# Patient Record
Sex: FEMALE | Race: WHITE | NOT HISPANIC OR LATINO | Employment: OTHER | URBAN - METROPOLITAN AREA
[De-identification: names, ages, dates, MRNs, and addresses within clinical notes are randomized per-mention and may not be internally consistent; named-entity substitution may affect disease eponyms.]

---

## 2017-01-04 ENCOUNTER — OFFICE VISIT (OUTPATIENT)
Dept: GASTROENTEROLOGY | Facility: CLINIC | Age: 67
End: 2017-01-04
Payer: MEDICARE

## 2017-01-04 ENCOUNTER — LAB VISIT (OUTPATIENT)
Dept: LAB | Facility: HOSPITAL | Age: 67
End: 2017-01-04
Attending: INTERNAL MEDICINE
Payer: MEDICARE

## 2017-01-04 VITALS
BODY MASS INDEX: 16.01 KG/M2 | HEART RATE: 80 BPM | SYSTOLIC BLOOD PRESSURE: 132 MMHG | WEIGHT: 105.63 LBS | DIASTOLIC BLOOD PRESSURE: 82 MMHG | HEIGHT: 68 IN

## 2017-01-04 DIAGNOSIS — K50.018 CROHN'S DISEASE OF SMALL INTESTINE WITH OTHER COMPLICATION: ICD-10-CM

## 2017-01-04 DIAGNOSIS — R63.4 WEIGHT LOSS, NON-INTENTIONAL: ICD-10-CM

## 2017-01-04 DIAGNOSIS — K59.09 CONSTIPATION, CHRONIC: ICD-10-CM

## 2017-01-04 DIAGNOSIS — K50.018 CROHN'S DISEASE OF SMALL INTESTINE WITH OTHER COMPLICATION: Primary | ICD-10-CM

## 2017-01-04 LAB
ANION GAP SERPL CALC-SCNC: 9 MMOL/L
BASOPHILS # BLD AUTO: 0.01 K/UL
BASOPHILS NFR BLD: 0.2 %
BUN SERPL-MCNC: 9 MG/DL
CALCIUM SERPL-MCNC: 9.4 MG/DL
CHLORIDE SERPL-SCNC: 102 MMOL/L
CO2 SERPL-SCNC: 26 MMOL/L
CREAT SERPL-MCNC: 0.9 MG/DL
CRP SERPL-MCNC: 1.2 MG/L
DIFFERENTIAL METHOD: ABNORMAL
EOSINOPHIL # BLD AUTO: 0 K/UL
EOSINOPHIL NFR BLD: 0.3 %
ERYTHROCYTE [DISTWIDTH] IN BLOOD BY AUTOMATED COUNT: 12.5 %
ERYTHROCYTE [SEDIMENTATION RATE] IN BLOOD BY WESTERGREN METHOD: 8 MM/HR
EST. GFR  (AFRICAN AMERICAN): >60 ML/MIN/1.73 M^2
EST. GFR  (NON AFRICAN AMERICAN): >60 ML/MIN/1.73 M^2
GLUCOSE SERPL-MCNC: 101 MG/DL
HCT VFR BLD AUTO: 39.9 %
HGB BLD-MCNC: 13.9 G/DL
LYMPHOCYTES # BLD AUTO: 1.6 K/UL
LYMPHOCYTES NFR BLD: 23.6 %
MCH RBC QN AUTO: 33.4 PG
MCHC RBC AUTO-ENTMCNC: 34.8 %
MCV RBC AUTO: 96 FL
MONOCYTES # BLD AUTO: 0.7 K/UL
MONOCYTES NFR BLD: 10.3 %
NEUTROPHILS # BLD AUTO: 4.3 K/UL
NEUTROPHILS NFR BLD: 65.4 %
PLATELET # BLD AUTO: 257 K/UL
PMV BLD AUTO: 9.9 FL
POTASSIUM SERPL-SCNC: 4.3 MMOL/L
RBC # BLD AUTO: 4.16 M/UL
SODIUM SERPL-SCNC: 137 MMOL/L
TSH SERPL DL<=0.005 MIU/L-ACNC: 1.33 UIU/ML
WBC # BLD AUTO: 6.62 K/UL

## 2017-01-04 PROCEDURE — 85651 RBC SED RATE NONAUTOMATED: CPT | Mod: PO

## 2017-01-04 PROCEDURE — 1126F AMNT PAIN NOTED NONE PRSNT: CPT | Mod: S$GLB,,, | Performed by: INTERNAL MEDICINE

## 2017-01-04 PROCEDURE — 3075F SYST BP GE 130 - 139MM HG: CPT | Mod: S$GLB,,, | Performed by: INTERNAL MEDICINE

## 2017-01-04 PROCEDURE — 1159F MED LIST DOCD IN RCRD: CPT | Mod: S$GLB,,, | Performed by: INTERNAL MEDICINE

## 2017-01-04 PROCEDURE — 3079F DIAST BP 80-89 MM HG: CPT | Mod: S$GLB,,, | Performed by: INTERNAL MEDICINE

## 2017-01-04 PROCEDURE — 84443 ASSAY THYROID STIM HORMONE: CPT

## 2017-01-04 PROCEDURE — 99214 OFFICE O/P EST MOD 30 MIN: CPT | Mod: S$GLB,,, | Performed by: INTERNAL MEDICINE

## 2017-01-04 PROCEDURE — 36415 COLL VENOUS BLD VENIPUNCTURE: CPT | Mod: PO

## 2017-01-04 PROCEDURE — 86140 C-REACTIVE PROTEIN: CPT

## 2017-01-04 PROCEDURE — 99499 UNLISTED E&M SERVICE: CPT | Mod: S$GLB,,, | Performed by: INTERNAL MEDICINE

## 2017-01-04 PROCEDURE — 1160F RVW MEDS BY RX/DR IN RCRD: CPT | Mod: S$GLB,,, | Performed by: INTERNAL MEDICINE

## 2017-01-04 PROCEDURE — 85025 COMPLETE CBC W/AUTO DIFF WBC: CPT

## 2017-01-04 PROCEDURE — 80048 BASIC METABOLIC PNL TOTAL CA: CPT

## 2017-01-04 PROCEDURE — 99999 PR PBB SHADOW E&M-EST. PATIENT-LVL III: CPT | Mod: PBBFAC,,, | Performed by: INTERNAL MEDICINE

## 2017-01-04 PROCEDURE — 1157F ADVNC CARE PLAN IN RCRD: CPT | Mod: S$GLB,,, | Performed by: INTERNAL MEDICINE

## 2017-01-04 RX ORDER — MESALAMINE 0.38 G/1
1.5 CAPSULE, EXTENDED RELEASE ORAL DAILY
Qty: 360 CAPSULE | Refills: 3 | Status: SHIPPED | OUTPATIENT
Start: 2017-01-04 | End: 2017-07-10 | Stop reason: ALTCHOICE

## 2017-01-04 RX ORDER — MESALAMINE 0.38 G/1
1.5 CAPSULE, EXTENDED RELEASE ORAL DAILY
Qty: 120 CAPSULE | Refills: 0 | Status: SHIPPED | OUTPATIENT
Start: 2017-01-04 | End: 2017-02-10

## 2017-01-04 RX ORDER — AMLODIPINE BESYLATE 10 MG/1
TABLET ORAL
Qty: 90 TABLET | Refills: 1 | Status: SHIPPED | OUTPATIENT
Start: 2017-01-04 | End: 2017-05-18 | Stop reason: SDUPTHER

## 2017-01-04 NOTE — MR AVS SNAPSHOT
[facility]  [address] [place] [address]  Phone: [phone]  Fax: [phone]                  Manuela Reyes   2017 10:40 AM   Office Visit    Description:  Female : 1950   Provider:  Jose Luis Leonard MD   Department:  [facility] - Gastroenterology           Reason for Visit     Crohn's Disease     Irritable Bowel Syndrome     Follow-up     Constipation     Bloated     Weight Loss           Diagnoses this Visit        Comments    Crohn's disease of small intestine with other complication    -  Primary     Constipation, chronic         Weight loss, non-intentional                To Do List           Future Appointments        Provider Department Dept Phone    2017 4:45 PM LAB, SAME DAY [facility] [phone]    2017 8:00 AM LABORATORY, [address] [facility] [phone]    2017 8:30 AM ONLC [facility] [phone]    2017 11:00 AM [facility] Luis Leonard [facility] [phone]      Goals (5 Years of Data)     None      Follow-Up and Disposition     Return in about 3 months (around 2017).       These Medications        Disp Refills Start End    mesalamine (APRISO) 0.375 gram Cp24 360 capsule 3 2017    Take 4 capsules (1.5 g total) by mouth once daily. - Oral    Pharmacy: [facility] [address] Ph #: [phone]       mesalamine (APRISO) 0.375 gram Cp24 120 capsule 0 2017    Take 4 capsules (1.5 g total) by mouth once daily. - Oral    Pharmacy: [facility] [place] [address] - [place]  [address] Ph #: [phone]         [facility] Call     [facility] On Call Nurse Care Line -  Assistance  Registered nurses in the [facility] On Call Center provide clinical advisement, health education, appointment booking, and other advisory services.  Call for this free service at  [phone].             Medications           Message regarding Medications     Verify the changes and/or additions to your medication regime listed below are the same as discussed with your clinician today.  If any of these changes or additions are incorrect, please notify your healthcare provider.        START taking these NEW medications        Refills    mesalamine (APRISO) 0.375 gram Cp24 3    Sig: Take 4 capsules (1.5 g total) by mouth once daily.    Class: Normal    Route: Oral    mesalamine (APRISO) 0.375 gram Cp24 0    Sig: Take 4 capsules (1.5 g total) by mouth once daily.    Class: Normal    Route: Oral      STOP taking these medications     PENTASA 500 mg CR capsule TAKE 2 CAPSULES TWICE DAILY           Verify that the below list of medications is an accurate representation of the medications you are currently taking.  If none reported, the list may be blank. If incorrect, please contact your healthcare provider. Carry this list with you in case of emergency.           Current Medications     amlodipine (NORVASC) 10 MG tablet TAKE 1 TABLET EVERY DAY    aspirin (ECOTRIN) 325 MG EC tablet Take 81 mg by mouth once daily.     calcium carbonate (OS-COOPER) 600 mg (1,500 mg) Tab Take 600 mg by mouth 2 (two) times daily with meals.    cyanocobalamin, vitamin B-12, 2,500 mcg Lozg Place 1 tablet under the tongue 2 (two) times daily.    fluticasone (FLONASE) 50 mcg/actuation nasal spray USE 2 SPRAYS IN EACH NOSTRIL EVERY DAY    loratadine (CLARITIN) 10 mg tablet     lorazepam (ATIVAN) 1 MG tablet Take 1 tablet (1 mg total) by mouth every evening.    vitamin D 1000 units Tab Take 185 mg by mouth once daily.    desonide 0.05% (DESOWEN) 0.05 % Oint Apply topically 3 (three) times daily. Apply to upper and lower eyelid rims OU TID for 7 days.    mesalamine (APRISO) 0.375 gram Cp24 Take 4 capsules (1.5 g total) by mouth once daily.    mesalamine (APRISO) 0.375 gram Cp24 Take 4 capsules (1.5 g total) by mouth once  "daily.    valacyclovir (VALTREX) 500 MG tablet Take 1 tablet (500 mg total) by mouth 2 (two) times daily.           Clinical Reference Information           Vital Signs - Last Recorded  Most recent update: 1/4/2017 11:16 AM by Aurelia Mercado LPN    BP Pulse Ht Wt BMI    132/82 80 5' 8.4" (1.737 m) 47.9 kg (105 lb 9.6 oz) 15.87 kg/m2      Blood Pressure          Most Recent Value    BP  132/82      Allergies as of 1/4/2017     Codeine Sulfate    Hydrochlorothiazide    Lisinopril      Immunizations Administered on Date of Encounter - 1/4/2017     None      Orders Placed During Today's Visit     Future Labs/Procedures Expected by Expires    Basic metabolic panel  1/4/2017 3/5/2018    C-reactive protein  1/4/2017 3/5/2018    CBC auto differential  1/4/2017 3/5/2018    ESR (SEDIMENTATION RATE, MANUAL)  1/4/2017 3/5/2018    TSH  1/4/2017 3/5/2018      "
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
## 2017-01-05 ENCOUNTER — PATIENT MESSAGE (OUTPATIENT)
Dept: GASTROENTEROLOGY | Facility: HOSPITAL | Age: 67
End: 2017-01-05

## 2017-01-05 ENCOUNTER — PATIENT MESSAGE (OUTPATIENT)
Dept: GASTROENTEROLOGY | Facility: CLINIC | Age: 67
End: 2017-01-05

## 2017-01-05 DIAGNOSIS — K50.018 CROHN'S DISEASE OF SMALL INTESTINE WITH OTHER COMPLICATION: ICD-10-CM

## 2017-01-05 DIAGNOSIS — R63.4 WEIGHT LOSS, NON-INTENTIONAL: Primary | ICD-10-CM

## 2017-01-05 NOTE — TELEPHONE ENCOUNTER
Labs normal. Will get CT A/P to evaluate weight loss.   Results sent to patient through [facility].

## 2017-01-09 ENCOUNTER — TELEPHONE (OUTPATIENT)
Dept: GASTROENTEROLOGY | Facility: CLINIC | Age: 67
End: 2017-01-09

## 2017-01-10 ENCOUNTER — PATIENT MESSAGE (OUTPATIENT)
Dept: GASTROENTEROLOGY | Facility: CLINIC | Age: 67
End: 2017-01-10

## 2017-01-11 ENCOUNTER — HOSPITAL ENCOUNTER (OUTPATIENT)
Dept: RADIOLOGY | Facility: HOSPITAL | Age: 67
Discharge: HOME | End: 2017-01-11
Attending: INTERNAL MEDICINE
Payer: MEDICARE

## 2017-01-11 ENCOUNTER — PATIENT MESSAGE (OUTPATIENT)
Dept: GASTROENTEROLOGY | Facility: CLINIC | Age: 67
End: 2017-01-11

## 2017-01-11 DIAGNOSIS — R63.4 WEIGHT LOSS, NON-INTENTIONAL: ICD-10-CM

## 2017-01-11 DIAGNOSIS — K50.018 CROHN'S DISEASE OF SMALL INTESTINE WITH OTHER COMPLICATION: ICD-10-CM

## 2017-01-11 PROCEDURE — 74177 CT ABD & PELVIS W/CONTRAST: CPT | Mod: TC

## 2017-01-11 PROCEDURE — 25500020 PHARM REV CODE 255: Performed by: INTERNAL MEDICINE

## 2017-01-11 RX ADMIN — IOHEXOL 75 ML: 300 INJECTION, SOLUTION INTRAVENOUS at 01:01

## 2017-01-11 RX ADMIN — IOHEXOL 30 ML: 350 INJECTION, SOLUTION INTRAVENOUS at 11:01

## 2017-01-12 ENCOUNTER — PATIENT MESSAGE (OUTPATIENT)
Dept: GASTROENTEROLOGY | Facility: CLINIC | Age: 67
End: 2017-01-12

## 2017-01-12 ENCOUNTER — PATIENT MESSAGE (OUTPATIENT)
Dept: GASTROENTEROLOGY | Facility: HOSPITAL | Age: 67
End: 2017-01-12

## 2017-01-17 ENCOUNTER — PATIENT MESSAGE (OUTPATIENT)
Dept: INTERNAL MEDICINE | Facility: CLINIC | Age: 67
End: 2017-01-17

## 2017-01-18 RX ORDER — LORAZEPAM 1 MG/1
1 TABLET ORAL NIGHTLY
Qty: 30 TABLET | Refills: 5 | Status: SHIPPED | OUTPATIENT
Start: 2017-01-18 | End: 2017-07-17 | Stop reason: SDUPTHER

## 2017-01-20 ENCOUNTER — TELEPHONE (OUTPATIENT)
Dept: GASTROENTEROLOGY | Facility: CLINIC | Age: 67
End: 2017-01-20

## 2017-01-20 ENCOUNTER — PATIENT MESSAGE (OUTPATIENT)
Dept: GASTROENTEROLOGY | Facility: HOSPITAL | Age: 67
End: 2017-01-20

## 2017-01-20 RX ORDER — BUDESONIDE 3 MG/1
9 CAPSULE, COATED PELLETS ORAL DAILY
Qty: 90 CAPSULE | Refills: 1 | Status: SHIPPED | OUTPATIENT
Start: 2017-01-20 | End: 2017-02-19

## 2017-01-20 RX ORDER — BUDESONIDE 3 MG/1
9 CAPSULE, COATED PELLETS ORAL DAILY
Qty: 90 CAPSULE | Refills: 1 | Status: SHIPPED | OUTPATIENT
Start: 2017-01-20 | End: 2017-01-20 | Stop reason: SDUPTHER

## 2017-01-24 ENCOUNTER — PATIENT MESSAGE (OUTPATIENT)
Dept: INTERNAL MEDICINE | Facility: CLINIC | Age: 67
End: 2017-01-24

## 2017-01-27 ENCOUNTER — PATIENT MESSAGE (OUTPATIENT)
Dept: GASTROENTEROLOGY | Facility: HOSPITAL | Age: 67
End: 2017-01-27

## 2017-02-08 ENCOUNTER — LAB VISIT (OUTPATIENT)
Dept: LAB | Facility: HOSPITAL | Age: 67
End: 2017-02-08
Attending: INTERNAL MEDICINE
Payer: MEDICARE

## 2017-02-08 ENCOUNTER — PATIENT MESSAGE (OUTPATIENT)
Dept: GASTROENTEROLOGY | Facility: CLINIC | Age: 67
End: 2017-02-08

## 2017-02-08 DIAGNOSIS — I10 ESSENTIAL HYPERTENSION: ICD-10-CM

## 2017-02-08 LAB
ANION GAP SERPL CALC-SCNC: 12 MMOL/L
BUN SERPL-MCNC: 14 MG/DL
CALCIUM SERPL-MCNC: 9.4 MG/DL
CHLORIDE SERPL-SCNC: 101 MMOL/L
CHOLEST/HDLC SERPL: 2.5 {RATIO}
CO2 SERPL-SCNC: 24 MMOL/L
CREAT SERPL-MCNC: 0.8 MG/DL
EST. GFR  (AFRICAN AMERICAN): >60 ML/MIN/1.73 M^2
EST. GFR  (NON AFRICAN AMERICAN): >60 ML/MIN/1.73 M^2
GLUCOSE SERPL-MCNC: 89 MG/DL
HDL/CHOLESTEROL RATIO: 40.7 %
HDLC SERPL-MCNC: 182 MG/DL
HDLC SERPL-MCNC: 74 MG/DL
LDLC SERPL CALC-MCNC: 90.8 MG/DL
NONHDLC SERPL-MCNC: 108 MG/DL
POTASSIUM SERPL-SCNC: 4.3 MMOL/L
SODIUM SERPL-SCNC: 137 MMOL/L
TRIGL SERPL-MCNC: 86 MG/DL

## 2017-02-08 PROCEDURE — 80061 LIPID PANEL: CPT

## 2017-02-08 PROCEDURE — 36415 COLL VENOUS BLD VENIPUNCTURE: CPT

## 2017-02-08 PROCEDURE — 80048 BASIC METABOLIC PNL TOTAL CA: CPT

## 2017-02-10 ENCOUNTER — OFFICE VISIT (OUTPATIENT)
Dept: INTERNAL MEDICINE | Facility: CLINIC | Age: 67
End: 2017-02-10
Payer: MEDICARE

## 2017-02-10 VITALS
DIASTOLIC BLOOD PRESSURE: 72 MMHG | WEIGHT: 104.75 LBS | HEART RATE: 86 BPM | OXYGEN SATURATION: 98 % | BODY MASS INDEX: 15.87 KG/M2 | SYSTOLIC BLOOD PRESSURE: 142 MMHG | HEIGHT: 68 IN | TEMPERATURE: 98 F

## 2017-02-10 DIAGNOSIS — K50.00 CROHN'S DISEASE OF SMALL INTESTINE WITHOUT COMPLICATION: ICD-10-CM

## 2017-02-10 DIAGNOSIS — M81.0 OSTEOPOROSIS: ICD-10-CM

## 2017-02-10 DIAGNOSIS — I10 ESSENTIAL HYPERTENSION: Primary | ICD-10-CM

## 2017-02-10 DIAGNOSIS — K55.1 MESENTERIC ARTERY STENOSIS: ICD-10-CM

## 2017-02-10 DIAGNOSIS — R63.4 WEIGHT LOSS: ICD-10-CM

## 2017-02-10 PROCEDURE — 3077F SYST BP >= 140 MM HG: CPT | Mod: S$GLB,,, | Performed by: INTERNAL MEDICINE

## 2017-02-10 PROCEDURE — 99999 PR PBB SHADOW E&M-EST. PATIENT-LVL III: CPT | Mod: PBBFAC,,, | Performed by: INTERNAL MEDICINE

## 2017-02-10 PROCEDURE — 99214 OFFICE O/P EST MOD 30 MIN: CPT | Mod: S$GLB,,, | Performed by: INTERNAL MEDICINE

## 2017-02-10 PROCEDURE — 1160F RVW MEDS BY RX/DR IN RCRD: CPT | Mod: S$GLB,,, | Performed by: INTERNAL MEDICINE

## 2017-02-10 PROCEDURE — 1126F AMNT PAIN NOTED NONE PRSNT: CPT | Mod: S$GLB,,, | Performed by: INTERNAL MEDICINE

## 2017-02-10 PROCEDURE — 99499 UNLISTED E&M SERVICE: CPT | Mod: S$GLB,,, | Performed by: INTERNAL MEDICINE

## 2017-02-10 PROCEDURE — 1159F MED LIST DOCD IN RCRD: CPT | Mod: S$GLB,,, | Performed by: INTERNAL MEDICINE

## 2017-02-10 PROCEDURE — 1157F ADVNC CARE PLAN IN RCRD: CPT | Mod: S$GLB,,, | Performed by: INTERNAL MEDICINE

## 2017-02-10 PROCEDURE — 3078F DIAST BP <80 MM HG: CPT | Mod: S$GLB,,, | Performed by: INTERNAL MEDICINE

## 2017-02-10 NOTE — MR AVS SNAPSHOT
[facility]  [address]  [place] [address]  Phone: [phone]  Fax: [phone]                  Manuela Reyes   2/10/2017 11:00 AM   Office Visit    Description:  Female : 1950   Provider:  Christelle Lawler DO   Department:  [facility]           Reason for Visit     Follow-up           Diagnoses this Visit        Comments    Essential hypertension    -  Primary     Osteoporosis         Weight loss         Mesenteric artery stenosis         Crohn's disease of small intestine without complication                To Do List           Future Appointments        Provider Department Dept Phone    3/8/2017 9:30 AM [facility] Reed [facility] [phone]    2017 11:00 AM Jose Luis Leonard MD [facility] [phone]      Goals (5 Years of Data)     None      [facility] Nurse Care Line -  Assistance  Registered nurses in the [facility] On Call Center provide clinical advisement, health education, appointment booking, and other advisory services.  Call for this free service at [phone].             Medications           Message regarding Medications     Verify the changes and/or additions to your medication regime listed below are the same as discussed with your clinician today.  If any of these changes or additions are incorrect, please notify your healthcare provider.        STOP taking these medications     desonide 0.05% (DESOWEN) 0.05 % Oint Apply topically 3 (three) times daily. Apply to upper and lower eyelid rims OU TID for 7 days.    loratadine (CLARITIN) 10 mg tablet     valacyclovir (VALTREX) 500 MG tablet Take 1 tablet (500 mg total) by mouth 2 (two) times daily.           Verify that the below list of medications is an accurate representation of the medications you are currently taking.  If none reported, the list may be blank. If incorrect, please contact your  "healthcare provider. Carry this list with you in case of emergency.           Current Medications     amlodipine (NORVASC) 10 MG tablet TAKE 1 TABLET EVERY DAY    aspirin (ECOTRIN) 325 MG EC tablet Take 81 mg by mouth once daily.     budesonide (ENTOCORT EC) 3 mg capsule Take 3 capsules (9 mg total) by mouth once daily.    calcium carbonate (OS-COOPER) 600 mg (1,500 mg) Tab Take 600 mg by mouth 2 (two) times daily with meals.    cyanocobalamin, vitamin B-12, 2,500 mcg Lozg Place 1 tablet under the tongue 2 (two) times daily.    fluticasone (FLONASE) 50 mcg/actuation nasal spray USE 2 SPRAYS IN EACH NOSTRIL EVERY DAY    lorazepam (ATIVAN) 1 MG tablet Take 1 tablet (1 mg total) by mouth every evening.    mesalamine (APRISO) 0.375 gram Cp24 Take 4 capsules (1.5 g total) by mouth once daily.    vitamin D 1000 units Tab Take 185 mg by mouth once daily.           Clinical Reference Information           Your Vitals Were     BP Pulse Temp Height    142/72 (BP Location: Right arm, Patient Position: Sitting, BP Method: Manual) 86 97.5 °F (36.4 °C) (Tympanic) 5' 8.4" (1.737 m)    Weight SpO2 BMI    47.5 kg (104 lb 11.5 oz) 98% 15.74 kg/m2      Blood Pressure          Most Recent Value    BP  (!)  142/72      Allergies as of 2/10/2017     Codeine Sulfate    Hydrochlorothiazide    Lisinopril      Immunizations Administered on Date of Encounter - 2/10/2017     None      Orders Placed During Today's Visit      Normal Orders This Visit    Ambulatory consult to Vascular Surgery       Language Assistance Services     ATTENTION: Language assistance services are available, free of charge. Please call [phone].      ATENCIÓN: Si habla español, tiene a marte disposición servicios gratuitos de asistencia lingüística. Llame al [phone].     CHÚ Ý: N?u b?n nói Ti?ng Vi?t, có các d?ch v? h? tr? ngôn ng? mi?n phí dành cho b?n. G?i s? [phone].         [facility] complies with applicable Federal civil rights laws " and does not discriminate on the basis of race, color, national origin, age, disability, or sex.

## 2017-02-10 NOTE — PROGRESS NOTES
Subjective:       Patient ID: Manuela Reyes is a 67 y.o. female.    Chief Complaint: Follow-up    HPI Comments: Manuela Reyes  67 y.o. White female    Patient presents with:  Follow-up    HPI: Here today to follow up on chronic conditions.  HTN--stable on amlodipine.                     LDLCALC                  90.8                02/08/2017            She recently had a DEXA scan that shows osteoporosis. She takes vitamin D. She is active.   She has been losing weight. She was recently evaluated by her gastroenterologist for Crohn's disease. She had a CT scan that showed mesenteric stenosis. She denies abdominal pain with meals or anorexia. She is concerned about this.     Past Medical History:    Anxiety                                                       Crohn's disease                                               Depression                                                    Genital herpes                                                Hypertension                                                  Insomnia                                                      Osteoporosis                                                  TIA (transient ischemic attack)                               Current Outpatient Prescriptions on File Prior to Visit:  amlodipine (NORVASC) 10 MG tablet, TAKE 1 TABLET EVERY DAY, Disp: 90 tablet, Rfl: 1  aspirin (ECOTRIN) 325 MG EC tablet, Take 81 mg by mouth once daily. , Disp: , Rfl:   budesonide (ENTOCORT EC) 3 mg capsule, Take 3 capsules (9 mg total) by mouth once daily., Disp: 90 capsule, Rfl: 1  calcium carbonate (OS-COOPER) 600 mg (1,500 mg) Tab, Take 600 mg by mouth 2 (two) times daily with meals., Disp: , Rfl:   cyanocobalamin, vitamin B-12, 2,500 mcg Lozg, Place 1 tablet under the tongue 2 (two) times daily., Disp: , Rfl:   fluticasone (FLONASE) 50 mcg/actuation nasal spray, USE 2 SPRAYS IN EACH NOSTRIL EVERY DAY, Disp: 48 g, Rfl: 3  lorazepam (ATIVAN) 1 MG tablet, Take  1 tablet (1 mg total) by mouth every evening., Disp: 30 tablet, Rfl: 5  mesalamine (APRISO) 0.375 gram Cp24, Take 4 capsules (1.5 g total) by mouth once daily., Disp: 360 capsule, Rfl: 3  vitamin D 1000 units Tab, Take 185 mg by mouth once daily., Disp: , Rfl:     Allergies:  Review of patient's allergies indicates:   -- Codeine sulfate -- Itching   -- Hydrochlorothiazide -- Other (See Comments)    --  Adverse Reaction   -- Lisinopril -- Swelling and Edema    --  Facial Swelling        Review of Systems   Constitutional: Positive for unexpected weight change. Negative for fever.   Respiratory: Negative for cough and shortness of breath.    Cardiovascular: Negative for chest pain and leg swelling.   Gastrointestinal: Negative for abdominal pain and blood in stool.   Genitourinary: Negative for dysuria.   Neurological: Negative for dizziness and headaches.       Objective:      Physical Exam   Constitutional: She is oriented to person, place, and time. She appears well-developed and well-nourished. No distress.   Eyes: No scleral icterus.   Cardiovascular: Normal rate, regular rhythm and normal heart sounds.    Pulmonary/Chest: Effort normal and breath sounds normal. No respiratory distress. She has no wheezes. She has no rales.   Abdominal: Soft. Bowel sounds are normal. She exhibits no distension. There is no tenderness.   Surgical scars on abdomen     Musculoskeletal: She exhibits no edema.   Neurological: She is alert and oriented to person, place, and time.   Skin: Skin is warm and dry.   Psychiatric: She has a normal mood and affect.   Vitals reviewed.      Assessment:       1. Essential hypertension    2. Osteoporosis    3. Weight loss    4. Mesenteric artery stenosis    5. Crohn's disease of small intestine without complication        Plan:       Manuela was seen today for follow-up.    Diagnoses and all orders for this visit:    Essential hypertension  -     Continue current management    Osteoporosis  -      Continue vitamin D  -     Await finalized report    Weight loss  -     Monitor    Crohn's disease of small intestine without complication    Mesenteric artery stenosis  -     Ambulatory consult to Vascular Surgery    RTC in 6 months and as needed.

## 2017-02-19 ENCOUNTER — PATIENT MESSAGE (OUTPATIENT)
Dept: GASTROENTEROLOGY | Facility: CLINIC | Age: 67
End: 2017-02-19

## 2017-02-20 ENCOUNTER — TELEPHONE (OUTPATIENT)
Dept: INTERNAL MEDICINE | Facility: CLINIC | Age: 67
End: 2017-02-20

## 2017-02-20 DIAGNOSIS — M81.0 OSTEOPOROSIS: Primary | ICD-10-CM

## 2017-02-21 ENCOUNTER — TELEPHONE (OUTPATIENT)
Dept: RHEUMATOLOGY | Facility: CLINIC | Age: 67
End: 2017-02-21

## 2017-02-21 NOTE — TELEPHONE ENCOUNTER
Called patient regarding referral from Dr. Lawler, made an apt with Dr. DUVALL for 5.16.17 at 2.30 pm. Pt verbalized understanding. Will mail apt slip as well.

## 2017-02-21 NOTE — TELEPHONE ENCOUNTER
----- Message from Pita Luciano LPN sent at 2/21/2017  1:29 PM CST -----      ----- Message -----     From: Ines Ramirez     Sent: 2/21/2017   1:27 PM       To: Dione GARRETT Staff    Patient states that she was returning your call.   Call her at [phone].                                      key

## 2017-04-12 ENCOUNTER — OFFICE VISIT (OUTPATIENT)
Dept: GASTROENTEROLOGY | Facility: CLINIC | Age: 67
End: 2017-04-12
Payer: MEDICARE

## 2017-04-12 VITALS
HEIGHT: 68 IN | HEART RATE: 84 BPM | WEIGHT: 104.25 LBS | BODY MASS INDEX: 15.8 KG/M2 | DIASTOLIC BLOOD PRESSURE: 76 MMHG | SYSTOLIC BLOOD PRESSURE: 140 MMHG

## 2017-04-12 DIAGNOSIS — K55.1 SMA STENOSIS: ICD-10-CM

## 2017-04-12 DIAGNOSIS — K50.012 CROHN'S DISEASE OF SMALL INTESTINE WITH INTESTINAL OBSTRUCTION: Primary | ICD-10-CM

## 2017-04-12 DIAGNOSIS — K59.09 CONSTIPATION, CHRONIC: ICD-10-CM

## 2017-04-12 PROCEDURE — 1157F ADVNC CARE PLAN IN RCRD: CPT | Mod: S$GLB,,, | Performed by: INTERNAL MEDICINE

## 2017-04-12 PROCEDURE — 99214 OFFICE O/P EST MOD 30 MIN: CPT | Mod: S$GLB,,, | Performed by: INTERNAL MEDICINE

## 2017-04-12 PROCEDURE — 1160F RVW MEDS BY RX/DR IN RCRD: CPT | Mod: S$GLB,,, | Performed by: INTERNAL MEDICINE

## 2017-04-12 PROCEDURE — 1159F MED LIST DOCD IN RCRD: CPT | Mod: S$GLB,,, | Performed by: INTERNAL MEDICINE

## 2017-04-12 PROCEDURE — 1126F AMNT PAIN NOTED NONE PRSNT: CPT | Mod: S$GLB,,, | Performed by: INTERNAL MEDICINE

## 2017-04-12 PROCEDURE — 99999 PR PBB SHADOW E&M-EST. PATIENT-LVL III: CPT | Mod: PBBFAC,,, | Performed by: INTERNAL MEDICINE

## 2017-04-12 PROCEDURE — 3078F DIAST BP <80 MM HG: CPT | Mod: S$GLB,,, | Performed by: INTERNAL MEDICINE

## 2017-04-12 PROCEDURE — 3077F SYST BP >= 140 MM HG: CPT | Mod: S$GLB,,, | Performed by: INTERNAL MEDICINE

## 2017-04-12 PROCEDURE — 99499 UNLISTED E&M SERVICE: CPT | Mod: S$GLB,,, | Performed by: INTERNAL MEDICINE

## 2017-04-12 RX ORDER — SODIUM, POTASSIUM,MAG SULFATES 17.5-3.13G
1 SOLUTION, RECONSTITUTED, ORAL ORAL ONCE
Qty: 1 BOTTLE | Refills: 0 | Status: SHIPPED | OUTPATIENT
Start: 2017-04-12 | End: 2017-04-12

## 2017-04-12 NOTE — PROGRESS NOTES
Clinic Follow Up:  [facility] Follow Up Note    Reason for Follow Up:  The primary encounter diagnosis was Crohn's disease of small intestine with intestinal obstruction. Diagnoses of Constipation, chronic and SMA stenosis were also pertinent to this visit.    PCP: Christelle Lawler       HPI:  This is a 67 y.o. female here for follow up of the above issues.  She has been doing better since our last visit.  She has been taking Apriso 1.5 grams daily and is about to finish a 3 month course of Entocort.  She reports with this combination her symptoms have improved dramatically.  Her bloating is much improved but still occurs occasionally.  She has implemented a low fiber diet and reports that she has only had 2 significant episodes of abdominal bloating.  She is also using MiraLAX once a day and reports having a bowel movement on most days.  Yesterday she had a little bit of diarrhea and urgency but this is the only time this has happened in the last few months.  Her abdominal discomfort is improved as well.  Her weight has been stable.  After her last visit she had a CT scan done and it revealed some narrowing of the proximal SMA.  She has seen vascular surgery for this and no intervention was recommended.    Review of Systems:  CONSTITUTIONAL: Denies weight change,  fatigue, fevers, chills, night sweats.  CARDIOVASCULAR: Denies chest pain, shortness of breath, orthopnea and edema.  RESPIRATORY: Denies cough, hemoptysis, dyspnea, and wheezing.  GI: See HPI.  : Denies dysuria and hematuria    Medical History:  Past Medical History:   Diagnosis Date    Anxiety     Crohn's disease     Depression     Genital herpes     Hypertension     Insomnia     Osteoporosis     TIA (transient ischemic attack)        Surgical History:   Past Surgical History:   Procedure Laterality Date    APPENDECTOMY      SMALL INTESTINE SURGERY         Family History:   Family History   Problem Relation Age of Onset     "Stroke Mother     Cancer Father      Lung    Glaucoma Neg Hx     Macular degeneration Neg Hx     Thyroid disease Neg Hx        Social History:   Social History   Substance Use Topics    Smoking status: Former Smoker     Years: 20.00     Types: Cigarettes     Quit date: 1/1/2005    Smokeless tobacco: Never Used      Comment: Former Light Smoker less than 10 a day    Alcohol use 2.4 oz/week     4 Glasses of wine per week       Allergies: Reviewed    Home Medications:  Medication List with Changes/Refills   New Medications    SODIUM,POTASSIUM,MAG SULFATES (SUPREP BOWEL PREP KIT) 17.5-3.13-1.6 GRAM SOLR    Take 1 kit by mouth once.   Current Medications    AMLODIPINE (NORVASC) 10 MG TABLET    TAKE 1 TABLET EVERY DAY    ASPIRIN (ECOTRIN) 325 MG EC TABLET    Take 81 mg by mouth once daily.     CALCIUM CARBONATE (OS-COOPER) 600 MG (1,500 MG) TAB    Take 600 mg by mouth 2 (two) times daily with meals.    CYANOCOBALAMIN, VITAMIN B-12, 2,500 MCG LOZG    Place 1 tablet under the tongue 2 (two) times daily.    FLUTICASONE (FLONASE) 50 MCG/ACTUATION NASAL SPRAY    USE 2 SPRAYS IN EACH NOSTRIL EVERY DAY    LORATADINE ORAL    Take 10 mg by mouth.    LORAZEPAM (ATIVAN) 1 MG TABLET    Take 1 tablet (1 mg total) by mouth every evening.    MESALAMINE (APRISO) 0.375 GRAM CP24    Take 4 capsules (1.5 g total) by mouth once daily.    VITAMIN D 1000 UNITS TAB    Take 185 mg by mouth once daily.       Physical Exam:  Vital Signs:  BP (!) 140/76  Pulse 84  Ht 5' 8.4" (1.737 m)  Wt 47.3 kg (104 lb 4.4 oz)  BMI 15.67 kg/m2  Body mass index is 15.67 kg/(m^2).      GENERAL: No acute distress, Alert and oriented x 4  EYES: Anicteric, no pallor noted.  ENT: Oropharynx is clear  NECK: Supple, no masses, no thyromegally.  CHEST: Equal breath sounds bilaterally, no wheezing.  CARDIOVASCULAR: Regular rate and rhythm. Murmurs, rubs and gallops absent.  ABDOMEN: soft, non-tender, non-distended, normal bowel sounds.  EXTREMITIES: No clubbing, " cyanosis or edema.    Labs: Pertinent labs reviewed.  Recent BMP noted.    Imaging: CT scan personally reviewed again.    Endoscopy:  Not applicable.    CRC Screening: Due for colonoscopy.    Assessment:  1. Crohn's disease of small intestine with intestinal obstruction    2. Constipation, chronic    3. SMA stenosis        Recommendations:  1.  Crohn's disease: Overall her symptoms are much better.  I recommend that she continue Apriso for the time being.  We will evaluate her colon in the near future with colonoscopy.  This will help to determine if the stenotic area seen on CT scan is more related to an inflammatory stricture or fibrotic stricture.  Continue low fiber diet at the present time.    2.  Chronic constipation: Continue MiraLAX.  Symptoms under better control.    3.  SMA stenosis.  Seen by vascular surgery.  No intervention recommended at the present time.    Return to Clinic:    Follow up based on the above evaluation.

## 2017-04-12 NOTE — MR AVS SNAPSHOT
[facility]  [address] [address]  Phone: [phone]  Fax: [phone]                  Manuela Reyes   2017 11:00 AM   Office Visit    Description:  Female : 1950   Provider:  Jose Luis Leonard MD   Department:  [facility]           Reason for Visit     Crohn's Disease     Irritable Bowel Syndrome           Diagnoses this Visit        Comments    Crohn's disease of small intestine with intestinal obstruction    -  Primary     Constipation, chronic         SMA stenosis                To Do List           Future Appointments        Provider Department Dept Phone    2017 2:30 PM Jayjay Larkin MD [facility] [phone]      Your Future Surgeries/Procedures     May 04, 2017   Surgery with Jose Luis Leonard MD   [facility])    [address] [address] [phone]              Goals (5 Years of Data)     None       These Medications        Disp Refills Start End    sodium,potassium,mag sulfates (SUPREP BOWEL PREP KIT) 17.5-3.13-1.6 gram SolR 1 Bottle 0 2017    Take 1 kit by mouth once. - Oral    Pharmacy: [facility] - [place] [id number] [facility] Ph #: [phone]         [facility]     [facility] Care Line - 24/ Assistance  Unless otherwise directed by your provider, please contact [facility], our nurse care line that is available for 24/7 assistance.     Registered nurses in the [facility] provide: appointment scheduling, clinical advisement, health education, and other advisory services.  Call: [phone] (toll free)               Medications           Message regarding Medications     Verify the changes and/or additions to your medication regime listed below are the same as discussed with your clinician today.  If any of these changes or additions are  "incorrect, please notify your healthcare provider.        START taking these NEW medications        Refills    sodium,potassium,mag sulfates (SUPREP BOWEL PREP KIT) 17.5-3.13-1.6 gram SolR 0    Sig: Take 1 kit by mouth once.    Class: Normal    Route: Oral           Verify that the below list of medications is an accurate representation of the medications you are currently taking.  If none reported, the list may be blank. If incorrect, please contact your healthcare provider. Carry this list with you in case of emergency.           Current Medications     amlodipine (NORVASC) 10 MG tablet TAKE 1 TABLET EVERY DAY    aspirin (ECOTRIN) 325 MG EC tablet Take 81 mg by mouth once daily.     calcium carbonate (OS-COOPER) 600 mg (1,500 mg) Tab Take 600 mg by mouth 2 (two) times daily with meals.    cyanocobalamin, vitamin B-12, 2,500 mcg Lozg Place 1 tablet under the tongue 2 (two) times daily.    fluticasone (FLONASE) 50 mcg/actuation nasal spray USE 2 SPRAYS IN EACH NOSTRIL EVERY DAY    LORATADINE ORAL Take 10 mg by mouth.    lorazepam (ATIVAN) 1 MG tablet Take 1 tablet (1 mg total) by mouth every evening.    mesalamine (APRISO) 0.375 gram Cp24 Take 4 capsules (1.5 g total) by mouth once daily.    vitamin D 1000 units Tab Take 185 mg by mouth once daily.    sodium,potassium,mag sulfates (SUPREP BOWEL PREP KIT) 17.5-3.13-1.6 gram SolR Take 1 kit by mouth once.           Clinical Reference Information           Your Vitals Were     BP Pulse Height Weight BMI    140/76 84 5' 8.4" (1.737 m) 47.3 kg (104 lb 4.4 oz) 15.67 kg/m2      Blood Pressure          Most Recent Value    BP  (!)  140/76      Allergies as of 4/12/2017     Codeine Sulfate    Hydrochlorothiazide    Lisinopril      Immunizations Administered on Date of Encounter - 4/12/2017     None      Orders Placed During Today's Visit      Normal Orders This Visit    Case request GI: COLONOSCOPY       Language Assistance Services     ATTENTION: Language assistance services " are available, free of charge. Please call [phone].      ATENCIÓN: Si habla español, tiene a marte disposición servicios gratuitos de asistencia lingüística. Llame al [phone].     CHÚ Ý: N?u b?n nói Ti?ng Vi?t, có các d?ch v? h? tr? ngôn ng? mi?n phí dành cho b?n. G?i s? [phone].         [facility] complies with applicable Federal civil rights laws and does not discriminate on the basis of race, color, national origin, age, disability, or sex.

## 2017-04-13 ENCOUNTER — PATIENT MESSAGE (OUTPATIENT)
Dept: GASTROENTEROLOGY | Facility: CLINIC | Age: 67
End: 2017-04-13

## 2017-04-23 ENCOUNTER — PATIENT MESSAGE (OUTPATIENT)
Dept: GASTROENTEROLOGY | Facility: CLINIC | Age: 67
End: 2017-04-23

## 2017-04-26 ENCOUNTER — PATIENT MESSAGE (OUTPATIENT)
Dept: GASTROENTEROLOGY | Facility: CLINIC | Age: 67
End: 2017-04-26

## 2017-04-27 ENCOUNTER — TELEPHONE (OUTPATIENT)
Dept: GASTROENTEROLOGY | Facility: CLINIC | Age: 67
End: 2017-04-27

## 2017-04-27 NOTE — TELEPHONE ENCOUNTER
----- Message from Sergio Orosco sent at 4/27/2017 10:44 AM CDT -----  Contact: pt   States she is calling to discuss her colonoscopy appt and can be reached at [phone]//[email or web address]

## 2017-04-27 NOTE — TELEPHONE ENCOUNTER
Spoke with patient. She was originally scheduled for colonoscopy on May 4, 2017.  Was rescheduled to 6/1/2017 by schedulers. She would like to know if you want the colonoscopy sooner and if she will have to take the Apriso no matter what the colonoscopy shows.  She states the Apriso is very expensive and she just want to know if it is a long term medication. Please advise.

## 2017-05-01 ENCOUNTER — TELEPHONE (OUTPATIENT)
Dept: GASTROENTEROLOGY | Facility: CLINIC | Age: 67
End: 2017-05-01

## 2017-05-01 ENCOUNTER — PATIENT MESSAGE (OUTPATIENT)
Dept: GASTROENTEROLOGY | Facility: CLINIC | Age: 67
End: 2017-05-01

## 2017-05-01 NOTE — TELEPHONE ENCOUNTER
Spoke with patient. Informed her the if she is feeling well ok to wait until June to do the colonoscopy. Based on findings at the time of her colonoscopy she may need to continue the Apriso, buy may decide to switch medication .  Will need to be on some medication for the remainder of her life per Dr. Leonard.  Patient voiced understanding.

## 2017-05-01 NOTE — TELEPHONE ENCOUNTER
If she is feeling OK then we can wait until June to do the colonoscopy. Based on what we find at the time of the colonoscopy we may need to continue Apriso, but we may decide to switch to another medication. She will need to be on some medication for the remainder of her life.

## 2017-05-18 RX ORDER — AMLODIPINE BESYLATE 10 MG/1
TABLET ORAL
Qty: 90 TABLET | Refills: 2 | Status: SHIPPED | OUTPATIENT
Start: 2017-05-18 | End: 2018-03-28 | Stop reason: SDUPTHER

## 2017-05-19 ENCOUNTER — ANESTHESIA (OUTPATIENT)
Dept: ENDOSCOPY | Facility: HOSPITAL | Age: 67
End: 2017-05-19
Payer: MEDICARE

## 2017-05-19 ENCOUNTER — SURGERY (OUTPATIENT)
Age: 67
End: 2017-05-19

## 2017-05-19 ENCOUNTER — ANESTHESIA EVENT (OUTPATIENT)
Dept: ENDOSCOPY | Facility: HOSPITAL | Age: 67
End: 2017-05-19
Payer: MEDICARE

## 2017-05-19 ENCOUNTER — HOSPITAL ENCOUNTER (OUTPATIENT)
Facility: HOSPITAL | Age: 67
Discharge: HOME | End: 2017-05-19
Attending: INTERNAL MEDICINE | Admitting: INTERNAL MEDICINE
Payer: MEDICARE

## 2017-05-19 VITALS
WEIGHT: 102 LBS | SYSTOLIC BLOOD PRESSURE: 120 MMHG | TEMPERATURE: 98 F | OXYGEN SATURATION: 98 % | DIASTOLIC BLOOD PRESSURE: 72 MMHG | HEART RATE: 77 BPM | HEIGHT: 67 IN | BODY MASS INDEX: 16.01 KG/M2 | RESPIRATION RATE: 16 BRPM

## 2017-05-19 VITALS — RESPIRATION RATE: 14 BRPM

## 2017-05-19 DIAGNOSIS — K50.118 CROHN'S COLITIS, OTHER COMPLICATION: ICD-10-CM

## 2017-05-19 PROBLEM — K50.10 CROHN'S COLITIS: Status: ACTIVE | Noted: 2017-05-19

## 2017-05-19 PROCEDURE — 27201012 HC FORCEPS, HOT/COLD, DISP: Performed by: INTERNAL MEDICINE

## 2017-05-19 PROCEDURE — 37000009 HC ANESTHESIA EA ADD 15 MINS: Performed by: INTERNAL MEDICINE

## 2017-05-19 PROCEDURE — 88305 TISSUE EXAM BY PATHOLOGIST: CPT | Mod: 26,,, | Performed by: PATHOLOGY

## 2017-05-19 PROCEDURE — 25000003 PHARM REV CODE 250: Performed by: NURSE ANESTHETIST, CERTIFIED REGISTERED

## 2017-05-19 PROCEDURE — 45380 COLONOSCOPY AND BIOPSY: CPT | Performed by: INTERNAL MEDICINE

## 2017-05-19 PROCEDURE — 63600175 PHARM REV CODE 636 W HCPCS: Performed by: NURSE ANESTHETIST, CERTIFIED REGISTERED

## 2017-05-19 PROCEDURE — 45380 COLONOSCOPY AND BIOPSY: CPT | Mod: ,,, | Performed by: INTERNAL MEDICINE

## 2017-05-19 PROCEDURE — 88305 TISSUE EXAM BY PATHOLOGIST: CPT | Performed by: PATHOLOGY

## 2017-05-19 PROCEDURE — 37000008 HC ANESTHESIA 1ST 15 MINUTES: Performed by: INTERNAL MEDICINE

## 2017-05-19 PROCEDURE — 25000003 PHARM REV CODE 250: Performed by: INTERNAL MEDICINE

## 2017-05-19 RX ORDER — SODIUM CHLORIDE, SODIUM LACTATE, POTASSIUM CHLORIDE, CALCIUM CHLORIDE 600; 310; 30; 20 MG/100ML; MG/100ML; MG/100ML; MG/100ML
INJECTION, SOLUTION INTRAVENOUS CONTINUOUS
Status: DISCONTINUED | OUTPATIENT
Start: 2017-05-19 | End: 2017-05-19 | Stop reason: HOSPADM

## 2017-05-19 RX ORDER — LIDOCAINE HYDROCHLORIDE 10 MG/ML
INJECTION INFILTRATION; PERINEURAL
Status: DISCONTINUED | OUTPATIENT
Start: 2017-05-19 | End: 2017-05-19

## 2017-05-19 RX ORDER — ASPIRIN 81 MG/1
81 TABLET ORAL DAILY
COMMUNITY
Start: 2017-05-19

## 2017-05-19 RX ORDER — PROPOFOL 10 MG/ML
VIAL (ML) INTRAVENOUS
Status: DISCONTINUED | OUTPATIENT
Start: 2017-05-19 | End: 2017-05-19

## 2017-05-19 RX ADMIN — PROPOFOL 50 MG: 10 INJECTION, EMULSION INTRAVENOUS at 09:05

## 2017-05-19 RX ADMIN — LIDOCAINE HYDROCHLORIDE 50 MG: 10 INJECTION, SOLUTION INFILTRATION; PERINEURAL at 09:05

## 2017-05-19 RX ADMIN — PROPOFOL 20 MG: 10 INJECTION, EMULSION INTRAVENOUS at 09:05

## 2017-05-19 RX ADMIN — SODIUM CHLORIDE, SODIUM LACTATE, POTASSIUM CHLORIDE, AND CALCIUM CHLORIDE: .6; .31; .03; .02 INJECTION, SOLUTION INTRAVENOUS at 08:05

## 2017-05-19 NOTE — H&P
Short Stay Endoscopy History and Physical    PCP - Christelle Lawler, DO    Procedure - Colonoscopy  ASA - 2  Mallampati - per anesthesia  History of Anesthesia problems - no  Family history Anesthesia problems -  no     HPI:  This is a 67 y.o. female here for evaluation of :     Average Risk Screening: No  High risk screening: yes  History of polyps: No  Anemia: No  Blood in stools: No  Diarrhea: No  Abdominal Pain: No    Review of Systems:  CONSTITUTIONAL: Denies weight change,  fatigue, fevers, chills, night sweats.  CARDIOVASCULAR: Denies chest pain, shortness of breath, orthopnea and edema.  RESPIRATORY: Denies cough, hemoptysis, dyspnea, and wheezing.  GI: See HPI.    Medical History:  Past Medical History:   Diagnosis Date    Anxiety     Crohn's disease     Depression     Genital herpes     Hypertension     Insomnia     Osteoporosis     TIA (transient ischemic attack)        Surgical History:   Past Surgical History:   Procedure Laterality Date    APPENDECTOMY      SMALL INTESTINE SURGERY         Family History:   Family History   Problem Relation Age of Onset    Stroke Mother     Cancer Father      Lung    Glaucoma Neg Hx     Macular degeneration Neg Hx     Thyroid disease Neg Hx        Social History:   Social History   Substance Use Topics    Smoking status: Former Smoker     Years: 20.00     Types: Cigarettes     Quit date: 1/1/2005    Smokeless tobacco: Never Used      Comment: Former Light Smoker less than 10 a day    Alcohol use 2.4 oz/week     4 Glasses of wine per week       Allergies: Reviewed.    Medications:  No current facility-administered medications on file prior to encounter.      Current Outpatient Prescriptions on File Prior to Encounter   Medication Sig Dispense Refill    calcium carbonate (OS-COOPER) 600 mg (1,500 mg) Tab Take 600 mg by mouth 2 (two) times daily with meals.      cyanocobalamin, vitamin B-12, 2,500 mcg [facility] 1 tablet under the tongue 2 (two) times  daily.      fluticasone (FLONASE) 50 mcg/actuation nasal spray USE 2 SPRAYS IN EACH NOSTRIL EVERY DAY 48 g 3    LORATADINE ORAL Take 10 mg by mouth.      lorazepam (ATIVAN) 1 MG tablet Take 1 tablet (1 mg total) by mouth every evening. 30 tablet 5    mesalamine (APRISO) 0.375 gram Cp24 Take 4 capsules (1.5 g total) by mouth once daily. 360 capsule 3    vitamin D 1000 units Tab Take 185 mg by mouth once daily.      aspirin (ECOTRIN) 325 MG EC tablet Take 81 mg by mouth once daily.          Physical Exam:  Vital Signs:   Vitals:    05/19/17 0830   BP: (!) 142/84   Pulse: 78   Resp: 16   Temp: 98.2 °F (36.8 °C)     General Appearance: Well appearing in no acute distress  ENT: OP clear  Chest: CTA B  CV: RRR, no m/r/g  Abd: s/nt/nd/nabs  Ext: no edema    Labs:  Reviewed    Impression: Crohn's disease    Plan:  I have explained the risks and benefits of colonoscopy to the patient including but not limited to bleeding, perforation, infection, and death. The patient wishes to proceed with colonoscopy.

## 2017-05-19 NOTE — ANESTHESIA PREPROCEDURE EVALUATION
05/19/2017  Manuela Reyes is a 67 y.o., female.    Anesthesia Evaluation    I have reviewed the Patient Summary Reports.    I have reviewed the Nursing Notes.   I have reviewed the Medications.     Review of Systems  Anesthesia Hx:  No problems with previous Anesthesia  Denies Family Hx of Anesthesia complications.   Denies Personal Hx of Anesthesia complications.   Social:  Former Smoker, Social Alcohol Use    Hematology/Oncology:  Hematology Normal   Oncology Normal     Cardiovascular:   Hypertension Denies MI.   Denies CABG/stent.         Pulmonary:   Denies COPD.  Denies Asthma.  Denies Sleep Apnea.    Renal/:  Renal/ Normal     Hepatic/GI:   Bowel Prep. Denies GERD. Denies Liver Disease.  Denies Hepatitis.    Musculoskeletal:   osteoporosis   Neurological:   TIA, Denies Seizures.    Endocrine:  Endocrine Normal    Psych:   anxiety depression          Physical Exam  General:  Well nourished    Airway/Jaw/Neck:  Airway Findings: Mouth Opening: Normal Tongue: Normal  General Airway Assessment: Adult  Mallampati: II      Dental:  Dental Findings: In tact   Chest/Lungs:  Chest/Lungs Findings: Clear to auscultation, Normal Respiratory Rate     Heart/Vascular:  Heart Findings: Rate: Normal  Rhythm: Regular Rhythm  Sounds: Normal             Anesthesia Plan  Type of Anesthesia, risks & benefits discussed:  Anesthesia Type:  MAC  Patient's Preference:   Intra-op Monitoring Plan: standard ASA monitors  Intra-op Monitoring Plan Comments:   Post Op Pain Control Plan:   Post Op Pain Control Plan Comments:   Induction:   IV  Beta Blocker:  Patient is not currently on a Beta-Blocker (No further documentation required).       Informed Consent: Patient understands risks and agrees with Anesthesia plan.  Questions answered. Anesthesia consent signed with patient.  ASA Score: 2     Day of Surgery Review of  History & Physical: I have interviewed and examined the patient. I have reviewed the patient's H&P dated:  There are no significant changes.  H&P update referred to the surgeon.         Ready For Surgery From Anesthesia Perspective.

## 2017-05-19 NOTE — IP AVS SNAPSHOT
[address] [place] [address]           Patient Discharge Instructions   Our goal is to set you up for success. This packet includes information on your condition, medications, and your home care.  It will help you care for yourself to prevent having to return to the hospital.     Please ask your nurse if you have any questions.      There are many details to remember when preparing to leave the hospital. Here is what you will need to do:    1. Take your medicine. If you are prescribed medications, review your Medication List on the following pages. You may have new medications to  at the pharmacy and others that you'll need to stop taking. Review the instructions for how and when to take your medications. Talk with your doctor or nurses if you are unsure of what to do.     2. Go to your follow-up appointments. Specific follow-up information is listed in the following pages. Your may be contacted by a nurse or clinical provider about future appointments. Be sure we have all of the phone numbers to reach you. Please contact your provider's office if you are unable to make an appointment.     3. Watch for warning signs. Your doctor or nurse will give you detailed warning signs to watch for and when to call for assistance. These instructions may also include educational information about your condition. If you experience any of warning signs to your health, call your doctor.               ** Verify the list of medication(s) below is accurate and up to date. Carry this with you in case of emergency. If your medications have changed, please notify your healthcare provider.             Medication List      CHANGE how you take these medications        Additional Info                      aspirin 81 MG EC tablet   Commonly known as:  ECOTRIN   Refills:  0   Dose:  81 mg   What changed:  medication strength    Instructions:  Take 1 tablet (81 mg total) by mouth once  daily.     Begin Date    AM    Noon    PM    Bedtime         CONTINUE taking these medications        Additional Info                      amlodipine 10 MG tablet   Commonly known as:  NORVASC   Quantity:  90 tablet   Refills:  2    Instructions:  TAKE 1 TABLET EVERY DAY     Begin Date    AM    Noon    PM    Bedtime       calcium carbonate 600 mg (1,500 mg) Tab   Commonly known as:  OS-COOPER   Refills:  0   Dose:  600 mg    Instructions:  Take 600 mg by mouth 2 (two) times daily with meals.     Begin Date    AM    Noon    PM    Bedtime       cyanocobalamin (vitamin B-12) 2,500 mcg Lozg   Refills:  0   Dose:  1 tablet    Instructions:  Place 1 tablet under the tongue 2 (two) times daily.     Begin Date    AM    Noon    PM    Bedtime       fluticasone 50 mcg/actuation nasal spray   Commonly known as:  FLONASE   Quantity:  48 g   Refills:  3    Instructions:  USE 2 SPRAYS IN EACH NOSTRIL EVERY DAY     Begin Date    AM    Noon    PM    Bedtime       LORATADINE ORAL   Refills:  0   Dose:  10 mg    Instructions:  Take 10 mg by mouth.     Begin Date    AM    Noon    PM    Bedtime       lorazepam 1 MG tablet   Commonly known as:  ATIVAN   Quantity:  30 tablet   Refills:  5   Dose:  1 mg    Instructions:  Take 1 tablet (1 mg total) by mouth every evening.     Begin Date    AM    Noon    PM    Bedtime       mesalamine 0.375 gram Cp24   Commonly known as:  APRISO   Quantity:  360 capsule   Refills:  3   Dose:  1.5 g    Instructions:  Take 4 capsules (1.5 g total) by mouth once daily.     Begin Date    AM    Noon    PM    Bedtime       vitamin D 1000 units Tab   Refills:  0   Dose:  185 mg    Instructions:  Take 185 mg by mouth once daily.     Begin Date    AM    Noon    PM    Bedtime            Where to Get Your Medications      You can get these medications from any pharmacy     You don't need a prescription for these medications     aspirin 81 MG EC tablet                  Please bring to all follow up appointments:    1. A  copy of your discharge instructions.  2. All medicines you are currently taking in their original bottles.  3. Identification and insurance card.    Please arrive 15 minutes ahead of scheduled appointment time.    Please call 24 hours in advance if you must reschedule your appointment and/or time.        Your Scheduled Appointments     Sep 01, 2017 11:00 AM CDT   New Patient with Jayjay Larkin MD   [facility] - Rheumatology ([facility])    [address] [facility] [place] [id number] [phone]              Follow-up Information     Follow up with Christelle Lawler DO.    Specialty:  Internal Medicine    Contact information:    [address] [place] [address] [address] [place] [address]  [phone]          Discharge Instructions     Future Orders    Activity as tolerated     Diet general     Questions:    Total calories:      Fat restriction, if any:      Protein restriction, if any:      Na restriction, if any:      Fluid restriction:      Additional restrictions:          Discharge Instructions         Crohns Disease    Crohns disease is inflammation of the intestinal tract that comes and goes in flare-ups. This is a chronic (long-term) illness. During a flare-up, intense abdominal pain and fever may be felt. Mucus, blood, or pus may appear in the stool. Between flare ups, inflammation lessens and there usually are no symptoms. Crohns disease is a form of inflammatory bowel disease (IBD).   Symptoms of Crohn's disease may include:  · Abdominal cramps and pain  · Diarrhea, usually bloody, alternating with constipation  · Mucus in stools  · Rectal bleeding  · Rectal pain  · Fever  · Low energy  · Decreased appetite and weight loss  No one knows what exactly causes Crohn's disease, and there is no cure. The goal of treatment is to control and relieve symptoms and prevent complications, so you can lead a full and active life. No single treatment works for everyone, but many things can be done to help.  Diet  Foods did  "not cause your Crohn's, but they can affect it. Unfortunately, there is no one diet that works for everyone. Below are some things to try. Keep a food log to figure out what you are sensitive to.  · Eat more slowly and smaller amounts at a time, but more often. Remember, you can always eat more, but cannot eat less once you've eaten too much.  · High fiber foods are complicated. While they may help constipation they can make the bloating, cramping, gas, and diarrhea worse.  · Try avoiding dairy products, sometimes this helps  · Try cutting out foods that are high in fat and fatty meats  · Eat less sugar  · Bloating or passing excess gas may be controlled. Be careful with "gassy" vegetables and fruits like beans, cabbage, broccoli, and cauliflower.  · Be careful of carbonated beverages and fruit juices. They can make the bloating and diarrhea worse.  · Caffeine, alcohol, and stimulants may worsen symptoms. These include coffee, tea, sodas, energy drinks, and chocolate.  Lifestyle  Although stress does not cause Crohn's, it is often a factor in flare-ups. It can also affect how you feel about and cope with your condition.  · Look for factors that seem to worsen your symptoms such as stress and emotions.  · Counseling can often help relieve stress. So can self-help measures such as exercise, yoga, and meditation.  · Depression can be a part of this illness and antidepressants may be prescribed. This may actually help with diarrhea, constipation, and cramping, as well as depression.  · Smoking doesn't cause Crohn's, but can make the symptoms worse.  Medicines  Your healthcare provider may prescribe medicines. If so, take them as directed. For acute flare-ups, prescription medicines can be prescribed. Contact your provider if you need this.  · Ask your healthcare provider before taking any antidiarrheal medicines.  · Avoid anti-inflammatory medicines like ibuprofen or naproxen.  · Consider nutritional supplements. This " is especially true if the diarrhea is prolonged, or you aren't eating or are losing weight.  Follow-up care  Follow up with your healthcare provider, or as advised. If a stool sample was taken or cultures were done, you will be told if your treatment needs to change. Call as directed for the results.  Support  Support groups for persons Crohns disease can be a source of useful information on how others are coping with this illness. They are available in person, on the phone, or via the Internet. Contact the following resources for more information.  · Crohns and Colitis [employer] 479.978.9865 www.ccfa.org  · National Digestive Diseases Information Clearinghouse (NDDIC) 657.548.1715 www.digestive.niddk.nih.gov  When to seek medical advice  Call your healthcare provider right away if any of these occur:  · Fever of 100.4ºF (38ºC) or higher, or as directed by your healthcare provider  · Abdominal pain that doesn't get better when you take the usual measures  · Mucus, pus, or blood in the stool (dark or bright red)  · Repeated vomiting  · Abdominal swelling and pain that doesn't go away after a few hours  Call 911  Call 911 if any of these occur:  · Trouble breathing  · Confusion  · Extreme sleepiness or trouble waking up  · Fainting or loss of consciousness  · Rapid heart rate  Date Last Reviewed: 8/31/2015 © 2000-2016 [employer]. [address] [place] [address]. All rights reserved. This information is not intended as a substitute for professional medical care. Always follow your healthcare professional's instructions.        Hemorrhoids    Hemorrhoids are swollen and inflamed veins inside the rectum and near the anus. The rectum is the last several inches of the colon. The anus is the passage between the rectum and the outside of the body.  Causes  The veins can become swollen due to increased pressure in them. This is most often caused by:  · Chronic constipation or  diarrhea  · Straining when having a bowel movement  · Sitting too long on the toilet  · A low-fiber diet  · Pregnancy  Symptoms  · Bleeding from the rectum (this may be noticeable after bowel movements)  · Lump near the anus  · Itching around the anus  · Pain around the anus  There are different types of hemorrhoids. Depending on the type you have and the severity, you may be able to treat yourself at home. In some cases, a procedure may be the best treatment option. Your healthcare provider can tell you more about this, if needed.  Home care  General care  · To get relief from pain or itching, try:  ¨ Topical products. Your healthcare provider may prescribe or recommend creams, ointments, or pads that can be applied to the hemorrhoid. Use these exactly as directed.  ¨ Medicines. Your healthcare provider may recommend stool softeners, suppositories, or laxatives to help manage constipation. Use these exactly as directed.  ¨ Sitz baths. A sitz bath involves sitting in a few inches of warm bath water. Be careful not to make the water so hot that you burn yourself--test it before sitting in it. Soak for about 10 to 15 minutes a few times a day. This may help relieve pain.  Tips to help prevent hemorrhoids  · Eat more fiber. Fiber adds bulk to stool and absorbs water as it moves through your colon. This makes stool softer and easier to pass.  ¨ Increase the fiber in your diet with more fiber-rich foods. These include fresh fruit, vegetables, and whole grains.  ¨ Take a fiber supplement or bulking agent, if advised to by your provider. These include products such as psyllium or methylcellulose.  · Drink plenty of water, if directed to by your provider. This can help keep stool soft.  · Be more active. Frequent exercise aids digestion and helps prevent constipation. It may also help make bowel movements more regular.  · Dont strain during bowel movements. This can make hemorrhoids more likely. Also, dont sit on the  "toilet for long periods of time.  Follow-up care  Follow up with your healthcare provider, or as advised. If a culture or imaging tests were done, you will be notified of the results when they are ready. This may take a few days or longer.  When to seek medical advice  Call your healthcare provider right away if any of these occur:  · Increased bleeding from the rectum  · Increased pain around the rectum or anus  · Weakness or dizziness  Call 911  Call 911 or return to the emergency department right away if any of these occur:  · Trouble breathing or swallowing  · Fainting or loss of consciousness  · Unusually fast heart rate  · Vomiting blood  · Large amounts of blood in stool  Date Last Reviewed: 6/22/2015  © 5628-9183 [employer]. [address] [place] [address], [place] [address]. All rights reserved. This information is not intended as a substitute for professional medical care. Always follow your healthcare professional's instructions.            Admission Information     Date & Time Provider Department CSN    5/19/2017  7:50 AM Jose Luis Leonard MD [facility] [id number]      Care Providers     Provider Role Specialty Primary office phone    Jose Luis Leonard MD Attending Provider Gastroenterology [phone]    Jose Luis Leonard MD Surgeon  Gastroenterology [phone]      Your Vitals Were     BP Pulse Temp Resp Height Weight    102/60 85 98.2 °F (36.8 °C) (Oral) 16 5' 7" (1.702 m) 46.3 kg (102 lb)    SpO2 BMI             98% 15.98 kg/m2         Recent Lab Values     No lab values to display.      Pending Labs     Order Current Status    Specimen to Pathology - Surgery Collected (05/19/17 0730)      Allergies as of 5/19/2017        Reactions    Codeine Sulfate Itching    Hydrochlorothiazide Other (See Comments)    Adverse Reaction    Lisinopril Swelling, Edema    Facial Swelling      [facility] Nurse Care Line - 24/7 Assistance  Unless otherwise directed " by your provider, please contact [facility], our nurse care line that is available for 24/7 assistance.     Registered nurses in the [facility] provide clinical advisement, health education, appointment booking, and other advisory services.  Call for this free service at [phone].        Advance Directives     An advance directive is a document which, in the event you are no longer able to make decisions for yourself, tells your healthcare team what kind of treatment you do or do not want to receive, or who you would like to make those decisions for you.  If you do not currently have an advance directive, [facility] encourages you to create one.  For more information call:  [phone]), [phone] ([phone]),  or log on to [email or web address].        Smoking Cessation     If you would like to quit smoking:   You may be eligible for free services if you are a [place] resident and started smoking cigarettes before September 1, 1988.  Call the Smoking Cessation Trust (SCT) toll free at (860) 708-4585 or (354) 580-4080.   Call 4-294-QUIT-NOW if you do not meet the above criteria.   Contact us via email: [email or web address]   View our website for more information: [email or web address]        Language Assistance Services     ATTENTION: Language assistance services are available, free of charge. Please call [phone].      ATENCIÓN: Si habla español, tiene a marte disposición servicios gratuitos de asistencia lingüística. Llame al [phone].     CHÚ Ý: N?u b?n nói Ti?ng Vi?t, có các d?ch v? h? tr? ngôn ng? mi?n phí dành cho b?n. G?i s? [phone].         [facility] complies with applicable Federal civil rights laws and does not discriminate on the basis of race, color, national origin, age, disability, or sex.

## 2017-05-19 NOTE — DISCHARGE SUMMARY
[facility] -   Brief Operative Note     SUMMARY     Surgery Date: 5/19/2017     Surgeon(s) and Role:     * Jose Luis Leonard MD - Primary    Assisting Surgeon: None    Pre-op Diagnosis:  Crohn's disease of small intestine with intestinal obstruction [K50.012]    Post-op Diagnosis:  Post-Op Diagnosis Codes:     * Crohn's disease of small intestine with intestinal obstruction [K50.012]    Procedure(s) (LRB):  COLONOSCOPY (N/A)    Anesthesia: Monitor Anesthesia Care    Description of the findings of the procedure: Procedure completed. See Procedure note for details.     Findings/Key Components: Procedure completed. See Procedure note for details.     Prosthesis/Implants: None    Estimated Blood Loss: less than 10         Specimens:   Specimen (12h ago through future)    Start     Ordered    05/19/17 0928  Specimen to Pathology - Surgery  Once     Comments:  1. illium bx r/o illitis    05/19/17 0930          Discharge Note    SUMMARY     Admit Date: 5/19/2017    Discharge Date and Time:  05/19/2017 9:38 AM    Hospital Course (synopsis of major diagnoses, care, treatment, and services provided during the course of the hospital stay): Procedure completed. See Procedure note for details.      Final Diagnosis: Post-Op Diagnosis Codes:     * Crohn's disease of small intestine with intestinal obstruction [K50.012]    Disposition: Home or Self Care    Follow Up/Patient Instructions:     Medications:  Reconciled Home Medications:   Current Discharge Medication List      CONTINUE these medications which have CHANGED    Details   aspirin (ECOTRIN) 81 MG EC tablet Take 1 tablet (81 mg total) by mouth once daily.         CONTINUE these medications which have NOT CHANGED    Details   amlodipine (NORVASC) 10 MG tablet TAKE 1 TABLET EVERY DAY  Qty: 90 tablet, Refills: 2      calcium carbonate (OS-COOPER) 600 mg (1,500 mg) Tab Take 600 mg by mouth 2 (two) times daily with meals.      cyanocobalamin, vitamin B-12, 2,500 mcg  Lozg Place 1 tablet under the tongue 2 (two) times daily.      fluticasone (FLONASE) 50 mcg/actuation nasal spray USE 2 SPRAYS IN EACH NOSTRIL EVERY DAY  Qty: 48 g, Refills: 3      LORATADINE ORAL Take 10 mg by mouth.      lorazepam (ATIVAN) 1 MG tablet Take 1 tablet (1 mg total) by mouth every evening.  Qty: 30 tablet, Refills: 5      mesalamine (APRISO) 0.375 gram Cp24 Take 4 capsules (1.5 g total) by mouth once daily.  Qty: 360 capsule, Refills: 3      vitamin D 1000 units Tab Take 185 mg by mouth once daily.             Discharge Procedure Orders  Diet general     Activity as tolerated       Follow-up Information     Follow up with Christelle Lawler DO.    Specialty:  Internal Medicine    Contact information:    [address] [place] [address] [address] [place] [address] [phone]

## 2017-05-19 NOTE — ANESTHESIA RELEASE NOTE
"Anesthesia Release from PACU Note    Patient: Manuela Reyes    Procedure(s) Performed: Procedure(s) (LRB):  COLONOSCOPY (N/A)    Anesthesia type: MAC    Post pain: Adequate analgesia    Post assessment: no apparent anesthetic complications, tolerated procedure well and no evidence of recall    Last Vitals:   Visit Vitals    BP (!) 142/84 (Patient Position: Lying, BP Method: Automatic)    Pulse 78    Temp 36.8 °C (98.2 °F) (Oral)    Resp 16    Ht 5' 7" (1.702 m)    Wt 46.3 kg (102 lb)    SpO2 99%    Breastfeeding No    BMI 15.98 kg/m2       Post vital signs: stable    Level of consciousness: awake    Nausea/Vomiting: no nausea/no vomiting    Complications: none    Airway Patency: patent    Respiratory: unassisted, spontaneous ventilation, room air    Cardiovascular: stable and blood pressure at baseline    Hydration: euvolemic  "

## 2017-05-19 NOTE — DISCHARGE INSTRUCTIONS
"  Crohns Disease    Crohns disease is inflammation of the intestinal tract that comes and goes in flare-ups. This is a chronic (long-term) illness. During a flare-up, intense abdominal pain and fever may be felt. Mucus, blood, or pus may appear in the stool. Between flare ups, inflammation lessens and there usually are no symptoms. Crohns disease is a form of inflammatory bowel disease (IBD).   Symptoms of Crohn's disease may include:  · Abdominal cramps and pain  · Diarrhea, usually bloody, alternating with constipation  · Mucus in stools  · Rectal bleeding  · Rectal pain  · Fever  · Low energy  · Decreased appetite and weight loss  No one knows what exactly causes Crohn's disease, and there is no cure. The goal of treatment is to control and relieve symptoms and prevent complications, so you can lead a full and active life. No single treatment works for everyone, but many things can be done to help.  Diet  Foods did not cause your Crohn's, but they can affect it. Unfortunately, there is no one diet that works for everyone. Below are some things to try. Keep a food log to figure out what you are sensitive to.  · Eat more slowly and smaller amounts at a time, but more often. Remember, you can always eat more, but cannot eat less once you've eaten too much.  · High fiber foods are complicated. While they may help constipation they can make the bloating, cramping, gas, and diarrhea worse.  · Try avoiding dairy products, sometimes this helps  · Try cutting out foods that are high in fat and fatty meats  · Eat less sugar  · Bloating or passing excess gas may be controlled. Be careful with "gassy" vegetables and fruits like beans, cabbage, broccoli, and cauliflower.  · Be careful of carbonated beverages and fruit juices. They can make the bloating and diarrhea worse.  · Caffeine, alcohol, and stimulants may worsen symptoms. These include coffee, tea, sodas, energy drinks, and chocolate.  Lifestyle  Although stress " does not cause Crohn's, it is often a factor in flare-ups. It can also affect how you feel about and cope with your condition.  · Look for factors that seem to worsen your symptoms such as stress and emotions.  · Counseling can often help relieve stress. So can self-help measures such as exercise, yoga, and meditation.  · Depression can be a part of this illness and antidepressants may be prescribed. This may actually help with diarrhea, constipation, and cramping, as well as depression.  · Smoking doesn't cause Crohn's, but can make the symptoms worse.  Medicines  Your healthcare provider may prescribe medicines. If so, take them as directed. For acute flare-ups, prescription medicines can be prescribed. Contact your provider if you need this.  · Ask your healthcare provider before taking any antidiarrheal medicines.  · Avoid anti-inflammatory medicines like ibuprofen or naproxen.  · Consider nutritional supplements. This is especially true if the diarrhea is prolonged, or you aren't eating or are losing weight.  Follow-up care  Follow up with your healthcare provider, or as advised. If a stool sample was taken or cultures were done, you will be told if your treatment needs to change. Call as directed for the results.  Support  Support groups for persons Crohns disease can be a source of useful information on how others are coping with this illness. They are available in person, on the phone, or via the Internet. Contact the following resources for more information.  · Crohns and [employer] 498.896.8920 www.ccfa.org  · National Digestive Diseases Information Clearinghouse (NDDIC) 527.247.7850 www.digestive.niddk.nih.gov  When to seek medical advice  Call your healthcare provider right away if any of these occur:  · Fever of 100.4ºF (38ºC) or higher, or as directed by your healthcare provider  · Abdominal pain that doesn't get better when you take the usual measures  · Mucus, pus, or blood  in the stool (dark or bright red)  · Repeated vomiting  · Abdominal swelling and pain that doesn't go away after a few hours  Call 911  Call 911 if any of these occur:  · Trouble breathing  · Confusion  · Extreme sleepiness or trouble waking up  · Fainting or loss of consciousness  · Rapid heart rate  Date Last Reviewed: 8/31/2015 © 2000-2016 [employer]. [address] [place] [address], [place] [address]. All rights reserved. This information is not intended as a substitute for professional medical care. Always follow your healthcare professional's instructions.        Hemorrhoids    Hemorrhoids are swollen and inflamed veins inside the rectum and near the anus. The rectum is the last several inches of the colon. The anus is the passage between the rectum and the outside of the body.  Causes  The veins can become swollen due to increased pressure in them. This is most often caused by:  · Chronic constipation or diarrhea  · Straining when having a bowel movement  · Sitting too long on the toilet  · A low-fiber diet  · Pregnancy  Symptoms  · Bleeding from the rectum (this may be noticeable after bowel movements)  · Lump near the anus  · Itching around the anus  · Pain around the anus  There are different types of hemorrhoids. Depending on the type you have and the severity, you may be able to treat yourself at home. In some cases, a procedure may be the best treatment option. Your healthcare provider can tell you more about this, if needed.  Home care  General care  · To get relief from pain or itching, try:  ¨ Topical products. Your healthcare provider may prescribe or recommend creams, ointments, or pads that can be applied to the hemorrhoid. Use these exactly as directed.  ¨ Medicines. Your healthcare provider may recommend stool softeners, suppositories, or laxatives to help manage constipation. Use these exactly as directed.  ¨ Sitz baths. A sitz bath involves sitting in a few inches of warm bath water. Be  careful not to make the water so hot that you burn yourself--test it before sitting in it. Soak for about 10 to 15 minutes a few times a day. This may help relieve pain.  Tips to help prevent hemorrhoids  · Eat more fiber. Fiber adds bulk to stool and absorbs water as it moves through your colon. This makes stool softer and easier to pass.  ¨ Increase the fiber in your diet with more fiber-rich foods. These include fresh fruit, vegetables, and whole grains.  ¨ Take a fiber supplement or bulking agent, if advised to by your provider. These include products such as psyllium or methylcellulose.  · Drink plenty of water, if directed to by your provider. This can help keep stool soft.  · Be more active. Frequent exercise aids digestion and helps prevent constipation. It may also help make bowel movements more regular.  · Dont strain during bowel movements. This can make hemorrhoids more likely. Also, dont sit on the toilet for long periods of time.  Follow-up care  Follow up with your healthcare provider, or as advised. If a culture or imaging tests were done, you will be notified of the results when they are ready. This may take a few days or longer.  When to seek medical advice  Call your healthcare provider right away if any of these occur:  · Increased bleeding from the rectum  · Increased pain around the rectum or anus  · Weakness or dizziness  Call 911  Call 911 or return to the emergency department right away if any of these occur:  · Trouble breathing or swallowing  · Fainting or loss of consciousness  · Unusually fast heart rate  · Vomiting blood  · Large amounts of blood in stool  Date Last Reviewed: 6/22/2015 © 2000-2016 [employer]. [address] [place] [address], [place] [address]. All rights reserved. This information is not intended as a substitute for professional medical care. Always follow your healthcare professional's instructions.

## 2017-05-19 NOTE — TRANSFER OF CARE
"Anesthesia Transfer of Care Note    Patient: Manuela Reyes    Procedure(s) Performed: Procedure(s) (LRB):  COLONOSCOPY (N/A)    Patient location: PACU    Anesthesia Type: MAC    Transport from OR: Transported from OR on room air with adequate spontaneous ventilation    Post pain: adequate analgesia    Post assessment: no apparent anesthetic complications and tolerated procedure well    Post vital signs: stable    Level of consciousness: awake    Nausea/Vomiting: no nausea/vomiting    Complications: none    Transfer of care protocol was followed      Last vitals:   Visit Vitals    BP (!) 142/84 (Patient Position: Lying, BP Method: Automatic)    Pulse 78    Temp 36.8 °C (98.2 °F) (Oral)    Resp 16    Ht 5' 7" (1.702 m)    Wt 46.3 kg (102 lb)    SpO2 99%    Breastfeeding No    BMI 15.98 kg/m2     "

## 2017-05-19 NOTE — ANESTHESIA POSTPROCEDURE EVALUATION
"Anesthesia Post Evaluation    Patient: Manuela Reyes    Procedure(s) Performed: Procedure(s) (LRB):  COLONOSCOPY (N/A)    Final Anesthesia Type: MAC  Patient location during evaluation: PACU  Patient participation: Yes- Able to Participate  Level of consciousness: awake  Post-procedure vital signs: reviewed and stable  Pain management: adequate  Airway patency: patent  PONV status at discharge: No PONV  Anesthetic complications: no      Cardiovascular status: blood pressure returned to baseline and hemodynamically stable  Respiratory status: unassisted, spontaneous ventilation and room air  Hydration status: euvolemic  Follow-up not needed.        Visit Vitals    BP (!) 142/84 (Patient Position: Lying, BP Method: Automatic)    Pulse 78    Temp 36.8 °C (98.2 °F) (Oral)    Resp 16    Ht 5' 7" (1.702 m)    Wt 46.3 kg (102 lb)    SpO2 99%    Breastfeeding No    BMI 15.98 kg/m2       Pain/Aileen Score: Pain Assessment Performed: Yes (5/19/2017  8:19 AM)      "

## 2017-05-22 ENCOUNTER — OFFICE VISIT (OUTPATIENT)
Dept: OPHTHALMOLOGY | Facility: CLINIC | Age: 67
End: 2017-05-22
Payer: MEDICARE

## 2017-05-22 ENCOUNTER — PATIENT MESSAGE (OUTPATIENT)
Dept: GASTROENTEROLOGY | Facility: CLINIC | Age: 67
End: 2017-05-22

## 2017-05-22 DIAGNOSIS — H00.15 CHALAZION LEFT LOWER EYELID: Primary | ICD-10-CM

## 2017-05-22 PROCEDURE — 92002 INTRM OPH EXAM NEW PATIENT: CPT | Mod: S$GLB,,, | Performed by: OPTOMETRIST

## 2017-05-22 PROCEDURE — 99999 PR PBB SHADOW E&M-EST. PATIENT-LVL I: CPT | Mod: PBBFAC,,, | Performed by: OPTOMETRIST

## 2017-05-22 RX ORDER — BUDESONIDE 3 MG/1
9 CAPSULE, COATED PELLETS ORAL DAILY
Qty: 30 CAPSULE | Refills: 1 | Status: SHIPPED | OUTPATIENT
Start: 2017-05-22 | End: 2017-06-02 | Stop reason: SDUPTHER

## 2017-05-22 NOTE — TELEPHONE ENCOUNTER
Please try to schedule her an appt with me as soon as possible. Any time in the next few weeks would be fine. Thanks.

## 2017-05-22 NOTE — PROGRESS NOTES
HPI     Bump on left lower lid since 04/26/2017. Patient using warm compresses   and erythromycin bita and keflex that helped then bump came back. Patient   having puss pocket on left lower lid. Patient had a eye exam 05/03/2017   with outside provider.    Last edited by Dioni Woodard, OD on 5/22/2017  1:24 PM. (History)            Assessment /Plan     For exam results, see Encounter Report.    Chalazion left lower eyelid      Warm compresses and lid scrubs qid OU x 3 weeks, worksheet given.  Omega 3    RTC 3 weeks with Jamia if no improvement, sooner if worsens, consider 2nd round of Keflex x 10 days instead of 5

## 2017-05-25 ENCOUNTER — TELEPHONE (OUTPATIENT)
Dept: GASTROENTEROLOGY | Facility: CLINIC | Age: 67
End: 2017-05-25

## 2017-06-02 ENCOUNTER — TELEPHONE (OUTPATIENT)
Dept: GASTROENTEROLOGY | Facility: CLINIC | Age: 67
End: 2017-06-02

## 2017-06-02 RX ORDER — BUDESONIDE 3 MG/1
9 CAPSULE, COATED PELLETS ORAL DAILY
Qty: 90 CAPSULE | Refills: 1 | Status: SHIPPED | OUTPATIENT
Start: 2017-06-02 | End: 2017-07-02

## 2017-06-20 ENCOUNTER — PATIENT MESSAGE (OUTPATIENT)
Dept: GASTROENTEROLOGY | Facility: CLINIC | Age: 67
End: 2017-06-20

## 2017-07-10 ENCOUNTER — OFFICE VISIT (OUTPATIENT)
Dept: GASTROENTEROLOGY | Facility: CLINIC | Age: 67
End: 2017-07-10
Payer: MEDICARE

## 2017-07-10 ENCOUNTER — LAB VISIT (OUTPATIENT)
Dept: LAB | Facility: HOSPITAL | Age: 67
End: 2017-07-10
Attending: INTERNAL MEDICINE
Payer: MEDICARE

## 2017-07-10 VITALS
DIASTOLIC BLOOD PRESSURE: 70 MMHG | SYSTOLIC BLOOD PRESSURE: 122 MMHG | WEIGHT: 104.06 LBS | HEIGHT: 67 IN | HEART RATE: 66 BPM | BODY MASS INDEX: 16.33 KG/M2

## 2017-07-10 DIAGNOSIS — K50.012 CROHN'S DISEASE OF SMALL INTESTINE WITH INTESTINAL OBSTRUCTION: ICD-10-CM

## 2017-07-10 DIAGNOSIS — K50.012 CROHN'S DISEASE OF SMALL INTESTINE WITH INTESTINAL OBSTRUCTION: Primary | ICD-10-CM

## 2017-07-10 LAB
ALBUMIN SERPL BCP-MCNC: 4 G/DL
ALP SERPL-CCNC: 75 U/L
ALT SERPL W/O P-5'-P-CCNC: 16 U/L
ANION GAP SERPL CALC-SCNC: 13 MMOL/L
AST SERPL-CCNC: 22 U/L
BASOPHILS # BLD AUTO: 0.01 K/UL
BASOPHILS NFR BLD: 0.2 %
BILIRUB SERPL-MCNC: 0.6 MG/DL
BUN SERPL-MCNC: 10 MG/DL
CALCIUM SERPL-MCNC: 9.7 MG/DL
CHLORIDE SERPL-SCNC: 99 MMOL/L
CO2 SERPL-SCNC: 24 MMOL/L
CREAT SERPL-MCNC: 0.8 MG/DL
CRP SERPL-MCNC: 1.9 MG/L
DIFFERENTIAL METHOD: ABNORMAL
EOSINOPHIL # BLD AUTO: 0 K/UL
EOSINOPHIL NFR BLD: 0.5 %
ERYTHROCYTE [DISTWIDTH] IN BLOOD BY AUTOMATED COUNT: 12.7 %
ERYTHROCYTE [SEDIMENTATION RATE] IN BLOOD BY WESTERGREN METHOD: 13 MM/HR
EST. GFR  (AFRICAN AMERICAN): >60 ML/MIN/1.73 M^2
EST. GFR  (NON AFRICAN AMERICAN): >60 ML/MIN/1.73 M^2
GLUCOSE SERPL-MCNC: 83 MG/DL
HCT VFR BLD AUTO: 42.8 %
HGB BLD-MCNC: 14.9 G/DL
LYMPHOCYTES # BLD AUTO: 1.6 K/UL
LYMPHOCYTES NFR BLD: 26.7 %
MCH RBC QN AUTO: 33.6 PG
MCHC RBC AUTO-ENTMCNC: 34.8 %
MCV RBC AUTO: 97 FL
MONOCYTES # BLD AUTO: 0.7 K/UL
MONOCYTES NFR BLD: 12.2 %
NEUTROPHILS # BLD AUTO: 3.5 K/UL
NEUTROPHILS NFR BLD: 60.2 %
PLATELET # BLD AUTO: 268 K/UL
PMV BLD AUTO: 9.7 FL
POTASSIUM SERPL-SCNC: 3.6 MMOL/L
PROT SERPL-MCNC: 8.2 G/DL
RBC # BLD AUTO: 4.43 M/UL
SODIUM SERPL-SCNC: 136 MMOL/L
VIT B12 SERPL-MCNC: 1809 PG/ML
WBC # BLD AUTO: 5.84 K/UL

## 2017-07-10 PROCEDURE — 86706 HEP B SURFACE ANTIBODY: CPT

## 2017-07-10 PROCEDURE — 85651 RBC SED RATE NONAUTOMATED: CPT | Mod: PO

## 2017-07-10 PROCEDURE — 86140 C-REACTIVE PROTEIN: CPT

## 2017-07-10 PROCEDURE — 86803 HEPATITIS C AB TEST: CPT

## 2017-07-10 PROCEDURE — 99214 OFFICE O/P EST MOD 30 MIN: CPT | Mod: S$GLB,,, | Performed by: INTERNAL MEDICINE

## 2017-07-10 PROCEDURE — 82607 VITAMIN B-12: CPT

## 2017-07-10 PROCEDURE — 87340 HEPATITIS B SURFACE AG IA: CPT

## 2017-07-10 PROCEDURE — 36415 COLL VENOUS BLD VENIPUNCTURE: CPT | Mod: PO

## 2017-07-10 PROCEDURE — 86704 HEP B CORE ANTIBODY TOTAL: CPT

## 2017-07-10 PROCEDURE — 99499 UNLISTED E&M SERVICE: CPT | Mod: S$GLB,,, | Performed by: INTERNAL MEDICINE

## 2017-07-10 PROCEDURE — 80053 COMPREHEN METABOLIC PANEL: CPT

## 2017-07-10 PROCEDURE — 85025 COMPLETE CBC W/AUTO DIFF WBC: CPT

## 2017-07-10 PROCEDURE — 99999 PR PBB SHADOW E&M-EST. PATIENT-LVL III: CPT | Mod: PBBFAC,,, | Performed by: INTERNAL MEDICINE

## 2017-07-10 PROCEDURE — 1126F AMNT PAIN NOTED NONE PRSNT: CPT | Mod: S$GLB,,, | Performed by: INTERNAL MEDICINE

## 2017-07-10 PROCEDURE — 86480 TB TEST CELL IMMUN MEASURE: CPT

## 2017-07-10 PROCEDURE — 1159F MED LIST DOCD IN RCRD: CPT | Mod: S$GLB,,, | Performed by: INTERNAL MEDICINE

## 2017-07-10 RX ORDER — POLYETHYLENE GLYCOL 3350 17 G/17G
POWDER, FOR SOLUTION ORAL
COMMUNITY
Start: 2017-04-17 | End: 2018-06-08

## 2017-07-10 RX ORDER — BUDESONIDE 3 MG/1
6 CAPSULE, COATED PELLETS ORAL DAILY
COMMUNITY
End: 2017-08-21 | Stop reason: ALTCHOICE

## 2017-07-10 NOTE — PROGRESS NOTES
Clinic Follow Up:  [facility] Follow Up Note    Reason for Follow Up:  The encounter diagnosis was Crohn's disease of small intestine with intestinal obstruction.    PCP: Christelle Lawler       HPI:  This is a 67 y.o. female here for follow up of the above issues.  She has been doing fairly well.  Her bowels are moving with using MiraLAX twice daily.  She's also on a low fiber diet which helps a lot of her bloating issues.  Her weight has been stable.  She denies any major postprandial bloating that she was having in the past.  She does continue to have some bloating and gas issues but overall she feels fairly well and better than she did several months ago.  She did notice improvement in the gas and bloating when she was on the higher dose of Entocort.  She is currently taking 6 mg a day.  She is here today to discuss further management of her Crohn's disease.  She had a colonoscopy a few weeks ago and was noted to have active ileal disease as well as a significant ileal stricture.    Review of Systems:  CONSTITUTIONAL: Denies weight change,  fatigue, fevers, chills, night sweats.  CARDIOVASCULAR: Denies chest pain, shortness of breath, orthopnea and edema.  RESPIRATORY: Denies cough, hemoptysis, dyspnea, and wheezing.  GI: See HPI.  : Denies dysuria and hematuria    Medical History:  Past Medical History:   Diagnosis Date    Anxiety     Crohn's disease     Depression     Genital herpes     Hypertension     Insomnia     Osteoporosis     TIA (transient ischemic attack)        Surgical History:   Past Surgical History:   Procedure Laterality Date    APPENDECTOMY      COLONOSCOPY N/A 5/19/2017    Procedure: COLONOSCOPY;  Surgeon: Jose Luis Leonard MD;  Location: [facility];  Service: Endoscopy;  Laterality: N/A;    SMALL INTESTINE SURGERY         Family History:   Family History   Problem Relation Age of Onset    Stroke Mother     Cancer Father      Lung    Glaucoma Neg Hx      "Macular degeneration Neg Hx     Thyroid disease Neg Hx        Social History:   Social History   Substance Use Topics    Smoking status: Former Smoker     Years: 20.00     Types: Cigarettes     Quit date: 1/1/2005    Smokeless tobacco: Never Used      Comment: Former Light Smoker less than 10 a day    Alcohol use No       Allergies: Reviewed    Home Medications:  Medication List with Changes/Refills   Current Medications    AMLODIPINE (NORVASC) 10 MG TABLET    TAKE 1 TABLET EVERY DAY    ASPIRIN (ECOTRIN) 81 MG EC TABLET    Take 1 tablet (81 mg total) by mouth once daily.    BUDESONIDE (ENTOCORT EC) 3 MG CAPSULE    Take 6 mg by mouth once daily.    CALCIUM CARBONATE (OS-COOPER) 600 MG (1,500 MG) TAB    Take 600 mg by mouth 2 (two) times daily with meals.    CYANOCOBALAMIN, VITAMIN B-12, 2,500 MCG LOZG    Place 1 tablet under the tongue 2 (two) times daily.    FLUTICASONE (FLONASE) 50 MCG/ACTUATION NASAL SPRAY    USE 2 SPRAYS IN EACH NOSTRIL EVERY DAY    LORATADINE ORAL    Take 10 mg by mouth.    LORAZEPAM (ATIVAN) 1 MG TABLET    Take 1 tablet (1 mg total) by mouth every evening.    MULTIVITAMIN WITH MINERALS TABLET    Take 1 tablet by mouth once daily at 6am.    POLYETHYLENE GLYCOL (GLYCOLAX) 17 GRAM/DOSE POWDER        VITAMIN D 1000 UNITS TAB    Take 185 mg by mouth once daily.   Discontinued Medications    MESALAMINE (APRISO) 0.375 GRAM CP24    Take 4 capsules (1.5 g total) by mouth once daily.       Physical Exam:  Vital Signs:  /70   Pulse 66   Ht 5' 7" (1.702 m)   Wt 47.2 kg (104 lb 0.9 oz)   BMI 16.30 kg/m²   Body mass index is 16.3 kg/m².      GENERAL: No acute distress, Alert and oriented x 4  EYES: Anicteric, no pallor noted.  ENT: Oropharynx is clear  NECK: Supple, no masses, no thyromegally.  CHEST: Equal breath sounds bilaterally, no wheezing.  CARDIOVASCULAR: Regular rate and rhythm. Murmurs, rubs and gallops absent.  ABDOMEN: soft, non-tender, non-distended, normal bowel sounds.  EXTREMITIES: " No clubbing, cyanosis or edema.  SKIN: Without lesion.  LYMPH: No cervical, axillary lymphadenopathy palpable.   NEUROLOGICAL: Grossly normal.    Labs: Pertinent labs reviewed.     Endoscopy:  Not applicable    CRC Screening: Up-to-date.  Please see health maintenance tab.    Imaging: CT scan from earlier this year was reviewed.    Assessment:  1. Crohn's disease of small intestine with intestinal obstruction        Recommendations:  1.  Crohn's disease: She has significant ileal disease in spite of taking Pentasa.  She's had 2 prior surgeries because of stricturing disease in the past.  At this time she's minimally symptomatic and I think it would not be unreasonable to try medical management prior to considering surgery.  I did explain to her that eventually she may require surgery for the recurrent stricture but she would like to try medical therapy first as well.  The option of proceeding with surgery initially and then starting medications after was presented as well.  We discussed various treatment options including different biologic agents as well as the use of immunomodulators.  Given her age and the increased risk of lymphoma I would not recommend using combination therapy at this time.  Today I recommend that we start a TNF inhibitor. The risks of biologic agents (Increased risk of infection, reactivation of tuberculosis or fungal infections, suppressed immune system, increased risk of malignancy, injection/infusion reactions) were discussed with the patient.  We will check labs including hepatitis B and C serologies, quantiferon gold, B12, CBC, CMP, ESR, CRP.  If the labs look okay we will start Humira in the near future. Continue Entocort as planned.    Return to Clinic:    Return in about 8 weeks (around 9/4/2017).

## 2017-07-11 ENCOUNTER — TELEPHONE (OUTPATIENT)
Dept: GASTROENTEROLOGY | Facility: HOSPITAL | Age: 67
End: 2017-07-11

## 2017-07-11 DIAGNOSIS — R76.8 HCV ANTIBODY POSITIVE: Primary | ICD-10-CM

## 2017-07-11 LAB
HBV CORE AB SERPL QL IA: POSITIVE
HBV SURFACE AB SER-ACNC: POSITIVE M[IU]/ML
HBV SURFACE AG SERPL QL IA: NEGATIVE
HCV AB SERPL QL IA: POSITIVE

## 2017-07-12 ENCOUNTER — PATIENT MESSAGE (OUTPATIENT)
Dept: GASTROENTEROLOGY | Facility: CLINIC | Age: 67
End: 2017-07-12

## 2017-07-13 LAB
MITOGEN NIL: >10 IU/ML
NIL: 0.03 IU/ML
TB ANTIGEN NIL: 0 IU/ML
TB ANTIGEN: 0.03 IU/ML
TB GOLD: NEGATIVE

## 2017-07-14 ENCOUNTER — PATIENT MESSAGE (OUTPATIENT)
Dept: GASTROENTEROLOGY | Facility: CLINIC | Age: 67
End: 2017-07-14

## 2017-07-16 ENCOUNTER — PATIENT MESSAGE (OUTPATIENT)
Dept: INTERNAL MEDICINE | Facility: CLINIC | Age: 67
End: 2017-07-16

## 2017-07-17 ENCOUNTER — TELEPHONE (OUTPATIENT)
Dept: GASTROENTEROLOGY | Facility: CLINIC | Age: 67
End: 2017-07-17

## 2017-07-17 RX ORDER — LORAZEPAM 1 MG/1
1 TABLET ORAL NIGHTLY
Qty: 30 TABLET | Refills: 0 | Status: SHIPPED | OUTPATIENT
Start: 2017-07-17 | End: 2017-07-20 | Stop reason: SDUPTHER

## 2017-07-18 ENCOUNTER — LAB VISIT (OUTPATIENT)
Dept: LAB | Facility: HOSPITAL | Age: 67
End: 2017-07-18
Attending: INTERNAL MEDICINE
Payer: MEDICARE

## 2017-07-18 DIAGNOSIS — R76.8 HCV ANTIBODY POSITIVE: ICD-10-CM

## 2017-07-18 PROCEDURE — 87517 HEPATITIS B DNA QUANT: CPT

## 2017-07-18 PROCEDURE — 87522 HEPATITIS C REVRS TRNSCRPJ: CPT

## 2017-07-20 ENCOUNTER — PATIENT MESSAGE (OUTPATIENT)
Dept: INTERNAL MEDICINE | Facility: CLINIC | Age: 67
End: 2017-07-20

## 2017-07-20 NOTE — TELEPHONE ENCOUNTER
Nurse spoke with pt. appt for 6 month f/u med refill scheduled for 7/25/17 @ 3. Pt request 5 days of med refill be sent to pharmacy until appt. Please review

## 2017-07-20 NOTE — TELEPHONE ENCOUNTER
----- Message from Amaris Hall sent at 7/20/2017  9:00 AM CDT -----  Contact: Pt  ..1. What is the name of the medication you are requesting? LORAZEPAM  2. What is the dose? 1 MG  3. How do you take the medication? Orally, topically, etc? Orally  4. How often do you take this medication? Daily  5. Do you need a 30 day or 90 day supply? 30  6. How many refills are you requesting? 1  7. What is your preferred pharmacy and location of the pharmacy? ..  [facility] [address] [address]  Phone: [phone] Fax: [phone]      8. Who can we contact with further questions?Pt

## 2017-07-21 ENCOUNTER — PATIENT MESSAGE (OUTPATIENT)
Dept: GASTROENTEROLOGY | Facility: CLINIC | Age: 67
End: 2017-07-21

## 2017-07-21 ENCOUNTER — PATIENT MESSAGE (OUTPATIENT)
Dept: GASTROENTEROLOGY | Facility: HOSPITAL | Age: 67
End: 2017-07-21

## 2017-07-21 ENCOUNTER — TELEPHONE (OUTPATIENT)
Dept: GASTROENTEROLOGY | Facility: CLINIC | Age: 67
End: 2017-07-21

## 2017-07-21 LAB
HCV LOG: 6.25 LOG (10) IU/ML
HCV RNA QUANT PCR: ABNORMAL IU/ML
HCV, QUALITATIVE: DETECTED IU/ML

## 2017-07-21 RX ORDER — LORAZEPAM 1 MG/1
1 TABLET ORAL NIGHTLY
Qty: 30 TABLET | Refills: 0 | Status: SHIPPED | OUTPATIENT
Start: 2017-07-21 | End: 2017-08-17 | Stop reason: SDUPTHER

## 2017-07-24 ENCOUNTER — TELEPHONE (OUTPATIENT)
Dept: PHARMACY | Facility: CLINIC | Age: 67
End: 2017-07-24

## 2017-07-24 ENCOUNTER — PATIENT MESSAGE (OUTPATIENT)
Dept: GASTROENTEROLOGY | Facility: CLINIC | Age: 67
End: 2017-07-24

## 2017-07-24 DIAGNOSIS — B18.2 CHRONIC HEPATITIS C WITHOUT HEPATIC COMA: Primary | ICD-10-CM

## 2017-07-24 LAB
HBV DNA SERPL NAA+PROBE-ACNC: NORMAL LOGIU/ML
HBV DNA SERPL NAA+PROBE-LOG IU: NORMAL IU/ML

## 2017-07-24 NOTE — TELEPHONE ENCOUNTER
Spoke with the pt and questions answered. Will send in Rx for Humira to [facility] on [address]. Will also arrange for labs to check HCV genotype and fibrosure score.

## 2017-07-25 ENCOUNTER — OFFICE VISIT (OUTPATIENT)
Dept: INTERNAL MEDICINE | Facility: CLINIC | Age: 67
End: 2017-07-25
Payer: MEDICARE

## 2017-07-25 ENCOUNTER — LAB VISIT (OUTPATIENT)
Dept: LAB | Facility: HOSPITAL | Age: 67
End: 2017-07-25
Attending: INTERNAL MEDICINE
Payer: MEDICARE

## 2017-07-25 ENCOUNTER — PATIENT MESSAGE (OUTPATIENT)
Dept: GASTROENTEROLOGY | Facility: CLINIC | Age: 67
End: 2017-07-25

## 2017-07-25 ENCOUNTER — TELEPHONE (OUTPATIENT)
Dept: PHARMACY | Facility: CLINIC | Age: 67
End: 2017-07-25

## 2017-07-25 VITALS
DIASTOLIC BLOOD PRESSURE: 74 MMHG | HEART RATE: 86 BPM | HEIGHT: 67 IN | OXYGEN SATURATION: 98 % | WEIGHT: 103.19 LBS | SYSTOLIC BLOOD PRESSURE: 132 MMHG | BODY MASS INDEX: 16.2 KG/M2 | TEMPERATURE: 98 F

## 2017-07-25 DIAGNOSIS — K55.1 MESENTERIC ARTERY STENOSIS: ICD-10-CM

## 2017-07-25 DIAGNOSIS — K50.012 CROHN'S DISEASE OF SMALL INTESTINE WITH INTESTINAL OBSTRUCTION: ICD-10-CM

## 2017-07-25 DIAGNOSIS — F41.9 ANXIETY: ICD-10-CM

## 2017-07-25 DIAGNOSIS — E78.5 HYPERLIPIDEMIA LDL GOAL <70: ICD-10-CM

## 2017-07-25 DIAGNOSIS — B18.2 CHRONIC HEPATITIS C WITHOUT HEPATIC COMA: ICD-10-CM

## 2017-07-25 DIAGNOSIS — I10 HYPERTENSION GOAL BP (BLOOD PRESSURE) < 140/90: Primary | ICD-10-CM

## 2017-07-25 DIAGNOSIS — F51.04 CHRONIC INSOMNIA: ICD-10-CM

## 2017-07-25 PROCEDURE — 99499 UNLISTED E&M SERVICE: CPT | Mod: S$GLB,,, | Performed by: INTERNAL MEDICINE

## 2017-07-25 PROCEDURE — 87522 HEPATITIS C REVRS TRNSCRPJ: CPT

## 2017-07-25 PROCEDURE — 87902 NFCT AGT GNTYP ALYS HEP C: CPT

## 2017-07-25 PROCEDURE — 1159F MED LIST DOCD IN RCRD: CPT | Mod: S$GLB,,, | Performed by: INTERNAL MEDICINE

## 2017-07-25 PROCEDURE — 99214 OFFICE O/P EST MOD 30 MIN: CPT | Mod: S$GLB,,, | Performed by: INTERNAL MEDICINE

## 2017-07-25 PROCEDURE — 82247 BILIRUBIN TOTAL: CPT

## 2017-07-25 PROCEDURE — 99999 PR PBB SHADOW E&M-EST. PATIENT-LVL III: CPT | Mod: PBBFAC,,, | Performed by: INTERNAL MEDICINE

## 2017-07-25 PROCEDURE — 36415 COLL VENOUS BLD VENIPUNCTURE: CPT

## 2017-07-25 RX ORDER — POLYETHYLENE GLYCOL 3350 17 G/17G
POWDER, FOR SOLUTION ORAL
COMMUNITY
Start: 2017-07-10 | End: 2017-07-25

## 2017-07-25 RX ORDER — TRIAMCINOLONE ACETONIDE 1 MG/G
CREAM TOPICAL 2 TIMES DAILY
Refills: 0 | COMMUNITY
Start: 2017-07-14 | End: 2018-06-08

## 2017-07-25 NOTE — TELEPHONE ENCOUNTER
Informed patient we have received an order from your provider for Humira and will contact you once a benefits investigation is complete with copay information to set up pickup or shipment and [facility] consultation.  feel free to give us a call with  any questions prior to hearing back from us at [phone]._ 7/25/17

## 2017-07-25 NOTE — PROGRESS NOTES
Subjective:       Patient ID: Manuela Reyes is a 67 y.o. female.    Chief Complaint: Follow-up    Manuela Reyes  67 y.o. White female    Patient presents with:  Follow-up    HPI: Here today to follow up on chronic conditions.  HTN--stable on amlodipine.                    CHOL                     182                 02/08/2017                 HDL                      74                  02/08/2017                 LDLCALC                  90.8                02/08/2017                TRIG                     86                  02/08/2017            She was found to have mesenteric artery stenosis. She was evaluated by vascular surgery. Surgical intervention was not indicated. She denies symptoms.   Crohn's disease--management per GI. She will be startin Humira.   Insomnia/anxiety--stable on lorazepam.         Past Medical History:  Anxiety  Crohn's disease  Depression  Genital herpes  Hypertension  Insomnia  Osteoporosis  TIA (transient ischemic attack)    Current Outpatient Prescriptions on File Prior to Visit:  adalimumab (HUMIRA PEN [facility] START) PnKt injection, Take 80mg (2 shots) on day 1 and day 2, Then take 80mg (2 shots) on day 14., Disp: 6 each, Rfl: 0  adalimumab (HUMIRA) 40 mg/0.8 mL SyKt injection, Inject 0.8 mLs (40 mg total) into the skin every 14 (fourteen) days., Disp: 2 vial, Rfl: 11  amlodipine (NORVASC) 10 MG tablet, TAKE 1 TABLET EVERY DAY, Disp: 90 tablet, Rfl: 2  aspirin (ECOTRIN) 81 MG EC tablet, Take 1 tablet (81 mg total) by mouth once daily., Disp: , Rfl:   budesonide (ENTOCORT EC) 3 mg capsule, Take 6 mg by mouth once daily., Disp: , Rfl:   calcium carbonate (OS-COOPER) 600 mg (1,500 mg) Tab, Take 600 mg by mouth 2 (two) times daily with meals., Disp: , Rfl:   cyanocobalamin, vitamin B-12, 2,500 mcg Lozg, Place 1 tablet under the tongue 2 (two) times daily., Disp: , Rfl:   fluticasone (FLONASE) 50 mcg/actuation nasal spray, USE 2 SPRAYS IN EACH NOSTRIL EVERY DAY,  Disp: 48 g, Rfl: 3  LORATADINE ORAL, Take 10 mg by mouth., Disp: , Rfl:   lorazepam (ATIVAN) 1 MG tablet, Take 1 tablet (1 mg total) by mouth every evening., Disp: 30 tablet, Rfl: 0  multivitamin with minerals tablet, Take 1 tablet by mouth once daily at 6am., Disp: , Rfl:   polyethylene glycol (GLYCOLAX) 17 gram/dose powder, , Disp: , Rfl:   vitamin D 1000 units Tab, Take 185 mg by mouth once daily., Disp: , Rfl:     Allergies:  Review of patient's allergies indicates:   -- Codeine sulfate -- Itching   -- Hydrochlorothiazide -- Other (See Comments)    --  Adverse Reaction   -- Lisinopril -- Swelling and Edema    --  Facial Swelling            Review of Systems   Constitutional: Negative for fever and unexpected weight change.   Respiratory: Negative for shortness of breath.    Cardiovascular: Negative for chest pain and leg swelling.   Gastrointestinal: Negative for abdominal pain and blood in stool.   Genitourinary: Negative for dysuria.   Musculoskeletal: Negative for gait problem.   Neurological: Negative for dizziness and headaches.   Psychiatric/Behavioral: Positive for sleep disturbance. The patient is nervous/anxious.        Objective:      Physical Exam   Constitutional: She is oriented to person, place, and time. She appears well-developed and well-nourished. No distress.   Eyes: No scleral icterus.   Neck: No tracheal deviation present.   Cardiovascular: Normal rate, regular rhythm and normal heart sounds.    Pulmonary/Chest: Effort normal and breath sounds normal. No respiratory distress.   Abdominal: Soft. Bowel sounds are normal.   Musculoskeletal: She exhibits no edema.   Neurological: She is alert and oriented to person, place, and time.   Skin: Skin is warm and dry.   Psychiatric: She has a normal mood and affect.   Vitals reviewed.      Assessment:       1. Hypertension goal BP (blood pressure) < 140/90    2. Hyperlipidemia LDL goal <70    3. Mesenteric artery stenosis    4. Crohn's disease of  small intestine with intestinal obstruction    5. Chronic insomnia    6. Anxiety        Plan:       Manuela was seen today for follow-up.    Diagnoses and all orders for this visit:    Hypertension goal BP (blood pressure) < 140/90  -     Continue current management    Hyperlipidemia LDL goal <70  -     Check lipid panel    Mesenteric artery stenosis  -     Monitor  -     LDL goal less than 70--explained to patient     Crohn's disease of small intestine with intestinal obstruction  -     Management per GI    Chronic insomnia  -     Continue lorazepam    Anxiety  -     Continue lorazepam    Schedule labs.     F/U in 6 months and as needed.

## 2017-07-28 LAB
HCV GENTYP SERPL NAA+PROBE: ABNORMAL
HCV QUALITATIVE RESULT: DETECTED
HCV QUANTITATIVE LOG: 6.21 LOG (10) IU/ML
HCV RNA SPEC NAA+PROBE-ACNC: ABNORMAL IU/ML

## 2017-07-28 NOTE — TELEPHONE ENCOUNTER
Good afternoon,     I see that Ms. Reyes's results for hepatitis came back positive. Before moving forward and scheduling her shipment and consult of the Humira I wanted to first check with you and see how you would like us to proceed. Would you like us to hold the Rx at this time and wait for clearance or proceed?     Thanks,   Amy Jerome, PharmD  [facility]

## 2017-07-29 LAB
A2 MACROGLOB SERPL-MCNC: 421 MG/DL (ref 106–279)
ALT SERPL W P-5'-P-CCNC: 13 U/L (ref 6–29)
APO A-I SERPL-MCNC: 197 MG/DL (ref 101–198)
BILIRUB SERPL-MCNC: 0.4 MG/DL (ref 0.2–1.2)
FIBROSIS STAGE SERPL QL: ABNORMAL
FIBROTEST INTERPRETATION: ABNORMAL
FOOTNOTE: ABNORMAL
GGT SERPL-CCNC: 10 U/L (ref 3–65)
HAPTOGLOB SERPL-MCNC: 195 MG/DL (ref 43–212)
LIVER FIBR SCORE SERPL CALC.FIBROSURE: 0.25
NECROINFLAMMAT INTERP: ABNORMAL
NECROINFLAMMATORY ACT GRADE SERPL QL: ABNORMAL
NECROINFLAMMATORY ACT SCORE SERPL: 0.04
REFERENCE ID: ABNORMAL

## 2017-07-31 ENCOUNTER — PATIENT MESSAGE (OUTPATIENT)
Dept: GASTROENTEROLOGY | Facility: CLINIC | Age: 67
End: 2017-07-31

## 2017-07-31 ENCOUNTER — TELEPHONE (OUTPATIENT)
Dept: GASTROENTEROLOGY | Facility: HOSPITAL | Age: 67
End: 2017-07-31

## 2017-08-01 ENCOUNTER — PATIENT MESSAGE (OUTPATIENT)
Dept: GASTROENTEROLOGY | Facility: CLINIC | Age: 67
End: 2017-08-01

## 2017-08-01 NOTE — TELEPHONE ENCOUNTER
Patient reached out to [facility] regarding specialty medication for Humira. Inform patient medication was approved but with a high co-pay. Patient would like to  patient assistance application at [facility] in [facility] and will complete and bring it back to them to fax it over to [facility]. Patient voiced understanding.

## 2017-08-02 ENCOUNTER — OFFICE VISIT (OUTPATIENT)
Dept: GASTROENTEROLOGY | Facility: CLINIC | Age: 67
End: 2017-08-02
Payer: MEDICARE

## 2017-08-02 VITALS
SYSTOLIC BLOOD PRESSURE: 122 MMHG | DIASTOLIC BLOOD PRESSURE: 82 MMHG | WEIGHT: 109.38 LBS | HEART RATE: 80 BPM | HEIGHT: 68 IN | BODY MASS INDEX: 16.58 KG/M2

## 2017-08-02 DIAGNOSIS — B18.2 CHRONIC HEPATITIS C WITHOUT HEPATIC COMA: Primary | ICD-10-CM

## 2017-08-02 DIAGNOSIS — K50.012 CROHN'S DISEASE OF SMALL INTESTINE WITH INTESTINAL OBSTRUCTION: ICD-10-CM

## 2017-08-02 DIAGNOSIS — K50.119 CROHN'S COLITIS, UNSPECIFIED COMPLICATION: ICD-10-CM

## 2017-08-02 PROCEDURE — 1125F AMNT PAIN NOTED PAIN PRSNT: CPT | Mod: S$GLB,,, | Performed by: INTERNAL MEDICINE

## 2017-08-02 PROCEDURE — 99999 PR PBB SHADOW E&M-EST. PATIENT-LVL III: CPT | Mod: PBBFAC,,, | Performed by: INTERNAL MEDICINE

## 2017-08-02 PROCEDURE — 99499 UNLISTED E&M SERVICE: CPT | Mod: S$GLB,,, | Performed by: INTERNAL MEDICINE

## 2017-08-02 PROCEDURE — 99214 OFFICE O/P EST MOD 30 MIN: CPT | Mod: S$GLB,,, | Performed by: INTERNAL MEDICINE

## 2017-08-02 PROCEDURE — 1159F MED LIST DOCD IN RCRD: CPT | Mod: S$GLB,,, | Performed by: INTERNAL MEDICINE

## 2017-08-02 RX ORDER — LEDIPASVIR AND SOFOSBUVIR 90; 400 MG/1; MG/1
1 TABLET, FILM COATED ORAL DAILY
Qty: 30 TABLET | Refills: 1 | Status: SHIPPED | OUTPATIENT
Start: 2017-08-02 | End: 2018-01-12

## 2017-08-02 NOTE — PROGRESS NOTES
Clinic Follow Up:  [facility] Follow Up Note    Reason for Follow Up:  The primary encounter diagnosis was Chronic hepatitis C without hepatic coma. Diagnoses of Crohn's disease of small intestine with intestinal obstruction and Crohn's colitis, unspecified complication were also pertinent to this visit.    PCP: Christelle Lawler   [address] [place] [address]  / ALECIA MARQUEZ [id number]    HPI:  This is a 67 y.o. female here for follow up of the above issues.  She is here today for a follow up primarily to discuss her hepatitis C infection.  At her last visit we did serologies for hepatitis B and C.  Hepatitis B serologies were negative for the hepatitis C antibody was positive.  A subsequent viral load was confirmed to be positive and she was found to have genotype 1A disease.  She had a negative TB test so prescriptions for Humira were submitted to the pharmacy.  These have currently been approved but she is waiting to hear back on whether co-pay assistance will be possible because there is still a rather sizable co-pay.  There has been no significant change in her symptoms.  She is here today to discuss potentially treating hepatitis C.     Review of Systems:  CONSTITUTIONAL: Denies weight change,  fatigue, fevers, chills, night sweats.  CARDIOVASCULAR: Denies chest pain, shortness of breath, orthopnea and edema.  RESPIRATORY: Denies cough, hemoptysis, dyspnea, and wheezing.  GI: See HPI.  : Denies dysuria and hematuria    Medical History:  Past Medical History:   Diagnosis Date    Anxiety     Crohn's disease     Depression     Genital herpes     Hepatitis C infection     Hypertension     Insomnia     Osteoporosis     TIA (transient ischemic attack)        Surgical History:   Past Surgical History:   Procedure Laterality Date    APPENDECTOMY      COLONOSCOPY N/A 5/19/2017    Procedure: COLONOSCOPY;  Surgeon: Jose Luis Leonard MD;  Location: [facility];  Service: Endoscopy;  Laterality: N/A;     SMALL INTESTINE SURGERY         Family History:   Family History   Problem Relation Age of Onset    Stroke Mother     Cancer Father      Lung    Glaucoma Neg Hx     Macular degeneration Neg Hx     Thyroid disease Neg Hx        Social History:   Social History   Substance Use Topics    Smoking status: Former Smoker     Years: 20.00     Types: Cigarettes     Quit date: 1/1/2005    Smokeless tobacco: Never Used      Comment: Former Light Smoker less than 10 a day    Alcohol use No       Allergies: Reviewed    Home Medications:  Medication List with Changes/Refills   New Medications    LEDIPASVIR-SOFOSBUVIR  MG TAB    Take 1 tablet by mouth once daily.   Current Medications    ADALIMUMAB (HUMIRA PEN CROHN'S-UC-HS START) PNKT INJECTION    Take 80mg (2 shots) on day 1 and day 2, Then take 80mg (2 shots) on day 14.    ADALIMUMAB (HUMIRA) 40 MG/0.8 ML SYKT INJECTION    Inject 0.8 mLs (40 mg total) into the skin every 14 (fourteen) days.    AMLODIPINE (NORVASC) 10 MG TABLET    TAKE 1 TABLET EVERY DAY    ASPIRIN (ECOTRIN) 81 MG EC TABLET    Take 1 tablet (81 mg total) by mouth once daily.    BUDESONIDE (ENTOCORT EC) 3 MG CAPSULE    Take 6 mg by mouth once daily.    CALCIUM CARBONATE (OS-COOPER) 600 MG (1,500 MG) TAB    Take 600 mg by mouth 2 (two) times daily with meals.    CYANOCOBALAMIN, VITAMIN B-12, 2,500 MCG LOZG    Place 1 tablet under the tongue 2 (two) times daily.    FLUTICASONE (FLONASE) 50 MCG/ACTUATION NASAL SPRAY    USE 2 SPRAYS IN EACH NOSTRIL EVERY DAY    LORATADINE ORAL    Take 10 mg by mouth.    LORAZEPAM (ATIVAN) 1 MG TABLET    Take 1 tablet (1 mg total) by mouth every evening.    MULTIVITAMIN WITH MINERALS TABLET    Take 1 tablet by mouth once daily at 6am.    POLYETHYLENE GLYCOL (GLYCOLAX) 17 GRAM/DOSE POWDER        TRIAMCINOLONE ACETONIDE 0.1% (KENALOG) 0.1 % CREAM    2 (two) times daily. Apply to affected area    VITAMIN D 1000 UNITS TAB    Take 185 mg by mouth once daily.       Physical  "Exam:  Vital Signs:  /82   Pulse 80   Ht 5' 8.4" (1.737 m)   Wt 49.6 kg (109 lb 5.6 oz)   BMI 16.43 kg/m²   Body mass index is 16.43 kg/m².      GENERAL: No acute distress, Alert and oriented x 4  EYES: Anicteric, no pallor noted.  ABDOMEN: soft, non-tender, non-distended, normal bowel sounds.    Labs: Pertinent labs reviewed.  Hepatitis C confirmed.  Fibro-sure score suggests minimal fibrosis.    Endoscopy:  Not applicable    CRC Screening: Up-to-date.  Please see health maintenance tab.    Assessment:  1. Chronic hepatitis C without hepatic coma    2. Crohn's disease of small intestine with intestinal obstruction    3. Crohn's colitis, unspecified complication        Recommendations:  1.  Hepatitis C infection: We discussed treatment options today.  We will proceed with treatment with Harvoni.  A prescription was sent to the patient's pharmacy.  She will need 8 weeks of therapy.  We discussed the potential side effects of Harvoni.  I also discussed the importance of not missing doses, taking the doses at the same time every day, and avoiding antacids and other medications that may reduce gastric acid production.    2.  Crohn's disease: Awaiting further word on insurance approval for Humira.  If the co-pay will be unreasonable then we will likely use Remicade instead.    Return to Clinic:    Return in about 2 months (around 10/2/2017).        "

## 2017-08-03 ENCOUNTER — TELEPHONE (OUTPATIENT)
Dept: PHARMACY | Facility: CLINIC | Age: 67
End: 2017-08-03

## 2017-08-03 NOTE — TELEPHONE ENCOUNTER
Informed patient this is [facility] specialty pharmacy, we have received an order for Briana from your provider and will contact you once a benefits investigation is complete with copay information, to set up pickup or shipment, and [facility] consultation.  feel free to give us a call with  any questions prior to hearing back from us at [phone]. thank you!_OSEAS 8/3/17

## 2017-08-04 NOTE — TELEPHONE ENCOUNTER
[employer]: Theresa- Reach out to insurance company to submit prior authorization for Harvoni 90/400mg x 8 weeks over the phone. Case ID# [id number] SHAHDIA @5:30pm

## 2017-08-07 NOTE — TELEPHONE ENCOUNTER
DOCUMENTATION ONLY   Briana prior authorization approved on 08/06/2017 x 8 weeks.   Approval date: 08/06/2017 to 10/01/2017   PA# [id number]   Co-pay: $2945.93     Forward to patient assistance.

## 2017-08-08 ENCOUNTER — PATIENT MESSAGE (OUTPATIENT)
Dept: INTERNAL MEDICINE | Facility: CLINIC | Age: 67
End: 2017-08-08

## 2017-08-08 ENCOUNTER — PATIENT MESSAGE (OUTPATIENT)
Dept: GASTROENTEROLOGY | Facility: CLINIC | Age: 67
End: 2017-08-08

## 2017-08-08 NOTE — TELEPHONE ENCOUNTER
DOCUMENTATION ONLY:  [facility] Approved  8/8/17-8/8/18  $24,000 Award  BIN: [id number]  PCN: [id number]  ID: [id number]  Group: [id number]  $0.00 copay

## 2017-08-09 NOTE — TELEPHONE ENCOUNTER
Initial Harvoni consult complete. Name/ confirmed. Medication list reviewed and updated. Consultation included: indication; goals of treatment; administration; storage and handling; side effects; how to handle side effects; the importance of compliance; how to handle missed doses; the importance of laboratory monitoring; the importance of keeping all follow up appointments. Patient understands to report any medication changes to [facility] and provider. All questions answered and addressed to patients satisfaction. Patient acknowledged understanding of goals of treatment and medication regimen; she is aware to notify pharmacy of any medication changes during treatment; she endorses strict compliance with regard to her regular medication regimen and does not anticipate problems with the addition of harvoni; she questioned the mechanism of the medication with was relayed in layman's terms for her; she is briefed on side effects of medication and made aware to notify pharmacy of any side effects; all questions were answered and addressed to her satisfaction; will f/u with patient in 7-10 days to assess tolerance and in 21 days to coordinate refill    Initial Harvoni 90/400mg consult completed on 17. Harvoni 90/400mg will be shipped on 17 to arrive at patient's home on 8/10/17 via [employer]. $0 copay. Patient will start Harvoni 90/400mg on 17. Address confirmed, CC on file. Confirmed 2 patient identifiers - name and . Therapy Appropriate.    Harvoni- Take one tablet by mouth daily x 8 weeks  Counseling was reviewed:   1. Patient MUST take Harvoni at the SAME time every day.   2. Patient MUST avoid acid reducers without consulting with myself or provider first. Antacids are to be spaced out at least 4 hours apart from Harvoni.    3. Side effects include headaches and fatigue.   Headache: Patient may treat with OTC remedies. If Tylenol is used, dose should  not exceed 2000mg per day.    DDI: Medication list  reviewed and potential DDIs addressed. No DDIs or allergies noted. Patient MUST contact myself or provider prior to starting any new OTC, herbal, or prescription drugs to avoid potential DDIs.    Discussed the importance of staying well hydrated while on therapy. Compliance stressed - patient to take missed doses as soon as remembered, but NOT to take 2 doses in one day. Patient will report questions or concerns to myself or practitioner. Patient verbalizes understanding. Patient plans to start Harvoni on 8/11/17. Consultation included: indication; goals of treatment; administration; storage and handling; side effects; how to handle side effects; the importance of compliance; how to handle missed doses; the importance of laboratory monitoring; the importance of keeping all follow up appointments.  Patient understands to report any medication changes to [facility] and provider. All questions answered and addressed to patients satisfaction.  I will f/u with patient in 1 week from start, and [facility] will contact patient in 3 weeks to coordinate next refill.      Harjit Ortiz, PharmD, BCPS  [facility]  [phone]

## 2017-08-09 NOTE — TELEPHONE ENCOUNTER
DOCUMENTATION ONLY:  Humira Assistance Approved  8/9/17-12/31/17  [facility] Patient [employer]

## 2017-08-10 ENCOUNTER — PATIENT MESSAGE (OUTPATIENT)
Dept: GASTROENTEROLOGY | Facility: CLINIC | Age: 67
End: 2017-08-10

## 2017-08-10 ENCOUNTER — PATIENT MESSAGE (OUTPATIENT)
Dept: RHEUMATOLOGY | Facility: CLINIC | Age: 67
End: 2017-08-10

## 2017-08-10 ENCOUNTER — TELEPHONE (OUTPATIENT)
Dept: GASTROENTEROLOGY | Facility: HOSPITAL | Age: 67
End: 2017-08-10

## 2017-08-10 DIAGNOSIS — B18.2 CHRONIC HEPATITIS C WITHOUT HEPATIC COMA: Primary | ICD-10-CM

## 2017-08-10 RX ORDER — TALC
1 POWDER (GRAM) TOPICAL NIGHTLY PRN
COMMUNITY
End: 2019-08-05

## 2017-08-10 NOTE — TELEPHONE ENCOUNTER
She is going to start Harvoni on 8/11/17. She will need to have a repeat HCV blood test a month after she finishes treatment (arround Nov 11). She also will be getting her Humira soon and needs to come in for teaching. Thanks.

## 2017-08-14 ENCOUNTER — PATIENT MESSAGE (OUTPATIENT)
Dept: INTERNAL MEDICINE | Facility: CLINIC | Age: 67
End: 2017-08-14

## 2017-08-16 ENCOUNTER — TELEPHONE (OUTPATIENT)
Dept: GASTROENTEROLOGY | Facility: CLINIC | Age: 67
End: 2017-08-16

## 2017-08-16 ENCOUNTER — PATIENT MESSAGE (OUTPATIENT)
Dept: GASTROENTEROLOGY | Facility: CLINIC | Age: 67
End: 2017-08-16

## 2017-08-16 ENCOUNTER — TELEPHONE (OUTPATIENT)
Dept: PHARMACY | Facility: CLINIC | Age: 67
End: 2017-08-16

## 2017-08-16 NOTE — TELEPHONE ENCOUNTER
Incoming call from patient reporting signs of acute sinusitis including chills and stuffy nose; she inquires if this is related to harvoni; she does state that she typically gets sinus infections at the end of the summer months which may be what this is; usually requires antibiotics; explained that I do not expect this is related to harvoni and may just be a seasonal reaction; she has NOT initiated humira at this time and she is advised to hold start of humira until this sinus issue has resolved; she acknowledges understanding; will f/u w\ patient 8/21 to determine status and humira start

## 2017-08-16 NOTE — TELEPHONE ENCOUNTER
Informed her Harvoni not cause of sinus reaction. She verbalized understanding. Schedule Nurse visit for Humira teaching.

## 2017-08-17 ENCOUNTER — PATIENT MESSAGE (OUTPATIENT)
Dept: GASTROENTEROLOGY | Facility: CLINIC | Age: 67
End: 2017-08-17

## 2017-08-18 RX ORDER — LORAZEPAM 1 MG/1
1 TABLET ORAL NIGHTLY
Qty: 30 TABLET | Refills: 5 | Status: SHIPPED | OUTPATIENT
Start: 2017-08-18 | End: 2018-03-02 | Stop reason: SDUPTHER

## 2017-08-21 ENCOUNTER — TELEPHONE (OUTPATIENT)
Dept: PHARMACY | Facility: CLINIC | Age: 67
End: 2017-08-21

## 2017-08-21 ENCOUNTER — CLINICAL SUPPORT (OUTPATIENT)
Dept: GASTROENTEROLOGY | Facility: CLINIC | Age: 67
End: 2017-08-21
Payer: MEDICARE

## 2017-08-21 VITALS
HEIGHT: 67 IN | SYSTOLIC BLOOD PRESSURE: 156 MMHG | DIASTOLIC BLOOD PRESSURE: 88 MMHG | BODY MASS INDEX: 15.92 KG/M2 | HEART RATE: 88 BPM | WEIGHT: 101.44 LBS

## 2017-08-21 DIAGNOSIS — Z79.620 ADALIMUMAB (HUMIRA) LONG-TERM USE: Primary | ICD-10-CM

## 2017-08-21 PROCEDURE — 99499 UNLISTED E&M SERVICE: CPT | Mod: S$GLB,,, | Performed by: INTERNAL MEDICINE

## 2017-08-21 PROCEDURE — 99999 PR PBB SHADOW E&M-EST. PATIENT-LVL III: CPT | Mod: PBBFAC,,,

## 2017-08-21 NOTE — PROGRESS NOTES
Pt did teaching for Humira injections. Watched video at [email or web address] for instruction. Used practice pen before self administering. Administered 40 mg in right upper leg at 3:14 pm and 40 mg in left upper leg at 3:20 pm. Reports pain 0/10 at both injection sites. No redness, swelling, or bruising noted. Denies headache, dizziness, or nausea. Band aid applied at both injection sites. Monitored for 15 min. No reaction noted.

## 2017-08-21 NOTE — TELEPHONE ENCOUNTER
Harvoni f/u complete. Name/ confirmed. Medication list reviewed and updated. Consultation included: indication; goals of treatment; administration; storage and handling; side effects; how to handle side effects; the importance of compliance; how to handle missed doses; the importance of laboratory monitoring; the importance of keeping all follow up appointments. Patient understands to report any medication changes to [facility] and provider. All questions answered and addressed to patients satisfaction. Noah acknowledges understanding of medciation regimen and goals of treatment; she endorses strict compliance; she understands the importance of lab monitoring; she was feeling a little under the weather last week and reports that she missed no doses of her Harvoni during that period; she is afforded the opportunity to ask questions and had none

## 2017-08-21 NOTE — TELEPHONE ENCOUNTER
patient returned call stating she feels well and will receive injection training today at MD office; today is day 1 humira treatment

## 2017-08-22 ENCOUNTER — PATIENT MESSAGE (OUTPATIENT)
Dept: GASTROENTEROLOGY | Facility: CLINIC | Age: 67
End: 2017-08-22

## 2017-08-22 ENCOUNTER — TELEPHONE (OUTPATIENT)
Dept: GASTROENTEROLOGY | Facility: CLINIC | Age: 67
End: 2017-08-22

## 2017-08-22 NOTE — TELEPHONE ENCOUNTER
2 of the 4 starter Humira shots leaked less that 1/4 of each shot. Ms. Reyes wants to know if she needs another shot?

## 2017-08-22 NOTE — TELEPHONE ENCOUNTER
She received instruction 8/21/17. States she finally understands how much pressure needs to be put on pen while injecting.

## 2017-08-22 NOTE — TELEPHONE ENCOUNTER
----- Message from nAita Palacios sent at 8/22/2017  1:47 PM CDT -----  Contact: Patient  Patient states she was given training in giving Humira herself, but patient is still having difficulties, please call her back [phone]. Thank you

## 2017-08-24 ENCOUNTER — PATIENT OUTREACH (OUTPATIENT)
Dept: ADMINISTRATIVE | Facility: HOSPITAL | Age: 67
End: 2017-08-24

## 2017-08-24 NOTE — PROGRESS NOTES
Last documented 2017 Blood Pressure routed to [facility] as attestation documentation for Blood Pressure Controlled measure. Measure has been met.

## 2017-08-29 ENCOUNTER — PATIENT MESSAGE (OUTPATIENT)
Dept: GASTROENTEROLOGY | Facility: CLINIC | Age: 67
End: 2017-08-29

## 2017-08-31 ENCOUNTER — LAB VISIT (OUTPATIENT)
Dept: LAB | Facility: HOSPITAL | Age: 67
End: 2017-08-31
Payer: MEDICARE

## 2017-08-31 DIAGNOSIS — I10 ESSENTIAL HYPERTENSION: ICD-10-CM

## 2017-08-31 LAB
ANION GAP SERPL CALC-SCNC: 8 MMOL/L
BUN SERPL-MCNC: 14 MG/DL
CALCIUM SERPL-MCNC: 9.8 MG/DL
CHLORIDE SERPL-SCNC: 98 MMOL/L
CHOLEST SERPL-MCNC: 206 MG/DL
CHOLEST/HDLC SERPL: 2.5 {RATIO}
CO2 SERPL-SCNC: 30 MMOL/L
CREAT SERPL-MCNC: 0.9 MG/DL
EST. GFR  (AFRICAN AMERICAN): >60 ML/MIN/1.73 M^2
EST. GFR  (NON AFRICAN AMERICAN): >60 ML/MIN/1.73 M^2
GLUCOSE SERPL-MCNC: 104 MG/DL
HDLC SERPL-MCNC: 83 MG/DL
HDLC SERPL: 40.3 %
LDLC SERPL CALC-MCNC: 105 MG/DL
NONHDLC SERPL-MCNC: 123 MG/DL
POTASSIUM SERPL-SCNC: 4.2 MMOL/L
SODIUM SERPL-SCNC: 136 MMOL/L
TRIGL SERPL-MCNC: 90 MG/DL

## 2017-08-31 PROCEDURE — 80048 BASIC METABOLIC PNL TOTAL CA: CPT

## 2017-08-31 PROCEDURE — 80061 LIPID PANEL: CPT

## 2017-08-31 PROCEDURE — 36415 COLL VENOUS BLD VENIPUNCTURE: CPT

## 2017-09-05 ENCOUNTER — OFFICE VISIT (OUTPATIENT)
Dept: INTERNAL MEDICINE | Facility: CLINIC | Age: 67
End: 2017-09-05
Payer: MEDICARE

## 2017-09-05 VITALS
HEART RATE: 83 BPM | SYSTOLIC BLOOD PRESSURE: 144 MMHG | OXYGEN SATURATION: 98 % | TEMPERATURE: 97 F | HEIGHT: 67 IN | DIASTOLIC BLOOD PRESSURE: 78 MMHG | BODY MASS INDEX: 16.13 KG/M2 | WEIGHT: 102.75 LBS

## 2017-09-05 DIAGNOSIS — I10 HYPERTENSION GOAL BP (BLOOD PRESSURE) < 140/90: Primary | ICD-10-CM

## 2017-09-05 DIAGNOSIS — L30.9 DERMATITIS OF FACE: ICD-10-CM

## 2017-09-05 PROCEDURE — 3077F SYST BP >= 140 MM HG: CPT | Mod: S$GLB,,, | Performed by: INTERNAL MEDICINE

## 2017-09-05 PROCEDURE — 1159F MED LIST DOCD IN RCRD: CPT | Mod: S$GLB,,, | Performed by: INTERNAL MEDICINE

## 2017-09-05 PROCEDURE — 3078F DIAST BP <80 MM HG: CPT | Mod: S$GLB,,, | Performed by: INTERNAL MEDICINE

## 2017-09-05 PROCEDURE — 99999 PR PBB SHADOW E&M-EST. PATIENT-LVL III: CPT | Mod: PBBFAC,,, | Performed by: INTERNAL MEDICINE

## 2017-09-05 PROCEDURE — 1126F AMNT PAIN NOTED NONE PRSNT: CPT | Mod: S$GLB,,, | Performed by: INTERNAL MEDICINE

## 2017-09-05 PROCEDURE — 3008F BODY MASS INDEX DOCD: CPT | Mod: S$GLB,,, | Performed by: INTERNAL MEDICINE

## 2017-09-05 PROCEDURE — 99213 OFFICE O/P EST LOW 20 MIN: CPT | Mod: S$GLB,,, | Performed by: INTERNAL MEDICINE

## 2017-09-05 PROCEDURE — 99499 UNLISTED E&M SERVICE: CPT | Mod: S$GLB,,, | Performed by: INTERNAL MEDICINE

## 2017-09-06 NOTE — PROGRESS NOTES
Manuela Reyes  67 y.o.  White female    Chief Complaint   Patient presents with    Follow-up       HPI:  Presents to the clinic to follow up on HTN. Her b/p has been running high at home. She has a wrist cuff. She states it is an old cuff. She has it here today and it is reading 10 points higher in both systolic and diastolic readings. She has been compliant with amlodipine.   She is also concerned about having a rash on her chin. She noticed it a couple days ago. She states it does not itch, hurt or drain. She has a prescription for triamcinolone at home. It was prescribed by her dermatologist for a separate issue.     PMH: Reviewed    MEDS: Reviewed med card    ALLERGIES: Reviewed allergy card    PE: Reviewed vitals  GENERAL: Alert and oriented, no acute distress  FACE: Erythematous macules and papules on chin, non tender, no drainage   HEART: Regular rate  LUNGS: Unlabored respirations     ASSESSMENT/PLAN:    Manuela was seen today for follow-up.    Diagnoses and all orders for this visit:    Hypertension goal BP (blood pressure) < 140/90  -     Recommend investing in a new b/p cuff  -     Recommend daily home b/p readings    Dermatitis of face  -     Recommend triamcinolone BID x 7 days    Advised to notify me of b/p readings via [facility] in 1-2 weeks.

## 2017-09-11 ENCOUNTER — PATIENT MESSAGE (OUTPATIENT)
Dept: INTERNAL MEDICINE | Facility: CLINIC | Age: 67
End: 2017-09-11

## 2017-09-26 ENCOUNTER — OFFICE VISIT (OUTPATIENT)
Dept: INTERNAL MEDICINE | Facility: CLINIC | Age: 67
End: 2017-09-26
Payer: MEDICARE

## 2017-09-26 ENCOUNTER — TELEPHONE (OUTPATIENT)
Dept: RADIOLOGY | Facility: HOSPITAL | Age: 67
End: 2017-09-26

## 2017-09-26 VITALS
HEART RATE: 91 BPM | OXYGEN SATURATION: 97 % | SYSTOLIC BLOOD PRESSURE: 160 MMHG | DIASTOLIC BLOOD PRESSURE: 82 MMHG | WEIGHT: 103.63 LBS | HEIGHT: 67 IN | TEMPERATURE: 96 F | BODY MASS INDEX: 16.27 KG/M2

## 2017-09-26 DIAGNOSIS — I10 HYPERTENSION GOAL BP (BLOOD PRESSURE) < 140/90: Primary | ICD-10-CM

## 2017-09-26 DIAGNOSIS — R10.9 LEFT FLANK PAIN: ICD-10-CM

## 2017-09-26 PROCEDURE — 3008F BODY MASS INDEX DOCD: CPT | Mod: S$GLB,,, | Performed by: INTERNAL MEDICINE

## 2017-09-26 PROCEDURE — 3077F SYST BP >= 140 MM HG: CPT | Mod: S$GLB,,, | Performed by: INTERNAL MEDICINE

## 2017-09-26 PROCEDURE — 3079F DIAST BP 80-89 MM HG: CPT | Mod: S$GLB,,, | Performed by: INTERNAL MEDICINE

## 2017-09-26 PROCEDURE — 99213 OFFICE O/P EST LOW 20 MIN: CPT | Mod: S$GLB,,, | Performed by: INTERNAL MEDICINE

## 2017-09-26 PROCEDURE — 1159F MED LIST DOCD IN RCRD: CPT | Mod: S$GLB,,, | Performed by: INTERNAL MEDICINE

## 2017-09-26 PROCEDURE — 99499 UNLISTED E&M SERVICE: CPT | Mod: S$GLB,,, | Performed by: INTERNAL MEDICINE

## 2017-09-26 PROCEDURE — 99999 PR PBB SHADOW E&M-EST. PATIENT-LVL IV: CPT | Mod: PBBFAC,,, | Performed by: INTERNAL MEDICINE

## 2017-09-27 ENCOUNTER — HOSPITAL ENCOUNTER (OUTPATIENT)
Dept: RADIOLOGY | Facility: HOSPITAL | Age: 67
Discharge: HOME | End: 2017-09-27
Attending: INTERNAL MEDICINE
Payer: MEDICARE

## 2017-09-27 ENCOUNTER — PATIENT MESSAGE (OUTPATIENT)
Dept: INTERNAL MEDICINE | Facility: CLINIC | Age: 67
End: 2017-09-27

## 2017-09-27 DIAGNOSIS — R10.9 LEFT FLANK PAIN: ICD-10-CM

## 2017-09-27 PROCEDURE — 76770 US EXAM ABDO BACK WALL COMP: CPT | Mod: 26,,, | Performed by: RADIOLOGY

## 2017-09-27 PROCEDURE — 76770 US EXAM ABDO BACK WALL COMP: CPT | Mod: TC

## 2017-09-28 ENCOUNTER — PATIENT MESSAGE (OUTPATIENT)
Dept: INTERNAL MEDICINE | Facility: CLINIC | Age: 67
End: 2017-09-28

## 2017-09-28 NOTE — PROGRESS NOTES
Manuela Reyes  67 y.o.  White female    Chief Complaint   Patient presents with    Follow-up       HPI:  Presents to the clinic to follow up on HTN. Since her last visit a couple weeks ago she has purchased a new b/p cuff and her readings have been better. Her b/p is high today but she has a lot on her mind. She denies symptoms.   She continues to have pain in her left mid back on occasion. It has been going on for a while. The pain seems to worsen with certain movements. She denies trauma. She denies radiation of pain. She denies urinary symptoms. .    PMH: Reviewed    MEDS: Reviewed med card    ALLERGIES: Reviewed allergy card    PE: Reviewed vitals  GENERAL: Alert and oriented, no acute distress  HEART: Regular rate  LUNGS: Unlabored respirations  BACK: Left flank without swelling, erythema or lesions, no significant tenderness noted, no palpable deformities or masses      ASSESSMENT/PLAN:    Manuela was seen today for follow-up.    Diagnoses and all orders for this visit:    Hypertension goal BP (blood pressure) < 140/90  -     Continue amlodipine   -     Continue home b/p monitoring    Left flank pain  -     US Retroperitoneal Complete; Future    I have recommended she send me her home b/p readings over a one week period via [facility].

## 2017-09-29 ENCOUNTER — TELEPHONE (OUTPATIENT)
Dept: INTERNAL MEDICINE | Facility: CLINIC | Age: 67
End: 2017-09-29

## 2017-09-29 DIAGNOSIS — N20.0 NEPHROLITHIASIS: ICD-10-CM

## 2017-09-29 DIAGNOSIS — R10.9 LEFT FLANK PAIN: Primary | ICD-10-CM

## 2017-09-29 DIAGNOSIS — N28.1 COMPLEX RENAL CYST: ICD-10-CM

## 2017-10-01 ENCOUNTER — PATIENT MESSAGE (OUTPATIENT)
Dept: INTERNAL MEDICINE | Facility: CLINIC | Age: 67
End: 2017-10-01

## 2017-10-02 ENCOUNTER — PATIENT MESSAGE (OUTPATIENT)
Dept: GASTROENTEROLOGY | Facility: CLINIC | Age: 67
End: 2017-10-02

## 2017-10-02 ENCOUNTER — OFFICE VISIT (OUTPATIENT)
Dept: GASTROENTEROLOGY | Facility: CLINIC | Age: 67
End: 2017-10-02
Payer: MEDICARE

## 2017-10-02 VITALS
HEIGHT: 68 IN | BODY MASS INDEX: 15.77 KG/M2 | WEIGHT: 104.06 LBS | DIASTOLIC BLOOD PRESSURE: 84 MMHG | HEART RATE: 88 BPM | SYSTOLIC BLOOD PRESSURE: 154 MMHG

## 2017-10-02 DIAGNOSIS — K50.012 CROHN'S DISEASE OF SMALL INTESTINE WITH INTESTINAL OBSTRUCTION: Primary | ICD-10-CM

## 2017-10-02 DIAGNOSIS — Z13.9 ENCOUNTER FOR MEDICAL SCREENING EXAMINATION: ICD-10-CM

## 2017-10-02 DIAGNOSIS — Z12.5 PROSTATE CANCER SCREENING: ICD-10-CM

## 2017-10-02 DIAGNOSIS — B18.2 CHRONIC HEPATITIS C WITHOUT HEPATIC COMA: ICD-10-CM

## 2017-10-02 PROCEDURE — 3008F BODY MASS INDEX DOCD: CPT | Mod: S$GLB,,, | Performed by: INTERNAL MEDICINE

## 2017-10-02 PROCEDURE — 1159F MED LIST DOCD IN RCRD: CPT | Mod: S$GLB,,, | Performed by: INTERNAL MEDICINE

## 2017-10-02 PROCEDURE — 99999 PR PBB SHADOW E&M-EST. PATIENT-LVL III: CPT | Mod: PBBFAC,,, | Performed by: INTERNAL MEDICINE

## 2017-10-02 PROCEDURE — 1125F AMNT PAIN NOTED PAIN PRSNT: CPT | Mod: S$GLB,,, | Performed by: INTERNAL MEDICINE

## 2017-10-02 PROCEDURE — 3077F SYST BP >= 140 MM HG: CPT | Mod: S$GLB,,, | Performed by: INTERNAL MEDICINE

## 2017-10-02 PROCEDURE — 99213 OFFICE O/P EST LOW 20 MIN: CPT | Mod: S$GLB,,, | Performed by: INTERNAL MEDICINE

## 2017-10-02 PROCEDURE — 3079F DIAST BP 80-89 MM HG: CPT | Mod: S$GLB,,, | Performed by: INTERNAL MEDICINE

## 2017-10-02 PROCEDURE — 99499 UNLISTED E&M SERVICE: CPT | Mod: S$GLB,,, | Performed by: INTERNAL MEDICINE

## 2017-10-02 NOTE — PROGRESS NOTES
Clinic Follow Up:  [facility] Follow Up Note    Reason for Follow Up:  The primary encounter diagnosis was Crohn's disease of small intestine with intestinal obstruction. Diagnoses of Chronic hepatitis C without hepatic coma, Encounter for medical screening examination, and Prostate cancer screening were also pertinent to this visit.    PCP: Christelle Lawler       HPI:  This is a 67 y.o. female here for follow up of the above issues.  She has been doing pretty well from a Crohn's disease standpoint.  She has been on Humira for the last 6 weeks.  She has had no issues with the injections.  She denies any abdominal pain, diarrhea, bloating after eating.  She has one bowel movement daily.  She continues on a low fiber diet.  She also has almost completed her Harvoni therapy.  She has 3 days left.  She does report some pain in the left flank for several months.  She saw her primary care physician and had an ultrasound done which showed some renal cysts as well as renal stones.  There were no stones identified on her CT scan in early January.    Review of Systems:  CONSTITUTIONAL: Denies weight change,  fatigue, fevers, chills, night sweats.  CARDIOVASCULAR: Denies chest pain, shortness of breath, orthopnea and edema.  RESPIRATORY: Denies cough, hemoptysis, dyspnea, and wheezing.  GI: See HPI.  : Denies dysuria and hematuria    Medical History:  Past Medical History:   Diagnosis Date    Anxiety     Crohn's disease     Depression     Genital herpes     Hepatitis C infection     Hypertension     Insomnia     Osteoporosis     TIA (transient ischemic attack)        Surgical History:   Past Surgical History:   Procedure Laterality Date    APPENDECTOMY      COLONOSCOPY N/A 5/19/2017    Procedure: COLONOSCOPY;  Surgeon: Jose Luis Leonard MD;  Location: [facility];  Service: Endoscopy;  Laterality: N/A;    SMALL INTESTINE SURGERY         Family History:   Family History   Problem Relation Age of  "Onset    Stroke Mother     Cancer Father      Lung    Glaucoma Neg Hx     Macular degeneration Neg Hx     Thyroid disease Neg Hx        Social History:   Social History   Substance Use Topics    Smoking status: Former Smoker     Years: 20.00     Types: Cigarettes     Quit date: 1/1/2005    Smokeless tobacco: Former User      Comment: Former Light Smoker less than 10 a day    Alcohol use No       Allergies: Reviewed    Home Medications:  Medication List with Changes/Refills   Current Medications    ADALIMUMAB (HUMIRA PEN CROHN'S-UC-HS START) PNKT INJECTION    Take 80mg (2 shots) on day 1 and day 2, Then take 80mg (2 shots) on day 14.    ADALIMUMAB (HUMIRA) 40 MG/0.8 ML SYKT INJECTION    Inject 0.8 mLs (40 mg total) into the skin every 14 (fourteen) days.    AMLODIPINE (NORVASC) 10 MG TABLET    TAKE 1 TABLET EVERY DAY    ASPIRIN (ECOTRIN) 81 MG EC TABLET    Take 1 tablet (81 mg total) by mouth once daily.    CALCIUM CARBONATE (OS-COOPER) 600 MG (1,500 MG) TAB    Take 600 mg by mouth 2 (two) times daily with meals.    CYANOCOBALAMIN, VITAMIN B-12, 2,500 MCG LOZG    Place 1 tablet under the tongue.     FLUTICASONE (FLONASE) 50 MCG/ACTUATION NASAL SPRAY    USE 2 SPRAYS IN EACH NOSTRIL EVERY DAY    LEDIPASVIR-SOFOSBUVIR  MG TAB    Take 1 tablet by mouth once daily.    LORATADINE ORAL    Take 10 mg by mouth.    LORAZEPAM (ATIVAN) 1 MG TABLET    Take 1 tablet (1 mg total) by mouth every evening.    MELATONIN 3 MG TAB    Take 1 tablet by mouth nightly as needed.    MULTIVITAMIN WITH MINERALS TABLET    Take 1 tablet by mouth once daily at 6am.    POLYETHYLENE GLYCOL (GLYCOLAX) 17 GRAM/DOSE POWDER        TRIAMCINOLONE ACETONIDE 0.1% (KENALOG) 0.1 % CREAM    2 (two) times daily. Apply to affected area    VITAMIN D 1000 UNITS TAB    Take 185 mg by mouth once daily.       Physical Exam:  Vital Signs:  BP (!) 154/84   Pulse 88   Ht 5' 8.4" (1.737 m)   Wt 47.2 kg (104 lb 0.9 oz)   BMI 15.64 kg/m²   Body mass index is " 15.64 kg/m².      GENERAL: No acute distress, Alert and oriented x 4  EYES: Anicteric, no pallor noted.  ENT: Oropharynx is clear  NECK: Supple, no masses, no thyromegally.  CHEST: Equal breath sounds bilaterally, no wheezing.  CARDIOVASCULAR: Regular rate and rhythm. Murmurs, rubs and gallops absent.  ABDOMEN: soft, non-tender, non-distended, normal bowel sounds.  EXTREMITIES: No clubbing, cyanosis or edema.  SKIN: Without lesion.  LYMPH: No cervical, axillary lymphadenopathy palpable.   NEUROLOGICAL: Grossly normal    Labs: Pertinent labs reviewed.  No new labs available    Endoscopy:  Not applicable    CRC Screening: Up-to-date    Assessment:  1. Crohn's disease of small intestine with intestinal obstruction    2. Chronic hepatitis C without hepatic coma    3. Encounter for medical screening examination    4. Prostate cancer screening        Recommendations:  1.  Crohn's disease: Continue Humira.  Symptoms well controlled.  I recommend that she start to increase her fiber intake as tolerated.  She will have labs done in about a month.    2.  Hepatitis C infection: Check viral load and liver enzymes a month after completing therapy.    Return to Clinic:    Return in about 3 months (around 1/2/2018).

## 2017-10-03 ENCOUNTER — PATIENT MESSAGE (OUTPATIENT)
Dept: GASTROENTEROLOGY | Facility: CLINIC | Age: 67
End: 2017-10-03

## 2017-10-03 ENCOUNTER — PATIENT MESSAGE (OUTPATIENT)
Dept: INTERNAL MEDICINE | Facility: CLINIC | Age: 67
End: 2017-10-03

## 2017-10-03 PROBLEM — B19.20 HEPATITIS C: Status: ACTIVE | Noted: 2017-10-03

## 2017-10-08 ENCOUNTER — PATIENT MESSAGE (OUTPATIENT)
Dept: INTERNAL MEDICINE | Facility: CLINIC | Age: 67
End: 2017-10-08

## 2017-10-09 ENCOUNTER — PATIENT MESSAGE (OUTPATIENT)
Dept: GASTROENTEROLOGY | Facility: CLINIC | Age: 67
End: 2017-10-09

## 2017-10-09 ENCOUNTER — TELEPHONE (OUTPATIENT)
Dept: UROLOGY | Facility: CLINIC | Age: 67
End: 2017-10-09

## 2017-10-09 NOTE — TELEPHONE ENCOUNTER
----- Message from Ines Ramirez sent at 10/9/2017  9:32 AM CDT -----  Patient got a message about getting an earlier appointment.   Call her at [phone].                            key

## 2017-10-12 ENCOUNTER — PATIENT MESSAGE (OUTPATIENT)
Dept: INTERNAL MEDICINE | Facility: CLINIC | Age: 67
End: 2017-10-12

## 2017-10-18 ENCOUNTER — PATIENT MESSAGE (OUTPATIENT)
Dept: GASTROENTEROLOGY | Facility: CLINIC | Age: 67
End: 2017-10-18

## 2017-10-30 ENCOUNTER — OFFICE VISIT (OUTPATIENT)
Dept: UROLOGY | Facility: CLINIC | Age: 67
End: 2017-10-30
Payer: MEDICARE

## 2017-10-30 VITALS
BODY MASS INDEX: 15.69 KG/M2 | SYSTOLIC BLOOD PRESSURE: 142 MMHG | DIASTOLIC BLOOD PRESSURE: 84 MMHG | WEIGHT: 104.38 LBS

## 2017-10-30 DIAGNOSIS — N20.0 RENAL STONES: ICD-10-CM

## 2017-10-30 DIAGNOSIS — N28.1 RENAL CYST: Primary | ICD-10-CM

## 2017-10-30 LAB
BILIRUB SERPL-MCNC: NORMAL MG/DL
BLOOD URINE, POC: NORMAL
COLOR, POC UA: NORMAL
GLUCOSE UR QL STRIP: NORMAL
KETONES UR QL STRIP: NORMAL
LEUKOCYTE ESTERASE URINE, POC: NORMAL
NITRITE, POC UA: NORMAL
PH, POC UA: 6
PROTEIN, POC: NORMAL
SPECIFIC GRAVITY, POC UA: 1.01
UROBILINOGEN, POC UA: NORMAL

## 2017-10-30 PROCEDURE — 99204 OFFICE O/P NEW MOD 45 MIN: CPT | Mod: 25,S$GLB,, | Performed by: UROLOGY

## 2017-10-30 PROCEDURE — 81002 URINALYSIS NONAUTO W/O SCOPE: CPT | Mod: S$GLB,,, | Performed by: UROLOGY

## 2017-10-30 PROCEDURE — 99999 PR PBB SHADOW E&M-EST. PATIENT-LVL II: CPT | Mod: PBBFAC,,, | Performed by: UROLOGY

## 2017-10-30 NOTE — PROGRESS NOTES
Chief Complaint: Left flank pain    HPI:   10/30/17: 66 yo woman since 12/16 has had some left flank pain.  CT 1/17 shows some simple renal cysts no sig stones.  US more recently redemonstrates similar findings no obstruction.  No abd/pelvic pain and no exac/rel factors.  No hematuria.  No prior urolithiasis.  No urinary bother.  No  history.      Allergies:  Codeine sulfate; Hydrochlorothiazide; and Lisinopril    Medications: has a current medication list which includes the following prescription(s): adalimumab, adalimumab, amlodipine, aspirin, calcium carbonate, cyanocobalamin (vitamin b-12), fluticasone, ledipasvir-sofosbuvir, loratadine, lorazepam, melatonin, multivitamin with minerals, polyethylene glycol, triamcinolone acetonide 0.1%, and vitamin d.    Review of Systems:  General: No fever, chills, fatigability, or weight loss.  Skin: No rashes, itching, or changes in color or texture of skin.  Chest: Denies HICKS, cyanosis, wheezing, cough, and sputum production.  Abdomen: Appetite fine. No weight loss. Denies diarrhea, abdominal pain, hematemesis, or blood in stool.  Musculoskeletal: No joint stiffness or swelling. Denies back pain.  : As above.  All other review of systems negative.    PMH:   has a past medical history of Anxiety; Crohn's disease; Depression; Genital herpes; Hepatitis C infection; Hypertension; Insomnia; Osteoporosis; and TIA (transient ischemic attack).    PSH:   has a past surgical history that includes Appendectomy; Small intestine surgery; and Colonoscopy (N/A, 5/19/2017).    FamHx: family history includes Cancer in her father; Stroke in her mother.    SocHx:  reports that she quit smoking about 12 years ago. Her smoking use included Cigarettes. She quit after 20.00 years of use. She has quit using smokeless tobacco. She reports that she does not drink alcohol or use drugs.     Physical Exam:  Vitals:   Vitals:    10/30/17 1355   BP: (!) 142/84     General: A&Ox3. No apparent  distress. No deformities.  Neck: No masses. Normal thyroid.  Lungs: normal inspiration. No use of accessory muscles.  Heart: normal pulse. No arrhythmias.  Abdomen: Soft. NT. ND. No masses. No hernias. No hepatosplenomegaly.  Lymphatic: Neck and groin nodes negative.  Skin: The skin is warm and dry. No jaundice.  Ext: No c/c/e.  : deferred    Labs/Studies:   Urinalysis performed in clinic, summary: UA normal    Impression/Plan:   1. Nothing on her CT or US reveals a source of pain that could be related to the left kidney.  Relieving constipation may relieve her pain in the left flank.  Could out of an abundance of caution get a CT renal mass protocol because of the minimally complex cyst, but very low risk of this very small lesion being RCC, so not ordering this now.  2. US/RTC 1 year to survey any changes.

## 2017-10-30 NOTE — LETTER
October 30, 2017      Christelle Lawler DO  [address] [place] [address] Dr Tristen MARQUEZ [id number]           [facility]  [address] [place] [address] [place]  [place] [address]  Phone: [phone]  Fax: [phone]          Patient: Manuela Reyes   MR Number: [id number]   YOB: 1950   Date of Visit: 10/30/2017       Dear Dr. Christelle Lawler:    Thank you for referring Manuela Reyes to me for evaluation. Attached you will find relevant portions of my assessment and plan of care.    If you have questions, please do not hesitate to call me. I look forward to following Manuela Reyes along with you.    Sincerely,    Santo Burt IV, MD    Enclosure  CC:  No Recipients    If you would like to receive this communication electronically, please contact [email or web address] or [phone] to request more information on [facility] access.    For providers and/or their staff who would like to refer a patient to [facility], please contact us through our one-stop-shop provider referral line, [facility], at [phone].    If you feel you have received this communication in error or would no longer like to receive these types of communications, please e-mail [email or web address]

## 2017-10-31 ENCOUNTER — PATIENT MESSAGE (OUTPATIENT)
Dept: GASTROENTEROLOGY | Facility: CLINIC | Age: 67
End: 2017-10-31

## 2017-11-01 ENCOUNTER — PATIENT MESSAGE (OUTPATIENT)
Dept: INTERNAL MEDICINE | Facility: CLINIC | Age: 67
End: 2017-11-01

## 2017-11-02 RX ORDER — FLUTICASONE PROPIONATE 50 MCG
SPRAY, SUSPENSION (ML) NASAL
Qty: 48 G | Refills: 3 | Status: SHIPPED | OUTPATIENT
Start: 2017-11-02 | End: 2018-01-10 | Stop reason: SDUPTHER

## 2017-11-08 ENCOUNTER — PATIENT MESSAGE (OUTPATIENT)
Dept: GASTROENTEROLOGY | Facility: CLINIC | Age: 67
End: 2017-11-08

## 2017-11-14 ENCOUNTER — TELEPHONE (OUTPATIENT)
Dept: PHARMACY | Facility: CLINIC | Age: 67
End: 2017-11-14

## 2017-11-14 NOTE — TELEPHONE ENCOUNTER
Called patient for final QOL assessment at [facility].  Name and  confirmed.  Patient stated that she is feeling well and noted marked improvement from before therapy.  Patient reminded to followup with all future appts for labs and provider consults.    KILO Méndez.Ph.  Clinical Pharmacist  [facility]  Phone: [phone]

## 2017-12-18 ENCOUNTER — PATIENT MESSAGE (OUTPATIENT)
Dept: GASTROENTEROLOGY | Facility: CLINIC | Age: 67
End: 2017-12-18

## 2018-01-01 ENCOUNTER — PATIENT MESSAGE (OUTPATIENT)
Dept: GASTROENTEROLOGY | Facility: CLINIC | Age: 68
End: 2018-01-01

## 2018-01-10 ENCOUNTER — PATIENT MESSAGE (OUTPATIENT)
Dept: INTERNAL MEDICINE | Facility: CLINIC | Age: 68
End: 2018-01-10

## 2018-01-10 RX ORDER — FLUTICASONE PROPIONATE 50 MCG
2 SPRAY, SUSPENSION (ML) NASAL DAILY
Qty: 48 G | Refills: 3 | Status: CANCELLED | OUTPATIENT
Start: 2018-01-10

## 2018-01-10 RX ORDER — FLUTICASONE PROPIONATE 50 MCG
2 SPRAY, SUSPENSION (ML) NASAL DAILY
Qty: 48 G | Refills: 1 | Status: SHIPPED | OUTPATIENT
Start: 2018-01-10 | End: 2018-06-08

## 2018-01-12 ENCOUNTER — LAB VISIT (OUTPATIENT)
Dept: LAB | Facility: HOSPITAL | Age: 68
End: 2018-01-12
Attending: NURSE PRACTITIONER
Payer: MEDICARE

## 2018-01-12 ENCOUNTER — INITIAL CONSULT (OUTPATIENT)
Dept: GASTROENTEROLOGY | Facility: CLINIC | Age: 68
End: 2018-01-12
Payer: MEDICARE

## 2018-01-12 ENCOUNTER — TELEPHONE (OUTPATIENT)
Dept: PHARMACY | Facility: CLINIC | Age: 68
End: 2018-01-12

## 2018-01-12 VITALS
HEART RATE: 80 BPM | HEIGHT: 69 IN | DIASTOLIC BLOOD PRESSURE: 78 MMHG | SYSTOLIC BLOOD PRESSURE: 152 MMHG | BODY MASS INDEX: 15.71 KG/M2 | WEIGHT: 106.06 LBS

## 2018-01-12 DIAGNOSIS — K50.019 CROHN'S DISEASE OF SMALL INTESTINE WITH COMPLICATION: ICD-10-CM

## 2018-01-12 DIAGNOSIS — B19.20 HEPATITIS C VIRUS INFECTION WITHOUT HEPATIC COMA, UNSPECIFIED CHRONICITY: ICD-10-CM

## 2018-01-12 DIAGNOSIS — B19.20 HEPATITIS C VIRUS INFECTION WITHOUT HEPATIC COMA, UNSPECIFIED CHRONICITY: Primary | ICD-10-CM

## 2018-01-12 LAB
ALBUMIN SERPL BCP-MCNC: 3.8 G/DL
ALP SERPL-CCNC: 76 U/L
ALT SERPL W/O P-5'-P-CCNC: 11 U/L
ANION GAP SERPL CALC-SCNC: 8 MMOL/L
AST SERPL-CCNC: 22 U/L
BASOPHILS # BLD AUTO: 0.01 K/UL
BASOPHILS NFR BLD: 0.2 %
BILIRUB SERPL-MCNC: 0.5 MG/DL
BUN SERPL-MCNC: 23 MG/DL
CALCIUM SERPL-MCNC: 9.4 MG/DL
CHLORIDE SERPL-SCNC: 98 MMOL/L
CO2 SERPL-SCNC: 28 MMOL/L
CREAT SERPL-MCNC: 0.8 MG/DL
DIFFERENTIAL METHOD: ABNORMAL
EOSINOPHIL # BLD AUTO: 0 K/UL
EOSINOPHIL NFR BLD: 0.3 %
ERYTHROCYTE [DISTWIDTH] IN BLOOD BY AUTOMATED COUNT: 12 %
EST. GFR  (AFRICAN AMERICAN): >60 ML/MIN/1.73 M^2
EST. GFR  (NON AFRICAN AMERICAN): >60 ML/MIN/1.73 M^2
GLUCOSE SERPL-MCNC: 89 MG/DL
HCT VFR BLD AUTO: 36.7 %
HGB BLD-MCNC: 12.8 G/DL
IMM GRANULOCYTES # BLD AUTO: 0.01 K/UL
IMM GRANULOCYTES NFR BLD AUTO: 0.2 %
LYMPHOCYTES # BLD AUTO: 1.8 K/UL
LYMPHOCYTES NFR BLD: 27.5 %
MCH RBC QN AUTO: 33.8 PG
MCHC RBC AUTO-ENTMCNC: 34.9 G/DL
MCV RBC AUTO: 97 FL
MONOCYTES # BLD AUTO: 0.5 K/UL
MONOCYTES NFR BLD: 8.3 %
NEUTROPHILS # BLD AUTO: 4.1 K/UL
NEUTROPHILS NFR BLD: 63.5 %
NRBC BLD-RTO: 0 /100 WBC
PLATELET # BLD AUTO: 225 K/UL
PMV BLD AUTO: 9.8 FL
POTASSIUM SERPL-SCNC: 3.9 MMOL/L
PROT SERPL-MCNC: 7.6 G/DL
RBC # BLD AUTO: 3.79 M/UL
SODIUM SERPL-SCNC: 134 MMOL/L
WBC # BLD AUTO: 6.37 K/UL

## 2018-01-12 PROCEDURE — 99214 OFFICE O/P EST MOD 30 MIN: CPT | Mod: S$GLB,,, | Performed by: NURSE PRACTITIONER

## 2018-01-12 PROCEDURE — 80053 COMPREHEN METABOLIC PANEL: CPT

## 2018-01-12 PROCEDURE — 99499 UNLISTED E&M SERVICE: CPT | Mod: S$GLB,,, | Performed by: NURSE PRACTITIONER

## 2018-01-12 PROCEDURE — 99999 PR PBB SHADOW E&M-EST. PATIENT-LVL III: CPT | Mod: PBBFAC,,, | Performed by: NURSE PRACTITIONER

## 2018-01-12 PROCEDURE — 85025 COMPLETE CBC W/AUTO DIFF WBC: CPT

## 2018-01-12 PROCEDURE — 87522 HEPATITIS C REVRS TRNSCRPJ: CPT

## 2018-01-12 PROCEDURE — 36415 COLL VENOUS BLD VENIPUNCTURE: CPT | Mod: PO

## 2018-01-12 RX ORDER — SODIUM, POTASSIUM,MAG SULFATES 17.5-3.13G
1 SOLUTION, RECONSTITUTED, ORAL ORAL ONCE
Qty: 1 BOTTLE | Refills: 0 | Status: SHIPPED | OUTPATIENT
Start: 2018-01-12 | End: 2018-01-12

## 2018-01-12 RX ORDER — AMOXICILLIN 500 MG
2 CAPSULE ORAL DAILY
COMMUNITY
End: 2018-02-13

## 2018-01-12 RX ORDER — DOXYCYCLINE 40 MG/1
40 CAPSULE ORAL DAILY
Status: ON HOLD | COMMUNITY
End: 2018-03-26 | Stop reason: CLARIF

## 2018-01-12 NOTE — PROGRESS NOTES
Clinic Follow Up:  [facility] Follow Up Note    Reason for Follow Up:  The primary encounter diagnosis was Hepatitis C virus infection without hepatic coma, unspecified chronicity. A diagnosis of Crohn's disease of small intestine with complication was also pertinent to this visit.    PCP: Christelle Lawler       HPI:  This is a 68 y.o. female here for follow up of the above  Pt is a previous pt of Dr. Leonard  SHe states that she has been overall feeling well without any complaints.   SHe has been taking the Humira every 2 weeks.  Rerpots soft, formed non-bloody stools.  Denies any abdominal pain.  Weight is improving.  Last colonoscopy 5/17 with continued inflammation and stricture.  Repeat needed in February/March for ensured healing.   Pt previously treated for HCV with Harvoni.  Labs in November showed HCV undetected.   Denies any abdominal pain.  No nausea or vomiting.  No change in bowel pattern.  No melena or hematochezia. No weight loss.      Review of Systems   Constitutional: Negative for chills, fever, malaise/fatigue and weight loss.   Respiratory: Negative for cough.    Cardiovascular: Negative for chest pain.   Gastrointestinal:        Per HPI   Musculoskeletal: Negative for myalgias.   Skin: Negative for itching and rash.   Neurological: Negative for headaches.   Psychiatric/Behavioral: The patient is not nervous/anxious.        Medical History:  Past Medical History:   Diagnosis Date    Anxiety     Crohn's disease     Depression     Genital herpes     Hepatitis C infection     Hypertension     Insomnia     Osteoporosis     TIA (transient ischemic attack)        Surgical History:   Past Surgical History:   Procedure Laterality Date    APPENDECTOMY      COLONOSCOPY N/A 5/19/2017    Procedure: COLONOSCOPY;  Surgeon: Jose Luis Leonard MD;  Location: [facility];  Service: Endoscopy;  Laterality: N/A;    SMALL INTESTINE SURGERY         Family History:   Family History    Problem Relation Age of Onset    Stroke Mother     Cancer Father      Lung    Glaucoma Neg Hx     Macular degeneration Neg Hx     Thyroid disease Neg Hx        Social History:   Social History   Substance Use Topics    Smoking status: Former Smoker     Years: 20.00     Types: Cigarettes     Quit date: 1/1/2005    Smokeless tobacco: Former User      Comment: Former Light Smoker less than 10 a day    Alcohol use No       Allergies: Reviewed    Home Medications:  Current Outpatient Prescriptions on File Prior to Visit   Medication Sig Dispense Refill    amlodipine (NORVASC) 10 MG tablet TAKE 1 TABLET EVERY DAY 90 tablet 2    aspirin (ECOTRIN) 81 MG EC tablet Take 1 tablet (81 mg total) by mouth once daily.      calcium carbonate (OS-COOPER) 600 mg (1,500 mg) Tab Take 600 mg by mouth 2 (two) times daily with meals.      cyanocobalamin, vitamin B-12, 2,500 mcg Lozg Place 1 tablet under the tongue.       fluticasone (FLONASE) 50 mcg/actuation nasal spray 2 sprays (100 mcg total) by Each Nare route once daily. 48 g 1    LORATADINE ORAL Take 10 mg by mouth.      lorazepam (ATIVAN) 1 MG tablet Take 1 tablet (1 mg total) by mouth every evening. 30 tablet 5    multivitamin with minerals tablet Take 1 tablet by mouth once daily at 6am.      polyethylene glycol (GLYCOLAX) 17 gram/dose powder       vitamin D 1000 units Tab Take 185 mg by mouth once daily.      [DISCONTINUED] adalimumab (HUMIRA PEN CROHN'S-UC-HS START) PnKt injection Take 80mg (2 shots) on day 1 and day 2, Then take 80mg (2 shots) on day 14. 6 each 0    [DISCONTINUED] adalimumab (HUMIRA) 40 mg/0.8 mL SyKt injection Inject 0.8 mLs (40 mg total) into the skin every 14 (fourteen) days. 2 vial 11    melatonin 3 mg Tab Take 1 tablet by mouth nightly as needed.      triamcinolone acetonide 0.1% (KENALOG) 0.1 % cream 2 (two) times daily. Apply to affected area  0    [DISCONTINUED] ledipasvir-sofosbuvir  mg Tab Take 1 tablet by mouth once  "daily. 30 tablet 1     No current facility-administered medications on file prior to visit.        Physical Exam:  Vital Signs:  BP (!) 152/78   Pulse 80   Ht 5' 8.5" (1.74 m)   Wt 48.1 kg (106 lb 0.7 oz)   BMI 15.89 kg/m²   Body mass index is 15.89 kg/m².  Physical Exam   Constitutional: She is oriented to person, place, and time. She appears well-developed and well-nourished.   HENT:   Head: Normocephalic.   Eyes: No scleral icterus.   Neck: Normal range of motion.   Cardiovascular: Normal rate and regular rhythm.    Pulmonary/Chest: Effort normal and breath sounds normal.   Abdominal: Soft. Bowel sounds are normal. She exhibits no distension. There is no tenderness.   Musculoskeletal: Normal range of motion.   Neurological: She is alert and oriented to person, place, and time.   Skin: Skin is warm and dry.   Psychiatric: She has a normal mood and affect.   Vitals reviewed.      Labs: Pertinent labs reviewed.    Assessment:  1. Hepatitis C virus infection without hepatic coma, unspecified chronicity    2. Crohn's disease of small intestine with complication        Recommendations:  Hepatitis C virus infection without hepatic coma, unspecified chronicity  - Labs today to ensure SVR12  -     HEPATITIS C RNA, QUANTITATIVE, PCR; Future; Expected date: 01/12/2018    Crohn's disease of small intestine with complication  - Continue current POC  - Will get labs today for trending.  - Colonoscopy with Suprep for ensured healing.   -     CBC auto differential; Future; Expected date: 01/12/2018  -     Comprehensive metabolic panel; Future; Expected date: 01/12/2018  -     adalimumab (HUMIRA) 40 mg/0.8 mL SyKt injection; Inject 0.8 mLs (40 mg total) into the skin every 14 (fourteen) days.  Dispense: 2 vial; Refill: 11  -     Case request GI: COLONOSCOPY  -     sodium,potassium,mag sulfates (SUPREP BOWEL PREP KIT) 17.5-3.13-1.6 gram SolR; Take 1 Units by mouth once.  Dispense: 1 Bottle; Refill: 0        Return to " Clinic:    Follow-up in about 6 months (around 7/12/2018).

## 2018-01-15 ENCOUNTER — TELEPHONE (OUTPATIENT)
Dept: GASTROENTEROLOGY | Facility: CLINIC | Age: 68
End: 2018-01-15

## 2018-01-15 ENCOUNTER — PATIENT MESSAGE (OUTPATIENT)
Dept: GASTROENTEROLOGY | Facility: CLINIC | Age: 68
End: 2018-01-15

## 2018-01-15 LAB
HCV LOG: <1.08 LOG (10) IU/ML
HCV RNA QUANT PCR: <12 IU/ML
HCV, QUALITATIVE: NOT DETECTED IU/ML

## 2018-01-15 NOTE — TELEPHONE ENCOUNTER
Spoke to pt after speaking to Claudia about her Humira.  Answered all of her questions re the dosage and route of administration.  She then stated that she does not run the Humira through her own insurance company as she has a large co-pay - it goes through [employer].  Will call the specialty pharmacy to see if we need to send Rx directly to [facility] or if they will send it through.

## 2018-01-15 NOTE — TELEPHONE ENCOUNTER
----- Message from Swati Rojas sent at 1/15/2018 10:26 AM CST -----  Contact: pt  1. What is the name of the medication you are requesting? Humira  2. What is the dose? 40mg  3. How do you take the medication? Orally, topically, etc? oral  4. How often do you take this medication? Once injection every 2 weeks  5. Do you need a 30 day or 90 day supply? .  6. How many refills are you requesting? .  7. What is your preferred pharmacy and location of the pharmacy? [facility]  8. Who can we contact with further questions? [phone]

## 2018-01-16 ENCOUNTER — PATIENT MESSAGE (OUTPATIENT)
Dept: GASTROENTEROLOGY | Facility: CLINIC | Age: 68
End: 2018-01-16

## 2018-01-17 NOTE — TELEPHONE ENCOUNTER
DOCUMENTATION ONLY:  Prior authorization for Humira approved from 1/16/18 to 12/31/18    Case Id: N/A    Co-pay: $1,594.47    Patient Assistance is current with [employer] Patient [employer].

## 2018-01-23 ENCOUNTER — PATIENT MESSAGE (OUTPATIENT)
Dept: GASTROENTEROLOGY | Facility: CLINIC | Age: 68
End: 2018-01-23

## 2018-02-13 ENCOUNTER — OFFICE VISIT (OUTPATIENT)
Dept: OTOLARYNGOLOGY | Facility: CLINIC | Age: 68
End: 2018-02-13
Payer: MEDICARE

## 2018-02-13 VITALS
BODY MASS INDEX: 15.33 KG/M2 | WEIGHT: 102.31 LBS | DIASTOLIC BLOOD PRESSURE: 97 MMHG | HEART RATE: 91 BPM | SYSTOLIC BLOOD PRESSURE: 152 MMHG

## 2018-02-13 DIAGNOSIS — K13.70 LESION OF ORAL MUCOSA: Primary | ICD-10-CM

## 2018-02-13 PROCEDURE — 99999 PR PBB SHADOW E&M-EST. PATIENT-LVL IV: CPT | Mod: PBBFAC,,, | Performed by: PHYSICIAN ASSISTANT

## 2018-02-13 PROCEDURE — 3008F BODY MASS INDEX DOCD: CPT | Mod: S$GLB,,, | Performed by: PHYSICIAN ASSISTANT

## 2018-02-13 PROCEDURE — 99213 OFFICE O/P EST LOW 20 MIN: CPT | Mod: S$GLB,,, | Performed by: PHYSICIAN ASSISTANT

## 2018-02-13 PROCEDURE — 1126F AMNT PAIN NOTED NONE PRSNT: CPT | Mod: S$GLB,,, | Performed by: PHYSICIAN ASSISTANT

## 2018-02-13 PROCEDURE — 1159F MED LIST DOCD IN RCRD: CPT | Mod: S$GLB,,, | Performed by: PHYSICIAN ASSISTANT

## 2018-02-13 NOTE — PROGRESS NOTES
Subjective:   Patient: Manuela Reyes [id number], :1950   Visit date:2018 3:02 PM    Chief Complaint:  Consult (sore under tongue)    HPI:  Manuela is a 68 y.o. female who is here to see me today for the first time for evaluation of a sore under her tongue. She was last here in 2016 with Dr. Urbano with xerostomia.  She says her dry mouth and tongue sensitivity has continued to wax and wane since that time.  About 5 days ago she says that under her tongue felt irritated and when she looked, she saw a small blood blister on the underside of her tongue.  Denies drainage or severe pain.  Denies any change in size of the spot since she first noticed it.  She was eating chips the day before she noticed it and she does take B12 sublingual.  Denies any worsening pain with eating/drinking.  Denies any swelling under her tongue, neck or face.  Denies fever.  She has routine dental exams, last one about 8 months ago.  She does not smoke.  No recent fever or chills.        Review of Systems:  -     Allergic/Immunologic: is allergic to codeine sulfate; hydrochlorothiazide; and lisinopril..  -     Constitutional: Current temp:      Her meds, allergies, medical, surgical, social & family histories were reviewed & updated:  -     She has a current medication list which includes the following prescription(s): adalimumab, amlodipine, aspirin, calcium carbonate, cyanocobalamin (vitamin b-12), doxycycline, fluticasone, loratadine, lorazepam, melatonin, multivitamin with minerals, polyethylene glycol, triamcinolone acetonide 0.1%, and vitamin d.  -     She  has a past medical history of Anxiety; Crohn's disease; Depression; Genital herpes; Hepatitis C infection; Hypertension; Insomnia; Osteoporosis; and TIA (transient ischemic attack).   -     She  does not have any pertinent problems on file.   -     She  has a past surgical history that includes Appendectomy; Small intestine surgery; and Colonoscopy (N/A,  5/19/2017).  -     She  reports that she quit smoking about 13 years ago. Her smoking use included Cigarettes. She quit after 20.00 years of use. She has quit using smokeless tobacco. She reports that she does not drink alcohol or use drugs.  -     Her family history includes Cancer in her father; Stroke in her mother.  -     She is allergic to codeine sulfate; hydrochlorothiazide; and lisinopril.    Objective:     Physical Exam:  Vitals:  BP (!) 152/97   Pulse 91   Wt 46.4 kg (102 lb 4.7 oz)   BMI 15.33 kg/m²   Appearance:  Well-developed, well-nourished.  Communication:  Able to communicate, no hoarseness.  Head & Face:  Normocephalic, atraumatic, no sinus tenderness, normal facial strength.  Eyes:  Extraocular motions intact.  Ears:  Otoscopy of external auditory canals and tympanic membranes was normal, clinical speech reception thresholds grossly intact, no mass/lesion of auricle.  Nose:  No masses/lesions of external nose, nasal mucosa, septum, and turbinates were within normal limits.  Mouth:  No mass/lesion of lips, teeth, gums, hard/soft palate, tongue, tonsils, or oropharynx.  Small blood blister noted just lateral to frenulum at left [place]'s duct; no palpable stone and no ulceration or tenderness.  No tenderness or masses palpated in floor of mouth.  Mucous membranes appear somewhat dry.           Neck & Lymphatics:  No cervical lymphadenopathy, no neck mass/crepitus/ asymmetry, trachea is midline, no thyroid enlargement/tenderness/mass.  Neuro/Psych: Alert with normal mood and affect.   Abdominal: Normal appearance.   Respiration/Chest:  Symmetric expansion during respiration, normal respiratory effort.  Skin:  Warm and intact  Cardiovascular:  No peripheral vascular edema or varicosities.    Assessment & Plan:   Manuela was seen today for consult.    Diagnoses and all orders for this visit:    Lesion of oral mucosa      Reassured patient that this is likely related to trauma (?ranula).  I would  recommend avoiding any sharp foods, and using a small-headed soft toothbrush to avoid any trauma to the oral mucosa.  There is some research to suggest sodium lauryl sulfate to be implicated in this disease process, so may wish to switch to a natural toothpaste that does not contain this chemical.  Recommend RTC in 2 weeks with Dr. Urbano for recheck.  Instructed her to call sooner with any worsening symptoms.  Discussed that sialolithiasis can cause obstruction of the salivary gland and sialoadenitis can occur as well.  I don't suspect this at this time.  We also discussed conservative measures to help treat sialoadenitis, including massage, warm compresses, aggressive hydration, and oral sialogogues (lemon drops or peppermints).      As for her xerostomia, she says it's really unchanged since she was last here and says it waxes and wanes.  Her autoimmune labs/ antibodies were all normal at that time.  She has had appt with rheumatology scheduled but has not seen them.    We discussed that a decrease in salivary production may occur as a normal process of aging which may occur more rapidly and some individuals than others.  In these patients it is important to provide symptomatic relief with significant hydration as well as sialagogues or salivary replacement.  Additionally this is important in order to protect the teeth.

## 2018-02-15 ENCOUNTER — PATIENT OUTREACH (OUTPATIENT)
Dept: ADMINISTRATIVE | Facility: HOSPITAL | Age: 68
End: 2018-02-15

## 2018-02-24 RX ORDER — LORAZEPAM 1 MG/1
TABLET ORAL
Qty: 30 TABLET | Refills: 5 | Status: CANCELLED | OUTPATIENT
Start: 2018-02-24

## 2018-02-28 ENCOUNTER — OFFICE VISIT (OUTPATIENT)
Dept: OTOLARYNGOLOGY | Facility: CLINIC | Age: 68
End: 2018-02-28
Payer: MEDICARE

## 2018-02-28 ENCOUNTER — PATIENT MESSAGE (OUTPATIENT)
Dept: OTOLARYNGOLOGY | Facility: CLINIC | Age: 68
End: 2018-02-28

## 2018-02-28 VITALS
DIASTOLIC BLOOD PRESSURE: 90 MMHG | TEMPERATURE: 96 F | BODY MASS INDEX: 15.69 KG/M2 | WEIGHT: 104.75 LBS | SYSTOLIC BLOOD PRESSURE: 166 MMHG | HEART RATE: 85 BPM

## 2018-02-28 DIAGNOSIS — K11.7 XEROSTOMIA: Primary | ICD-10-CM

## 2018-02-28 PROCEDURE — 99999 PR PBB SHADOW E&M-EST. PATIENT-LVL III: CPT | Mod: PBBFAC,,, | Performed by: OTOLARYNGOLOGY

## 2018-02-28 PROCEDURE — 99213 OFFICE O/P EST LOW 20 MIN: CPT | Mod: S$GLB,,, | Performed by: OTOLARYNGOLOGY

## 2018-02-28 PROCEDURE — 3080F DIAST BP >= 90 MM HG: CPT | Mod: S$GLB,,, | Performed by: OTOLARYNGOLOGY

## 2018-02-28 PROCEDURE — 3077F SYST BP >= 140 MM HG: CPT | Mod: S$GLB,,, | Performed by: OTOLARYNGOLOGY

## 2018-03-02 ENCOUNTER — PATIENT MESSAGE (OUTPATIENT)
Dept: OTOLARYNGOLOGY | Facility: CLINIC | Age: 68
End: 2018-03-02

## 2018-03-02 ENCOUNTER — DOCUMENTATION ONLY (OUTPATIENT)
Dept: ENDOSCOPY | Facility: HOSPITAL | Age: 68
End: 2018-03-02

## 2018-03-02 ENCOUNTER — PATIENT MESSAGE (OUTPATIENT)
Dept: ENDOSCOPY | Facility: HOSPITAL | Age: 68
End: 2018-03-02

## 2018-03-02 ENCOUNTER — PATIENT MESSAGE (OUTPATIENT)
Dept: INTERNAL MEDICINE | Facility: CLINIC | Age: 68
End: 2018-03-02

## 2018-03-02 NOTE — TELEPHONE ENCOUNTER
Please reschedule her to see Dr. Urbano for possible biopsy in 6 weeks.  Book as a procedure.  Thanks!

## 2018-03-03 RX ORDER — LORAZEPAM 1 MG/1
1 TABLET ORAL NIGHTLY
Qty: 30 TABLET | Refills: 0 | Status: SHIPPED | OUTPATIENT
Start: 2018-03-03 | End: 2018-03-28 | Stop reason: SDUPTHER

## 2018-03-06 RX ORDER — LORAZEPAM 1 MG/1
1 TABLET ORAL NIGHTLY
Qty: 30 TABLET | Refills: 0 | OUTPATIENT
Start: 2018-03-06

## 2018-03-06 NOTE — PROGRESS NOTES
"Subjective:   Patient: Manuela Reyes [id number], :1950   Visit date:2018 9:38 AM    Chief Complaint:  Follow-up    HPI:  Manuela is a 68 y.o. female who is here for follow-up. She was last here with Rylee Lovell PA-C about 2 weeks ago.  She had seen me about a month ago for xerostomia.  When she saw Rylee, she had noted a small "blister" below the tongue the week prior.  She reports that it is unchanged.  She denies any pain in the area.  She does not smoke.  She denies unintentional weight loss. She denies bleeding from the area.         Review of Systems:  -     Allergic/Immunologic: is allergic to codeine sulfate; hydrochlorothiazide; and lisinopril..  -     Constitutional: Current temp: 96.1 °F (35.6 °C) (Tympanic)    Her meds, allergies, medical, surgical, social & family histories were reviewed & updated:  -     She has a current medication list which includes the following prescription(s): adalimumab, amlodipine, aspirin, calcium carbonate, cyanocobalamin (vitamin b-12), doxycycline, fluticasone, loratadine, melatonin, multivitamin with minerals, polyethylene glycol, triamcinolone acetonide 0.1%, vitamin d, and lorazepam.  -     She  has a past medical history of Anxiety; Crohn's disease; Depression; Genital herpes; Hepatitis C infection; Hypertension; Insomnia; Osteoporosis; and TIA (transient ischemic attack).   -     She  does not have any pertinent problems on file.   -     She  has a past surgical history that includes Appendectomy; Small intestine surgery; and Colonoscopy (N/A, 2017).  -     She  reports that she quit smoking about 13 years ago. Her smoking use included Cigarettes. She quit after 20.00 years of use. She has quit using smokeless tobacco. She reports that she does not drink alcohol or use drugs.  -     Her family history includes Cancer in her father; Stroke in her mother.  -     She is allergic to codeine sulfate; hydrochlorothiazide; and " lisinopril.    Objective:     Physical Exam:  Vitals:  BP (!) 166/90   Pulse 85   Temp 96.1 °F (35.6 °C) (Tympanic)   Wt 47.5 kg (104 lb 11.5 oz)   LMP  (Exact Date)   BMI 15.69 kg/m²   Appearance:  Well-developed, well-nourished.  Communication:  Able to communicate, no hoarseness.  Head & Face:  Normocephalic, atraumatic, no sinus tenderness, normal facial strength.  Eyes:  Extraocular motions intact.  Ears:  Otoscopy of external auditory canals and tympanic membranes was normal, clinical speech reception thresholds grossly intact, no mass/lesion of auricle.  Nose:  No masses/lesions of external nose, nasal mucosa, septum, and turbinates were within normal limits.  Mouth:  No mass/lesion of lips, teeth, gums, hard/soft palate, tongue, tonsils, or oropharynx.  Mild dilation of [place]'s duct bilaterally  Neck & Lymphatics:  No cervical lymphadenopathy, no neck mass/crepitus/ asymmetry, trachea is midline, no thyroid enlargement/tenderness/mass.  Neuro/Psych: Alert with normal mood and affect.   Abdominal: Normal appearance.   Respiration/Chest:  Symmetric expansion during respiration, normal respiratory effort.  Skin:  Warm and intact  Cardiovascular:  No peripheral vascular edema or varicosities.    Assessment & Plan:   Manuela was seen today for follow-up.    Diagnoses and all orders for this visit:    Xerostomia      We discussed her medical conditions, treatments and plan.   Furthermore, I counseled Manuela that while at this point I do not see any evidence of head and neck cancer, this does not eliminate the possibility of a very small malignancy that is currently undetectable on examination or developing head and neck cancer in the future.  I advised them of the signs of head and neck cancer and that they should alert my office should they occur for more than 10-14 days (unintentional weight loss, pain or difficulty with swallowing, bleeding in the oral cavity or throat, coughing or spitting up blood, pain  with mouth opening, persistent ulceration of the skin or mucosa, persistent or enlarging neck masses/swelling).  Manuela seemed to have a clear understanding of the concerning nature of these conditions.  Manuela should return to clinic if any issues arise (symptoms worsen or persist), otherwise we will see her back in the clinic only as needed.

## 2018-03-19 ENCOUNTER — PATIENT MESSAGE (OUTPATIENT)
Dept: ENDOSCOPY | Facility: HOSPITAL | Age: 68
End: 2018-03-19

## 2018-03-23 ENCOUNTER — TELEPHONE (OUTPATIENT)
Dept: GASTROENTEROLOGY | Facility: CLINIC | Age: 68
End: 2018-03-23

## 2018-03-23 NOTE — TELEPHONE ENCOUNTER
Returned pt's call - she just wanted to go over her Nu-Lytely prep to make sure she was going to do it correctly.  Went over it with her and answered her questions.

## 2018-03-23 NOTE — TELEPHONE ENCOUNTER
----- Message from Arabella Duncan sent at 3/23/2018  3:49 PM CDT -----  Contact: pt   Pt is requesting a call back from nurse in regards to her colonoscopy.           [phone] (home)

## 2018-03-26 ENCOUNTER — ANESTHESIA EVENT (OUTPATIENT)
Dept: ENDOSCOPY | Facility: HOSPITAL | Age: 68
End: 2018-03-26
Payer: MEDICARE

## 2018-03-26 ENCOUNTER — HOSPITAL ENCOUNTER (OUTPATIENT)
Facility: HOSPITAL | Age: 68
Discharge: HOME | End: 2018-03-26
Attending: INTERNAL MEDICINE | Admitting: INTERNAL MEDICINE
Payer: MEDICARE

## 2018-03-26 ENCOUNTER — ANESTHESIA (OUTPATIENT)
Dept: ENDOSCOPY | Facility: HOSPITAL | Age: 68
End: 2018-03-26
Payer: MEDICARE

## 2018-03-26 ENCOUNTER — SURGERY (OUTPATIENT)
Age: 68
End: 2018-03-26

## 2018-03-26 VITALS
DIASTOLIC BLOOD PRESSURE: 80 MMHG | OXYGEN SATURATION: 100 % | SYSTOLIC BLOOD PRESSURE: 146 MMHG | WEIGHT: 101 LBS | HEIGHT: 67 IN | HEART RATE: 72 BPM | BODY MASS INDEX: 15.85 KG/M2 | TEMPERATURE: 98 F | RESPIRATION RATE: 18 BRPM

## 2018-03-26 DIAGNOSIS — K50.90 CROHN'S DISEASE: ICD-10-CM

## 2018-03-26 PROCEDURE — 25000003 PHARM REV CODE 250: Performed by: NURSE ANESTHETIST, CERTIFIED REGISTERED

## 2018-03-26 PROCEDURE — 37000008 HC ANESTHESIA 1ST 15 MINUTES: Performed by: INTERNAL MEDICINE

## 2018-03-26 PROCEDURE — 37000009 HC ANESTHESIA EA ADD 15 MINS: Performed by: INTERNAL MEDICINE

## 2018-03-26 PROCEDURE — 45378 DIAGNOSTIC COLONOSCOPY: CPT | Mod: ,,, | Performed by: INTERNAL MEDICINE

## 2018-03-26 PROCEDURE — 45378 DIAGNOSTIC COLONOSCOPY: CPT | Performed by: INTERNAL MEDICINE

## 2018-03-26 PROCEDURE — 63600175 PHARM REV CODE 636 W HCPCS: Performed by: NURSE ANESTHETIST, CERTIFIED REGISTERED

## 2018-03-26 PROCEDURE — 25000003 PHARM REV CODE 250: Performed by: INTERNAL MEDICINE

## 2018-03-26 RX ORDER — LIDOCAINE HYDROCHLORIDE 10 MG/ML
INJECTION INFILTRATION; PERINEURAL
Status: DISCONTINUED | OUTPATIENT
Start: 2018-03-26 | End: 2018-03-26

## 2018-03-26 RX ORDER — SODIUM CHLORIDE, SODIUM LACTATE, POTASSIUM CHLORIDE, CALCIUM CHLORIDE 600; 310; 30; 20 MG/100ML; MG/100ML; MG/100ML; MG/100ML
INJECTION, SOLUTION INTRAVENOUS CONTINUOUS
Status: DISCONTINUED | OUTPATIENT
Start: 2018-03-26 | End: 2018-03-26 | Stop reason: HOSPADM

## 2018-03-26 RX ORDER — PROPOFOL 10 MG/ML
VIAL (ML) INTRAVENOUS
Status: DISCONTINUED | OUTPATIENT
Start: 2018-03-26 | End: 2018-03-26

## 2018-03-26 RX ADMIN — PROPOFOL 20 MG: 10 INJECTION, EMULSION INTRAVENOUS at 12:03

## 2018-03-26 RX ADMIN — PROPOFOL 70 MG: 10 INJECTION, EMULSION INTRAVENOUS at 12:03

## 2018-03-26 RX ADMIN — LIDOCAINE HYDROCHLORIDE 50 MG: 10 INJECTION, SOLUTION INFILTRATION; PERINEURAL at 12:03

## 2018-03-26 RX ADMIN — SODIUM CHLORIDE, SODIUM LACTATE, POTASSIUM CHLORIDE, AND CALCIUM CHLORIDE: .6; .31; .03; .02 INJECTION, SOLUTION INTRAVENOUS at 11:03

## 2018-03-26 NOTE — ANESTHESIA RELEASE NOTE
"Anesthesia Release from PACU Note    Patient: Manuela Reyes    Procedure(s) Performed: Procedure(s) (LRB):  COLONOSCOPY (N/A)    Anesthesia type: MAC    Post pain: Adequate analgesia    Post assessment: no apparent anesthetic complications, tolerated procedure well and no evidence of recall    Last Vitals:   Visit Vitals  BP (!) 147/87 (BP Location: Left arm, Patient Position: Lying)   Pulse 76   Temp 36.7 °C (98 °F) (Oral)   Resp 20   Ht 5' 7" (1.702 m)   Wt 45.8 kg (101 lb)   SpO2 98%   Breastfeeding? No   BMI 15.82 kg/m²       Post vital signs: stable    Level of consciousness: sedated    Nausea/Vomiting: no nausea/no vomiting    Complications: none    Airway Patency: patent    Respiratory: unassisted, spontaneous ventilation, room air    Cardiovascular: stable and blood pressure at baseline    Hydration: euvolemic  "

## 2018-03-26 NOTE — PROVATION PATIENT INSTRUCTIONS
Discharge Summary/Instructions after an Endoscopic Procedure  Patient Name: Manuela Reyes  Patient MRN: [id number]  Patient YOB: 1950 Monday, March 26, 2018 Guillaume Whitman MD  RESTRICTIONS:  During your procedure today, you received medications for sedation.  These   medications may affect your judgment, balance and coordination.  Therefore,   for 24 hours, you have the following restrictions:   - DO NOT drive a car, operate machinery, make legal/financial decisions,   sign important papers or drink alcohol.    ACTIVITY:  The following day: return to full activity including work, except no heavy   lifting, straining or running for 3 days if polyps were removed.  DIET:  Eat and drink normally unless instructed otherwise.     TREATMENT FOR COMMON SIDE EFFECTS:  - Mild abdominal pain, nausea, belching, bloating or excessive gas:  rest,   eat lightly and use a heating pad.  - Sore Throat: treat with throat lozenges and/or gargle with warm salt   water.  - Because air was used during the procedure, expelling large amounts of air   from your rectum or belching is normal.  - If a bowel prep was taken, you may not have a bowel movement for 1-3 days.    This is normal.  SYMPTOMS TO WATCH FOR AND REPORT TO YOUR PHYSICIAN:  1. Abdominal pain or bloating, other than gas cramps.  2. Chest pain.  3. Back pain.  4. Signs of infection such as: chills or fever occurring within 24 hours   after the procedure.  5. Rectal bleeding, which would show as bright red, maroon, or black stools.   (A tablespoon of blood from the rectum is not serious, especially if   hemorrhoids are present.)  6. Vomiting.  7. Weakness or dizziness.  GO DIRECTLY TO THE NEAREST EMERGENCY ROOM IF YOU HAVE ANY OF THE FOLLOWING:      Difficulty breathing              Chills and/or fever over 101 F   Persistent vomiting and/or vomiting blood   Severe abdominal pain   Severe chest pain   Black, tarry stools   Bleeding- more than one  tablespoon   Any other symptom or condition that you feel may need urgent attention  Your doctor recommends these additional instructions:  If any biopsies were taken, your doctors clinic will contact you in 1 to 2   weeks with any results.  You have a contact number available for emergencies.  The signs and symptoms   of potential delayed complications were discussed with you.  You may return   to normal activities tomorrow.  Written discharge instructions were   provided to you.   Resume your previous diet.   Continue your present medications.   Your physician has recommended a repeat colonoscopy in five years for   screening purposes.   Return to your primary care physician as previously scheduled.  For questions, problems or results please call your physician Guillaume Whitman MD at Work:  [phone]  If you have any questions about the above instructions, call the GI   department at [phone] or call the endoscopy unit at [phone]   from 7am until 3 pm.  [facility], EMERGENCY ROOM PHONE NUMBER:   [phone]  IF A COMPLICATION OR EMERGENCY SITUATION ARISES AND YOU ARE UNABLE TO REACH   YOUR PHYSICIAN - GO DIRECTLY TO THE EMERGENCY ROOM.  I have read or have had read to me these discharge instructions for my   procedure and have received a written copy.  I understand these   instructions and will follow-up with my physician if I have any questions.     __________________________________       _____________________________________  Nurse Signature                                          Patient/Designated   Responsible Party Signature  Guillaume Whitman MD  3/26/2018 12:33:12 PM  This report has been verified and signed electronically.

## 2018-03-26 NOTE — H&P
Short Stay Endoscopy History and Physical    PCP - Christelle Lawler, DO    Procedure - Colonoscopy  ASA - II  Mallampati - per anesthesia  History of Anesthesia problems - no  Family history Anesthesia problems -  no     HPI:  This is a 68 y.o. female here for evaluation of : Crohn's disease assessment    Average Risk Screening:no  Family history of colon cancer: No  History of polyps: No  Anemia: No  Blood in stools: No  Diarrhea: No  Abdominal Pain: No    Review of Systems:  CONSTITUTIONAL: Denies weight change,  fatigue, fevers, chills, night sweats.  CARDIOVASCULAR: Denies chest pain, shortness of breath, orthopnea and edema.  RESPIRATORY: Denies cough, hemoptysis, dyspnea, and wheezing.  GI: See HPI.    Medical History:  Past Medical History:   Diagnosis Date    Anxiety     Crohn's disease     Depression     Genital herpes     Hepatitis C infection     Hypertension     Insomnia     Osteoporosis     TIA (transient ischemic attack)        Surgical History:   Past Surgical History:   Procedure Laterality Date    APPENDECTOMY      COLONOSCOPY N/A 5/19/2017    Procedure: COLONOSCOPY;  Surgeon: Jose Luis Leonard MD;  Location: [facility];  Service: Endoscopy;  Laterality: N/A;    SMALL INTESTINE SURGERY         Family History:   Family History   Problem Relation Age of Onset    Stroke Mother     Cancer Father      Lung    Glaucoma Neg Hx     Macular degeneration Neg Hx     Thyroid disease Neg Hx        Social History:   Social History   Substance Use Topics    Smoking status: Former Smoker     Years: 20.00     Types: Cigarettes     Quit date: 1/1/2005    Smokeless tobacco: Former User      Comment: Former Light Smoker less than 10 a day    Alcohol use No       Allergies: Reviewed.    Medications:  No current facility-administered medications on file prior to encounter.      Current Outpatient Prescriptions on File Prior to Encounter   Medication Sig Dispense Refill    adalimumab (HUMIRA) 40  mg/0.8 mL SyKt injection Inject 0.8 mLs (40 mg total) into the skin every 14 (fourteen) days. 2 vial 11    amlodipine (NORVASC) 10 MG tablet TAKE 1 TABLET EVERY DAY 90 tablet 2    aspirin (ECOTRIN) 81 MG EC tablet Take 1 tablet (81 mg total) by mouth once daily.      calcium carbonate (OS-COOPER) 600 mg (1,500 mg) Tab Take 600 mg by mouth 2 (two) times daily with meals.      cyanocobalamin, vitamin B-12, 2,500 mcg Lozg Place 1 tablet under the tongue.       doxycycline (ORACEA) 40 mg capsule Take 40 mg by mouth once daily.      fluticasone (FLONASE) 50 mcg/actuation nasal spray 2 sprays (100 mcg total) by Each Nare route once daily. 48 g 1    LORATADINE ORAL Take 10 mg by mouth.      melatonin 3 mg Tab Take 1 tablet by mouth nightly as needed.      multivitamin with minerals tablet Take 1 tablet by mouth once daily at 6am.      polyethylene glycol (GLYCOLAX) 17 gram/dose powder       triamcinolone acetonide 0.1% (KENALOG) 0.1 % cream 2 (two) times daily. Apply to affected area  0    vitamin D 1000 units Tab Take 185 mg by mouth once daily.         Physical Exam:  Vital Signs: There were no vitals filed for this visit.  General Appearance: Well appearing in no acute distress  ENT: OP clear  Chest: CTA B  CV: RRR, no m/r/g  Abd: s/nt/nd/nabs  Ext: no edema    Labs:  Lab Results   Component Value Date    WBC 6.37 01/12/2018    HGB 12.8 01/12/2018    HCT 36.7 (L) 01/12/2018    MCV 97 01/12/2018     01/12/2018     No results found for: INR, PROTIME  No results found for: IRON, TIBC, FERRITIN, SATURATEDIRO      IMPRESSION:  Patient Active Problem List   Diagnosis    Crohn's disease of small intestine    Hypertension goal BP (blood pressure) < 140/90    Crohn's colitis    Mesenteric artery stenosis    Hyperlipidemia LDL goal <70    Chronic insomnia    Anxiety    Hepatitis C       Crohn's disease assessment      Plan:  I have explained the risks and benefits of colonoscopy to the patient including  but not limited to bleeding, perforation, infection, and death. The patient wishes to proceed with colonoscopy.

## 2018-03-26 NOTE — ANESTHESIA PREPROCEDURE EVALUATION
03/26/2018  Manuela Reyes is a 68 y.o., female.    Anesthesia Evaluation    I have reviewed the Patient Summary Reports.    I have reviewed the Nursing Notes.   I have reviewed the Medications.     Review of Systems  Anesthesia Hx:  No problems with previous Anesthesia  Denies Family Hx of Anesthesia complications.   Denies Personal Hx of Anesthesia complications.   Social:  Former Smoker, No Alcohol Use    Hematology/Oncology:  Hematology Normal   Oncology Normal     Cardiovascular:   Hypertension Denies MI.   Denies CABG/stent.      hyperlipidemia    Pulmonary:   Denies COPD.  Denies Asthma.  Denies Sleep Apnea.    Renal/:  Renal/ Normal     Hepatic/GI:   Bowel Prep. Denies GERD. Liver Disease, Hepatitis, C Crohn's disease  Hep c was treated   Musculoskeletal:   Osteoporosis   Neurological:   TIA, Denies CVA. Denies Seizures.    Endocrine:  Endocrine Normal    Dermatological:   Genital herpes   Psych:   anxiety depression Insomnia         Physical Exam  General:  Well nourished    Airway/Jaw/Neck:  Airway Findings: Mouth Opening: Normal Tongue: Normal  General Airway Assessment: Adult  Mallampati: II      Dental:  Dental Findings: In tact   Chest/Lungs:  Chest/Lungs Findings: Clear to auscultation, Normal Respiratory Rate     Heart/Vascular:  Heart Findings: Rate: Normal  Rhythm: Regular Rhythm  Sounds: Normal             Anesthesia Plan  Type of Anesthesia, risks & benefits discussed:  Anesthesia Type:  MAC  Patient's Preference:   Intra-op Monitoring Plan: standard ASA monitors  Intra-op Monitoring Plan Comments:   Post Op Pain Control Plan:   Post Op Pain Control Plan Comments:   Induction:   IV  Beta Blocker:  Patient is not currently on a Beta-Blocker (No further documentation required).       Informed Consent: Patient understands risks and agrees with Anesthesia plan.  Questions answered.  Anesthesia consent signed with patient.  ASA Score: 2     Day of Surgery Review of History & Physical: I have interviewed and examined the patient. I have reviewed the patient's H&P dated:  There are no significant changes.  H&P update referred to the surgeon.         Ready For Surgery From Anesthesia Perspective.

## 2018-03-26 NOTE — TRANSFER OF CARE
"Anesthesia Transfer of Care Note    Patient: Manuela Reyes    Procedure(s) Performed: Procedure(s) (LRB):  COLONOSCOPY (N/A)    Patient location: PACU    Anesthesia Type: MAC    Transport from OR: Transported from OR on room air with adequate spontaneous ventilation    Post pain: adequate analgesia    Post assessment: no apparent anesthetic complications and tolerated procedure well    Post vital signs: stable    Level of consciousness: sedated    Nausea/Vomiting: no nausea/vomiting    Complications: none    Transfer of care protocol was followed      Last vitals:   Visit Vitals  BP (!) 147/87 (BP Location: Left arm, Patient Position: Lying)   Pulse 76   Temp 36.7 °C (98 °F) (Oral)   Resp 20   Ht 5' 7" (1.702 m)   Wt 45.8 kg (101 lb)   SpO2 98%   Breastfeeding? No   BMI 15.82 kg/m²     "

## 2018-03-26 NOTE — ANESTHESIA POSTPROCEDURE EVALUATION
"Anesthesia Post Evaluation    Patient: Manuela Reyes    Procedure(s) Performed: Procedure(s) (LRB):  COLONOSCOPY (N/A)    Final Anesthesia Type: MAC  Patient location during evaluation: PACU  Patient participation: No - Unable to Participate, Sedation  Level of consciousness: sedated  Post-procedure vital signs: reviewed and stable  Pain management: adequate  Airway patency: patent  PONV status at discharge: No PONV  Anesthetic complications: no      Cardiovascular status: blood pressure returned to baseline and hemodynamically stable  Respiratory status: room air, spontaneous ventilation and unassisted  Hydration status: euvolemic  Follow-up not needed.        Visit Vitals  BP (!) 147/87 (BP Location: Left arm, Patient Position: Lying)   Pulse 76   Temp 36.7 °C (98 °F) (Oral)   Resp 20   Ht 5' 7" (1.702 m)   Wt 45.8 kg (101 lb)   SpO2 98%   Breastfeeding? No   BMI 15.82 kg/m²       Pain/Aileen Score: Pain Assessment Performed: Yes (3/26/2018 11:45 AM)  Presence of Pain: denies (3/26/2018 11:45 AM)      "

## 2018-03-28 ENCOUNTER — OFFICE VISIT (OUTPATIENT)
Dept: INTERNAL MEDICINE | Facility: CLINIC | Age: 68
End: 2018-03-28
Payer: MEDICARE

## 2018-03-28 VITALS
OXYGEN SATURATION: 98 % | SYSTOLIC BLOOD PRESSURE: 128 MMHG | DIASTOLIC BLOOD PRESSURE: 62 MMHG | HEART RATE: 81 BPM | BODY MASS INDEX: 15.95 KG/M2 | WEIGHT: 101.88 LBS | TEMPERATURE: 96 F

## 2018-03-28 DIAGNOSIS — E78.5 HYPERLIPIDEMIA LDL GOAL <70: ICD-10-CM

## 2018-03-28 DIAGNOSIS — K55.1 MESENTERIC ARTERY STENOSIS: ICD-10-CM

## 2018-03-28 DIAGNOSIS — K50.00 CROHN'S DISEASE OF SMALL INTESTINE WITHOUT COMPLICATION: ICD-10-CM

## 2018-03-28 DIAGNOSIS — F41.9 ANXIETY: ICD-10-CM

## 2018-03-28 DIAGNOSIS — Z91.89 AT RISK FOR CARDIOVASCULAR EVENT: ICD-10-CM

## 2018-03-28 DIAGNOSIS — I10 HYPERTENSION GOAL BP (BLOOD PRESSURE) < 140/90: Primary | Chronic | ICD-10-CM

## 2018-03-28 PROCEDURE — 99999 PR PBB SHADOW E&M-EST. PATIENT-LVL III: CPT | Mod: PBBFAC,,, | Performed by: INTERNAL MEDICINE

## 2018-03-28 PROCEDURE — 99214 OFFICE O/P EST MOD 30 MIN: CPT | Mod: S$GLB,,, | Performed by: INTERNAL MEDICINE

## 2018-03-28 PROCEDURE — 3074F SYST BP LT 130 MM HG: CPT | Mod: CPTII,S$GLB,, | Performed by: INTERNAL MEDICINE

## 2018-03-28 PROCEDURE — 99499 UNLISTED E&M SERVICE: CPT | Mod: S$GLB,,, | Performed by: INTERNAL MEDICINE

## 2018-03-28 PROCEDURE — 3078F DIAST BP <80 MM HG: CPT | Mod: CPTII,S$GLB,, | Performed by: INTERNAL MEDICINE

## 2018-03-28 RX ORDER — LORAZEPAM 1 MG/1
1 TABLET ORAL NIGHTLY
Qty: 30 TABLET | Refills: 5 | Status: SHIPPED | OUTPATIENT
Start: 2018-03-28 | End: 2018-07-20 | Stop reason: SDUPTHER

## 2018-03-28 RX ORDER — METRONIDAZOLE 7.5 MG/G
CREAM TOPICAL
Refills: 2 | COMMUNITY
Start: 2017-12-21 | End: 2019-08-05

## 2018-03-28 RX ORDER — VALACYCLOVIR HYDROCHLORIDE 500 MG/1
TABLET, FILM COATED ORAL
Refills: 0 | COMMUNITY
Start: 2017-12-29 | End: 2021-10-19 | Stop reason: SDUPTHER

## 2018-03-28 RX ORDER — AMLODIPINE BESYLATE 10 MG/1
10 TABLET ORAL DAILY
Qty: 90 TABLET | Refills: 3 | Status: SHIPPED | OUTPATIENT
Start: 2018-03-28 | End: 2019-02-22 | Stop reason: SDUPTHER

## 2018-03-28 NOTE — PROGRESS NOTES
Subjective:       Patient ID: Manuela Reyes is a 68 y.o. female.    Chief Complaint: Annual Exam    Manuela Reyes  68 y.o. White female     Patient presents with:  Annual Exam    HPI: Here today for an annual physical.   HTN--stable on amlodipine. She denies symptoms.   Anxiety--stable on lorazepam. She takes it at night. She needs refills.   Crohn's disease--she recently had a normal colonoscopy. She is managed by GI.   Mesenteric artery stenosis/aortic atherosclerosis--she has seen vascular surgery but is wondering if she should see cardiology. She is not on a statin but she is taking an aspirin daily. She has a strong family history of CVD. She denies symptoms.                      LDLCALC                  105.0               08/31/2017              Pneumococcal (65+)(1 of 2 - PCV13) due on 01/04/2015  Mammogram due on 12/27/2018  DEXA SCAN due on 02/08/2020  Lipid Panel due on 08/31/2022  Colonoscopy due on 03/26/2023  TETANUS VACCINE due on 07/25/2026  Hepatitis C Screening Completed  Zoster Vaccine Completed  Influenza Vaccine Completed    Past Medical History:  Anxiety  Crohn's disease  Depression  Genital herpes  Hepatitis C infection  Hypertension  Insomnia  Osteoporosis  TIA (transient ischemic attack)    Past Surgical History:  APPENDECTOMY  5/19/2017: COLONOSCOPY N/A      Comment: Procedure: COLONOSCOPY;  Surgeon: Jose Luis Leonard MD;  Location: [facility];  Service:                Endoscopy;  Laterality: N/A;  3/26/2018: COLONOSCOPY N/A      Comment: Procedure: COLONOSCOPY;  Surgeon: Guillaume Whitman MD;  Location: [facility];  Service:                Endoscopy;  Laterality: N/A;  SMALL INTESTINE SURGERY    Review of patient's family history indicates:    Stroke                         Mother                    Heart disease                  Mother                    Cancer                         Father                      Comment: Lung    Heart  disease                  Sister                    Glaucoma                       Neg Hx                    Macular degeneration           Neg Hx                    Thyroid disease                Neg Hx                    Current Outpatient Prescriptions on File Prior to Visit:  adalimumab (HUMIRA) 40 mg/0.8 mL SyKt injection, Inject 0.8 mLs (40 mg total) into the skin every 14 (fourteen) days., Disp: 2 vial, Rfl: 11  aspirin (ECOTRIN) 81 MG EC tablet, Take 1 tablet (81 mg total) by mouth once daily., Disp: , Rfl:   calcium carbonate (OS-COOPER) 600 mg (1,500 mg) Tab, Take 600 mg by mouth 2 (two) times daily with meals., Disp: , Rfl:   cyanocobalamin, vitamin B-12, 2,500 mcg Lozg, Place 1 tablet under the tongue. , Disp: , Rfl:   fluticasone (FLONASE) 50 mcg/actuation nasal spray, 2 sprays (100 mcg total) by Each Nare route once daily., Disp: 48 g, Rfl: 1  melatonin 3 mg Tab, Take 1 tablet by mouth nightly as needed., Disp: , Rfl:   multivitamin with minerals tablet, Take 1 tablet by mouth once daily at 6am., Disp: , Rfl:   polyethylene glycol (GLYCOLAX) 17 gram/dose powder, , Disp: , Rfl:   vitamin D 1000 units Tab, Take 185 mg by mouth once daily., Disp: , Rfl:   amlodipine (NORVASC) 10 MG tablet, TAKE 1 TABLET EVERY DAY, Disp: 90 tablet, Rfl: 2  LORazepam (ATIVAN) 1 MG tablet, Take 1 tablet (1 mg total) by mouth every evening., Disp: 30 tablet, Rfl: 0  LORATADINE ORAL, Take 10 mg by mouth., Disp: , Rfl:   triamcinolone acetonide 0.1% (KENALOG) 0.1 % cream, 2 (two) times daily. Apply to affected area, Disp: , Rfl: 0    Allergies:  Review of patient's allergies indicates:   -- Codeine sulfate -- Itching   -- Hydrochlorothiazide -- Other (See Comments)    --  Adverse Reaction   -- Lisinopril -- Swelling and Edema    --  Facial Swelling                Review of Systems   Constitutional: Negative for appetite change, fever and unexpected weight change.   Respiratory: Negative for cough and shortness of breath.     Cardiovascular: Negative for chest pain and leg swelling.   Gastrointestinal: Negative for abdominal pain.   Genitourinary: Negative for difficulty urinating.   Musculoskeletal: Negative for gait problem.   Neurological: Negative for dizziness and headaches.   Psychiatric/Behavioral: The patient is nervous/anxious.        Objective:      Physical Exam   Constitutional: She is oriented to person, place, and time. She appears well-developed and well-nourished. No distress.   Eyes: No scleral icterus.   Neck: No tracheal deviation present.   Cardiovascular: Normal rate, regular rhythm and normal heart sounds.    Pulmonary/Chest: Effort normal and breath sounds normal. No respiratory distress.   Abdominal: Soft. Bowel sounds are normal.   Neurological: She is alert and oriented to person, place, and time.   Skin: Skin is warm and dry.   Psychiatric: She has a normal mood and affect.   Vitals reviewed.      Assessment:       1. Hypertension goal BP (blood pressure) < 140/90    2. Anxiety    3. Crohn's disease of small intestine without complication    4. Mesenteric artery stenosis    5. Hyperlipidemia LDL goal <70    6. At risk for cardiovascular event        Plan:       Manuela was seen today for annual exam.    Diagnoses and all orders for this visit:    Hypertension goal BP (blood pressure) < 140/90  -     Refill amLODIPine (NORVASC) 10 MG tablet; Take 1 tablet (10 mg total) by mouth once daily.    Anxiety  -     Refill LORazepam (ATIVAN) 1 MG tablet; Take 1 tablet (1 mg total) by mouth every evening.    Crohn's disease of small intestine without complication    Mesenteric artery stenosis  -     Lipid panel; Standing    Hyperlipidemia LDL goal <70  -     Discussed goal due to aortic atherosclerosis  -     Lipid panel; Standing    At risk for cardiovascular event  -     Ambulatory consult to Cardiology    She states she received her pneumonia vaccine at her pharmacy. I will obtain the record.     Labs and F/U in 6  months and as needed.

## 2018-04-05 ENCOUNTER — LAB VISIT (OUTPATIENT)
Dept: LAB | Facility: HOSPITAL | Age: 68
End: 2018-04-05
Payer: MEDICARE

## 2018-04-05 DIAGNOSIS — E78.5 HYPERLIPIDEMIA LDL GOAL <70: ICD-10-CM

## 2018-04-05 DIAGNOSIS — K55.1 MESENTERIC ARTERY STENOSIS: ICD-10-CM

## 2018-04-05 LAB
CHOLEST SERPL-MCNC: 203 MG/DL
CHOLEST/HDLC SERPL: 2.4 {RATIO}
HDLC SERPL-MCNC: 86 MG/DL
HDLC SERPL: 42.4 %
LDLC SERPL CALC-MCNC: 102.4 MG/DL
NONHDLC SERPL-MCNC: 117 MG/DL
TRIGL SERPL-MCNC: 73 MG/DL

## 2018-04-05 PROCEDURE — 36415 COLL VENOUS BLD VENIPUNCTURE: CPT

## 2018-04-05 PROCEDURE — 80061 LIPID PANEL: CPT

## 2018-04-06 ENCOUNTER — PATIENT MESSAGE (OUTPATIENT)
Dept: INTERNAL MEDICINE | Facility: CLINIC | Age: 68
End: 2018-04-06

## 2018-04-11 ENCOUNTER — PATIENT MESSAGE (OUTPATIENT)
Dept: INTERNAL MEDICINE | Facility: CLINIC | Age: 68
End: 2018-04-11

## 2018-04-18 ENCOUNTER — OFFICE VISIT (OUTPATIENT)
Dept: OTOLARYNGOLOGY | Facility: CLINIC | Age: 68
End: 2018-04-18
Payer: MEDICARE

## 2018-04-18 VITALS
BODY MASS INDEX: 16.4 KG/M2 | HEART RATE: 98 BPM | SYSTOLIC BLOOD PRESSURE: 153 MMHG | DIASTOLIC BLOOD PRESSURE: 93 MMHG | WEIGHT: 104.75 LBS | TEMPERATURE: 98 F

## 2018-04-18 DIAGNOSIS — J06.9 ACUTE UPPER RESPIRATORY INFECTION: Primary | ICD-10-CM

## 2018-04-18 PROCEDURE — 3080F DIAST BP >= 90 MM HG: CPT | Mod: CPTII,S$GLB,, | Performed by: OTOLARYNGOLOGY

## 2018-04-18 PROCEDURE — 99999 PR PBB SHADOW E&M-EST. PATIENT-LVL III: CPT | Mod: PBBFAC,,, | Performed by: OTOLARYNGOLOGY

## 2018-04-18 PROCEDURE — 99214 OFFICE O/P EST MOD 30 MIN: CPT | Mod: S$GLB,,, | Performed by: OTOLARYNGOLOGY

## 2018-04-18 PROCEDURE — 3077F SYST BP >= 140 MM HG: CPT | Mod: CPTII,S$GLB,, | Performed by: OTOLARYNGOLOGY

## 2018-04-18 RX ORDER — AZITHROMYCIN 250 MG/1
TABLET, FILM COATED ORAL
Qty: 6 TABLET | Refills: 0 | Status: SHIPPED | OUTPATIENT
Start: 2018-04-18 | End: 2018-06-08

## 2018-04-18 RX ORDER — METHYLPREDNISOLONE 4 MG/1
TABLET ORAL
Qty: 1 PACKAGE | Refills: 0 | Status: SHIPPED | OUTPATIENT
Start: 2018-04-18 | End: 2018-06-08

## 2018-04-18 RX ORDER — CETIRIZINE HYDROCHLORIDE 10 MG/1
10 TABLET ORAL DAILY
COMMUNITY
End: 2018-06-08

## 2018-04-19 NOTE — PROGRESS NOTES
Subjective:   Patient: Manuela Reyes [id number], :1950   Visit date:2018 9:09 PM    Chief Complaint:  Lesion of oral mucosa (per margaret viveros for possible biopsy ) and Sinus Problem (begining  a sinus drip, headache)    HPI:  Manuela is a 68 y.o. female who is here for follow-up. She was last here about 6 weeks ago for a small area of abnormality below the tongue.  She is a chronic smoker.  Since this first appeared, she has had no pain or bleeding from the area.  She feels that the area is the same or perhaps a bit smaller.  Additionally, she has had about 4 days of not feeling well.  Bilateral congestion and nasal discomfort.  Pressure like sensation.  Moderate severity.  No exacerbating or relieving factors. Associated rhinorrhea.      Review of Systems:  -     Allergic/Immunologic: is allergic to codeine sulfate; hydrochlorothiazide; and lisinopril..  -     Constitutional: Current temp: 97.9 °F (36.6 °C) (Tympanic)    Her meds, allergies, medical, surgical, social & family histories were reviewed & updated:  -     She has a current medication list which includes the following prescription(s): adalimumab, amlodipine, aspirin, calcium carbonate, cetirizine, cyanocobalamin (vitamin b-12), fluticasone, lorazepam, melatonin, metronidazole 0.75%, multivitamin with minerals, triamcinolone acetonide 0.1%, valacyclovir, vitamin d, azithromycin, methylprednisolone, and polyethylene glycol.  -     She  has a past medical history of Anxiety; Crohn's disease; Depression; Genital herpes; Hepatitis C infection; Hypertension; Insomnia; Osteoporosis; and TIA (transient ischemic attack).   -     She  does not have any pertinent problems on file.   -     She  has a past surgical history that includes Appendectomy; Small intestine surgery; Colonoscopy (N/A, 2017); and Colonoscopy (N/A, 3/26/2018).  -     She  reports that she quit smoking about 13 years ago. Her smoking use included Cigarettes. She quit  after 20.00 years of use. She has quit using smokeless tobacco. She reports that she does not drink alcohol or use drugs.  -     Her family history includes Cancer in her father; Heart disease in her mother and sister; Stroke in her mother.  -     She is allergic to codeine sulfate; hydrochlorothiazide; and lisinopril.    Objective:     Physical Exam:  Vitals:  BP (!) 153/93 (BP Location: Right arm, Patient Position: Sitting, BP Method: Small (Automatic))   Pulse 98   Temp 97.9 °F (36.6 °C) (Tympanic)   Wt 47.5 kg (104 lb 11.5 oz)   BMI 16.40 kg/m²   Communication:  Able to communicate, no hoarseness.  Head & Face:  Normocephalic, atraumatic, no sinus tenderness.  Eyes:  Extraocular motions intact.  Ears:  Otoscopy of external auditory canals and tympanic membranes was normal, clinical speech reception thresholds grossly intact, no mass/lesion of auricle.  Nose:  No masses/lesions of external nose, septum, and turbinates were within normal limits.  Right nasal mucosa erythmatous.   Mouth:  No mass/lesion of lips, teeth, gums, hard/soft palate, tongue, tonsils, or oropharynx. Small area near left punctum of wharthin's duct with mild purple discoloration, appears similar to small ectatic vessel.    Neck & Lymphatics:  No cervical lymphadenopathy, no neck mass/crepitus/ asymmetry, trachea is midline, no thyroid enlargement/tenderness/mass.  Neuro/Psych: Alert with normal mood and affect.   Respiration/Chest:  Symmetric expansion during respiration, normal respiratory effort.  Skin:  Warm and intact.    Assessment & Plan:     URI- zpack and medrol dosepack    Mouth appears benign.  We discussed her medical conditions, treatments and plan.   Furthermore, I counseled Manuela that while at this point I do not see any evidence of head and neck cancer, this does not eliminate the possibility of a very small malignancy that is currently undetectable on examination or developing head and neck cancer in the future.  I advised  them of the signs of head and neck cancer and that they should alert my office should they occur for more than 10-14 days (unintentional weight loss, pain or difficulty with swallowing, bleeding in the oral cavity or throat, coughing or spitting up blood, pain with mouth opening, persistent ulceration of the skin or mucosa, persistent or enlarging neck masses/swelling).  Manuela seemed to have a clear understanding of the concerning nature of these conditions.  Manuela should return to clinic if any issues arise (symptoms worsen or persist), otherwise we will see her back in the clinic only as needed.

## 2018-04-25 ENCOUNTER — PATIENT MESSAGE (OUTPATIENT)
Dept: OTOLARYNGOLOGY | Facility: CLINIC | Age: 68
End: 2018-04-25

## 2018-04-25 DIAGNOSIS — J01.90 ACUTE BACTERIAL SINUSITIS: Primary | ICD-10-CM

## 2018-04-25 DIAGNOSIS — B96.89 ACUTE BACTERIAL SINUSITIS: Primary | ICD-10-CM

## 2018-04-26 ENCOUNTER — TELEPHONE (OUTPATIENT)
Dept: OTOLARYNGOLOGY | Facility: CLINIC | Age: 68
End: 2018-04-26

## 2018-04-26 ENCOUNTER — PATIENT MESSAGE (OUTPATIENT)
Dept: OTOLARYNGOLOGY | Facility: CLINIC | Age: 68
End: 2018-04-26

## 2018-04-26 ENCOUNTER — HOSPITAL ENCOUNTER (OUTPATIENT)
Dept: RADIOLOGY | Facility: HOSPITAL | Age: 68
Discharge: HOME | End: 2018-04-26
Attending: OTOLARYNGOLOGY
Payer: MEDICARE

## 2018-04-26 DIAGNOSIS — B96.89 ACUTE BACTERIAL SINUSITIS: ICD-10-CM

## 2018-04-26 DIAGNOSIS — J01.90 ACUTE BACTERIAL SINUSITIS: ICD-10-CM

## 2018-04-26 PROCEDURE — 70220 X-RAY EXAM OF SINUSES: CPT | Mod: TC

## 2018-04-26 PROCEDURE — 70220 X-RAY EXAM OF SINUSES: CPT | Mod: 26,,, | Performed by: RADIOLOGY

## 2018-04-26 NOTE — TELEPHONE ENCOUNTER
----- Message from Yvonne Garnett sent at 4/26/2018  1:56 PM CDT -----  Contact: pt  She's calling stating that she had lab work completed today but didn't know that she was supposed to fast, wants to know if she should re-do the labs, please advise [phone] (home)

## 2018-04-30 ENCOUNTER — PATIENT MESSAGE (OUTPATIENT)
Dept: OTOLARYNGOLOGY | Facility: CLINIC | Age: 68
End: 2018-04-30

## 2018-04-30 RX ORDER — LEVOCETIRIZINE DIHYDROCHLORIDE 5 MG/1
5 TABLET, FILM COATED ORAL NIGHTLY
Qty: 30 TABLET | Refills: 11 | Status: SHIPPED | OUTPATIENT
Start: 2018-04-30 | End: 2018-05-01 | Stop reason: SDUPTHER

## 2018-05-01 RX ORDER — LEVOCETIRIZINE DIHYDROCHLORIDE 5 MG/1
5 TABLET, FILM COATED ORAL NIGHTLY
Qty: 30 TABLET | Refills: 11 | Status: SHIPPED | OUTPATIENT
Start: 2018-05-01 | End: 2019-04-15

## 2018-05-04 ENCOUNTER — OFFICE VISIT (OUTPATIENT)
Dept: CARDIOLOGY | Facility: CLINIC | Age: 68
End: 2018-05-04
Payer: MEDICARE

## 2018-05-04 VITALS
SYSTOLIC BLOOD PRESSURE: 130 MMHG | BODY MASS INDEX: 16.17 KG/M2 | WEIGHT: 103 LBS | HEIGHT: 67 IN | HEART RATE: 78 BPM | DIASTOLIC BLOOD PRESSURE: 70 MMHG

## 2018-05-04 DIAGNOSIS — Z82.49 FAMILY HISTORY OF CARDIOVASCULAR DISEASE: ICD-10-CM

## 2018-05-04 DIAGNOSIS — K55.1 MESENTERIC ARTERY STENOSIS: ICD-10-CM

## 2018-05-04 DIAGNOSIS — E78.5 HYPERLIPIDEMIA LDL GOAL <70: ICD-10-CM

## 2018-05-04 DIAGNOSIS — I10 HYPERTENSION GOAL BP (BLOOD PRESSURE) < 140/90: Primary | Chronic | ICD-10-CM

## 2018-05-04 DIAGNOSIS — I70.0 ATHEROSCLEROSIS OF ABDOMINAL AORTA: ICD-10-CM

## 2018-05-04 PROCEDURE — 99999 PR PBB SHADOW E&M-EST. PATIENT-LVL IV: CPT | Mod: PBBFAC,,, | Performed by: INTERNAL MEDICINE

## 2018-05-04 PROCEDURE — 3075F SYST BP GE 130 - 139MM HG: CPT | Mod: CPTII,S$GLB,, | Performed by: INTERNAL MEDICINE

## 2018-05-04 PROCEDURE — 93000 ELECTROCARDIOGRAM COMPLETE: CPT | Mod: S$GLB,,, | Performed by: INTERNAL MEDICINE

## 2018-05-04 PROCEDURE — 99204 OFFICE O/P NEW MOD 45 MIN: CPT | Mod: S$GLB,,, | Performed by: INTERNAL MEDICINE

## 2018-05-04 PROCEDURE — 3078F DIAST BP <80 MM HG: CPT | Mod: CPTII,S$GLB,, | Performed by: INTERNAL MEDICINE

## 2018-05-04 NOTE — PROGRESS NOTES
Subjective:    Patient ID:  Manuela Reyes is a 68 y.o. female who presents for evaluation of Risk Factor Management For Atherosclerosis; Hypertension; and Hyperlipidemia      Pt referred by Dr. Christelle Lawler      HPI pt presents for cardiac eval.  Has HTN, hyperlipidemia, crohns disease, mesenteric artery stenosis (CTA 2017 showed mesenteric stenosis, abdominal aorta atherosclerosis).  Nonsmoker.  Sister has CHF.  Mother had MI age 60's.  Brother has CAD/PCI.   She denies chest pains, no dyspnea.  Walks for exercise.  Mindful of diet.   ecg today is normal.       Patient Active Problem List   Diagnosis    Crohn's disease of small intestine    Hypertension goal BP (blood pressure) < 140/90    Crohn's colitis    Mesenteric artery stenosis    Hyperlipidemia LDL goal <70    Chronic insomnia    Anxiety    Hepatitis C    Crohn's disease    Family history of cardiovascular disease    Atherosclerosis of abdominal aorta         Past Medical History:   Diagnosis Date    Anxiety     Crohn's disease     Depression     Genital herpes     Hepatitis C infection     Hypertension     Insomnia     Osteoporosis     TIA (transient ischemic attack)        Current Outpatient Prescriptions:     adalimumab (HUMIRA) 40 mg/0.8 mL SyKt injection, Inject 0.8 mLs (40 mg total) into the skin every 14 (fourteen) days., Disp: 2 vial, Rfl: 11    amLODIPine (NORVASC) 10 MG tablet, Take 1 tablet (10 mg total) by mouth once daily., Disp: 90 tablet, Rfl: 3    aspirin (ECOTRIN) 81 MG EC tablet, Take 1 tablet (81 mg total) by mouth once daily., Disp: , Rfl:     azithromycin (Z-KEVIN) 250 MG tablet, Take per package instructions, Disp: 6 tablet, Rfl: 0    calcium carbonate (OS-COOPER) 600 mg (1,500 mg) Tab, Take 600 mg by mouth 2 (two) times daily with meals., Disp: , Rfl:     cetirizine (ZYRTEC) 10 MG tablet, Take 10 mg by mouth once daily., Disp: , Rfl:     cyanocobalamin, vitamin B-12, 2,500 mcg Lo, Place 1 tablet under  "the tongue. , Disp: , Rfl:     fluticasone (FLONASE) 50 mcg/actuation nasal spray, 2 sprays (100 mcg total) by Each Nare route once daily., Disp: 48 g, Rfl: 1    levocetirizine (XYZAL) 5 MG tablet, Take 1 tablet (5 mg total) by mouth every evening., Disp: 30 tablet, Rfl: 11    LORazepam (ATIVAN) 1 MG tablet, Take 1 tablet (1 mg total) by mouth every evening., Disp: 30 tablet, Rfl: 5    melatonin 3 mg Tab, Take 1 tablet by mouth nightly as needed., Disp: , Rfl:     methylPREDNISolone (MEDROL DOSEPACK) 4 mg tablet, use as directed, Disp: 1 Package, Rfl: 0    metronidazole 0.75% (METROCREAM) 0.75 % Crea, APPLY ONE APPLICATION TO THE FACE TWICE DAILY, Disp: , Rfl: 2    multivitamin with minerals tablet, Take 1 tablet by mouth once daily at 6am., Disp: , Rfl:     polyethylene glycol (GLYCOLAX) 17 gram/dose powder, , Disp: , Rfl:     triamcinolone acetonide 0.1% (KENALOG) 0.1 % cream, 2 (two) times daily. Apply to affected area, Disp: , Rfl: 0    valACYclovir (VALTREX) 500 MG tablet, take 1 tablet by mouth as directed and take 1 tablet by mouth twice a day for 3 days, Disp: , Rfl: 0    vitamin D 1000 units Tab, Take 185 mg by mouth once daily., Disp: , Rfl:       Review of Systems   Constitution: Negative.   HENT: Negative.    Eyes: Negative.    Cardiovascular: Negative.    Respiratory: Negative.    Endocrine: Negative.    Hematologic/Lymphatic: Negative.    Skin: Negative.    Musculoskeletal: Negative.    Gastrointestinal: Negative.    Genitourinary: Negative.    Neurological: Negative.    Psychiatric/Behavioral: Negative.    Allergic/Immunologic: Negative.        /70 (BP Location: Left arm, Patient Position: Sitting, BP Method: Large (Manual))   Pulse 78   Ht 5' 7" (1.702 m)   Wt 46.7 kg (103 lb)   BMI 16.13 kg/m²     Wt Readings from Last 3 Encounters:   05/04/18 46.7 kg (103 lb)   04/18/18 47.5 kg (104 lb 11.5 oz)   03/28/18 46.2 kg (101 lb 13.6 oz)     Temp Readings from Last 3 Encounters: "   04/18/18 97.9 °F (36.6 °C) (Tympanic)   03/28/18 96.4 °F (35.8 °C) (Tympanic)   03/26/18 98 °F (36.7 °C) (Oral)     BP Readings from Last 3 Encounters:   05/04/18 130/70   04/18/18 (!) 153/93   03/28/18 128/62     Pulse Readings from Last 3 Encounters:   05/04/18 78   04/18/18 98   03/28/18 81          Objective:    Physical Exam   Constitutional: She is oriented to person, place, and time. Vital signs are normal. She appears well-developed and well-nourished. She is active and cooperative. She does not have a sickly appearance. She does not appear ill. No distress.   HENT:   Head: Normocephalic.   Neck: Neck supple. Normal carotid pulses, no hepatojugular reflux and no JVD present. Carotid bruit is not present. No thyromegaly present.   Cardiovascular: Normal rate, regular rhythm, S1 normal, S2 normal, normal heart sounds and normal pulses.  PMI is not displaced.  Exam reveals no gallop and no friction rub.    No murmur heard.  Pulses:       Radial pulses are 2+ on the right side, and 2+ on the left side.        Femoral pulses are 2+ on the right side, and 2+ on the left side.       Dorsalis pedis pulses are 2+ on the right side, and 2+ on the left side.        Posterior tibial pulses are 2+ on the right side, and 2+ on the left side.   Pulmonary/Chest: Effort normal and breath sounds normal. She has no wheezes. She has no rales.   Abdominal: Soft. Normal appearance, normal aorta and bowel sounds are normal. She exhibits abdominal bruit. She exhibits no pulsatile liver, no ascites and no mass. There is no splenomegaly or hepatomegaly. There is no tenderness.   Musculoskeletal: She exhibits no edema.   Lymphadenopathy:     She has no cervical adenopathy.   Neurological: She is alert and oriented to person, place, and time.   Skin: Skin is warm. She is not diaphoretic.   Psychiatric: She has a normal mood and affect. Her behavior is normal.   Nursing note and vitals reviewed.      I have reviewed all pertinent labs  and cardiac studies.    No results found for: LABA1C, HGBA1C  Lab Results   Component Value Date    CHOL 203 (H) 04/05/2018    CHOL 206 (H) 08/31/2017    CHOL 182 02/08/2017     Lab Results   Component Value Date    HDL 86 (H) 04/05/2018    HDL 83 (H) 08/31/2017    HDL 74 02/08/2017     Lab Results   Component Value Date    LDLCALC 102.4 04/05/2018    LDLCALC 105.0 08/31/2017    LDLCALC 90.8 02/08/2017     Lab Results   Component Value Date    TRIG 73 04/05/2018    TRIG 90 08/31/2017    TRIG 86 02/08/2017     Lab Results   Component Value Date    CHOLHDL 42.4 04/05/2018    CHOLHDL 40.3 08/31/2017    CHOLHDL 40.7 02/08/2017       Chemistry        Component Value Date/Time     (L) 01/12/2018 1437    K 3.9 01/12/2018 1437    CL 98 01/12/2018 1437    CO2 28 01/12/2018 1437    BUN 23 01/12/2018 1437    CREATININE 0.8 01/12/2018 1437    GLU 89 01/12/2018 1437        Component Value Date/Time    CALCIUM 9.4 01/12/2018 1437    ALKPHOS 76 01/12/2018 1437    AST 22 01/12/2018 1437    ALT 11 01/12/2018 1437    BILITOT 0.5 01/12/2018 1437    ESTGFRAFRICA >60.0 01/12/2018 1437    EGFRNONAA >60.0 01/12/2018 1437        Lab Results   Component Value Date    WBC 7.30 04/26/2018    HGB 14.1 04/26/2018    HCT 40.8 04/26/2018     (H) 04/26/2018     04/26/2018           Assessment:       1. Hypertension goal BP (blood pressure) < 140/90    2. Hyperlipidemia LDL goal <70    3. Mesenteric artery stenosis    4. Family history of cardiovascular disease    5. Atherosclerosis of abdominal aorta         Plan:             Stress test  Echocardiogram  Discussed statin indications.  She has indication for statin for atherosclerotic disease.  Indications for treatment, side effects discussed.  Could be possible interaction with Humira although I think it would be safe for low dose statin.  She will check with her treating physician for Crohns disease who prescribes Humira.  Cardiac diet  Exercise  No changes to meds  today.    Phone review for test results.

## 2018-05-04 NOTE — LETTER
May 4, 2018      Christelle Lawler DO  [address] [place] [address] [address] [place] [address]           [facility]  [address] [place] [address] [place] [address]  Phone: [phone]  Fax: [phone]          Patient: Manuela Reyes   MR Number: [id number]   YOB: 1950   Date of Visit: 5/4/2018       Dear Dr. Christelle Lawler:    Thank you for referring Manuela Reyes to me for evaluation. Attached you will find relevant portions of my assessment and plan of care.    If you have questions, please do not hesitate to call me. I look forward to following Manuela Reyes along with you.    Sincerely,    Allen White MD    Enclosure  CC:  No Recipients    If you would like to receive this communication electronically, please contact [email or web address] or [phone] to request more information on [employer] Link access.    For providers and/or their staff who would like to refer a patient to [facility], please contact us through our one-stop-shop provider referral line, [facility], at [phone].    If you feel you have received this communication in error or would no longer like to receive these types of communications, please e-mail [email or web address]

## 2018-05-07 ENCOUNTER — PATIENT MESSAGE (OUTPATIENT)
Dept: CARDIOLOGY | Facility: CLINIC | Age: 68
End: 2018-05-07

## 2018-05-07 ENCOUNTER — PATIENT MESSAGE (OUTPATIENT)
Dept: GASTROENTEROLOGY | Facility: CLINIC | Age: 68
End: 2018-05-07

## 2018-05-08 ENCOUNTER — PATIENT MESSAGE (OUTPATIENT)
Dept: OTOLARYNGOLOGY | Facility: CLINIC | Age: 68
End: 2018-05-08

## 2018-05-08 ENCOUNTER — PATIENT MESSAGE (OUTPATIENT)
Dept: CARDIOLOGY | Facility: CLINIC | Age: 68
End: 2018-05-08

## 2018-05-08 DIAGNOSIS — E78.2 MIXED HYPERLIPIDEMIA: Primary | ICD-10-CM

## 2018-05-09 ENCOUNTER — PATIENT MESSAGE (OUTPATIENT)
Dept: CARDIOLOGY | Facility: CLINIC | Age: 68
End: 2018-05-09

## 2018-05-09 RX ORDER — ROSUVASTATIN CALCIUM 10 MG/1
10 TABLET, COATED ORAL DAILY
Qty: 90 TABLET | Refills: 3 | Status: SHIPPED | OUTPATIENT
Start: 2018-05-09 | End: 2019-06-06

## 2018-05-09 NOTE — TELEPHONE ENCOUNTER
Discussed at her appt on statin side effects.  Very safe, most pts have no side effects.  Small percentage of pts can have muscle aches, pains.    Dr White

## 2018-05-10 ENCOUNTER — PATIENT MESSAGE (OUTPATIENT)
Dept: CARDIOLOGY | Facility: CLINIC | Age: 68
End: 2018-05-10

## 2018-05-10 DIAGNOSIS — E78.2 MIXED HYPERLIPIDEMIA: ICD-10-CM

## 2018-05-10 RX ORDER — ROSUVASTATIN CALCIUM 10 MG/1
10 TABLET, COATED ORAL DAILY
Qty: 90 TABLET | Refills: 3 | OUTPATIENT
Start: 2018-05-10 | End: 2019-05-10

## 2018-05-22 ENCOUNTER — CLINICAL SUPPORT (OUTPATIENT)
Dept: CARDIOLOGY | Facility: CLINIC | Age: 68
End: 2018-05-22
Attending: INTERNAL MEDICINE
Payer: MEDICARE

## 2018-05-22 DIAGNOSIS — I10 HYPERTENSION GOAL BP (BLOOD PRESSURE) < 140/90: Chronic | ICD-10-CM

## 2018-05-22 DIAGNOSIS — I70.0 ATHEROSCLEROSIS OF ABDOMINAL AORTA: ICD-10-CM

## 2018-05-22 DIAGNOSIS — Z82.49 FAMILY HISTORY OF CARDIOVASCULAR DISEASE: ICD-10-CM

## 2018-05-22 DIAGNOSIS — K55.1 MESENTERIC ARTERY STENOSIS: ICD-10-CM

## 2018-05-22 LAB
DIASTOLIC DYSFUNCTION: NO
ESTIMATED PA SYSTOLIC PRESSURE: 28
RETIRED EF AND QEF - SEE NOTES: 60 (ref 55–65)

## 2018-05-22 PROCEDURE — 93306 TTE W/DOPPLER COMPLETE: CPT | Mod: S$GLB,,, | Performed by: INTERNAL MEDICINE

## 2018-05-24 ENCOUNTER — PATIENT MESSAGE (OUTPATIENT)
Dept: CARDIOLOGY | Facility: CLINIC | Age: 68
End: 2018-05-24

## 2018-05-31 ENCOUNTER — PATIENT MESSAGE (OUTPATIENT)
Dept: CARDIOLOGY | Facility: CLINIC | Age: 68
End: 2018-05-31

## 2018-06-07 ENCOUNTER — PATIENT MESSAGE (OUTPATIENT)
Dept: GASTROENTEROLOGY | Facility: CLINIC | Age: 68
End: 2018-06-07

## 2018-06-07 ENCOUNTER — TELEPHONE (OUTPATIENT)
Dept: RHEUMATOLOGY | Facility: CLINIC | Age: 68
End: 2018-06-07

## 2018-06-07 NOTE — TELEPHONE ENCOUNTER
Spoke with pt and scheduled next day appointment with Dr. Colon for 6.8.18 at 12.30 pm. Pt verbalized understanding.

## 2018-06-08 ENCOUNTER — OFFICE VISIT (OUTPATIENT)
Dept: RHEUMATOLOGY | Facility: CLINIC | Age: 68
End: 2018-06-08
Payer: MEDICARE

## 2018-06-08 ENCOUNTER — PATIENT MESSAGE (OUTPATIENT)
Dept: GASTROENTEROLOGY | Facility: CLINIC | Age: 68
End: 2018-06-08

## 2018-06-08 VITALS
BODY MASS INDEX: 16.72 KG/M2 | HEART RATE: 86 BPM | HEIGHT: 67 IN | DIASTOLIC BLOOD PRESSURE: 83 MMHG | WEIGHT: 106.5 LBS | SYSTOLIC BLOOD PRESSURE: 149 MMHG

## 2018-06-08 DIAGNOSIS — M81.0 AGE-RELATED OSTEOPOROSIS WITHOUT CURRENT PATHOLOGICAL FRACTURE: Primary | ICD-10-CM

## 2018-06-08 PROCEDURE — 3077F SYST BP >= 140 MM HG: CPT | Mod: CPTII,S$GLB,, | Performed by: INTERNAL MEDICINE

## 2018-06-08 PROCEDURE — 99204 OFFICE O/P NEW MOD 45 MIN: CPT | Mod: S$GLB,,, | Performed by: INTERNAL MEDICINE

## 2018-06-08 PROCEDURE — 99999 PR PBB SHADOW E&M-EST. PATIENT-LVL III: CPT | Mod: PBBFAC,,, | Performed by: INTERNAL MEDICINE

## 2018-06-08 PROCEDURE — 3079F DIAST BP 80-89 MM HG: CPT | Mod: CPTII,S$GLB,, | Performed by: INTERNAL MEDICINE

## 2018-06-08 PROCEDURE — 99499 UNLISTED E&M SERVICE: CPT | Mod: S$GLB,,, | Performed by: INTERNAL MEDICINE

## 2018-06-08 NOTE — PATIENT INSTRUCTIONS
Denosumab injection  What is this medicine?  DENOSUMAB (den oh aixa mab) slows bone breakdown. Prolia is used to treat osteoporosis in women after menopause and in men. Xgeva is used to prevent bone fractures and other bone problems caused by cancer bone metastases. Xgeva is also used to treat giant cell tumor of the bone.  How should I use this medicine?  This medicine is for injection under the skin. It is given by a health care professional in a hospital or clinic setting.  If you are getting Prolia, a special MedGuide will be given to you by the pharmacist with each prescription and refill. Be sure to read this information carefully each time.  For Prolia, talk to your pediatrician regarding the use of this medicine in children. Special care may be needed. For Xgeva, talk to your pediatrician regarding the use of this medicine in children. While this drug may be prescribed for children as young as 13 years for selected conditions, precautions do apply.  What side effects may I notice from receiving this medicine?  Side effects that you should report to your doctor or health care professional as soon as possible:  · allergic reactions like skin rash, itching or hives, swelling of the face, lips, or tongue  · breathing problems  · chest pain  · fast, irregular heartbeat  · feeling faint or lightheaded, falls  · fever, chills, or any other sign of infection  · muscle spasms, tightening, or twitches  · numbness or tingling  · skin blisters or bumps, or is dry, peels, or red  · slow healing or unexplained pain in the mouth or jaw  · unusual bleeding or bruising  Side effects that usually do not require medical attention (Report these to your doctor or health care professional if they continue or are bothersome.):  · muscle pain  · stomach upset, gas  What may interact with this medicine?  Do not take this medicine with any of the following medications:  · other medicines containing denosumab  This medicine may also  interact with the following medications:  · medicines that suppress the immune system  · medicines that treat cancer  · steroid medicines like prednisone or cortisone  What if I miss a dose?  It is important not to miss your dose. Call your doctor or health care professional if you are unable to keep an appointment.  Where should I keep my medicine?  This medicine is only given in a clinic, doctor's office, or other health care setting and will not be stored at home.  What should I tell my health care provider before I take this medicine?  They need to know if you have any of these conditions:  · dental disease  · eczema  · infection or history of infections  · kidney disease or on dialysis  · low blood calcium or vitamin D  · malabsorption syndrome  · scheduled to have surgery or tooth extraction  · taking medicine that contains denosumab  · thyroid or parathyroid disease  · an unusual reaction to denosumab, other medicines, foods, dyes, or preservatives  · pregnant or trying to get pregnant  · breast-feeding  What should I watch for while using this medicine?  Visit your doctor or health care professional for regular checks on your progress. Your doctor or health care professional may order blood tests and other tests to see how you are doing.  Call your doctor or health care professional if you get a cold or other infection while receiving this medicine. Do not treat yourself. This medicine may decrease your body's ability to fight infection.  You should make sure you get enough calcium and vitamin D while you are taking this medicine, unless your doctor tells you not to. Discuss the foods you eat and the vitamins you take with your health care professional.  See your dentist regularly. Brush and floss your teeth as directed. Before you have any dental work done, tell your dentist you are receiving this medicine.  Do not become pregnant while taking this medicine or for 5 months after stopping it. Women should  inform their doctor if they wish to become pregnant or think they might be pregnant. There is a potential for serious side effects to an unborn child. Talk to your health care professional or pharmacist for more information.  NOTE:This sheet is a summary. It may not cover all possible information. If you have questions about this medicine, talk to your doctor, pharmacist, or health care provider. Copyright© 2017 [employer]

## 2018-06-08 NOTE — LETTER
June 8, 2018      Christelle Lawler DO  [address] [place] [address]           [facility] - Rheumatology  [address] [place] [address]  Phone: [phone]  Fax: [phone]          Patient: Manuela Reyes   MR Number: [id number]   YOB: 1950   Date of Visit: 6/8/2018       Dear Dr. Christelle Lawler:    Thank you for referring Manuela Reyes to me for evaluation. Attached you will find relevant portions of my assessment and plan of care.    If you have questions, please do not hesitate to call me. I look forward to following Manuela Reyes along with you.    Sincerely,    Shilpa Colon MD    Enclosure  CC:  No Recipients    If you would like to receive this communication electronically, please contact [email or web address] or [phone] to request more information on [employer] Link access.    For providers and/or their staff who would like to refer a patient to [facility], please contact us through our one-stop-shop provider referral line, [facility] , at [phone].    If you feel you have received this communication in error or would no longer like to receive these types of communications, please e-mail [email or web address]

## 2018-06-08 NOTE — PROGRESS NOTES
CC:  Chief Complaint   Patient presents with    Osteoporosis       History of Present Illness:  Manuela Farley a 68 y.o.yo female   Patient Active Problem List   Diagnosis    Crohn's disease of small intestine    Hypertension goal BP (blood pressure) < 140/90    Crohn's colitis    Mesenteric artery stenosis    Hyperlipidemia LDL goal <70    Chronic insomnia    Anxiety    Hepatitis C    Crohn's disease    Family history of cardiovascular disease    Atherosclerosis of abdominal aorta    Age-related osteoporosis without current pathological fracture     DEXA : 2/2017  Osteoporosis   Current meds for osteopenia/ osteoporosis  :ca/vit d   Prior meds for osteopenia/ osteoporosis  : boniva between 3543-8474   Hystrectomy: no , menopause at 45   Smoker : former smoker , quit now   Kidney problems : no   Prior fracture : no   Other :     Bleeding gums : no  Dental caries :no  Anticipated dental work :yes  Jaw pains :no   GERD :no        Past Medical History:   Diagnosis Date    Anxiety     Crohn's disease     Depression     Genital herpes     Hepatitis C infection     Hypertension     Insomnia     Mesenteric artery stenosis     Dr. Checo Rede--vascular surgery    Osteoporosis     TIA (transient ischemic attack)        Past Surgical History:   Procedure Laterality Date    APPENDECTOMY      COLONOSCOPY N/A 5/19/2017    Procedure: COLONOSCOPY;  Surgeon: Jose Luis Leonard MD;  Location: [facility] ENDO;  Service: Endoscopy;  Laterality: N/A;    COLONOSCOPY N/A 3/26/2018    Procedure: COLONOSCOPY;  Surgeon: Guillaume Whitman MD;  Location: [facility] ENDO;  Service: Endoscopy;  Laterality: N/A;    SMALL INTESTINE SURGERY           Social History   Substance Use Topics    Smoking status: Former Smoker     Years: 20.00     Types: Cigarettes     Quit date: 1/1/2005    Smokeless tobacco: Former User      Comment: Former Light Smoker less than 10 a day    Alcohol use No       Family History    Problem Relation Age of Onset    Stroke Mother     Heart disease Mother     Cancer Father         Lung    Heart disease Sister     Glaucoma Neg Hx     Macular degeneration Neg Hx     Thyroid disease Neg Hx        Review of patient's allergies indicates:   Allergen Reactions    Codeine sulfate Itching    Hydrochlorothiazide Other (See Comments)     Adverse Reaction    Lisinopril Swelling and Edema     Facial Swelling             Review of Systems:  Constitutional: Denies fever, chills. No recent weight changes.   Fatigue: no  Muscle weakness: no  Headaches: no new headaches  Eyes: No redness or dryness.  No recent visual changes.  ENT: Denies dry mouth. No oral or nasal ulcers.  Card: No chest pain.  Resp: No cough or sob.   Gastro: No nausea or vomiting.  No heartburn.  Constipation: no  Diarrhea: no  Genito:  No dysuria.  No genital ulcers.  Skin: No rash.  Raynauds:no  Neuro: No numbness / tingling.   Psych: No depression, anxiety  Endo:  no excess thirst.  Heme: no abnormal bleeding or bruising  Clots:none       OBJECTIVE:     Vital Signs   Vitals:    06/08/18 1252   BP: (!) 149/83   Pulse: 86     Physical Exam:  General Appearance:  NAD.   Gait: not favoring.  HEENT: PERRL.  Eyes not dry or injected.  No nasal ulcers.  Mouth not dry, no oral lesions.  Lymph: cervical, supraclavicular or axillary nodes: none abnormal   Cardio: no irregularity of S1 or S2.  No gallops or rubs.   Resp: Normal respiratory motion. Clear to auscultation bilaterally.   No abnormal chest conformation.  Abd: Soft, non-tender, nondistended.  No masses.   Skin: Head and neck,  and extremities examined. No rashes.   Neuro: Ox3.   Cranial nerves II-XII grossly intact.   Sensation intact  in both distal LE and upper extremities to light touch.  Musculoskeletal Exam:    Right Side Rheumatological Exam     Examination finds the shoulder, elbow, wrist, knee, 1st PIP, 1st MCP, 2nd PIP, 2nd MCP, 3rd PIP, 3rd MCP, 4th PIP, 4th MCP, 5th  PIP and 5th MCP no synovitis     Others :  Hip: No synovitis   Ankle :No synovitis   Foot:No synovitis     Left Side Rheumatological Exam     Examination finds the shoulder, elbow, wrist, knee, 1st PIP, 1st MCP, 2nd PIP, 2nd MCP, 3rd PIP, 3rd MCP, 4th PIP, 4th MCP, 5th PIP and 5th MCP no synovitis     Others :  Hip:No synovitis   Ankle :No synovitis   Foot:No synovitis       Muscle strength:Equal and full in all mm groups of the upper and lower ext.    Laboratory:   Results for orders placed or performed in visit on 05/22/18   2D echo with color flow doppler   Result Value Ref Range    EF 60 55 - 65    Diastolic Dysfunction No     Est. PA Systolic Pressure 28      Imaging :DEXA 2/2017,  Osteoporosis, T score -3.2 at spine     Notes reviewed  Other procedures:    ASSESSMENT/PLAN:     Age-related osteoporosis without current pathological fracture  -     Comprehensive metabolic panel; Standing  -     Vitamin D; Standing  -     PTH, intact; Future; Expected date: 06/08/2018  -     Prior Authorization Order      Discussed Prolia with her.  Reviewed her DEXA results.  She has some dental work pending.  She would complete the dental work and call us back to schedule labs and Prolia later.    History of Crohn's disease , on Humira:  Her questions/concerns regarding Prolia and Humira answered.  She would want to talk to her gastroenterologist prior to proceeding as well.  Continue vit d supp otc     Chronic hep c : s/p treatment     Risks vs Benefits and potential side effects of medication prescribed today were discussed with patient. Medication literature given to patient up discharge  Went over uptodate information /literature on the meds prescribed today    Start Prolia. Risks vs Benefits and potential side effects of medication prescribed today were discussed with patient. Medication literature given to patient up discharge. We also discussed about jaw necrosis and atypical fractures which are rare side effects from the  medication.  Always mention to dentist prior to any dental work of  being on Prolia.    6 month return     Cc Claudia Tang/VENKATA

## 2018-06-11 ENCOUNTER — PATIENT MESSAGE (OUTPATIENT)
Dept: GASTROENTEROLOGY | Facility: CLINIC | Age: 68
End: 2018-06-11

## 2018-06-14 ENCOUNTER — OFFICE VISIT (OUTPATIENT)
Dept: PODIATRY | Facility: CLINIC | Age: 68
End: 2018-06-14
Payer: MEDICARE

## 2018-06-14 VITALS
BODY MASS INDEX: 16.65 KG/M2 | DIASTOLIC BLOOD PRESSURE: 83 MMHG | SYSTOLIC BLOOD PRESSURE: 161 MMHG | HEART RATE: 85 BPM | WEIGHT: 106.06 LBS | HEIGHT: 67 IN

## 2018-06-14 DIAGNOSIS — L60.9 DISEASE OF NAIL: Primary | ICD-10-CM

## 2018-06-14 PROCEDURE — 3077F SYST BP >= 140 MM HG: CPT | Mod: CPTII,S$GLB,, | Performed by: PODIATRIST

## 2018-06-14 PROCEDURE — 99999 PR PBB SHADOW E&M-EST. PATIENT-LVL IV: CPT | Mod: PBBFAC,,, | Performed by: PODIATRIST

## 2018-06-14 PROCEDURE — 99203 OFFICE O/P NEW LOW 30 MIN: CPT | Mod: S$GLB,,, | Performed by: PODIATRIST

## 2018-06-14 PROCEDURE — 88302 TISSUE EXAM BY PATHOLOGIST: CPT | Mod: 26,,, | Performed by: PATHOLOGY

## 2018-06-14 PROCEDURE — 88312 SPECIAL STAINS GROUP 1: CPT | Performed by: PATHOLOGY

## 2018-06-14 PROCEDURE — 88312 SPECIAL STAINS GROUP 1: CPT | Mod: 26,,, | Performed by: PATHOLOGY

## 2018-06-14 PROCEDURE — 3079F DIAST BP 80-89 MM HG: CPT | Mod: CPTII,S$GLB,, | Performed by: PODIATRIST

## 2018-06-14 NOTE — PATIENT INSTRUCTIONS
Patient instructed to spray all shoes with Lysol disinfectant spray and let dry before wearing.   Patient instructed to wash all socks in hot water and bleach.       Tea Tree Oil

## 2018-06-14 NOTE — PROGRESS NOTES
Subjective:     Patient ID: Manuela Reyes is a 68 y.o. female.    Chief Complaint: Nail Problem (toenail fungus )    Manuela is a 68 y.o. female who presents to the clinic complaining of thick and discolored toenails on both feet. Patient states she has tried several OTC medications with no real improvement. Manuela is inquiring about treatment options.          Patient Active Problem List   Diagnosis    Crohn's disease of small intestine    Hypertension goal BP (blood pressure) < 140/90    Crohn's colitis    Mesenteric artery stenosis    Hyperlipidemia LDL goal <70    Chronic insomnia    Anxiety    Hepatitis C    Crohn's disease    Family history of cardiovascular disease    Atherosclerosis of abdominal aorta    Age-related osteoporosis without current pathological fracture       Medication List with Changes/Refills   Current Medications    ADALIMUMAB (HUMIRA) 40 MG/0.8 ML SYKT INJECTION    Inject 0.8 mLs (40 mg total) into the skin every 14 (fourteen) days.    AMLODIPINE (NORVASC) 10 MG TABLET    Take 1 tablet (10 mg total) by mouth once daily.    ASPIRIN (ECOTRIN) 81 MG EC TABLET    Take 1 tablet (81 mg total) by mouth once daily.    CALCIUM CARBONATE (OS-COOPER) 600 MG (1,500 MG) TAB    Take 600 mg by mouth 2 (two) times daily with meals.    CYANOCOBALAMIN, VITAMIN B-12, 2,500 MCG LOZG    Place 1 tablet under the tongue.     LEVOCETIRIZINE (XYZAL) 5 MG TABLET    Take 1 tablet (5 mg total) by mouth every evening.    LORAZEPAM (ATIVAN) 1 MG TABLET    Take 1 tablet (1 mg total) by mouth every evening.    MELATONIN 3 MG TAB    Take 1 tablet by mouth nightly as needed.    METRONIDAZOLE 0.75% (METROCREAM) 0.75 % CREA    APPLY ONE APPLICATION TO THE FACE TWICE DAILY    MULTIVITAMIN WITH MINERALS TABLET    Take 1 tablet by mouth once daily at 6am.    ROSUVASTATIN (CRESTOR) 10 MG TABLET    Take 1 tablet (10 mg total) by mouth once daily.    VALACYCLOVIR (VALTREX) 500 MG TABLET    take 1 tablet by mouth as  "directed and take 1 tablet by mouth twice a day for 3 days    VITAMIN D 1000 UNITS TAB    Take 185 mg by mouth once daily.       Review of patient's allergies indicates:   Allergen Reactions    Codeine sulfate Itching    Hydrochlorothiazide Other (See Comments)     Adverse Reaction    Lisinopril Swelling and Edema     Facial Swelling       Past Surgical History:   Procedure Laterality Date    APPENDECTOMY      COLONOSCOPY N/A 5/19/2017    Procedure: COLONOSCOPY;  Surgeon: Jose Luis Leonard MD;  Location: [facility] ENDO;  Service: Endoscopy;  Laterality: N/A;    COLONOSCOPY N/A 3/26/2018    Procedure: COLONOSCOPY;  Surgeon: Guillaume Whitman MD;  Location: [facility] ENDO;  Service: Endoscopy;  Laterality: N/A;    SMALL INTESTINE SURGERY         Family History   Problem Relation Age of Onset    Stroke Mother     Heart disease Mother     Cancer Father         Lung    Heart disease Sister     Glaucoma Neg Hx     Macular degeneration Neg Hx     Thyroid disease Neg Hx        Social History     Social History    Marital status: Single     Spouse name: N/A    Number of children: N/A    Years of education: N/A     Occupational History    Not on file.     Social History Main Topics    Smoking status: Former Smoker     Years: 20.00     Types: Cigarettes     Quit date: 1/1/2005    Smokeless tobacco: Former User      Comment: Former Light Smoker less than 10 a day    Alcohol use No    Drug use: No    Sexual activity: No     Other Topics Concern    Not on file     Social History Narrative    No narrative on file       Vitals:    06/14/18 1313 06/14/18 1319   BP: (!) 161/91 (!) 161/83   Pulse: 87 85   Weight: 48.1 kg (106 lb 0.7 oz)    Height: 5' 7" (1.702 m)        Review of Systems   Constitutional: Negative for chills and fever.   Respiratory: Negative for shortness of breath.    Cardiovascular: Negative for chest pain, palpitations, orthopnea, claudication and leg swelling.   Gastrointestinal: Negative for " diarrhea, nausea and vomiting.   Musculoskeletal: Negative for joint pain.   Skin: Negative for rash.   Neurological: Negative for dizziness, tingling, sensory change, focal weakness and weakness.   Psychiatric/Behavioral: Negative.            Objective:      PHYSICAL EXAM: Apperance: Alert and orient in no distress,well developed, and with good attention to grooming and body habits  LOWER EXTREMITY EXAM:  VASCULAR: Dorsalis pedis pulses 2/4 bilateral and Posterior Tibial pulses 2/4 bilateral. Capillary fill time <4 seconds bilateral. No edema observed bilateral. Varicosities absent bilateral. Skin temperature of the lower extremities is warm to warm, proximal to distal. Hair growth WNL bilateral.  DERMATOLOGICAL: No skin rashes, subcutaneous nodules, lesions, or ulcers observed bilateral. Nails 1,2,3,4,5 bilateral thickened, and discolored with subungual debris. Webspaces 1,2,3,4 bilateral clean, dry and without evidence of break in skin integrity.  NEUROLOGICAL: Light touch, sharp-dull, proprioception all present and equal bilaterally.     MUSCULOSKELETAL: Muscle strength is 5/5 for foot inverters, everters, plantarflexors, and dorsiflexors. Muscle tone is normal. Negative pain on palpation of nails bilateral.            Assessment:       Encounter Diagnosis   Name Primary?    Disease of nail Yes         Plan:   Disease of nail  -     Tissue Specimen To Pathology, Podiatry      I counseled the patient on her conditions, regarding findings of my examination, my impressions, and usual treatment plan.   Patient instructed to spray all shoes with Lysol disinfectant spray and let dry before wearing. Patient instructed to wash all socks in hot water and bleach.  Discuss treatment options for nail fungus.  I explained that fungus lives in a warm dark moist environment and therefore patient should make every attempt to keep feet clean and dry.  We discussed drying feet thoroughly after shower particularly between the  toes and then applying powder between the toes and in the shoes.    For fungal toenails I prescribed topical medication to be used daily for up to a year.  We also discussed oral Lamisil but I did not recommend it as a first line of treatment since it is an internal medicine that may potentially have side effects, including liver problems. Patient elects for topical treatment.   With patient's permission, nail clippings obtained for fungal nail culture.   Patient to return pending nail culture results.                                 Halima Salas DPM  [facility]

## 2018-06-20 ENCOUNTER — PATIENT MESSAGE (OUTPATIENT)
Dept: PODIATRY | Facility: CLINIC | Age: 68
End: 2018-06-20

## 2018-06-28 ENCOUNTER — TELEPHONE (OUTPATIENT)
Dept: GASTROENTEROLOGY | Facility: CLINIC | Age: 68
End: 2018-06-28

## 2018-06-28 NOTE — TELEPHONE ENCOUNTER
----- Message from Enriqueta Fernandes sent at 6/28/2018  1:25 PM CDT -----  Contact: self/[phone]  Would like to consult with nurse regarding root cannel, patient would like to know if she could take Humira, please call back at  [phone]. Thanks/ar

## 2018-06-28 NOTE — TELEPHONE ENCOUNTER
Returned call to pt who stated that her dentist has ordered a root canal. Pt would like to make sure it is safe for her to have this done while she is on Humira. Pt is requesting a call back from Claudia Tang or a message through her [employer].

## 2018-06-29 NOTE — TELEPHONE ENCOUNTER
Informed pt, per Claudia Tang, there are no issues with dental work and Humira. Advised pt to made her dentist aware of all medications she is taking. Pt verbalized understanding.

## 2018-07-03 ENCOUNTER — PATIENT MESSAGE (OUTPATIENT)
Dept: PODIATRY | Facility: CLINIC | Age: 68
End: 2018-07-03

## 2018-07-05 ENCOUNTER — PATIENT MESSAGE (OUTPATIENT)
Dept: GASTROENTEROLOGY | Facility: CLINIC | Age: 68
End: 2018-07-05

## 2018-07-08 ENCOUNTER — PATIENT MESSAGE (OUTPATIENT)
Dept: CARDIOLOGY | Facility: CLINIC | Age: 68
End: 2018-07-08

## 2018-07-12 ENCOUNTER — OFFICE VISIT (OUTPATIENT)
Dept: OPHTHALMOLOGY | Facility: CLINIC | Age: 68
End: 2018-07-12
Payer: MEDICARE

## 2018-07-12 ENCOUNTER — PATIENT MESSAGE (OUTPATIENT)
Dept: GASTROENTEROLOGY | Facility: CLINIC | Age: 68
End: 2018-07-12

## 2018-07-12 ENCOUNTER — OFFICE VISIT (OUTPATIENT)
Dept: GASTROENTEROLOGY | Facility: CLINIC | Age: 68
End: 2018-07-12
Payer: MEDICARE

## 2018-07-12 ENCOUNTER — PATIENT MESSAGE (OUTPATIENT)
Dept: CARDIOLOGY | Facility: CLINIC | Age: 68
End: 2018-07-12

## 2018-07-12 VITALS
WEIGHT: 105.81 LBS | HEIGHT: 67 IN | HEART RATE: 80 BPM | BODY MASS INDEX: 16.61 KG/M2 | DIASTOLIC BLOOD PRESSURE: 70 MMHG | SYSTOLIC BLOOD PRESSURE: 126 MMHG

## 2018-07-12 DIAGNOSIS — H25.013 CATARACT CORTICAL, SENILE, BILATERAL: ICD-10-CM

## 2018-07-12 DIAGNOSIS — Z13.5 GLAUCOMA SCREENING: ICD-10-CM

## 2018-07-12 DIAGNOSIS — H25.13 CATARACT, NUCLEAR SCLEROTIC SENILE, BILATERAL: Primary | ICD-10-CM

## 2018-07-12 DIAGNOSIS — H52.7 REFRACTIVE ERROR: ICD-10-CM

## 2018-07-12 DIAGNOSIS — K50.00 CROHN'S DISEASE OF SMALL INTESTINE WITHOUT COMPLICATION: Primary | ICD-10-CM

## 2018-07-12 PROBLEM — B19.20 HEPATITIS C: Status: RESOLVED | Noted: 2017-10-03 | Resolved: 2018-07-12

## 2018-07-12 PROCEDURE — 99214 OFFICE O/P EST MOD 30 MIN: CPT | Mod: S$GLB,,, | Performed by: NURSE PRACTITIONER

## 2018-07-12 PROCEDURE — 99999 PR PBB SHADOW E&M-EST. PATIENT-LVL I: CPT | Mod: PBBFAC,,, | Performed by: OPTOMETRIST

## 2018-07-12 PROCEDURE — 92015 DETERMINE REFRACTIVE STATE: CPT | Mod: S$GLB,,, | Performed by: OPTOMETRIST

## 2018-07-12 PROCEDURE — 99999 PR PBB SHADOW E&M-EST. PATIENT-LVL III: CPT | Mod: PBBFAC,,, | Performed by: NURSE PRACTITIONER

## 2018-07-12 PROCEDURE — 3078F DIAST BP <80 MM HG: CPT | Mod: CPTII,S$GLB,, | Performed by: NURSE PRACTITIONER

## 2018-07-12 PROCEDURE — 3074F SYST BP LT 130 MM HG: CPT | Mod: CPTII,S$GLB,, | Performed by: NURSE PRACTITIONER

## 2018-07-12 PROCEDURE — 92014 COMPRE OPH EXAM EST PT 1/>: CPT | Mod: S$GLB,,, | Performed by: OPTOMETRIST

## 2018-07-12 NOTE — PROGRESS NOTES
HPI     Last TRF visit 05/22/2017 for chalazion only, not a full exam.  Screening for glaucoma  RE  Decreased distance and near vision    Last edited by STEVE Tolbert, OD on 7/12/2018  2:28 PM. (History)            Assessment /Plan     For exam results, see Encounter Report.    Cataract, nuclear sclerotic senile, bilateral    Cataract cortical, senile, bilateral    Glaucoma screening    Refractive error      Mild NS/cortical cataracts OU = discussed and will follow.  OH OK OU otherwise.  Spec Rx given.  RTC one year or prn.

## 2018-07-12 NOTE — PROGRESS NOTES
Clinic Follow Up:  [facility] Follow Up Note    Reason for Follow Up:  The encounter diagnosis was Crohn's disease of small intestine without complication.    PCP: Christelle Lawler       HPI:  This is a 68 y.o. female here for follow up of the above  Pt states that since her last visit, she has been feeling overall well without any new complaints.   Denies any abdominal pain.  No nausea or vomiting.  No change in bowel pattern.  No melena or hematochezia. No weight loss.  Continues with Humira every 2 weeks without issues.    Last colonoscopy 3/18, without any inflammation.     Review of Systems   Constitutional: Negative for chills, fever, malaise/fatigue and weight loss.   Respiratory: Negative for cough.    Cardiovascular: Negative for chest pain.   Gastrointestinal:        Per HPI   Musculoskeletal: Negative for myalgias.   Skin: Negative for itching and rash.   Neurological: Negative for headaches.   Psychiatric/Behavioral: The patient is not nervous/anxious.        Medical History:  Past Medical History:   Diagnosis Date    Anxiety     Crohn's disease     Depression     Genital herpes     Hepatitis C infection     Hypertension     Insomnia     Mesenteric artery stenosis     Dr. Checo Reed--vascular surgery    Osteoporosis     TIA (transient ischemic attack)        Surgical History:   Past Surgical History:   Procedure Laterality Date    APPENDECTOMY      COLONOSCOPY N/A 5/19/2017    Procedure: COLONOSCOPY;  Surgeon: Jose Luis Leonard MD;  [facility];  Service: Endoscopy;  Laterality: N/A;    COLONOSCOPY N/A 3/26/2018    Procedure: COLONOSCOPY;  Surgeon: Guillaume Whitman MD;  Location: [facility];  Service: Endoscopy;  Laterality: N/A;    SMALL INTESTINE SURGERY         Family History:   Family History   Problem Relation Age of Onset    Stroke Mother     Heart disease Mother     Cancer Father         Lung    Heart disease Sister     Glaucoma Neg Hx      "Macular degeneration Neg Hx     Thyroid disease Neg Hx        Social History:   Social History   Substance Use Topics    Smoking status: Former Smoker     Years: 20.00     Types: Cigarettes     Quit date: 1/1/2005    Smokeless tobacco: Former User      Comment: Former Light Smoker less than 10 a day    Alcohol use No       Allergies: Reviewed    Home Medications:  Current Outpatient Prescriptions on File Prior to Visit   Medication Sig Dispense Refill    adalimumab (HUMIRA) 40 mg/0.8 mL SyKt injection Inject 0.8 mLs (40 mg total) into the skin every 14 (fourteen) days. 2 vial 11    amLODIPine (NORVASC) 10 MG tablet Take 1 tablet (10 mg total) by mouth once daily. 90 tablet 3    aspirin (ECOTRIN) 81 MG EC tablet Take 1 tablet (81 mg total) by mouth once daily.      calcium carbonate (OS-COOPER) 600 mg (1,500 mg) Tab Take 600 mg by mouth 2 (two) times daily with meals.      cyanocobalamin, vitamin B-12, 2,500 mcg Lozg Place 1 tablet under the tongue.       levocetirizine (XYZAL) 5 MG tablet Take 1 tablet (5 mg total) by mouth every evening. 30 tablet 11    LORazepam (ATIVAN) 1 MG tablet Take 1 tablet (1 mg total) by mouth every evening. 30 tablet 5    melatonin 3 mg Tab Take 1 tablet by mouth nightly as needed.      metronidazole 0.75% (METROCREAM) 0.75 % Crea APPLY ONE APPLICATION TO THE FACE TWICE DAILY  2    multivitamin with minerals tablet Take 1 tablet by mouth once daily at 6am.      rosuvastatin (CRESTOR) 10 MG tablet Take 1 tablet (10 mg total) by mouth once daily. 90 tablet 3    valACYclovir (VALTREX) 500 MG tablet take 1 tablet by mouth as directed and take 1 tablet by mouth twice a day for 3 days  0    vitamin D 1000 units Tab Take 185 mg by mouth once daily.       No current facility-administered medications on file prior to visit.        Physical Exam:  Vital Signs:  /70   Pulse 80   Ht 5' 7" (1.702 m)   Wt 48 kg (105 lb 13.1 oz)   BMI 16.57 kg/m²   Body mass index is 16.57 " kg/m².  Physical Exam   Constitutional: She is oriented to person, place, and time. She appears well-developed and well-nourished.   HENT:   Head: Normocephalic.   Eyes: No scleral icterus.   Neck: Normal range of motion.   Cardiovascular: Normal rate and regular rhythm.    Pulmonary/Chest: Effort normal and breath sounds normal.   Abdominal: Soft. Bowel sounds are normal. She exhibits no distension. There is no tenderness.   Musculoskeletal: Normal range of motion.   Neurological: She is alert and oriented to person, place, and time.   Skin: Skin is warm and dry.   Psychiatric: She has a normal mood and affect.   Vitals reviewed.      Labs: Pertinent labs reviewed.    Assessment:  1. Crohn's disease of small intestine without complication        Recommendations:  Crohn's disease of small intestine without complication  - stable  - Continue Humira every 2 weeks.             Return to Clinic:  6 months

## 2018-07-18 ENCOUNTER — OFFICE VISIT (OUTPATIENT)
Dept: INTERNAL MEDICINE | Facility: CLINIC | Age: 68
End: 2018-07-18
Payer: MEDICARE

## 2018-07-18 VITALS
TEMPERATURE: 98 F | HEART RATE: 86 BPM | HEIGHT: 67 IN | WEIGHT: 106.69 LBS | SYSTOLIC BLOOD PRESSURE: 136 MMHG | BODY MASS INDEX: 16.74 KG/M2 | DIASTOLIC BLOOD PRESSURE: 88 MMHG | OXYGEN SATURATION: 97 %

## 2018-07-18 DIAGNOSIS — M79.605 LOW BACK PAIN RADIATING TO LEFT LOWER EXTREMITY: Primary | ICD-10-CM

## 2018-07-18 DIAGNOSIS — M54.50 LOW BACK PAIN RADIATING TO LEFT LOWER EXTREMITY: Primary | ICD-10-CM

## 2018-07-18 DIAGNOSIS — M62.838 MUSCLE SPASM OF LEFT LOWER EXTREMITY: ICD-10-CM

## 2018-07-18 PROCEDURE — 3079F DIAST BP 80-89 MM HG: CPT | Mod: CPTII,S$GLB,, | Performed by: INTERNAL MEDICINE

## 2018-07-18 PROCEDURE — 99213 OFFICE O/P EST LOW 20 MIN: CPT | Mod: S$GLB,,, | Performed by: INTERNAL MEDICINE

## 2018-07-18 PROCEDURE — 99999 PR PBB SHADOW E&M-EST. PATIENT-LVL III: CPT | Mod: PBBFAC,,, | Performed by: INTERNAL MEDICINE

## 2018-07-18 PROCEDURE — 3075F SYST BP GE 130 - 139MM HG: CPT | Mod: CPTII,S$GLB,, | Performed by: INTERNAL MEDICINE

## 2018-07-18 RX ORDER — TIZANIDINE 4 MG/1
4 TABLET ORAL EVERY 8 HOURS PRN
Qty: 30 TABLET | Refills: 0 | Status: SHIPPED | OUTPATIENT
Start: 2018-07-18 | End: 2018-07-28

## 2018-07-18 NOTE — PATIENT INSTRUCTIONS
Self-Care for Low Back Pain    Most people have low back pain now and then. In many cases, it isnt serious and self-care can help. Sometimes low back pain can be a sign of a bigger problem. Call your healthcare provider if your pain returns often or gets worse over time. For the long-term care of your back, get regular exercise, lose any excess weight and learn good posture.  Take a short rest  Lying down during the day may be beneficial for short periods of time if severe pain increases with sitting or standing. Long-term bed rest could be detrimental.  Reduce pain and swelling  Cold reduces swelling. Both cold and heat can reduce pain. Protect your skin by placing a towel between your body and the ice or heat source.  · For the first few days, apply an ice pack for 15 to 20 minutes .  · After the first few days, try heat for 15 minutes at a time to ease pain. Never sleep on a heating pad.  · Over-the-counter medicine can help control pain and swelling. Try aspirin or ibuprofen.  Exercise  Exercise can help your back heal. It also helps your back get stronger and more flexible, preventing any reinjury. Ask your healthcare provider about specific exercises for your back.  Use good posture to avoid reinjury  · When moving, bend at the hips and knees. Dont bend at the waist or twist around.  · When lifting, keep the object close to your body. Dont try to lift more than you can handle.  · When sitting, keep your lower back supported. Use a rolled-up towel as needed.  Seek immediate medical care if:  · Youre unable to stand or walk.  · You have a temperature over 100.4°F (38.0°C)  · You have frequent, painful, or bloody urination.  · You have severe abdominal pain.  · You have a sharp, stabbing pain.  · Your pain is constant.  · You have pain or numbness in your leg.  · You feel pain in a new area of your back.  · You notice that the pain isnt decreasing after more than a week.   Date Last Reviewed: 9/29/2015  ©  6617-6010 [employer]. [address] [place] [address], [place] [address]. All rights reserved. This information is not intended as a substitute for professional medical care. Always follow your healthcare professional's instructions.        Back Pain (Acute or Chronic)    Back pain is one of the most common problems. The good news is that most people feel better in 1 to 2 weeks, and most of the rest in 1 to 2 months. Most people can remain active.  People experience and describe pain differently; not everyone is the same.  · The pain can be sharp, stabbing, shooting, aching, cramping or burning.  · Movement, standing, bending, lifting, sitting, or walking may worsen pain.  · It can be localized to one spot or area, or it can be more generalized.  · It can spread or radiate upwards, to the front, or go down your arms or legs (sciatica).  · It can cause muscle spasm.  Most of the time, mechanical problems with the muscles or spine cause the pain. Mechanical problems are usually caused by an injury to the muscles or ligaments. While illness can cause back pain, it is usually not caused by a serious illness. Mechanical problems include:   · Physical activity such as sports, exercise, work, or normal activity  · Overexertion, lifting, pushing, pulling incorrectly or too aggressively  · Sudden twisting, bending, or stretching from an accident, or accidental movement  · Poor posture  · Stretching or moving wrong, without noticing pain at the time  · Poor coordination, lack of regular exercise (check with your doctor about this)  · Spinal disc disease or arthritis  · Stress  Pain can also be related to pregnancy, or illness like appendicitis, bladder or kidney infections, pelvic infections, and many other things.  Acute back pain usually gets better in 1 to 2 weeks. Back pain related to disk disease, arthritis in the spinal joints or spinal stenosis (narrowing of the spinal canal) can become chronic and last for  months or years.  Unless you had a physical injury (for example, a car accident or fall) X-rays are usually not needed for the initial evaluation of back pain. If pain continues and does not respond to medical treatment, X-rays and other tests may be needed.  Home care  Try these home care recommendations:  · When in bed, try to find a position of comfort. A firm mattress is best. Try lying flat on your back with pillows under your knees. You can also try lying on your side with your knees bent up towards your chest and a pillow between your knees.  · At first, do not try to stretch out the sore spots. If there is a strain, it is not like the good soreness you get after exercising without an injury. In this case, stretching may make it worse.  · Avoid prolong sitting, long car rides, or travel. This puts more stress on the lower back than standing or walking.  · During the first 24 to 72 hours after an acute injury or flare up of chronic back pain, apply an ice pack to the painful area for 20 minutes and then remove it for 20 minutes. Do this over a period of 60 to 90 minutes or several times a day. This will reduce swelling and pain. Wrap the ice pack in a thin towel or plastic to protect your skin.  · You can start with ice, then switch to heat. Heat (hot shower, hot bath, or heating pad) reduces pain and works well for muscle spasms. Heat can be applied to the painful area for 20 minutes then remove it for 20 minutes. Do this over a period of 60 to 90 minutes or several times a day. Do not sleep on a heating pad. It can lead to skin burns or tissue damage.  · You can alternate ice and heat therapy. Talk with your doctor about the best treatment for your back pain.  · Therapeutic massage can help relax the back muscles without stretching them.  · Be aware of safe lifting methods and do not lift anything without stretching first.  Medicines  Talk to your doctor before using medicine, especially if you have other  medical problems or are taking other medicines.  · You may use over-the-counter medicine as directed on the bottle to control pain, unless another pain medicine was prescribed. If you have chronic conditions like diabetes, liver or kidney disease, stomach ulcers, or gastrointestinal bleeding, or are taking blood thinners, talk to your doctor before taking any medicine.  · Be careful if you are given a prescription medicines, narcotics, or medicine for muscle spasms. They can cause drowsiness, affect your coordination, reflexes, and judgement. Do not drive or operate heavy machinery.  Follow-up care  Follow up with your healthcare provider, or as advised.   A radiologist will review any X-rays that were taken. Your provide will notify you of any new findings that may affect your care.  Call 911  Call emergency services if any of the following occur:  · Trouble breathing  · Confusion  · Very drowsy or trouble awakening  · Fainting or loss of consciousness  · Rapid or very slow heart rate  · Loss of bowel or bladder control  When to seek medical advice  Call your healthcare provider right away if any of these occur:   · Pain becomes worse or spreads to your legs  · Weakness or numbness in one or both legs  · Numbness in the groin or genital area  Date Last Reviewed: 7/1/2016  © 3876-1243 [employer]. [address] [place] [address], [place] [address]. All rights reserved. This information is not intended as a substitute for professional medical care. Always follow your healthcare professional's instructions.

## 2018-07-18 NOTE — PROGRESS NOTES
Manuela Reyes  68 y.o. White female     Chief Complaint   Patient presents with    Back Pain       HPI: Presents to the clinic with complaint of left lower back pain x 2 weeks. The pain radiates to her left groin and leg. She is having muscle spasms. She admits to heavy lifting prior to onset. She denies trauma. She denies numbness, tingling or weakness in her legs. She has had this in the past and was told she had inflammation of her SI joint. She has been taking OTC ibuprofen and it has helped.     PMH: Reviewed    Medications: Reviewed and updated    Allergies: Reviewed and updated    PHYSICAL EXAM:   VITAL SIGNS: Reviewed nurse's note  GENERAL: Well developed, well nourished  MUSCULOSKELETAL: No spinal or paraspinal erythema, tenderness or palpable deformities. Left buttock tenderness over the SI joint. Bilateral lower extremity strength and sensation intact.    ASSESSMENT/PLAN:  Manuela was seen today for back pain.    Diagnoses and all orders for this visit:    Low back pain radiating to left lower extremity  -     tiZANidine (ZANAFLEX) 4 MG tablet; Take 1 tablet (4 mg total) by mouth every 8 (eight) hours as needed.    Muscle spasm of left lower extremity  -     tiZANidine (ZANAFLEX) 4 MG tablet; Take 1 tablet (4 mg total) by mouth every 8 (eight) hours as needed.        Recommend heat application.     Handout provided.    RTC: If symptoms worsen or fail to resolve over the next 10-14 days

## 2018-07-20 DIAGNOSIS — F41.9 ANXIETY: ICD-10-CM

## 2018-07-20 RX ORDER — LORAZEPAM 1 MG/1
1 TABLET ORAL NIGHTLY
Qty: 30 TABLET | Refills: 5 | Status: SHIPPED | OUTPATIENT
Start: 2018-07-20 | End: 2018-07-20 | Stop reason: SDUPTHER

## 2018-07-20 NOTE — TELEPHONE ENCOUNTER
----- Message from Dori Ambrocio sent at 7/20/2018  1:03 PM CDT -----  Contact: self [phone]  Would like to consult with nurse regarding Lorazepam medication; pharmacy unable to fill prescription, only has. 5 milligram in stock, would like to have prescription changed.  Please call back at [phone].  Md Ana Paula        .  [facility] - [place] - [address] AT [facility]  [address]  [place] [address]  Phone: [phone] Fax: [phone]

## 2018-07-20 NOTE — TELEPHONE ENCOUNTER
[facility] is requesting a refill on Lorazepam 1 mg tab qty 30 - take 1 tablet by mouth every evening.

## 2018-07-20 NOTE — TELEPHONE ENCOUNTER
----- Message from Jeanna Matthews sent at 7/20/2018  4:00 PM CDT -----  Contact: catherine rg/ yaneth  needs ative change to 1.5 [phone]

## 2018-07-20 NOTE — TELEPHONE ENCOUNTER
Ms. Ivy with Dr. Lawler okayed the change with Jessika after asking Dr. Lawler. Will take   lorazepam .5mg take 2 tablets at night.

## 2018-07-24 RX ORDER — LORAZEPAM 1 MG/1
1 TABLET ORAL NIGHTLY
Qty: 30 TABLET | Refills: 5 | Status: SHIPPED | OUTPATIENT
Start: 2018-07-24 | End: 2018-08-17 | Stop reason: SDUPTHER

## 2018-08-06 ENCOUNTER — PATIENT MESSAGE (OUTPATIENT)
Dept: INTERNAL MEDICINE | Facility: CLINIC | Age: 68
End: 2018-08-06

## 2018-08-06 NOTE — TELEPHONE ENCOUNTER
I spoke with patient, patient stated she was calling to let Dr. Lawler know on her bottle of lorazepam states there are no more refills

## 2018-08-07 NOTE — TELEPHONE ENCOUNTER
Prescription was sent to patient's pharmacy on 7/24/18 by Geronimo Man. Patient needs to check with her pharmacy.

## 2018-08-15 ENCOUNTER — TELEPHONE (OUTPATIENT)
Dept: GASTROENTEROLOGY | Facility: CLINIC | Age: 68
End: 2018-08-15

## 2018-08-15 NOTE — TELEPHONE ENCOUNTER
----- Message from Sergio Orosco sent at 8/15/2018  2:49 PM CDT -----  Contact: Pt   States the humira misfired and majority of the medicine didn't get into her and wants to be advised on what to do and can be reached at [phone]//thanks/dbw

## 2018-08-16 NOTE — TELEPHONE ENCOUNTER
Returned pt's call yesterday afternoon - she states that her Humira pen misfired when she attempted to inject herself and she felt the medication dripping down her leg.  She is questioning whether she should use another pen to re-inject or wait until time for her next injection.  I called and spoke to Alejandro at the [facility] and the recommendation is that she should re-inject to get the full dose of Humira.  She will call her [employer] rep to replace the pen that misfired.

## 2018-08-17 ENCOUNTER — TELEPHONE (OUTPATIENT)
Dept: INTERNAL MEDICINE | Facility: CLINIC | Age: 68
End: 2018-08-17

## 2018-08-17 ENCOUNTER — PATIENT MESSAGE (OUTPATIENT)
Dept: INTERNAL MEDICINE | Facility: CLINIC | Age: 68
End: 2018-08-17

## 2018-08-17 DIAGNOSIS — F41.9 ANXIETY: ICD-10-CM

## 2018-08-17 RX ORDER — LORAZEPAM 0.5 MG/1
1 TABLET ORAL NIGHTLY
Qty: 60 TABLET | Refills: 2 | Status: SHIPPED | OUTPATIENT
Start: 2018-08-17 | End: 2018-11-26 | Stop reason: SDUPTHER

## 2018-08-17 NOTE — TELEPHONE ENCOUNTER
Notified patient through the patient portal that Mr. Man resent the prescription to the pharmacy for her. Notify me if there are any issues getting it.

## 2018-08-17 NOTE — TELEPHONE ENCOUNTER
----- Message from Amaris Hall sent at 8/17/2018  2:03 PM CDT -----  Contact: Pt  Please give pt a call at .[phone] (home) she is requesting a refill on her LORAZEPAM.        ..  [id number] - [place] - [address] AT [facility]  [address]  [place] [address]  Phone: [phone] Fax: [phone]

## 2018-08-17 NOTE — TELEPHONE ENCOUNTER
Spoke with Rubén from Pharm she states a 30 day rx was called in by Zahraa on 07/20. The rx written 07/24/18 was not received by pharm. Pharm wants to verify that it is ok to fill rx dated 07/24 being that she picked up 30  On 07/20. Please review.     refilled

## 2018-08-21 ENCOUNTER — TELEPHONE (OUTPATIENT)
Dept: CARDIOLOGY | Facility: CLINIC | Age: 68
End: 2018-08-21

## 2018-08-21 ENCOUNTER — LAB VISIT (OUTPATIENT)
Dept: LAB | Facility: HOSPITAL | Age: 68
End: 2018-08-21
Attending: INTERNAL MEDICINE
Payer: MEDICARE

## 2018-08-21 DIAGNOSIS — E78.2 MIXED HYPERLIPIDEMIA: ICD-10-CM

## 2018-08-21 LAB
ALBUMIN SERPL BCP-MCNC: 4.2 G/DL
ALP SERPL-CCNC: 75 U/L
ALT SERPL W/O P-5'-P-CCNC: 12 U/L
ANION GAP SERPL CALC-SCNC: 9 MMOL/L
AST SERPL-CCNC: 19 U/L
BILIRUB SERPL-MCNC: 0.6 MG/DL
BUN SERPL-MCNC: 17 MG/DL
CALCIUM SERPL-MCNC: 9.9 MG/DL
CHLORIDE SERPL-SCNC: 100 MMOL/L
CHOLEST SERPL-MCNC: 139 MG/DL
CHOLEST/HDLC SERPL: 1.8 {RATIO}
CO2 SERPL-SCNC: 27 MMOL/L
CREAT SERPL-MCNC: 0.9 MG/DL
EST. GFR  (AFRICAN AMERICAN): >60 ML/MIN/1.73 M^2
EST. GFR  (NON AFRICAN AMERICAN): >60 ML/MIN/1.73 M^2
GLUCOSE SERPL-MCNC: 101 MG/DL
HDLC SERPL-MCNC: 77 MG/DL
HDLC SERPL: 55.4 %
LDLC SERPL CALC-MCNC: 49.6 MG/DL
NONHDLC SERPL-MCNC: 62 MG/DL
POTASSIUM SERPL-SCNC: 4.4 MMOL/L
PROT SERPL-MCNC: 7.5 G/DL
SODIUM SERPL-SCNC: 136 MMOL/L
TRIGL SERPL-MCNC: 62 MG/DL

## 2018-08-21 PROCEDURE — 36415 COLL VENOUS BLD VENIPUNCTURE: CPT

## 2018-08-21 PROCEDURE — 80053 COMPREHEN METABOLIC PANEL: CPT

## 2018-08-21 PROCEDURE — 80061 LIPID PANEL: CPT

## 2018-08-21 NOTE — TELEPHONE ENCOUNTER
Please call pt  Cholesterol much improved, down to 139 from 203.  Continue current dose of Rosuvastatin.    Dr White

## 2018-08-22 ENCOUNTER — PATIENT MESSAGE (OUTPATIENT)
Dept: CARDIOLOGY | Facility: CLINIC | Age: 68
End: 2018-08-22

## 2018-08-22 NOTE — TELEPHONE ENCOUNTER
The patient has been notified of this information and all questions answered. Voiced understand.

## 2018-08-23 ENCOUNTER — TELEPHONE (OUTPATIENT)
Dept: GASTROENTEROLOGY | Facility: CLINIC | Age: 68
End: 2018-08-23

## 2018-08-23 NOTE — TELEPHONE ENCOUNTER
Spoke with April with pharmacy. Patient had misfired Humira pen, gave verbal order for replacement pen, HILARY.

## 2018-08-23 NOTE — TELEPHONE ENCOUNTER
----- Message from Arabella Duncan sent at 8/23/2018  2:51 PM CDT -----  Contact: pharmacy  Caller is requesting a call back from the nurse in regards to getting a verbal for pt HUMIRA pen because pt had a misfire and need a replacement.  [phone]

## 2018-08-24 ENCOUNTER — PATIENT MESSAGE (OUTPATIENT)
Dept: GASTROENTEROLOGY | Facility: CLINIC | Age: 68
End: 2018-08-24

## 2018-08-28 ENCOUNTER — LAB VISIT (OUTPATIENT)
Dept: LAB | Facility: HOSPITAL | Age: 68
End: 2018-08-28
Attending: NURSE PRACTITIONER
Payer: MEDICARE

## 2018-08-28 ENCOUNTER — OFFICE VISIT (OUTPATIENT)
Dept: GASTROENTEROLOGY | Facility: CLINIC | Age: 68
End: 2018-08-28
Payer: MEDICARE

## 2018-08-28 VITALS
HEIGHT: 67 IN | HEART RATE: 70 BPM | SYSTOLIC BLOOD PRESSURE: 140 MMHG | WEIGHT: 104.06 LBS | DIASTOLIC BLOOD PRESSURE: 74 MMHG | BODY MASS INDEX: 16.33 KG/M2

## 2018-08-28 DIAGNOSIS — K50.80 CROHN'S DISEASE OF BOTH SMALL AND LARGE INTESTINE WITHOUT COMPLICATION: Primary | ICD-10-CM

## 2018-08-28 DIAGNOSIS — K50.80 CROHN'S DISEASE OF BOTH SMALL AND LARGE INTESTINE WITHOUT COMPLICATION: ICD-10-CM

## 2018-08-28 DIAGNOSIS — R10.13 EPIGASTRIC PAIN: ICD-10-CM

## 2018-08-28 DIAGNOSIS — K59.00 CONSTIPATION, UNSPECIFIED CONSTIPATION TYPE: ICD-10-CM

## 2018-08-28 DIAGNOSIS — K56.699 STRICTURE OF COLON: ICD-10-CM

## 2018-08-28 LAB
ALBUMIN SERPL BCP-MCNC: 4.5 G/DL
ALP SERPL-CCNC: 81 U/L
ALT SERPL W/O P-5'-P-CCNC: 10 U/L
ANION GAP SERPL CALC-SCNC: 11 MMOL/L
AST SERPL-CCNC: 19 U/L
BASOPHILS # BLD AUTO: 0.01 K/UL
BASOPHILS NFR BLD: 0.1 %
BILIRUB SERPL-MCNC: 0.7 MG/DL
BUN SERPL-MCNC: 14 MG/DL
CALCIUM SERPL-MCNC: 10.3 MG/DL
CHLORIDE SERPL-SCNC: 99 MMOL/L
CO2 SERPL-SCNC: 25 MMOL/L
CREAT SERPL-MCNC: 0.8 MG/DL
CRP SERPL-MCNC: 0.1 MG/L
DIFFERENTIAL METHOD: ABNORMAL
EOSINOPHIL # BLD AUTO: 0 K/UL
EOSINOPHIL NFR BLD: 0.1 %
ERYTHROCYTE [DISTWIDTH] IN BLOOD BY AUTOMATED COUNT: 11.7 %
ERYTHROCYTE [SEDIMENTATION RATE] IN BLOOD BY WESTERGREN METHOD: 7 MM/HR
EST. GFR  (AFRICAN AMERICAN): >60 ML/MIN/1.73 M^2
EST. GFR  (NON AFRICAN AMERICAN): >60 ML/MIN/1.73 M^2
GLUCOSE SERPL-MCNC: 101 MG/DL
HCT VFR BLD AUTO: 39.2 %
HGB BLD-MCNC: 13.8 G/DL
IMM GRANULOCYTES # BLD AUTO: 0.01 K/UL
IMM GRANULOCYTES NFR BLD AUTO: 0.1 %
LYMPHOCYTES # BLD AUTO: 1.9 K/UL
LYMPHOCYTES NFR BLD: 28.6 %
MCH RBC QN AUTO: 34.6 PG
MCHC RBC AUTO-ENTMCNC: 35.2 G/DL
MCV RBC AUTO: 98 FL
MONOCYTES # BLD AUTO: 0.6 K/UL
MONOCYTES NFR BLD: 8.3 %
NEUTROPHILS # BLD AUTO: 4.2 K/UL
NEUTROPHILS NFR BLD: 62.8 %
NRBC BLD-RTO: 0 /100 WBC
PLATELET # BLD AUTO: 228 K/UL
PMV BLD AUTO: 10.3 FL
POTASSIUM SERPL-SCNC: 3.9 MMOL/L
PROT SERPL-MCNC: 7.9 G/DL
RBC # BLD AUTO: 3.99 M/UL
SODIUM SERPL-SCNC: 135 MMOL/L
WBC # BLD AUTO: 6.75 K/UL

## 2018-08-28 PROCEDURE — 80299 QUANTITATIVE ASSAY DRUG: CPT

## 2018-08-28 PROCEDURE — 99214 OFFICE O/P EST MOD 30 MIN: CPT | Mod: S$GLB,,, | Performed by: NURSE PRACTITIONER

## 2018-08-28 PROCEDURE — 85025 COMPLETE CBC W/AUTO DIFF WBC: CPT

## 2018-08-28 PROCEDURE — 85651 RBC SED RATE NONAUTOMATED: CPT | Mod: PO

## 2018-08-28 PROCEDURE — 3078F DIAST BP <80 MM HG: CPT | Mod: CPTII,S$GLB,, | Performed by: NURSE PRACTITIONER

## 2018-08-28 PROCEDURE — 3077F SYST BP >= 140 MM HG: CPT | Mod: CPTII,S$GLB,, | Performed by: NURSE PRACTITIONER

## 2018-08-28 PROCEDURE — 80053 COMPREHEN METABOLIC PANEL: CPT

## 2018-08-28 PROCEDURE — 36415 COLL VENOUS BLD VENIPUNCTURE: CPT | Mod: PO

## 2018-08-28 PROCEDURE — 99999 PR PBB SHADOW E&M-EST. PATIENT-LVL III: CPT | Mod: PBBFAC,,, | Performed by: NURSE PRACTITIONER

## 2018-08-28 PROCEDURE — 86140 C-REACTIVE PROTEIN: CPT

## 2018-08-28 RX ORDER — PANTOPRAZOLE SODIUM 40 MG/1
40 TABLET, DELAYED RELEASE ORAL DAILY
Qty: 30 TABLET | Refills: 11 | Status: SHIPPED | OUTPATIENT
Start: 2018-08-28 | End: 2018-12-26

## 2018-08-29 ENCOUNTER — PATIENT MESSAGE (OUTPATIENT)
Dept: GASTROENTEROLOGY | Facility: CLINIC | Age: 68
End: 2018-08-29

## 2018-08-29 ENCOUNTER — NURSE TRIAGE (OUTPATIENT)
Dept: ADMINISTRATIVE | Facility: CLINIC | Age: 68
End: 2018-08-29

## 2018-08-29 NOTE — PROGRESS NOTES
Clinic Follow Up:  [facility] Follow Up Note    Reason for Follow Up:  The primary encounter diagnosis was Crohn's disease of both small and large intestine without complication. Diagnoses of Epigastric pain, Stricture of colon, and Constipation, unspecified constipation type were also pertinent to this visit.    PCP: Christelle Lawler       HPI:  This is a 68 y.o. female here for follow up of of the above. She is a previous patient of Dr. Davis that has been following up with Claudia Tang. She is new to me. She has a history of Crohn's disease that presented with constipation rather than diarrhea. She was started on Humira 1 year ago and reports symptoms well controlled until about 1 month ago when she started with increase in constipation. She reports the feeling of incomplete evacuation. She has been taking Miralax and has helped some. She has known stenosis in the ascending colon at prior anastomosis site that was identified on colonoscopy in March of this year. The scope could not be advanced beyond this area. This was mild and mucosa appeared healthy.     She also is having some epigastric pain associated with belching. No melena or hematochezia. No recent NSAID use.     Review of Systems   Constitutional: Negative for activity change and appetite change.        As per interval history above   Respiratory: Negative for cough and shortness of breath.    Cardiovascular: Negative for chest pain.   Gastrointestinal: Positive for abdominal pain and constipation. Negative for blood in stool, diarrhea, nausea and vomiting. Abdominal distention: epigastric.   Skin: Negative for color change and rash.       Medical History:  Past Medical History:   Diagnosis Date    Anxiety     Crohn's disease     Depression     Genital herpes     Hepatitis C infection     Hypertension     Insomnia     Mesenteric artery stenosis     Dr. Checo Reed--vascular surgery    Osteoporosis     TIA (transient  ischemic attack)        Surgical History:   Past Surgical History:   Procedure Laterality Date    APPENDECTOMY      SMALL INTESTINE SURGERY         Family History:   Family History   Problem Relation Age of Onset    Stroke Mother     Heart disease Mother     Cancer Father         Lung    Heart disease Sister     Glaucoma Neg Hx     Macular degeneration Neg Hx     Thyroid disease Neg Hx        Social History:   Social History     Tobacco Use    Smoking status: Former Smoker     Years: 20.00     Types: Cigarettes     Last attempt to quit: 2005     Years since quittin.6    Smokeless tobacco: Former User    Tobacco comment: Former Light Smoker less than 10 a day   Substance Use Topics    Alcohol use: No     Alcohol/week: 2.4 oz     Types: 4 Glasses of wine per week    Drug use: No       Allergies:   Review of patient's allergies indicates:   Allergen Reactions    Codeine sulfate Itching    Hydrochlorothiazide Other (See Comments)     Adverse Reaction    Lisinopril Swelling and Edema     Facial Swelling       Home Medications:  Current Outpatient Medications on File Prior to Visit   Medication Sig Dispense Refill    adalimumab (HUMIRA) 40 mg/0.8 mL SyKt injection Inject 0.8 mLs (40 mg total) into the skin every 14 (fourteen) days. 2 vial 11    amLODIPine (NORVASC) 10 MG tablet Take 1 tablet (10 mg total) by mouth once daily. 90 tablet 3    aspirin (ECOTRIN) 81 MG EC tablet Take 1 tablet (81 mg total) by mouth once daily.      calcium carbonate (OS-COOPER) 600 mg (1,500 mg) Tab Take 600 mg by mouth 2 (two) times daily with meals.      cyanocobalamin, vitamin B-12, 2,500 mcg Lozg Place 1 tablet under the tongue.       levocetirizine (XYZAL) 5 MG tablet Take 1 tablet (5 mg total) by mouth every evening. 30 tablet 11    LORazepam (ATIVAN) 1 MG tablet Take 1 tablet (1 mg total) by mouth every evening. 30 tablet 5    melatonin 3 mg Tab Take 1 tablet by mouth nightly as needed.      multivitamin  "with minerals tablet Take 1 tablet by mouth once daily at 6am.      rosuvastatin (CRESTOR) 10 MG tablet Take 1 tablet (10 mg total) by mouth once daily. 90 tablet 3    valACYclovir (VALTREX) 500 MG tablet take 1 tablet by mouth as directed and take 1 tablet by mouth twice a day for 3 days  0    vitamin D 1000 units Tab Take 185 mg by mouth once daily.      metronidazole 0.75% (METROCREAM) 0.75 % Crea APPLY ONE APPLICATION TO THE FACE TWICE DAILY  2     No current facility-administered medications on file prior to visit.        BP (!) 140/74   Pulse 70   Ht 5' 7" (1.702 m)   Wt 47.2 kg (104 lb 0.9 oz)   BMI 16.30 kg/m²   Body mass index is 16.3 kg/m².  Physical Exam   Constitutional: She is oriented to person, place, and time and well-developed, well-nourished, and in no distress. No distress.   HENT:   Head: Normocephalic.   Eyes: Conjunctivae are normal. Pupils are equal, round, and reactive to light.   Cardiovascular: Normal rate, regular rhythm and normal heart sounds.   Pulmonary/Chest: Effort normal and breath sounds normal. No respiratory distress.   Abdominal: Soft. Bowel sounds are normal. She exhibits no distension. There is no tenderness.   Neurological: She is alert and oriented to person, place, and time. No cranial nerve deficit.   Skin: Skin is warm and dry. No rash noted.   Psychiatric: Mood and affect normal.       Labs: Pertinent labs reviewed.  CRC Screening: see HPI    Assessment:  1. Crohn's disease of both small and large intestine without complication    2. Epigastric pain    3. Stricture of colon    4. Constipation, unspecified constipation type        Recommendations:  Crohn's disease of both small and large intestine without complication  - was doing well until about 4 weeks ago.   - unclear etiology of constipation. Crohn's flare vs worsening stenosis  - get CT scan for further evaluation. Pending report, may need repeat colonoscopy  - check labs today including Humira levels. She " is due for next injection tomorrow.   -     CT Abdomen Pelvis With Contrast; Future; Expected date: 08/28/2018  -     CBC auto differential; Future; Expected date: 08/28/2018  -     Comprehensive metabolic panel; Future; Expected date: 08/28/2018  -     ESR (SEDIMENTATION RATE, MANUAL); Future; Expected date: 08/28/2018  -     C-reactive protein; Future; Expected date: 08/28/2018  -     ADALIMUMAB CONCENTRATION AND ANTI-ADALIMUMAB ANTIBODY (SERIA); Future; Expected date: 08/28/2018    Epigastric pain  - unclear but possible gastritis.  - start on PPI. May need EGD if symptoms continue   -     CT Abdomen Pelvis With Contrast; Future; Expected date: 08/28/2018  -     pantoprazole (PROTONIX) 40 MG tablet; Take 1 tablet (40 mg total) by mouth once daily.  Dispense: 30 tablet; Refill: 11    Stricture of colon  - plan as above  -     CT Abdomen Pelvis With Contrast; Future; Expected date: 08/28/2018    Constipation, unspecified constipation type  - plan as above.   -     CT Abdomen Pelvis With Contrast; Future; Expected date: 08/28/2018    Return to Clinic:  Follow up to be determined after results.    Thank you for the opportunity to participate in the care of this patient.  DINA Ashraf

## 2018-08-30 ENCOUNTER — HOSPITAL ENCOUNTER (OUTPATIENT)
Dept: RADIOLOGY | Facility: HOSPITAL | Age: 68
Discharge: HOME | End: 2018-08-30
Attending: NURSE PRACTITIONER
Payer: MEDICARE

## 2018-08-30 DIAGNOSIS — K59.00 CONSTIPATION, UNSPECIFIED CONSTIPATION TYPE: ICD-10-CM

## 2018-08-30 DIAGNOSIS — K50.80 CROHN'S DISEASE OF BOTH SMALL AND LARGE INTESTINE WITHOUT COMPLICATION: ICD-10-CM

## 2018-08-30 DIAGNOSIS — R10.13 EPIGASTRIC PAIN: ICD-10-CM

## 2018-08-30 DIAGNOSIS — K56.699 STRICTURE OF COLON: ICD-10-CM

## 2018-08-30 PROCEDURE — 25500020 PHARM REV CODE 255: Performed by: NURSE PRACTITIONER

## 2018-08-30 PROCEDURE — 74177 CT ABD & PELVIS W/CONTRAST: CPT | Mod: TC

## 2018-08-30 RX ADMIN — IOHEXOL 30 ML: 350 INJECTION, SOLUTION INTRAVENOUS at 10:08

## 2018-08-30 RX ADMIN — IOHEXOL 75 ML: 350 INJECTION, SOLUTION INTRAVENOUS at 01:08

## 2018-08-31 ENCOUNTER — PATIENT MESSAGE (OUTPATIENT)
Dept: GASTROENTEROLOGY | Facility: CLINIC | Age: 68
End: 2018-08-31

## 2018-09-05 ENCOUNTER — PATIENT MESSAGE (OUTPATIENT)
Dept: GASTROENTEROLOGY | Facility: CLINIC | Age: 68
End: 2018-09-05

## 2018-09-07 ENCOUNTER — PATIENT MESSAGE (OUTPATIENT)
Dept: GASTROENTEROLOGY | Facility: CLINIC | Age: 68
End: 2018-09-07

## 2018-09-07 LAB — ADALIMUMAB CONCENTRATION & ANTI-ADALIMUMAB ANTIBODY: NORMAL

## 2018-09-13 ENCOUNTER — PATIENT MESSAGE (OUTPATIENT)
Dept: GASTROENTEROLOGY | Facility: CLINIC | Age: 68
End: 2018-09-13

## 2018-09-13 DIAGNOSIS — K50.80 CROHN'S DISEASE OF BOTH SMALL AND LARGE INTESTINE WITHOUT COMPLICATION: Primary | ICD-10-CM

## 2018-09-14 DIAGNOSIS — K50.80 CROHN'S DISEASE OF BOTH SMALL AND LARGE INTESTINE WITHOUT COMPLICATION: Primary | ICD-10-CM

## 2018-09-19 ENCOUNTER — LAB VISIT (OUTPATIENT)
Dept: LAB | Facility: HOSPITAL | Age: 68
End: 2018-09-19
Attending: NURSE PRACTITIONER
Payer: MEDICARE

## 2018-09-19 DIAGNOSIS — K50.80 CROHN'S DISEASE OF BOTH SMALL AND LARGE INTESTINE WITHOUT COMPLICATION: ICD-10-CM

## 2018-09-19 PROCEDURE — 83993 ASSAY FOR CALPROTECTIN FECAL: CPT

## 2018-09-24 ENCOUNTER — PATIENT MESSAGE (OUTPATIENT)
Dept: GASTROENTEROLOGY | Facility: CLINIC | Age: 68
End: 2018-09-24

## 2018-09-25 ENCOUNTER — LAB VISIT (OUTPATIENT)
Dept: LAB | Facility: HOSPITAL | Age: 68
End: 2018-09-25
Attending: NURSE PRACTITIONER
Payer: MEDICARE

## 2018-09-25 DIAGNOSIS — K50.80 CROHN'S DISEASE OF BOTH SMALL AND LARGE INTESTINE WITHOUT COMPLICATION: ICD-10-CM

## 2018-09-25 PROCEDURE — 82657 ENZYME CELL ACTIVITY: CPT

## 2018-09-25 PROCEDURE — 36415 COLL VENOUS BLD VENIPUNCTURE: CPT

## 2018-09-27 LAB — CALPROTECTIN STL-MCNT: 206.6 MCG/G

## 2018-09-28 LAB
6-METHYLMERCAPTOPURINE RIBOSIDE: 5.42 NMOL/ML/H (ref 5.04–9.57)
6-METHYLMERCAPTOPURINE: 3.41 NMOL/ML/H (ref 3–6.66)
6-METHYLTHIOGUANINE RIBOSIDE: 3.42 NMOL/ML/H (ref 2.7–5.84)
TPMT INTERPRETATION: NORMAL
TPMT REVIEWED BY: NORMAL

## 2018-10-01 ENCOUNTER — TELEPHONE (OUTPATIENT)
Dept: PHARMACY | Facility: CLINIC | Age: 68
End: 2018-10-01

## 2018-10-01 DIAGNOSIS — K50.80 CROHN'S DISEASE OF BOTH SMALL AND LARGE INTESTINE WITHOUT COMPLICATION: Primary | ICD-10-CM

## 2018-10-01 RX ORDER — AZATHIOPRINE 50 MG/1
50 TABLET ORAL DAILY
Qty: 30 TABLET | Refills: 11 | Status: SHIPPED | OUTPATIENT
Start: 2018-10-01 | End: 2018-12-26

## 2018-10-05 NOTE — TELEPHONE ENCOUNTER
DOCUMENTATION ONLY:  Prior authorization for Azathioprine approved until 12/31/18    Co-pay: $10.00    Patient Assistance is not required.

## 2018-10-09 ENCOUNTER — OFFICE VISIT (OUTPATIENT)
Dept: GASTROENTEROLOGY | Facility: CLINIC | Age: 68
End: 2018-10-09
Payer: MEDICARE

## 2018-10-09 VITALS
DIASTOLIC BLOOD PRESSURE: 76 MMHG | HEART RATE: 90 BPM | WEIGHT: 104.5 LBS | HEIGHT: 67 IN | SYSTOLIC BLOOD PRESSURE: 130 MMHG | BODY MASS INDEX: 16.4 KG/M2

## 2018-10-09 DIAGNOSIS — K50.80 CROHN'S DISEASE OF BOTH SMALL AND LARGE INTESTINE WITHOUT COMPLICATION: Primary | ICD-10-CM

## 2018-10-09 PROCEDURE — 99999 PR PBB SHADOW E&M-EST. PATIENT-LVL III: CPT | Mod: PBBFAC,,, | Performed by: NURSE PRACTITIONER

## 2018-10-09 PROCEDURE — 1101F PT FALLS ASSESS-DOCD LE1/YR: CPT | Mod: CPTII,,, | Performed by: NURSE PRACTITIONER

## 2018-10-09 PROCEDURE — 3078F DIAST BP <80 MM HG: CPT | Mod: CPTII,,, | Performed by: NURSE PRACTITIONER

## 2018-10-09 PROCEDURE — 99213 OFFICE O/P EST LOW 20 MIN: CPT | Mod: S$PBB,,, | Performed by: NURSE PRACTITIONER

## 2018-10-09 PROCEDURE — 3075F SYST BP GE 130 - 139MM HG: CPT | Mod: CPTII,,, | Performed by: NURSE PRACTITIONER

## 2018-10-09 PROCEDURE — 99213 OFFICE O/P EST LOW 20 MIN: CPT | Mod: PBBFAC,PO | Performed by: NURSE PRACTITIONER

## 2018-10-09 NOTE — PROGRESS NOTES
Clinic Consult:  [facility]ation Note    Reason for Consult:  The encounter diagnosis was Crohn's disease of both small and large intestine without complication.    PCP: Christelle Lawler       HPI:  This is a 68 y.o. female here for evaluation of the above. She is here to discuss potential treatment. She has crohn's disease (with constipation. No previous issues with diarrhea). Started having worsening constipation as well as upper abdominal pain. No hematochezia or melena. Last colonoscopy in March 2018 with area of stenosis. Scope could not be advanced beyond this point. At visit, labs drawn which revealed normal CRP. Humira drug level good but did have low level antibody formation. Discussed case with Dr. Sanchez who recommended addition of Imuran. She is no longer having any symptoms but fecal calprotectin elevated at 206. Recommended starting Imuran to help overcome low level antibodies and discussed that elevated calprotectin indicates active disease, however, I do not have prior study for comparison. She is very hesitant about starting Imuran secondary to potential side effects.     Review of Systems   Constitutional: Negative for fever, malaise/fatigue and weight loss.   HENT: Negative for sore throat.    Respiratory: Negative for cough and wheezing.    Cardiovascular: Negative for chest pain and palpitations.   Gastrointestinal: Negative for abdominal pain, blood in stool, constipation, diarrhea, heartburn, melena, nausea and vomiting.   Genitourinary: Negative for dysuria and frequency.   Musculoskeletal: Negative for back pain, joint pain, myalgias and neck pain.   Skin: Negative for itching and rash.   Neurological: Negative for dizziness, speech change, seizures, loss of consciousness and headaches.   Psychiatric/Behavioral: Negative for depression and substance abuse. The patient is not nervous/anxious.        Medical History:  has a past medical history of Anxiety, Crohn's disease,  Depression, Genital herpes, Hepatitis C infection, Hypertension, Insomnia, Mesenteric artery stenosis, Osteoporosis, and TIA (transient ischemic attack).    Surgical History:  has a past surgical history that includes Appendectomy; Small intestine surgery; COLONOSCOPY (N/A, 3/26/2018); COLONOSCOPY (N/A, 5/19/2017); and COLONOSCOPY (N/A, 5/22/2015).    Family History: family history includes Cancer in her father; Heart disease in her mother and sister; Stroke in her mother..     Social History:  reports that she quit smoking about 13 years ago. Her smoking use included cigarettes. She quit after 20.00 years of use. She has quit using smokeless tobacco. She reports that she does not drink alcohol or use drugs.    Allergies: Reviewed    Home Medications:   Current Outpatient Medications on File Prior to Visit   Medication Sig Dispense Refill    adalimumab (HUMIRA) 40 mg/0.8 mL SyKt injection Inject 0.8 mLs (40 mg total) into the skin every 14 (fourteen) days. 2 vial 11    amLODIPine (NORVASC) 10 MG tablet Take 1 tablet (10 mg total) by mouth once daily. 90 tablet 3    aspirin (ECOTRIN) 81 MG EC tablet Take 1 tablet (81 mg total) by mouth once daily.      calcium carbonate (OS-COOPER) 600 mg (1,500 mg) Tab Take 600 mg by mouth 2 (two) times daily with meals.      cyanocobalamin, vitamin B-12, 2,500 mcg Lozg Place 1 tablet under the tongue.       levocetirizine (XYZAL) 5 MG tablet Take 1 tablet (5 mg total) by mouth every evening. 30 tablet 11    LORazepam (ATIVAN) 0.5 MG tablet Take 2 tablets (1 mg total) by mouth every evening. 60 tablet 2    melatonin 3 mg Tab Take 1 tablet by mouth nightly as needed.      metronidazole 0.75% (METROCREAM) 0.75 % Crea APPLY ONE APPLICATION TO THE FACE TWICE DAILY  2    multivitamin with minerals tablet Take 1 tablet by mouth once daily at 6am.      pantoprazole (PROTONIX) 40 MG tablet Take 1 tablet (40 mg total) by mouth once daily. 30 tablet 11    rosuvastatin (CRESTOR) 10 MG  "tablet Take 1 tablet (10 mg total) by mouth once daily. 90 tablet 3    valACYclovir (VALTREX) 500 MG tablet take 1 tablet by mouth as directed and take 1 tablet by mouth twice a day for 3 days  0    vitamin D 1000 units Tab Take 185 mg by mouth once daily.      azaTHIOprine (IMURAN) 50 mg Tab Take 1 tablet (50 mg total) by mouth once daily. 30 tablet 11     No current facility-administered medications on file prior to visit.        Physical Exam:  /76   Pulse 90   Ht 5' 7" (1.702 m)   Wt 47.4 kg (104 lb 8 oz)   BMI 16.37 kg/m²   Body mass index is 16.37 kg/m².  Physical Exam   Constitutional: She is oriented to person, place, and time and well-developed, well-nourished, and in no distress. No distress.   HENT:   Head: Normocephalic.   Eyes: Conjunctivae are normal. Pupils are equal, round, and reactive to light.   Cardiovascular: Normal rate, regular rhythm and normal heart sounds.   Pulmonary/Chest: Effort normal and breath sounds normal. No respiratory distress.   Abdominal: Soft. Bowel sounds are normal. She exhibits no distension. There is no tenderness.   Neurological: She is alert and oriented to person, place, and time. No cranial nerve deficit.   Skin: Skin is warm and dry. No rash noted.   Psychiatric: Mood and affect normal.       Labs: Pertinent labs reviewed.    Assessment:  1. Crohn's disease of both small and large intestine without complication         Recommendations:  - discussed recommendation to start Imura but patient very hesitant to start secondary to potential side effects of adding immunomodulator.  - fecal calprotectin with mild elevation. This does suggest active disease, however, there is no prior study for comparison.   - would prefer that I discuss again with the GI team to get further opinions on treatment possibilities. If the recommendation is to start Imuran, she will comply with recommendations.     Thank you so much for allowing me to participate in the care of Manuela " Kacie Hunt, LYNDSAY-C

## 2018-10-17 ENCOUNTER — PATIENT MESSAGE (OUTPATIENT)
Dept: GASTROENTEROLOGY | Facility: CLINIC | Age: 68
End: 2018-10-17

## 2018-10-17 ENCOUNTER — PATIENT MESSAGE (OUTPATIENT)
Dept: UROLOGY | Facility: CLINIC | Age: 68
End: 2018-10-17

## 2018-10-17 DIAGNOSIS — N20.0 NEPHROLITHIASIS: Primary | ICD-10-CM

## 2018-11-05 ENCOUNTER — PATIENT MESSAGE (OUTPATIENT)
Dept: GASTROENTEROLOGY | Facility: CLINIC | Age: 68
End: 2018-11-05

## 2018-11-05 ENCOUNTER — IMMUNIZATION (OUTPATIENT)
Dept: PHARMACY | Facility: CLINIC | Age: 68
End: 2018-11-05
Payer: MEDICARE

## 2018-11-06 ENCOUNTER — TELEPHONE (OUTPATIENT)
Dept: GASTROENTEROLOGY | Facility: CLINIC | Age: 68
End: 2018-11-06

## 2018-11-06 DIAGNOSIS — K50.80 CROHN'S DISEASE OF BOTH SMALL AND LARGE INTESTINE WITHOUT COMPLICATION: Primary | ICD-10-CM

## 2018-11-06 DIAGNOSIS — R10.13 EPIGASTRIC PAIN: ICD-10-CM

## 2018-11-06 DIAGNOSIS — K56.699 COLONIC STRICTURE: ICD-10-CM

## 2018-11-06 RX ORDER — SODIUM, POTASSIUM,MAG SULFATES 17.5-3.13G
SOLUTION, RECONSTITUTED, ORAL ORAL
Qty: 354 ML | Refills: 0 | Status: ON HOLD | OUTPATIENT
Start: 2018-11-06 | End: 2019-03-19 | Stop reason: HOSPADM

## 2018-11-06 NOTE — TELEPHONE ENCOUNTER
----- Message from Greta Thomas sent at 11/6/2018  1:21 PM CST -----  Contact: patient  Returning your call. Please call patient @ [phone]. Thanks, mika

## 2018-11-15 ENCOUNTER — TELEPHONE (OUTPATIENT)
Dept: GASTROENTEROLOGY | Facility: CLINIC | Age: 68
End: 2018-11-15

## 2018-11-15 NOTE — TELEPHONE ENCOUNTER
----- Message from Bob Law sent at 11/15/2018  3:19 PM CST -----  Contact: pt   Pt unable to receive messages thru portal internet down for a week. Symptoms are not getting any better and pt would like a cb ,           ..[phone] (home)

## 2018-11-15 NOTE — TELEPHONE ENCOUNTER
Returned call to pt who stated she will not have internet for a few days so we will need to call her if an earlier Endo appt opens up. Pt also stated that her symptoms have not improved at all.

## 2018-11-25 ENCOUNTER — PATIENT MESSAGE (OUTPATIENT)
Dept: GASTROENTEROLOGY | Facility: CLINIC | Age: 68
End: 2018-11-25

## 2018-11-25 ENCOUNTER — PATIENT MESSAGE (OUTPATIENT)
Dept: INTERNAL MEDICINE | Facility: CLINIC | Age: 68
End: 2018-11-25

## 2018-11-25 DIAGNOSIS — F41.9 ANXIETY: ICD-10-CM

## 2018-11-26 ENCOUNTER — PATIENT MESSAGE (OUTPATIENT)
Dept: GASTROENTEROLOGY | Facility: CLINIC | Age: 68
End: 2018-11-26

## 2018-11-26 RX ORDER — LORAZEPAM 0.5 MG/1
1 TABLET ORAL NIGHTLY
Qty: 60 TABLET | Refills: 2 | Status: SHIPPED | OUTPATIENT
Start: 2018-11-26 | End: 2019-01-17

## 2018-12-17 ENCOUNTER — TELEPHONE (OUTPATIENT)
Dept: INTERNAL MEDICINE | Facility: CLINIC | Age: 68
End: 2018-12-17

## 2018-12-17 ENCOUNTER — TELEPHONE (OUTPATIENT)
Dept: ENDOSCOPY | Facility: HOSPITAL | Age: 68
End: 2018-12-17

## 2018-12-17 DIAGNOSIS — Z12.31 ENCOUNTER FOR SCREENING MAMMOGRAM FOR MALIGNANT NEOPLASM OF BREAST: Primary | ICD-10-CM

## 2018-12-17 NOTE — TELEPHONE ENCOUNTER
----- Message from Rosa Darnell sent at 12/17/2018 11:25 AM CST -----  Contact: pt  Please call pt @ [phone] regarding an order to get a mammograph.

## 2018-12-17 NOTE — TELEPHONE ENCOUNTER
Pt rescheduled from 1-14-19 to 2-4-19 due to endo schedule change. New suprep instructions review with patient.

## 2018-12-26 ENCOUNTER — OFFICE VISIT (OUTPATIENT)
Dept: INTERNAL MEDICINE | Facility: CLINIC | Age: 68
End: 2018-12-26
Payer: MEDICARE

## 2018-12-26 VITALS
OXYGEN SATURATION: 98 % | TEMPERATURE: 98 F | RESPIRATION RATE: 20 BRPM | HEIGHT: 67 IN | BODY MASS INDEX: 15.95 KG/M2 | WEIGHT: 101.63 LBS | SYSTOLIC BLOOD PRESSURE: 138 MMHG | HEART RATE: 96 BPM | DIASTOLIC BLOOD PRESSURE: 70 MMHG

## 2018-12-26 DIAGNOSIS — G44.209 TENSION HEADACHE: Primary | ICD-10-CM

## 2018-12-26 PROCEDURE — 99999 PR PBB SHADOW E&M-EST. PATIENT-LVL V: CPT | Mod: PBBFAC,,, | Performed by: PHYSICIAN ASSISTANT

## 2018-12-26 PROCEDURE — 3075F SYST BP GE 130 - 139MM HG: CPT | Mod: CPTII,S$GLB,, | Performed by: PHYSICIAN ASSISTANT

## 2018-12-26 PROCEDURE — 3078F DIAST BP <80 MM HG: CPT | Mod: CPTII,S$GLB,, | Performed by: PHYSICIAN ASSISTANT

## 2018-12-26 PROCEDURE — 96372 THER/PROPH/DIAG INJ SC/IM: CPT | Mod: S$GLB,,, | Performed by: PHYSICIAN ASSISTANT

## 2018-12-26 PROCEDURE — 99214 OFFICE O/P EST MOD 30 MIN: CPT | Mod: 25,S$GLB,, | Performed by: PHYSICIAN ASSISTANT

## 2018-12-26 PROCEDURE — 1101F PT FALLS ASSESS-DOCD LE1/YR: CPT | Mod: CPTII,S$GLB,, | Performed by: PHYSICIAN ASSISTANT

## 2018-12-26 RX ORDER — CYCLOBENZAPRINE HCL 10 MG
10 TABLET ORAL 3 TIMES DAILY PRN
Qty: 30 TABLET | Refills: 0 | Status: SHIPPED | OUTPATIENT
Start: 2018-12-26 | End: 2019-01-05

## 2018-12-26 RX ORDER — KETOROLAC TROMETHAMINE 30 MG/ML
30 INJECTION, SOLUTION INTRAMUSCULAR; INTRAVENOUS
Status: COMPLETED | OUTPATIENT
Start: 2018-12-26 | End: 2018-12-26

## 2018-12-26 RX ADMIN — KETOROLAC TROMETHAMINE 30 MG: 30 INJECTION, SOLUTION INTRAMUSCULAR; INTRAVENOUS at 01:12

## 2018-12-26 NOTE — PROGRESS NOTES
Subjective:       Patient ID: Manuela Ryees is a 68 y.o. female.    Chief Complaint: Headache and Neck Pain (3/10)    Headache    This is a new problem. The current episode started in the past 7 days. The problem occurs constantly. The problem has been unchanged. The pain is located in the occipital region. Radiates to: around the temporal head  The quality of the pain is described as band-like. The pain is mild. Associated symptoms include neck pain. Pertinent negatives include no blurred vision, dizziness, hearing loss, numbness, phonophobia, photophobia, rhinorrhea, scalp tenderness or seizures. The symptoms are aggravated by activity. She has tried acetaminophen for the symptoms. The treatment provided mild relief. Her past medical history is significant for hypertension and immunosuppression.     Past Medical History:   Diagnosis Date    Anxiety     Crohn's disease     Depression     Genital herpes     Hepatitis C infection     Hypertension     Insomnia     Mesenteric artery stenosis     Dr. Checo Reed--vascular surgery    Osteoporosis     TIA (transient ischemic attack)        Review of Systems   HENT: Negative for hearing loss and rhinorrhea.    Eyes: Negative for blurred vision and photophobia.   Musculoskeletal: Positive for neck pain.   Neurological: Positive for headaches. Negative for dizziness, seizures and numbness.       Objective:      Physical Exam   Constitutional: She is oriented to person, place, and time. She appears well-developed and well-nourished. No distress.   HENT:   Head: Normocephalic and atraumatic.   Right Ear: External ear normal.   Left Ear: External ear normal.   Nose: Nose normal.   Mouth/Throat: Oropharynx is clear and moist. No oropharyngeal exudate.   Neck: Neck supple.   Cardiovascular: Normal rate and regular rhythm. Exam reveals no gallop and no friction rub.   No murmur heard.  Pulmonary/Chest: Effort normal and breath sounds normal. No stridor. No  respiratory distress. She has no wheezes. She has no rales. She exhibits no tenderness.   Abdominal: Soft. There is no tenderness.   Lymphadenopathy:     She has no cervical adenopathy.   Neurological: She is alert and oriented to person, place, and time.   Skin: She is not diaphoretic.   Nursing note and vitals reviewed.      Assessment:       1. Tension headache        Plan:       Tension headache    Other orders  -     ketorolac injection 30 mg  -     cyclobenzaprine (FLEXERIL) 10 MG tablet; Take 1 tablet (10 mg total) by mouth 3 (three) times daily as needed for Muscle spasms.  Dispense: 30 tablet; Refill: 0

## 2018-12-26 NOTE — PATIENT INSTRUCTIONS
Tension Headache    A muscle tension headache is a very common cause of head pain. Its also called a stress headache. When some people are under stress, they tense the muscles of their shoulder, neck, and scalp without knowing it. If this tension lasts long enough, a headache can occur. A tension headache can be quite painful. It can last for hours or even days.  Home care  Follow these tips when caring for yourself at home:  · Dont drive yourself home if you were given pain medicine for your headache. Instead, have someone else drive you home. Try to sleep when you get home. You should feel much better when you wake up.  · Put heat on the back of your neck to help ease neck spasm.  · Drink only clear liquids or eat a light diet until your symptoms get better. This will help you avoid nausea or vomiting.  How to prevent headaches  · Figure out what is causing stress in your life. Learn new ways to handle your stress. Ideas include regular exercise, biofeedback, self-hypnosis, yoga, and meditation. Talk with your healthcare provider to find out more information about managing stress. Many books and digital media are also available on this subject.  · Take time out at the first sign of a tension headache, if possible. Take yourself out of the stressful situation. Find a quiet, comfortable place to sit or lie down and let yourself relax. Heat and deep massage of the tight areas in the neck and shoulders may help ease muscle spasm. You may also get relief from a medicine like ibuprofen or a prescribed muscle relaxant.  Follow-up care  Follow up with your healthcare provider, or as advised. Talk with your provider if you have frequent headaches. He or she can figure out a treatment plan. Ask if you can have medicine to take at home the next time you get a bad headache. This may keep you from having to visit the emergency department in the future. You may need to see a headache specialist (neurologist) if you continue  to have headaches.  When to seek medical advice  Call your healthcare provider right away if any of these occur:  · Your head pain gets worse during sexual intercourse or strenuous activity  · Your head pain doesnt get better within 24 hours  · You arent able to keep liquids down (repeated vomiting)  · Fever of 100.4ºF (38ºC) or higher, or as directed by your healthcare provider  · Stiff neck  · Extreme drowsiness, confusion, or fainting  · Dizziness or dizziness with spinning sensation (vertigo)  · Weakness in an arm or leg or one side of your face  · You have difficulty speaking  · Your vision changes  Date Last Reviewed: 8/1/2016  © 5909-9908 [employer]. [address] [place] [address], [place] [address]. All rights reserved. This information is not intended as a substitute for professional medical care. Always follow your healthcare professional's instructions.

## 2018-12-29 ENCOUNTER — PATIENT MESSAGE (OUTPATIENT)
Dept: ENDOSCOPY | Facility: HOSPITAL | Age: 68
End: 2018-12-29

## 2018-12-29 ENCOUNTER — PATIENT MESSAGE (OUTPATIENT)
Dept: PODIATRY | Facility: CLINIC | Age: 68
End: 2018-12-29

## 2018-12-31 ENCOUNTER — TELEPHONE (OUTPATIENT)
Dept: PODIATRY | Facility: CLINIC | Age: 68
End: 2018-12-31

## 2018-12-31 NOTE — TELEPHONE ENCOUNTER
Called patient to schedule an appointment for her on [address]. There was no answer, I left a message requesting a call back.     Rob Mace MA  Podiatry Department  [facility]

## 2019-01-02 ENCOUNTER — PATIENT MESSAGE (OUTPATIENT)
Dept: GASTROENTEROLOGY | Facility: CLINIC | Age: 69
End: 2019-01-02

## 2019-01-14 ENCOUNTER — OFFICE VISIT (OUTPATIENT)
Dept: PODIATRY | Facility: CLINIC | Age: 69
End: 2019-01-14
Payer: MEDICARE

## 2019-01-14 ENCOUNTER — PATIENT MESSAGE (OUTPATIENT)
Dept: GASTROENTEROLOGY | Facility: CLINIC | Age: 69
End: 2019-01-14

## 2019-01-14 VITALS
HEART RATE: 89 BPM | SYSTOLIC BLOOD PRESSURE: 145 MMHG | WEIGHT: 103 LBS | BODY MASS INDEX: 16.17 KG/M2 | DIASTOLIC BLOOD PRESSURE: 93 MMHG | HEIGHT: 67 IN

## 2019-01-14 DIAGNOSIS — M79.674 PAIN IN TOES OF BOTH FEET: ICD-10-CM

## 2019-01-14 DIAGNOSIS — B35.1 ONYCHOMYCOSIS: Primary | ICD-10-CM

## 2019-01-14 DIAGNOSIS — M79.675 PAIN IN TOES OF BOTH FEET: ICD-10-CM

## 2019-01-14 PROCEDURE — 99213 PR OFFICE/OUTPT VISIT, EST, LEVL III, 20-29 MIN: ICD-10-PCS | Mod: S$GLB,,, | Performed by: PODIATRIST

## 2019-01-14 PROCEDURE — 3080F PR MOST RECENT DIASTOLIC BLOOD PRESSURE >= 90 MM HG: ICD-10-PCS | Mod: CPTII,S$GLB,, | Performed by: PODIATRIST

## 2019-01-14 PROCEDURE — 99213 OFFICE O/P EST LOW 20 MIN: CPT | Mod: S$GLB,,, | Performed by: PODIATRIST

## 2019-01-14 PROCEDURE — 3077F PR MOST RECENT SYSTOLIC BLOOD PRESSURE >= 140 MM HG: ICD-10-PCS | Mod: CPTII,S$GLB,, | Performed by: PODIATRIST

## 2019-01-14 PROCEDURE — 99999 PR PBB SHADOW E&M-EST. PATIENT-LVL III: CPT | Mod: PBBFAC,,, | Performed by: PODIATRIST

## 2019-01-14 PROCEDURE — 1101F PR PT FALLS ASSESS DOC 0-1 FALLS W/OUT INJ PAST YR: ICD-10-PCS | Mod: CPTII,S$GLB,, | Performed by: PODIATRIST

## 2019-01-14 PROCEDURE — 99999 PR PBB SHADOW E&M-EST. PATIENT-LVL III: ICD-10-PCS | Mod: PBBFAC,,, | Performed by: PODIATRIST

## 2019-01-14 PROCEDURE — 3080F DIAST BP >= 90 MM HG: CPT | Mod: CPTII,S$GLB,, | Performed by: PODIATRIST

## 2019-01-14 PROCEDURE — 3077F SYST BP >= 140 MM HG: CPT | Mod: CPTII,S$GLB,, | Performed by: PODIATRIST

## 2019-01-14 PROCEDURE — 1101F PT FALLS ASSESS-DOCD LE1/YR: CPT | Mod: CPTII,S$GLB,, | Performed by: PODIATRIST

## 2019-01-14 NOTE — PROGRESS NOTES
Subjective:       Patient ID: Manuela Reyes is a 69 y.o. female.    Chief Complaint: Follow-up (non diabetic , routine f/u)    HPI: Manuela Reyes presents to the office today, for follow-up concerning bilateral onychomycosis, extent.  Patient has been using tea-tree oil for past 6 months and does state improvement as per my colleague, Dr. Halima Salas.  Patient does state profound improvement.  States less pain and discomfort. Christelle Lawler DO is her primary care provider.  She presents this afternoon for status check.       Review of patient's allergies indicates:   Allergen Reactions    Codeine sulfate Itching    Hydrochlorothiazide Other (See Comments)     Adverse Reaction    Lisinopril Swelling and Edema     Facial Swelling       Past Medical History:   Diagnosis Date    Anxiety     Crohn's disease     Depression     Genital herpes     Hepatitis C infection     Hypertension     Insomnia     Mesenteric artery stenosis     Dr. Checo Reed--vascular surgery    Osteoporosis     TIA (transient ischemic attack)        Family History   Problem Relation Age of Onset    Stroke Mother     Heart disease Mother     Cancer Father         Lung    Heart disease Sister     Glaucoma Neg Hx     Macular degeneration Neg Hx     Thyroid disease Neg Hx        Social History     Socioeconomic History    Marital status: Single     Spouse name: Not on file    Number of children: Not on file    Years of education: Not on file    Highest education level: Not on file   Social Needs    Financial resource strain: Not on file    Food insecurity - worry: Not on file    Food insecurity - inability: Not on file    Transportation needs - medical: Not on file    Transportation needs - non-medical: Not on file   Occupational History    Not on file   Tobacco Use    Smoking status: Former Smoker     Years: 20.00     Types: Cigarettes     Last attempt to quit: 1/1/2005     Years since quitting:  "14.0    Smokeless tobacco: Former User    Tobacco comment: Former Light Smoker less than 10 a day   Substance and Sexual Activity    Alcohol use: No     Alcohol/week: 2.4 oz     Types: 4 Glasses of wine per week    Drug use: No    Sexual activity: No     Partners: Female   Other Topics Concern    Not on file   Social History Narrative    Not on file       Past Surgical History:   Procedure Laterality Date    APPENDECTOMY      COLONOSCOPY N/A 3/26/2018    [facility]    COLONOSCOPY N/A 5/19/2017    [facility] by Jose Luis Leonard [facility] at [facility] END[facility]    COLONOSCOPY N/A 5/22/2015    [facility] by [facility] at [facility] END[facility]    SMALL INTESTINE SURGERY         Review of Systems   Constitutional: Negative for chills, fatigue and fever.   HENT: Negative for hearing loss.    Eyes: Negative for photophobia and visual disturbance.   Respiratory: Negative for cough, chest tightness, shortness of breath and wheezing.    Cardiovascular: Negative for chest pain and palpitations.   Gastrointestinal: Negative for constipation, diarrhea, nausea and vomiting.   Endocrine: Negative for cold intolerance and heat intolerance.   Genitourinary: Negative for flank pain.   Musculoskeletal: Negative for neck pain and neck stiffness.   Skin: Negative for wound.   Neurological: Negative for light-headedness and headaches.   Psychiatric/Behavioral: Negative for sleep disturbance.          Objective:   BP (!) 145/93 (BP Location: Left arm, Patient Position: Sitting, BP Method: Small (Automatic))   Pulse 89   Ht 5' 7" (1.702 m)   Wt 46.7 kg (103 lb)   BMI 16.13 kg/m²       LOWER EXTREMITY PHYSICAL EXAMINATION  NEUROLOGY: Sensation to light touch is intact. Proprioception is intact. Sensation to pin prick is intact. Deep tendon reflexes of the lower extremity are WNL.     VASCULAR: Pulses are palpable to the B/L lower extremity. The right dorsalis pedis pulse is 2/4 and the posterior tibial " pulse is 2/4. The left dorsalis pedis pulse is 2/4 and the posterior tibial pulse is 2/4. Hair growth is noted on the dorsal foot and digits. Proximal to distal, warm to warm. Capillary refill time is WNL at less than 3s.     DERMATOLOGY: On the left foot and the right foot, nails 1-5 are suggestive of onychomycotic changes. These nail plates are painful with shoe gear and ambulation, as per patient. These nail plates are painful to palpation and manipulation, are thickened, are dystrophic, chaulky in appearance and malodorous with substantial subungual debris. These nail plates are yellow to brown in appearance.     ORTHOPEDIC: Manual Muscle Testing is 5/5 in all planes on the left and right, without pains, with and without resistance. No pains to palpation of the medial or lateral ankle ligaments. No discomfort to palpation of the posterior tibial tendon, peroneal tendon, Achilles tendon or the anterior ankle tendons.  Rectus foot type is noted. No pain upon range of motion of the midfoot or hindfoot joints. No crepitus upon range of motion and midfoot or hindfoot joints. No ankle pathology is noted. Gait pattern is non-antalgic.    Assessment:     1. Onychomycosis    2. Pain in toes of both feet        Plan:     Onychomycosis    Pain in toes of both feet      Thorough discussion is had with the patient today, concerning the diagnosis, its etiology, and the treatment algorithm at present.    Discussed the various treatment options concerning onychomycosis, these being over-the-counter topicals (efficacy is low in regards to cure), prescription strength topicals (better efficacy as compared to OTC variants, but only slightly so, and potentially costly), laser therapy (which is not covered by insurance companies), oral medications (patient is advised that there is a potential, though less than 5%, for the potential of adverse health and side effects; namely liver pathology) and doing nothing (frequent debridements)  as onychomycosis is a cosmetic ailment, and has no potential for long-term deleterious effects on the health.    Patient may continue current treatment, or may discontinue as it has been approximately 6 months.  Patient will also look into other homeopathic remedies, i.e., apple cider vinegar and warm water soaking, twice daily.            Future Appointments   Date Time Provider Department Center   1/17/2019 12:40 PM Christelle Lawler DO ON IM  [facility]   1/25/2019 10:20 AM [facility] MAMMO1 [facility] MAMMO [facility]   10/30/2019  9:00 AM [facility] XR1-DR [facility] XRAY [facility]   10/30/2019  9:30 AM ON[facility] US1 ON[facility] ULSOUND [facility]   11/18/2019  9:40 AM Santo Burt IV, MD ON UROLOGY  [facility]

## 2019-01-16 ENCOUNTER — PATIENT MESSAGE (OUTPATIENT)
Dept: GASTROENTEROLOGY | Facility: CLINIC | Age: 69
End: 2019-01-16

## 2019-01-17 ENCOUNTER — OFFICE VISIT (OUTPATIENT)
Dept: INTERNAL MEDICINE | Facility: CLINIC | Age: 69
End: 2019-01-17
Payer: MEDICARE

## 2019-01-17 VITALS
WEIGHT: 101.63 LBS | SYSTOLIC BLOOD PRESSURE: 120 MMHG | HEART RATE: 100 BPM | TEMPERATURE: 98 F | OXYGEN SATURATION: 99 % | BODY MASS INDEX: 15.95 KG/M2 | DIASTOLIC BLOOD PRESSURE: 70 MMHG | HEIGHT: 67 IN

## 2019-01-17 DIAGNOSIS — K50.80 CROHN'S DISEASE OF BOTH SMALL AND LARGE INTESTINE WITHOUT COMPLICATION: ICD-10-CM

## 2019-01-17 DIAGNOSIS — I70.0 ATHEROSCLEROSIS OF ABDOMINAL AORTA: ICD-10-CM

## 2019-01-17 DIAGNOSIS — E78.5 HYPERLIPIDEMIA LDL GOAL <70: ICD-10-CM

## 2019-01-17 DIAGNOSIS — M54.50 ACUTE LEFT-SIDED LOW BACK PAIN WITHOUT SCIATICA: ICD-10-CM

## 2019-01-17 DIAGNOSIS — F51.04 CHRONIC INSOMNIA: ICD-10-CM

## 2019-01-17 DIAGNOSIS — Z23 IMMUNIZATION DUE: ICD-10-CM

## 2019-01-17 DIAGNOSIS — F41.9 ANXIETY: ICD-10-CM

## 2019-01-17 DIAGNOSIS — I10 HYPERTENSION GOAL BP (BLOOD PRESSURE) < 140/90: Primary | Chronic | ICD-10-CM

## 2019-01-17 PROCEDURE — G0009 ADMIN PNEUMOCOCCAL VACCINE: HCPCS | Mod: S$GLB,,, | Performed by: INTERNAL MEDICINE

## 2019-01-17 PROCEDURE — 90732 PNEUMOCOCCAL POLYSACCHARIDE VACCINE 23-VALENT =>2YO SQ IM: ICD-10-PCS | Mod: S$GLB,,, | Performed by: INTERNAL MEDICINE

## 2019-01-17 PROCEDURE — 99214 PR OFFICE/OUTPT VISIT, EST, LEVL IV, 30-39 MIN: ICD-10-PCS | Mod: 25,S$GLB,, | Performed by: INTERNAL MEDICINE

## 2019-01-17 PROCEDURE — 3074F SYST BP LT 130 MM HG: CPT | Mod: CPTII,S$GLB,, | Performed by: INTERNAL MEDICINE

## 2019-01-17 PROCEDURE — 99999 PR PBB SHADOW E&M-EST. PATIENT-LVL III: CPT | Mod: PBBFAC,,, | Performed by: INTERNAL MEDICINE

## 2019-01-17 PROCEDURE — G0009 PNEUMOCOCCAL POLYSACCHARIDE VACCINE 23-VALENT =>2YO SQ IM: ICD-10-PCS | Mod: S$GLB,,, | Performed by: INTERNAL MEDICINE

## 2019-01-17 PROCEDURE — 3078F PR MOST RECENT DIASTOLIC BLOOD PRESSURE < 80 MM HG: ICD-10-PCS | Mod: CPTII,S$GLB,, | Performed by: INTERNAL MEDICINE

## 2019-01-17 PROCEDURE — 99214 OFFICE O/P EST MOD 30 MIN: CPT | Mod: 25,S$GLB,, | Performed by: INTERNAL MEDICINE

## 2019-01-17 PROCEDURE — 90732 PPSV23 VACC 2 YRS+ SUBQ/IM: CPT | Mod: S$GLB,,, | Performed by: INTERNAL MEDICINE

## 2019-01-17 PROCEDURE — 1101F PT FALLS ASSESS-DOCD LE1/YR: CPT | Mod: CPTII,S$GLB,, | Performed by: INTERNAL MEDICINE

## 2019-01-17 PROCEDURE — 99499 RISK ADDL DX/OHS AUDIT: ICD-10-PCS | Mod: S$GLB,,, | Performed by: INTERNAL MEDICINE

## 2019-01-17 PROCEDURE — 99499 UNLISTED E&M SERVICE: CPT | Mod: S$GLB,,, | Performed by: INTERNAL MEDICINE

## 2019-01-17 PROCEDURE — 1101F PR PT FALLS ASSESS DOC 0-1 FALLS W/OUT INJ PAST YR: ICD-10-PCS | Mod: CPTII,S$GLB,, | Performed by: INTERNAL MEDICINE

## 2019-01-17 PROCEDURE — 3074F PR MOST RECENT SYSTOLIC BLOOD PRESSURE < 130 MM HG: ICD-10-PCS | Mod: CPTII,S$GLB,, | Performed by: INTERNAL MEDICINE

## 2019-01-17 PROCEDURE — 99999 PR PBB SHADOW E&M-EST. PATIENT-LVL III: ICD-10-PCS | Mod: PBBFAC,,, | Performed by: INTERNAL MEDICINE

## 2019-01-17 PROCEDURE — 3078F DIAST BP <80 MM HG: CPT | Mod: CPTII,S$GLB,, | Performed by: INTERNAL MEDICINE

## 2019-01-17 RX ORDER — LORAZEPAM 0.5 MG/1
1.5 TABLET ORAL NIGHTLY
Qty: 90 TABLET | Refills: 0 | Status: SHIPPED | OUTPATIENT
Start: 2019-01-17 | End: 2019-02-22 | Stop reason: SDUPTHER

## 2019-01-18 ENCOUNTER — PATIENT MESSAGE (OUTPATIENT)
Dept: ENDOSCOPY | Facility: HOSPITAL | Age: 69
End: 2019-01-18

## 2019-01-18 NOTE — PROGRESS NOTES
Subjective:       Patient ID: Manuela Reyes is a 69 y.o. female.    Chief Complaint: Follow-up (HTN)    Manuela Reyes  69 y.o. White female    Patient presents with:  Follow-up: HTN    HPI: Here today to follow up on chronic conditions.  HTN--stable on amlodipine.   She has been having pain in her left lower back/buttock region. She has had issues with her SI joint in the past. She denies trauma or heavy lifting. She states it usually resolves in 3 days but it has not. She is considering going to a chiropractor or having accupuncture therapy.   Insomnia/anxiety--she takes lorazepam but lately it does not seem to help. She is under a lot of stress.   HLD/aortic atherosclerosis--compliant with rosuvastatin.                      LDLCALC                  49.6 (L)            08/21/2018            Crohn's--management per GI. She would like to have labs done.     Past Medical History:  Anxiety  Crohn's disease  Depression  Genital herpes  Hepatitis C infection  Hypertension  Insomnia  Mesenteric artery stenosis      Comment:  Dr. Checo Reed--vascular surgery  Osteoporosis  TIA (transient ischemic attack)    Current Outpatient Medications on File Prior to Visit:  amLODIPine (NORVASC) 10 MG tablet, Take 1 tablet (10 mg total) by mouth once daily., Disp: 90 tablet, Rfl: 3  aspirin (ECOTRIN) 81 MG EC tablet, Take 1 tablet (81 mg total) by mouth once daily., Disp: , Rfl:   calcium carbonate (OS-COOPER) 600 mg (1,500 mg) Tab, Take 600 mg by mouth 2 (two) times daily with meals., Disp: , Rfl:   cyanocobalamin, vitamin B-12, 2,500 mcg Lozg, Place 1 tablet under the tongue. , Disp: , Rfl:   levocetirizine (XYZAL) 5 MG tablet, Take 1 tablet (5 mg total) by mouth every evening., Disp: 30 tablet, Rfl: 11  melatonin 3 mg Tab, Take 1 tablet by mouth nightly as needed., Disp: , Rfl:    multivitamin with minerals tablet, Take 1 tablet by mouth once daily at 6am., Disp: , Rfl:   rosuvastatin (CRESTOR) 10 MG tablet, Take 1  tablet (10 mg total) by mouth once daily., Disp: 90 tablet, Rfl: 3  valACYclovir (VALTREX) 500 MG tablet, take 1 tablet by mouth as directed and take 1 tablet by mouth twice a day for 3 days, Disp: , Rfl: 0  vitamin D 1000 units Tab, Take 185 mg by mouth once daily., Disp: , Rfl:   adalimumab (HUMIRA) 40 mg/0.8 mL SyKt injection, Inject 0.8 mLs (40 mg total) into the skin every 14 (fourteen) days., Disp: 2 vial, Rfl: 11  metronidazole 0.75% (METROCREAM) 0.75 % Crea, APPLY ONE APPLICATION TO THE FACE TWICE DAILY, Disp: , Rfl: 2  SUPREP BOWEL PREP KIT 17.5-3.13-1.6 gram SolR, Use as directed, Disp: 354 mL, Rfl: 0    Allergies:  Review of patient's allergies indicates:   -- Codeine sulfate -- Itching   -- Hydrochlorothiazide -- Other (See Comments)    --  Adverse Reaction   -- Lisinopril -- Swelling and Edema    --  Facial Swelling          Review of Systems   Constitutional: Negative for fever and unexpected weight change.   Respiratory: Negative for shortness of breath.    Cardiovascular: Negative for chest pain and leg swelling.   Gastrointestinal: Negative for abdominal pain.   Genitourinary: Negative for difficulty urinating.   Musculoskeletal: Positive for back pain. Negative for gait problem.   Neurological: Negative for dizziness and headaches.   Psychiatric/Behavioral: Positive for sleep disturbance. The patient is nervous/anxious.        Objective:      Physical Exam   Constitutional: She is oriented to person, place, and time. She appears well-developed and well-nourished. No distress.   Eyes: No scleral icterus.   Neck: No tracheal deviation present.   Cardiovascular: Normal rate and regular rhythm.   Pulmonary/Chest: Effort normal and breath sounds normal. No respiratory distress.   Abdominal: Soft. Bowel sounds are normal.   Musculoskeletal: She exhibits no edema, tenderness or deformity.   Neurological: She is alert and oriented to person, place, and time.   Skin: Skin is warm and dry.   Psychiatric: She  has a normal mood and affect.   Vitals reviewed.      Assessment:       1. Hypertension goal BP (blood pressure) < 140/90    2. Acute left-sided low back pain without sciatica    3. Chronic insomnia    4. Anxiety    5. Hyperlipidemia LDL goal <70    6. Atherosclerosis of abdominal aorta    7. Crohn's disease of both small and large intestine without complication    8. Immunization due        Plan:       Manuela was seen today for follow-up.    Diagnoses and all orders for this visit:    Hypertension goal BP (blood pressure) < 140/90  -     Continue current management  -     Comprehensive metabolic panel; Standing  -     Lipid panel; Standing    Acute left-sided low back pain without sciatica  -     Discussed accupuncture vs chiropractic treatment vs PT    Chronic insomnia  -     Increase LORazepam (ATIVAN) 0.5 MG tablet; Take 3 tablets (1.5 mg total) by mouth every evening.    Anxiety  -    Increase LORazepam (ATIVAN) 0.5 MG tablet; Take 3 tablets (1.5 mg total) by mouth every evening.    Hyperlipidemia LDL goal <70  -     Continue rosuvastatin  -     Comprehensive metabolic panel; Standing  -     Lipid panel; Standing    Atherosclerosis of abdominal aorta  -     Continue rosuvastatin  -     Lipid panel; Standing    Crohn's disease of both small and large intestine without complication  -     Vitamin B12; Future  -     CBC auto differential; Future    Immunization due  -     (In Office Administered) Pneumococcal Polysaccharide Vaccine (23 Valent) (SQ/IM)    Schedule labs.     F/U in 4-6 weeks ([facility]) for insomnia and anxiety.

## 2019-01-25 ENCOUNTER — HOSPITAL ENCOUNTER (OUTPATIENT)
Dept: RADIOLOGY | Facility: HOSPITAL | Age: 69
Discharge: HOME | End: 2019-01-25
Attending: INTERNAL MEDICINE
Payer: MEDICARE

## 2019-01-25 DIAGNOSIS — Z12.31 ENCOUNTER FOR SCREENING MAMMOGRAM FOR MALIGNANT NEOPLASM OF BREAST: ICD-10-CM

## 2019-01-25 PROCEDURE — 77063 MAMMO DIGITAL SCREENING BILAT WITH TOMOSYNTHESIS_CAD: ICD-10-PCS | Mod: 26,,, | Performed by: RADIOLOGY

## 2019-01-25 PROCEDURE — 77067 SCR MAMMO BI INCL CAD: CPT | Mod: 26,,, | Performed by: RADIOLOGY

## 2019-01-25 PROCEDURE — 77063 BREAST TOMOSYNTHESIS BI: CPT | Mod: 26,,, | Performed by: RADIOLOGY

## 2019-01-25 PROCEDURE — 77067 SCR MAMMO BI INCL CAD: CPT | Mod: TC

## 2019-01-25 PROCEDURE — 77067 MAMMO DIGITAL SCREENING BILAT WITH TOMOSYNTHESIS_CAD: ICD-10-PCS | Mod: 26,,, | Performed by: RADIOLOGY

## 2019-02-01 ENCOUNTER — PATIENT MESSAGE (OUTPATIENT)
Dept: INTERNAL MEDICINE | Facility: CLINIC | Age: 69
End: 2019-02-01

## 2019-02-01 ENCOUNTER — PATIENT MESSAGE (OUTPATIENT)
Dept: GASTROENTEROLOGY | Facility: CLINIC | Age: 69
End: 2019-02-01

## 2019-02-01 ENCOUNTER — PATIENT MESSAGE (OUTPATIENT)
Dept: CARDIOLOGY | Facility: CLINIC | Age: 69
End: 2019-02-01

## 2019-02-04 ENCOUNTER — PATIENT MESSAGE (OUTPATIENT)
Dept: GASTROENTEROLOGY | Facility: CLINIC | Age: 69
End: 2019-02-04

## 2019-02-04 DIAGNOSIS — R10.13 EPIGASTRIC PAIN: ICD-10-CM

## 2019-02-04 RX ORDER — PANTOPRAZOLE SODIUM 40 MG/1
40 TABLET, DELAYED RELEASE ORAL DAILY
Qty: 30 TABLET | Refills: 11 | Status: ON HOLD | OUTPATIENT
Start: 2019-02-04 | End: 2019-03-19 | Stop reason: HOSPADM

## 2019-02-06 RX ORDER — POLYETHYLENE GLYCOL 3350, SODIUM SULFATE ANHYDROUS, SODIUM BICARBONATE, SODIUM CHLORIDE, POTASSIUM CHLORIDE 236; 22.74; 6.74; 5.86; 2.97 G/4L; G/4L; G/4L; G/4L; G/4L
4 POWDER, FOR SOLUTION ORAL ONCE
Qty: 4000 ML | Refills: 0 | Status: SHIPPED | OUTPATIENT
Start: 2019-02-06 | End: 2019-02-06

## 2019-02-07 ENCOUNTER — PATIENT MESSAGE (OUTPATIENT)
Dept: GASTROENTEROLOGY | Facility: CLINIC | Age: 69
End: 2019-02-07

## 2019-02-20 ENCOUNTER — PATIENT MESSAGE (OUTPATIENT)
Dept: INTERNAL MEDICINE | Facility: CLINIC | Age: 69
End: 2019-02-20

## 2019-02-20 DIAGNOSIS — F41.9 ANXIETY: ICD-10-CM

## 2019-02-20 DIAGNOSIS — I10 HYPERTENSION GOAL BP (BLOOD PRESSURE) < 140/90: Chronic | ICD-10-CM

## 2019-02-20 DIAGNOSIS — F51.04 CHRONIC INSOMNIA: ICD-10-CM

## 2019-02-22 RX ORDER — AMLODIPINE BESYLATE 10 MG/1
10 TABLET ORAL DAILY
Qty: 90 TABLET | Refills: 3 | Status: SHIPPED | OUTPATIENT
Start: 2019-02-22 | End: 2019-12-02 | Stop reason: SDUPTHER

## 2019-02-22 RX ORDER — LORAZEPAM 0.5 MG/1
1.25 TABLET ORAL NIGHTLY
Qty: 75 TABLET | Refills: 4 | Status: SHIPPED | OUTPATIENT
Start: 2019-02-22 | End: 2019-06-17 | Stop reason: SDUPTHER

## 2019-03-04 ENCOUNTER — OFFICE VISIT (OUTPATIENT)
Dept: GASTROENTEROLOGY | Facility: CLINIC | Age: 69
End: 2019-03-04
Payer: MEDICARE

## 2019-03-04 VITALS
HEART RATE: 84 BPM | SYSTOLIC BLOOD PRESSURE: 120 MMHG | HEIGHT: 67 IN | WEIGHT: 103.19 LBS | DIASTOLIC BLOOD PRESSURE: 80 MMHG | BODY MASS INDEX: 16.2 KG/M2

## 2019-03-04 DIAGNOSIS — K50.80 CROHN'S DISEASE OF BOTH SMALL AND LARGE INTESTINE WITHOUT COMPLICATION: Primary | ICD-10-CM

## 2019-03-04 PROCEDURE — 99499 RISK ADDL DX/OHS AUDIT: ICD-10-PCS | Mod: S$GLB,,, | Performed by: INTERNAL MEDICINE

## 2019-03-04 PROCEDURE — 99499 UNLISTED E&M SERVICE: CPT | Mod: S$GLB,,, | Performed by: INTERNAL MEDICINE

## 2019-03-04 PROCEDURE — 3079F PR MOST RECENT DIASTOLIC BLOOD PRESSURE 80-89 MM HG: ICD-10-PCS | Mod: CPTII,S$GLB,, | Performed by: INTERNAL MEDICINE

## 2019-03-04 PROCEDURE — 3074F PR MOST RECENT SYSTOLIC BLOOD PRESSURE < 130 MM HG: ICD-10-PCS | Mod: CPTII,S$GLB,, | Performed by: INTERNAL MEDICINE

## 2019-03-04 PROCEDURE — 1101F PT FALLS ASSESS-DOCD LE1/YR: CPT | Mod: CPTII,S$GLB,, | Performed by: INTERNAL MEDICINE

## 2019-03-04 PROCEDURE — 3074F SYST BP LT 130 MM HG: CPT | Mod: CPTII,S$GLB,, | Performed by: INTERNAL MEDICINE

## 2019-03-04 PROCEDURE — 3079F DIAST BP 80-89 MM HG: CPT | Mod: CPTII,S$GLB,, | Performed by: INTERNAL MEDICINE

## 2019-03-04 PROCEDURE — 99213 OFFICE O/P EST LOW 20 MIN: CPT | Mod: S$GLB,,, | Performed by: INTERNAL MEDICINE

## 2019-03-04 PROCEDURE — 1101F PR PT FALLS ASSESS DOC 0-1 FALLS W/OUT INJ PAST YR: ICD-10-PCS | Mod: CPTII,S$GLB,, | Performed by: INTERNAL MEDICINE

## 2019-03-04 PROCEDURE — 99213 PR OFFICE/OUTPT VISIT, EST, LEVL III, 20-29 MIN: ICD-10-PCS | Mod: S$GLB,,, | Performed by: INTERNAL MEDICINE

## 2019-03-04 PROCEDURE — 99999 PR PBB SHADOW E&M-EST. PATIENT-LVL III: CPT | Mod: PBBFAC,,, | Performed by: INTERNAL MEDICINE

## 2019-03-04 PROCEDURE — 99999 PR PBB SHADOW E&M-EST. PATIENT-LVL III: ICD-10-PCS | Mod: PBBFAC,,, | Performed by: INTERNAL MEDICINE

## 2019-03-04 NOTE — PROGRESS NOTES
GASTROENTEROLOGY IBD CONSULTATION:   Manuela Reyes  MRN: [id number]  : 1950    Referring Provider: No ref. provider found  Primary Care Provider: Christelle Lawler DO      CHIEF COMPLAINT:   Chief Complaint   Patient presents with    Crohn's Disease       History of Present Illness:  Ms. Reyes is a 69 y.o. female who is here today for Crohn's disease.     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  Had symptoms in January that resolved and since then has felt normal.      PREVENTIVE MEDICINE:  Immunization History   Administered Date(s) Administered    Influenza - High Dose 2015, 2016, 2018    Influenza - Trivalent - Adjuvanted 2017    Pneumococcal Conjugate - 13 Valent 12/10/2017    Pneumococcal Polysaccharide - 23 Valent 2019      Date of Last Pap Smear, result n/a  Date of Last Surveillance Colonoscopy, result:   Date of Last DEXA, result: 2018  Date of Last T-SPOT, result: ?  TPMT status: ?   Smoking status: non-smoker  NSAID use: none  History of C. difficile: none  Family planning: n/a      Past Medical History:  Past Medical History:   Diagnosis Date    Anxiety     Crohn's disease     Depression     Genital herpes     Hepatitis C infection     Hypertension     Insomnia     Mesenteric artery stenosis     Dr. Checo Reed--vascular surgery     Osteoporosis     TIA (transient ischemic attack)      Past Surgical History:  Past Surgical History:   Procedure Laterality Date    APPENDECTOMY      COLONOSCOPY N/A 3/26/2018    [facility]    COLONOSCOPY N/A 5/19/2017    [facility] by [facility]    COLONOSCOPY N/A 5/22/2015    [facility] at [facility] END[facility]    SMALL INTESTINE SURGERY       Current Medications:   Current Outpatient Medications   Medication Sig Dispense Refill    adalimumab (HUMIRA) 40 mg/0.8 mL SyKt injection Inject 0.8 mLs (40 mg total) into the skin every 14 (fourteen) days. 2 vial 11    amLODIPine (NORVASC) 10 MG tablet Take 1 tablet (10 mg total) by mouth once daily. 90 tablet 3    aspirin (ECOTRIN) 81 MG EC tablet Take 1 tablet (81 mg total) by mouth once daily.      calcium carbonate (OS-COOPER) 600 mg (1,500 mg) Tab Take 600 mg by mouth 2 (two) times daily with meals.      cyanocobalamin, vitamin B-12, 2,500 mcg Lozg Place 1 tablet under the tongue.       levocetirizine (XYZAL) 5 MG tablet Take 1 tablet (5 mg total) by mouth every evening. 30 tablet 11    LORazepam (ATIVAN) 0.5 MG tablet Take 2.5 tablets (1.25 mg total) by mouth every evening. 75 tablet 4    melatonin 3 mg Tab Take 1 tablet by mouth nightly as needed.      metronidazole 0.75% (METROCREAM) 0.75 % Crea APPLY ONE APPLICATION TO THE FACE TWICE DAILY  2    multivitamin with minerals tablet Take 1 tablet by mouth once daily at 6am.      rosuvastatin (CRESTOR) 10 MG tablet Take 1 tablet (10 mg total) by mouth once daily. 90 tablet 3    valACYclovir (VALTREX) 500 MG tablet take 1 tablet by mouth as directed and take 1 tablet by mouth twice a day for 3 days  0    vitamin D 1000 units Tab Take 185 mg by mouth once daily.      pantoprazole (PROTONIX) 40 MG tablet Take 1 tablet (40 mg total) by mouth once daily. 30 tablet 11    SUPREP BOWEL PREP KIT 17.5-3.13-1.6 gram SolR Use as directed  354 mL 0     No current facility-administered medications for this visit.       Allergies:   Review of patient's allergies indicates:   Allergen Reactions    Codeine sulfate Itching    Hydrochlorothiazide Other (See Comments)     Adverse Reaction    Lisinopril Swelling and Edema     Facial Swelling     Social History:  Social History     Socioeconomic History    Marital status: Single     Spouse name: Not on file    Number of children: Not on file    Years of education: Not on file    Highest education level: Not on file   Social Needs    Financial resource strain: Not on file    Food insecurity - worry: Not on file    Food insecurity - inability: Not on file    Transportation needs - medical: Not on file    Transportation needs - non-medical: Not on file   Occupational History    Not on file   Tobacco Use    Smoking status: Former Smoker     Years: 20.00     Types: Cigarettes     Last attempt to quit: 2005     Years since quittin.1    Smokeless tobacco: Former User    Tobacco comment: Former Light Smoker less than 10 a day   Substance and Sexual Activity    Alcohol use: No     Alcohol/week: 2.4 oz     Types: 4 Glasses of wine per week    Drug use: No    Sexual activity: No     Partners: Female   Other Topics Concern    Not on file   Social History Narrative    Not on file     Family History:  Family History   Problem Relation Age of Onset    Stroke Mother     Heart disease Mother     Cancer Father         Lung    Heart disease Sister     Glaucoma Neg Hx     Macular degeneration Neg Hx     Thyroid disease Neg Hx        Review of Systems:   CONSTITUTIONAL: Denies weight change,  fatigue, fevers, chills, night sweats.  EYES: No changes in vision.   ENT: No oral lesions or sore throat.  HEMATOLOGICAL/Lymph: Denies bleeding tendency, bruising tendency. No swellings or enlarged lymph nodes.  CARDIOVASCULAR: Denies chest pain, shortness of breath, orthopnea and edema.  RESPIRATORY:  "Denies cough, hemoptysis, dyspnea, and wheezing.  GI: See HPI.  : Denies dysuria and hematuria  MUSCULOSKELETAL: Denies joint pain or swelling, back pain and muscle pain.  SKIN: Denies rashes.  NEUROLOGIC: Denies headaches, seizures and numbness.  PSYCHIATRIC: Denies depression or anxiety.  ENDOCRINE: Denies heat or cold intolerance and excessive thirst or urination.    Physical Examination:       /80   Pulse 84   Ht 5' 7" (1.702 m)   Wt 46.8 kg (103 lb 2.8 oz)   BMI 16.16 kg/m²   General Appearance:  Alert, cooperative, no distress, appears stated age  Head:   Normocephalic, without obvious abnormality, atraumatic  Eyes, Nose, Mouth:  Mucous membranes moist, conjunctiva/corneas clear, EOMI ,no obvious lip or oral lesions noted  Neck: No cervical or supraclavicular lymphadenopathy or masses  Lungs: Clear to auscultation bilaterally, no wheezes, rales or rhonchi, no respiratory distress.  Normal effort.   Cardiac: Regular rhythm, no obvious murmurs  Abdomen: Soft, non-distended, no hernias, non-tender, no hepatosplenomegally, no rebound, guarding or masses  Extremities: No edema, no clubbing, no palmar erythema  Skin: No other visible or palpable rashes   Neuro-Psychiatric: Mood, affect, memory and insight are normal.  No encephalopathy    Laboratory:  Lab Results   Component Value Date    WBC 4.43 01/25/2019    MCV 99 (H) 01/25/2019    RDW 12.1 01/25/2019     01/25/2019    BUN 13 01/25/2019    GLU 91 01/25/2019      Lab Results   Component Value Date    CRP 0.1 08/28/2018    TSH 1.326 01/04/2017    CHOL 147 01/25/2019    TRIG 50 01/25/2019    HDL 84 (H) 01/25/2019     No results found for: PHOS, MG, ALB     Imaging Review:   No results found.        Health Maintenance Review:  Health Maintenance   Topic Date Due    DEXA SCAN  02/08/2020    High Dose Statin  03/04/2020    Mammogram  01/25/2021    Colonoscopy  03/26/2023    Lipid Panel  01/25/2024    TETANUS VACCINE  07/25/2026    Zoster " Vaccine  Completed    Pneumococcal Vaccine (65+ Low/Medium Risk)  Completed    Influenza Vaccine  Completed    Hepatitis C Screening  Addressed       ASSESSMENT:  69 y.o. Female with longstanding history of ileocolonic Crohn's with h/o resection x 2, now on humira and doing well.  Appears to be in clinical remission, however has had some minor symptoms over the past year.  Pt with low level antibodies to humira with good drug level.      PLAN:  Will do colonoscopy and repeat calprotectin.  Depending on findings at colonoscopy, may need to add immunomodulator given low level antibodies vs switching biologic.  If colonoscopy normal then can consider continuing humira monotherapy.      Health Maintenance:   Vitamin D- address at next visit  Bone Health: DEXA- osteoporosis, needs therapy but plans to address this after colonoscopy   Derm- sees yearly     Return to clinic: No Follow-up on file.      Lili Ellis  3/4/2019  11:17 AM

## 2019-03-05 ENCOUNTER — LAB VISIT (OUTPATIENT)
Dept: LAB | Facility: HOSPITAL | Age: 69
End: 2019-03-05
Attending: INTERNAL MEDICINE
Payer: MEDICARE

## 2019-03-05 DIAGNOSIS — K50.80 CROHN'S DISEASE OF BOTH SMALL AND LARGE INTESTINE WITHOUT COMPLICATION: ICD-10-CM

## 2019-03-05 PROCEDURE — 83993 ASSAY FOR CALPROTECTIN FECAL: CPT

## 2019-03-06 ENCOUNTER — PATIENT MESSAGE (OUTPATIENT)
Dept: ENDOSCOPY | Facility: HOSPITAL | Age: 69
End: 2019-03-06

## 2019-03-12 ENCOUNTER — TELEPHONE (OUTPATIENT)
Dept: ENDOSCOPY | Facility: HOSPITAL | Age: 69
End: 2019-03-12

## 2019-03-12 ENCOUNTER — PATIENT MESSAGE (OUTPATIENT)
Dept: GASTROENTEROLOGY | Facility: CLINIC | Age: 69
End: 2019-03-12

## 2019-03-12 LAB — CALPROTECTIN STL-MCNT: 186.4 MCG/G

## 2019-03-13 ENCOUNTER — PATIENT MESSAGE (OUTPATIENT)
Dept: ENDOSCOPY | Facility: HOSPITAL | Age: 69
End: 2019-03-13

## 2019-03-13 RX ORDER — SODIUM, POTASSIUM,MAG SULFATES 17.5-3.13G
1 SOLUTION, RECONSTITUTED, ORAL ORAL ONCE
Qty: 354 ML | Refills: 0 | Status: SHIPPED | OUTPATIENT
Start: 2019-03-13 | End: 2019-03-16

## 2019-03-19 ENCOUNTER — ANESTHESIA EVENT (OUTPATIENT)
Dept: ENDOSCOPY | Facility: HOSPITAL | Age: 69
End: 2019-03-19
Payer: MEDICARE

## 2019-03-19 ENCOUNTER — HOSPITAL ENCOUNTER (OUTPATIENT)
Facility: HOSPITAL | Age: 69
Discharge: HOME | End: 2019-03-19
Attending: INTERNAL MEDICINE | Admitting: INTERNAL MEDICINE
Payer: MEDICARE

## 2019-03-19 ENCOUNTER — ANESTHESIA (OUTPATIENT)
Dept: ENDOSCOPY | Facility: HOSPITAL | Age: 69
End: 2019-03-19
Payer: MEDICARE

## 2019-03-19 VITALS
TEMPERATURE: 98 F | BODY MASS INDEX: 15.78 KG/M2 | HEIGHT: 67 IN | OXYGEN SATURATION: 98 % | RESPIRATION RATE: 20 BRPM | SYSTOLIC BLOOD PRESSURE: 127 MMHG | HEART RATE: 78 BPM | DIASTOLIC BLOOD PRESSURE: 72 MMHG | WEIGHT: 100.5 LBS

## 2019-03-19 DIAGNOSIS — K50.90 CROHN'S DISEASE WITHOUT COMPLICATION, UNSPECIFIED GASTROINTESTINAL TRACT LOCATION: ICD-10-CM

## 2019-03-19 DIAGNOSIS — K50.90 CROHN'S DISEASE: Primary | ICD-10-CM

## 2019-03-19 DIAGNOSIS — K50.00 CROHN'S DISEASE OF SMALL INTESTINE WITHOUT COMPLICATION: ICD-10-CM

## 2019-03-19 PROCEDURE — 45380 PR COLONOSCOPY,BIOPSY: ICD-10-PCS | Mod: ,,, | Performed by: INTERNAL MEDICINE

## 2019-03-19 PROCEDURE — 88305 TISSUE EXAM BY PATHOLOGIST: CPT | Mod: 26,,, | Performed by: PATHOLOGY

## 2019-03-19 PROCEDURE — 27201012 HC FORCEPS, HOT/COLD, DISP: Performed by: INTERNAL MEDICINE

## 2019-03-19 PROCEDURE — 37000008 HC ANESTHESIA 1ST 15 MINUTES: Performed by: INTERNAL MEDICINE

## 2019-03-19 PROCEDURE — 25000003 PHARM REV CODE 250: Performed by: INTERNAL MEDICINE

## 2019-03-19 PROCEDURE — 37000009 HC ANESTHESIA EA ADD 15 MINS: Performed by: INTERNAL MEDICINE

## 2019-03-19 PROCEDURE — 88305 TISSUE SPECIMEN TO PATHOLOGY - SURGERY: ICD-10-PCS | Mod: 26,,, | Performed by: PATHOLOGY

## 2019-03-19 PROCEDURE — 88305 TISSUE EXAM BY PATHOLOGIST: CPT | Performed by: PATHOLOGY

## 2019-03-19 PROCEDURE — 25000003 PHARM REV CODE 250: Performed by: NURSE ANESTHETIST, CERTIFIED REGISTERED

## 2019-03-19 PROCEDURE — 63600175 PHARM REV CODE 636 W HCPCS: Performed by: NURSE ANESTHETIST, CERTIFIED REGISTERED

## 2019-03-19 PROCEDURE — 45380 COLONOSCOPY AND BIOPSY: CPT | Mod: ,,, | Performed by: INTERNAL MEDICINE

## 2019-03-19 PROCEDURE — 45380 COLONOSCOPY AND BIOPSY: CPT | Performed by: INTERNAL MEDICINE

## 2019-03-19 RX ORDER — LIDOCAINE HYDROCHLORIDE 10 MG/ML
INJECTION INFILTRATION; PERINEURAL
Status: DISCONTINUED | OUTPATIENT
Start: 2019-03-19 | End: 2019-03-19

## 2019-03-19 RX ORDER — PROPOFOL 10 MG/ML
VIAL (ML) INTRAVENOUS
Status: DISCONTINUED | OUTPATIENT
Start: 2019-03-19 | End: 2019-03-19

## 2019-03-19 RX ORDER — SODIUM CHLORIDE, SODIUM LACTATE, POTASSIUM CHLORIDE, CALCIUM CHLORIDE 600; 310; 30; 20 MG/100ML; MG/100ML; MG/100ML; MG/100ML
INJECTION, SOLUTION INTRAVENOUS CONTINUOUS
Status: DISCONTINUED | OUTPATIENT
Start: 2019-03-19 | End: 2019-03-19 | Stop reason: HOSPADM

## 2019-03-19 RX ADMIN — PROPOFOL 50 MG: 10 INJECTION, EMULSION INTRAVENOUS at 12:03

## 2019-03-19 RX ADMIN — LIDOCAINE HYDROCHLORIDE 50 MG: 10 INJECTION, SOLUTION INFILTRATION; PERINEURAL at 12:03

## 2019-03-19 RX ADMIN — PROPOFOL 50 MG: 10 INJECTION, EMULSION INTRAVENOUS at 01:03

## 2019-03-19 RX ADMIN — PROPOFOL 100 MG: 10 INJECTION, EMULSION INTRAVENOUS at 12:03

## 2019-03-19 RX ADMIN — SODIUM CHLORIDE, SODIUM LACTATE, POTASSIUM CHLORIDE, AND CALCIUM CHLORIDE: .6; .31; .03; .02 INJECTION, SOLUTION INTRAVENOUS at 11:03

## 2019-03-19 NOTE — ANESTHESIA PREPROCEDURE EVALUATION
03/19/2019  Manuela Reyes is a 69 y.o., female.    Pre-op Assessment    I have reviewed the Patient Summary Reports.      I have reviewed the Medications.     Review of Systems  Anesthesia Hx:  No problems with previous Anesthesia  Denies Family Hx of Anesthesia complications.   Denies Personal Hx of Anesthesia complications.   Social:  Former Smoker, No Alcohol Use    Hematology/Oncology:  Hematology Normal   Oncology Normal     Cardiovascular:   Hypertension Denies MI.   Denies CABG/stent.      hyperlipidemia ECG has been reviewed. Normal sinus rhythm  Normal ECG  No previous ECGs available  Confirmed by BOSSMAN WILLINGHAM MD ([id number]) on 5/9/2018 8:51:55 AM    CONCLUSIONS     1 - Normal left ventricular systolic function (EF 60-65%).     2 - Normal left ventricular diastolic function.     3 - Normal right ventricular systolic function .    Pulmonary:   Denies COPD.  Denies Asthma.  Denies Sleep Apnea.    Renal/:  Renal/ Normal     Hepatic/GI:   Bowel Prep. Denies GERD. Liver Disease, Hepatitis, C Crohn's disease  Hep c was treated   Musculoskeletal:   Osteoporosis   Neurological:   TIA, Denies CVA. Denies Seizures.    Endocrine:  Endocrine Normal    Dermatological:   Genital herpes   Psych:   anxiety depression Insomnia         Physical Exam  General:  Well nourished    Airway/Jaw/Neck:  Airway Findings: Mouth Opening: Normal Tongue: Normal  General Airway Assessment: Adult  Mallampati: II      Dental:  Dental Findings: In tact   Chest/Lungs:  Chest/Lungs Findings: Clear to auscultation, Normal Respiratory Rate     Heart/Vascular:  Heart Findings: Rate: Normal  Rhythm: Regular Rhythm  Sounds: Normal             Anesthesia Plan  Type of Anesthesia, risks & benefits discussed:  Anesthesia Type:  MAC  Patient's Preference:   Intra-op Monitoring Plan: standard ASA monitors  Intra-op Monitoring Plan  Comments:   Post Op Pain Control Plan: multimodal analgesia  Post Op Pain Control Plan Comments:   Induction:   IV  Beta Blocker:  Patient is not currently on a Beta-Blocker (No further documentation required).       Informed Consent: Patient understands risks and agrees with Anesthesia plan.  Questions answered. Anesthesia consent signed with patient.  ASA Score: 2     Day of Surgery Review of History & Physical: I have interviewed and examined the patient. I have reviewed the patient's H&P dated:  There are no significant changes.  H&P update referred to the surgeon.         Ready For Surgery From Anesthesia Perspective.

## 2019-03-19 NOTE — DISCHARGE INSTRUCTIONS
"  Crohns Disease    Crohns disease is inflammation of the intestinal tract that comes and goes in flare-ups. This is a chronic (long-term) illness. During a flare-up, intense abdominal pain and fever may be felt. Mucus, blood, or pus may appear in the stool. Between flare ups, inflammation lessens and there usually are no symptoms. Crohns disease is a form of inflammatory bowel disease (IBD).   Symptoms of Crohn's disease may include:  · Abdominal cramps and pain  · Diarrhea, usually bloody, alternating with constipation  · Mucus in stools  · Rectal bleeding  · Rectal pain  · Fever  · Low energy  · Decreased appetite and weight loss  No one knows what exactly causes Crohn's disease, and there is no cure. The goal of treatment is to control and relieve symptoms and prevent complications, so you can lead a full and active life. No single treatment works for everyone, but many things can be done to help.  Diet  Foods did not cause your Crohn's, but they can affect it. Unfortunately, there is no one diet that works for everyone. Below are some things to try. Keep a food log to figure out what you are sensitive to.  · Eat more slowly and smaller amounts at a time, but more often. Remember, you can always eat more, but cannot eat less once you've eaten too much.  · High fiber foods are complicated. While they may help constipation they can make the bloating, cramping, gas, and diarrhea worse.  · Try avoiding dairy products, sometimes this helps  · Try cutting out foods that are high in fat and fatty meats  · Eat less sugar  · Bloating or passing excess gas may be controlled. Be careful with "gassy" vegetables and fruits like beans, cabbage, broccoli, and cauliflower.  · Be careful of carbonated beverages and fruit juices. They can make the bloating and diarrhea worse.  · Caffeine, alcohol, and stimulants may worsen symptoms. These include coffee, tea, sodas, energy drinks, and chocolate.  Lifestyle  Although stress " does not cause Crohn's, it is often a factor in flare-ups. It can also affect how you feel about and cope with your condition.  · Look for factors that seem to worsen your symptoms such as stress and emotions.  · Counseling can often help relieve stress. So can self-help measures such as exercise, yoga, and meditation.  · Depression can be a part of this illness and antidepressants may be prescribed. This may actually help with diarrhea, constipation, and cramping, as well as depression.  · Smoking doesn't cause Crohn's, but can make the symptoms worse.  Medicines  Your healthcare provider may prescribe medicines. If so, take them as directed. For acute flare-ups, prescription medicines can be prescribed. Contact your provider if you need this.  · Ask your healthcare provider before taking any antidiarrheal medicines.  · Avoid anti-inflammatory medicines like ibuprofen or naproxen.  · Consider nutritional supplements. This is especially true if the diarrhea is prolonged, or you aren't eating or are losing weight.  Follow-up care  Follow up with your healthcare provider, or as advised. If a stool sample was taken or cultures were done, you will be told if your treatment needs to change. Call as directed for the results.  Support  Support groups for persons Crohns disease can be a source of useful information on how others are coping with this illness. They are available in person, on the phone, or via the Internet. Contact the following resources for more information.  · Crohns [employer] 527.228.2812 www.ccfa.org  · National Digestive Diseases Information Clearinghouse (NDDIC) 955.327.7492 www.digestive.niddk.nih.gov  When to seek medical advice  Call your healthcare provider right away if any of these occur:  · Fever of 100.4ºF (38ºC) or higher, or as directed by your healthcare provider  · Abdominal pain that doesn't get better when you take the usual measures  · Mucus, pus, or blood  in the stool (dark or bright red)  · Repeated vomiting  · Abdominal swelling and pain that doesn't go away after a few hours  Call 911  Call 911 if any of these occur:  · Trouble breathing  · Confusion  · Extreme sleepiness or trouble waking up  · Fainting or loss of consciousness  · Rapid heart rate  Date Last Reviewed: 8/31/2015  © 6428-1081 [employer]. [address] [place] [address], [place] [address]. All rights reserved. This information is not intended as a substitute for professional medical care. Always follow your healthcare professional's instructions.

## 2019-03-19 NOTE — [employer] PATIENT INSTRUCTIONS
Discharge Summary/Instructions after an Endoscopic Procedure  Patient Name: Manuela Reyes  Patient MRN: [id number]  Patient YOB: 1950 Tuesday, March 19, 2019 Lili Ellis MD  RESTRICTIONS:  During your procedure today, you received medications for sedation.  These   medications may affect your judgment, balance and coordination.  Therefore,   for 24 hours, you have the following restrictions:   - DO NOT drive a car, operate machinery, make legal/financial decisions,   sign important papers or drink alcohol.    ACTIVITY:  Today: no heavy lifting, straining or running due to procedural   sedation/anesthesia.  The following day: return to full activity including work.  DIET:  Eat and drink normally unless instructed otherwise.     TREATMENT FOR COMMON SIDE EFFECTS:  - Mild abdominal pain, nausea, belching, bloating or excessive gas:  rest,   eat lightly and use a heating pad.  - Sore Throat: treat with throat lozenges and/or gargle with warm salt   water.  - Because air was used during the procedure, expelling large amounts of air   from your rectum or belching is normal.  - If a bowel prep was taken, you may not have a bowel movement for 1-3 days.    This is normal.  SYMPTOMS TO WATCH FOR AND REPORT TO YOUR PHYSICIAN:  1. Abdominal pain or bloating, other than gas cramps.  2. Chest pain.  3. Back pain.  4. Signs of infection such as: chills or fever occurring within 24 hours   after the procedure.  5. Rectal bleeding, which would show as bright red, maroon, or black stools.   (A tablespoon of blood from the rectum is not serious, especially if   hemorrhoids are present.)  6. Vomiting.  7. Weakness or dizziness.  GO DIRECTLY TO THE NEAREST EMERGENCY ROOM IF YOU HAVE ANY OF THE FOLLOWING:      Difficulty breathing              Chills and/or fever over 101 F   Persistent vomiting and/or vomiting blood   Severe abdominal pain   Severe chest pain   Black, tarry stools   Bleeding- more than one  tablespoon   Any other symptom or condition that you feel may need urgent attention  Your doctor recommends these additional instructions:  If any biopsies were taken, your doctors clinic will contact you in 1 to 2   weeks with any results.  - Patient has a contact number available for emergencies.  The signs and   symptoms of potential delayed complications were discussed with the   patient.  Return to normal activities tomorrow.  Written discharge   instructions were provided to the patient.   - Discharge patient to home (via wheelchair).   - Resume previous diet today.   - Continue present medications.   - Repeat colonoscopy in 1 year to assess disease activity.  For questions, problems or results please call your physician Lili Ellis MD at Work:  [phone]  If you have any questions about the above instructions, call the GI   department at [phone] or call the endoscopy unit at [phone]   from 7am until 3 pm.  [facility], EMERGENCY ROOM PHONE NUMBER:   [phone]  IF A COMPLICATION OR EMERGENCY SITUATION ARISES AND YOU ARE UNABLE TO REACH   YOUR PHYSICIAN - GO DIRECTLY TO THE EMERGENCY ROOM.  I have read or have had read to me these discharge instructions for my   procedure and have received a written copy.  I understand these   instructions and will follow-up with my physician if I have any questions.     __________________________________       _____________________________________  Nurse Signature                                          Patient/Designated   Responsible Party Signature  MD Lili Lopez MD  3/19/2019 1:40:19 PM  This report has been verified and signed electronically.  [employer]

## 2019-03-19 NOTE — TRANSFER OF CARE
"Anesthesia Transfer of Care Note    Patient: Manuela Reyes    Procedure(s) Performed: Procedure(s) (LRB):  COLONOSCOPY (N/A)    Patient location: GI    Anesthesia Type: MAC    Transport from OR: Transported from OR on room air with adequate spontaneous ventilation    Post pain: adequate analgesia    Post assessment: no apparent anesthetic complications    Post vital signs: stable    Level of consciousness: awake, alert and oriented    Nausea/Vomiting: no nausea/vomiting    Complications: none    Transfer of care protocol was followed      Last vitals:   Visit Vitals  BP (!) 124/97 (BP Location: Left arm, Patient Position: Lying)   Pulse 88   Temp 36.8 °C (98.2 °F) (Tympanic)   Resp 18   Ht 5' 7" (1.702 m)   Wt 45.6 kg (100 lb 8.5 oz)   SpO2 97%   Breastfeeding? No   BMI 15.75 kg/m²     "

## 2019-03-19 NOTE — DISCHARGE SUMMARY
Endoscopy Discharge Summary      Admit Date: 3/19/2019    Discharge Date and Time:  3/19/2019 1:24 PM    Attending Physician: Lili Ellis MD     Discharge Physician: Lili Ellis MD     Principal Admitting Diagnoses: Crohn's disease         Discharge Diagnosis: The primary encounter diagnosis was Crohn's disease. Diagnoses of Crohn's disease without complication, unspecified gastrointestinal tract location and Crohn's disease of small intestine without complication were also pertinent to this visit.     Discharged Condition: Good    Indication for Admission: Crohn's disease     Hospital Course: Patient was admitted for an inpatient procedure and tolerated the procedure well with no complications.    Significant Diagnostic Studies: Colonoscopy with biopsy    Pathology (if any):  Specimen (12h ago, onward)    Start     Ordered    03/19/19 1259  Specimen to Pathology - Surgery  Once     Comments:  #1 biopsy terminal ileum/crohn's disease     Start Status   03/19/19 1259 Collected (03/19/19 1321)       03/19/19 1320          Estimated Blood Loss: 1 ml.    Discussed with: patient.    Disposition: Home.    Follow Up/Patient Instructions:   Current Discharge Medication List      CONTINUE these medications which have NOT CHANGED    Details   amLODIPine (NORVASC) 10 MG tablet Take 1 tablet (10 mg total) by mouth once daily.  Qty: 90 tablet, Refills: 3    Associated Diagnoses: Hypertension goal BP (blood pressure) < 140/90      aspirin (ECOTRIN) 81 MG EC tablet Take 1 tablet (81 mg total) by mouth once daily.      calcium carbonate (OS-COOPER) 600 mg (1,500 mg) Tab Take 600 mg by mouth 2 (two) times daily with meals.      cyanocobalamin, vitamin B-12, 2,500 mcg Lozg Place 1 tablet under the tongue.       levocetirizine (XYZAL) 5 MG tablet Take 1 tablet (5 mg total) by mouth every evening.  Qty: 30 tablet, Refills: 11      LORazepam (ATIVAN) 0.5 MG tablet Take 2.5 tablets (1.25 mg total) by mouth every evening.  Qty: 75  tablet, Refills: 4    Associated Diagnoses: Anxiety; Chronic insomnia      melatonin 3 mg Tab Take 1 tablet by mouth nightly as needed.      metronidazole 0.75% (METROCREAM) 0.75 % Crea APPLY ONE APPLICATION TO THE FACE TWICE DAILY  Refills: 2      multivitamin with minerals tablet Take 1 tablet by mouth once daily at 6am.      rosuvastatin (CRESTOR) 10 MG tablet Take 1 tablet (10 mg total) by mouth once daily.  Qty: 90 tablet, Refills: 3    Associated Diagnoses: Mixed hyperlipidemia      vitamin D 1000 units Tab Take 185 mg by mouth once daily.      adalimumab (HUMIRA) 40 mg/0.8 mL SyKt injection Inject 0.8 mLs (40 mg total) into the skin every 14 (fourteen) days.  Qty: 2 vial, Refills: 11    Associated Diagnoses: Crohn's disease of small intestine with complication      valACYclovir (VALTREX) 500 MG tablet take 1 tablet by mouth as directed and take 1 tablet by mouth twice a day for 3 days  Refills: 0         STOP taking these medications       pantoprazole (PROTONIX) 40 MG tablet Comments:   Reason for Stopping:         SUPREP BOWEL PREP KIT 17.5-3.13-1.6 gram SolR Comments:   Reason for Stopping:               Discharge Procedure Orders   Diet general     Call MD for:  temperature >100.4     Call MD for:  persistent nausea and vomiting     Call MD for:  severe uncontrolled pain     Call MD for:  difficulty breathing, headache or visual disturbances     Activity as tolerated       Follow-up Information     Lili Ellis MD. Call in 1 week.    Specialty:  Gastroenterology  Why:  For pathology results  Contact information:  [address]  [place] [address] [phone]

## 2019-03-19 NOTE — H&P
PRE PROCEDURE H&P    Patient Name: Manuela Reyes  MRN: [id number]  : 1950  Date of Procedure:  3/19/2019  Referring Physician: Sandra Hunt NP  Primary Physician: Christelle Lawler DO  Procedure Physician: Lili Ellis MD       Planned Procedure: Colonoscopy  Diagnosis: Crohn's disease  Chief Complaint: Same as above    HPI: Patient is an 69 y.o. female is here for Crohn's disease    Past Medical History:   Past Medical History:   Diagnosis Date    Anxiety     Crohn's disease     Depression     Genital herpes     Hepatitis C infection     Hypertension     Insomnia     Mesenteric artery stenosis     Dr. Checo Reed--vascular surgery    Osteoporosis     TIA (transient ischemic attack)         Past Surgical History:  Past Surgical History:   Procedure Laterality Date    APPENDECTOMY      COLONOSCOPY N/A 3/26/2018    Performed [facility]illaume Whitman [facility]    COLONOSCOPY N/A 2017    Performed by Jose Luis Leonard MD at [facility]    COLONOSCOPY N/A 2015    Performed by Jose Luis Leonard MD at [facility]    SMALL INTESTINE SURGERY          Home Medications:  Prior to Admission medications    Medication Sig Start Date End Date Taking? Authorizing Provider   amLODIPine (NORVASC) 10 MG tablet Take 1 tablet (10 mg total) by mouth once daily. 19  Yes Christelle Lawler DO   aspirin (ECOTRIN) 81 MG EC tablet Take 1 tablet (81 mg total) by mouth once daily. 17  Yes Jose Luis Leonard MD   calcium carbonate (OS-COOPER) 600 mg (1,500 mg) Tab Take 600 mg by mouth 2 (two) times daily with meals.   Yes Historical Provider, MD   cyanocobalamin, vitamin B-12, 2,500 mcg Lozg Place 1 tablet under the tongue.    Yes Historical Provider, MD   levocetirizine (XYZAL) 5 MG tablet Take 1 tablet (5 mg total) by mouth every evening. 18 Yes Geronimo Urbano MD   LORazepam (ATIVAN) 0.5 MG tablet Take 2.5 tablets (1.25 mg total) by mouth every evening. 19  Yes Christelle Lawler DO    melatonin 3 mg Tab Take 1 tablet by mouth nightly as needed.   Yes Historical Provider, MD   metronidazole 0.75% (METROCREAM) 0.75 % Crea APPLY ONE APPLICATION TO THE FACE TWICE DAILY 12/21/17  Yes Historical Provider, MD   multivitamin with minerals tablet Take 1 tablet by mouth once daily at 6am. 4/17/17  Yes Historical Provider, MD   rosuvastatin (CRESTOR) 10 MG tablet Take 1 tablet (10 mg total) by mouth once daily. 5/9/18 6/13/19 Yes Allen White MD   vitamin D 1000 units Tab Take 185 mg by mouth once daily.   Yes Historical Provider, MD   adalimumab (HUMIRA) 40 mg/0.8 mL SyKt injection Inject 0.8 mLs (40 mg total) into the skin every 14 (fourteen) days. 1/12/18 3/4/19  LYNDSAY Huang   valACYclovir (VALTREX) 500 MG tablet take 1 tablet by mouth as directed and take 1 tablet by mouth twice a day for 3 days 12/29/17   Historical Provider, MD   pantoprazole (PROTONIX) 40 MG tablet Take 1 tablet (40 mg total) by mouth once daily. 2/4/19 3/19/19  Sandra Hunt NP   SUPREP BOWEL PREP KIT 17.5-3.13-1.6 gram SolR Use as directed 11/6/18 3/19/19  Sandra Hunt NP        Allergies:  Review of patient's allergies indicates:   Allergen Reactions    Codeine sulfate Itching    Hydrochlorothiazide Other (See Comments)     Adverse Reaction    Lisinopril Swelling and Edema     Facial Swelling        Social History:   Social History     Socioeconomic History    Marital status: Single     Spouse name: Not on file    Number of children: Not on file    Years of education: Not on file    Highest education level: Not on file   Social Needs    Financial resource strain: Not on file    Food insecurity - worry: Not on file    Food insecurity - inability: Not on file    Transportation needs - medical: Not on file    Transportation needs - non-medical: Not on file   Occupational History    Not on file   Tobacco Use    Smoking status: Former Smoker     Years: 20.00     Types: Cigarettes     Last attempt to  "quit: 2005     Years since quittin.2    Smokeless tobacco: Former User    Tobacco comment: Former Light Smoker less than 10 a day   Substance and Sexual Activity    Alcohol use: No     Alcohol/week: 2.4 oz     Types: 4 Glasses of wine per week    Drug use: No    Sexual activity: No     Partners: Female   Other Topics Concern    Not on file   Social History Narrative    Not on file       Family History:  Family History   Problem Relation Age of Onset    Stroke Mother     Heart disease Mother     Cancer Father         Lung    Heart disease Sister     Glaucoma Neg Hx     Macular degeneration Neg Hx     Thyroid disease Neg Hx        ROS: No acute cardiac events, no acute respiratory complaints.     Physical Exam (all patients):    /72   Pulse 78   Temp 97.7 °F (36.5 °C) (Temporal)   Resp 20   Ht 5' 7" (1.702 m)   Wt 45.6 kg (100 lb 8.5 oz)   SpO2 98%   Breastfeeding? No   BMI 15.75 kg/m²   Lungs: Clear to auscultation bilaterally, respirations unlabored  Heart: Regular rate and rhythm, S1 and S2 normal, no obvious murmurs  Abdomen:  Soft, non-tender, bowel sounds normal, no masses, no organomegaly    Lab Results   Component Value Date    WBC 4.43 2019    MCV 99 (H) 2019    RDW 12.1 2019     2019    GLU 91 2019    BUN 13 2019     2019    K 3.8 2019     2019        SEDATION PLAN: MAC      History reviewed, vital signs satisfactory, cardiopulmonary status satisfactory, sedation options, risks and plans have been discussed with the patient. All their questions were answered and the patient agrees to the sedation procedures as planned and the patient is deemed an appropriate candidate for the sedation as planned.    Procedure explained to patient, informed consent obtained and placed in chart.    Lili Ellis  3/19/2019  3:10 PM   "

## 2019-03-19 NOTE — ANESTHESIA POSTPROCEDURE EVALUATION
"Anesthesia Post Evaluation    Patient: Manuela Reyes    Procedure(s) Performed: Procedure(s) (LRB):  COLONOSCOPY (N/A)    Final Anesthesia Type: MAC  Patient location during evaluation: GI PACU  Patient participation: Yes- Able to Participate  Level of consciousness: awake and alert  Post-procedure vital signs: reviewed and stable  Pain management: adequate  Airway patency: patent  PONV status at discharge: No PONV  Anesthetic complications: no      Cardiovascular status: blood pressure returned to baseline  Respiratory status: unassisted and spontaneous ventilation  Hydration status: euvolemic  Follow-up not needed.        Visit Vitals  /72   Pulse 78   Temp 36.5 °C (97.7 °F) (Temporal)   Resp 20   Ht 5' 7" (1.702 m)   Wt 45.6 kg (100 lb 8.5 oz)   SpO2 98%   Breastfeeding? No   BMI 15.75 kg/m²       Pain/Aileen Score: Aileen Score: 10 (3/19/2019  1:42 PM)        "

## 2019-03-19 NOTE — ANESTHESIA RELEASE NOTE
"Anesthesia Release from PACU Note    Patient: Manuela Reyes    Procedure(s) Performed: Procedure(s) (LRB):  COLONOSCOPY (N/A)    Anesthesia type: MAC    Post pain: Adequate analgesia    Post assessment: no apparent anesthetic complications, tolerated procedure well and no evidence of recall    Last Vitals:   Visit Vitals  /72   Pulse 78   Temp 36.5 °C (97.7 °F) (Temporal)   Resp 20   Ht 5' 7" (1.702 m)   Wt 45.6 kg (100 lb 8.5 oz)   SpO2 98%   Breastfeeding? No   BMI 15.75 kg/m²       Post vital signs: stable    Level of consciousness: awake, alert  and oriented    Nausea/Vomiting: no nausea/no vomiting    Complications: none    Airway Patency: patent    Respiratory: unassisted, spontaneous ventilation, room air    Cardiovascular: stable and blood pressure at baseline    Hydration: euvolemic  "

## 2019-03-21 ENCOUNTER — PES CALL (OUTPATIENT)
Dept: ADMINISTRATIVE | Facility: CLINIC | Age: 69
End: 2019-03-21

## 2019-03-28 ENCOUNTER — PATIENT MESSAGE (OUTPATIENT)
Dept: GASTROENTEROLOGY | Facility: CLINIC | Age: 69
End: 2019-03-28

## 2019-04-02 ENCOUNTER — TELEPHONE (OUTPATIENT)
Dept: GASTROENTEROLOGY | Facility: CLINIC | Age: 69
End: 2019-04-02

## 2019-04-02 NOTE — TELEPHONE ENCOUNTER
----- Message from Greta Thomas sent at 4/1/2019  4:08 PM CDT -----  Contact: PATIENT  Type:  Patient Returning Call    Who Called:PATIENT  Who Left Message for Patient:NURSE  Does the patient know what this is regarding?:BIOSPY FROM COLONOSCOPY  Would the patient rather a call back or a response via [facility]? CALL  Best Call Back Number:[phone]  Additional Information: GASTROENTEROLOGY

## 2019-04-02 NOTE — TELEPHONE ENCOUNTER
Returned pt's call - she is requesting her Pathology report.  Told that I received the report today from Dr. Ellis and it showed moderate inflammation.  Told her that no changes had been recommended by the doctor.

## 2019-04-05 ENCOUNTER — PATIENT MESSAGE (OUTPATIENT)
Dept: GASTROENTEROLOGY | Facility: CLINIC | Age: 69
End: 2019-04-05

## 2019-04-09 ENCOUNTER — PATIENT MESSAGE (OUTPATIENT)
Dept: GASTROENTEROLOGY | Facility: CLINIC | Age: 69
End: 2019-04-09

## 2019-04-09 ENCOUNTER — OFFICE VISIT (OUTPATIENT)
Dept: INTERNAL MEDICINE | Facility: CLINIC | Age: 69
End: 2019-04-09
Payer: MEDICARE

## 2019-04-09 VITALS
BODY MASS INDEX: 16.06 KG/M2 | OXYGEN SATURATION: 98 % | WEIGHT: 102.31 LBS | HEIGHT: 67 IN | TEMPERATURE: 98 F | DIASTOLIC BLOOD PRESSURE: 76 MMHG | HEART RATE: 86 BPM | SYSTOLIC BLOOD PRESSURE: 130 MMHG

## 2019-04-09 DIAGNOSIS — J02.9 ACUTE PHARYNGITIS, UNSPECIFIED ETIOLOGY: Primary | ICD-10-CM

## 2019-04-09 PROCEDURE — 99214 OFFICE O/P EST MOD 30 MIN: CPT | Mod: S$GLB,,, | Performed by: PHYSICIAN ASSISTANT

## 2019-04-09 PROCEDURE — 3078F DIAST BP <80 MM HG: CPT | Mod: CPTII,S$GLB,, | Performed by: PHYSICIAN ASSISTANT

## 2019-04-09 PROCEDURE — 3075F PR MOST RECENT SYSTOLIC BLOOD PRESS GE 130-139MM HG: ICD-10-PCS | Mod: CPTII,S$GLB,, | Performed by: PHYSICIAN ASSISTANT

## 2019-04-09 PROCEDURE — 99999 PR PBB SHADOW E&M-EST. PATIENT-LVL IV: CPT | Mod: PBBFAC,,, | Performed by: PHYSICIAN ASSISTANT

## 2019-04-09 PROCEDURE — 99999 PR PBB SHADOW E&M-EST. PATIENT-LVL IV: ICD-10-PCS | Mod: PBBFAC,,, | Performed by: PHYSICIAN ASSISTANT

## 2019-04-09 PROCEDURE — 3078F PR MOST RECENT DIASTOLIC BLOOD PRESSURE < 80 MM HG: ICD-10-PCS | Mod: CPTII,S$GLB,, | Performed by: PHYSICIAN ASSISTANT

## 2019-04-09 PROCEDURE — 1101F PR PT FALLS ASSESS DOC 0-1 FALLS W/OUT INJ PAST YR: ICD-10-PCS | Mod: CPTII,S$GLB,, | Performed by: PHYSICIAN ASSISTANT

## 2019-04-09 PROCEDURE — 3075F SYST BP GE 130 - 139MM HG: CPT | Mod: CPTII,S$GLB,, | Performed by: PHYSICIAN ASSISTANT

## 2019-04-09 PROCEDURE — 99214 PR OFFICE/OUTPT VISIT, EST, LEVL IV, 30-39 MIN: ICD-10-PCS | Mod: S$GLB,,, | Performed by: PHYSICIAN ASSISTANT

## 2019-04-09 PROCEDURE — 1101F PT FALLS ASSESS-DOCD LE1/YR: CPT | Mod: CPTII,S$GLB,, | Performed by: PHYSICIAN ASSISTANT

## 2019-04-09 RX ORDER — AZITHROMYCIN 250 MG/1
TABLET, FILM COATED ORAL
Qty: 6 TABLET | Refills: 0 | Status: SHIPPED | OUTPATIENT
Start: 2019-04-09 | End: 2019-04-22

## 2019-04-09 NOTE — PROGRESS NOTES
Subjective:       Patient ID: Manuela Reyes is a 69 y.o. female.    Chief Complaint: Sore Throat (PCP - [facility]) and sinus drip    Sore Throat    This is a new problem. The current episode started 1 to 4 weeks ago. The problem has been unchanged. The pain is worse on the left side. There has been no fever. The pain is mild. Associated symptoms include trouble swallowing. Pertinent negatives include no abdominal pain, congestion, coughing, ear discharge, ear pain, plugged ear sensation or swollen glands. She has had no exposure to strep or mono. She has tried nothing for the symptoms.     Past Medical History:   Diagnosis Date    Anxiety     Crohn's disease     Depression     Genital herpes     Hepatitis C infection     Hypertension     Insomnia     Mesenteric artery stenosis     Dr. 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 Malaika Mack--dermatology    TIA (transient ischemic attack)        Review of Systems   Constitutional: Negative for chills and fatigue.   HENT: Positive for postnasal drip, sore throat and trouble swallowing. Negative for congestion, ear discharge, ear pain, rhinorrhea, sinus pressure and sinus pain.    Respiratory: Negative for cough and chest tightness.    Cardiovascular: Negative for chest pain.   Gastrointestinal: Negative for abdominal pain.       Objective:      Physical Exam   Constitutional: She appears well-developed and well-nourished. No distress.   HENT:   Head: Normocephalic and atraumatic.   Neck: Neck supple.   Cardiovascular: Normal rate and regular rhythm. Exam reveals no gallop and no friction rub.   No murmur heard.  Pulmonary/Chest: Effort normal and breath sounds normal. No stridor. No respiratory distress. She has no wheezes. She has no rales. She exhibits no tenderness.   Abdominal: Soft. There is no tenderness.   Lymphadenopathy:     She has no cervical adenopathy.   Skin: She is not  diaphoretic.   Nursing note and vitals reviewed.      Assessment:       1. Acute pharyngitis, unspecified etiology        Plan:       Acute pharyngitis, unspecified etiology    Other orders  -     azithromycin (Z-KEVIN) 250 MG tablet; Follow instructions on pack.  Dispense: 6 tablet; Refill: 0

## 2019-04-10 ENCOUNTER — PATIENT MESSAGE (OUTPATIENT)
Dept: GASTROENTEROLOGY | Facility: CLINIC | Age: 69
End: 2019-04-10

## 2019-04-10 DIAGNOSIS — K50.119 CROHN'S DISEASE OF COLON WITH COMPLICATION: Primary | ICD-10-CM

## 2019-04-11 ENCOUNTER — PATIENT MESSAGE (OUTPATIENT)
Dept: GASTROENTEROLOGY | Facility: CLINIC | Age: 69
End: 2019-04-11

## 2019-04-15 DIAGNOSIS — J30.89 ALLERGIC RHINITIS DUE TO OTHER ALLERGIC TRIGGER, UNSPECIFIED SEASONALITY: Primary | ICD-10-CM

## 2019-04-15 RX ORDER — LEVOCETIRIZINE DIHYDROCHLORIDE 5 MG/1
5 TABLET, FILM COATED ORAL NIGHTLY
Qty: 30 TABLET | Refills: 11 | Status: CANCELLED | OUTPATIENT
Start: 2019-04-15 | End: 2020-04-14

## 2019-04-17 RX ORDER — LEVOCETIRIZINE DIHYDROCHLORIDE 5 MG/1
5 TABLET, FILM COATED ORAL NIGHTLY
Qty: 30 TABLET | Refills: 6 | Status: SHIPPED | OUTPATIENT
Start: 2019-04-17 | End: 2020-06-01

## 2019-04-22 ENCOUNTER — OFFICE VISIT (OUTPATIENT)
Dept: GASTROENTEROLOGY | Facility: CLINIC | Age: 69
End: 2019-04-22
Payer: MEDICARE

## 2019-04-22 VITALS
SYSTOLIC BLOOD PRESSURE: 150 MMHG | HEART RATE: 84 BPM | DIASTOLIC BLOOD PRESSURE: 90 MMHG | HEIGHT: 67 IN | WEIGHT: 102.31 LBS | BODY MASS INDEX: 16.06 KG/M2

## 2019-04-22 DIAGNOSIS — K50.819 CROHN'S DISEASE OF SMALL AND LARGE INTESTINES WITH COMPLICATION: Primary | ICD-10-CM

## 2019-04-22 PROCEDURE — 3080F DIAST BP >= 90 MM HG: CPT | Mod: CPTII,S$GLB,, | Performed by: INTERNAL MEDICINE

## 2019-04-22 PROCEDURE — 99999 PR PBB SHADOW E&M-EST. PATIENT-LVL III: CPT | Mod: PBBFAC,,, | Performed by: INTERNAL MEDICINE

## 2019-04-22 PROCEDURE — 99212 PR OFFICE/OUTPT VISIT, EST, LEVL II, 10-19 MIN: ICD-10-PCS | Mod: S$GLB,,, | Performed by: INTERNAL MEDICINE

## 2019-04-22 PROCEDURE — 3077F SYST BP >= 140 MM HG: CPT | Mod: CPTII,S$GLB,, | Performed by: INTERNAL MEDICINE

## 2019-04-22 PROCEDURE — 3080F PR MOST RECENT DIASTOLIC BLOOD PRESSURE >= 90 MM HG: ICD-10-PCS | Mod: CPTII,S$GLB,, | Performed by: INTERNAL MEDICINE

## 2019-04-22 PROCEDURE — 99212 OFFICE O/P EST SF 10 MIN: CPT | Mod: S$GLB,,, | Performed by: INTERNAL MEDICINE

## 2019-04-22 PROCEDURE — 1101F PT FALLS ASSESS-DOCD LE1/YR: CPT | Mod: CPTII,S$GLB,, | Performed by: INTERNAL MEDICINE

## 2019-04-22 PROCEDURE — 1101F PR PT FALLS ASSESS DOC 0-1 FALLS W/OUT INJ PAST YR: ICD-10-PCS | Mod: CPTII,S$GLB,, | Performed by: INTERNAL MEDICINE

## 2019-04-22 PROCEDURE — 3077F PR MOST RECENT SYSTOLIC BLOOD PRESSURE >= 140 MM HG: ICD-10-PCS | Mod: CPTII,S$GLB,, | Performed by: INTERNAL MEDICINE

## 2019-04-22 PROCEDURE — 99999 PR PBB SHADOW E&M-EST. PATIENT-LVL III: ICD-10-PCS | Mod: PBBFAC,,, | Performed by: INTERNAL MEDICINE

## 2019-04-22 NOTE — PROGRESS NOTES
Clinic Follow up    Reason for follow-up:  The encounter diagnosis was Crohn's disease of small and large intestines with complication.    PCP: Christelle Lawler       HPI:  This is a 69 y.o. female here for follow up for Crohn's disease.      IBD History:  -Type: ileocolonic Crohn's  -Diagnosed: 1975  -Disease Location: ileum, ?colon  -Phenotype: stricturing  -Surgeries related to IBD: ileal resection in 1979, 2009 (due to stricture)   -Medication History:  Humira started in August 2017   -Endoscopy Reports: 2018 - anastomotic stricture, no active disease;  2017 - Rutgeert's i4 (disease in ileum and at anastomosis)   -Extra-intestinal manifestations: mild eczema on leg that resolved with cerave     Interval History:  Since starting humira in 2018 symptoms of bloating, gas, smaller BMs, poor appetite returned, this lasted about 20 days and then she began feeling better (back to normal).  Had symptoms in January that resolved and since then has felt normal.  She comes in today primarily to discuss starting methotrexate and has many questions regarding this and her disease.  She is under a lot of stress right now as her sister is ill.  She has had an outbreak of genital herpes that resolved with treatment and now a new outbreak.          ROS:  CONSTITUTIONAL: Denies weight change,  fatigue, fevers, chills, night sweats.  EYES: No changes in vision.   ENT: No oral lesions or sore throat.  HEMATOLOGICAL/Lymph: Denies bleeding tendency, bruising tendency. No swellings or enlarged lymph nodes.  CARDIOVASCULAR: Denies chest pain, shortness of breath, orthopnea and edema.  RESPIRATORY: Denies cough, hemoptysis, dyspnea, and wheezing.  GI: See HPI.  : Denies dysuria and hematuria  MUSCULOSKELETAL: Denies joint pain or swelling, back pain and muscle pain.  SKIN: Denies rashes.  NEUROLOGIC: Denies headaches, seizures and numbness.  PSYCHIATRIC: Denies depression or anxiety.  ENDOCRINE: Denies heat or cold intolerance and  excessive thirst or urination.    Medical History:   Past Medical History:   Diagnosis Date    Anxiety     Crohn's disease     Depression     Genital herpes     Hepatitis C infection     Hypertension     Insomnia     Mesenteric artery stenosis     Dr. Checo Reed--vascular surgery    Osteoporosis     SCC (squamous cell carcinoma), hand, right 2019    Dr. Malaika Mack--dermatology    TIA (transient ischemic attack)        Surgical History:  Past Surgical History:   Procedure Laterality Date    APPENDECTOMY      COLONOSCOPY N/A 3/19/2019    [facility]    COLONOSCOPY N/A 3/26/2018    [facility]    COLONOSCOPY N/A 2017    [facility]    COLONOSCOPY N/A 2015    [facility]    SMALL INTESTINE SURGERY         Family History:   Family History   Problem Relation Age of Onset    Stroke Mother     Heart disease Mother     Cancer Father         Lung    Heart disease Sister     Glaucoma Neg Hx     Macular degeneration Neg Hx     Thyroid disease Neg Hx        Social History:   Social History     Tobacco Use    Smoking status: Former Smoker     Years: 20.00     Types: Cigarettes     Last attempt to quit: 2005     Years since quittin.3    Smokeless tobacco: Former User    Tobacco comment: Former Light Smoker less than 10 a day   Substance Use Topics    Alcohol use: No     Alcohol/week: 2.4 oz     Types: 4 Glasses of wine per week    Drug use: No       Allergies: Reviewed    Home Medications:   Medication List with Changes/Refills   Current Medications    ADALIMUMAB (HUMIRA) 40 MG/0.8 ML SYKT INJECTION    Inject 0.8 mLs (40 mg total) into the skin every 14 (fourteen) days.    AMLODIPINE (NORVASC) 10 MG TABLET    Take 1 tablet (10 mg total) by mouth once daily.    ASPIRIN (ECOTRIN) 81 MG EC TABLET    Take 1 tablet (81 mg total) by  "mouth once daily.    CALCIUM CARBONATE (OS-COOPER) 600 MG (1,500 MG) TAB    Take 600 mg by mouth 2 (two) times daily with meals.    CYANOCOBALAMIN, VITAMIN B-12, 2,500 MCG LOZG    Place 1 tablet under the tongue.     LEVOCETIRIZINE (XYZAL) 5 MG TABLET    Take 1 tablet (5 mg total) by mouth every evening.    LORAZEPAM (ATIVAN) 0.5 MG TABLET    Take 2.5 tablets (1.25 mg total) by mouth every evening.    MELATONIN 3 MG TAB    Take 1 tablet by mouth nightly as needed.    METHOTREXATE (TREXALL) 7.5 MG TABLET    Take 1 tablet (7.5 mg total) by mouth once a week. for 12 doses    METRONIDAZOLE 0.75% (METROCREAM) 0.75 % CREA    APPLY ONE APPLICATION TO THE FACE TWICE DAILY    MULTIVITAMIN WITH MINERALS TABLET    Take 1 tablet by mouth once daily at 6am.    ROSUVASTATIN (CRESTOR) 10 MG TABLET    Take 1 tablet (10 mg total) by mouth once daily.    VALACYCLOVIR (VALTREX) 500 MG TABLET    take 1 tablet by mouth as directed and take 1 tablet by mouth twice a day for 3 days    VITAMIN D 1000 UNITS TAB    Take 185 mg by mouth once daily.   Discontinued Medications    AZITHROMYCIN (Z-KEVIN) 250 MG TABLET    Follow instructions on pack.         Physical Exam:  Vital Signs:  BP (!) 150/90   Pulse 84   Ht 5' 7" (1.702 m)   Wt 46.4 kg (102 lb 4.7 oz)   BMI 16.02 kg/m²   Body mass index is 16.02 kg/m².      GENERAL: No acute distress, A&Ox3  EYES: Anicteric, no pallor noted.  ENT: OP clear  NECK: Supple, no masses, no thyromegally.  CHEST: Equal breath sounds bilaterally, no wheezing.  CARDIOVASCULAR: Regular rate and rhythm. Murmurs, rubs and gallops absent.  ABDOMEN: soft, non-tender, non-distended, normal bowel sounds, no hepatosplenomegaly   EXTREMITIES: No clubbing, cyanosis or edema.  SKIN: Without lesion or erythema.  LYMPH: No cervical, axillary lymphadenopathy palpable.   NEUROLOGICAL: Grossly normal, no asterixis present.    Labs: Pertinent labs reviewed.   Imaging Review:   No results found.      Assessment:  Crohn's disease of " small and large intestines with complication  Plan to start methotrexate 7.5mg weekly with follow up check of LFTs.  Counseled her on side effects and usage of mtx.  Recommend dextromethorphan if she develops fatigue on mtx.  Avoid etoh.  Will plan on colonoscopy in 6 months.  If not endoscopic remission on humira and methotrexate, then will consider stelara.      -     Quantiferon Gold TB; Future; Expected date: 04/22/2019    Genital herpes  Recommend being evaluated by dermatologist (as this is who has treated her in the past) to ensure it is in fact herpes.           Order summary:  Orders Placed This Encounter   Procedures    Quantiferon Gold TB       Thank you so much for allowing me to participate in the care of Manuela Ellis MD

## 2019-04-23 ENCOUNTER — PATIENT MESSAGE (OUTPATIENT)
Dept: GASTROENTEROLOGY | Facility: CLINIC | Age: 69
End: 2019-04-23

## 2019-04-29 ENCOUNTER — OFFICE VISIT (OUTPATIENT)
Dept: INTERNAL MEDICINE | Facility: CLINIC | Age: 69
End: 2019-04-29
Payer: MEDICARE

## 2019-04-29 VITALS
HEIGHT: 67 IN | WEIGHT: 105.19 LBS | OXYGEN SATURATION: 97 % | HEART RATE: 87 BPM | DIASTOLIC BLOOD PRESSURE: 72 MMHG | TEMPERATURE: 98 F | BODY MASS INDEX: 16.51 KG/M2 | SYSTOLIC BLOOD PRESSURE: 142 MMHG

## 2019-04-29 DIAGNOSIS — M79.605 LEFT LEG PAIN: Primary | ICD-10-CM

## 2019-04-29 DIAGNOSIS — M79.675 TOE PAIN, LEFT: ICD-10-CM

## 2019-04-29 DIAGNOSIS — G89.29 CHRONIC LEFT-SIDED LOW BACK PAIN WITHOUT SCIATICA: ICD-10-CM

## 2019-04-29 DIAGNOSIS — M54.50 CHRONIC LEFT-SIDED LOW BACK PAIN WITHOUT SCIATICA: ICD-10-CM

## 2019-04-29 PROCEDURE — 1101F PR PT FALLS ASSESS DOC 0-1 FALLS W/OUT INJ PAST YR: ICD-10-PCS | Mod: CPTII,S$GLB,, | Performed by: INTERNAL MEDICINE

## 2019-04-29 PROCEDURE — 1101F PT FALLS ASSESS-DOCD LE1/YR: CPT | Mod: CPTII,S$GLB,, | Performed by: INTERNAL MEDICINE

## 2019-04-29 PROCEDURE — 3078F PR MOST RECENT DIASTOLIC BLOOD PRESSURE < 80 MM HG: ICD-10-PCS | Mod: CPTII,S$GLB,, | Performed by: INTERNAL MEDICINE

## 2019-04-29 PROCEDURE — 3078F DIAST BP <80 MM HG: CPT | Mod: CPTII,S$GLB,, | Performed by: INTERNAL MEDICINE

## 2019-04-29 PROCEDURE — 99213 OFFICE O/P EST LOW 20 MIN: CPT | Mod: S$GLB,,, | Performed by: INTERNAL MEDICINE

## 2019-04-29 PROCEDURE — 99999 PR PBB SHADOW E&M-EST. PATIENT-LVL III: ICD-10-PCS | Mod: PBBFAC,,, | Performed by: INTERNAL MEDICINE

## 2019-04-29 PROCEDURE — 99213 PR OFFICE/OUTPT VISIT, EST, LEVL III, 20-29 MIN: ICD-10-PCS | Mod: S$GLB,,, | Performed by: INTERNAL MEDICINE

## 2019-04-29 PROCEDURE — 3077F PR MOST RECENT SYSTOLIC BLOOD PRESSURE >= 140 MM HG: ICD-10-PCS | Mod: CPTII,S$GLB,, | Performed by: INTERNAL MEDICINE

## 2019-04-29 PROCEDURE — 3077F SYST BP >= 140 MM HG: CPT | Mod: CPTII,S$GLB,, | Performed by: INTERNAL MEDICINE

## 2019-04-29 PROCEDURE — 99999 PR PBB SHADOW E&M-EST. PATIENT-LVL III: CPT | Mod: PBBFAC,,, | Performed by: INTERNAL MEDICINE

## 2019-04-29 RX ORDER — CYCLOBENZAPRINE HCL 10 MG
10 TABLET ORAL 3 TIMES DAILY PRN
Qty: 30 TABLET | Refills: 0 | Status: SHIPPED | OUTPATIENT
Start: 2019-04-29 | End: 2019-05-09

## 2019-04-29 NOTE — PROGRESS NOTES
Subjective:       Patient ID: Manuela Reyes is a 69 y.o. female.    Chief Complaint: Groin Pain    Groin Pain   This is a new problem. The current episode started in the past 7 days. The problem occurs constantly. The problem has been waxing and waning. The pain is moderate. The problem affects the left side. Associated symptoms include back pain and rash (healing--genital herpes). Pertinent negatives include no dysuria, fever or joint swelling. The symptoms are aggravated by activity and tactile pressure. Treatments tried: muscle relaxant. The treatment provided moderate relief. She is postmenopausal.     She is concerned that her back may be contributing to her symptoms. She admits to heavy lifting lately. She is interested in physical therapy.   She also dropped a heavy object on her left 4th toe. It is hurting but she can move her toe without difficulty. There is bruising without swelling.     Review of Systems   Constitutional: Negative for fever.   Cardiovascular: Negative for leg swelling.   Genitourinary: Negative for dysuria.   Musculoskeletal: Positive for arthralgias and back pain. Negative for gait problem.   Skin: Positive for rash (healing--genital herpes).       Objective:      Physical Exam   Constitutional: She is oriented to person, place, and time. She appears well-developed and well-nourished. No distress.   Eyes: No scleral icterus.   Neck: No tracheal deviation present.   Cardiovascular: Normal rate.   Pulmonary/Chest: Effort normal. No respiratory distress.   Genitourinary: There is lesion on the left labia.   Musculoskeletal:        Left hip: She exhibits normal range of motion, no tenderness, no bony tenderness, no swelling and no deformity.   Left 4th digit bruising without swelling or limitations in movement. No open lesions.    Neurological: She is alert and oriented to person, place, and time.   Skin: Skin is warm and dry.   Psychiatric: She has a normal mood and affect.    Vitals reviewed.      Assessment:       1. Left leg pain    2. Chronic left-sided low back pain without sciatica    3. Toe pain, left        Plan:       Manuela was seen today for groin pain.    Diagnoses and all orders for this visit:    Left leg pain  -     cyclobenzaprine (FLEXERIL) 10 MG tablet; Take 1 tablet (10 mg total) by mouth 3 (three) times daily as needed for Muscle spasms.    Chronic left-sided low back pain without sciatica  -     cyclobenzaprine (FLEXERIL) 10 MG tablet; Take 1 tablet (10 mg total) by mouth 3 (three) times daily as needed for Muscle spasms.  -     Ambulatory consult to Physical Therapy    Toe pain, left  -     Recommend ice application  -     Monitor    RTC if symptoms worsen or fail to resolve with treatment.

## 2019-05-09 ENCOUNTER — PATIENT MESSAGE (OUTPATIENT)
Dept: GASTROENTEROLOGY | Facility: CLINIC | Age: 69
End: 2019-05-09

## 2019-05-17 ENCOUNTER — OFFICE VISIT (OUTPATIENT)
Dept: INTERNAL MEDICINE | Facility: CLINIC | Age: 69
End: 2019-05-17
Payer: MEDICARE

## 2019-05-17 VITALS
BODY MASS INDEX: 16.36 KG/M2 | SYSTOLIC BLOOD PRESSURE: 120 MMHG | WEIGHT: 104.25 LBS | HEIGHT: 67 IN | DIASTOLIC BLOOD PRESSURE: 84 MMHG | TEMPERATURE: 96 F | OXYGEN SATURATION: 98 % | HEART RATE: 99 BPM

## 2019-05-17 DIAGNOSIS — I10 HYPERTENSION GOAL BP (BLOOD PRESSURE) < 140/90: Chronic | ICD-10-CM

## 2019-05-17 DIAGNOSIS — J01.10 ACUTE NON-RECURRENT FRONTAL SINUSITIS: Primary | ICD-10-CM

## 2019-05-17 DIAGNOSIS — E78.5 HYPERLIPIDEMIA LDL GOAL <70: ICD-10-CM

## 2019-05-17 PROCEDURE — 3074F PR MOST RECENT SYSTOLIC BLOOD PRESSURE < 130 MM HG: ICD-10-PCS | Mod: CPTII,S$GLB,, | Performed by: FAMILY MEDICINE

## 2019-05-17 PROCEDURE — 99214 OFFICE O/P EST MOD 30 MIN: CPT | Mod: S$GLB,,, | Performed by: FAMILY MEDICINE

## 2019-05-17 PROCEDURE — 99999 PR PBB SHADOW E&M-EST. PATIENT-LVL III: CPT | Mod: PBBFAC,,, | Performed by: FAMILY MEDICINE

## 2019-05-17 PROCEDURE — 99214 PR OFFICE/OUTPT VISIT, EST, LEVL IV, 30-39 MIN: ICD-10-PCS | Mod: S$GLB,,, | Performed by: FAMILY MEDICINE

## 2019-05-17 PROCEDURE — 3079F DIAST BP 80-89 MM HG: CPT | Mod: CPTII,S$GLB,, | Performed by: FAMILY MEDICINE

## 2019-05-17 PROCEDURE — 1101F PT FALLS ASSESS-DOCD LE1/YR: CPT | Mod: CPTII,S$GLB,, | Performed by: FAMILY MEDICINE

## 2019-05-17 PROCEDURE — 99999 PR PBB SHADOW E&M-EST. PATIENT-LVL III: ICD-10-PCS | Mod: PBBFAC,,, | Performed by: FAMILY MEDICINE

## 2019-05-17 PROCEDURE — 3074F SYST BP LT 130 MM HG: CPT | Mod: CPTII,S$GLB,, | Performed by: FAMILY MEDICINE

## 2019-05-17 PROCEDURE — 1101F PR PT FALLS ASSESS DOC 0-1 FALLS W/OUT INJ PAST YR: ICD-10-PCS | Mod: CPTII,S$GLB,, | Performed by: FAMILY MEDICINE

## 2019-05-17 PROCEDURE — 99499 UNLISTED E&M SERVICE: CPT | Mod: S$GLB,,, | Performed by: FAMILY MEDICINE

## 2019-05-17 PROCEDURE — 99499 RISK ADDL DX/OHS AUDIT: ICD-10-PCS | Mod: S$GLB,,, | Performed by: FAMILY MEDICINE

## 2019-05-17 PROCEDURE — 3079F PR MOST RECENT DIASTOLIC BLOOD PRESSURE 80-89 MM HG: ICD-10-PCS | Mod: CPTII,S$GLB,, | Performed by: FAMILY MEDICINE

## 2019-05-17 RX ORDER — AZITHROMYCIN 250 MG/1
TABLET, FILM COATED ORAL
Qty: 6 TABLET | Refills: 0 | Status: SHIPPED | OUTPATIENT
Start: 2019-05-17 | End: 2019-08-05

## 2019-05-17 RX ORDER — METHYLPREDNISOLONE 4 MG/1
TABLET ORAL
Qty: 21 TABLET | Refills: 0 | Status: SHIPPED | OUTPATIENT
Start: 2019-05-17 | End: 2019-08-05

## 2019-05-17 RX ORDER — PROMETHAZINE HYDROCHLORIDE AND DEXTROMETHORPHAN HYDROBROMIDE 6.25; 15 MG/5ML; MG/5ML
5 SYRUP ORAL NIGHTLY PRN
Qty: 118 ML | Refills: 0 | Status: SHIPPED | OUTPATIENT
Start: 2019-05-17 | End: 2019-06-09

## 2019-05-17 NOTE — PROGRESS NOTES
Manuela Reyes  05/17/2019  [id number]    Christelle Lawler DO  Patient Care Team:  Christelle Lawler DO as PCP - General (Internal Medicine)  Belen Reyes LPN as Care Coordinator (Internal Medicine)  Has the patient seen any provider outside of the [facility] network since the last visit? (no). If yes, HIPPA forms completed and records requested.      Chief Complaint:  Chief Complaint   Patient presents with    Sinus Problem       History of Present Illness:  Sinusitis   This is a new problem. The current episode started in the past 7 days. The problem has been gradually worsening since onset. There has been no fever. The pain is moderate. Associated symptoms include congestion, headaches and sinus pressure. Pertinent negatives include no chills, coughing or shortness of breath. Treatments tried: taking Xyzal daily. The treatment provided no relief.           History:  Past Medical History:   Diagnosis Date    Anxiety     Crohn's disease     Depression     Genital herpes     Hepatitis C infection     Hypertension     Insomnia     Mesenteric artery stenosis     Dr. 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 Malaika Mack--dermatology    TIA (transient ischemic attack)      Past Surgical History:   Procedure Laterality Date    APPENDECTOMY      COLONOSCOPY N/A 3/19/2019    [facility]    COLONOSCOPY N/A 3/26/2018    [facility]    COLONOSCOPY N/A 5/19/2017    [facility]    COLONOSCOPY N/A 5/22/2015    [facility] at [facility]    SMALL INTESTINE SURGERY       Family History   Problem Relation Age of Onset    Stroke Mother     Heart disease Mother     Cancer Father         Lung    Heart disease Sister     Glaucoma Neg Hx     Macular degeneration Neg Hx     Thyroid disease Neg Hx      Social  History     Socioeconomic History    Marital status: Single     Spouse name: Not on file    Number of children: Not on file    Years of education: Not on file    Highest education level: Not on file   Occupational History    Not on file   Social Needs    Financial resource strain: Not on file    Food insecurity:     Worry: Not on file     Inability: Not on file    Transportation needs:     Medical: Not on file     Non-medical: Not on file   Tobacco Use    Smoking status: Former Smoker     Years: 20.00     Types: Cigarettes     Last attempt to quit: 2005     Years since quittin.3    Smokeless tobacco: Former User    Tobacco comment: Former Light Smoker less than 10 a day   Substance and Sexual Activity    Alcohol use: No     Alcohol/week: 2.4 oz     Types: 4 Glasses of wine per week    Drug use: No    Sexual activity: Never     Partners: Female   Lifestyle    Physical activity:     Days per week: Not on file     Minutes per session: Not on file    Stress: Not on file   Relationships    Social connections:     Talks on phone: Not on file     Gets together: Not on file     Attends [employer] service: Not on file     Active member of club or organization: Not on file     Attends meetings of clubs or organizations: Not on file     Relationship status: Not on file   Other Topics Concern    Not on file   Social History Narrative    Not on file     Patient Active Problem List   Diagnosis    Crohn's disease of small intestine    Hypertension goal BP (blood pressure) < 140/90    Crohn's colitis    Mesenteric artery stenosis    Hyperlipidemia LDL goal <70    Chronic insomnia    Anxiety    Crohn's disease    Family history of cardiovascular disease    Atherosclerosis of abdominal aorta    Age-related osteoporosis without current pathological fracture     Review of patient's allergies indicates:   Allergen Reactions    Codeine sulfate Itching    Hydrochlorothiazide Other (See Comments)      Adverse Reaction    Lisinopril Swelling and Edema     Facial Swelling       The following were reviewed at this visit: active problem list, medication list, allergies, family history, social history, and health maintenance.    Medications:  Current Outpatient Medications on File Prior to Visit   Medication Sig Dispense Refill    amLODIPine (NORVASC) 10 MG tablet Take 1 tablet (10 mg total) by mouth once daily. 90 tablet 3    aspirin (ECOTRIN) 81 MG EC tablet Take 1 tablet (81 mg total) by mouth once daily.      calcium carbonate (OS-COOPER) 600 mg (1,500 mg) Tab Take 600 mg by mouth 2 (two) times daily with meals.      cyanocobalamin, vitamin B-12, 2,500 mcg Lozg Place 1 tablet under the tongue.       levocetirizine (XYZAL) 5 MG tablet Take 1 tablet (5 mg total) by mouth every evening. 30 tablet 6    LORazepam (ATIVAN) 0.5 MG tablet Take 2.5 tablets (1.25 mg total) by mouth every evening. 75 tablet 4    metronidazole 0.75% (METROCREAM) 0.75 % Crea APPLY ONE APPLICATION TO THE FACE TWICE DAILY  2    multivitamin with minerals tablet Take 1 tablet by mouth once daily at 6am.      rosuvastatin (CRESTOR) 10 MG tablet Take 1 tablet (10 mg total) by mouth once daily. 90 tablet 3    valACYclovir (VALTREX) 500 MG tablet take 1 tablet by mouth as directed and take 1 tablet by mouth twice a day for 3 days  0    vitamin D 1000 units Tab Take 185 mg by mouth once daily.      adalimumab (HUMIRA) 40 mg/0.8 mL SyKt injection Inject 0.8 mLs (40 mg total) into the skin every 14 (fourteen) days. 2 vial 11    melatonin 3 mg Tab Take 1 tablet by mouth nightly as needed.      methotrexate (TREXALL) 7.5 MG tablet Take 1 tablet (7.5 mg total) by mouth once a week. for 12 doses 12 tablet 4     No current facility-administered medications on file prior to visit.        Medications have been reviewed and reconciled with patient at this visit.  Barriers to medications present (no)    Adverse reactions to current medications  (no)    Over the counter medications reviewed (Yes ), and if needed added to active Medication list at this visit.     Exam:  Wt Readings from Last 3 Encounters:   05/17/19 47.3 kg (104 lb 4.4 oz)   04/29/19 47.7 kg (105 lb 2.6 oz)   04/22/19 46.4 kg (102 lb 4.7 oz)     Temp Readings from Last 3 Encounters:   05/17/19 96.1 °F (35.6 °C) (Tympanic)   04/29/19 97.7 °F (36.5 °C) (Tympanic)   04/09/19 98.2 °F (36.8 °C) (Tympanic)     BP Readings from Last 3 Encounters:   05/17/19 120/84   04/29/19 (!) 142/72   04/22/19 (!) 150/90     Pulse Readings from Last 3 Encounters:   05/17/19 99   04/29/19 87   04/22/19 84     Body mass index is 16.33 kg/m².      Review of Systems   Constitutional: Positive for malaise/fatigue. Negative for chills and fever.   HENT: Positive for congestion, sinus pressure and sinus pain. Negative for hearing loss.    Eyes: Negative for redness.   Respiratory: Negative for cough and shortness of breath.    Cardiovascular: Negative for chest pain.   Gastrointestinal: Negative for nausea and vomiting.   Musculoskeletal: Negative for joint pain and myalgias.   Skin: Negative for rash.   Neurological: Positive for headaches. Negative for focal weakness.     Physical Exam   Constitutional: She is oriented to person, place, and time. She appears well-developed and well-nourished. She appears ill. No distress.   HENT:   Head: Normocephalic and atraumatic.   Right Ear: Hearing, external ear and ear canal normal. Tympanic membrane is not injected and not erythematous. A middle ear effusion is present.   Left Ear: Hearing, external ear and ear canal normal. Tympanic membrane is not injected and not erythematous. A middle ear effusion is present.   Nose: Rhinorrhea present. No mucosal edema. Right sinus exhibits frontal sinus tenderness. Right sinus exhibits no maxillary sinus tenderness. Left sinus exhibits frontal sinus tenderness. Left sinus exhibits no maxillary sinus tenderness.   Mouth/Throat: No  oropharyngeal exudate, posterior oropharyngeal edema or posterior oropharyngeal erythema.   Eyes: Conjunctivae are normal. No scleral icterus.   Cardiovascular: Normal rate, regular rhythm and normal heart sounds. Exam reveals no gallop and no friction rub.   No murmur heard.  Pulmonary/Chest: Effort normal and breath sounds normal. No respiratory distress. She has no wheezes. She has no rales.   Neurological: She is alert and oriented to person, place, and time.   Skin: Skin is warm and dry. She is not diaphoretic.   Psychiatric: She has a normal mood and affect.   Nursing note and vitals reviewed.      Laboratory Reviewed ({Yes)  Lab Results   Component Value Date    WBC 4.43 01/25/2019    HGB 13.8 01/25/2019    HCT 40.1 01/25/2019     01/25/2019    CHOL 147 01/25/2019    TRIG 50 01/25/2019    HDL 84 (H) 01/25/2019    ALT 9 (L) 01/25/2019    AST 20 01/25/2019     01/25/2019    K 3.8 01/25/2019     01/25/2019    CREATININE 0.8 01/25/2019    BUN 13 01/25/2019    CO2 26 01/25/2019    TSH 1.326 01/04/2017    PSA <0.01 11/07/2017       Manuela was seen today for sinus problem.    Diagnoses and all orders for this visit:    Acute non-recurrent frontal sinusitis  -     azithromycin (Z-KEVIN) 250 MG tablet; Take 2 tablets by mouth on day 1; Take 1 tablet by mouth on days 2-5  -     methylPREDNISolone (MEDROL DOSEPACK) 4 mg tablet; use as directed  -     promethazine-dextromethorphan (PROMETHAZINE-DM) 6.25-15 mg/5 mL Syrp; Take 5 mLs by mouth nightly as needed.    -Recommend good oral hydration, rest, handwashing, humidifier, and NSAIDs/Tylenol PRN pain and fever  -RTC if symptoms worsen or do not improve  -Follow-up with PCP in 1 wk unless improved    -Chronic hypertension.  Managed by patient's PCP.  -Chronic hyperlipidemia.  Managed by patient's PCP.    -Patient's lab results were reviewed and discussed with patient   -Treatment options and alternatives were discussed with the patient. Patient expressed  understanding. Patient was given the opportunity to ask questions and be an active participant in their medical care. Patient had no further questions or concerns at this time.   -Patient is an overall moderate risk for health complications from their medical conditions.     Follow up: Follow up if symptoms worsen or fail to improve.    After visit summary was printed and given to patient upon discharge today.  Patient goals and care plan are included in After Visit Summary.

## 2019-05-24 ENCOUNTER — PATIENT MESSAGE (OUTPATIENT)
Dept: INTERNAL MEDICINE | Facility: CLINIC | Age: 69
End: 2019-05-24

## 2019-05-31 ENCOUNTER — PATIENT MESSAGE (OUTPATIENT)
Dept: GASTROENTEROLOGY | Facility: CLINIC | Age: 69
End: 2019-05-31

## 2019-06-06 DIAGNOSIS — E78.2 MIXED HYPERLIPIDEMIA: ICD-10-CM

## 2019-06-06 RX ORDER — ROSUVASTATIN CALCIUM 10 MG/1
10 TABLET, COATED ORAL DAILY
Qty: 90 TABLET | Refills: 3 | Status: SHIPPED | OUTPATIENT
Start: 2019-06-06 | End: 2020-07-24 | Stop reason: SDUPTHER

## 2019-06-10 ENCOUNTER — PATIENT MESSAGE (OUTPATIENT)
Dept: GASTROENTEROLOGY | Facility: CLINIC | Age: 69
End: 2019-06-10

## 2019-06-11 ENCOUNTER — TELEPHONE (OUTPATIENT)
Dept: PHARMACY | Facility: CLINIC | Age: 69
End: 2019-06-11

## 2019-06-11 ENCOUNTER — PATIENT MESSAGE (OUTPATIENT)
Dept: GASTROENTEROLOGY | Facility: CLINIC | Age: 69
End: 2019-06-11

## 2019-06-11 NOTE — TELEPHONE ENCOUNTER
Informed Patient  that [facility] received prescription for Methotrexate and prior authorization is required.  [facility] will be back in touch once insurance determination is received.

## 2019-06-14 NOTE — TELEPHONE ENCOUNTER
DOCUMENTATION ONLY:  Prior authorization for Methotrexate not required.    Co-pay: $9.33    Patient Assistance is not required.

## 2019-06-17 ENCOUNTER — TELEPHONE (OUTPATIENT)
Dept: PHARMACY | Facility: CLINIC | Age: 69
End: 2019-06-17

## 2019-06-17 ENCOUNTER — PATIENT MESSAGE (OUTPATIENT)
Dept: GASTROENTEROLOGY | Facility: CLINIC | Age: 69
End: 2019-06-17

## 2019-06-17 ENCOUNTER — PATIENT MESSAGE (OUTPATIENT)
Dept: INTERNAL MEDICINE | Facility: CLINIC | Age: 69
End: 2019-06-17

## 2019-06-17 DIAGNOSIS — F51.04 CHRONIC INSOMNIA: ICD-10-CM

## 2019-06-17 DIAGNOSIS — K50.119 CROHN'S DISEASE OF COLON WITH COMPLICATION: Primary | ICD-10-CM

## 2019-06-17 DIAGNOSIS — F41.9 ANXIETY: ICD-10-CM

## 2019-06-17 NOTE — TELEPHONE ENCOUNTER
Methotrexate 2.5 mg tablets initial consult / shipment complete on 19.  Pt confirmed shipping of Methotrexate 2.5 mg tablets on  for delivery on .  Pt address and  verified.  $9.33 copay in 004.  [facility].  Pt plans to start medication on .  Pt advised that she will be taking 3 tablets per week all on the same day.  Possible side effects reviewed including abdominal pain, nausea, diarrhea, indigestion, fatigue, and elevated liver enzymes.  Liver enzymes form earlier this year are well WNL.  Pt states that MD plans to have blood work and test liver enzymes 2 weeks after she starts.  Pt last TB test was negative from 7/10/17.  I advised pt that a more current TB test is recommended.  Pt states that MD will order TB test in 2 weeks when she gets blood work.  I checked the chart and confirmed that MD has already placed orders for TB.  Pt is currently doing well taking Humira for Crohn's disease.  She has a little bit of occasional constipation, has no other Crohn's symptoms.  Pt had a colonoscopy in 2019 which showed indications of active Crohn's disease.  This result caused MD to add MTX to Humira.  Pt states that the goal is prevent the indicators on the colonoscopy from turning into symptoms or a flare up.  Pt's medications and allergies reviewed.  After the call the pt called back to tell me that she also takes 9 essential amino acids, and collagen protein.  Pt advised that these are okay with her medications.  [facility] refill policy and welcome packet reviewed.  Pt had no further questions or concerns.

## 2019-06-18 DIAGNOSIS — Z00.00 ENCOUNTER FOR PREVENTIVE CARE: Primary | ICD-10-CM

## 2019-06-18 RX ORDER — LORAZEPAM 0.5 MG/1
1.25 TABLET ORAL NIGHTLY
Qty: 75 TABLET | Refills: 4 | Status: SHIPPED | OUTPATIENT
Start: 2019-06-18 | End: 2019-10-17 | Stop reason: SDUPTHER

## 2019-06-19 DIAGNOSIS — Z86.19 HISTORY OF HEPATITIS C: Primary | ICD-10-CM

## 2019-06-21 ENCOUNTER — PATIENT MESSAGE (OUTPATIENT)
Dept: GASTROENTEROLOGY | Facility: CLINIC | Age: 69
End: 2019-06-21

## 2019-06-24 ENCOUNTER — PATIENT MESSAGE (OUTPATIENT)
Dept: GASTROENTEROLOGY | Facility: CLINIC | Age: 69
End: 2019-06-24

## 2019-07-03 NOTE — TELEPHONE ENCOUNTER
Patient ID: Nargis Shanks is a 28 year old female.    Chief Complaint   Patient presents with   • Pharyngitis     exposure to strep (both sons)   • Anxiety     on sertraline and Ativan from GYN     Pt is here with c/o sore throat and fever for 2 days. Both of her sons were diagnosed with strep in the past week. They are currently still being treated. She denies any cough, sinus symptoms, abd pain, N/V/D.    She also c/o continued anxiety. Her GYN had started her on sertraline 50mg and Ativan 0.5mg but Nargis cannot tell much improvement. 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 Her anxiety is more around her personal life than her schooling/work. She contacted her GYN and that office recommended she f/u with me. I'd like to increase her sertraline to 100mg and have her try hydroxizine rather than another benzo. Pt is agreeable to that plan. She denies any thoughts of harm to herself or others.    Review of Systems:  Review of Systems   Constitutional: Positive for fever. Negative for chills.   HENT: Positive for sore throat. Negative for congestion.    Eyes: Negative for visual disturbance.   Respiratory: Negative for cough and shortness of breath.    Cardiovascular: Negative for chest pain.   Gastrointestinal: Negative for abdominal pain.   Endocrine: Negative for cold intolerance.   Genitourinary: Negative for difficulty urinating.   Musculoskeletal: Negative for back pain.   Skin: Negative for rash.   Neurological: Negative for headaches.   Hematological: Negative for adenopathy.   Psychiatric/Behavioral: Negative for sleep disturbance. The patient is nervous/anxious.        Physical:  Physical Exam   Constitutional: She is oriented to person, place, and time. She appears well-developed and well-nourished.   HENT:   Head: Normocephalic and atraumatic.   Right Ear: Hearing, tympanic membrane and external ear normal.   Left Ear: Hearing, tympanic membrane and external ear  Spoke with patient and scheduled sooner appointment with Dr. Burt.    normal.   Mouth/Throat: Oropharyngeal exudate and posterior oropharyngeal erythema present.   Eyes: Conjunctivae and EOM are normal.   Neck: Normal range of motion.   Cardiovascular: Normal rate, regular rhythm and normal heart sounds. Exam reveals no gallop and no friction rub.   No murmur heard.  Pulmonary/Chest: Effort normal and breath sounds normal. No respiratory distress. She has no wheezes. She has no rales.   Musculoskeletal: Normal range of motion.   Lymphadenopathy:     She has cervical adenopathy (right anterior cervical lymphadenopathy).   Neurological: She is alert and oriented to person, place, and time.   Skin: Skin is warm and dry. No rash noted.   Psychiatric: She has a normal mood and affect. Her behavior is normal. Judgment and thought content normal.           Nargis was seen today for pharyngitis and anxiety.    Diagnoses and all orders for this visit:    Acute streptococcal pharyngitis    Depression with anxiety    Other orders  -     amoxicillin (AMOXIL) 500 MG capsule; Take 1 capsule by mouth 3 times daily.  -     sertraline (ZOLOFT) 100 MG tablet; Take 1 tablet by mouth daily.  -     hydrOXYzine (ATARAX) 10 MG tablet; Take 1-2 tablets by mouth every 6 hours as needed for Anxiety.        Mary Hillman, CNP

## 2019-07-15 ENCOUNTER — LAB VISIT (OUTPATIENT)
Dept: LAB | Facility: HOSPITAL | Age: 69
End: 2019-07-15
Attending: INTERNAL MEDICINE
Payer: MEDICARE

## 2019-07-15 DIAGNOSIS — K50.819 CROHN'S DISEASE OF SMALL AND LARGE INTESTINES WITH COMPLICATION: ICD-10-CM

## 2019-07-15 DIAGNOSIS — K50.119 CROHN'S DISEASE OF COLON WITH COMPLICATION: ICD-10-CM

## 2019-07-15 LAB
ALBUMIN SERPL BCP-MCNC: 4.2 G/DL (ref 3.5–5.2)
ALP SERPL-CCNC: 82 U/L (ref 55–135)
ALT SERPL W/O P-5'-P-CCNC: 13 U/L (ref 10–44)
ANION GAP SERPL CALC-SCNC: 11 MMOL/L (ref 8–16)
AST SERPL-CCNC: 21 U/L (ref 10–40)
BASOPHILS # BLD AUTO: 0.02 K/UL (ref 0–0.2)
BASOPHILS NFR BLD: 0.4 % (ref 0–1.9)
BILIRUB SERPL-MCNC: 0.6 MG/DL (ref 0.1–1)
BUN SERPL-MCNC: 17 MG/DL (ref 8–23)
CALCIUM SERPL-MCNC: 9.4 MG/DL (ref 8.7–10.5)
CHLORIDE SERPL-SCNC: 95 MMOL/L (ref 95–110)
CO2 SERPL-SCNC: 25 MMOL/L (ref 23–29)
CREAT SERPL-MCNC: 0.8 MG/DL (ref 0.5–1.4)
DIFFERENTIAL METHOD: ABNORMAL
EOSINOPHIL # BLD AUTO: 0.1 K/UL (ref 0–0.5)
EOSINOPHIL NFR BLD: 1.6 % (ref 0–8)
ERYTHROCYTE [DISTWIDTH] IN BLOOD BY AUTOMATED COUNT: 12.2 % (ref 11.5–14.5)
EST. GFR  (AFRICAN AMERICAN): >60 ML/MIN/1.73 M^2
EST. GFR  (NON AFRICAN AMERICAN): >60 ML/MIN/1.73 M^2
GLUCOSE SERPL-MCNC: 86 MG/DL (ref 70–110)
HCT VFR BLD AUTO: 38.4 % (ref 37–48.5)
HGB BLD-MCNC: 13.1 G/DL (ref 12–16)
IMM GRANULOCYTES # BLD AUTO: 0 K/UL (ref 0–0.04)
IMM GRANULOCYTES NFR BLD AUTO: 0 % (ref 0–0.5)
LYMPHOCYTES # BLD AUTO: 1.6 K/UL (ref 1–4.8)
LYMPHOCYTES NFR BLD: 32.5 % (ref 18–48)
MCH RBC QN AUTO: 34 PG (ref 27–31)
MCHC RBC AUTO-ENTMCNC: 34.1 G/DL (ref 32–36)
MCV RBC AUTO: 100 FL (ref 82–98)
MONOCYTES # BLD AUTO: 0.5 K/UL (ref 0.3–1)
MONOCYTES NFR BLD: 10.8 % (ref 4–15)
NEUTROPHILS # BLD AUTO: 2.7 K/UL (ref 1.8–7.7)
NEUTROPHILS NFR BLD: 54.7 % (ref 38–73)
NRBC BLD-RTO: 0 /100 WBC
PLATELET # BLD AUTO: 235 K/UL (ref 150–350)
PMV BLD AUTO: 9.6 FL (ref 9.2–12.9)
POTASSIUM SERPL-SCNC: 3.8 MMOL/L (ref 3.5–5.1)
PROT SERPL-MCNC: 7.6 G/DL (ref 6–8.4)
RBC # BLD AUTO: 3.85 M/UL (ref 4–5.4)
SODIUM SERPL-SCNC: 131 MMOL/L (ref 136–145)
WBC # BLD AUTO: 4.92 K/UL (ref 3.9–12.7)

## 2019-07-15 PROCEDURE — 80053 COMPREHEN METABOLIC PANEL: CPT

## 2019-07-15 PROCEDURE — 82607 VITAMIN B-12: CPT

## 2019-07-15 PROCEDURE — 36415 COLL VENOUS BLD VENIPUNCTURE: CPT

## 2019-07-15 PROCEDURE — 85025 COMPLETE CBC W/AUTO DIFF WBC: CPT

## 2019-07-15 PROCEDURE — 86480 TB TEST CELL IMMUN MEASURE: CPT

## 2019-07-16 ENCOUNTER — PATIENT MESSAGE (OUTPATIENT)
Dept: PHARMACY | Facility: CLINIC | Age: 69
End: 2019-07-16

## 2019-07-16 ENCOUNTER — PATIENT MESSAGE (OUTPATIENT)
Dept: GASTROENTEROLOGY | Facility: CLINIC | Age: 69
End: 2019-07-16

## 2019-07-16 LAB — VIT B12 SERPL-MCNC: 1610 PG/ML (ref 210–950)

## 2019-07-17 ENCOUNTER — PATIENT MESSAGE (OUTPATIENT)
Dept: GASTROENTEROLOGY | Facility: CLINIC | Age: 69
End: 2019-07-17

## 2019-07-17 LAB
M TB IFN-G CD4+ BCKGRND COR BLD-ACNC: 0.01 IU/ML
MITOGEN IGNF BCKGRD COR BLD-ACNC: >10 IU/ML
MITOGEN IGNF BCKGRD COR BLD-ACNC: NEGATIVE [IU]/ML
NIL: 0.05 IU/ML
TB2 - NIL: -0.01 IU/ML

## 2019-07-18 ENCOUNTER — PATIENT MESSAGE (OUTPATIENT)
Dept: GASTROENTEROLOGY | Facility: CLINIC | Age: 69
End: 2019-07-18

## 2019-08-05 ENCOUNTER — OFFICE VISIT (OUTPATIENT)
Dept: GASTROENTEROLOGY | Facility: CLINIC | Age: 69
End: 2019-08-05
Payer: MEDICARE

## 2019-08-05 ENCOUNTER — PATIENT MESSAGE (OUTPATIENT)
Dept: GASTROENTEROLOGY | Facility: CLINIC | Age: 69
End: 2019-08-05

## 2019-08-05 VITALS
HEART RATE: 84 BPM | WEIGHT: 104.25 LBS | HEIGHT: 67 IN | BODY MASS INDEX: 16.36 KG/M2 | DIASTOLIC BLOOD PRESSURE: 76 MMHG | SYSTOLIC BLOOD PRESSURE: 148 MMHG

## 2019-08-05 DIAGNOSIS — K50.119 CROHN'S DISEASE OF COLON WITH COMPLICATION: Primary | ICD-10-CM

## 2019-08-05 PROCEDURE — 99213 PR OFFICE/OUTPT VISIT, EST, LEVL III, 20-29 MIN: ICD-10-PCS | Mod: S$GLB,,, | Performed by: INTERNAL MEDICINE

## 2019-08-05 PROCEDURE — 99213 OFFICE O/P EST LOW 20 MIN: CPT | Mod: S$GLB,,, | Performed by: INTERNAL MEDICINE

## 2019-08-05 PROCEDURE — 1101F PR PT FALLS ASSESS DOC 0-1 FALLS W/OUT INJ PAST YR: ICD-10-PCS | Mod: CPTII,S$GLB,, | Performed by: INTERNAL MEDICINE

## 2019-08-05 PROCEDURE — 99999 PR PBB SHADOW E&M-EST. PATIENT-LVL IV: ICD-10-PCS | Mod: PBBFAC,,, | Performed by: INTERNAL MEDICINE

## 2019-08-05 PROCEDURE — 3077F SYST BP >= 140 MM HG: CPT | Mod: CPTII,S$GLB,, | Performed by: INTERNAL MEDICINE

## 2019-08-05 PROCEDURE — 3078F PR MOST RECENT DIASTOLIC BLOOD PRESSURE < 80 MM HG: ICD-10-PCS | Mod: CPTII,S$GLB,, | Performed by: INTERNAL MEDICINE

## 2019-08-05 PROCEDURE — 99999 PR PBB SHADOW E&M-EST. PATIENT-LVL IV: CPT | Mod: PBBFAC,,, | Performed by: INTERNAL MEDICINE

## 2019-08-05 PROCEDURE — 1101F PT FALLS ASSESS-DOCD LE1/YR: CPT | Mod: CPTII,S$GLB,, | Performed by: INTERNAL MEDICINE

## 2019-08-05 PROCEDURE — 3077F PR MOST RECENT SYSTOLIC BLOOD PRESSURE >= 140 MM HG: ICD-10-PCS | Mod: CPTII,S$GLB,, | Performed by: INTERNAL MEDICINE

## 2019-08-05 PROCEDURE — 3078F DIAST BP <80 MM HG: CPT | Mod: CPTII,S$GLB,, | Performed by: INTERNAL MEDICINE

## 2019-08-05 NOTE — PROGRESS NOTES
GASTROENTEROLOGY IBD CONSULTATION:   Manuela Reyes  MRN: [id number]  : 1950    Referring Provider: No ref. provider found  Primary Care Provider: Christelle Lawler DO      CHIEF COMPLAINT:   Chief Complaint   Patient presents with    Crohn's Disease     follow u/p new Medication       History of Present Illness:  Ms. Reyes is a 69 y.o. female who is here today for Crohn's disease.     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  Initially did fine but then developed some non-specific symptoms about 2 weeks ago along with fatigue and just not feeling right.  She says that is now better as of a few days ago.  Does continue to have some bloating, gas and abdominal pain.  States her fatigue is better.      PREVENTIVE MEDICINE:  Immunization History   Administered Date(s) Administered    Influenza - High Dose 2015, 2016, 2018    Influenza - Trivalent - Adjuvanted 2017    Pneumococcal Conjugate - 13 Valent 12/10/2017    Pneumococcal Polysaccharide - 23 Valent 2019      Date of Last Pap Smear, result n/a  Date of Last Surveillance Colonoscopy, result:   Date of Last DEXA, result: 2018  Date of Last T-SPOT, result: ?  TPMT status: ?   Smoking status: non-smoker  NSAID use: none  History of C. difficile: none  Family planning: n/a      Past Medical History:  Past Medical History:   Diagnosis Date    Anxiety     Crohn's disease     Depression     Genital herpes     Hepatitis C infection      Hypertension     Insomnia     Mesenteric artery stenosis     Dr. 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 Malaika Mack--dermatology    TIA (transient ischemic attack)      Past Surgical History:  Past Surgical History:   Procedure Laterality Date    APPENDECTOMY      COLONOSCOPY N/A 3/19/2019    [facility]    COLONOSCOPY N/A 3/26/2018    Performed by Guillaume Whitman [facility] at [facility]    COLONOSCOPY N/A 5/19/2017    [facility] Luis Leonard [facility]    COLONOSCOPY N/A 5/22/2015    [facility] at [facility]    SMALL INTESTINE SURGERY       Current Medications:   Current Outpatient Medications   Medication Sig Dispense Refill    amLODIPine (NORVASC) 10 MG tablet Take 1 tablet (10 mg total) by mouth once daily. 90 tablet 3    aspirin (ECOTRIN) 81 MG EC tablet Take 1 tablet (81 mg total) by mouth once daily.      calcium carbonate (OS-COOPER) 600 mg (1,500 mg) Tab Take 600 mg by mouth 2 (two) times daily with meals.       collagen, bovine, 100 % Powd       cyanocobalamin, vitamin B-12, 2,500 mcg Lozg Place 1 tablet under the tongue.       levocetirizine (XYZAL) 5 MG tablet Take 1 tablet (5 mg total) by mouth every evening. 30 tablet 6    LORazepam (ATIVAN) 0.5 MG tablet Take 2.5 tablets (1.25 mg total) by mouth every evening. 75 tablet 4    methotrexate 2.5 MG Tab Take 3 tablets (7.5 mg total) by mouth every 7 days. 12 tablet 4    multivitamin with minerals tablet Take 1 tablet by mouth once daily at 6am.      nut tx,urea cycle dis w-o iron (ESSENTIAL AMINO ACID MIX ORAL)       rosuvastatin (CRESTOR) 10 MG tablet Take 1 tablet (10 mg total) by mouth once daily. 90 tablet 3    UNABLE TO FIND Take 25 mg by mouth once daily. CBD Oil      valACYclovir (VALTREX) 500 MG tablet take 1 tablet by mouth as directed and take 1 tablet by mouth as needed  0    vitamin  D 1000 units Tab Take 185 mg by mouth once daily.      adalimumab (HUMIRA) 40 mg/0.8 mL SyKt injection Inject 0.8 mLs (40 mg total) into the skin every 14 (fourteen) days. 2 vial 11     No current facility-administered medications for this visit.       Allergies:   Review of patient's allergies indicates:   Allergen Reactions    Codeine sulfate Itching    Hydrochlorothiazide Other (See Comments)     Adverse Reaction    Lisinopril Swelling and Edema     Facial Swelling     Social History:  Social History     Socioeconomic History    Marital status: Single     Spouse name: Not on file    Number of children: Not on file    Years of education: Not on file    Highest education level: Not on file   Occupational History    Not on file   Social Needs    Financial resource strain: Not on file    Food insecurity:     Worry: Not on file     Inability: Not on file    Transportation needs:     Medical: Not on file     Non-medical: Not on file   Tobacco Use    Smoking status: Former Smoker     Years: 20.00     Types: Cigarettes     Last attempt to quit: 2005     Years since quittin.6    Smokeless tobacco: Former User    Tobacco comment: Former Light Smoker less than 10 a day   Substance and Sexual Activity    Alcohol use: No     Alcohol/week: 2.4 oz     Types: 4 Glasses of wine per week    Drug use: No    Sexual activity: Never     Partners: Female   Lifestyle    Physical activity:     Days per week: Not on file     Minutes per session: Not on file    Stress: Not on file   Relationships    Social connections:     Talks on phone: Not on file     Gets together: Not on file     Attends Gnosticism service: Not on file     Active member of club or organization: Not on file     Attends meetings of clubs or organizations: Not on file     Relationship status: Not on file   Other Topics Concern    Not on file   Social History Narrative    Not on file     Family History:  Family History   Problem Relation Age  "of Onset    Stroke Mother     Heart disease Mother     Cancer Father         Lung    Heart disease Sister     Glaucoma Neg Hx     Macular degeneration Neg Hx     Thyroid disease Neg Hx        Review of Systems:   CONSTITUTIONAL: Denies weight change,  fatigue, fevers, chills, night sweats.  EYES: No changes in vision.   ENT: No oral lesions or sore throat.  HEMATOLOGICAL/Lymph: Denies bleeding tendency, bruising tendency. No swellings or enlarged lymph nodes.  CARDIOVASCULAR: Denies chest pain, shortness of breath, orthopnea and edema.  RESPIRATORY: Denies cough, hemoptysis, dyspnea, and wheezing.  GI: See HPI.  : Denies dysuria and hematuria  MUSCULOSKELETAL: Denies joint pain or swelling, back pain and muscle pain.  SKIN: Denies rashes.  NEUROLOGIC: Denies headaches, seizures and numbness.  PSYCHIATRIC: Denies depression or anxiety.  ENDOCRINE: Denies heat or cold intolerance and excessive thirst or urination.    Physical Examination:       BP (!) 148/76   Pulse 84   Ht 5' 7" (1.702 m)   Wt 47.3 kg (104 lb 4.4 oz)   BMI 16.33 kg/m²   General Appearance:  Alert, cooperative, no distress, appears stated age  Head:   Normocephalic, without obvious abnormality, atraumatic  Eyes, Nose, Mouth:  Mucous membranes moist, conjunctiva/corneas clear, EOMI ,no obvious lip or oral lesions noted  Neck: No cervical or supraclavicular lymphadenopathy or masses  Lungs: Clear to auscultation bilaterally, no wheezes, rales or rhonchi, no respiratory distress.  Normal effort.   Cardiac: Regular rhythm, no obvious murmurs  Abdomen: Soft, non-distended, no hernias, non-tender, no hepatosplenomegally, no rebound, guarding or masses  Extremities: No edema, no clubbing, no palmar erythema  Skin: No other visible or palpable rashes   Neuro-Psychiatric: Mood, affect, memory and insight are normal.  No encephalopathy    Laboratory:  Lab Results   Component Value Date    WBC 4.92 07/15/2019     (H) 07/15/2019    RDW 12.2 " 07/15/2019     07/15/2019    BUN 17 07/15/2019    GLU 86 07/15/2019      Lab Results   Component Value Date    CRP 0.1 08/28/2018    TSH 1.326 01/04/2017    CHOL 147 01/25/2019    TRIG 50 01/25/2019    HDL 84 (H) 01/25/2019     No results found for: PHOS, MG, ALB     Imaging Review:   No results found.        Health Maintenance Review:  Health Maintenance   Topic Date Due    DEXA SCAN  02/08/2020    High Dose Statin  08/05/2020    Mammogram  01/25/2021    Lipid Panel  01/25/2024    Colonoscopy  03/19/2024    TETANUS VACCINE  07/25/2026    Pneumococcal Vaccine (65+ Low/Medium Risk)  Completed    Hepatitis C Screening  Addressed       ASSESSMENT:  69 y.o. Female with longstanding history of ileocolonic Crohn's with h/o resection x 2, now on humira and doing well.  Pt is in clinical remission but not endoscopic, therefore methotrexate was started.  Pt with low level antibodies to humira with good drug level.      PLAN:  - continue methotrexate  - continue to monitor      Return to clinic: 3 months    Lili Ellis  8/5/2019  11:17 AM

## 2019-08-12 ENCOUNTER — TELEPHONE (OUTPATIENT)
Dept: PHARMACY | Facility: CLINIC | Age: 69
End: 2019-08-12

## 2019-08-12 ENCOUNTER — PATIENT MESSAGE (OUTPATIENT)
Dept: GASTROENTEROLOGY | Facility: CLINIC | Age: 69
End: 2019-08-12

## 2019-08-13 ENCOUNTER — PATIENT MESSAGE (OUTPATIENT)
Dept: CARDIOLOGY | Facility: CLINIC | Age: 69
End: 2019-08-13

## 2019-08-15 ENCOUNTER — PATIENT MESSAGE (OUTPATIENT)
Dept: GASTROENTEROLOGY | Facility: CLINIC | Age: 69
End: 2019-08-15

## 2019-09-04 ENCOUNTER — TELEPHONE (OUTPATIENT)
Dept: PODIATRY | Facility: CLINIC | Age: 69
End: 2019-09-04

## 2019-09-04 NOTE — TELEPHONE ENCOUNTER
Contacted Pt in regards to appt On September 25. Explained to Pt that provider would be out of office. Pt agreed to new appt time and date.

## 2019-09-09 ENCOUNTER — PATIENT MESSAGE (OUTPATIENT)
Dept: GASTROENTEROLOGY | Facility: CLINIC | Age: 69
End: 2019-09-09

## 2019-09-10 ENCOUNTER — PATIENT MESSAGE (OUTPATIENT)
Dept: GASTROENTEROLOGY | Facility: CLINIC | Age: 69
End: 2019-09-10

## 2019-09-11 ENCOUNTER — PATIENT MESSAGE (OUTPATIENT)
Dept: GASTROENTEROLOGY | Facility: CLINIC | Age: 69
End: 2019-09-11

## 2019-09-12 ENCOUNTER — PATIENT MESSAGE (OUTPATIENT)
Dept: GASTROENTEROLOGY | Facility: CLINIC | Age: 69
End: 2019-09-12

## 2019-09-13 ENCOUNTER — PATIENT MESSAGE (OUTPATIENT)
Dept: GASTROENTEROLOGY | Facility: CLINIC | Age: 69
End: 2019-09-13

## 2019-09-16 ENCOUNTER — PATIENT MESSAGE (OUTPATIENT)
Dept: GASTROENTEROLOGY | Facility: CLINIC | Age: 69
End: 2019-09-16

## 2019-09-17 ENCOUNTER — PATIENT MESSAGE (OUTPATIENT)
Dept: GASTROENTEROLOGY | Facility: CLINIC | Age: 69
End: 2019-09-17

## 2019-09-17 ENCOUNTER — TELEPHONE (OUTPATIENT)
Dept: PHARMACY | Facility: CLINIC | Age: 69
End: 2019-09-17

## 2019-09-17 ENCOUNTER — LAB VISIT (OUTPATIENT)
Dept: LAB | Facility: HOSPITAL | Age: 69
End: 2019-09-17
Attending: INTERNAL MEDICINE
Payer: MEDICARE

## 2019-09-17 DIAGNOSIS — K50.119 CROHN'S DISEASE OF COLON WITH COMPLICATION: ICD-10-CM

## 2019-09-17 DIAGNOSIS — K50.90 CROHN'S DISEASE WITHOUT COMPLICATION, UNSPECIFIED GASTROINTESTINAL TRACT LOCATION: Primary | ICD-10-CM

## 2019-09-17 LAB
BASOPHILS # BLD AUTO: 0.03 K/UL (ref 0–0.2)
BASOPHILS NFR BLD: 0.5 % (ref 0–1.9)
DIFFERENTIAL METHOD: ABNORMAL
EOSINOPHIL # BLD AUTO: 0 K/UL (ref 0–0.5)
EOSINOPHIL NFR BLD: 0.6 % (ref 0–8)
ERYTHROCYTE [DISTWIDTH] IN BLOOD BY AUTOMATED COUNT: 12.9 % (ref 11.5–14.5)
ERYTHROCYTE [SEDIMENTATION RATE] IN BLOOD BY WESTERGREN METHOD: 12 MM/HR (ref 0–20)
HCT VFR BLD AUTO: 39.4 % (ref 37–48.5)
HGB BLD-MCNC: 13.2 G/DL (ref 12–16)
IMM GRANULOCYTES # BLD AUTO: 0.01 K/UL (ref 0–0.04)
IMM GRANULOCYTES NFR BLD AUTO: 0.2 % (ref 0–0.5)
LYMPHOCYTES # BLD AUTO: 2.1 K/UL (ref 1–4.8)
LYMPHOCYTES NFR BLD: 32.6 % (ref 18–48)
MCH RBC QN AUTO: 34.4 PG (ref 27–31)
MCHC RBC AUTO-ENTMCNC: 33.5 G/DL (ref 32–36)
MCV RBC AUTO: 103 FL (ref 82–98)
MONOCYTES # BLD AUTO: 0.8 K/UL (ref 0.3–1)
MONOCYTES NFR BLD: 12 % (ref 4–15)
NEUTROPHILS # BLD AUTO: 3.5 K/UL (ref 1.8–7.7)
NEUTROPHILS NFR BLD: 54.1 % (ref 38–73)
NRBC BLD-RTO: 0 /100 WBC
PLATELET # BLD AUTO: 232 K/UL (ref 150–350)
PMV BLD AUTO: 9.7 FL (ref 9.2–12.9)
RBC # BLD AUTO: 3.84 M/UL (ref 4–5.4)
WBC # BLD AUTO: 6.51 K/UL (ref 3.9–12.7)

## 2019-09-17 PROCEDURE — 85651 RBC SED RATE NONAUTOMATED: CPT

## 2019-09-17 PROCEDURE — 36415 COLL VENOUS BLD VENIPUNCTURE: CPT

## 2019-09-17 PROCEDURE — 80053 COMPREHEN METABOLIC PANEL: CPT

## 2019-09-17 PROCEDURE — 85025 COMPLETE CBC W/AUTO DIFF WBC: CPT

## 2019-09-17 PROCEDURE — 86140 C-REACTIVE PROTEIN: CPT

## 2019-09-17 NOTE — TELEPHONE ENCOUNTER
Patient confirmed shipping of MTX on  to arrive . Address and  verified. $9.33 copay in 004. Patient reported 1 missed dose that was intentional. She stated her crohn's symptoms worsened and she was unsure if this was related to MTX and held dose. Patient stated she consulted with MD and was advised to resume MTX. Patient reported 0 doses on hand. Next dose due Mon . Patient did not report any new medications. Patient had no further questions or concerns.

## 2019-09-18 ENCOUNTER — LAB VISIT (OUTPATIENT)
Dept: LAB | Facility: HOSPITAL | Age: 69
End: 2019-09-18
Attending: INTERNAL MEDICINE
Payer: MEDICARE

## 2019-09-18 DIAGNOSIS — K50.90 CROHN'S DISEASE WITHOUT COMPLICATION, UNSPECIFIED GASTROINTESTINAL TRACT LOCATION: ICD-10-CM

## 2019-09-18 LAB
ALBUMIN SERPL BCP-MCNC: 4.5 G/DL (ref 3.5–5.2)
ALP SERPL-CCNC: 74 U/L (ref 55–135)
ALT SERPL W/O P-5'-P-CCNC: 13 U/L (ref 10–44)
ANION GAP SERPL CALC-SCNC: 12 MMOL/L (ref 8–16)
AST SERPL-CCNC: 21 U/L (ref 10–40)
BILIRUB SERPL-MCNC: 0.5 MG/DL (ref 0.1–1)
BUN SERPL-MCNC: 17 MG/DL (ref 8–23)
CALCIUM SERPL-MCNC: 9.5 MG/DL (ref 8.7–10.5)
CHLORIDE SERPL-SCNC: 98 MMOL/L (ref 95–110)
CO2 SERPL-SCNC: 26 MMOL/L (ref 23–29)
CREAT SERPL-MCNC: 0.8 MG/DL (ref 0.5–1.4)
CRP SERPL-MCNC: 0.5 MG/L (ref 0–8.2)
EST. GFR  (AFRICAN AMERICAN): >60 ML/MIN/1.73 M^2
EST. GFR  (NON AFRICAN AMERICAN): >60 ML/MIN/1.73 M^2
GLUCOSE SERPL-MCNC: 75 MG/DL (ref 70–110)
POTASSIUM SERPL-SCNC: 4.4 MMOL/L (ref 3.5–5.1)
PROT SERPL-MCNC: 7.6 G/DL (ref 6–8.4)
SODIUM SERPL-SCNC: 136 MMOL/L (ref 136–145)

## 2019-09-18 PROCEDURE — 83993 ASSAY FOR CALPROTECTIN FECAL: CPT

## 2019-09-19 ENCOUNTER — PATIENT MESSAGE (OUTPATIENT)
Dept: GASTROENTEROLOGY | Facility: CLINIC | Age: 69
End: 2019-09-19

## 2019-09-23 ENCOUNTER — PATIENT MESSAGE (OUTPATIENT)
Dept: GASTROENTEROLOGY | Facility: CLINIC | Age: 69
End: 2019-09-23

## 2019-09-25 ENCOUNTER — IMMUNIZATION (OUTPATIENT)
Dept: PHARMACY | Facility: CLINIC | Age: 69
End: 2019-09-25
Payer: MEDICARE

## 2019-09-25 ENCOUNTER — OFFICE VISIT (OUTPATIENT)
Dept: OPHTHALMOLOGY | Facility: CLINIC | Age: 69
End: 2019-09-25
Payer: MEDICARE

## 2019-09-25 DIAGNOSIS — H52.7 REFRACTIVE ERROR: ICD-10-CM

## 2019-09-25 DIAGNOSIS — H25.13 CATARACT, NUCLEAR SCLEROTIC SENILE, BILATERAL: Primary | ICD-10-CM

## 2019-09-25 PROCEDURE — 99499 RISK ADDL DX/OHS AUDIT: ICD-10-PCS | Mod: S$GLB,,, | Performed by: OPTOMETRIST

## 2019-09-25 PROCEDURE — 99999 PR PBB SHADOW E&M-EST. PATIENT-LVL I: ICD-10-PCS | Mod: PBBFAC,,, | Performed by: OPTOMETRIST

## 2019-09-25 PROCEDURE — 92015 PR REFRACTION: ICD-10-PCS | Mod: S$GLB,,, | Performed by: OPTOMETRIST

## 2019-09-25 PROCEDURE — 92014 PR EYE EXAM, EST PATIENT,COMPREHESV: ICD-10-PCS | Mod: S$GLB,,, | Performed by: OPTOMETRIST

## 2019-09-25 PROCEDURE — 92015 DETERMINE REFRACTIVE STATE: CPT | Mod: S$GLB,,, | Performed by: OPTOMETRIST

## 2019-09-25 PROCEDURE — 99499 UNLISTED E&M SERVICE: CPT | Mod: S$GLB,,, | Performed by: OPTOMETRIST

## 2019-09-25 PROCEDURE — 99999 PR PBB SHADOW E&M-EST. PATIENT-LVL I: CPT | Mod: PBBFAC,,, | Performed by: OPTOMETRIST

## 2019-09-25 PROCEDURE — 92014 COMPRE OPH EXAM EST PT 1/>: CPT | Mod: S$GLB,,, | Performed by: OPTOMETRIST

## 2019-09-25 NOTE — PROGRESS NOTES
HPI     No visual complaints.  Last eye exam 07/12/2018 [facility].  Last eye visit 05/22/2017 [facility] for chalazion.  Update glasses RX.    Last edited by Oly Lovell on 9/25/2019  2:53 PM. (History)            Assessment /Plan     For exam results, see Encounter Report.    Cataract, nuclear sclerotic senile, bilateral    Refractive error      Minimal cataracts OU, not surgical    Dispense Final Rx for glasses.  RTC 1 year  Discussed above and answered questions.

## 2019-09-26 ENCOUNTER — PATIENT MESSAGE (OUTPATIENT)
Dept: GASTROENTEROLOGY | Facility: CLINIC | Age: 69
End: 2019-09-26

## 2019-09-26 LAB — CALPROTECTIN STL-MCNT: 130.4 MCG/G

## 2019-10-02 ENCOUNTER — PATIENT MESSAGE (OUTPATIENT)
Dept: GASTROENTEROLOGY | Facility: CLINIC | Age: 69
End: 2019-10-02

## 2019-10-11 ENCOUNTER — TELEPHONE (OUTPATIENT)
Dept: PHARMACY | Facility: CLINIC | Age: 69
End: 2019-10-11

## 2019-10-15 ENCOUNTER — PATIENT MESSAGE (OUTPATIENT)
Dept: GASTROENTEROLOGY | Facility: CLINIC | Age: 69
End: 2019-10-15

## 2019-10-17 ENCOUNTER — PATIENT MESSAGE (OUTPATIENT)
Dept: INTERNAL MEDICINE | Facility: CLINIC | Age: 69
End: 2019-10-17

## 2019-10-17 ENCOUNTER — PATIENT MESSAGE (OUTPATIENT)
Dept: UROLOGY | Facility: CLINIC | Age: 69
End: 2019-10-17

## 2019-10-17 DIAGNOSIS — F51.04 CHRONIC INSOMNIA: ICD-10-CM

## 2019-10-17 DIAGNOSIS — F41.9 ANXIETY: ICD-10-CM

## 2019-10-17 RX ORDER — LORAZEPAM 0.5 MG/1
1.5 TABLET ORAL NIGHTLY
Qty: 90 TABLET | Refills: 0 | Status: SHIPPED | OUTPATIENT
Start: 2019-10-17 | End: 2019-12-02 | Stop reason: SDUPTHER

## 2019-10-23 ENCOUNTER — OFFICE VISIT (OUTPATIENT)
Dept: INTERNAL MEDICINE | Facility: CLINIC | Age: 69
End: 2019-10-23
Payer: MEDICARE

## 2019-10-23 VITALS
HEART RATE: 113 BPM | OXYGEN SATURATION: 96 % | HEIGHT: 67 IN | TEMPERATURE: 95 F | SYSTOLIC BLOOD PRESSURE: 124 MMHG | WEIGHT: 105.63 LBS | BODY MASS INDEX: 16.58 KG/M2 | DIASTOLIC BLOOD PRESSURE: 74 MMHG

## 2019-10-23 DIAGNOSIS — K50.00 CROHN'S DISEASE OF SMALL INTESTINE WITHOUT COMPLICATION: ICD-10-CM

## 2019-10-23 DIAGNOSIS — R30.0 DYSURIA: ICD-10-CM

## 2019-10-23 DIAGNOSIS — N30.00 ACUTE CYSTITIS WITHOUT HEMATURIA: Primary | ICD-10-CM

## 2019-10-23 DIAGNOSIS — I10 HYPERTENSION GOAL BP (BLOOD PRESSURE) < 140/90: ICD-10-CM

## 2019-10-23 LAB
BACTERIA #/AREA URNS HPF: ABNORMAL /HPF
BILIRUB UR QL STRIP: ABNORMAL
CLARITY UR: ABNORMAL
COLOR UR: YELLOW
GLUCOSE UR QL STRIP: NEGATIVE
HGB UR QL STRIP: ABNORMAL
HYALINE CASTS #/AREA URNS LPF: 0 /LPF
KETONES UR QL STRIP: NEGATIVE
LEUKOCYTE ESTERASE UR QL STRIP: ABNORMAL
MICROSCOPIC COMMENT: ABNORMAL
NITRITE UR QL STRIP: POSITIVE
PH UR STRIP: 6.5 [PH] (ref 5–8)
PROT UR QL STRIP: ABNORMAL
RBC #/AREA URNS HPF: 50 /HPF (ref 0–4)
SP GR UR STRIP: 1.01 (ref 1–1.03)
SQUAMOUS #/AREA URNS HPF: 5 /HPF
URN SPEC COLLECT METH UR: ABNORMAL
WBC #/AREA URNS HPF: >100 /HPF (ref 0–5)
WBC CLUMPS URNS QL MICRO: ABNORMAL

## 2019-10-23 PROCEDURE — 87077 CULTURE AEROBIC IDENTIFY: CPT

## 2019-10-23 PROCEDURE — 3078F DIAST BP <80 MM HG: CPT | Mod: CPTII,S$GLB,, | Performed by: FAMILY MEDICINE

## 2019-10-23 PROCEDURE — 1101F PT FALLS ASSESS-DOCD LE1/YR: CPT | Mod: CPTII,S$GLB,, | Performed by: FAMILY MEDICINE

## 2019-10-23 PROCEDURE — 87086 URINE CULTURE/COLONY COUNT: CPT

## 2019-10-23 PROCEDURE — 99214 OFFICE O/P EST MOD 30 MIN: CPT | Mod: S$GLB,,, | Performed by: FAMILY MEDICINE

## 2019-10-23 PROCEDURE — 99999 PR PBB SHADOW E&M-EST. PATIENT-LVL III: CPT | Mod: PBBFAC,,, | Performed by: FAMILY MEDICINE

## 2019-10-23 PROCEDURE — 87088 URINE BACTERIA CULTURE: CPT

## 2019-10-23 PROCEDURE — 3074F SYST BP LT 130 MM HG: CPT | Mod: CPTII,S$GLB,, | Performed by: FAMILY MEDICINE

## 2019-10-23 PROCEDURE — 81000 URINALYSIS NONAUTO W/SCOPE: CPT

## 2019-10-23 PROCEDURE — 99214 PR OFFICE/OUTPT VISIT, EST, LEVL IV, 30-39 MIN: ICD-10-PCS | Mod: S$GLB,,, | Performed by: FAMILY MEDICINE

## 2019-10-23 PROCEDURE — 87186 SC STD MICRODIL/AGAR DIL: CPT

## 2019-10-23 PROCEDURE — 3074F PR MOST RECENT SYSTOLIC BLOOD PRESSURE < 130 MM HG: ICD-10-PCS | Mod: CPTII,S$GLB,, | Performed by: FAMILY MEDICINE

## 2019-10-23 PROCEDURE — 1101F PR PT FALLS ASSESS DOC 0-1 FALLS W/OUT INJ PAST YR: ICD-10-PCS | Mod: CPTII,S$GLB,, | Performed by: FAMILY MEDICINE

## 2019-10-23 PROCEDURE — 99999 PR PBB SHADOW E&M-EST. PATIENT-LVL III: ICD-10-PCS | Mod: PBBFAC,,, | Performed by: FAMILY MEDICINE

## 2019-10-23 PROCEDURE — 3078F PR MOST RECENT DIASTOLIC BLOOD PRESSURE < 80 MM HG: ICD-10-PCS | Mod: CPTII,S$GLB,, | Performed by: FAMILY MEDICINE

## 2019-10-23 RX ORDER — CIPROFLOXACIN 500 MG/1
500 TABLET ORAL 2 TIMES DAILY
Qty: 6 TABLET | Refills: 0 | Status: SHIPPED | OUTPATIENT
Start: 2019-10-23 | End: 2019-12-17 | Stop reason: ALTCHOICE

## 2019-10-23 NOTE — PROGRESS NOTES
Manuela Reyes  10/23/2019  [id number]    Christelle Lawler DO  Patient Care Team:  Christelle Lawler DO as PCP - General (Internal Medicine)  Belen Reyes LPN as Care Coordinator (Internal Medicine)  Has the patient seen any provider outside of the [facility] network since the last visit? (no). If yes, HIPPA forms completed and records requested.      Chief Complaint:  Chief Complaint   Patient presents with    Urinary Frequency     burning sensation       History of Present Illness:  Patient is a 69-year-old female presents clinic today complaining of dysuria and URI symptoms.    URI:  -congestion, cough, and sore throat  -endorses fatigue and low energy   -hasn't taken any OTC meds yet    UTI:  -started 2 days prior  -increased frequency, burning  -increased urgency  -took Azo pill which helped some            History:  Past Medical History:   Diagnosis Date    Anxiety     Crohn's disease     Depression     Genital herpes     Hepatitis C infection     Hypertension     Insomnia     Mesenteric artery stenosis     Dr. 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 Malaika Mack--dermatology    TIA (transient ischemic attack)      Past Surgical History:   Procedure Laterality Date    APPENDECTOMY      COLONOSCOPY N/A 5/19/2017    Procedure: COLONOSCOPY;  Surgeon: Jose Luis Leonard MD;  Location: [facility];  Service: Endoscopy;  Laterality: N/A;    COLONOSCOPY N/A 3/26/2018    Procedure: COLONOSCOPY;  Surgeon: Guillaume Whitman MD;  Location: [facility];  Service: Endoscopy;  Laterality: N/A;    COLONOSCOPY N/A 3/19/2019    Procedure: COLONOSCOPY;  Surgeon: Lili Ellis MD;  Location: [facility];  Service: Endoscopy;  Laterality: N/A;    SMALL INTESTINE SURGERY       Family History   Problem Relation Age of Onset    Stroke Mother     Heart disease Mother     Cataracts Mother     Cancer Father         Lung    Heart disease  Sister     Hypertension Brother     Glaucoma Neg Hx     Macular degeneration Neg Hx     Thyroid disease Neg Hx      Social History     Socioeconomic History    Marital status: Single     Spouse name: Not on file    Number of children: Not on file    Years of education: Not on file    Highest education level: Not on file   Occupational History    Not on file   Social Needs    Financial resource strain: Not on file    Food insecurity:     Worry: Not on file     Inability: Not on file    Transportation needs:     Medical: Not on file     Non-medical: Not on file   Tobacco Use    Smoking status: Former Smoker     Years: 20.00     Types: Cigarettes     Last attempt to quit: 2005     Years since quittin.8    Smokeless tobacco: Former User    Tobacco comment: Former Light Smoker less than 10 a day   Substance and Sexual Activity    Alcohol use: No     Alcohol/week: 4.0 standard drinks     Types: 4 Glasses of wine per week    Drug use: No    Sexual activity: Never     Partners: Female   Lifestyle    Physical activity:     Days per week: Not on file     Minutes per session: Not on file    Stress: Not on file   Relationships    Social connections:     Talks on phone: Not on file     Gets together: Not on file     Attends Islam service: Not on file     Active member of club or organization: Not on file     Attends meetings of clubs or organizations: Not on file     Relationship status: Not on file   Other Topics Concern    Not on file   Social History Narrative    Not on file     Patient Active Problem List   Diagnosis    Crohn's disease of small intestine    Hypertension goal BP (blood pressure) < 140/90    Crohn's colitis    Mesenteric artery stenosis    Hyperlipidemia LDL goal <70    Chronic insomnia    Anxiety    Crohn's disease    Family history of cardiovascular disease    Atherosclerosis of abdominal aorta    Age-related osteoporosis without current pathological fracture      Review of patient's allergies indicates:   Allergen Reactions    Codeine sulfate Itching    Hydrochlorothiazide Other (See Comments)     Adverse Reaction    Lisinopril Swelling and Edema     Facial Swelling       The following were reviewed at this visit: active problem list, medication list, allergies, family history, social history, and health maintenance.    Medications:  Current Outpatient Medications on File Prior to Visit   Medication Sig Dispense Refill    adalimumab (HUMIRA,CF, PEN) 40 mg/0.4 mL PnKt Inject 0.4 mLs (40 mg total) into the skin every 14 (fourteen) days. 2 pen 11    amLODIPine (NORVASC) 10 MG tablet Take 1 tablet (10 mg total) by mouth once daily. 90 tablet 3    aspirin (ECOTRIN) 81 MG EC tablet Take 1 tablet (81 mg total) by mouth once daily.      calcium carbonate (OS-COOPER) 600 mg (1,500 mg) Tab Take 600 mg by mouth 2 (two) times daily with meals.       collagen, bovine, 100 % Powd       cyanocobalamin, vitamin B-12, 2,500 mcg Lozg Place 1 tablet under the tongue.       LORazepam (ATIVAN) 0.5 MG tablet Take 3 tablets (1.5 mg total) by mouth every evening. 90 tablet 0    methotrexate 2.5 MG Tab Take 3 tablets (7.5 mg total) by mouth every 7 days. 12 tablet 4    multivitamin with minerals tablet Take 1 tablet by mouth once daily at 6am.      nut tx,urea cycle dis w-o iron (ESSENTIAL AMINO ACID MIX ORAL)       rosuvastatin (CRESTOR) 10 MG tablet Take 1 tablet (10 mg total) by mouth once daily. 90 tablet 3    valACYclovir (VALTREX) 500 MG tablet take 1 tablet by mouth as directed and take 1 tablet by mouth as needed  0    vitamin D 1000 units Tab Take 185 mg by mouth once daily.      levocetirizine (XYZAL) 5 MG tablet Take 1 tablet (5 mg total) by mouth every evening. (Patient not taking: Reported on 9/25/2019) 30 tablet 6    UNABLE TO FIND Take 25 mg by mouth once daily. CBD Oil       No current facility-administered medications on file prior to visit.        Medications  have been reviewed and reconciled with patient at this visit.  Barriers to medications present (no)    Adverse reactions to current medications (no)    Over the counter medications reviewed (Yes ), and if needed added to active Medication list at this visit.     Exam:  Wt Readings from Last 3 Encounters:   10/23/19 47.9 kg (105 lb 9.6 oz)   08/05/19 47.3 kg (104 lb 4.4 oz)   05/17/19 47.3 kg (104 lb 4.4 oz)     Temp Readings from Last 3 Encounters:   10/23/19 (!) 94.7 °F (34.8 °C) (Tympanic)   05/17/19 96.1 °F (35.6 °C) (Tympanic)   04/29/19 97.7 °F (36.5 °C) (Tympanic)     BP Readings from Last 3 Encounters:   10/23/19 124/74   08/05/19 (!) 148/76   05/17/19 120/84     Pulse Readings from Last 3 Encounters:   10/23/19 (!) 113   08/05/19 84   05/17/19 99     Body mass index is 16.54 kg/m².      Review of Systems   Constitutional: Positive for malaise/fatigue. Negative for chills and fever.   HENT: Positive for congestion and sore throat. Negative for hearing loss.    Eyes: Negative for redness.   Respiratory: Positive for cough. Negative for sputum production and shortness of breath.    Cardiovascular: Negative for chest pain.   Gastrointestinal: Negative for nausea and vomiting.   Genitourinary: Positive for dysuria, frequency and urgency. Negative for flank pain and hematuria.   Musculoskeletal: Negative for joint pain and myalgias.   Skin: Negative for rash.   Neurological: Negative for focal weakness and headaches.     Physical Exam   Constitutional: She is oriented to person, place, and time. She appears well-developed and well-nourished. No distress.   HENT:   Head: Normocephalic and atraumatic.   Eyes: Conjunctivae are normal. No scleral icterus.   Pulmonary/Chest: Effort normal.   Neurological: She is alert and oriented to person, place, and time.   Skin: Skin is warm and dry. Capillary refill takes less than 2 seconds. She is not diaphoretic.   Psychiatric: She has a normal mood and affect. Her behavior is  normal.   Nursing note and vitals reviewed.      Laboratory Reviewed ({Yes)  Lab Results   Component Value Date    WBC 6.51 09/17/2019    HGB 13.2 09/17/2019    HCT 39.4 09/17/2019     09/17/2019    CHOL 147 01/25/2019    TRIG 50 01/25/2019    HDL 84 (H) 01/25/2019    ALT 13 09/17/2019    AST 21 09/17/2019     09/17/2019    K 4.4 09/17/2019    CL 98 09/17/2019    CREATININE 0.8 09/17/2019    BUN 17 09/17/2019    CO2 26 09/17/2019    TSH 1.326 01/04/2017    PSA <0.01 11/07/2017       Manuela was seen today for urinary frequency.    Diagnoses and all orders for this visit:    Acute cystitis without hematuria  -     ciprofloxacin HCl (CIPRO) 500 MG tablet; Take 1 tablet (500 mg total) by mouth 2 (two) times daily.    Dysuria  -     Urinalysis; Future  -     Urine culture; Future  -     Urinalysis  -     Urine culture  -     ciprofloxacin HCl (CIPRO) 500 MG tablet; Take 1 tablet (500 mg total) by mouth 2 (two) times daily.    Crohn's disease of small intestine without complication    Hypertension goal BP (blood pressure) < 140/90    Other orders  -     Urinalysis Microscopic    UTI and URI:  -Recommend good oral hydration, rest, handwashing, humidifier, and NSAIDs/Tylenol PRN pain and fever  -RTC if symptoms worsen or do not improve  -Follow-up with PCP in 1 wk unless improved    -Chronic Crohn's disease.  Managed by patient's GI.  -Chronic hypertension.  Managed by patient's PCP.    -Patient's lab results were reviewed and discussed with patient   -Treatment options and alternatives were discussed with the patient. Patient expressed understanding. Patient was given the opportunity to ask questions and be an active participant in their medical care. Patient had no further questions or concerns at this time.   -Patient is an overall moderate risk for health complications from their medical conditions.     Follow up: Follow up if symptoms worsen or fail to improve.    After visit summary was printed and given  to patient upon discharge today.  Patient goals and care plan are included in After Visit Summary.

## 2019-10-24 ENCOUNTER — PATIENT MESSAGE (OUTPATIENT)
Dept: INTERNAL MEDICINE | Facility: CLINIC | Age: 69
End: 2019-10-24

## 2019-10-24 ENCOUNTER — PATIENT MESSAGE (OUTPATIENT)
Dept: UROLOGY | Facility: CLINIC | Age: 69
End: 2019-10-24

## 2019-10-24 ENCOUNTER — PATIENT MESSAGE (OUTPATIENT)
Dept: GASTROENTEROLOGY | Facility: CLINIC | Age: 69
End: 2019-10-24

## 2019-10-25 ENCOUNTER — PATIENT MESSAGE (OUTPATIENT)
Dept: UROLOGY | Facility: CLINIC | Age: 69
End: 2019-10-25

## 2019-10-25 ENCOUNTER — TELEPHONE (OUTPATIENT)
Dept: UROLOGY | Facility: CLINIC | Age: 69
End: 2019-10-25

## 2019-10-25 ENCOUNTER — PATIENT MESSAGE (OUTPATIENT)
Dept: INTERNAL MEDICINE | Facility: CLINIC | Age: 69
End: 2019-10-25

## 2019-10-25 DIAGNOSIS — N20.0 NEPHROLITHIASIS: Primary | ICD-10-CM

## 2019-10-25 NOTE — TELEPHONE ENCOUNTER
Received msg stating pt contacted our office on 10/17/19 regarding an ultrasound; no notes found confirming this; however, I attempted to contact pt but no answer.  Msg left on vm informing her of the date and location of her ultrasound.

## 2019-10-26 LAB — BACTERIA UR CULT: ABNORMAL

## 2019-10-28 ENCOUNTER — TELEPHONE (OUTPATIENT)
Dept: UROLOGY | Facility: CLINIC | Age: 69
End: 2019-10-28

## 2019-10-30 ENCOUNTER — PATIENT MESSAGE (OUTPATIENT)
Dept: INTERNAL MEDICINE | Facility: CLINIC | Age: 69
End: 2019-10-30

## 2019-11-11 ENCOUNTER — OFFICE VISIT (OUTPATIENT)
Dept: GASTROENTEROLOGY | Facility: CLINIC | Age: 69
End: 2019-11-11
Payer: MEDICARE

## 2019-11-11 VITALS
DIASTOLIC BLOOD PRESSURE: 64 MMHG | HEART RATE: 84 BPM | WEIGHT: 106.25 LBS | HEIGHT: 67 IN | SYSTOLIC BLOOD PRESSURE: 108 MMHG | BODY MASS INDEX: 16.67 KG/M2

## 2019-11-11 DIAGNOSIS — K50.80 CROHN'S DISEASE OF BOTH SMALL AND LARGE INTESTINE WITHOUT COMPLICATION: Primary | ICD-10-CM

## 2019-11-11 PROCEDURE — 3078F PR MOST RECENT DIASTOLIC BLOOD PRESSURE < 80 MM HG: ICD-10-PCS | Mod: CPTII,S$GLB,, | Performed by: INTERNAL MEDICINE

## 2019-11-11 PROCEDURE — 99999 PR PBB SHADOW E&M-EST. PATIENT-LVL IV: ICD-10-PCS | Mod: PBBFAC,,, | Performed by: INTERNAL MEDICINE

## 2019-11-11 PROCEDURE — 1101F PR PT FALLS ASSESS DOC 0-1 FALLS W/OUT INJ PAST YR: ICD-10-PCS | Mod: CPTII,S$GLB,, | Performed by: INTERNAL MEDICINE

## 2019-11-11 PROCEDURE — 3078F DIAST BP <80 MM HG: CPT | Mod: CPTII,S$GLB,, | Performed by: INTERNAL MEDICINE

## 2019-11-11 PROCEDURE — 1101F PT FALLS ASSESS-DOCD LE1/YR: CPT | Mod: CPTII,S$GLB,, | Performed by: INTERNAL MEDICINE

## 2019-11-11 PROCEDURE — 99999 PR PBB SHADOW E&M-EST. PATIENT-LVL IV: CPT | Mod: PBBFAC,,, | Performed by: INTERNAL MEDICINE

## 2019-11-11 PROCEDURE — 99213 PR OFFICE/OUTPT VISIT, EST, LEVL III, 20-29 MIN: ICD-10-PCS | Mod: S$GLB,,, | Performed by: INTERNAL MEDICINE

## 2019-11-11 PROCEDURE — 3074F SYST BP LT 130 MM HG: CPT | Mod: CPTII,S$GLB,, | Performed by: INTERNAL MEDICINE

## 2019-11-11 PROCEDURE — 3074F PR MOST RECENT SYSTOLIC BLOOD PRESSURE < 130 MM HG: ICD-10-PCS | Mod: CPTII,S$GLB,, | Performed by: INTERNAL MEDICINE

## 2019-11-11 PROCEDURE — 99213 OFFICE O/P EST LOW 20 MIN: CPT | Mod: S$GLB,,, | Performed by: INTERNAL MEDICINE

## 2019-11-11 RX ORDER — FOLIC ACID 1 MG/1
TABLET ORAL DAILY
COMMUNITY
End: 2022-03-08

## 2019-11-11 NOTE — PROGRESS NOTES
GASTROENTEROLOGY IBD CONSULTATION:   Manuela Reyes  MRN: [id number]  : 1950    Referring Provider: No ref. provider found  Primary Care Provider: Christelle Lawler DO      CHIEF COMPLAINT:   Chief Complaint   Patient presents with    Kenn's       History of Present Illness:  Ms. Reyes is a 69 y.o. female who is here today for Crohn's disease.     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    On probiotic (by pt choice), on folic acid  States appetite is good.  Denies fatigue.  States this is the best she has felt in a while.      PREVENTIVE MEDICINE:  Immunization History   Administered Date(s) Administered    Influenza - High Dose - PF (65 years and older) 2015, 2016, 2018, 2019    Influenza - Trivalent - Adjuvanted - PF 2017    Pneumococcal Conjugate - 13 Valent 12/10/2017    Pneumococcal Polysaccharide - 23 Valent 2019      Date of Last Pap Smear, result n/a  Date of Last Surveillance Colonoscopy, result:   Date of Last DEXA, result: 2018  Date of Last T-SPOT, result: ?  TPMT status: ?   Smoking status: non-smoker  NSAID use: none  History of C. 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 Malaika Mack--dermatology    TIA (transient ischemic attack)      Past Surgical History:  Past Surgical History:   Procedure Laterality Date    APPENDECTOMY      COLONOSCOPY N/A 5/19/2017    Procedure: COLONOSCOPY;  Surgeon: Jose Luis Leonard MD;  Location: [facility] ENDO;  Service: Endoscopy;  Laterality: N/A;    COLONOSCOPY N/A 3/26/2018    Procedure: COLONOSCOPY;  Surgeon: Guillaume Whitman MD;  Location: [facility] ENDO;  Service: Endoscopy;  Laterality: N/A;    COLONOSCOPY N/A 3/19/2019    Procedure: COLONOSCOPY;  Surgeon: Lili Ellis MD;  Location: [facility] ENDO;  Service: Endoscopy;  Laterality: N/A;    SMALL INTESTINE SURGERY       Current Medications:   Current Outpatient Medications   Medication Sig Dispense Refill    amLODIPine (NORVASC) 10 MG tablet Take 1 tablet (10 mg total) by mouth once daily. 90 tablet 3    aspirin (ECOTRIN) 81 MG EC tablet Take 1 tablet (81 mg total) by mouth once daily.      calcium carbonate (OS-COOPER) 600 mg (1,500 mg) Tab Take 600 mg by mouth 2 (two) times daily with meals.       collagen, bovine, 100 % Powd       cyanocobalamin, vitamin B-12, 2,500 mcg Log Place 1 tablet under the tongue.       folic acid (FOLVITE) 1 MG tablet Take by mouth once daily.      levocetirizine (XYZAL) 5 MG tablet Take 1 tablet (5 mg total) by mouth every evening. 30 tablet 6    LORazepam (ATIVAN) 0.5 MG tablet Take 3 tablets (1.5 mg total) by mouth every evening. 90 tablet 0    methotrexate 2.5 MG Tab Take 3 tablets (7.5 mg total) by mouth every 7 days. 12 tablet 4    multivitamin with minerals tablet Take 1 tablet by mouth once daily at 6am.      nut tx,urea cycle dis w-o iron (ESSENTIAL AMINO ACID MIX ORAL)       rosuvastatin (CRESTOR) 10 MG tablet Take 1 tablet (10 mg total) by mouth once daily. 90 tablet 3    UNABLE TO FIND Take 25 mg by mouth once daily. CBD Oil       valACYclovir (VALTREX) 500 MG tablet take 1 tablet by mouth as directed and take 1 tablet by mouth as needed  0    vitamin D 1000 units Tab Take 185 mg by mouth once daily.      adalimumab (HUMIRA,CF, PEN) 40 mg/0.4 mL PnKt Inject 0.4 mLs (40 mg total) into the skin every 14 (fourteen) days. 2 pen 11    ciprofloxacin HCl (CIPRO) 500 MG tablet Take 1 tablet (500 mg total) by mouth 2 (two) times daily. (Patient not taking: Reported on 2019) 6 tablet 0     No current facility-administered medications for this visit.       Allergies:   Review of patient's allergies indicates:   Allergen Reactions    Codeine sulfate Itching    Hydrochlorothiazide Other (See Comments)     Adverse Reaction    Lisinopril Swelling and Edema     Facial Swelling     Social History:  Social History     Socioeconomic History    Marital status: Single     Spouse name: Not on file    Number of children: Not on file    Years of education: Not on file    Highest education level: Not on file   Occupational History    Not on file   Social Needs    Financial resource strain: Not on file    Food insecurity:     Worry: Not on file     Inability: Not on file    Transportation needs:     Medical: Not on file     Non-medical: Not on file   Tobacco Use    Smoking status: Former Smoker     Years: 20.00     Types: Cigarettes     Last attempt to quit: 2005     Years since quittin.8    Smokeless tobacco: Former User    Tobacco comment: Former Light Smoker less than 10 a day   Substance and Sexual Activity    Alcohol use: No     Alcohol/week: 4.0 standard drinks     Types: 4 Glasses of wine per week    Drug use: No    Sexual activity: Never     Partners: Female   Lifestyle    Physical activity:     Days per week: Not on file     Minutes per session: Not on file    Stress: Not on file   Relationships    Social connections:     Talks on phone: Not on file     Gets together: Not on file     Attends Yazdanism service: Not on file  "    Active member of club or organization: Not on file     Attends meetings of clubs or organizations: Not on file     Relationship status: Not on file   Other Topics Concern    Not on file   Social History Narrative    Not on file     Family History:  Family History   Problem Relation Age of Onset    Stroke Mother     Heart disease Mother     Cataracts Mother     Cancer Father         Lung    Heart disease Sister     Hypertension Brother     Glaucoma Neg Hx     Macular degeneration Neg Hx     Thyroid disease Neg Hx        Review of Systems:   CONSTITUTIONAL: Denies weight change,  fatigue, fevers, chills, night sweats.  EYES: No changes in vision.   ENT: No oral lesions or sore throat.  HEMATOLOGICAL/Lymph: Denies bleeding tendency, bruising tendency. No swellings or enlarged lymph nodes.  CARDIOVASCULAR: Denies chest pain, shortness of breath, orthopnea and edema.  RESPIRATORY: Denies cough, hemoptysis, dyspnea, and wheezing.  GI: See HPI.  : Denies dysuria and hematuria  MUSCULOSKELETAL: Denies joint pain or swelling, back pain and muscle pain.  SKIN: Denies rashes.  NEUROLOGIC: Denies headaches, seizures and numbness.  PSYCHIATRIC: Denies depression or anxiety.  ENDOCRINE: Denies heat or cold intolerance and excessive thirst or urination.    Physical Examination:       /64   Pulse 84   Ht 5' 7" (1.702 m)   Wt 48.2 kg (106 lb 4.2 oz)   BMI 16.64 kg/m²   General Appearance:  Alert, cooperative, no distress, appears stated age  Head:   Normocephalic, without obvious abnormality, atraumatic  Eyes, Nose, Mouth:  Mucous membranes moist, conjunctiva/corneas clear, EOMI ,no obvious lip or oral lesions noted  Neck: No cervical or supraclavicular lymphadenopathy or masses  Lungs: Clear to auscultation bilaterally, no wheezes, rales or rhonchi, no respiratory distress.  Normal effort.   Cardiac: Regular rhythm, no obvious murmurs  Abdomen: Soft, non-distended, no hernias, non-tender, no " hepatosplenomegally, no rebound, guarding or masses  Extremities: No edema, no clubbing, no palmar erythema  Skin: No other visible or palpable rashes   Neuro-Psychiatric: Mood, affect, memory and insight are normal.  No encephalopathy    Laboratory:  Lab Results   Component Value Date    WBC 6.51 09/17/2019     (H) 09/17/2019    RDW 12.9 09/17/2019     09/17/2019    BUN 17 09/17/2019    GLU 75 09/17/2019      Lab Results   Component Value Date    CRP 0.5 09/17/2019    TSH 1.326 01/04/2017    CHOL 147 01/25/2019    TRIG 50 01/25/2019    HDL 84 (H) 01/25/2019     No results found for: PHOS, MG, ALB     Imaging Review:   No results found.        Health Maintenance Review:  Health Maintenance   Topic Date Due    DEXA SCAN  02/08/2020    High Dose Statin  10/23/2020    Mammogram  01/25/2021    Lipid Panel  01/25/2024    Colonoscopy  03/19/2024    TETANUS VACCINE  07/25/2026    Pneumococcal Vaccine (65+ Low/Medium Risk)  Completed    Hepatitis C Screening  Addressed       ASSESSMENT:  69 y.o. Female with longstanding history of ileocolonic Crohn's with h/o resection x 2 with anastomotic stricture, now on humira and doing well.  Pt is in clinical remission but not endoscopic, therefore methotrexate was started.  Pt with low level antibodies to humira with good drug level.      PLAN:  - continue methotrexate  - continue to monitor cbc and cmp every 12 weeks  - continue miralax nightly and increase as needed  - plan for colonoscopy in March/April 2020      Return to clinic: 6 months    Lili Ellis  11/11/2019  11:17 AM

## 2019-11-12 RX ORDER — METHOTREXATE 2.5 MG/1
7.5 TABLET ORAL
Qty: 12 TABLET | Refills: 4 | Status: SHIPPED | OUTPATIENT
Start: 2019-11-12 | End: 2020-04-07

## 2019-11-12 NOTE — TELEPHONE ENCOUNTER
----- Message from Valerie Lovell sent at 11/12/2019 12:03 PM CST -----  Contact: [facility]  States the pt needs a refill on her Methotrexate Tab 2.5, no additional info given and can be reached at [phone]///[id number]

## 2019-11-14 ENCOUNTER — TELEPHONE (OUTPATIENT)
Dept: RADIOLOGY | Facility: HOSPITAL | Age: 69
End: 2019-11-14

## 2019-11-14 ENCOUNTER — TELEPHONE (OUTPATIENT)
Dept: PHARMACY | Facility: CLINIC | Age: 69
End: 2019-11-14

## 2019-11-14 NOTE — TELEPHONE ENCOUNTER
Patient confirmed shipping of MTX on  to arrive 11/15. Address and  verified. $9.33 copay in 004, [facility]COF# 2384. Patient stated she has 0 doses on hand. Next dose due Monday, . Patient reported 0 missed doses. She stated she has not started any new medications or dose changes. Patient stated she has not developed any new allergies or health conditions. Patient had no further questions or concerns.

## 2019-11-15 ENCOUNTER — HOSPITAL ENCOUNTER (OUTPATIENT)
Dept: RADIOLOGY | Facility: HOSPITAL | Age: 69
Discharge: HOME | End: 2019-11-15
Attending: UROLOGY
Payer: MEDICARE

## 2019-11-15 DIAGNOSIS — N20.0 NEPHROLITHIASIS: ICD-10-CM

## 2019-11-15 PROCEDURE — 76770 US EXAM ABDO BACK WALL COMP: CPT | Mod: TC

## 2019-11-15 PROCEDURE — 76770 US RETROPERITONEAL COMPLETE: ICD-10-PCS | Mod: 26,,, | Performed by: RADIOLOGY

## 2019-11-15 PROCEDURE — 74018 RADEX ABDOMEN 1 VIEW: CPT | Mod: 26,,, | Performed by: RADIOLOGY

## 2019-11-15 PROCEDURE — 74018 XR KUB: ICD-10-PCS | Mod: 26,,, | Performed by: RADIOLOGY

## 2019-11-15 PROCEDURE — 76770 US EXAM ABDO BACK WALL COMP: CPT | Mod: 26,,, | Performed by: RADIOLOGY

## 2019-11-15 PROCEDURE — 74018 RADEX ABDOMEN 1 VIEW: CPT | Mod: TC

## 2019-11-18 ENCOUNTER — PATIENT MESSAGE (OUTPATIENT)
Dept: GASTROENTEROLOGY | Facility: CLINIC | Age: 69
End: 2019-11-18

## 2019-11-18 ENCOUNTER — OFFICE VISIT (OUTPATIENT)
Dept: UROLOGY | Facility: CLINIC | Age: 69
End: 2019-11-18
Payer: MEDICARE

## 2019-11-18 ENCOUNTER — PATIENT MESSAGE (OUTPATIENT)
Dept: UROLOGY | Facility: CLINIC | Age: 69
End: 2019-11-18

## 2019-11-18 VITALS — BODY MASS INDEX: 16.5 KG/M2 | DIASTOLIC BLOOD PRESSURE: 80 MMHG | WEIGHT: 105.38 LBS | SYSTOLIC BLOOD PRESSURE: 140 MMHG

## 2019-11-18 DIAGNOSIS — I10 HYPERTENSION, UNSPECIFIED TYPE: ICD-10-CM

## 2019-11-18 DIAGNOSIS — N28.1 RENAL CYST: Primary | ICD-10-CM

## 2019-11-18 DIAGNOSIS — N20.0 RENAL STONE: ICD-10-CM

## 2019-11-18 PROCEDURE — 3077F SYST BP >= 140 MM HG: CPT | Mod: CPTII,S$GLB,, | Performed by: UROLOGY

## 2019-11-18 PROCEDURE — 99214 PR OFFICE/OUTPT VISIT, EST, LEVL IV, 30-39 MIN: ICD-10-PCS | Mod: S$GLB,,, | Performed by: UROLOGY

## 2019-11-18 PROCEDURE — 3079F DIAST BP 80-89 MM HG: CPT | Mod: CPTII,S$GLB,, | Performed by: UROLOGY

## 2019-11-18 PROCEDURE — 99999 PR PBB SHADOW E&M-EST. PATIENT-LVL III: ICD-10-PCS | Mod: PBBFAC,,, | Performed by: UROLOGY

## 2019-11-18 PROCEDURE — 99999 PR PBB SHADOW E&M-EST. PATIENT-LVL III: CPT | Mod: PBBFAC,,, | Performed by: UROLOGY

## 2019-11-18 PROCEDURE — 3079F PR MOST RECENT DIASTOLIC BLOOD PRESSURE 80-89 MM HG: ICD-10-PCS | Mod: CPTII,S$GLB,, | Performed by: UROLOGY

## 2019-11-18 PROCEDURE — 1101F PR PT FALLS ASSESS DOC 0-1 FALLS W/OUT INJ PAST YR: ICD-10-PCS | Mod: CPTII,S$GLB,, | Performed by: UROLOGY

## 2019-11-18 PROCEDURE — 99214 OFFICE O/P EST MOD 30 MIN: CPT | Mod: S$GLB,,, | Performed by: UROLOGY

## 2019-11-18 PROCEDURE — 1101F PT FALLS ASSESS-DOCD LE1/YR: CPT | Mod: CPTII,S$GLB,, | Performed by: UROLOGY

## 2019-11-18 PROCEDURE — 3077F PR MOST RECENT SYSTOLIC BLOOD PRESSURE >= 140 MM HG: ICD-10-PCS | Mod: CPTII,S$GLB,, | Performed by: UROLOGY

## 2019-11-18 NOTE — PROGRESS NOTES
Chief Complaint: Left flank pain    HPI:   11/18/19: Back for followup on prior stone disease.   Indep assessment of imaging agree with report:  Punctate right renal stone, bilateral cysts some mildly complex.  Left flank pain fully resolved.  Reviewed history in detail. Discosed that she had sex reassignment surgery in 70s so is male genotype.  10/30/17: 68 yo woman since 12/16 has had some left flank pain.  CT 1/17 shows some simple renal cysts no sig stones.  US more recently redemonstrates similar findings no obstruction.  No abd/pelvic pain and no exac/rel factors.  No hematuria.  No prior urolithiasis.  No urinary bother.  No  history.      Allergies:  Codeine sulfate; Hydrochlorothiazide; and Lisinopril    Medications: has a current medication list which includes the following prescription(s): amlodipine, aspirin, calcium carbonate, collagen (bovine), cyanocobalamin (vitamin b-12), folic acid, levocetirizine, lorazepam, methotrexate, multivitamin with minerals, nut tx,urea cycle dis w-o iron, rosuvastatin, UNABLE TO FIND, valacyclovir, vitamin d, adalimumab, and ciprofloxacin hcl.    Review of Systems:  General: No fever, chills, fatigability, or weight loss.  Skin: No rashes, itching, or changes in color or texture of skin.  Chest: Denies HICKS, cyanosis, wheezing, cough, and sputum production.  Abdomen: Appetite fine. No weight loss. Denies diarrhea, abdominal pain, hematemesis, or blood in stool.  Musculoskeletal: No joint stiffness or swelling. Denies back pain.  : As above.  All other review of systems negative.    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    PSH:   has a past surgical history that includes Appendectomy; Small intestine surgery; Colonoscopy (N/A, 5/19/2017);  Colonoscopy (N/A, 3/26/2018); and Colonoscopy (N/A, 3/19/2019).    FamHx: family history includes Cancer in her father; Cataracts in her mother; Heart disease in her mother and sister; Hypertension in her brother; Stroke in her mother.    SocHx:  reports that she quit smoking about 14 years ago. Her smoking use included cigarettes. She quit after 20.00 years of use. She has quit using smokeless tobacco. She reports that she does not drink alcohol or use drugs.     Physical Exam:  Vitals:   Vitals:    11/18/19 0938   BP: (!) 140/80     General: A&Ox3. No apparent distress. No deformities.  Neck: No masses. Normal thyroid.  Lungs: normal inspiration. No use of accessory muscles.  Heart: normal pulse. No arrhythmias.  Abdomen: Soft. NT. ND  Skin: The skin is warm and dry. No jaundice.  Ext: No c/c/e.  :   11/18/19: PRADEEP normal    Labs/Studies:   Urinalysis performed in clinic, summary: UA normal  PSA    11/17: undetect    Impression/Plan:   1. Renal stones and cysts stable, US/RTC 1 year to survey any changes.  2. HTN: discussed and referred to PCP for further management.  3. Low risk of prostate cancer - epic won't allow the dx of prostate cancer screening to be placed based on gender but does have a prostate.

## 2019-11-21 ENCOUNTER — PATIENT MESSAGE (OUTPATIENT)
Dept: INTERNAL MEDICINE | Facility: CLINIC | Age: 69
End: 2019-11-21

## 2019-11-26 ENCOUNTER — OFFICE VISIT (OUTPATIENT)
Dept: PODIATRY | Facility: CLINIC | Age: 69
End: 2019-11-26
Payer: MEDICARE

## 2019-11-26 VITALS
HEIGHT: 67 IN | DIASTOLIC BLOOD PRESSURE: 79 MMHG | HEART RATE: 76 BPM | BODY MASS INDEX: 16.48 KG/M2 | WEIGHT: 105 LBS | SYSTOLIC BLOOD PRESSURE: 141 MMHG

## 2019-11-26 DIAGNOSIS — B35.1 ONYCHOMYCOSIS: Primary | ICD-10-CM

## 2019-11-26 PROCEDURE — 1126F AMNT PAIN NOTED NONE PRSNT: CPT | Mod: S$GLB,,, | Performed by: PODIATRIST

## 2019-11-26 PROCEDURE — 1159F MED LIST DOCD IN RCRD: CPT | Mod: S$GLB,,, | Performed by: PODIATRIST

## 2019-11-26 PROCEDURE — 3077F SYST BP >= 140 MM HG: CPT | Mod: CPTII,S$GLB,, | Performed by: PODIATRIST

## 2019-11-26 PROCEDURE — 3078F DIAST BP <80 MM HG: CPT | Mod: CPTII,S$GLB,, | Performed by: PODIATRIST

## 2019-11-26 PROCEDURE — 1101F PT FALLS ASSESS-DOCD LE1/YR: CPT | Mod: CPTII,S$GLB,, | Performed by: PODIATRIST

## 2019-11-26 PROCEDURE — 99213 OFFICE O/P EST LOW 20 MIN: CPT | Mod: S$GLB,,, | Performed by: PODIATRIST

## 2019-11-26 PROCEDURE — 3077F PR MOST RECENT SYSTOLIC BLOOD PRESSURE >= 140 MM HG: ICD-10-PCS | Mod: CPTII,S$GLB,, | Performed by: PODIATRIST

## 2019-11-26 PROCEDURE — 99999 PR PBB SHADOW E&M-EST. PATIENT-LVL III: ICD-10-PCS | Mod: PBBFAC,,, | Performed by: PODIATRIST

## 2019-11-26 PROCEDURE — 99213 PR OFFICE/OUTPT VISIT, EST, LEVL III, 20-29 MIN: ICD-10-PCS | Mod: S$GLB,,, | Performed by: PODIATRIST

## 2019-11-26 PROCEDURE — 99999 PR PBB SHADOW E&M-EST. PATIENT-LVL III: CPT | Mod: PBBFAC,,, | Performed by: PODIATRIST

## 2019-11-26 PROCEDURE — 1101F PR PT FALLS ASSESS DOC 0-1 FALLS W/OUT INJ PAST YR: ICD-10-PCS | Mod: CPTII,S$GLB,, | Performed by: PODIATRIST

## 2019-11-26 PROCEDURE — 3078F PR MOST RECENT DIASTOLIC BLOOD PRESSURE < 80 MM HG: ICD-10-PCS | Mod: CPTII,S$GLB,, | Performed by: PODIATRIST

## 2019-11-26 PROCEDURE — 1126F PR PAIN SEVERITY QUANTIFIED, NO PAIN PRESENT: ICD-10-PCS | Mod: S$GLB,,, | Performed by: PODIATRIST

## 2019-11-26 PROCEDURE — 1159F PR MEDICATION LIST DOCUMENTED IN MEDICAL RECORD: ICD-10-PCS | Mod: S$GLB,,, | Performed by: PODIATRIST

## 2019-11-26 NOTE — PROGRESS NOTES
Subjective:     Patient ID: Manuela Reyes is a 69 y.o. female.    Chief Complaint: Nail Problem (bilateral possible fungus. wears casual shoes with socks. non-diabetic Pt. last seen on 04/29/19 with PCP Dr. Lawler)    Manuela is a 69 y.o. female who presents to the clinic complaining of thick and discolored toenails on both feet. Manuela is inquiring about treatment options. Patient states she did do Tea Tree Oil for about 1 year and is now using Formal 3 with a little difference to the nails.     Patient Active Problem List   Diagnosis    Crohn's disease of small intestine    Hypertension goal BP (blood pressure) < 140/90    Crohn's colitis    Mesenteric artery stenosis    Hyperlipidemia LDL goal <70    Chronic insomnia    Anxiety    Crohn's disease    Family history of cardiovascular disease    Atherosclerosis of abdominal aorta    Age-related osteoporosis without current pathological fracture       Medication List with Changes/Refills   Current Medications    ADALIMUMAB (HUMIRA,CF, PEN) 40 MG/0.4 ML PNKT    Inject 0.4 mLs (40 mg total) into the skin every 14 (fourteen) days.    AMLODIPINE (NORVASC) 10 MG TABLET    Take 1 tablet (10 mg total) by mouth once daily.    ASPIRIN (ECOTRIN) 81 MG EC TABLET    Take 1 tablet (81 mg total) by mouth once daily.    CALCIUM CARBONATE (OS-COOPER) 600 MG (1,500 MG) TAB    Take 600 mg by mouth 2 (two) times daily with meals.     CIPROFLOXACIN HCL (CIPRO) 500 MG TABLET    Take 1 tablet (500 mg total) by mouth 2 (two) times daily.    COLLAGEN, BOVINE, 100 % POWD        CYANOCOBALAMIN, VITAMIN B-12, 2,500 [facility]    Place 1 tablet under the tongue.     FOLIC ACID (FOLVITE) 1 MG TABLET    Take by mouth once daily.    LEVOCETIRIZINE (XYZAL) 5 MG TABLET    Take 1 tablet (5 mg total) by mouth every evening.    LORAZEPAM (ATIVAN) 0.5 MG TABLET    Take 3 tablets (1.5 mg total) by mouth every evening.    METHOTREXATE 2.5 MG TAB    Take 3 tablets (7.5 mg total) by mouth every  7 days.    MULTIVITAMIN WITH MINERALS TABLET    Take 1 tablet by mouth once daily at 6am.    NUT TX,UREA CYCLE DIS W-O IRON (ESSENTIAL AMINO ACID MIX ORAL)        ROSUVASTATIN (CRESTOR) 10 MG TABLET    Take 1 tablet (10 mg total) by mouth once daily.    UNABLE TO FIND    Take 25 mg by mouth once daily. CBD Oil    VALACYCLOVIR (VALTREX) 500 MG TABLET    take 1 tablet by mouth as directed and take 1 tablet by mouth as needed    VITAMIN D 1000 UNITS TAB    Take 185 mg by mouth once daily.       Review of patient's allergies indicates:   Allergen Reactions    Codeine sulfate Itching    Hydrochlorothiazide Other (See Comments)     Adverse Reaction    Lisinopril Swelling and Edema     Facial Swelling       Past Surgical History:   Procedure Laterality Date    APPENDECTOMY      COLONOSCOPY N/A 5/19/2017    Procedure: COLONOSCOPY;  Surgeon: Jose Luis Leonard MD;  Location: [facility];  Service: Endoscopy;  Laterality: N/A;    COLONOSCOPY N/A 3/26/2018    Procedure: COLONOSCOPY;  Surgeon: Guillaume Whitman MD;  Location: [facility];  Service: Endoscopy;  Laterality: N/A;    COLONOSCOPY N/A 3/19/2019    Procedure: COLONOSCOPY;  Surgeon: Lili Ellis MD;  Location: [facility];  Service: Endoscopy;  Laterality: N/A;    SMALL INTESTINE SURGERY         Family History   Problem Relation Age of Onset    Stroke Mother     Heart disease Mother     Cataracts Mother     Cancer Father         Lung    Heart disease Sister     Hypertension Brother     Glaucoma Neg Hx     Macular degeneration Neg Hx     Thyroid disease Neg Hx        Social History     Socioeconomic History    Marital status: Single     Spouse name: Not on file    Number of children: Not on file    Years of education: Not on file    Highest education level: Not on file   Occupational History    Not on file   Social Needs    Financial resource strain: Not on file    Food insecurity:     Worry: Not on file     Inability: Not on file     "Transportation needs:     Medical: Not on file     Non-medical: Not on file   Tobacco Use    Smoking status: Former Smoker     Years: 20.00     Types: Cigarettes     Last attempt to quit: 2005     Years since quittin.9    Smokeless tobacco: Former User    Tobacco comment: Former Light Smoker less than 10 a day   Substance and Sexual Activity    Alcohol use: No     Alcohol/week: 4.0 standard drinks     Types: 4 Glasses of wine per week    Drug use: No    Sexual activity: Never     Partners: Female   Lifestyle    Physical activity:     Days per week: Not on file     Minutes per session: Not on file    Stress: Not on file   Relationships    Social connections:     Talks on phone: Not on file     Gets together: Not on file     Attends Buddhism service: Not on file     Active member of club or organization: Not on file     Attends meetings of clubs or organizations: Not on file     Relationship status: Not on file   Other Topics Concern    Not on file   Social History Narrative    Not on file       Vitals:    19 1334   BP: (!) 141/79   Pulse: 76   Weight: 47.6 kg (105 lb)   Height: 5' 7" (1.702 m)   PainSc: 0-No pain   PainLoc: Foot       Review of Systems   Constitutional: Negative for chills and fever.   Respiratory: Negative for shortness of breath.    Cardiovascular: Negative for chest pain, palpitations, orthopnea, claudication and leg swelling.   Gastrointestinal: Negative for diarrhea, nausea and vomiting.   Musculoskeletal: Negative for joint pain.   Skin: Negative for rash.   Neurological: Negative for dizziness, tingling, sensory change, focal weakness and weakness.   Psychiatric/Behavioral: Negative.              Objective:      PHYSICAL EXAM: Apperance: Alert and orient in no distress,well developed, and with good attention to grooming and body habits  LOWER EXTREMITY EXAM:  VASCULAR: Dorsalis pedis pulses 2/4 bilateral and Posterior Tibial pulses 2/4 bilateral. Capillary fill time " <4 seconds bilateral. No edema observed bilateral. Varicosities absent bilateral. Skin temperature of the lower extremities is warm to warm, proximal to distal. Hair growth WNL bilateral.  DERMATOLOGICAL: No skin rashes, subcutaneous nodules, lesions, or ulcers observed bilateral. Nails 1,2,3,4,5 bilateral thickened, and discolored with subungual debris. Webspaces 1,2,3,4 bilateral clean, dry and without evidence of break in skin integrity.  NEUROLOGICAL: Light touch, sharp-dull, proprioception all present and equal bilaterally.     MUSCULOSKELETAL: Muscle strength is 5/5 for foot inverters, everters, plantarflexors, and dorsiflexors. Muscle tone is normal. Negative pain on palpation of nails bilateral.          Assessment:       Encounter Diagnosis   Name Primary?    Onychomycosis Yes         Plan:   Onychomycosis      I counseled the patient on her conditions, regarding findings of my examination, my impressions, and usual treatment plan.   Patient agrees to continue Formal 3 topical antifungal on nails.   Patient instructed to spray all shoes with Lysol disinfectant spray and let dry before wearing. Patient instructed to wash all socks in hot water and bleach.  Discuss treatment options for nail fungus.  I explained that fungus lives in a warm dark moist environment and therefore patient should make every attempt to keep feet clean and dry.  We discussed drying feet thoroughly after shower particularly between the toes and then applying powder between the toes and in the shoes.    Patient to return in 6 months.             Halima Salas DPM  [facility]

## 2019-12-02 ENCOUNTER — PATIENT MESSAGE (OUTPATIENT)
Dept: INTERNAL MEDICINE | Facility: CLINIC | Age: 69
End: 2019-12-02

## 2019-12-02 DIAGNOSIS — F41.9 ANXIETY: ICD-10-CM

## 2019-12-02 DIAGNOSIS — F51.04 CHRONIC INSOMNIA: ICD-10-CM

## 2019-12-02 DIAGNOSIS — I10 HYPERTENSION GOAL BP (BLOOD PRESSURE) < 140/90: Chronic | ICD-10-CM

## 2019-12-02 RX ORDER — AMLODIPINE BESYLATE 10 MG/1
10 TABLET ORAL DAILY
Qty: 90 TABLET | Refills: 1 | Status: SHIPPED | OUTPATIENT
Start: 2019-12-02 | End: 2020-04-22

## 2019-12-02 RX ORDER — LORAZEPAM 0.5 MG/1
1.5 TABLET ORAL NIGHTLY
Qty: 90 TABLET | Refills: 0 | Status: SHIPPED | OUTPATIENT
Start: 2019-12-02 | End: 2019-12-17 | Stop reason: SDUPTHER

## 2019-12-10 ENCOUNTER — TELEPHONE (OUTPATIENT)
Dept: PHARMACY | Facility: CLINIC | Age: 69
End: 2019-12-10

## 2019-12-11 ENCOUNTER — LAB VISIT (OUTPATIENT)
Dept: LAB | Facility: HOSPITAL | Age: 69
End: 2019-12-11
Attending: INTERNAL MEDICINE
Payer: MEDICARE

## 2019-12-11 DIAGNOSIS — K50.80 CROHN'S DISEASE OF BOTH SMALL AND LARGE INTESTINE WITHOUT COMPLICATION: ICD-10-CM

## 2019-12-11 LAB
ALBUMIN SERPL BCP-MCNC: 4 G/DL (ref 3.5–5.2)
ALP SERPL-CCNC: 64 U/L (ref 55–135)
ALT SERPL W/O P-5'-P-CCNC: 15 U/L (ref 10–44)
ANION GAP SERPL CALC-SCNC: 12 MMOL/L (ref 8–16)
AST SERPL-CCNC: 24 U/L (ref 10–40)
BASOPHILS # BLD AUTO: 0.01 K/UL (ref 0–0.2)
BASOPHILS NFR BLD: 0.2 % (ref 0–1.9)
BILIRUB SERPL-MCNC: 0.6 MG/DL (ref 0.1–1)
BUN SERPL-MCNC: 18 MG/DL (ref 8–23)
CALCIUM SERPL-MCNC: 9.4 MG/DL (ref 8.7–10.5)
CHLORIDE SERPL-SCNC: 99 MMOL/L (ref 95–110)
CO2 SERPL-SCNC: 23 MMOL/L (ref 23–29)
CREAT SERPL-MCNC: 0.8 MG/DL (ref 0.5–1.4)
DIFFERENTIAL METHOD: ABNORMAL
EOSINOPHIL # BLD AUTO: 0 K/UL (ref 0–0.5)
EOSINOPHIL NFR BLD: 0.3 % (ref 0–8)
ERYTHROCYTE [DISTWIDTH] IN BLOOD BY AUTOMATED COUNT: 12.8 % (ref 11.5–14.5)
EST. GFR  (AFRICAN AMERICAN): >60 ML/MIN/1.73 M^2
EST. GFR  (NON AFRICAN AMERICAN): >60 ML/MIN/1.73 M^2
GLUCOSE SERPL-MCNC: 103 MG/DL (ref 70–110)
HCT VFR BLD AUTO: 36.3 % (ref 37–48.5)
HGB BLD-MCNC: 12 G/DL (ref 12–16)
IMM GRANULOCYTES # BLD AUTO: 0.03 K/UL (ref 0–0.04)
IMM GRANULOCYTES NFR BLD AUTO: 0.5 % (ref 0–0.5)
LYMPHOCYTES # BLD AUTO: 1.6 K/UL (ref 1–4.8)
LYMPHOCYTES NFR BLD: 24.3 % (ref 18–48)
MCH RBC QN AUTO: 33.8 PG (ref 27–31)
MCHC RBC AUTO-ENTMCNC: 33.1 G/DL (ref 32–36)
MCV RBC AUTO: 102 FL (ref 82–98)
MONOCYTES # BLD AUTO: 0.6 K/UL (ref 0.3–1)
MONOCYTES NFR BLD: 9.2 % (ref 4–15)
NEUTROPHILS # BLD AUTO: 4.3 K/UL (ref 1.8–7.7)
NEUTROPHILS NFR BLD: 65.5 % (ref 38–73)
NRBC BLD-RTO: 0 /100 WBC
PLATELET # BLD AUTO: 234 K/UL (ref 150–350)
PMV BLD AUTO: 9.7 FL (ref 9.2–12.9)
POTASSIUM SERPL-SCNC: 4.1 MMOL/L (ref 3.5–5.1)
PROT SERPL-MCNC: 7.1 G/DL (ref 6–8.4)
RBC # BLD AUTO: 3.55 M/UL (ref 4–5.4)
SODIUM SERPL-SCNC: 134 MMOL/L (ref 136–145)
WBC # BLD AUTO: 6.55 K/UL (ref 3.9–12.7)

## 2019-12-11 PROCEDURE — 85025 COMPLETE CBC W/AUTO DIFF WBC: CPT

## 2019-12-11 PROCEDURE — 36415 COLL VENOUS BLD VENIPUNCTURE: CPT

## 2019-12-11 PROCEDURE — 80053 COMPREHEN METABOLIC PANEL: CPT

## 2019-12-12 ENCOUNTER — PATIENT MESSAGE (OUTPATIENT)
Dept: GASTROENTEROLOGY | Facility: CLINIC | Age: 69
End: 2019-12-12

## 2019-12-17 ENCOUNTER — OFFICE VISIT (OUTPATIENT)
Dept: INTERNAL MEDICINE | Facility: CLINIC | Age: 69
End: 2019-12-17
Payer: MEDICARE

## 2019-12-17 VITALS
BODY MASS INDEX: 16.61 KG/M2 | HEART RATE: 88 BPM | DIASTOLIC BLOOD PRESSURE: 78 MMHG | TEMPERATURE: 96 F | OXYGEN SATURATION: 98 % | SYSTOLIC BLOOD PRESSURE: 132 MMHG | WEIGHT: 106.06 LBS

## 2019-12-17 DIAGNOSIS — E78.5 HYPERLIPIDEMIA LDL GOAL <70: ICD-10-CM

## 2019-12-17 DIAGNOSIS — K55.1 MESENTERIC ARTERY STENOSIS: ICD-10-CM

## 2019-12-17 DIAGNOSIS — I70.0 ATHEROSCLEROSIS OF ABDOMINAL AORTA: ICD-10-CM

## 2019-12-17 DIAGNOSIS — I10 HYPERTENSION GOAL BP (BLOOD PRESSURE) < 140/90: Primary | Chronic | ICD-10-CM

## 2019-12-17 DIAGNOSIS — F41.9 ANXIETY: ICD-10-CM

## 2019-12-17 DIAGNOSIS — F51.04 CHRONIC INSOMNIA: ICD-10-CM

## 2019-12-17 PROCEDURE — 3075F PR MOST RECENT SYSTOLIC BLOOD PRESS GE 130-139MM HG: ICD-10-PCS | Mod: CPTII,S$GLB,, | Performed by: INTERNAL MEDICINE

## 2019-12-17 PROCEDURE — 1101F PT FALLS ASSESS-DOCD LE1/YR: CPT | Mod: CPTII,S$GLB,, | Performed by: INTERNAL MEDICINE

## 2019-12-17 PROCEDURE — 3075F SYST BP GE 130 - 139MM HG: CPT | Mod: CPTII,S$GLB,, | Performed by: INTERNAL MEDICINE

## 2019-12-17 PROCEDURE — 99999 PR PBB SHADOW E&M-EST. PATIENT-LVL III: ICD-10-PCS | Mod: PBBFAC,,, | Performed by: INTERNAL MEDICINE

## 2019-12-17 PROCEDURE — 99214 PR OFFICE/OUTPT VISIT, EST, LEVL IV, 30-39 MIN: ICD-10-PCS | Mod: S$GLB,,, | Performed by: INTERNAL MEDICINE

## 2019-12-17 PROCEDURE — 99999 PR PBB SHADOW E&M-EST. PATIENT-LVL III: CPT | Mod: PBBFAC,,, | Performed by: INTERNAL MEDICINE

## 2019-12-17 PROCEDURE — 1101F PR PT FALLS ASSESS DOC 0-1 FALLS W/OUT INJ PAST YR: ICD-10-PCS | Mod: CPTII,S$GLB,, | Performed by: INTERNAL MEDICINE

## 2019-12-17 PROCEDURE — 3078F DIAST BP <80 MM HG: CPT | Mod: CPTII,S$GLB,, | Performed by: INTERNAL MEDICINE

## 2019-12-17 PROCEDURE — 99214 OFFICE O/P EST MOD 30 MIN: CPT | Mod: S$GLB,,, | Performed by: INTERNAL MEDICINE

## 2019-12-17 PROCEDURE — 1126F PR PAIN SEVERITY QUANTIFIED, NO PAIN PRESENT: ICD-10-PCS | Mod: S$GLB,,, | Performed by: INTERNAL MEDICINE

## 2019-12-17 PROCEDURE — 3078F PR MOST RECENT DIASTOLIC BLOOD PRESSURE < 80 MM HG: ICD-10-PCS | Mod: CPTII,S$GLB,, | Performed by: INTERNAL MEDICINE

## 2019-12-17 PROCEDURE — 99499 RISK ADDL DX/OHS AUDIT: ICD-10-PCS | Mod: S$GLB,,, | Performed by: INTERNAL MEDICINE

## 2019-12-17 PROCEDURE — 1159F PR MEDICATION LIST DOCUMENTED IN MEDICAL RECORD: ICD-10-PCS | Mod: S$GLB,,, | Performed by: INTERNAL MEDICINE

## 2019-12-17 PROCEDURE — 1126F AMNT PAIN NOTED NONE PRSNT: CPT | Mod: S$GLB,,, | Performed by: INTERNAL MEDICINE

## 2019-12-17 PROCEDURE — 99499 UNLISTED E&M SERVICE: CPT | Mod: S$GLB,,, | Performed by: INTERNAL MEDICINE

## 2019-12-17 PROCEDURE — 1159F MED LIST DOCD IN RCRD: CPT | Mod: S$GLB,,, | Performed by: INTERNAL MEDICINE

## 2019-12-17 RX ORDER — LORAZEPAM 0.5 MG/1
1.5 TABLET ORAL NIGHTLY
Qty: 90 TABLET | Refills: 5 | Status: SHIPPED | OUTPATIENT
Start: 2019-12-17 | End: 2020-05-06

## 2019-12-19 NOTE — PROGRESS NOTES
Subjective:       Patient ID: Manuela Reyes is a 69 y.o. female.    Chief Complaint: Annual Exam    Manuela Reyes  69 y.o. White female     Patient presents with:  Annual Exam    HPI: Here today for an annual physical. She denies significant complaints.  HTN--stable on amlodipine.    HLD--compliant with rosuvastatin.                 LDLCALC                  53.0 (L)            01/25/2019            Mesenteric artery stenosis/aortic atherosclerosis--compliant with aspirin and rosuvastatin. She is under the care of vascular surgery. She denies symptoms.    Chronic insomnia/anxiety--stable on lorazepam nightly.     DEXA SCAN due on 02/08/2020  High Dose Statin due on 12/17/2020  Mammogram due on 01/25/2021  Lipid Panel due on 01/25/2024  Colonoscopy due on 03/19/2024  TETANUS VACCINE due on 07/25/2026  Pneumococcal Vaccine (65+ Low/Medium Risk) Completed  Hepatitis C Screening Completed    Past Medical History:  Anxiety  Crohn's disease  Depression  Genital herpes  Hepatitis C infection  Hypertension  Insomnia  Mesenteric artery stenosis      Comment:  Dr. 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 Malaika Mack--dermatology  TIA (transient ischemic attack)    Past Surgical History:  APPENDECTOMY  5/19/2017: COLONOSCOPY; N/A      Comment:  Procedure: COLONOSCOPY;  Surgeon: Jose Luis Leonard MD;  Location: [facility];  Service: Endoscopy;                 Laterality: N/A;  3/26/2018: COLONOSCOPY; N/A      Comment:  Procedure: COLONOSCOPY;  Surgeon: Guillaume Whitman MD;  Location: [facility];  Service: Endoscopy;                 Laterality: N/A;  3/19/2019: COLONOSCOPY; N/A      Comment:  Procedure: COLONOSCOPY;  Surgeon: Lili Ellis MD;                 Location: [facility];  Service: Endoscopy;  Laterality:                N/A;  SMALL INTESTINE SURGERY    Review of patient's family history  indicates:  Problem: Stroke      Relation: Mother          Age of Onset: (Not Specified)  Problem: Heart disease      Relation: Mother          Age of Onset: (Not Specified)  Problem: Cataracts      Relation: Mother          Age of Onset: (Not Specified)  Problem: Cancer      Relation: Father          Age of Onset: (Not Specified)          Comment: Lung  Problem: Heart disease      Relation: Sister          Age of Onset: (Not Specified)  Problem: Hypertension      Relation: Brother          Age of Onset: (Not Specified)  Problem: Glaucoma      Relation: Neg Hx          Age of Onset: (Not Specified)  Problem: Macular degeneration      Relation: Neg Hx          Age of Onset: (Not Specified)  Problem: Thyroid disease      Relation: Neg Hx          Age of Onset: (Not Specified)    Current Outpatient Medications:     adalimumab (HUMIRA,CF, PEN) 40 mg/0.4 mL PnKt, Inject 0.4 mLs (40 mg total) into the skin every 14 (fourteen) days., Disp: 2 pen, Rfl: 11    amLODIPine (NORVASC) 10 MG tablet, Take 1 tablet (10 mg total) by mouth once daily., Disp: 90 tablet, Rfl: 1    aspirin (ECOTRIN) 81 MG EC tablet, Take 1 tablet (81 mg total) by mouth once daily., Disp: , Rfl:     calcium carbonate (OS-COOPER) 600 mg (1,500 mg) Tab, Take 600 mg by mouth 2 (two) times daily with meals. , Disp: , Rfl:     collagen, bovine, 100 % Powd, , Disp: , Rfl:     cyanocobalamin, vitamin B-12, 2,500 mcg Lozg, Place 1 tablet under the tongue. , Disp: , Rfl:     folic acid (FOLVITE) 1 MG tablet, Take by mouth once daily., Disp: , Rfl:     levocetirizine (XYZAL) 5 MG tablet, Take 1 tablet (5 mg total) by mouth every evening., Disp: 30 tablet, Rfl: 6    LORazepam (ATIVAN) 0.5 MG tablet, Take 3 tablets (1.5 mg total) by mouth every evening., Disp: 90 tablet, Rfl: 5    methotrexate 2.5 MG Tab, Take 3 tablets (7.5 mg total) by mouth every 7 days., Disp: 12 tablet, Rfl: 4    multivitamin with minerals tablet, Take 1 tablet by mouth once daily at  6am., Disp: , Rfl:     nut tx,urea cycle dis w-o iron (ESSENTIAL AMINO ACID MIX ORAL), , Disp: , Rfl:     rosuvastatin (CRESTOR) 10 MG tablet, Take 1 tablet (10 mg total) by mouth once daily., Disp: 90 tablet, Rfl: 3    UNABLE TO FIND, Take 25 mg by mouth once daily. CBD Oil, Disp: , Rfl:     valACYclovir (VALTREX) 500 MG tablet, take 1 tablet by mouth as directed and take 1 tablet by mouth as needed, Disp: , Rfl: 0    vitamin D 1000 units Tab, Take 185 mg by mouth once daily., Disp: , Rfl:     Allergies:  Review of patient's allergies indicates:   -- Codeine sulfate -- Itching   -- Hydrochlorothiazide -- Other (See Comments)    --  Adverse Reaction   -- Lisinopril -- Swelling and Edema    --  Facial Swelling              Review of Systems   Constitutional: Negative for activity change, fever and unexpected weight change.   HENT: Negative for hearing loss.    Eyes: Negative for discharge and visual disturbance.   Respiratory: Negative for shortness of breath and wheezing.    Cardiovascular: Negative for chest pain, palpitations and leg swelling.   Gastrointestinal: Positive for constipation. Negative for diarrhea and vomiting.   Genitourinary: Negative for difficulty urinating and hematuria.   Musculoskeletal: Negative for gait problem.   Neurological: Negative for headaches.   Psychiatric/Behavioral: Positive for sleep disturbance. Negative for dysphoric mood. The patient is nervous/anxious.        Objective:      Physical Exam   Constitutional: She is oriented to person, place, and time. She appears well-developed and well-nourished. No distress.   Eyes: No scleral icterus.   Neck: No tracheal deviation present. No thyromegaly present.   Cardiovascular: Normal rate, regular rhythm and normal heart sounds.   Pulmonary/Chest: Effort normal and breath sounds normal. No respiratory distress. She has no wheezes. She has no rales.   Abdominal: Soft. Bowel sounds are normal.   Lymphadenopathy:     She has no  cervical adenopathy.   Neurological: She is alert and oriented to person, place, and time.   Skin: Skin is warm and dry.   Psychiatric: She has a normal mood and affect.   Vitals reviewed.      Assessment:       1. Hypertension goal BP (blood pressure) < 140/90    2. Hyperlipidemia LDL goal <70    3. Mesenteric artery stenosis    4. Atherosclerosis of abdominal aorta    5. Chronic insomnia    6. Anxiety        Plan:       Manuela was seen today for annual exam.    Diagnoses and all orders for this visit:    Hypertension goal BP (blood pressure) < 140/90  -     Continue amlodipine    Hyperlipidemia LDL goal <70  -     Continue rosuvastatin  -     Check lipid panel    Mesenteric artery stenosis  -     Continue aspirin and rosuvastatin  -     F/U with vascular surgery as indicated    Atherosclerosis of abdominal aorta  -     Continue aspirin and rosuvastatin    Chronic insomnia  -     LORazepam (ATIVAN) 0.5 MG tablet; Take 3 tablets (1.5 mg total) by mouth every evening.    Anxiety  -     LORazepam (ATIVAN) 0.5 MG tablet; Take 3 tablets (1.5 mg total) by mouth every evening.    Schedule labs.     F/U in 6 months and as needed.

## 2020-01-02 ENCOUNTER — PATIENT MESSAGE (OUTPATIENT)
Dept: GASTROENTEROLOGY | Facility: CLINIC | Age: 70
End: 2020-01-02

## 2020-01-07 ENCOUNTER — TELEPHONE (OUTPATIENT)
Dept: PHARMACY | Facility: CLINIC | Age: 70
End: 2020-01-07

## 2020-01-14 DIAGNOSIS — I10 ESSENTIAL HYPERTENSION: Primary | ICD-10-CM

## 2020-01-29 ENCOUNTER — LAB VISIT (OUTPATIENT)
Dept: LAB | Facility: HOSPITAL | Age: 70
End: 2020-01-29
Attending: INTERNAL MEDICINE
Payer: MEDICARE

## 2020-01-29 ENCOUNTER — PATIENT MESSAGE (OUTPATIENT)
Dept: ENDOSCOPY | Facility: HOSPITAL | Age: 70
End: 2020-01-29

## 2020-01-29 DIAGNOSIS — I70.0 ATHEROSCLEROSIS OF ABDOMINAL AORTA: ICD-10-CM

## 2020-01-29 DIAGNOSIS — K50.80 CROHN'S DISEASE OF BOTH SMALL AND LARGE INTESTINE WITHOUT COMPLICATION: ICD-10-CM

## 2020-01-29 DIAGNOSIS — E78.5 HYPERLIPIDEMIA LDL GOAL <70: ICD-10-CM

## 2020-01-29 DIAGNOSIS — I10 HYPERTENSION GOAL BP (BLOOD PRESSURE) < 140/90: Chronic | ICD-10-CM

## 2020-01-29 LAB
ALBUMIN SERPL BCP-MCNC: 4.3 G/DL (ref 3.5–5.2)
ALP SERPL-CCNC: 68 U/L (ref 55–135)
ALT SERPL W/O P-5'-P-CCNC: 15 U/L (ref 10–44)
ANION GAP SERPL CALC-SCNC: 9 MMOL/L (ref 8–16)
AST SERPL-CCNC: 24 U/L (ref 10–40)
BASOPHILS # BLD AUTO: 0.02 K/UL (ref 0–0.2)
BASOPHILS NFR BLD: 0.5 % (ref 0–1.9)
BILIRUB SERPL-MCNC: 0.5 MG/DL (ref 0.1–1)
BUN SERPL-MCNC: 18 MG/DL (ref 8–23)
CALCIUM SERPL-MCNC: 10.1 MG/DL (ref 8.7–10.5)
CHLORIDE SERPL-SCNC: 104 MMOL/L (ref 95–110)
CHOLEST SERPL-MCNC: 164 MG/DL (ref 120–199)
CHOLEST/HDLC SERPL: 1.9 {RATIO} (ref 2–5)
CO2 SERPL-SCNC: 27 MMOL/L (ref 23–29)
CREAT SERPL-MCNC: 0.9 MG/DL (ref 0.5–1.4)
DIFFERENTIAL METHOD: ABNORMAL
EOSINOPHIL # BLD AUTO: 0.1 K/UL (ref 0–0.5)
EOSINOPHIL NFR BLD: 1.6 % (ref 0–8)
ERYTHROCYTE [DISTWIDTH] IN BLOOD BY AUTOMATED COUNT: 13.2 % (ref 11.5–14.5)
EST. GFR  (AFRICAN AMERICAN): >60 ML/MIN/1.73 M^2
EST. GFR  (NON AFRICAN AMERICAN): >60 ML/MIN/1.73 M^2
GLUCOSE SERPL-MCNC: 91 MG/DL (ref 70–110)
HCT VFR BLD AUTO: 39.5 % (ref 37–48.5)
HDLC SERPL-MCNC: 85 MG/DL (ref 40–75)
HDLC SERPL: 51.8 % (ref 20–50)
HGB BLD-MCNC: 13.4 G/DL (ref 12–16)
IMM GRANULOCYTES # BLD AUTO: 0.01 K/UL (ref 0–0.04)
IMM GRANULOCYTES NFR BLD AUTO: 0.2 % (ref 0–0.5)
LDLC SERPL CALC-MCNC: 68.6 MG/DL (ref 63–159)
LYMPHOCYTES # BLD AUTO: 1.8 K/UL (ref 1–4.8)
LYMPHOCYTES NFR BLD: 40.6 % (ref 18–48)
MCH RBC QN AUTO: 34.6 PG (ref 27–31)
MCHC RBC AUTO-ENTMCNC: 33.9 G/DL (ref 32–36)
MCV RBC AUTO: 102 FL (ref 82–98)
MONOCYTES # BLD AUTO: 0.6 K/UL (ref 0.3–1)
MONOCYTES NFR BLD: 12.6 % (ref 4–15)
NEUTROPHILS # BLD AUTO: 2 K/UL (ref 1.8–7.7)
NEUTROPHILS NFR BLD: 44.5 % (ref 38–73)
NONHDLC SERPL-MCNC: 79 MG/DL
NRBC BLD-RTO: 0 /100 WBC
PLATELET # BLD AUTO: 245 K/UL (ref 150–350)
PMV BLD AUTO: 9.7 FL (ref 9.2–12.9)
POTASSIUM SERPL-SCNC: 4 MMOL/L (ref 3.5–5.1)
PROT SERPL-MCNC: 7.8 G/DL (ref 6–8.4)
RBC # BLD AUTO: 3.87 M/UL (ref 4–5.4)
SODIUM SERPL-SCNC: 140 MMOL/L (ref 136–145)
TRIGL SERPL-MCNC: 52 MG/DL (ref 30–150)
WBC # BLD AUTO: 4.38 K/UL (ref 3.9–12.7)

## 2020-01-29 PROCEDURE — 36415 COLL VENOUS BLD VENIPUNCTURE: CPT

## 2020-01-29 PROCEDURE — 80061 LIPID PANEL: CPT

## 2020-01-29 PROCEDURE — 85025 COMPLETE CBC W/AUTO DIFF WBC: CPT

## 2020-01-29 PROCEDURE — 80053 COMPREHEN METABOLIC PANEL: CPT

## 2020-01-30 ENCOUNTER — TELEPHONE (OUTPATIENT)
Dept: INTERNAL MEDICINE | Facility: CLINIC | Age: 70
End: 2020-01-30

## 2020-01-30 ENCOUNTER — PATIENT MESSAGE (OUTPATIENT)
Dept: CARDIOLOGY | Facility: CLINIC | Age: 70
End: 2020-01-30

## 2020-01-30 NOTE — TELEPHONE ENCOUNTER
----- Message from Rodrigo Espinosa sent at 1/30/2020 11:25 AM CST -----  Contact: PT   Type:  Test Results    Who Called: Pt   Name of Test (Lab/Mammo/Etc): cholesterol labs    Date of Test:  01/29/2020  Ordering Provider: anitha   Where the test was performed: [facility]  Would the patient rather a call back or a response via [facility]? Call back  Best Call Back Number: [phone] (home)   Additional Information:  N/a

## 2020-01-30 NOTE — TELEPHONE ENCOUNTER
Spoke to pt about getting test result for cholesterol, she would like to know what you think about her cholesterol?

## 2020-02-07 ENCOUNTER — PATIENT MESSAGE (OUTPATIENT)
Dept: INTERNAL MEDICINE | Facility: CLINIC | Age: 70
End: 2020-02-07

## 2020-02-07 ENCOUNTER — OFFICE VISIT (OUTPATIENT)
Dept: INTERNAL MEDICINE | Facility: CLINIC | Age: 70
End: 2020-02-07
Payer: MEDICARE

## 2020-02-07 ENCOUNTER — PATIENT MESSAGE (OUTPATIENT)
Dept: GASTROENTEROLOGY | Facility: CLINIC | Age: 70
End: 2020-02-07

## 2020-02-07 VITALS
SYSTOLIC BLOOD PRESSURE: 138 MMHG | BODY MASS INDEX: 15.78 KG/M2 | HEART RATE: 100 BPM | TEMPERATURE: 99 F | OXYGEN SATURATION: 97 % | DIASTOLIC BLOOD PRESSURE: 88 MMHG | WEIGHT: 100.75 LBS

## 2020-02-07 DIAGNOSIS — I10 HYPERTENSION GOAL BP (BLOOD PRESSURE) < 140/90: Chronic | ICD-10-CM

## 2020-02-07 DIAGNOSIS — R25.2 BILATERAL LEG CRAMPS: Primary | ICD-10-CM

## 2020-02-07 DIAGNOSIS — R22.0 RIGHT FACIAL SWELLING: ICD-10-CM

## 2020-02-07 DIAGNOSIS — Z78.9 TAKES DIETARY SUPPLEMENTS: ICD-10-CM

## 2020-02-07 PROCEDURE — 99499 RISK ADDL DX/OHS AUDIT: ICD-10-PCS | Mod: S$GLB,,, | Performed by: NURSE PRACTITIONER

## 2020-02-07 PROCEDURE — 3075F PR MOST RECENT SYSTOLIC BLOOD PRESS GE 130-139MM HG: ICD-10-PCS | Mod: CPTII,S$GLB,, | Performed by: NURSE PRACTITIONER

## 2020-02-07 PROCEDURE — 99999 PR PBB SHADOW E&M-EST. PATIENT-LVL V: ICD-10-PCS | Mod: PBBFAC,,, | Performed by: NURSE PRACTITIONER

## 2020-02-07 PROCEDURE — 99499 UNLISTED E&M SERVICE: CPT | Mod: S$GLB,,, | Performed by: NURSE PRACTITIONER

## 2020-02-07 PROCEDURE — 3079F PR MOST RECENT DIASTOLIC BLOOD PRESSURE 80-89 MM HG: ICD-10-PCS | Mod: CPTII,S$GLB,, | Performed by: NURSE PRACTITIONER

## 2020-02-07 PROCEDURE — 1101F PT FALLS ASSESS-DOCD LE1/YR: CPT | Mod: CPTII,S$GLB,, | Performed by: NURSE PRACTITIONER

## 2020-02-07 PROCEDURE — 1101F PR PT FALLS ASSESS DOC 0-1 FALLS W/OUT INJ PAST YR: ICD-10-PCS | Mod: CPTII,S$GLB,, | Performed by: NURSE PRACTITIONER

## 2020-02-07 PROCEDURE — 99214 PR OFFICE/OUTPT VISIT, EST, LEVL IV, 30-39 MIN: ICD-10-PCS | Mod: S$GLB,,, | Performed by: NURSE PRACTITIONER

## 2020-02-07 PROCEDURE — 1159F MED LIST DOCD IN RCRD: CPT | Mod: S$GLB,,, | Performed by: NURSE PRACTITIONER

## 2020-02-07 PROCEDURE — 1125F AMNT PAIN NOTED PAIN PRSNT: CPT | Mod: S$GLB,,, | Performed by: NURSE PRACTITIONER

## 2020-02-07 PROCEDURE — 3075F SYST BP GE 130 - 139MM HG: CPT | Mod: CPTII,S$GLB,, | Performed by: NURSE PRACTITIONER

## 2020-02-07 PROCEDURE — 99999 PR PBB SHADOW E&M-EST. PATIENT-LVL V: CPT | Mod: PBBFAC,,, | Performed by: NURSE PRACTITIONER

## 2020-02-07 PROCEDURE — 1125F PR PAIN SEVERITY QUANTIFIED, PAIN PRESENT: ICD-10-PCS | Mod: S$GLB,,, | Performed by: NURSE PRACTITIONER

## 2020-02-07 PROCEDURE — 3079F DIAST BP 80-89 MM HG: CPT | Mod: CPTII,S$GLB,, | Performed by: NURSE PRACTITIONER

## 2020-02-07 PROCEDURE — 99214 OFFICE O/P EST MOD 30 MIN: CPT | Mod: S$GLB,,, | Performed by: NURSE PRACTITIONER

## 2020-02-07 PROCEDURE — 1159F PR MEDICATION LIST DOCUMENTED IN MEDICAL RECORD: ICD-10-PCS | Mod: S$GLB,,, | Performed by: NURSE PRACTITIONER

## 2020-02-07 NOTE — PATIENT INSTRUCTIONS
"STOP new collagen supplement     Look into reading "It Starts with Food" by Estephanie & Jose Dutton   "

## 2020-02-07 NOTE — Clinical Note
"Just an FYI: Pt c/o BLE pains and right facial "swelling" Pending US/KWAME, if unremarkable - possible stress/overhydration reaction?Face complaint seems unclear- possible anxiety or SE from supplements? If continues will likely do neuro referral If ya'll have any other thoughts/suggestions please let me know- Thanks! Joel "

## 2020-02-07 NOTE — PROGRESS NOTES
"Manuela Reyes 70 y.o. female     Chief Complaint:  Chief Complaint   Patient presents with    Leg Pain     believes to have edema in face       History of Present Illness:  Pt is new to provider, but established in practice and presents for:     Bilateral leg pain  Started in L calf about 3 weeks ago   Next week started in R calf   Walks about 2 miles each morning   Cut back on walking, but continued   Worse at night   Changed shoes, no improvement   No redness/swelling   No CP/SOB/fevers/sweats  Reports "making sure" drinks 10 glasses of water/day   Talked to gastro due to humira/methotrexate - suggested see PCP     increased stress due to ill dog with colitis and cg for debilitated sister     FACE SWELLING  Only on right smile line  "more of a feeling of puffiness vs swelling"  Started about 1 week ago   Lasted about 1.5 days, gradually improved then reoccurred this AM No numbness/tingling   No speech/vision changes   No SOB/CP/HA   Did not take anything for it     States had facial swelling after on lisinopril for 8 years   Concerned may be amlodipine     No gardening/pesticide/allergen exposure   No new lotions/soaps/detergents   No new foods   New supplement - collagen protein with greens     Exam:  Review of Systems   Constitutional: Negative for activity change, appetite change, chills, diaphoresis, fatigue, fever and unexpected weight change.   HENT: Negative for trouble swallowing.    Eyes: Negative for discharge and visual disturbance.   Respiratory: Negative for shortness of breath and wheezing.    Cardiovascular: Negative for chest pain and leg swelling.   Gastrointestinal: Negative for diarrhea, nausea and vomiting.   Genitourinary: Negative for difficulty urinating.   Musculoskeletal: Positive for myalgias (BLL). Negative for gait problem.   Skin: Negative for color change, pallor, rash and wound.   Allergic/Immunologic: Positive for immunocompromised state.   Neurological: Positive for " facial asymmetry. Negative for speech difficulty, light-headedness, numbness and headaches.   Psychiatric/Behavioral: Negative for confusion.     Physical Exam   Constitutional: She is oriented to person, place, and time. Vital signs are normal. She appears well-developed and well-nourished. She is cooperative.  Non-toxic appearance. She does not have a sickly appearance. She does not appear ill. No distress.   HENT:   Head: Normocephalic and atraumatic.   Right Ear: External ear normal.   Left Ear: External ear normal.   glasses   Eyes: Pupils are equal, round, and reactive to light. Conjunctivae and EOM are normal. Right eye exhibits no discharge. Left eye exhibits no discharge. No scleral icterus.   Neck: Normal range of motion. No tracheal deviation present.   Cardiovascular: Normal rate, regular rhythm and normal heart sounds.   Pulmonary/Chest: Effort normal and breath sounds normal. No stridor. No respiratory distress. She has no wheezes. She has no rales. She exhibits no tenderness.   Abdominal: Soft. Bowel sounds are normal. She exhibits no distension. There is no tenderness.   Musculoskeletal: Normal range of motion. She exhibits no edema or deformity.   Neurological: She is alert and oriented to person, place, and time.   Skin: Skin is warm, dry and intact. No rash noted. She is not diaphoretic. No erythema. No pallor.   Psychiatric: Her speech is normal and behavior is normal. Judgment and thought content normal. Her mood appears anxious. Cognition and memory are normal.   Nursing note and vitals reviewed.      Most Recent Laboratory Results Reviewed ({Yes)  Lab Results   Component Value Date    WBC 4.38 01/29/2020    HGB 13.4 01/29/2020    HCT 39.5 01/29/2020     01/29/2020    CHOL 164 01/29/2020    TRIG 52 01/29/2020    HDL 85 (H) 01/29/2020    ALT 15 01/29/2020    AST 24 01/29/2020     01/29/2020    K 4.0 01/29/2020     01/29/2020    CREATININE 0.9 01/29/2020    BUN 18 01/29/2020     "CO2 27 01/29/2020    TSH 1.326 01/04/2017    PSA <0.01 11/07/2017       Assessment     ICD-10-CM ICD-9-CM   1. Bilateral leg cramps R25.2 729.82   2. Right facial swelling R22.0 784.2   3. Hypertension goal BP (blood pressure) < 140/90 I10 401.9        Plan   Bilateral leg cramps  -     US Lower Extrem Arteries Bilat with KWAME (xpd); Future; Expected date: 02/07/2020  - CMP last week WNL  - suggested cutting back slightly on water intake   - long term statin therapy - LFT WNL  - will see cards next week, suggested further discussion with them     Right Facial swelling  - not appreciated on exam, but pt reports more sensed vs. visible   - suggested stopping new supplements due to possible neuropathic vs. allergic reaction  - discussed s/s stroke     Hypertension goal BP (blood pressure) < 140/90  - start of OV slightly high, WNL after 10 min recheck   - admits "whitecoat"  -  controlled, continue current meds      Takes dietary supplements   - reports multiple OTC supplements (collagen, amino acids formulations and others- none in database to list on med list)   - cautioned against supplements other than those suggested by medical providers due to Chron's and possible reactions   - suspect contributing to current complaints     Follow up if symptoms worsen or fail to improve.  Future Appointments     Date Provider Specialty Appt Notes    2/10/2020  Radiology Bilateral leg cramps    2/11/2020  Cardiology .    2/11/2020 Allen White MD Cardiology Cholesterol levels and check heart    5/11/2020 Lili Ellis MD Gastroenterology F/u 6 months    5/26/2020 Halima Salas DPM Podiatry 6 month fungus f/u    6/18/2020 Christelle Lawler DO Internal Medicine 6 month f/u    11/9/2020  Radiology     11/18/2020 Santo Burt IV, MD Urology Rev US//rb        "

## 2020-02-09 DIAGNOSIS — R25.2 BILATERAL LEG CRAMPS: Primary | ICD-10-CM

## 2020-02-10 ENCOUNTER — HOSPITAL ENCOUNTER (OUTPATIENT)
Dept: RADIOLOGY | Facility: HOSPITAL | Age: 70
Discharge: HOME | End: 2020-02-10
Attending: NURSE PRACTITIONER
Payer: MEDICARE

## 2020-02-10 DIAGNOSIS — R25.2 BILATERAL LEG CRAMPS: ICD-10-CM

## 2020-02-10 PROCEDURE — 93925 LOWER EXTREMITY STUDY: CPT | Mod: TC

## 2020-02-11 ENCOUNTER — OFFICE VISIT (OUTPATIENT)
Dept: CARDIOLOGY | Facility: CLINIC | Age: 70
End: 2020-02-11
Payer: MEDICARE

## 2020-02-11 ENCOUNTER — HOSPITAL ENCOUNTER (OUTPATIENT)
Dept: CARDIOLOGY | Facility: HOSPITAL | Age: 70
Discharge: HOME | End: 2020-02-11
Attending: INTERNAL MEDICINE
Payer: MEDICARE

## 2020-02-11 VITALS
SYSTOLIC BLOOD PRESSURE: 160 MMHG | WEIGHT: 101 LBS | HEART RATE: 91 BPM | DIASTOLIC BLOOD PRESSURE: 84 MMHG | OXYGEN SATURATION: 99 % | HEIGHT: 67 IN | BODY MASS INDEX: 15.85 KG/M2 | RESPIRATION RATE: 16 BRPM

## 2020-02-11 DIAGNOSIS — M79.605 PAIN IN BOTH LOWER EXTREMITIES: Primary | ICD-10-CM

## 2020-02-11 DIAGNOSIS — R94.31 ABNORMAL ECG: ICD-10-CM

## 2020-02-11 DIAGNOSIS — M79.604 PAIN IN BOTH LOWER EXTREMITIES: Primary | ICD-10-CM

## 2020-02-11 DIAGNOSIS — I10 ESSENTIAL HYPERTENSION: ICD-10-CM

## 2020-02-11 DIAGNOSIS — K55.1 MESENTERIC ARTERY STENOSIS: ICD-10-CM

## 2020-02-11 DIAGNOSIS — Z82.49 FAMILY HISTORY OF CARDIOVASCULAR DISEASE: ICD-10-CM

## 2020-02-11 DIAGNOSIS — I70.0 ATHEROSCLEROSIS OF ABDOMINAL AORTA: ICD-10-CM

## 2020-02-11 DIAGNOSIS — E78.5 HYPERLIPIDEMIA LDL GOAL <70: ICD-10-CM

## 2020-02-11 PROCEDURE — 3079F DIAST BP 80-89 MM HG: CPT | Mod: CPTII,S$GLB,, | Performed by: INTERNAL MEDICINE

## 2020-02-11 PROCEDURE — 1159F MED LIST DOCD IN RCRD: CPT | Mod: S$GLB,,, | Performed by: INTERNAL MEDICINE

## 2020-02-11 PROCEDURE — 99214 PR OFFICE/OUTPT VISIT, EST, LEVL IV, 30-39 MIN: ICD-10-PCS | Mod: S$GLB,,, | Performed by: INTERNAL MEDICINE

## 2020-02-11 PROCEDURE — 3079F PR MOST RECENT DIASTOLIC BLOOD PRESSURE 80-89 MM HG: ICD-10-PCS | Mod: CPTII,S$GLB,, | Performed by: INTERNAL MEDICINE

## 2020-02-11 PROCEDURE — 99999 PR PBB SHADOW E&M-EST. PATIENT-LVL III: CPT | Mod: PBBFAC,,, | Performed by: INTERNAL MEDICINE

## 2020-02-11 PROCEDURE — 93005 ELECTROCARDIOGRAM TRACING: CPT

## 2020-02-11 PROCEDURE — 99499 RISK ADDL DX/OHS AUDIT: ICD-10-PCS | Mod: S$GLB,,, | Performed by: INTERNAL MEDICINE

## 2020-02-11 PROCEDURE — 1126F PR PAIN SEVERITY QUANTIFIED, NO PAIN PRESENT: ICD-10-PCS | Mod: S$GLB,,, | Performed by: INTERNAL MEDICINE

## 2020-02-11 PROCEDURE — 1101F PR PT FALLS ASSESS DOC 0-1 FALLS W/OUT INJ PAST YR: ICD-10-PCS | Mod: CPTII,S$GLB,, | Performed by: INTERNAL MEDICINE

## 2020-02-11 PROCEDURE — 1101F PT FALLS ASSESS-DOCD LE1/YR: CPT | Mod: CPTII,S$GLB,, | Performed by: INTERNAL MEDICINE

## 2020-02-11 PROCEDURE — 99999 PR PBB SHADOW E&M-EST. PATIENT-LVL III: ICD-10-PCS | Mod: PBBFAC,,, | Performed by: INTERNAL MEDICINE

## 2020-02-11 PROCEDURE — 1126F AMNT PAIN NOTED NONE PRSNT: CPT | Mod: S$GLB,,, | Performed by: INTERNAL MEDICINE

## 2020-02-11 PROCEDURE — 1159F PR MEDICATION LIST DOCUMENTED IN MEDICAL RECORD: ICD-10-PCS | Mod: S$GLB,,, | Performed by: INTERNAL MEDICINE

## 2020-02-11 PROCEDURE — 99499 UNLISTED E&M SERVICE: CPT | Mod: S$GLB,,, | Performed by: INTERNAL MEDICINE

## 2020-02-11 PROCEDURE — 93010 EKG 12-LEAD: ICD-10-PCS | Mod: ,,, | Performed by: INTERNAL MEDICINE

## 2020-02-11 PROCEDURE — 3077F PR MOST RECENT SYSTOLIC BLOOD PRESSURE >= 140 MM HG: ICD-10-PCS | Mod: CPTII,S$GLB,, | Performed by: INTERNAL MEDICINE

## 2020-02-11 PROCEDURE — 99214 OFFICE O/P EST MOD 30 MIN: CPT | Mod: S$GLB,,, | Performed by: INTERNAL MEDICINE

## 2020-02-11 PROCEDURE — 3077F SYST BP >= 140 MM HG: CPT | Mod: CPTII,S$GLB,, | Performed by: INTERNAL MEDICINE

## 2020-02-11 PROCEDURE — 93010 ELECTROCARDIOGRAM REPORT: CPT | Mod: ,,, | Performed by: INTERNAL MEDICINE

## 2020-02-11 NOTE — PROGRESS NOTES
Subjective:    Patient ID:  Manuela Reyes is a 70 y.o. female who presents for evaluation of leg pain,  Hypertension; Hyperlipidemia; and Risk Factor Management For Atherosclerosis        HPI pt presents for eval.  Has HTN, hyperlipidemia, crohns disease, mesenteric artery stenosis (CTA 2017 showed mesenteric stenosis, abdominal aorta atherosclerosis).  Nonsmoker.  Sister has CHF.  Mother had MI age 60's.  Brother has CAD/PCI.   Pt seen once before 2018.  Echo 5/18 normal LV function.  Pt seen last week by PCP clinic and had some c/o edema, leg cramps.  B LE arterial u/s ordered in Radiology dept and per report shows no significant stenosis.  She has had some tingling in her legs, pain behind her knees and went on for a while and this led to her arterial u/s.  She said it could be from supplements.  She said it could be crestor related sxs.  She has held it for few days and sxs have improved 90% since then.   ecg today shows NSR, nonspecific septal Q waves.  No chest pain sxs.  No dyspnea.   Had some subj edema in face, much better now per pt.  BP controlled when she checks it home, 115 - 130/70 - 80.        Current Outpatient Medications:     adalimumab (HUMIRA,CF, PEN) 40 mg/0.4 mL PnKt, Inject 0.4 mLs (40 mg total) into the skin every 14 (fourteen) days., Disp: 2 pen, Rfl: 11    amLODIPine (NORVASC) 10 MG tablet, Take 1 tablet (10 mg total) by mouth once daily., Disp: 90 tablet, Rfl: 1    aspirin (ECOTRIN) 81 MG EC tablet, Take 1 tablet (81 mg total) by mouth once daily., Disp: , Rfl:     calcium carbonate (OS-COOPER) 600 mg (1,500 mg) Tab, Take 600 mg by mouth 2 (two) times daily with meals. , Disp: , Rfl:     cyanocobalamin, vitamin B-12, 2,500 mcg Lozg, Place 1 tablet under the tongue. , Disp: , Rfl:     folic acid (FOLVITE) 1 MG tablet, Take by mouth once daily., Disp: , Rfl:     levocetirizine (XYZAL) 5 MG tablet, Take 1 tablet (5 mg total) by mouth every evening., Disp: 30 tablet, Rfl: 6     "LORazepam (ATIVAN) 0.5 MG tablet, Take 3 tablets (1.5 mg total) by mouth every evening., Disp: 90 tablet, Rfl: 5    methotrexate 2.5 MG Tab, Take 3 tablets (7.5 mg total) by mouth every 7 days., Disp: 12 tablet, Rfl: 4    multivitamin with minerals tablet, Take 1 tablet by mouth once daily at 6am., Disp: , Rfl:     nut tx,urea cycle dis w-o iron (ESSENTIAL AMINO ACID MIX ORAL), , Disp: , Rfl:     rosuvastatin (CRESTOR) 10 MG tablet, Take 1 tablet (10 mg total) by mouth once daily., Disp: 90 tablet, Rfl: 3    UNABLE TO FIND, Take 25 mg by mouth once daily. CBD Oil, Disp: , Rfl:     valACYclovir (VALTREX) 500 MG tablet, take 1 tablet by mouth as directed and take 1 tablet by mouth as needed, Disp: , Rfl: 0    vitamin D 1000 units Tab, Take 185 mg by mouth once daily., Disp: , Rfl:       Review of Systems   Constitution: Negative.   HENT: Negative.    Eyes: Negative.    Cardiovascular: Negative.    Respiratory: Negative.    Endocrine: Negative.    Hematologic/Lymphatic: Negative.    Skin: Negative.    Musculoskeletal: Positive for muscle cramps.   Gastrointestinal: Negative.    Genitourinary: Negative.    Neurological: Negative.    Psychiatric/Behavioral: Negative.    Allergic/Immunologic: Negative.            BP (!) 160/84 (BP Location: Left arm, Patient Position: Sitting, BP Method: Medium (Manual))   Pulse 91   Resp 16   Ht 5' 7" (1.702 m)   Wt 45.8 kg (101 lb)   SpO2 99%   BMI 15.82 kg/m²     Wt Readings from Last 3 Encounters:   02/11/20 45.8 kg (101 lb)   02/07/20 45.7 kg (100 lb 12 oz)   12/17/19 48.1 kg (106 lb 0.7 oz)     Temp Readings from Last 3 Encounters:   02/07/20 98.7 °F (37.1 °C) (Tympanic)   12/17/19 96 °F (35.6 °C)   10/23/19 (!) 94.7 °F (34.8 °C) (Tympanic)     BP Readings from Last 3 Encounters:   02/11/20 (!) 160/84   02/07/20 138/88   12/17/19 132/78     Pulse Readings from Last 3 Encounters:   02/11/20 91   02/07/20 100   12/17/19 88       Objective:    Physical Exam "   Constitutional: She is oriented to person, place, and time. Vital signs are normal. She appears well-developed and well-nourished. She is active and cooperative. She does not have a sickly appearance. She does not appear ill. No distress.   HENT:   Head: Normocephalic.   Neck: Neck supple. Normal carotid pulses, no hepatojugular reflux and no JVD present. Carotid bruit is not present. No thyromegaly present.   Cardiovascular: Normal rate, regular rhythm, S1 normal, S2 normal, normal heart sounds and normal pulses. PMI is not displaced. Exam reveals no gallop and no friction rub.   No murmur heard.  Pulses:       Radial pulses are 2+ on the right side, and 2+ on the left side.        Femoral pulses are 2+ on the right side, and 2+ on the left side.       Dorsalis pedis pulses are 2+ on the left side.        Posterior tibial pulses are 2+ on the right side, and 2+ on the left side.   Pulmonary/Chest: Effort normal and breath sounds normal. She has no wheezes. She has no rales.   Abdominal: Soft. Normal appearance, normal aorta and bowel sounds are normal. She exhibits no pulsatile liver, no abdominal bruit, no ascites and no mass. There is no splenomegaly or hepatomegaly. There is no tenderness.   Musculoskeletal: She exhibits no edema.   Lymphadenopathy:     She has no cervical adenopathy.   Neurological: She is alert and oriented to person, place, and time.   Skin: Skin is warm. She is not diaphoretic.   Psychiatric: She has a normal mood and affect. Her behavior is normal.   Nursing note and vitals reviewed.      I have reviewed all pertinent labs and cardiac studies.    Lab Results   Component Value Date    TSH 1.326 01/04/2017             Chemistry        Component Value Date/Time     01/29/2020 0920    K 4.0 01/29/2020 0920     01/29/2020 0920    CO2 27 01/29/2020 0920    BUN 18 01/29/2020 0920    CREATININE 0.9 01/29/2020 0920    GLU 91 01/29/2020 0920        Component Value Date/Time    CALCIUM  10.1 01/29/2020 0920    ALKPHOS 68 01/29/2020 0920    AST 24 01/29/2020 0920    ALT 15 01/29/2020 0920    BILITOT 0.5 01/29/2020 0920    ESTGFRAFRICA >60.0 01/29/2020 0920    EGFRNONAA >60.0 01/29/2020 0920        Lab Results   Component Value Date    WBC 4.38 01/29/2020    HGB 13.4 01/29/2020    HCT 39.5 01/29/2020     (H) 01/29/2020     01/29/2020       No results found for: LABA1C, HGBA1C  Lab Results   Component Value Date    CHOL 164 01/29/2020    CHOL 147 01/25/2019    CHOL 139 08/21/2018     Lab Results   Component Value Date    HDL 85 (H) 01/29/2020    HDL 84 (H) 01/25/2019    HDL 77 (H) 08/21/2018     Lab Results   Component Value Date    LDLCALC 68.6 01/29/2020    LDLCALC 53.0 (L) 01/25/2019    LDLCALC 49.6 (L) 08/21/2018     Lab Results   Component Value Date    TRIG 52 01/29/2020    TRIG 50 01/25/2019    TRIG 62 08/21/2018     Lab Results   Component Value Date    CHOLHDL 51.8 (H) 01/29/2020    CHOLHDL 57.1 (H) 01/25/2019    CHOLHDL 55.4 (H) 08/21/2018     Reading Physician Reading Date Result Priority   Ivan Akhtar MD 2/10/2020 Routine      Narrative     EXAMINATION:  US LOWER EXTREMITY ARTERIES BILATERAL    CLINICAL HISTORY:  Cramp and spasm    TECHNIQUE:  Bilateral lower extremity arterial duplex ultrasound examination performed. Multiple gray scale and color doppler images were obtained in addition to waveform analysis.    COMPARISON:  None    FINDINGS:  The peak systolic velocities on the right are as follows, in centimeters/second:    Common femoral artery: 202    Superficial femoral artery, proximal: 90    Superficial femoral artery, distal: 77/106    Popliteal artery: 99/74    Posterior tibial artery: 50    Peroneal artery: 48    Anterior tibial artery: 71    The peak systolic velocities on the left are as follows, in centimeters/second:    Common femoral artery: 162    Superficial femoral artery, proximal: 83    Superficial femoral artery, distal: 92/136    Popliteal artery:  67/127    Posterior tibial artery: 58    Peroneal artery: 51    Anterior tibial artery: 54    Normal arterial waveforms are demonstrated.      Impression       No hemodynamically significant stenosis demonstrated in the right or left lower extremity arterial system.      Electronically signed by: Ivan Akhtar MD  Date: 02/10/2020  Time: 17:26           Assessment:       1. Pain in both lower extremities    2. Mesenteric artery stenosis    3. Hyperlipidemia LDL goal <70    4. Family history of cardiovascular disease    5. Atherosclerosis of abdominal aorta    6. Essential hypertension    7. Abnormal ECG         Plan:             sxs probably statin related, improving already being off of it for last 4 days.  Statin holiday x 4 weeks.  She will email me in 4 weeks and update me on her symptoms.  Will then decide on reintroducing statin at lower dose, possibly changing to other brand.  Could consider Repatha.  At risk for CAD, established vasc disease.  Advise stress test -- she did not f/u with my recs to do it in 2018.  Treadmill stress test.  Risk factor modification.  Cardiac diet.  Discussed mesenteric disease, ischemia sxs and to continue f/u with Dr. Reed, vasc surgery, as scheduled.  Phone review.

## 2020-02-12 ENCOUNTER — LAB VISIT (OUTPATIENT)
Dept: LAB | Facility: HOSPITAL | Age: 70
End: 2020-02-12
Attending: NURSE PRACTITIONER
Payer: MEDICARE

## 2020-02-12 DIAGNOSIS — R25.2 BILATERAL LEG CRAMPS: ICD-10-CM

## 2020-02-12 PROCEDURE — 83735 ASSAY OF MAGNESIUM: CPT

## 2020-02-12 PROCEDURE — 36415 COLL VENOUS BLD VENIPUNCTURE: CPT

## 2020-02-13 LAB — MAGNESIUM SERPL-MCNC: 2.1 MG/DL (ref 1.6–2.6)

## 2020-02-18 ENCOUNTER — PATIENT MESSAGE (OUTPATIENT)
Dept: GASTROENTEROLOGY | Facility: CLINIC | Age: 70
End: 2020-02-18

## 2020-02-24 ENCOUNTER — DOCUMENTATION ONLY (OUTPATIENT)
Dept: GASTROENTEROLOGY | Facility: CLINIC | Age: 70
End: 2020-02-24

## 2020-02-29 ENCOUNTER — PATIENT MESSAGE (OUTPATIENT)
Dept: GASTROENTEROLOGY | Facility: CLINIC | Age: 70
End: 2020-02-29

## 2020-03-01 ENCOUNTER — PATIENT MESSAGE (OUTPATIENT)
Dept: GASTROENTEROLOGY | Facility: CLINIC | Age: 70
End: 2020-03-01

## 2020-03-02 ENCOUNTER — TELEPHONE (OUTPATIENT)
Dept: PHARMACY | Facility: CLINIC | Age: 70
End: 2020-03-02

## 2020-03-02 ENCOUNTER — PATIENT MESSAGE (OUTPATIENT)
Dept: GASTROENTEROLOGY | Facility: CLINIC | Age: 70
End: 2020-03-02

## 2020-03-10 ENCOUNTER — PATIENT MESSAGE (OUTPATIENT)
Dept: CARDIOLOGY | Facility: CLINIC | Age: 70
End: 2020-03-10

## 2020-03-13 ENCOUNTER — PATIENT MESSAGE (OUTPATIENT)
Dept: GASTROENTEROLOGY | Facility: CLINIC | Age: 70
End: 2020-03-13

## 2020-03-16 ENCOUNTER — PATIENT MESSAGE (OUTPATIENT)
Dept: GASTROENTEROLOGY | Facility: CLINIC | Age: 70
End: 2020-03-16

## 2020-03-26 ENCOUNTER — PATIENT MESSAGE (OUTPATIENT)
Dept: GASTROENTEROLOGY | Facility: CLINIC | Age: 70
End: 2020-03-26

## 2020-04-07 ENCOUNTER — PATIENT MESSAGE (OUTPATIENT)
Dept: PODIATRY | Facility: CLINIC | Age: 70
End: 2020-04-07

## 2020-04-07 ENCOUNTER — PATIENT MESSAGE (OUTPATIENT)
Dept: CARDIOLOGY | Facility: CLINIC | Age: 70
End: 2020-04-07

## 2020-04-08 RX ORDER — METHOTREXATE 2.5 MG/1
7.5 TABLET ORAL
Qty: 12 TABLET | Refills: 4 | Status: SHIPPED | OUTPATIENT
Start: 2020-04-08 | End: 2020-05-12

## 2020-04-15 ENCOUNTER — PATIENT MESSAGE (OUTPATIENT)
Dept: CARDIOLOGY | Facility: CLINIC | Age: 70
End: 2020-04-15

## 2020-04-20 ENCOUNTER — PATIENT MESSAGE (OUTPATIENT)
Dept: GASTROENTEROLOGY | Facility: CLINIC | Age: 70
End: 2020-04-20

## 2020-04-20 ENCOUNTER — PATIENT MESSAGE (OUTPATIENT)
Dept: PODIATRY | Facility: CLINIC | Age: 70
End: 2020-04-20

## 2020-04-22 DIAGNOSIS — I10 HYPERTENSION GOAL BP (BLOOD PRESSURE) < 140/90: Chronic | ICD-10-CM

## 2020-04-23 RX ORDER — AMLODIPINE BESYLATE 10 MG/1
10 TABLET ORAL DAILY
Qty: 90 TABLET | Refills: 1 | Status: SHIPPED | OUTPATIENT
Start: 2020-04-23 | End: 2020-11-18 | Stop reason: SDUPTHER

## 2020-04-27 ENCOUNTER — PATIENT MESSAGE (OUTPATIENT)
Dept: GASTROENTEROLOGY | Facility: CLINIC | Age: 70
End: 2020-04-27

## 2020-04-30 ENCOUNTER — PATIENT MESSAGE (OUTPATIENT)
Dept: GASTROENTEROLOGY | Facility: CLINIC | Age: 70
End: 2020-04-30

## 2020-05-03 ENCOUNTER — PATIENT MESSAGE (OUTPATIENT)
Dept: GASTROENTEROLOGY | Facility: CLINIC | Age: 70
End: 2020-05-03

## 2020-05-06 DIAGNOSIS — F41.9 ANXIETY: ICD-10-CM

## 2020-05-06 DIAGNOSIS — F51.04 CHRONIC INSOMNIA: ICD-10-CM

## 2020-05-06 RX ORDER — LORAZEPAM 0.5 MG/1
1.5 TABLET ORAL NIGHTLY
Qty: 90 TABLET | Refills: 5 | Status: CANCELLED | OUTPATIENT
Start: 2020-05-06

## 2020-05-07 ENCOUNTER — LAB VISIT (OUTPATIENT)
Dept: LAB | Facility: HOSPITAL | Age: 70
End: 2020-05-07
Attending: INTERNAL MEDICINE
Payer: MEDICARE

## 2020-05-07 DIAGNOSIS — K50.80 CROHN'S DISEASE OF BOTH SMALL AND LARGE INTESTINE WITHOUT COMPLICATION: ICD-10-CM

## 2020-05-07 LAB
ALBUMIN SERPL BCP-MCNC: 4.2 G/DL (ref 3.5–5.2)
ALP SERPL-CCNC: 68 U/L (ref 55–135)
ALT SERPL W/O P-5'-P-CCNC: 16 U/L (ref 10–44)
ANION GAP SERPL CALC-SCNC: 11 MMOL/L (ref 8–16)
AST SERPL-CCNC: 22 U/L (ref 10–40)
BASOPHILS # BLD AUTO: 0.03 K/UL (ref 0–0.2)
BASOPHILS NFR BLD: 0.8 % (ref 0–1.9)
BILIRUB SERPL-MCNC: 0.5 MG/DL (ref 0.1–1)
BUN SERPL-MCNC: 15 MG/DL (ref 8–23)
CALCIUM SERPL-MCNC: 9.4 MG/DL (ref 8.7–10.5)
CHLORIDE SERPL-SCNC: 99 MMOL/L (ref 95–110)
CO2 SERPL-SCNC: 28 MMOL/L (ref 23–29)
CREAT SERPL-MCNC: 0.8 MG/DL (ref 0.5–1.4)
DIFFERENTIAL METHOD: ABNORMAL
EOSINOPHIL # BLD AUTO: 0.1 K/UL (ref 0–0.5)
EOSINOPHIL NFR BLD: 1.3 % (ref 0–8)
ERYTHROCYTE [DISTWIDTH] IN BLOOD BY AUTOMATED COUNT: 12.9 % (ref 11.5–14.5)
EST. GFR  (AFRICAN AMERICAN): >60 ML/MIN/1.73 M^2
EST. GFR  (NON AFRICAN AMERICAN): >60 ML/MIN/1.73 M^2
GLUCOSE SERPL-MCNC: 106 MG/DL (ref 70–110)
HCT VFR BLD AUTO: 39.7 % (ref 37–48.5)
HGB BLD-MCNC: 12.7 G/DL (ref 12–16)
IMM GRANULOCYTES # BLD AUTO: 0.01 K/UL (ref 0–0.04)
IMM GRANULOCYTES NFR BLD AUTO: 0.3 % (ref 0–0.5)
LYMPHOCYTES # BLD AUTO: 1.6 K/UL (ref 1–4.8)
LYMPHOCYTES NFR BLD: 39.9 % (ref 18–48)
MCH RBC QN AUTO: 33.9 PG (ref 27–31)
MCHC RBC AUTO-ENTMCNC: 32 G/DL (ref 32–36)
MCV RBC AUTO: 106 FL (ref 82–98)
MONOCYTES # BLD AUTO: 0.5 K/UL (ref 0.3–1)
MONOCYTES NFR BLD: 12.6 % (ref 4–15)
NEUTROPHILS # BLD AUTO: 1.8 K/UL (ref 1.8–7.7)
NEUTROPHILS NFR BLD: 45.1 % (ref 38–73)
NRBC BLD-RTO: 0 /100 WBC
PLATELET # BLD AUTO: 235 K/UL (ref 150–350)
PMV BLD AUTO: 9.5 FL (ref 9.2–12.9)
POTASSIUM SERPL-SCNC: 3.7 MMOL/L (ref 3.5–5.1)
PROT SERPL-MCNC: 7.7 G/DL (ref 6–8.4)
RBC # BLD AUTO: 3.75 M/UL (ref 4–5.4)
SODIUM SERPL-SCNC: 138 MMOL/L (ref 136–145)
WBC # BLD AUTO: 3.96 K/UL (ref 3.9–12.7)

## 2020-05-07 PROCEDURE — 36415 COLL VENOUS BLD VENIPUNCTURE: CPT

## 2020-05-07 PROCEDURE — 80053 COMPREHEN METABOLIC PANEL: CPT

## 2020-05-07 PROCEDURE — 85025 COMPLETE CBC W/AUTO DIFF WBC: CPT

## 2020-05-07 RX ORDER — LORAZEPAM 0.5 MG/1
1.5 TABLET ORAL NIGHTLY
Qty: 90 TABLET | Refills: 1 | Status: SHIPPED | OUTPATIENT
Start: 2020-05-07 | End: 2020-06-18 | Stop reason: SDUPTHER

## 2020-05-11 ENCOUNTER — OFFICE VISIT (OUTPATIENT)
Dept: GASTROENTEROLOGY | Facility: CLINIC | Age: 70
End: 2020-05-11
Payer: MEDICARE

## 2020-05-11 DIAGNOSIS — K50.819 CROHN'S DISEASE OF SMALL AND LARGE INTESTINES WITH COMPLICATION: Primary | ICD-10-CM

## 2020-05-11 PROCEDURE — 1159F MED LIST DOCD IN RCRD: CPT | Mod: 95,,, | Performed by: INTERNAL MEDICINE

## 2020-05-11 PROCEDURE — 1101F PT FALLS ASSESS-DOCD LE1/YR: CPT | Mod: CPTII,95,, | Performed by: INTERNAL MEDICINE

## 2020-05-11 PROCEDURE — 99213 PR OFFICE/OUTPT VISIT, EST, LEVL III, 20-29 MIN: ICD-10-PCS | Mod: 95,,, | Performed by: INTERNAL MEDICINE

## 2020-05-11 PROCEDURE — 1101F PR PT FALLS ASSESS DOC 0-1 FALLS W/OUT INJ PAST YR: ICD-10-PCS | Mod: CPTII,95,, | Performed by: INTERNAL MEDICINE

## 2020-05-11 PROCEDURE — 1159F PR MEDICATION LIST DOCUMENTED IN MEDICAL RECORD: ICD-10-PCS | Mod: 95,,, | Performed by: INTERNAL MEDICINE

## 2020-05-11 PROCEDURE — 99213 OFFICE O/P EST LOW 20 MIN: CPT | Mod: 95,,, | Performed by: INTERNAL MEDICINE

## 2020-05-11 NOTE — PROGRESS NOTES
GASTROENTEROLOGY IBD CONSULTATION:   Manuela Reyes  MRN: [id number]  : 1950    Referring Provider: No ref. provider found  Primary Care Provider: Christelle Lawler DO      CHIEF COMPLAINT:   No chief complaint on file.      The patient location is: [place], [place]   The chief complaint leading to consultation is: follow up for Crohn's disease   Visit type: audiovisual  Total time spent with patient: 25 min   Each patient to whom he or she provides medical services by telemedicine is:  (1) informed of the relationship between the physician and patient and the respective role of any other health care provider with respect to management of the patient; and (2) notified that he or she may decline to receive medical services by telemedicine and may withdraw from such care at any time.    Notes:     History of Present Illness:  Ms. Reyes is a 70 y.o. female who is here today for Crohn's disease.     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  States this is the first time she has had an oral ulcer, it was very painful.   Had another oral ulcer several weeks later that was not as large or painful.  That has resolved.  Prior to starting methotrexate, would get herpes outbreak that was mild and would get 2-3 times per year.  Over the past year after starting mtx, has had more severe outbreaks and more frequent.      Denies diarrhea, nausea, vomiting, abdominal pain, hematochezia.  Takes miralax  intermittently for constipation.      PREVENTIVE MEDICINE:  Immunization History   Administered Date(s) Administered    Influenza - High Dose - PF (65 years and older) 12/17/2015, 11/17/2016, 11/05/2018, 09/25/2019    Influenza - Trivalent - Adjuvanted - PF 11/06/2017    Pneumococcal Conjugate - 13 Valent 12/10/2017    Pneumococcal Polysaccharide - 23 Valent 01/17/2019      Date of Last Pap Smear, result n/a  Date of Last Surveillance Colonoscopy, result: 2019, active disease   Date of Last DEXA, result: 9/2018  Date of Last T-SPOT, result: ?  TPMT status: ?   Smoking status: non-smoker  NSAID use: none  History of C. difficile: none  Family planning: n/a  Vaccine: up to date, got flu this year       Past Medical History:  Past Medical History:   Diagnosis Date    Anxiety     Crohn's disease     Depression     Genital herpes     Hepatitis C infection     Hypertension     Insomnia     Mesenteric artery stenosis     Dr. 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 Malaika Mack--dermatology    TIA (transient ischemic attack)      Past Surgical History:  Past Surgical History:   Procedure Laterality Date    APPENDECTOMY      COLONOSCOPY N/A 5/19/2017    Procedure: COLONOSCOPY;  Surgeon: Jose Luis Leonard MD;  Location: [facility];  Service: Endoscopy;  Laterality: N/A;    COLONOSCOPY N/A 3/26/2018    Procedure: COLONOSCOPY;  Surgeon: Guillaume Whitman MD;  Location: [facility];  Service: Endoscopy;  Laterality: N/A;    COLONOSCOPY N/A 3/19/2019    Procedure: COLONOSCOPY;  Surgeon: Lili Ellis MD;  Location: [facility];  Service: Endoscopy;  Laterality: N/A;    SMALL INTESTINE SURGERY       Current Medications:   Current Outpatient Medications   Medication Sig Dispense Refill    adalimumab (HUMIRA,CF, PEN) 40 mg/0.4 mL PnKt Inject 0.4 mLs (40 mg total) into the skin every 14 (fourteen) days. 2 pen 11    amLODIPine (NORVASC) 10  MG tablet Take 1 tablet (10 mg total) by mouth once daily. 90 tablet 1    aspirin (ECOTRIN) 81 MG EC tablet Take 1 tablet (81 mg total) by mouth once daily.      calcium carbonate (OS-COOPER) 600 mg (1,500 mg) Tab Take 600 mg by mouth 2 (two) times daily with meals.       cyanocobalamin, vitamin B-12, 2,500 mcg Lozg Place 1 tablet under the tongue.       folic acid (FOLVITE) 1 MG tablet Take by mouth once daily.      levocetirizine (XYZAL) 5 MG tablet Take 1 tablet (5 mg total) by mouth every evening. 30 tablet 6    LORazepam (ATIVAN) 0.5 MG tablet Take 3 tablets (1.5 mg total) by mouth every evening. 90 tablet 1    methotrexate 2.5 MG Tab Take 3 tablets (7.5 mg total) by mouth every 7 days. 12 tablet 4    multivitamin with minerals tablet Take 1 tablet by mouth once daily at 6am.      nut tx,urea cycle dis w-o iron (ESSENTIAL AMINO ACID MIX ORAL)       rosuvastatin (CRESTOR) 10 MG tablet Take 1 tablet (10 mg total) by mouth once daily. 90 tablet 3    UNABLE TO FIND Take 25 mg by mouth once daily. CBD Oil      valACYclovir (VALTREX) 500 MG tablet take 1 tablet by mouth as directed and take 1 tablet by mouth as needed  0    valACYclovir (VALTREX) 500 MG tablet Take one tablet by mouth twice daily for 3 days, then as needed for flare ups 30 tablet 0    vitamin D 1000 units Tab Take 185 mg by mouth once daily.       No current facility-administered medications for this visit.       Allergies:   Review of patient's allergies indicates:   Allergen Reactions    Codeine sulfate Itching    Hydrochlorothiazide Other (See Comments)     Adverse Reaction    Lisinopril Swelling and Edema     Facial Swelling     Social History:  Social History     Socioeconomic History    Marital status: Single     Spouse name: Not on file    Number of children: Not on file    Years of education: Not on file    Highest education level: Not on file   Occupational History    Not on file   Social Needs    Financial resource  strain: Not on file    Food insecurity:     Worry: Not on file     Inability: Not on file    Transportation needs:     Medical: Not on file     Non-medical: Not on file   Tobacco Use    Smoking status: Former Smoker     Years: 20.00     Types: Cigarettes     Last attempt to quit: 1/1/2005     Years since quitting: 15.3    Smokeless tobacco: Former User    Tobacco comment: Former Light Smoker less than 10 a day   Substance and Sexual Activity    Alcohol use: No     Alcohol/week: 4.0 standard drinks     Types: 4 Glasses of wine per week    Drug use: No    Sexual activity: Never     Partners: Female   Lifestyle    Physical activity:     Days per week: Not on file     Minutes per session: Not on file    Stress: Not on file   Relationships    Social connections:     Talks on phone: Not on file     Gets together: Not on file     Attends [employer] service: Not on file     Active member of club or organization: Not on file     Attends meetings of clubs or organizations: Not on file     Relationship status: Not on file   Other Topics Concern    Not on file   Social History Narrative    Not on file     Family History:  Family History   Problem Relation Age of Onset    Stroke Mother     Heart disease Mother     Cataracts Mother     Cancer Father         Lung    Heart disease Sister     Hypertension Brother     Glaucoma Neg Hx     Macular degeneration Neg Hx     Thyroid disease Neg Hx        Review of Systems:   CONSTITUTIONAL: Denies weight change,  fatigue, fevers, chills, night sweats.  EYES: No changes in vision.   ENT: No oral lesions or sore throat.  HEMATOLOGICAL/Lymph: Denies bleeding tendency, bruising tendency. No swellings or enlarged lymph nodes.  CARDIOVASCULAR: Denies chest pain, shortness of breath, orthopnea and edema.  RESPIRATORY: Denies cough, hemoptysis, dyspnea, and wheezing.  GI: See HPI.  : Denies dysuria and hematuria  MUSCULOSKELETAL: Denies joint pain or swelling, back pain and  muscle pain.  SKIN: Denies rashes.  NEUROLOGIC: Denies headaches, seizures and numbness.  PSYCHIATRIC: Denies depression or anxiety.  ENDOCRINE: Denies heat or cold intolerance and excessive thirst or urination.    Physical Examination:       There were no vitals taken for this visit.  General Appearance:  Alert, cooperative, no distress, appears stated age    Laboratory:  Lab Results   Component Value Date    WBC 3.96 05/07/2020     (H) 05/07/2020    RDW 12.9 05/07/2020     05/07/2020    BUN 15 05/07/2020     05/07/2020      Lab Results   Component Value Date    CRP 0.5 09/17/2019    TSH 1.326 01/04/2017    CHOL 164 01/29/2020    TRIG 52 01/29/2020    HDL 85 (H) 01/29/2020     Lab Results   Component Value Date    MG 2.1 02/12/2020        Imaging Review:   No results found.        Health Maintenance Review:  Health Maintenance   Topic Date Due    DEXA SCAN  02/08/2020    Mammogram  01/25/2021    High Dose Statin  02/07/2021    Colonoscopy  03/19/2024    Lipid Panel  01/29/2025    TETANUS VACCINE  07/25/2026    Pneumococcal Vaccine (65+ Low/Medium Risk)  Completed    Hepatitis C Screening  Addressed       ASSESSMENT:  69 y.o. Female with longstanding history of ileocolonic Crohn's with h/o resection x 2 with anastomotic stricture, now on humira and doing well.  She has had increased herpes outbreaks on methotrexate.  Had evidence of active disease on last colonoscopy which is why mtx was added.      PLAN:  - repeat colonoscopy as she is due and to get idea if mtx helped healing.   - can discontinue mtx as pt is very reluctant to continue taking.   - will await colonoscopy to make further treatment changes, although clinically doing well on humira and has good drug level.   - continue to monitor cbc and cmp every 12 weeks  - continue miralax nightly and increase as needed    Return to clinic: 6 months    Lili Ellis  5/11/2020  11:17 AM

## 2020-05-12 ENCOUNTER — TELEPHONE (OUTPATIENT)
Dept: PHARMACY | Facility: CLINIC | Age: 70
End: 2020-05-12

## 2020-05-12 ENCOUNTER — PATIENT MESSAGE (OUTPATIENT)
Dept: ENDOSCOPY | Facility: HOSPITAL | Age: 70
End: 2020-05-12

## 2020-05-12 DIAGNOSIS — K50.10 CROHN'S DISEASE OF COLON WITHOUT COMPLICATION: Primary | ICD-10-CM

## 2020-05-13 DIAGNOSIS — K50.10 CROHN'S DISEASE OF COLON WITHOUT COMPLICATION: Primary | ICD-10-CM

## 2020-05-14 ENCOUNTER — LAB VISIT (OUTPATIENT)
Dept: LAB | Facility: HOSPITAL | Age: 70
End: 2020-05-14
Attending: INTERNAL MEDICINE
Payer: MEDICARE

## 2020-05-14 DIAGNOSIS — K50.119 CROHN'S DISEASE OF COLON WITH COMPLICATION: ICD-10-CM

## 2020-05-14 DIAGNOSIS — K50.10 CROHN'S DISEASE OF COLON WITHOUT COMPLICATION: ICD-10-CM

## 2020-05-14 LAB — ERYTHROCYTE [SEDIMENTATION RATE] IN BLOOD BY WESTERGREN METHOD: 11 MM/HR (ref 0–20)

## 2020-05-14 PROCEDURE — 85651 RBC SED RATE NONAUTOMATED: CPT

## 2020-05-14 PROCEDURE — 86140 C-REACTIVE PROTEIN: CPT

## 2020-05-14 PROCEDURE — 82306 VITAMIN D 25 HYDROXY: CPT

## 2020-05-14 PROCEDURE — 36415 COLL VENOUS BLD VENIPUNCTURE: CPT

## 2020-05-14 PROCEDURE — 86480 TB TEST CELL IMMUN MEASURE: CPT

## 2020-05-15 LAB
25(OH)D3+25(OH)D2 SERPL-MCNC: 75 NG/ML (ref 30–96)
CRP SERPL-MCNC: 0.5 MG/L (ref 0–8.2)

## 2020-05-18 ENCOUNTER — PATIENT MESSAGE (OUTPATIENT)
Dept: ENDOSCOPY | Facility: HOSPITAL | Age: 70
End: 2020-05-18

## 2020-05-18 ENCOUNTER — PATIENT MESSAGE (OUTPATIENT)
Dept: CARDIOLOGY | Facility: CLINIC | Age: 70
End: 2020-05-18

## 2020-05-18 LAB
GAMMA INTERFERON BACKGROUND BLD IA-ACNC: 0.05 IU/ML
M TB IFN-G CD4+ BCKGRND COR BLD-ACNC: -0.01 IU/ML
MITOGEN IGNF BCKGRD COR BLD-ACNC: 7.86 IU/ML
TB GOLD PLUS: NEGATIVE
TB2 - NIL: -0.01 IU/ML

## 2020-05-19 ENCOUNTER — PATIENT MESSAGE (OUTPATIENT)
Dept: ENDOSCOPY | Facility: HOSPITAL | Age: 70
End: 2020-05-19

## 2020-05-21 ENCOUNTER — OFFICE VISIT (OUTPATIENT)
Dept: OTOLARYNGOLOGY | Facility: CLINIC | Age: 70
End: 2020-05-21
Payer: MEDICARE

## 2020-05-21 VITALS
SYSTOLIC BLOOD PRESSURE: 161 MMHG | HEART RATE: 90 BPM | WEIGHT: 217.13 LBS | TEMPERATURE: 118 F | BODY MASS INDEX: 34.01 KG/M2 | DIASTOLIC BLOOD PRESSURE: 92 MMHG

## 2020-05-21 DIAGNOSIS — J01.90 ACUTE SINUSITIS, RECURRENCE NOT SPECIFIED, UNSPECIFIED LOCATION: Primary | ICD-10-CM

## 2020-05-21 PROCEDURE — 1126F AMNT PAIN NOTED NONE PRSNT: CPT | Mod: S$GLB,,, | Performed by: PHYSICIAN ASSISTANT

## 2020-05-21 PROCEDURE — 3080F PR MOST RECENT DIASTOLIC BLOOD PRESSURE >= 90 MM HG: ICD-10-PCS | Mod: CPTII,S$GLB,, | Performed by: PHYSICIAN ASSISTANT

## 2020-05-21 PROCEDURE — 99214 PR OFFICE/OUTPT VISIT, EST, LEVL IV, 30-39 MIN: ICD-10-PCS | Mod: S$GLB,,, | Performed by: PHYSICIAN ASSISTANT

## 2020-05-21 PROCEDURE — 99214 OFFICE O/P EST MOD 30 MIN: CPT | Mod: S$GLB,,, | Performed by: PHYSICIAN ASSISTANT

## 2020-05-21 PROCEDURE — 3077F PR MOST RECENT SYSTOLIC BLOOD PRESSURE >= 140 MM HG: ICD-10-PCS | Mod: CPTII,S$GLB,, | Performed by: PHYSICIAN ASSISTANT

## 2020-05-21 PROCEDURE — 1159F PR MEDICATION LIST DOCUMENTED IN MEDICAL RECORD: ICD-10-PCS | Mod: S$GLB,,, | Performed by: PHYSICIAN ASSISTANT

## 2020-05-21 PROCEDURE — 1126F PR PAIN SEVERITY QUANTIFIED, NO PAIN PRESENT: ICD-10-PCS | Mod: S$GLB,,, | Performed by: PHYSICIAN ASSISTANT

## 2020-05-21 PROCEDURE — 3077F SYST BP >= 140 MM HG: CPT | Mod: CPTII,S$GLB,, | Performed by: PHYSICIAN ASSISTANT

## 2020-05-21 PROCEDURE — 3080F DIAST BP >= 90 MM HG: CPT | Mod: CPTII,S$GLB,, | Performed by: PHYSICIAN ASSISTANT

## 2020-05-21 PROCEDURE — 1101F PR PT FALLS ASSESS DOC 0-1 FALLS W/OUT INJ PAST YR: ICD-10-PCS | Mod: CPTII,S$GLB,, | Performed by: PHYSICIAN ASSISTANT

## 2020-05-21 PROCEDURE — 99999 PR PBB SHADOW E&M-EST. PATIENT-LVL IV: CPT | Mod: PBBFAC,,, | Performed by: PHYSICIAN ASSISTANT

## 2020-05-21 PROCEDURE — 1159F MED LIST DOCD IN RCRD: CPT | Mod: S$GLB,,, | Performed by: PHYSICIAN ASSISTANT

## 2020-05-21 PROCEDURE — 1101F PT FALLS ASSESS-DOCD LE1/YR: CPT | Mod: CPTII,S$GLB,, | Performed by: PHYSICIAN ASSISTANT

## 2020-05-21 PROCEDURE — 99999 PR PBB SHADOW E&M-EST. PATIENT-LVL IV: ICD-10-PCS | Mod: PBBFAC,,, | Performed by: PHYSICIAN ASSISTANT

## 2020-05-21 RX ORDER — AMOXICILLIN AND CLAVULANATE POTASSIUM 875; 125 MG/1; MG/1
1 TABLET, FILM COATED ORAL 2 TIMES DAILY
Qty: 20 TABLET | Refills: 0 | Status: SHIPPED | OUTPATIENT
Start: 2020-05-21 | End: 2020-05-31

## 2020-05-21 NOTE — PROGRESS NOTES
Subjective:       Patient ID: Manuela Reyes is a 70 y.o. female.    Chief Complaint: Ear Fullness; Sinus Problem; and Mucus in throat    Patient is a very pleasant 70 year old female here to see me today for evaluation of worsening sinus symptoms.    MAJOR SYMPTOMS:  No  Purulent anterior nasal discharge (minimal)  No  Purulent or discolored posterior nasal discharge (says it's thick white mucus in her throat)  Yes  Nasal congestion or obstruction  Yes  Facial Congestion or fullness  No  Hyposmia or anosmia  No  Fever    MINOR SYMPTOMS:  No  Headache  Yes  Ear pain, pressure, or fullness  No  Halitosis  No  Dental pain  Yes  Fatigue    The diagnosis of acute sinusitis is based on the presence of at least 2 major or 1 major and at least 2 minor symptoms.  Manuela does meet this criteria.  Clinical Practice Guideline for Acute Bacterial Rhinosinusitis in Children and Adults. Clin Infect Dis 2012; 54:e72    Onset:  2 weeks ago  Exacerbating factors: No  Relieving factors: No  Timing:  Worsening    She says it's usually this time of year that her sinus and allergies start bothering her.  She notes facial congestion and aural pressure AU for 2 weeks.  Now with pressure above her eyebrows.  Denies much nasal drainage.  No coughing unless she tries to cough to clear thick mucus from her throat.  No respiratory distress or shortness of breath.  No fever.  She has resumed Flonase about one week ago and continues with Xyzal at night.  She has tried Mucinex with some benefit.      Review of Systems   Constitutional: Positive for fatigue. Negative for activity change, appetite change and fever.   HENT: Positive for congestion, postnasal drip, sinus pressure (worse over left eyebrow) and sinus pain. Negative for ear discharge, ear pain (pressure AU), rhinorrhea, sore throat and trouble swallowing.    Respiratory: Negative for cough, shortness of breath and wheezing.    Gastrointestinal: Negative for vomiting.    Musculoskeletal: Negative for gait problem.   Neurological: Negative for headaches.       Objective:      Physical Exam   Constitutional: She is oriented to person, place, and time. She appears well-developed and well-nourished. She is cooperative. No distress.   HENT:   Head: Normocephalic and atraumatic.   Right Ear: External ear and ear canal normal. No drainage. Tympanic membrane is retracted. Tympanic membrane is not erythematous. No middle ear effusion.   Left Ear: External ear and ear canal normal. No drainage. Tympanic membrane is retracted. Tympanic membrane is not erythematous.  No middle ear effusion.   Nose: Mucosal edema and rhinorrhea (mucoid on head of left inferior turbinate) present. No nasal deformity or septal deviation. No epistaxis. Right sinus exhibits maxillary sinus tenderness and frontal sinus tenderness. Left sinus exhibits maxillary sinus tenderness and frontal sinus tenderness.   Mouth/Throat: Uvula is midline, oropharynx is clear and moist and mucous membranes are normal. Mucous membranes are not pale and not dry. No trismus in the jaw. Normal dentition. No uvula swelling. No oropharyngeal exudate, posterior oropharyngeal edema or posterior oropharyngeal erythema.   Eyes: Pupils are equal, round, and reactive to light. Conjunctivae, EOM and lids are normal. Right eye exhibits no chemosis. Left eye exhibits no chemosis. Right conjunctiva is not injected. Left conjunctiva is not injected. No scleral icterus. Right eye exhibits normal extraocular motion and no nystagmus. Left eye exhibits normal extraocular motion and no nystagmus.   Neck: Trachea normal and phonation normal. No tracheal tenderness present. No tracheal deviation present. No thyroid mass and no thyromegaly present.   Cardiovascular: Intact distal pulses.   Pulmonary/Chest: Effort normal. No stridor. No respiratory distress.   Abdominal: She exhibits no distension.   Lymphadenopathy:        Head (right side): No submental,  no submandibular, no preauricular, no posterior auricular and no occipital adenopathy present.        Head (left side): No submental, no submandibular, no preauricular, no posterior auricular and no occipital adenopathy present.     She has no cervical adenopathy.   Neurological: She is alert and oriented to person, place, and time. No cranial nerve deficit.   Skin: Skin is warm and dry. No rash noted. No erythema.   Psychiatric: She has a normal mood and affect. Her behavior is normal.       Assessment:       1. Acute sinusitis, recurrence not specified, unspecified location        Plan:         Recommend Augmentin x 10 days; discussed risk of GI upset and she will call if this occurs and will plan to change to Zpak at that time.  Discussed continued use of Mucinex BID to help thin and loosen the mucus.  Frequent nasal saline spray.  Stay well hydrated.  Instructed her to continue daily Xyzal and Flonase as well.  RTC as needed; instructed her to call with any worsening symptoms.  Her blood pressure is elevated in clinic today so will try and avoid steroids at this time.

## 2020-05-26 ENCOUNTER — PATIENT MESSAGE (OUTPATIENT)
Dept: OTOLARYNGOLOGY | Facility: CLINIC | Age: 70
End: 2020-05-26

## 2020-06-01 RX ORDER — LEVOCETIRIZINE DIHYDROCHLORIDE 5 MG/1
5 TABLET, FILM COATED ORAL NIGHTLY
Qty: 90 TABLET | Refills: 0 | Status: SHIPPED | OUTPATIENT
Start: 2020-06-01 | End: 2020-10-23 | Stop reason: SDUPTHER

## 2020-06-01 RX ORDER — LEVOCETIRIZINE DIHYDROCHLORIDE 5 MG/1
5 TABLET, FILM COATED ORAL NIGHTLY
Qty: 30 TABLET | Refills: 6 | Status: CANCELLED | OUTPATIENT
Start: 2020-06-01 | End: 2021-06-01

## 2020-06-01 NOTE — TELEPHONE ENCOUNTER
12/17/2019 [facility] 02/07/2020 Elisabeth     Pt has 6 refills left, however pt requesting a 90 day supply.

## 2020-06-02 RX ORDER — METHOTREXATE 2.5 MG/1
15 TABLET ORAL
Qty: 24 TABLET | Refills: 11 | Status: SHIPPED | OUTPATIENT
Start: 2020-06-02 | End: 2021-02-17

## 2020-06-03 ENCOUNTER — TELEPHONE (OUTPATIENT)
Dept: PHARMACY | Facility: CLINIC | Age: 70
End: 2020-06-03

## 2020-06-03 NOTE — TELEPHONE ENCOUNTER
MD has started pt back on MTX for CD. Dose increased to 7 tablets (15 mg) weekly, pt aware. Pt confirmed shipping of MTX tablets on 6/3/20 to arrive on 20. Address and  verified. $9.24 copay in 004. Pt did not start any new medications. Pt had no further questions or concerns.

## 2020-06-18 ENCOUNTER — OFFICE VISIT (OUTPATIENT)
Dept: INTERNAL MEDICINE | Facility: CLINIC | Age: 70
End: 2020-06-18
Payer: MEDICARE

## 2020-06-18 VITALS
DIASTOLIC BLOOD PRESSURE: 81 MMHG | BODY MASS INDEX: 16.33 KG/M2 | HEIGHT: 67 IN | OXYGEN SATURATION: 97 % | TEMPERATURE: 99 F | SYSTOLIC BLOOD PRESSURE: 130 MMHG | WEIGHT: 104.06 LBS | HEART RATE: 87 BPM

## 2020-06-18 DIAGNOSIS — F51.04 CHRONIC INSOMNIA: ICD-10-CM

## 2020-06-18 DIAGNOSIS — F41.9 ANXIETY: ICD-10-CM

## 2020-06-18 DIAGNOSIS — K55.1 MESENTERIC ARTERY STENOSIS: ICD-10-CM

## 2020-06-18 DIAGNOSIS — K50.00 CROHN'S DISEASE OF SMALL INTESTINE WITHOUT COMPLICATION: ICD-10-CM

## 2020-06-18 DIAGNOSIS — E78.5 HYPERLIPIDEMIA LDL GOAL <70: ICD-10-CM

## 2020-06-18 DIAGNOSIS — M81.0 AGE-RELATED OSTEOPOROSIS WITHOUT CURRENT PATHOLOGICAL FRACTURE: ICD-10-CM

## 2020-06-18 DIAGNOSIS — I10 HYPERTENSION GOAL BP (BLOOD PRESSURE) < 140/90: Primary | ICD-10-CM

## 2020-06-18 PROCEDURE — 1159F MED LIST DOCD IN RCRD: CPT | Mod: S$GLB,,, | Performed by: INTERNAL MEDICINE

## 2020-06-18 PROCEDURE — 3079F PR MOST RECENT DIASTOLIC BLOOD PRESSURE 80-89 MM HG: ICD-10-PCS | Mod: CPTII,S$GLB,, | Performed by: INTERNAL MEDICINE

## 2020-06-18 PROCEDURE — 1126F PR PAIN SEVERITY QUANTIFIED, NO PAIN PRESENT: ICD-10-PCS | Mod: S$GLB,,, | Performed by: INTERNAL MEDICINE

## 2020-06-18 PROCEDURE — 99214 OFFICE O/P EST MOD 30 MIN: CPT | Mod: S$GLB,,, | Performed by: INTERNAL MEDICINE

## 2020-06-18 PROCEDURE — 1101F PT FALLS ASSESS-DOCD LE1/YR: CPT | Mod: CPTII,S$GLB,, | Performed by: INTERNAL MEDICINE

## 2020-06-18 PROCEDURE — 99999 PR PBB SHADOW E&M-EST. PATIENT-LVL IV: CPT | Mod: PBBFAC,,, | Performed by: INTERNAL MEDICINE

## 2020-06-18 PROCEDURE — 1159F PR MEDICATION LIST DOCUMENTED IN MEDICAL RECORD: ICD-10-PCS | Mod: S$GLB,,, | Performed by: INTERNAL MEDICINE

## 2020-06-18 PROCEDURE — 3079F DIAST BP 80-89 MM HG: CPT | Mod: CPTII,S$GLB,, | Performed by: INTERNAL MEDICINE

## 2020-06-18 PROCEDURE — 1126F AMNT PAIN NOTED NONE PRSNT: CPT | Mod: S$GLB,,, | Performed by: INTERNAL MEDICINE

## 2020-06-18 PROCEDURE — 99214 PR OFFICE/OUTPT VISIT, EST, LEVL IV, 30-39 MIN: ICD-10-PCS | Mod: S$GLB,,, | Performed by: INTERNAL MEDICINE

## 2020-06-18 PROCEDURE — 1101F PR PT FALLS ASSESS DOC 0-1 FALLS W/OUT INJ PAST YR: ICD-10-PCS | Mod: CPTII,S$GLB,, | Performed by: INTERNAL MEDICINE

## 2020-06-18 PROCEDURE — 99999 PR PBB SHADOW E&M-EST. PATIENT-LVL IV: ICD-10-PCS | Mod: PBBFAC,,, | Performed by: INTERNAL MEDICINE

## 2020-06-18 PROCEDURE — 3075F SYST BP GE 130 - 139MM HG: CPT | Mod: CPTII,S$GLB,, | Performed by: INTERNAL MEDICINE

## 2020-06-18 PROCEDURE — 3075F PR MOST RECENT SYSTOLIC BLOOD PRESS GE 130-139MM HG: ICD-10-PCS | Mod: CPTII,S$GLB,, | Performed by: INTERNAL MEDICINE

## 2020-06-18 RX ORDER — LORAZEPAM 0.5 MG/1
1.5 TABLET ORAL NIGHTLY
Qty: 90 TABLET | Refills: 5 | Status: SHIPPED | OUTPATIENT
Start: 2020-06-18 | End: 2020-12-02 | Stop reason: SDUPTHER

## 2020-06-19 NOTE — PROGRESS NOTES
Subjective:       Patient ID: Manuela Reyes is a 70 y.o. female.    Chief Complaint: Follow-up (6 month)    Manuela Reyes  70 y.o. White female    Patient presents with:  Follow-up: 6 month    HPI: Here today to follow up on chronic conditions.  HTN--stable on amlodipine.   HLD--compliant with rosuvastatin. She had been having leg cramps but that has now resolved.                        LDLCALC                  68.6                01/29/2020            Mesenteric artery stenosis--compliant with aspirin and rosuvastatin. She denies symptoms. She is under the care of cardiology and vascular surgery.   Crohn's--management per GI.   Osteoporosis--compliant with calcium and vitamin D. She is not on treatment.   Anxiety/insomnia--stable on lorazepam at night.     Past Medical History:  Anxiety  Crohn's disease  Depression  Genital herpes  Hepatitis C infection  Hypertension  Insomnia  Mesenteric artery stenosis      Comment:  Dr. 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 Malaika Mack--dermatology  TIA (transient ischemic attack)    Current Outpatient Medications:     amLODIPine (NORVASC) 10 MG tablet, Take 1 tablet (10 mg total) by mouth once daily., Disp: 90 tablet, Rfl: 1    aspirin (ECOTRIN) 81 MG EC tablet, Take 1 tablet (81 mg total) by mouth once daily., Disp: , Rfl:     calcium carbonate (OS-COOPER) 600 mg (1,500 mg) Tab, Take 1,200 mg by mouth once daily. , Disp: , Rfl:     cyanocobalamin, vitamin B-12, 2,500 mcg [facility], Place 1 tablet under the tongue. , Disp: , Rfl:     folic acid (FOLVITE) 1 MG tablet, Take by mouth once daily., Disp: , Rfl:     levocetirizine (XYZAL) 5 MG tablet, Take 1 tablet (5 mg total) by mouth every evening., Disp: 90 tablet, Rfl: 0    LORazepam (ATIVAN) 0.5 MG tablet, Take 3 tablets (1.5 mg total) by mouth every evening., Disp: 90 tablet, Rfl: 5    methotrexate 2.5 MG Tab, Take 6 tablets  (15 mg total) by mouth every 7 days., Disp: 24 tablet, Rfl: 11    multivitamin with minerals tablet, Take 1 tablet by mouth once daily at 6am., Disp: , Rfl:     nut tx,urea cycle dis w-o iron (ESSENTIAL AMINO ACID MIX ORAL), once daily. , Disp: , Rfl:     rosuvastatin (CRESTOR) 10 MG tablet, Take 1 tablet (10 mg total) by mouth once daily. (Patient taking differently: Take 5 mg by mouth every other day. ), Disp: 90 tablet, Rfl: 3    UNABLE TO FIND, Take 25 mg by mouth once daily. CBD Oil, Disp: , Rfl:     valACYclovir (VALTREX) 500 MG tablet, take 1 tablet by mouth as directed and take 1 tablet by mouth as needed, Disp: , Rfl: 0    valACYclovir (VALTREX) 500 MG tablet, Take one tablet by mouth twice daily for 3 days, then as needed for flare ups, Disp: 30 tablet, Rfl: 0    vitamin D 1000 units Tab, Take 185 mg by mouth once daily., Disp: , Rfl:     adalimumab (HUMIRA,CF, PEN) 40 mg/0.4 mL PnKt, Inject 0.4 mLs (40 mg total) into the skin every 14 (fourteen) days., Disp: 2 pen, Rfl: 11    Allergies:  Review of patient's allergies indicates:   -- Codeine sulfate -- Itching   -- Hydrochlorothiazide -- Other (See Comments)    --  Adverse Reaction   -- Lisinopril -- Swelling and Edema    --  Facial Swelling        Review of Systems   Constitutional: Negative for activity change and unexpected weight change.   HENT: Negative for hearing loss, rhinorrhea and trouble swallowing.    Eyes: Negative for discharge and visual disturbance.   Respiratory: Negative for chest tightness and wheezing.    Cardiovascular: Negative for chest pain and palpitations.   Gastrointestinal: Negative for blood in stool, constipation, diarrhea and vomiting.   Endocrine: Negative for polydipsia and polyuria.   Genitourinary: Negative for difficulty urinating, dysuria, hematuria and menstrual problem.   Musculoskeletal: Negative for arthralgias, joint swelling and neck pain.   Neurological: Negative for weakness and headaches.    Psychiatric/Behavioral: Positive for sleep disturbance. Negative for confusion and dysphoric mood. The patient is nervous/anxious.          Objective:      Physical Exam  Constitutional:       General: She is not in acute distress.     Appearance: She is well-developed.   Cardiovascular:      Rate and Rhythm: Normal rate.   Pulmonary:      Effort: Pulmonary effort is normal. No respiratory distress.   Neurological:      Mental Status: She is alert and oriented to person, place, and time.   Psychiatric:         Behavior: Behavior normal.         Thought Content: Thought content normal.         Judgment: Judgment normal.         Assessment:       1. Hypertension goal BP (blood pressure) < 140/90    2. Hyperlipidemia LDL goal <70    3. Mesenteric artery stenosis    4. Crohn's disease of small intestine without complication    5. Age-related osteoporosis without current pathological fracture    6. Anxiety    7. Chronic insomnia        Plan:       Manuela was seen today for follow-up.    Diagnoses and all orders for this visit:    Hypertension goal BP (blood pressure) < 140/90  -     Continue amlodipine    Hyperlipidemia LDL goal <70  -     Continue rosuvastatin    Mesenteric artery stenosis  -     Continue current management    Crohn's disease of small intestine without complication  -    Management per GI    Age-related osteoporosis without current pathological fracture  -     DXA Bone Density Spine And Hip; Future    Anxiety  -     LORazepam (ATIVAN) 0.5 MG tablet; Take 3 tablets (1.5 mg total) by mouth every evening.    Chronic insomnia  -     LORazepam (ATIVAN) 0.5 MG tablet; Take 3 tablets (1.5 mg total) by mouth every evening.    Schedule labs.     F/U in 6 months and as needed.

## 2020-06-25 ENCOUNTER — TELEPHONE (OUTPATIENT)
Dept: PHARMACY | Facility: CLINIC | Age: 70
End: 2020-06-25

## 2020-06-30 ENCOUNTER — TELEPHONE (OUTPATIENT)
Dept: PHARMACY | Facility: CLINIC | Age: 70
End: 2020-06-30

## 2020-06-30 ENCOUNTER — DOCUMENTATION ONLY (OUTPATIENT)
Dept: GASTROENTEROLOGY | Facility: CLINIC | Age: 70
End: 2020-06-30

## 2020-07-06 ENCOUNTER — TELEPHONE (OUTPATIENT)
Dept: PHARMACY | Facility: CLINIC | Age: 70
End: 2020-07-06

## 2020-07-08 ENCOUNTER — LAB VISIT (OUTPATIENT)
Dept: LAB | Facility: HOSPITAL | Age: 70
End: 2020-07-08
Attending: INTERNAL MEDICINE
Payer: MEDICARE

## 2020-07-08 DIAGNOSIS — I70.0 ATHEROSCLEROSIS OF ABDOMINAL AORTA: ICD-10-CM

## 2020-07-08 DIAGNOSIS — I10 HYPERTENSION GOAL BP (BLOOD PRESSURE) < 140/90: Chronic | ICD-10-CM

## 2020-07-08 DIAGNOSIS — E78.5 HYPERLIPIDEMIA LDL GOAL <70: ICD-10-CM

## 2020-07-08 LAB
ALBUMIN SERPL BCP-MCNC: 3.9 G/DL (ref 3.5–5.2)
ALP SERPL-CCNC: 68 U/L (ref 55–135)
ALT SERPL W/O P-5'-P-CCNC: 12 U/L (ref 10–44)
ANION GAP SERPL CALC-SCNC: 8 MMOL/L (ref 8–16)
AST SERPL-CCNC: 19 U/L (ref 10–40)
BILIRUB SERPL-MCNC: 0.4 MG/DL (ref 0.1–1)
BUN SERPL-MCNC: 17 MG/DL (ref 8–23)
CALCIUM SERPL-MCNC: 9.4 MG/DL (ref 8.7–10.5)
CHLORIDE SERPL-SCNC: 103 MMOL/L (ref 95–110)
CHOLEST SERPL-MCNC: 161 MG/DL (ref 120–199)
CHOLEST/HDLC SERPL: 1.9 {RATIO} (ref 2–5)
CO2 SERPL-SCNC: 28 MMOL/L (ref 23–29)
CREAT SERPL-MCNC: 0.8 MG/DL (ref 0.5–1.4)
EST. GFR  (AFRICAN AMERICAN): >60 ML/MIN/1.73 M^2
EST. GFR  (NON AFRICAN AMERICAN): >60 ML/MIN/1.73 M^2
GLUCOSE SERPL-MCNC: 85 MG/DL (ref 70–110)
HDLC SERPL-MCNC: 85 MG/DL (ref 40–75)
HDLC SERPL: 52.8 % (ref 20–50)
LDLC SERPL CALC-MCNC: 66.4 MG/DL (ref 63–159)
NONHDLC SERPL-MCNC: 76 MG/DL
POTASSIUM SERPL-SCNC: 3.8 MMOL/L (ref 3.5–5.1)
PROT SERPL-MCNC: 7.4 G/DL (ref 6–8.4)
SODIUM SERPL-SCNC: 139 MMOL/L (ref 136–145)
TRIGL SERPL-MCNC: 48 MG/DL (ref 30–150)

## 2020-07-08 PROCEDURE — 80061 LIPID PANEL: CPT

## 2020-07-08 PROCEDURE — 36415 COLL VENOUS BLD VENIPUNCTURE: CPT

## 2020-07-08 PROCEDURE — 80053 COMPREHEN METABOLIC PANEL: CPT

## 2020-07-24 DIAGNOSIS — E78.2 MIXED HYPERLIPIDEMIA: ICD-10-CM

## 2020-07-24 RX ORDER — ROSUVASTATIN CALCIUM 10 MG/1
10 TABLET, COATED ORAL DAILY
Qty: 90 TABLET | Refills: 3 | Status: SHIPPED | OUTPATIENT
Start: 2020-07-24 | End: 2021-05-06 | Stop reason: SINTOL

## 2020-08-04 ENCOUNTER — TELEPHONE (OUTPATIENT)
Dept: PHARMACY | Facility: CLINIC | Age: 70
End: 2020-08-04

## 2020-08-17 ENCOUNTER — LAB VISIT (OUTPATIENT)
Dept: LAB | Facility: HOSPITAL | Age: 70
End: 2020-08-17
Attending: INTERNAL MEDICINE
Payer: MEDICARE

## 2020-08-17 DIAGNOSIS — K50.80 CROHN'S DISEASE OF BOTH SMALL AND LARGE INTESTINE WITHOUT COMPLICATION: ICD-10-CM

## 2020-08-17 PROCEDURE — 36415 COLL VENOUS BLD VENIPUNCTURE: CPT

## 2020-08-17 PROCEDURE — 85025 COMPLETE CBC W/AUTO DIFF WBC: CPT

## 2020-08-17 PROCEDURE — 80053 COMPREHEN METABOLIC PANEL: CPT

## 2020-08-18 LAB
ALBUMIN SERPL BCP-MCNC: 4 G/DL (ref 3.5–5.2)
ALP SERPL-CCNC: 66 U/L (ref 55–135)
ALT SERPL W/O P-5'-P-CCNC: 10 U/L (ref 10–44)
ANION GAP SERPL CALC-SCNC: 10 MMOL/L (ref 8–16)
AST SERPL-CCNC: 21 U/L (ref 10–40)
BASOPHILS # BLD AUTO: 0.03 K/UL (ref 0–0.2)
BASOPHILS NFR BLD: 0.6 % (ref 0–1.9)
BILIRUB SERPL-MCNC: 0.5 MG/DL (ref 0.1–1)
BUN SERPL-MCNC: 22 MG/DL (ref 8–23)
CALCIUM SERPL-MCNC: 10.2 MG/DL (ref 8.7–10.5)
CHLORIDE SERPL-SCNC: 100 MMOL/L (ref 95–110)
CO2 SERPL-SCNC: 24 MMOL/L (ref 23–29)
CREAT SERPL-MCNC: 0.8 MG/DL (ref 0.5–1.4)
DIFFERENTIAL METHOD: ABNORMAL
EOSINOPHIL # BLD AUTO: 0 K/UL (ref 0–0.5)
EOSINOPHIL NFR BLD: 0.6 % (ref 0–8)
ERYTHROCYTE [DISTWIDTH] IN BLOOD BY AUTOMATED COUNT: 13.8 % (ref 11.5–14.5)
EST. GFR  (AFRICAN AMERICAN): >60 ML/MIN/1.73 M^2
EST. GFR  (NON AFRICAN AMERICAN): >60 ML/MIN/1.73 M^2
GLUCOSE SERPL-MCNC: 92 MG/DL (ref 70–110)
HCT VFR BLD AUTO: 36.2 % (ref 37–48.5)
HGB BLD-MCNC: 12.1 G/DL (ref 12–16)
IMM GRANULOCYTES # BLD AUTO: 0.02 K/UL (ref 0–0.04)
IMM GRANULOCYTES NFR BLD AUTO: 0.4 % (ref 0–0.5)
LYMPHOCYTES # BLD AUTO: 1.2 K/UL (ref 1–4.8)
LYMPHOCYTES NFR BLD: 23.3 % (ref 18–48)
MCH RBC QN AUTO: 34.8 PG (ref 27–31)
MCHC RBC AUTO-ENTMCNC: 33.4 G/DL (ref 32–36)
MCV RBC AUTO: 104 FL (ref 82–98)
MONOCYTES # BLD AUTO: 0.7 K/UL (ref 0.3–1)
MONOCYTES NFR BLD: 12.7 % (ref 4–15)
NEUTROPHILS # BLD AUTO: 3.3 K/UL (ref 1.8–7.7)
NEUTROPHILS NFR BLD: 62.4 % (ref 38–73)
NRBC BLD-RTO: 0 /100 WBC
PLATELET # BLD AUTO: 240 K/UL (ref 150–350)
PMV BLD AUTO: 9.6 FL (ref 9.2–12.9)
POTASSIUM SERPL-SCNC: 4.2 MMOL/L (ref 3.5–5.1)
PROT SERPL-MCNC: 6.9 G/DL (ref 6–8.4)
RBC # BLD AUTO: 3.48 M/UL (ref 4–5.4)
SODIUM SERPL-SCNC: 134 MMOL/L (ref 136–145)
WBC # BLD AUTO: 5.2 K/UL (ref 3.9–12.7)

## 2020-08-19 ENCOUNTER — TELEPHONE (OUTPATIENT)
Dept: GASTROENTEROLOGY | Facility: CLINIC | Age: 70
End: 2020-08-19

## 2020-08-19 DIAGNOSIS — K50.819 CROHN'S DISEASE OF SMALL AND LARGE INTESTINES WITH COMPLICATION: Primary | ICD-10-CM

## 2020-08-19 RX ORDER — SODIUM, POTASSIUM,MAG SULFATES 17.5-3.13G
SOLUTION, RECONSTITUTED, ORAL ORAL
Qty: 354 ML | Refills: 0 | Status: ON HOLD | OUTPATIENT
Start: 2020-08-19 | End: 2020-09-24 | Stop reason: HOSPADM

## 2020-09-03 ENCOUNTER — TELEPHONE (OUTPATIENT)
Dept: PHARMACY | Facility: CLINIC | Age: 70
End: 2020-09-03

## 2020-09-09 ENCOUNTER — OFFICE VISIT (OUTPATIENT)
Dept: OPHTHALMOLOGY | Facility: CLINIC | Age: 70
End: 2020-09-09
Payer: MEDICARE

## 2020-09-09 ENCOUNTER — APPOINTMENT (OUTPATIENT)
Dept: RADIOLOGY | Facility: HOSPITAL | Age: 70
End: 2020-09-09
Attending: INTERNAL MEDICINE
Payer: MEDICARE

## 2020-09-09 DIAGNOSIS — H25.13 CATARACT, NUCLEAR SCLEROTIC SENILE, BILATERAL: ICD-10-CM

## 2020-09-09 DIAGNOSIS — I10 ESSENTIAL HYPERTENSION: Primary | ICD-10-CM

## 2020-09-09 DIAGNOSIS — H52.7 REFRACTIVE ERROR: ICD-10-CM

## 2020-09-09 DIAGNOSIS — M81.0 AGE-RELATED OSTEOPOROSIS WITHOUT CURRENT PATHOLOGICAL FRACTURE: ICD-10-CM

## 2020-09-09 PROCEDURE — 99999 PR PBB SHADOW E&M-EST. PATIENT-LVL III: ICD-10-PCS | Mod: PBBFAC,,, | Performed by: OPTOMETRIST

## 2020-09-09 PROCEDURE — 92014 PR EYE EXAM, EST PATIENT,COMPREHESV: ICD-10-PCS | Mod: S$GLB,,, | Performed by: OPTOMETRIST

## 2020-09-09 PROCEDURE — 99499 RISK ADDL DX/OHS AUDIT: ICD-10-PCS | Mod: S$GLB,,, | Performed by: OPTOMETRIST

## 2020-09-09 PROCEDURE — 92015 PR REFRACTION: ICD-10-PCS | Mod: S$GLB,,, | Performed by: OPTOMETRIST

## 2020-09-09 PROCEDURE — 99999 PR PBB SHADOW E&M-EST. PATIENT-LVL III: CPT | Mod: PBBFAC,,, | Performed by: OPTOMETRIST

## 2020-09-09 PROCEDURE — 92015 DETERMINE REFRACTIVE STATE: CPT | Mod: S$GLB,,, | Performed by: OPTOMETRIST

## 2020-09-09 PROCEDURE — 92014 COMPRE OPH EXAM EST PT 1/>: CPT | Mod: S$GLB,,, | Performed by: OPTOMETRIST

## 2020-09-09 PROCEDURE — 99499 UNLISTED E&M SERVICE: CPT | Mod: S$GLB,,, | Performed by: OPTOMETRIST

## 2020-09-09 PROCEDURE — 77080 DXA BONE DENSITY AXIAL: CPT | Mod: TC

## 2020-09-09 PROCEDURE — 77080 DXA BONE DENSITY AXIAL: CPT | Mod: 26,,, | Performed by: RADIOLOGY

## 2020-09-09 PROCEDURE — 77080 DEXA BONE DENSITY SPINE HIP: ICD-10-PCS | Mod: 26,,, | Performed by: RADIOLOGY

## 2020-09-09 RX ORDER — LORAZEPAM 0.5 MG/1
TABLET ORAL
COMMUNITY
Start: 2020-06-10 | End: 2021-01-07 | Stop reason: SDUPTHER

## 2020-09-09 NOTE — PROGRESS NOTES
HPI     Yearly eye exam  NS  HTN    Last edited by Dioni Woodard, OD on 9/9/2020 11:54 AM. (History)            Assessment /Plan     For exam results, see Encounter Report.    Essential hypertension    Cataract, nuclear sclerotic senile, bilateral    Refractive error      No HTN Retinopathy    Moderate cataracts OU, not surgical    Dispense Final Rx for glasses.  RTC 1 year  Discussed above and answered questions.

## 2020-09-16 ENCOUNTER — PATIENT MESSAGE (OUTPATIENT)
Dept: ENDOSCOPY | Facility: HOSPITAL | Age: 70
End: 2020-09-16

## 2020-09-21 ENCOUNTER — PATIENT MESSAGE (OUTPATIENT)
Dept: ENDOSCOPY | Facility: HOSPITAL | Age: 70
End: 2020-09-21

## 2020-09-21 ENCOUNTER — LAB VISIT (OUTPATIENT)
Dept: OTOLARYNGOLOGY | Facility: CLINIC | Age: 70
End: 2020-09-21
Payer: MEDICARE

## 2020-09-21 DIAGNOSIS — K50.819 CROHN'S DISEASE OF SMALL AND LARGE INTESTINES WITH COMPLICATION: ICD-10-CM

## 2020-09-21 PROCEDURE — U0003 INFECTIOUS AGENT DETECTION BY NUCLEIC ACID (DNA OR RNA); SEVERE ACUTE RESPIRATORY SYNDROME CORONAVIRUS 2 (SARS-COV-2) (CORONAVIRUS DISEASE [COVID-19]), AMPLIFIED PROBE TECHNIQUE, MAKING USE OF HIGH THROUGHPUT TECHNOLOGIES AS DESCRIBED BY CMS-2020-01-R: HCPCS

## 2020-09-22 LAB — SARS-COV-2 RNA RESP QL NAA+PROBE: NOT DETECTED

## 2020-09-23 ENCOUNTER — PATIENT MESSAGE (OUTPATIENT)
Dept: ENDOSCOPY | Facility: HOSPITAL | Age: 70
End: 2020-09-23

## 2020-09-24 ENCOUNTER — ANESTHESIA EVENT (OUTPATIENT)
Dept: ENDOSCOPY | Facility: HOSPITAL | Age: 70
End: 2020-09-24
Payer: MEDICARE

## 2020-09-24 ENCOUNTER — HOSPITAL ENCOUNTER (OUTPATIENT)
Facility: HOSPITAL | Age: 70
Discharge: HOME | End: 2020-09-24
Attending: INTERNAL MEDICINE | Admitting: INTERNAL MEDICINE
Payer: MEDICARE

## 2020-09-24 ENCOUNTER — PATIENT MESSAGE (OUTPATIENT)
Dept: ENDOSCOPY | Facility: HOSPITAL | Age: 70
End: 2020-09-24

## 2020-09-24 ENCOUNTER — ANESTHESIA (OUTPATIENT)
Dept: ENDOSCOPY | Facility: HOSPITAL | Age: 70
End: 2020-09-24
Payer: MEDICARE

## 2020-09-24 VITALS
RESPIRATION RATE: 18 BRPM | SYSTOLIC BLOOD PRESSURE: 127 MMHG | TEMPERATURE: 98 F | WEIGHT: 100.06 LBS | OXYGEN SATURATION: 99 % | HEART RATE: 78 BPM | BODY MASS INDEX: 15.68 KG/M2 | DIASTOLIC BLOOD PRESSURE: 88 MMHG

## 2020-09-24 DIAGNOSIS — K50.00 CROHN'S DISEASE OF SMALL INTESTINE WITHOUT COMPLICATION: ICD-10-CM

## 2020-09-24 DIAGNOSIS — K50.90 CROHN'S DISEASE: Primary | ICD-10-CM

## 2020-09-24 PROCEDURE — 63600175 PHARM REV CODE 636 W HCPCS: Performed by: NURSE ANESTHETIST, CERTIFIED REGISTERED

## 2020-09-24 PROCEDURE — 27201012 HC FORCEPS, HOT/COLD, DISP: Performed by: INTERNAL MEDICINE

## 2020-09-24 PROCEDURE — 88305 TISSUE EXAM BY PATHOLOGIST: CPT | Mod: 26,,, | Performed by: PATHOLOGY

## 2020-09-24 PROCEDURE — 45380 COLONOSCOPY AND BIOPSY: CPT | Mod: ,,, | Performed by: INTERNAL MEDICINE

## 2020-09-24 PROCEDURE — 88305 TISSUE EXAM BY PATHOLOGIST: CPT | Performed by: PATHOLOGY

## 2020-09-24 PROCEDURE — 45380 PR COLONOSCOPY,BIOPSY: ICD-10-PCS | Mod: ,,, | Performed by: INTERNAL MEDICINE

## 2020-09-24 PROCEDURE — 37000009 HC ANESTHESIA EA ADD 15 MINS: Performed by: INTERNAL MEDICINE

## 2020-09-24 PROCEDURE — 88342 IMHCHEM/IMCYTCHM 1ST ANTB: CPT | Mod: 26,,, | Performed by: PATHOLOGY

## 2020-09-24 PROCEDURE — 37000008 HC ANESTHESIA 1ST 15 MINUTES: Performed by: INTERNAL MEDICINE

## 2020-09-24 PROCEDURE — 88342 CHG IMMUNOCYTOCHEMISTRY: ICD-10-PCS | Mod: 26,,, | Performed by: PATHOLOGY

## 2020-09-24 PROCEDURE — 25000003 PHARM REV CODE 250: Performed by: NURSE ANESTHETIST, CERTIFIED REGISTERED

## 2020-09-24 PROCEDURE — 88342 IMHCHEM/IMCYTCHM 1ST ANTB: CPT | Performed by: PATHOLOGY

## 2020-09-24 PROCEDURE — 88305 TISSUE EXAM BY PATHOLOGIST: ICD-10-PCS | Mod: 26,,, | Performed by: PATHOLOGY

## 2020-09-24 PROCEDURE — 45380 COLONOSCOPY AND BIOPSY: CPT | Performed by: INTERNAL MEDICINE

## 2020-09-24 RX ORDER — LIDOCAINE HYDROCHLORIDE 10 MG/ML
INJECTION, SOLUTION EPIDURAL; INFILTRATION; INTRACAUDAL; PERINEURAL
Status: DISCONTINUED | OUTPATIENT
Start: 2020-09-24 | End: 2020-09-24

## 2020-09-24 RX ORDER — SODIUM CHLORIDE, SODIUM LACTATE, POTASSIUM CHLORIDE, CALCIUM CHLORIDE 600; 310; 30; 20 MG/100ML; MG/100ML; MG/100ML; MG/100ML
INJECTION, SOLUTION INTRAVENOUS CONTINUOUS PRN
Status: DISCONTINUED | OUTPATIENT
Start: 2020-09-24 | End: 2020-09-24

## 2020-09-24 RX ORDER — PROPOFOL 10 MG/ML
VIAL (ML) INTRAVENOUS
Status: DISCONTINUED | OUTPATIENT
Start: 2020-09-24 | End: 2020-09-24

## 2020-09-24 RX ORDER — SODIUM CHLORIDE, SODIUM LACTATE, POTASSIUM CHLORIDE, CALCIUM CHLORIDE 600; 310; 30; 20 MG/100ML; MG/100ML; MG/100ML; MG/100ML
INJECTION, SOLUTION INTRAVENOUS CONTINUOUS
Status: DISCONTINUED | OUTPATIENT
Start: 2020-09-24 | End: 2020-09-24 | Stop reason: HOSPADM

## 2020-09-24 RX ADMIN — PROPOFOL 40 MG: 10 INJECTION, EMULSION INTRAVENOUS at 11:09

## 2020-09-24 RX ADMIN — LIDOCAINE HYDROCHLORIDE 50 MG: 10 INJECTION, SOLUTION EPIDURAL; INFILTRATION; INTRACAUDAL; PERINEURAL at 11:09

## 2020-09-24 RX ADMIN — PROPOFOL 30 MG: 10 INJECTION, EMULSION INTRAVENOUS at 11:09

## 2020-09-24 RX ADMIN — PROPOFOL 20 MG: 10 INJECTION, EMULSION INTRAVENOUS at 11:09

## 2020-09-24 RX ADMIN — SODIUM CHLORIDE, SODIUM LACTATE, POTASSIUM CHLORIDE, AND CALCIUM CHLORIDE: 600; 310; 30; 20 INJECTION, SOLUTION INTRAVENOUS at 11:09

## 2020-09-24 RX ADMIN — PROPOFOL 50 MG: 10 INJECTION, EMULSION INTRAVENOUS at 11:09

## 2020-09-24 NOTE — PLAN OF CARE
Dr Ellis  came to bedside and discussed findings. NO N/V,  no abdominal pain, no GI bleeding, and vitals stable.  Pt discharged from unit.

## 2020-09-24 NOTE — ANESTHESIA RELEASE NOTE
Anesthesia Release from PACU Note    Patient: Manuela Reyes    Procedure(s) Performed: Procedure(s) (LRB):  COLONOSCOPY (N/A)    Anesthesia type: MAC    Post pain: Adequate analgesia    Post assessment: no apparent anesthetic complications and tolerated procedure well    Last Vitals:   Visit Vitals  BP (!) 140/83 (BP Location: Left arm, Patient Position: Lying)   Pulse 82   Temp 36.5 °C (97.7 °F) (Temporal)   Resp 18   Wt 45.4 kg (100 lb 1.4 oz)   SpO2 98%   Breastfeeding No   BMI 15.68 kg/m²       Post vital signs: stable    Level of consciousness: awake, alert  and oriented    Nausea/Vomiting: no nausea/no vomiting    Complications: none    Airway Patency: patent    Respiratory: unassisted, spontaneous ventilation, room air    Cardiovascular: stable and blood pressure at baseline    Hydration: euvolemic

## 2020-09-24 NOTE — [employer] PATIENT INSTRUCTIONS
Discharge Summary/Instructions after an Endoscopic Procedure  Patient Name: Manuela Reyes  Patient MRN: [id number]  Patient YOB: 1950 Thursday, September 24, 2020 Lili Ellis MD  RESTRICTIONS:  During your procedure today, you received medications for sedation.  These   medications may affect your judgment, balance and coordination.  Therefore,   for 24 hours, you have the following restrictions:   - DO NOT drive a car, operate machinery, make legal/financial decisions,   sign important papers or drink alcohol.    ACTIVITY:  Today: no heavy lifting, straining or running due to procedural   sedation/anesthesia.  The following day: return to full activity including work.  DIET:  Eat and drink normally unless instructed otherwise.     TREATMENT FOR COMMON SIDE EFFECTS:  - Mild abdominal pain, nausea, belching, bloating or excessive gas:  rest,   eat lightly and use a heating pad.  - Sore Throat: treat with throat lozenges and/or gargle with warm salt   water.  - Because air was used during the procedure, expelling large amounts of air   from your rectum or belching is normal.  - If a bowel prep was taken, you may not have a bowel movement for 1-3 days.    This is normal.  SYMPTOMS TO WATCH FOR AND REPORT TO YOUR PHYSICIAN:  1. Abdominal pain or bloating, other than gas cramps.  2. Chest pain.  3. Back pain.  4. Signs of infection such as: chills or fever occurring within 24 hours   after the procedure.  5. Rectal bleeding, which would show as bright red, maroon, or black stools.   (A tablespoon of blood from the rectum is not serious, especially if   hemorrhoids are present.)  6. Vomiting.  7. Weakness or dizziness.  GO DIRECTLY TO THE NEAREST EMERGENCY ROOM IF YOU HAVE ANY OF THE FOLLOWING:      Difficulty breathing              Chills and/or fever over 101 F   Persistent vomiting and/or vomiting blood   Severe abdominal pain   Severe chest pain   Black, tarry stools   Bleeding- more than one  tablespoon   Any other symptom or condition that you feel may need urgent attention  Your doctor recommends these additional instructions:  If any biopsies were taken, your doctors clinic will contact you in 1 to 2   weeks with any results.  - Patient has a contact number available for emergencies.  The signs and   symptoms of potential delayed complications were discussed with the   patient.  Return to normal activities tomorrow.  Written discharge   instructions were provided to the patient.   - Discharge patient to home (via wheelchair).   - Resume previous diet today.   - Continue present medications.  Continue humira.  Can discontinue   methotrexate.  Will discuss with patient.   - Await pathology results.   - Repeat colonoscopy is not recommended due to current age (66 years or   older) for screening purposes.  For questions, problems or results please call your physician Lili Ellis MD at Work:  [phone]  If you have any questions about the above instructions, call the GI   department at [phone] or call the endoscopy unit at [phone]   from 7am until 3 pm.  [facility] [place], EMERGENCY ROOM PHONE NUMBER:   [phone]  IF A COMPLICATION OR EMERGENCY SITUATION ARISES AND YOU ARE UNABLE TO REACH   YOUR PHYSICIAN - GO DIRECTLY TO THE EMERGENCY ROOM.  I have read or have had read to me these discharge instructions for my   procedure and have received a written copy.  I understand these   instructions and will follow-up with my physician if I have any questions.     __________________________________       _____________________________________  Nurse Signature                                          Patient/Designated   Responsible Party Signature  MD Lili Lopez MD  9/24/2020 11:42:10 AM  This report has been verified and signed electronically.  [employer]

## 2020-09-24 NOTE — TRANSFER OF CARE
Anesthesia Transfer of Care Note    Patient: Manuela Reyes    Procedure(s) Performed: Procedure(s) (LRB):  COLONOSCOPY (N/A)    Patient location: GI    Anesthesia Type: MAC    Transport from OR: Transported from OR on room air with adequate spontaneous ventilation    Post pain: adequate analgesia    Post assessment: no apparent anesthetic complications and tolerated procedure well    Post vital signs: stable    Level of consciousness: awake, alert and oriented    Nausea/Vomiting: no nausea/vomiting    Complications: none    Transfer of care protocol was followed      Last vitals:   Visit Vitals  BP (!) 140/83 (BP Location: Left arm, Patient Position: Lying)   Pulse 82   Temp 36.5 °C (97.7 °F) (Temporal)   Resp 18   Wt 45.4 kg (100 lb 1.4 oz)   SpO2 98%   Breastfeeding No   BMI 15.68 kg/m²

## 2020-09-24 NOTE — BRIEF OP NOTE
Endoscopy Discharge Summary      Admit Date: 9/24/2020    Discharge Date and Time:  9/24/2020 11:44 AM    Attending Physician: Lili Ellis MD     Discharge Physician: Lili Ellis MD     Principal Admitting Diagnoses: Crohn's disease         Discharge Diagnosis: The primary encounter diagnosis was Crohn's disease. A diagnosis of Crohn's disease of small intestine without complication was also pertinent to this visit.     Discharged Condition: Good    Indication for Admission: Crohn's disease     Hospital Course: Patient was admitted for an inpatient procedure and tolerated the procedure well with no complications.    Significant Diagnostic Studies: Colonoscopy     Pathology (if any):  Specimen (12h ago, onward)    None          Estimated Blood Loss: 1 ml.    Discussed with: patient.    Disposition: Home.    Follow Up/Patient Instructions:   Current Discharge Medication List      CONTINUE these medications which have NOT CHANGED    Details   amLODIPine (NORVASC) 10 MG tablet Take 1 tablet (10 mg total) by mouth once daily.  Qty: 90 tablet, Refills: 1    Associated Diagnoses: Hypertension goal BP (blood pressure) < 140/90      aspirin (ECOTRIN) 81 MG EC tablet Take 1 tablet (81 mg total) by mouth once daily.      calcium carbonate (OS-COOPER) 600 mg (1,500 mg) Tab Take 1,200 mg by mouth once daily.       cyanocobalamin, vitamin B-12, 2,500 mcg Lozg Place 1 tablet under the tongue.       folic acid (FOLVITE) 1 MG tablet Take by mouth once daily.      levocetirizine (XYZAL) 5 MG tablet Take 1 tablet (5 mg total) by mouth every evening.  Qty: 90 tablet, Refills: 0      !! LORazepam (ATIVAN) 0.5 MG tablet Take 3 tablets (1.5 mg total) by mouth every evening.  Qty: 90 tablet, Refills: 5    Associated Diagnoses: Anxiety; Chronic insomnia      !! LORazepam (ATIVAN) 0.5 MG tablet       methotrexate 2.5 MG Tab Take 6 tablets (15 mg total) by mouth every 7 days.  Qty: 24 tablet, Refills: 11      methotrexate 2.5 mg/mL  Soln       multivitamin with minerals tablet Take 1 tablet by mouth once daily at 6am.      rosuvastatin (CRESTOR) 10 MG tablet Take 1 tablet (10 mg total) by mouth once daily.  Qty: 90 tablet, Refills: 3    Associated Diagnoses: Mixed hyperlipidemia      vitamin D 1000 units Tab Take 185 mg by mouth once daily.      adalimumab (HUMIRA,CF, PEN) 40 mg/0.4 mL PnKt Inject 0.4 mLs (40 mg total) into the skin every 14 (fourteen) days.  Qty: 2 pen, Refills: 11      nut tx,urea cycle dis w-o iron (ESSENTIAL AMINO ACID MIX ORAL) once daily.       UNABLE TO FIND Take 25 mg by mouth once daily. CBD Oil      !! valACYclovir (VALTREX) 500 MG tablet take 1 tablet by mouth as directed and take 1 tablet by mouth as needed  Refills: 0      !! valACYclovir (VALTREX) 500 MG tablet Take one tablet by mouth twice daily for 3 days, then as needed for flare ups  Qty: 30 tablet, Refills: 0      !! valACYclovir (VALTREX) 500 MG tablet Take 1 tablet by mouth twice a day for 3 days then as needed for flare ups  Qty: 30 tablet, Refills: 0       !! - Potential duplicate medications found. Please discuss with provider.      STOP taking these medications       SUPREP BOWEL PREP KIT 17.5-3.13-1.6 gram SolR Comments:   Reason for Stopping:               Discharge Procedure Orders   Diet general     Call MD for:  temperature >100.4     Call MD for:  persistent nausea and vomiting     Call MD for:  severe uncontrolled pain     Call MD for:  difficulty breathing, headache or visual disturbances     Activity as tolerated       Follow-up Information     Lili Ellis MD. Call in 1 week.    Specialty: Gastroenterology  Why: For pathology results  Contact information:  [id number]  [place] [address] [phone]

## 2020-09-24 NOTE — ANESTHESIA PREPROCEDURE EVALUATION
09/24/2020  Manuela Reyes is a 70 y.o., female.    Anesthesia Evaluation    I have reviewed the Patient Summary Reports.    I have reviewed the Nursing Notes. I have reviewed the NPO Status.   I have reviewed the Medications.     Review of Systems  Anesthesia Hx:  No problems with previous Anesthesia    Social:  Non-Smoker, No Alcohol Use    Hematology/Oncology:  Hematology Normal   Oncology Normal     EENT/Dental:EENT/Dental Normal   Cardiovascular:   Hypertension, well controlled    Pulmonary:  Pulmonary Normal    Renal/:  Renal/ Normal     Hepatic/GI:   Bowel Prep. Hepatitis, C    Musculoskeletal:   Arthritis     Neurological:   TIA,    Endocrine:  Endocrine Normal    Dermatological:  Skin Normal    Psych:   anxiety          Physical Exam  General:  Well nourished    Airway/Jaw/Neck:  Airway Findings: Mouth Opening: Normal Tongue: Normal  General Airway Assessment: Adult  Mallampati: III  TM Distance: Normal, at least 6 cm      Dental:  Dental Findings: In tact   Chest/Lungs:  Chest/Lungs Findings: Normal Respiratory Rate     Heart/Vascular:  Heart Findings: Rate: Normal        Mental Status:  Mental Status Findings:  Cooperative, Alert and Oriented         Anesthesia Plan  Type of Anesthesia, risks & benefits discussed:  Anesthesia Type:  MAC  Patient's Preference:   Intra-op Monitoring Plan: standard ASA monitors  Intra-op Monitoring Plan Comments:   Post Op Pain Control Plan:   Post Op Pain Control Plan Comments:   Induction:   IV  Beta Blocker:  Patient is not currently on a Beta-Blocker (No further documentation required).       Informed Consent: Patient understands risks and agrees with Anesthesia plan.  Questions answered. Anesthesia consent signed with patient.  ASA Score: 2     Day of Surgery Review of History & Physical: I have interviewed and examined the patient. I have reviewed  the patient's H&P dated:  There are no significant changes.          Ready For Surgery From Anesthesia Perspective.

## 2020-09-24 NOTE — H&P
PRE PROCEDURE H&P    Patient Name: Manuela Reyes  MRN: [id number]  : 1950  Date of Procedure:  2020  Referring Physician: Sandra Hunt NP  Primary Physician: Christelle Lawler DO  Procedure Physician: Lili Ellis MD       Planned Procedure: Colonoscopy  Diagnosis: Crohn's disease  Chief Complaint: Same as above    HPI: Patient is an 70 y.o. female is here for the above. On methotrexate and humira.   Last colonoscopy:   Family history: negative   Anticoagulation: none       Past Medical History:   Past Medical History:   Diagnosis Date    Anxiety     Crohn's disease     Depression     Genital herpes     Hepatitis C infection     Hypertension     Insomnia     Mesenteric artery stenosis     Dr. Checo Reed--vascular surgery    Osteoporosis     SCC (squamous cell carcinoma), hand, right 2019    Dr. Malaika Mack--dermatology    TIA (transient ischemic attack)         Past Surgical History:  Past Surgical History:   Procedure Laterality Date    APPENDECTOMY      COLONOSCOPY N/A 2017    Procedure: COLONOSCOPY;  Surgeon: Jose Luis Leonard MD;  Location: [facility];  Service: Endoscopy;  Laterality: N/A;    COLONOSCOPY N/A 3/26/2018    Procedure: COLONOSCOPY;  Surgeon: Guillaume Whitman MD;  Location: [facility];  Service: Endoscopy;  Laterality: N/A;    COLONOSCOPY N/A 3/19/2019    Procedure: COLONOSCOPY;  Surgeon: Lili Ellis MD;  Location: [facility];  Service: Endoscopy;  Laterality: N/A;    SMALL INTESTINE SURGERY          Home Medications:  Prior to Admission medications    Medication Sig Start Date End Date Taking? Authorizing Provider   adalimumab (HUMIRA,CF, PEN) 40 mg/0.4 mL PnKt Inject 0.4 mLs (40 mg total) into the skin every 14 (fourteen) days. 19  Lili Ellis MD   amLODIPine (NORVASC) 10 MG tablet Take 1 tablet (10 mg total) by mouth once daily. 20   Christelle Lawler DO   aspirin (ECOTRIN) 81 MG EC tablet Take 1 tablet (81 mg  total) by mouth once daily. 5/19/17   Jose Luis Leonard MD   calcium carbonate (OS-COOPER) 600 mg (1,500 mg) Tab Take 1,200 mg by mouth once daily.     Historical Provider   cyanocobalamin, vitamin B-12, 2,500 mcg Lozg Place 1 tablet under the tongue.     Historical Provider   folic acid (FOLVITE) 1 MG tablet Take by mouth once daily.    Historical Provider   levocetirizine (XYZAL) 5 MG tablet Take 1 tablet (5 mg total) by mouth every evening. 6/1/20 6/1/21  Yvette Turner PA-C   LORazepam (ATIVAN) 0.5 MG tablet Take 3 tablets (1.5 mg total) by mouth every evening. 6/18/20   Christelle Lawler DO   LORazepam (ATIVAN) 0.5 MG tablet  6/10/20   Historical Provider   methotrexate 2.5 MG Tab Take 6 tablets (15 mg total) by mouth every 7 days. 6/2/20 6/2/21  Lili Ellis MD   methotrexate 2.5 mg/mL Soln  6/30/20   Historical Provider   multivitamin with minerals tablet Take 1 tablet by mouth once daily at 6am. 4/17/17   Historical Provider   nut tx,urea cycle dis w-o iron (ESSENTIAL AMINO ACID MIX ORAL) once daily.     Historical Provider   rosuvastatin (CRESTOR) 10 MG tablet Take 1 tablet (10 mg total) by mouth once daily. 7/24/20 7/24/21  Allen White MD   SUPREP BOWEL PREP KIT 17.5-3.13-1.6 gram SolR Use as directed 8/19/20   Sandra Hunt NP   UNABLE TO FIND Take 25 mg by mouth once daily. CBD Oil    Historical Provider   valACYclovir (VALTREX) 500 MG tablet take 1 tablet by mouth as directed and take 1 tablet by mouth as needed 12/29/17   Historical Provider   valACYclovir (VALTREX) 500 MG tablet Take one tablet by mouth twice daily for 3 days, then as needed for flare ups 2/13/20      valACYclovir (VALTREX) 500 MG tablet Take 1 tablet by mouth twice a day for 3 days then as needed for flare ups 8/6/20      vitamin D 1000 units Tab Take 185 mg by mouth once daily.    Historical Provider        Allergies:  Review of patient's allergies indicates:   Allergen Reactions    Codeine sulfate Itching     Hydrochlorothiazide Other (See Comments)     Adverse Reaction    Lisinopril Swelling and Edema     Facial Swelling        Social History:   Social History     Socioeconomic History    Marital status: Single     Spouse name: Not on file    Number of children: Not on file    Years of education: Not on file    Highest education level: Not on file   Occupational History    Not on file   Social Needs    Financial resource strain: Not on file    Food insecurity     Worry: Not on file     Inability: Not on file    Transportation needs     Medical: Not on file     Non-medical: Not on file   Tobacco Use    Smoking status: Former Smoker     Years: 20.00     Types: Cigarettes     Quit date: 1/1/2005     Years since quitting: 15.7    Smokeless tobacco: Former User    Tobacco comment: Former Light Smoker less than 10 a day   Substance and Sexual Activity    Alcohol use: Yes     Alcohol/week: 4.0 standard drinks     Types: 4 Glasses of wine per week     Comment: occ    Drug use: No    Sexual activity: Never     Partners: Female   Lifestyle    Physical activity     Days per week: Not on file     Minutes per session: Not on file    Stress: Not at all   Relationships    Social connections     Talks on phone: Not on file     Gets together: Not on file     Attends Islam service: Not on file     Active member of club or organization: Not on file     Attends meetings of clubs or organizations: Not on file     Relationship status: Not on file   Other Topics Concern    Not on file   Social History Narrative    Not on file       Family History:  Family History   Problem Relation Age of Onset    Stroke Mother     Heart disease Mother     Cataracts Mother     Cancer Father         Lung    Heart disease Sister     Hypertension Brother     Glaucoma Neg Hx     Macular degeneration Neg Hx     Thyroid disease Neg Hx        ROS: No acute cardiac events, no acute respiratory complaints.     Physical Exam (all  patients):    BP (!) 140/83 (BP Location: Left arm, Patient Position: Lying)   Pulse 82   Temp 97.7 °F (36.5 °C) (Temporal)   Resp 18   Wt 45.4 kg (100 lb 1.4 oz)   SpO2 98%   Breastfeeding No   BMI 15.68 kg/m²   Lungs: Clear to auscultation bilaterally, respirations unlabored  Heart: Regular rate and rhythm, S1 and S2 normal, no obvious murmurs  Abdomen:         Soft, non-tender, bowel sounds normal, no masses, no organomegaly    Lab Results   Component Value Date    WBC 5.20 08/17/2020     (H) 08/17/2020    RDW 13.8 08/17/2020     08/17/2020    GLU 92 08/17/2020    BUN 22 08/17/2020     (L) 08/17/2020    K 4.2 08/17/2020     08/17/2020        SEDATION PLAN: per anesthesia      History reviewed, vital signs satisfactory, cardiopulmonary status satisfactory, sedation options, risks and plans have been discussed with the patient  All their questions were answered and the patient agrees to the sedation procedures as planned and the patient is deemed an appropriate candidate for the sedation as planned.    Procedure explained to patient, informed consent obtained and placed in chart.    Lili Ellis  9/24/2020  10:57 AM

## 2020-09-24 NOTE — ANESTHESIA POSTPROCEDURE EVALUATION
Anesthesia Post Evaluation    Patient: Manuela Reyes    Procedure(s) Performed: Procedure(s) (LRB):  COLONOSCOPY (N/A)    Final Anesthesia Type: MAC    Patient location during evaluation: GI PACU  Patient participation: Yes- Able to Participate  Level of consciousness: awake and alert and oriented  Post-procedure vital signs: reviewed and stable  Pain management: adequate  Airway patency: patent  CAMI mitigation strategies: Multimodal analgesia  PONV status at discharge: No PONV  Anesthetic complications: no      Cardiovascular status: hemodynamically stable  Respiratory status: unassisted, spontaneous ventilation and room air  Hydration status: euvolemic  Follow-up not needed.                No case tracking events are documented in the log.      Pain/Aileen Score: No data recorded

## 2020-09-29 LAB
FINAL PATHOLOGIC DIAGNOSIS: NORMAL
GROSS: NORMAL

## 2020-09-30 ENCOUNTER — PATIENT MESSAGE (OUTPATIENT)
Dept: INTERNAL MEDICINE | Facility: CLINIC | Age: 70
End: 2020-09-30

## 2020-10-01 ENCOUNTER — PATIENT MESSAGE (OUTPATIENT)
Dept: PHARMACY | Facility: CLINIC | Age: 70
End: 2020-10-01

## 2020-10-01 ENCOUNTER — TELEPHONE (OUTPATIENT)
Dept: PHARMACY | Facility: CLINIC | Age: 70
End: 2020-10-01

## 2020-10-07 ENCOUNTER — PATIENT MESSAGE (OUTPATIENT)
Dept: GASTROENTEROLOGY | Facility: CLINIC | Age: 70
End: 2020-10-07

## 2020-10-13 ENCOUNTER — PATIENT OUTREACH (OUTPATIENT)
Dept: ADMINISTRATIVE | Facility: OTHER | Age: 70
End: 2020-10-13

## 2020-10-14 ENCOUNTER — OFFICE VISIT (OUTPATIENT)
Dept: RHEUMATOLOGY | Facility: CLINIC | Age: 70
End: 2020-10-14
Payer: MEDICARE

## 2020-10-14 ENCOUNTER — OFFICE VISIT (OUTPATIENT)
Dept: GASTROENTEROLOGY | Facility: CLINIC | Age: 70
End: 2020-10-14
Payer: MEDICARE

## 2020-10-14 VITALS
WEIGHT: 102.75 LBS | HEART RATE: 66 BPM | BODY MASS INDEX: 16.13 KG/M2 | OXYGEN SATURATION: 99 % | SYSTOLIC BLOOD PRESSURE: 148 MMHG | HEIGHT: 67 IN | DIASTOLIC BLOOD PRESSURE: 84 MMHG

## 2020-10-14 VITALS
HEART RATE: 91 BPM | WEIGHT: 103.81 LBS | BODY MASS INDEX: 16.26 KG/M2 | SYSTOLIC BLOOD PRESSURE: 169 MMHG | DIASTOLIC BLOOD PRESSURE: 96 MMHG

## 2020-10-14 DIAGNOSIS — M81.0 AGE-RELATED OSTEOPOROSIS WITHOUT CURRENT PATHOLOGICAL FRACTURE: Primary | ICD-10-CM

## 2020-10-14 DIAGNOSIS — K50.119 CROHN'S DISEASE OF COLON WITH COMPLICATION: ICD-10-CM

## 2020-10-14 DIAGNOSIS — K50.819 CROHN'S DISEASE OF SMALL AND LARGE INTESTINES WITH COMPLICATION: Primary | ICD-10-CM

## 2020-10-14 PROCEDURE — 1126F AMNT PAIN NOTED NONE PRSNT: CPT | Mod: S$GLB,,, | Performed by: NURSE PRACTITIONER

## 2020-10-14 PROCEDURE — 3008F BODY MASS INDEX DOCD: CPT | Mod: CPTII,S$GLB,, | Performed by: INTERNAL MEDICINE

## 2020-10-14 PROCEDURE — 99999 PR PBB SHADOW E&M-EST. PATIENT-LVL III: ICD-10-PCS | Mod: PBBFAC,,, | Performed by: INTERNAL MEDICINE

## 2020-10-14 PROCEDURE — 1159F MED LIST DOCD IN RCRD: CPT | Mod: S$GLB,,, | Performed by: INTERNAL MEDICINE

## 2020-10-14 PROCEDURE — 3079F PR MOST RECENT DIASTOLIC BLOOD PRESSURE 80-89 MM HG: ICD-10-PCS | Mod: CPTII,S$GLB,, | Performed by: NURSE PRACTITIONER

## 2020-10-14 PROCEDURE — 99499 RISK ADDL DX/OHS AUDIT: ICD-10-PCS | Mod: S$GLB,,, | Performed by: INTERNAL MEDICINE

## 2020-10-14 PROCEDURE — 99214 PR OFFICE/OUTPT VISIT, EST, LEVL IV, 30-39 MIN: ICD-10-PCS | Mod: S$GLB,,, | Performed by: INTERNAL MEDICINE

## 2020-10-14 PROCEDURE — 1126F PR PAIN SEVERITY QUANTIFIED, NO PAIN PRESENT: ICD-10-PCS | Mod: S$GLB,,, | Performed by: INTERNAL MEDICINE

## 2020-10-14 PROCEDURE — 3080F DIAST BP >= 90 MM HG: CPT | Mod: CPTII,S$GLB,, | Performed by: INTERNAL MEDICINE

## 2020-10-14 PROCEDURE — 1126F AMNT PAIN NOTED NONE PRSNT: CPT | Mod: S$GLB,,, | Performed by: INTERNAL MEDICINE

## 2020-10-14 PROCEDURE — 3008F PR BODY MASS INDEX (BMI) DOCUMENTED: ICD-10-PCS | Mod: CPTII,S$GLB,, | Performed by: NURSE PRACTITIONER

## 2020-10-14 PROCEDURE — 99213 PR OFFICE/OUTPT VISIT, EST, LEVL III, 20-29 MIN: ICD-10-PCS | Mod: S$GLB,,, | Performed by: NURSE PRACTITIONER

## 2020-10-14 PROCEDURE — 3079F DIAST BP 80-89 MM HG: CPT | Mod: CPTII,S$GLB,, | Performed by: NURSE PRACTITIONER

## 2020-10-14 PROCEDURE — 99999 PR PBB SHADOW E&M-EST. PATIENT-LVL III: CPT | Mod: PBBFAC,,, | Performed by: INTERNAL MEDICINE

## 2020-10-14 PROCEDURE — 1101F PR PT FALLS ASSESS DOC 0-1 FALLS W/OUT INJ PAST YR: ICD-10-PCS | Mod: CPTII,S$GLB,, | Performed by: INTERNAL MEDICINE

## 2020-10-14 PROCEDURE — 99999 PR PBB SHADOW E&M-EST. PATIENT-LVL V: CPT | Mod: PBBFAC,,, | Performed by: NURSE PRACTITIONER

## 2020-10-14 PROCEDURE — 1159F PR MEDICATION LIST DOCUMENTED IN MEDICAL RECORD: ICD-10-PCS | Mod: S$GLB,,, | Performed by: INTERNAL MEDICINE

## 2020-10-14 PROCEDURE — 99499 UNLISTED E&M SERVICE: CPT | Mod: S$GLB,,, | Performed by: INTERNAL MEDICINE

## 2020-10-14 PROCEDURE — 3080F PR MOST RECENT DIASTOLIC BLOOD PRESSURE >= 90 MM HG: ICD-10-PCS | Mod: CPTII,S$GLB,, | Performed by: INTERNAL MEDICINE

## 2020-10-14 PROCEDURE — 1159F MED LIST DOCD IN RCRD: CPT | Mod: S$GLB,,, | Performed by: NURSE PRACTITIONER

## 2020-10-14 PROCEDURE — 3077F SYST BP >= 140 MM HG: CPT | Mod: CPTII,S$GLB,, | Performed by: INTERNAL MEDICINE

## 2020-10-14 PROCEDURE — 3077F PR MOST RECENT SYSTOLIC BLOOD PRESSURE >= 140 MM HG: ICD-10-PCS | Mod: CPTII,S$GLB,, | Performed by: INTERNAL MEDICINE

## 2020-10-14 PROCEDURE — 99214 OFFICE O/P EST MOD 30 MIN: CPT | Mod: S$GLB,,, | Performed by: INTERNAL MEDICINE

## 2020-10-14 PROCEDURE — 3077F SYST BP >= 140 MM HG: CPT | Mod: CPTII,S$GLB,, | Performed by: NURSE PRACTITIONER

## 2020-10-14 PROCEDURE — 3008F PR BODY MASS INDEX (BMI) DOCUMENTED: ICD-10-PCS | Mod: CPTII,S$GLB,, | Performed by: INTERNAL MEDICINE

## 2020-10-14 PROCEDURE — 1126F PR PAIN SEVERITY QUANTIFIED, NO PAIN PRESENT: ICD-10-PCS | Mod: S$GLB,,, | Performed by: NURSE PRACTITIONER

## 2020-10-14 PROCEDURE — 1101F PT FALLS ASSESS-DOCD LE1/YR: CPT | Mod: CPTII,S$GLB,, | Performed by: NURSE PRACTITIONER

## 2020-10-14 PROCEDURE — 1101F PT FALLS ASSESS-DOCD LE1/YR: CPT | Mod: CPTII,S$GLB,, | Performed by: INTERNAL MEDICINE

## 2020-10-14 PROCEDURE — 1101F PR PT FALLS ASSESS DOC 0-1 FALLS W/OUT INJ PAST YR: ICD-10-PCS | Mod: CPTII,S$GLB,, | Performed by: NURSE PRACTITIONER

## 2020-10-14 PROCEDURE — 3077F PR MOST RECENT SYSTOLIC BLOOD PRESSURE >= 140 MM HG: ICD-10-PCS | Mod: CPTII,S$GLB,, | Performed by: NURSE PRACTITIONER

## 2020-10-14 PROCEDURE — 1159F PR MEDICATION LIST DOCUMENTED IN MEDICAL RECORD: ICD-10-PCS | Mod: S$GLB,,, | Performed by: NURSE PRACTITIONER

## 2020-10-14 PROCEDURE — 99213 OFFICE O/P EST LOW 20 MIN: CPT | Mod: S$GLB,,, | Performed by: NURSE PRACTITIONER

## 2020-10-14 PROCEDURE — 99999 PR PBB SHADOW E&M-EST. PATIENT-LVL V: ICD-10-PCS | Mod: PBBFAC,,, | Performed by: NURSE PRACTITIONER

## 2020-10-14 PROCEDURE — 3008F BODY MASS INDEX DOCD: CPT | Mod: CPTII,S$GLB,, | Performed by: NURSE PRACTITIONER

## 2020-10-14 NOTE — PROGRESS NOTES
CC:  Chief Complaint   Patient presents with    Osteoporosis     Referred by Dr. Lawler    History of Present Illness:  Manuela Farley a 70 y.o.yo female   Patient Active Problem List   Diagnosis    Crohn's disease of small intestine    Crohn's colitis    Mesenteric artery stenosis    Hyperlipidemia LDL goal <70    Chronic insomnia    Anxiety    Crohn's disease    Family history of cardiovascular disease    Atherosclerosis of abdominal aorta    Age-related osteoporosis without current pathological fracture    Essential hypertension    Pain in both lower extremities    Abnormal ECG       Presents to reestablish care for history of osteoporosis and Crohn's  Last seen by me in 2018  She was supposed to  start Prolia then but again got busy and never scheduled  She is currently on Humira 40 mg subcu every 2 weeks for Crohn's but she still has bloating fullness and constipation  She had a recent colonoscopy  She is seeing GI today and the plan is to move her to [facility] joint swelling      DEXA : 2/2017  Osteoporosis    Current meds for osteopenia/ osteoporosis  :ca/vit d   Prior meds for osteopenia/ osteoporosis  : boniva between 6541-2828   Hystrectomy: no , menopause at 45   Smoker : former smoker , quit now   Kidney problems : no   Prior fracture : no   Other :     Bleeding gums : no  Dental caries :no  Anticipated dental work :may be filling   Jaw pains :no   GERD :no        Past Medical History:   Diagnosis Date    Anxiety     Crohn's disease     Depression     Genital herpes     Hepatitis C infection     Hypertension     Insomnia     Mesenteric artery stenosis     Dr. 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 Malaika Mack--dermatology    TIA (transient ischemic attack)        Past Surgical History:   Procedure Laterality Date    APPENDECTOMY      COLONOSCOPY N/A 5/19/2017    Procedure: COLONOSCOPY;   Surgeon: Jose Luis Leonard MD;  Location: [facility] ENDO;  Service: Endoscopy;  Laterality: N/A;    COLONOSCOPY N/A 3/26/2018    Procedure: COLONOSCOPY;  Surgeon: Guillaume Whitman MD;  Location: [facility] ENDO;  Service: Endoscopy;  Laterality: N/A;    COLONOSCOPY N/A 3/19/2019    Procedure: COLONOSCOPY;  Surgeon: Lili Ellis MD;  Location: [facility] ENDO;  Service: Endoscopy;  Laterality: N/A;    COLONOSCOPY N/A 9/24/2020    Procedure: COLONOSCOPY;  Surgeon: Lili Ellis MD;  Location: [facility] ENDO;  Service: Endoscopy;  Laterality: N/A;    SMALL INTESTINE SURGERY           Social History     Tobacco Use    Smoking status: Former Smoker     Years: 20.00     Types: Cigarettes     Quit date: 1/1/2005     Years since quitting: 15.7    Smokeless tobacco: Former User    Tobacco comment: Former Light Smoker less than 10 a day   Substance Use Topics    Alcohol use: Yes     Alcohol/week: 4.0 standard drinks     Types: 4 Glasses of wine per week     Comment: occ    Drug use: No       Family History   Problem Relation Age of Onset    Stroke Mother     Heart disease Mother     Cataracts Mother     Cancer Father         Lung    Heart disease Sister     Hypertension Brother     Glaucoma Neg Hx     Macular degeneration Neg Hx     Thyroid disease Neg Hx        Review of patient's allergies indicates:   Allergen Reactions    Codeine sulfate Itching    Hydrochlorothiazide Other (See Comments)     Adverse Reaction    Lisinopril Swelling and Edema     Facial Swelling             Review of Systems:  Constitutional: Denies fever, chills. No recent weight changes.   Fatigue: no  Muscle weakness: no  Headaches: no new headaches  Eyes: No redness or dryness.  No recent visual changes.  ENT: Denies dry mouth. No oral or nasal ulcers.  Card: No chest pain.  Resp: No cough or sob.   Gastro: No nausea or vomiting.  No heartburn.  Constipation: yes   Diarrhea: no  Genito:  No dysuria.  No genital ulcers.  Skin: No  "rash.  Raynauds:no  Neuro: No numbness / tingling.   Psych: No depression, anxiety  Endo:  no excess thirst.  Heme: no abnormal bleeding or bruising  Clots:none       OBJECTIVE:     Vital Signs   Vitals:    10/14/20 1319   BP: (!) 169/96   Pulse: 91     Physical Exam:  General Appearance:  NAD.   Gait: not favoring.  HEENT: PERRL.  Eyes not dry or injected.  No nasal ulcers.  Mouth not dry, no oral lesions.  Lymph: cervical, supraclavicular or axillary nodes: none abnormal   Cardio: no irregularity of S1 or S2.  No gallops or rubs.   Resp: Normal respiratory motion. Clear to auscultation bilaterally.   No abnormal chest conformation.  Abd: Soft, non-tender, nondistended.  No masses.   Skin: Head and neck,  and extremities examined. No rashes.   Neuro: Ox3.   Cranial nerves II-XII grossly intact.   Sensation intact  in both distal LE and upper extremities to light touch.  Musculoskeletal Exam:  Osteoarthritic changes in both hands   No swelling no other synovitis noted      Laboratory:   Results for orders placed or performed during the hospital encounter of 09/24/20   Specimen to Pathology, Surgery Gastrointestinal tract   Result Value Ref Range    Final Pathologic Diagnosis       ILEUM, BIOPSY:  - Moderate chronic active ileitis with ulceration  - No evidence of viral cytopathic effect on H&E or on a properly controlled  CMV immunohistochemical stain  - No evidence of granuloma formation  - No evidence epithelial dysplasia or malignancy      Gross       Surgery ID:  [id number];  Pathology ID:  [id number]  1.  Received in formalin labeled "ileum biopsy" are 2 pale tan tissue  fragments, 0.1-0.2 cm in greatest dimension.  The specimen is entirely  embedded in 1 cassette.  NYG--1-A  Grossed by: Brett Bell       Imaging :DEXA 2/2017,  Osteoporosis, T score -3.2 at spine     DEXA 2020 :    FINDINGS:  The L1 to L4 vertebral bone mineral density is equal to 0.73 g/cm squared with a T score of -3.8.  There has been a " -8.6% statistically significant change relative to the prior study.     The left femoral neck bone mineral density is equal to 0.56 g/cm squared with a T score of -3.4.  There has been  a -14.2% statistically significant change relative to the prior study.     The total hip bone mineral density is equal to 0.53 g/cm squared with a T score of -3.8.     Impression:     Osteoporosis    Notes reviewed  Other procedures:    ASSESSMENT/PLAN:     Age-related osteoporosis without current pathological fracture  -     Comprehensive Metabolic Panel; Standing  -     PTH, Intact; Future; Expected date: 10/14/2020    Crohn's disease of colon with complication      Discussed Prolia with her.  Reviewed her DEXA results.  She is agreeable to Prolia, 60 mg subcu every 6 months  Continue with OTC vitamin-D supplements last levels normal    We have also discussed Forteo today will watch response after 4 doses of Prolia if satisfactory will continue Prolia if not will move her to Forteo for 12- 18 months    History of Crohn's disease , on Humira:  Her questions/concerns regarding Prolia and Humira answered.  Likely to be switched to Stelara by GI    Chronic hep c : s/p treatment     Risks vs Benefits and potential side effects of medication prescribed today were discussed with patient. Medication literature given to patient up discharge  Went over uptodate information /literature on the meds prescribed today    Start Prolia. Risks vs Benefits and potential side effects of medication prescribed today were discussed with patient. Medication literature given to patient up discharge. We also discussed about jaw necrosis and atypical fractures which are rare side effects from the medication.  Always mention to dentist prior to any dental work of  being on Prolia.    Labs as ordered schedule Prolia when approved  6 month return     Thank you for allowing us to participate in the care of this patient    Disclaimer: This note was prepared using  voice recognition system and is likely to have sound alike errors and is not proof read.  Please call me with any questions.

## 2020-10-14 NOTE — PROGRESS NOTES
Clinic Follow Up:  [facility] Follow Up Note    Reason for Follow Up:  The encounter diagnosis was Crohn's disease of small and large intestines with complication.    PCP: Christelle Lawler       HPI:  This is a 70 y.o. female here for follow up of Crohn's disease.     IBD History:  -Type: ileocolonic Crohn's  -Diagnosed: 1975  -Disease Location: ileum, ?colon  -Phenotype: stricturing  -Surgeries related to IBD: ileal resection in 1979, 2009 (due to stricture)   -Medication History:  Humira started in August 2017   -Endoscopy Reports:   2018 - anastomotic stricture, no active disease;  2017 - Rutgeert's i4 (disease in ileum and at anastomosis)   2019 - active disease in ileium  2020- normal endoscopic appearance but ileum biopsies with moderate inflammation.   -Extra-intestinal manifestations: mild eczema on leg that resolved with cerave     Interval History:  She had colonoscopy in September 2020 that showed moderate inflammation on biopsy. She is currently on Humira every 14 days and MTX.   She does reports occasional constipation, bloating, and gas. This is relieved by Miralax.     Next Humira dose on 10/24/20    PREVENTIVE MEDICINE:       Immunization History   Administered Date(s) Administered    Influenza - High Dose - PF (65 years and older) 12/17/2015, 11/17/2016, 11/05/2018, 09/25/2019    Influenza - Trivalent - Adjuvanted - PF 11/06/2017    Pneumococcal Conjugate - 13 Valent 12/10/2017    Pneumococcal Polysaccharide - 23 Valent 01/17/2019      Date of Last Pap Smear, result n/a  Date of Last Surveillance Colonoscopy: 2020  Date of Last DEXA, result: 9/2018  Date of Last quant gold. 5/14/20- negative   TPMT status: ?   Smoking status: non-smoker  NSAID use: none  History of C. difficile: none  Family planning: n/a    Review of Systems   Constitutional: Negative for activity change and appetite change.        As per interval history above   Respiratory: Negative for cough and shortness  of breath.    Cardiovascular: Negative for chest pain.   Gastrointestinal: Positive for constipation. Negative for abdominal pain, anal bleeding, blood in stool, diarrhea, nausea, rectal pain and vomiting.   Skin: Negative for color change and rash.       Medical History:  Past Medical History:   Diagnosis Date    Anxiety     Crohn's disease     Depression     Genital herpes     Hepatitis C infection     Hypertension     Insomnia     Mesenteric artery stenosis     Dr. 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 Malaika Mack--dermatology    TIA (transient ischemic attack)        Surgical History:   Past Surgical History:   Procedure Laterality Date    APPENDECTOMY      COLONOSCOPY N/A 5/19/2017    Procedure: COLONOSCOPY;  Surgeon: Jose Luis Leonard MD;  Location: [facility] ENDO;  Service: Endoscopy;  Laterality: N/A;    COLONOSCOPY N/A 3/26/2018    Procedure: COLONOSCOPY;  Surgeon: Guillaume Whitman MD;  Location: [facility];  Service: Endoscopy;  Laterality: N/A;    COLONOSCOPY N/A 3/19/2019    Procedure: COLONOSCOPY;  Surgeon: Lili Ellis MD;  Location: [facility];  Service: Endoscopy;  Laterality: N/A;    COLONOSCOPY N/A 9/24/2020    Procedure: COLONOSCOPY;  Surgeon: Lili Ellis MD;  Location: [facility];  Service: Endoscopy;  Laterality: N/A;    SMALL INTESTINE SURGERY         Family History:   Family History   Problem Relation Age of Onset    Stroke Mother     Heart disease Mother     Cataracts Mother     Cancer Father         Lung    Heart disease Sister     Hypertension Brother     Glaucoma Neg Hx     Macular degeneration Neg Hx     Thyroid disease Neg Hx        Social History:   Social History     Tobacco Use    Smoking status: Former Smoker     Years: 20.00     Types: Cigarettes     Quit date: 1/1/2005     Years since quitting: 15.7    Smokeless tobacco: Former User    Tobacco comment: Former Light Smoker  less than 10 a day   Substance Use Topics    Alcohol use: Yes     Alcohol/week: 4.0 standard drinks     Types: 4 Glasses of wine per week     Comment: occ    Drug use: No       Allergies:   Review of patient's allergies indicates:   Allergen Reactions    Codeine sulfate Itching    Hydrochlorothiazide Other (See Comments)     Adverse Reaction    Lisinopril Swelling and Edema     Facial Swelling       Home Medications:  Current Outpatient Medications on File Prior to Visit   Medication Sig Dispense Refill    adalimumab (HUMIRA,CF, PEN) 40 mg/0.4 mL PnKt Inject 0.4 mLs (40 mg total) into the skin every 14 (fourteen) days. 2 pen 11    amLODIPine (NORVASC) 10 MG tablet Take 1 tablet (10 mg total) by mouth once daily. 90 tablet 1    aspirin (ECOTRIN) 81 MG EC tablet Take 1 tablet (81 mg total) by mouth once daily.      calcium carbonate (OS-COOPER) 600 mg (1,500 mg) Tab Take 1,200 mg by mouth once daily.       cyanocobalamin, vitamin B-12, 2,500 mcg Lozg Place 1 tablet under the tongue.       folic acid (FOLVITE) 1 MG tablet Take by mouth once daily.      levocetirizine (XYZAL) 5 MG tablet Take 1 tablet (5 mg total) by mouth every evening. 90 tablet 0    LORazepam (ATIVAN) 0.5 MG tablet Take 3 tablets (1.5 mg total) by mouth every evening. 90 tablet 5    LORazepam (ATIVAN) 0.5 MG tablet       methotrexate 2.5 MG Tab Take 6 tablets (15 mg total) by mouth every 7 days. 24 tablet 11    methotrexate 2.5 mg/mL Soln       multivitamin with minerals tablet Take 1 tablet by mouth once daily at 6am.      nut tx,urea cycle dis w-o iron (ESSENTIAL AMINO ACID MIX ORAL) once daily.       rosuvastatin (CRESTOR) 10 MG tablet Take 1 tablet (10 mg total) by mouth once daily. 90 tablet 3    UNABLE TO FIND Take 25 mg by mouth once daily. CBD Oil      valACYclovir (VALTREX) 500 MG tablet take 1 tablet by mouth as directed and take 1 tablet by mouth as needed  0    valACYclovir (VALTREX) 500 MG tablet Take one tablet by  "mouth twice daily for 3 days, then as needed for flare ups 30 tablet 0    valACYclovir (VALTREX) 500 MG tablet Take 1 tablet by mouth twice a day for 3 days then as needed for flare ups 30 tablet 0    vitamin D 1000 units Tab Take 185 mg by mouth once daily.       No current facility-administered medications on file prior to visit.        BP (!) 148/84   Pulse 66   Ht 5' 7" (1.702 m)   Wt 46.6 kg (102 lb 11.8 oz)   SpO2 99%   BMI 16.09 kg/m²   Body mass index is 16.09 kg/m².  Physical Exam   Constitutional: She is oriented to person, place, and time and well-developed, well-nourished, and in no distress. No distress.   HENT:   Head: Normocephalic.   Eyes: Pupils are equal, round, and reactive to light. Conjunctivae are normal.   Cardiovascular: Normal rate, regular rhythm and normal heart sounds.   Pulmonary/Chest: Effort normal and breath sounds normal. No respiratory distress.   Abdominal: Soft. Bowel sounds are normal. She exhibits no distension. There is no abdominal tenderness.   Neurological: She is alert and oriented to person, place, and time. No cranial nerve deficit.   Skin: Skin is warm and dry. No rash noted.   Psychiatric: Mood and affect normal.       Labs: Pertinent labs reviewed.    Assessment:   1. Crohn's disease of small and large intestines with complication      Moderate disease activity on recent colonoscopy pathology report.    Recommendations:   Will check Humira trough and antibody levels to see if therapeutic.   If low drug levels, will increase frequency to every 7 days and continue MTX for now.   If normal, she would be a mechanistic failure and would then switch to Stelara.   Patient agrees with plan of care.     Crohn's disease of small and large intestines with complication  -     Infliximab concentration & Anti-infliximab Ab; Future; Expected date: 10/23/2020      Thank you for the opportunity to participate in the care of this patient.  DINA Ashraf        "

## 2020-10-15 ENCOUNTER — IMMUNIZATION (OUTPATIENT)
Dept: PHARMACY | Facility: CLINIC | Age: 70
End: 2020-10-15
Payer: MEDICARE

## 2020-10-15 ENCOUNTER — LAB VISIT (OUTPATIENT)
Dept: LAB | Facility: HOSPITAL | Age: 70
End: 2020-10-15
Attending: INTERNAL MEDICINE
Payer: MEDICARE

## 2020-10-15 ENCOUNTER — PES CALL (OUTPATIENT)
Dept: ADMINISTRATIVE | Facility: CLINIC | Age: 70
End: 2020-10-15

## 2020-10-15 ENCOUNTER — DOCUMENTATION ONLY (OUTPATIENT)
Dept: GASTROENTEROLOGY | Facility: CLINIC | Age: 70
End: 2020-10-15

## 2020-10-15 DIAGNOSIS — M81.0 AGE-RELATED OSTEOPOROSIS WITHOUT CURRENT PATHOLOGICAL FRACTURE: ICD-10-CM

## 2020-10-15 LAB
ALBUMIN SERPL BCP-MCNC: 4.3 G/DL (ref 3.5–5.2)
ALP SERPL-CCNC: 78 U/L (ref 55–135)
ALT SERPL W/O P-5'-P-CCNC: 12 U/L (ref 10–44)
ANION GAP SERPL CALC-SCNC: 12 MMOL/L (ref 8–16)
AST SERPL-CCNC: 21 U/L (ref 10–40)
BILIRUB SERPL-MCNC: 0.6 MG/DL (ref 0.1–1)
BUN SERPL-MCNC: 19 MG/DL (ref 8–23)
CALCIUM SERPL-MCNC: 9.4 MG/DL (ref 8.7–10.5)
CHLORIDE SERPL-SCNC: 98 MMOL/L (ref 95–110)
CO2 SERPL-SCNC: 23 MMOL/L (ref 23–29)
CREAT SERPL-MCNC: 0.8 MG/DL (ref 0.5–1.4)
EST. GFR  (AFRICAN AMERICAN): >60 ML/MIN/1.73 M^2
EST. GFR  (NON AFRICAN AMERICAN): >60 ML/MIN/1.73 M^2
GLUCOSE SERPL-MCNC: 97 MG/DL (ref 70–110)
POTASSIUM SERPL-SCNC: 3.8 MMOL/L (ref 3.5–5.1)
PROT SERPL-MCNC: 7.4 G/DL (ref 6–8.4)
PTH-INTACT SERPL-MCNC: 65.3 PG/ML (ref 9–77)
SODIUM SERPL-SCNC: 133 MMOL/L (ref 136–145)

## 2020-10-15 PROCEDURE — 36415 COLL VENOUS BLD VENIPUNCTURE: CPT

## 2020-10-15 PROCEDURE — 83970 ASSAY OF PARATHORMONE: CPT

## 2020-10-15 PROCEDURE — 80053 COMPREHEN METABOLIC PANEL: CPT

## 2020-10-15 NOTE — CLINICAL REVIEW
10/15/2020  CARE COORDINATION REVIEW - IBD    [facility]    LAST OV - 10/14/2020 [SAAGANESH]  NEXT OV DUE -     MEDICATIONS:  -HUMIRA BIWEEKLY, 1ST DOSE: 2018  -MTX  *POSSIBLE CHANGE TO STELARA    IMMUNIZATION HISTORY:  MMR -   VARICELLA -   TDAP -   HEPATITIS A -   HEPATITIS B -   HERPES ZOSTER -   HPV -   INFLUENZA - DUE  MENINGOCOCCAL -   PNEUMOCOCCAL - UTD    BONE HEALTH:  VITAMIN D - TAKING    CANCER PREVENTION:  LAST COLONSCOPY - 9/24/2020  DERMATOLOGY VISIT -   EYE EXAM -     LABS / SCANS:  DEXA - 9/9/2020 - OSTEOPOROSIS  QUANT GOLD - 5/14/2020 - NEGATIVE  TPMT - 9/25/2018 - WNL    NOTES:  CBC / CMP Q3 MONTHS  HUMIRA LEVEL - 10/23/2020

## 2020-10-19 ENCOUNTER — PATIENT MESSAGE (OUTPATIENT)
Dept: RHEUMATOLOGY | Facility: CLINIC | Age: 70
End: 2020-10-19

## 2020-10-19 ENCOUNTER — PATIENT MESSAGE (OUTPATIENT)
Dept: GASTROENTEROLOGY | Facility: CLINIC | Age: 70
End: 2020-10-19

## 2020-10-23 ENCOUNTER — LAB VISIT (OUTPATIENT)
Dept: LAB | Facility: HOSPITAL | Age: 70
End: 2020-10-23
Attending: INTERNAL MEDICINE
Payer: MEDICARE

## 2020-10-23 DIAGNOSIS — K50.80 CROHN'S DISEASE OF BOTH SMALL AND LARGE INTESTINE WITHOUT COMPLICATION: ICD-10-CM

## 2020-10-23 DIAGNOSIS — K50.819 CROHN'S DISEASE OF SMALL AND LARGE INTESTINES WITH COMPLICATION: ICD-10-CM

## 2020-10-23 LAB
ALBUMIN SERPL BCP-MCNC: 4.2 G/DL (ref 3.5–5.2)
ALP SERPL-CCNC: 74 U/L (ref 55–135)
ALT SERPL W/O P-5'-P-CCNC: 10 U/L (ref 10–44)
ANION GAP SERPL CALC-SCNC: 11 MMOL/L (ref 8–16)
AST SERPL-CCNC: 21 U/L (ref 10–40)
BASOPHILS # BLD AUTO: 0.02 K/UL (ref 0–0.2)
BASOPHILS NFR BLD: 0.4 % (ref 0–1.9)
BILIRUB SERPL-MCNC: 0.6 MG/DL (ref 0.1–1)
BUN SERPL-MCNC: 23 MG/DL (ref 8–23)
CALCIUM SERPL-MCNC: 9.5 MG/DL (ref 8.7–10.5)
CHLORIDE SERPL-SCNC: 97 MMOL/L (ref 95–110)
CO2 SERPL-SCNC: 26 MMOL/L (ref 23–29)
CREAT SERPL-MCNC: 0.8 MG/DL (ref 0.5–1.4)
DIFFERENTIAL METHOD: ABNORMAL
EOSINOPHIL # BLD AUTO: 0 K/UL (ref 0–0.5)
EOSINOPHIL NFR BLD: 0.4 % (ref 0–8)
ERYTHROCYTE [DISTWIDTH] IN BLOOD BY AUTOMATED COUNT: 12.4 % (ref 11.5–14.5)
EST. GFR  (AFRICAN AMERICAN): >60 ML/MIN/1.73 M^2
EST. GFR  (NON AFRICAN AMERICAN): >60 ML/MIN/1.73 M^2
GLUCOSE SERPL-MCNC: 74 MG/DL (ref 70–110)
HCT VFR BLD AUTO: 39.7 % (ref 37–48.5)
HGB BLD-MCNC: 13 G/DL (ref 12–16)
IMM GRANULOCYTES # BLD AUTO: 0.01 K/UL (ref 0–0.04)
IMM GRANULOCYTES NFR BLD AUTO: 0.2 % (ref 0–0.5)
LYMPHOCYTES # BLD AUTO: 1.5 K/UL (ref 1–4.8)
LYMPHOCYTES NFR BLD: 27.4 % (ref 18–48)
MCH RBC QN AUTO: 34.2 PG (ref 27–31)
MCHC RBC AUTO-ENTMCNC: 32.7 G/DL (ref 32–36)
MCV RBC AUTO: 105 FL (ref 82–98)
MONOCYTES # BLD AUTO: 0.5 K/UL (ref 0.3–1)
MONOCYTES NFR BLD: 8.9 % (ref 4–15)
NEUTROPHILS # BLD AUTO: 3.4 K/UL (ref 1.8–7.7)
NEUTROPHILS NFR BLD: 62.7 % (ref 38–73)
NRBC BLD-RTO: 0 /100 WBC
PLATELET # BLD AUTO: 223 K/UL (ref 150–350)
PMV BLD AUTO: 9.6 FL (ref 9.2–12.9)
POTASSIUM SERPL-SCNC: 4.2 MMOL/L (ref 3.5–5.1)
PROT SERPL-MCNC: 7.4 G/DL (ref 6–8.4)
RBC # BLD AUTO: 3.8 M/UL (ref 4–5.4)
SODIUM SERPL-SCNC: 134 MMOL/L (ref 136–145)
WBC # BLD AUTO: 5.4 K/UL (ref 3.9–12.7)

## 2020-10-23 PROCEDURE — 36415 COLL VENOUS BLD VENIPUNCTURE: CPT

## 2020-10-23 PROCEDURE — 80053 COMPREHEN METABOLIC PANEL: CPT

## 2020-10-23 PROCEDURE — 82397 CHEMILUMINESCENT ASSAY: CPT

## 2020-10-23 PROCEDURE — 85025 COMPLETE CBC W/AUTO DIFF WBC: CPT

## 2020-10-23 RX ORDER — LEVOCETIRIZINE DIHYDROCHLORIDE 5 MG/1
5 TABLET, FILM COATED ORAL NIGHTLY
Qty: 90 TABLET | Refills: 1 | Status: SHIPPED | OUTPATIENT
Start: 2020-10-23 | End: 2021-04-26 | Stop reason: SDUPTHER

## 2020-10-23 RX ORDER — LEVOCETIRIZINE DIHYDROCHLORIDE 5 MG/1
5 TABLET, FILM COATED ORAL NIGHTLY
Qty: 90 TABLET | Refills: 0 | Status: CANCELLED | OUTPATIENT
Start: 2020-10-23 | End: 2021-10-23

## 2020-10-26 ENCOUNTER — TELEPHONE (OUTPATIENT)
Dept: RHEUMATOLOGY | Facility: CLINIC | Age: 70
End: 2020-10-26

## 2020-10-26 NOTE — TELEPHONE ENCOUNTER
----- Message from Swati Rojas sent at 10/26/2020 11:23 AM CDT -----  Regarding: reschedule  Contact: pt  Pt needs to reschedule Wednesday appt. [phone]

## 2020-10-29 ENCOUNTER — PATIENT MESSAGE (OUTPATIENT)
Dept: RHEUMATOLOGY | Facility: CLINIC | Age: 70
End: 2020-10-29

## 2020-10-30 ENCOUNTER — INFUSION (OUTPATIENT)
Dept: INFUSION THERAPY | Facility: HOSPITAL | Age: 70
End: 2020-10-30
Attending: INTERNAL MEDICINE
Payer: MEDICARE

## 2020-10-30 VITALS
DIASTOLIC BLOOD PRESSURE: 84 MMHG | RESPIRATION RATE: 18 BRPM | OXYGEN SATURATION: 99 % | TEMPERATURE: 98 F | HEART RATE: 83 BPM | SYSTOLIC BLOOD PRESSURE: 153 MMHG

## 2020-10-30 DIAGNOSIS — M81.0 AGE-RELATED OSTEOPOROSIS WITHOUT CURRENT PATHOLOGICAL FRACTURE: Primary | ICD-10-CM

## 2020-10-30 PROCEDURE — 96372 THER/PROPH/DIAG INJ SC/IM: CPT

## 2020-10-30 PROCEDURE — 63600175 PHARM REV CODE 636 W HCPCS: Mod: JG | Performed by: INTERNAL MEDICINE

## 2020-10-30 RX ADMIN — DENOSUMAB 60 MG: 60 INJECTION SUBCUTANEOUS at 01:10

## 2020-10-30 NOTE — NURSING
Prolia 60 mg q 6 months  Last dose given- never (fisrt dose)     Any invasive dental procedures in past 3 months or upcoming 3 months: Denied     Last Rheumatology provider visit- Seen by Dr. Colon on 10/14/2020     Recent labs? 10/23/2020;  CKD pt needing repeat labs in 10 days-no      Prolia 60 mg/ml administered SQ to Left arm. Tolerated without any complaints. No adverse reaction noted or reported after 15 minutes of administration. No redness, swelling, or drainage noted to site. Pt instructed on signs and symptoms of reaction to report. Verbalizes understanding.      Follow up appointments:  Given to patient.

## 2020-11-02 ENCOUNTER — PATIENT MESSAGE (OUTPATIENT)
Dept: GASTROENTEROLOGY | Facility: CLINIC | Age: 70
End: 2020-11-02

## 2020-11-02 DIAGNOSIS — K50.819 CROHN'S DISEASE OF SMALL AND LARGE INTESTINES WITH COMPLICATION: Primary | ICD-10-CM

## 2020-11-02 LAB — ANTI-INFLIXIMAB ANTIBODY: NORMAL

## 2020-11-03 ENCOUNTER — PATIENT MESSAGE (OUTPATIENT)
Dept: GASTROENTEROLOGY | Facility: CLINIC | Age: 70
End: 2020-11-03

## 2020-11-05 ENCOUNTER — PATIENT MESSAGE (OUTPATIENT)
Dept: GASTROENTEROLOGY | Facility: CLINIC | Age: 70
End: 2020-11-05

## 2020-11-06 ENCOUNTER — LAB VISIT (OUTPATIENT)
Dept: LAB | Facility: HOSPITAL | Age: 70
End: 2020-11-06
Attending: NURSE PRACTITIONER
Payer: MEDICARE

## 2020-11-06 DIAGNOSIS — K50.819 CROHN'S DISEASE OF SMALL AND LARGE INTESTINES WITH COMPLICATION: ICD-10-CM

## 2020-11-06 PROCEDURE — 80145 DRUG ASSAY ADALIMUMAB: CPT

## 2020-11-06 PROCEDURE — 36415 COLL VENOUS BLD VENIPUNCTURE: CPT

## 2020-11-10 ENCOUNTER — PATIENT MESSAGE (OUTPATIENT)
Dept: UROLOGY | Facility: CLINIC | Age: 70
End: 2020-11-10

## 2020-11-10 ENCOUNTER — PATIENT MESSAGE (OUTPATIENT)
Dept: OTOLARYNGOLOGY | Facility: CLINIC | Age: 70
End: 2020-11-10

## 2020-11-12 ENCOUNTER — TELEPHONE (OUTPATIENT)
Dept: OTOLARYNGOLOGY | Facility: CLINIC | Age: 70
End: 2020-11-12

## 2020-11-12 NOTE — TELEPHONE ENCOUNTER
Spoke with the patient, she wanted her appt time back after she cancelled.  I explained that another patient was already scheduled at that time.  The next available is at the [facility] tomorrow. She declined that offer and said she would try to get in with her PCP.

## 2020-11-13 ENCOUNTER — OFFICE VISIT (OUTPATIENT)
Dept: INTERNAL MEDICINE | Facility: CLINIC | Age: 70
End: 2020-11-13
Payer: MEDICARE

## 2020-11-13 VITALS — DIASTOLIC BLOOD PRESSURE: 85 MMHG | SYSTOLIC BLOOD PRESSURE: 135 MMHG

## 2020-11-13 DIAGNOSIS — I10 ESSENTIAL HYPERTENSION: Primary | ICD-10-CM

## 2020-11-13 DIAGNOSIS — J32.1 FRONTAL SINUSITIS, UNSPECIFIED CHRONICITY: ICD-10-CM

## 2020-11-13 PROCEDURE — 1159F MED LIST DOCD IN RCRD: CPT | Mod: 95,,, | Performed by: FAMILY MEDICINE

## 2020-11-13 PROCEDURE — 3075F PR MOST RECENT SYSTOLIC BLOOD PRESS GE 130-139MM HG: ICD-10-PCS | Mod: CPTII,95,, | Performed by: FAMILY MEDICINE

## 2020-11-13 PROCEDURE — 3075F SYST BP GE 130 - 139MM HG: CPT | Mod: CPTII,95,, | Performed by: FAMILY MEDICINE

## 2020-11-13 PROCEDURE — 99499 UNLISTED E&M SERVICE: CPT | Mod: 95,,, | Performed by: FAMILY MEDICINE

## 2020-11-13 PROCEDURE — 99499 RISK ADDL DX/OHS AUDIT: ICD-10-PCS | Mod: 95,,, | Performed by: FAMILY MEDICINE

## 2020-11-13 PROCEDURE — 3079F PR MOST RECENT DIASTOLIC BLOOD PRESSURE 80-89 MM HG: ICD-10-PCS | Mod: CPTII,95,, | Performed by: FAMILY MEDICINE

## 2020-11-13 PROCEDURE — 1159F PR MEDICATION LIST DOCUMENTED IN MEDICAL RECORD: ICD-10-PCS | Mod: 95,,, | Performed by: FAMILY MEDICINE

## 2020-11-13 PROCEDURE — 99213 PR OFFICE/OUTPT VISIT, EST, LEVL III, 20-29 MIN: ICD-10-PCS | Mod: 95,,, | Performed by: FAMILY MEDICINE

## 2020-11-13 PROCEDURE — 3079F DIAST BP 80-89 MM HG: CPT | Mod: CPTII,95,, | Performed by: FAMILY MEDICINE

## 2020-11-13 PROCEDURE — 99213 OFFICE O/P EST LOW 20 MIN: CPT | Mod: 95,,, | Performed by: FAMILY MEDICINE

## 2020-11-13 NOTE — PROGRESS NOTES
Subjective:       Patient ID: Manuela Reyes is a 70 y.o. female.    Chief Complaint: No chief complaint on file.    Onset last week with weather change  A/w allergies, rhinorrhea, stuffy ears, HA frontal around eyes  On xyzal  Tylenol helps some   Denies fever   Checking bp at 135/85  Denies covid exposure    Assessment:     1. Essential hypertension      Plan:     Problem List Items Addressed This Visit        Cardiac/Vascular    Essential hypertension - Primary          Review of Systems   Constitutional: Negative for activity change, fever and unexpected weight change.   HENT: Positive for congestion, postnasal drip, rhinorrhea (improved w/ flonase) and sinus pressure. Negative for hearing loss and trouble swallowing.    Eyes: Negative for discharge and visual disturbance.   Respiratory: Negative for chest tightness and wheezing.    Cardiovascular: Negative for chest pain and palpitations.   Gastrointestinal: Negative for blood in stool, constipation, diarrhea and vomiting.   Endocrine: Negative for polydipsia and polyuria.   Genitourinary: Negative for difficulty urinating, dysuria, hematuria and menstrual problem.   Musculoskeletal: Negative for arthralgias, joint swelling and neck pain.   Neurological: Positive for headaches. Negative for weakness.   Psychiatric/Behavioral: Negative for confusion and dysphoric mood.        Objective:   /85 Comment: home bp reading    BP Readings from Last 3 Encounters:   11/13/20 135/85   10/30/20 (!) 153/84   10/14/20 (!) 148/84       No results found for: LABA1C, HGBA1C    Physical Exam  Vitals signs reviewed.   Constitutional:       General: She is not in acute distress.     Appearance: Normal appearance. She is well-developed. She is not ill-appearing or toxic-appearing.   HENT:      Head: Normocephalic and atraumatic.      Right Ear: Hearing normal.      Left Ear: Hearing normal.   Neck:      Musculoskeletal: Normal range of motion.   Cardiovascular:       Rate and Rhythm: Normal rate.   Pulmonary:      Effort: Pulmonary effort is normal. No respiratory distress.   Abdominal:      Palpations: Abdomen is soft.   Musculoskeletal: Normal range of motion.   Skin:     General: Skin is warm and dry.   Neurological:      Mental Status: She is alert and oriented to person, place, and time.   Psychiatric:         Behavior: Behavior normal.         No follow-ups on file.    The patient location is: home  The chief complaint leading to consultation is: uri/HA    Visit type: audiovisual      11:17-11:32 minutes of total time spent on the encounter, which includes face to face time and non-face to face time preparing to see the patient (eg, review of tests), Obtaining and/or reviewing separately obtained history, Documenting clinical information in the electronic or other health record, Independently interpreting results (not separately reported) and communicating results to the patient/family/caregiver, or Care coordination (not separately reported).         Each patient to whom he or she provides medical services by telemedicine is:  (1) informed of the relationship between the physician and patient and the respective role of any other health care provider with respect to management of the patient; and (2) notified that he or she may decline to receive medical services by telemedicine and may withdraw from such care at any time.    Notes:     Sx tx advised with alt tylenol and ibu  Counseled pt regarding NSAID use and to take w/ food to avoid GI upset/ulcers  Advised to f/u with ent on Monday  If sx worsen, rtc     Based on H& limited P, no abx indicated at this time. Does see ent

## 2020-11-16 ENCOUNTER — OFFICE VISIT (OUTPATIENT)
Dept: OTOLARYNGOLOGY | Facility: CLINIC | Age: 70
End: 2020-11-16
Payer: MEDICARE

## 2020-11-16 ENCOUNTER — HOSPITAL ENCOUNTER (OUTPATIENT)
Dept: RADIOLOGY | Facility: HOSPITAL | Age: 70
Discharge: HOME | End: 2020-11-16
Attending: PHYSICIAN ASSISTANT
Payer: MEDICARE

## 2020-11-16 ENCOUNTER — PATIENT MESSAGE (OUTPATIENT)
Dept: OTOLARYNGOLOGY | Facility: CLINIC | Age: 70
End: 2020-11-16

## 2020-11-16 VITALS
SYSTOLIC BLOOD PRESSURE: 174 MMHG | TEMPERATURE: 98 F | WEIGHT: 105.63 LBS | DIASTOLIC BLOOD PRESSURE: 93 MMHG | HEART RATE: 113 BPM | BODY MASS INDEX: 16.54 KG/M2

## 2020-11-16 DIAGNOSIS — J34.89 SINUS PRESSURE: Primary | ICD-10-CM

## 2020-11-16 DIAGNOSIS — R51.9 ACUTE NONINTRACTABLE HEADACHE, UNSPECIFIED HEADACHE TYPE: ICD-10-CM

## 2020-11-16 DIAGNOSIS — J34.89 SINUS PRESSURE: ICD-10-CM

## 2020-11-16 LAB — ADALIMUMAB CONCENTRATION & ANTI-ADALIMUMAB ANTIBODY: NORMAL

## 2020-11-16 PROCEDURE — 99214 OFFICE O/P EST MOD 30 MIN: CPT | Mod: S$GLB,,, | Performed by: PHYSICIAN ASSISTANT

## 2020-11-16 PROCEDURE — 1125F AMNT PAIN NOTED PAIN PRSNT: CPT | Mod: S$GLB,,, | Performed by: PHYSICIAN ASSISTANT

## 2020-11-16 PROCEDURE — 70220 X-RAY EXAM OF SINUSES: CPT | Mod: TC

## 2020-11-16 PROCEDURE — 1159F PR MEDICATION LIST DOCUMENTED IN MEDICAL RECORD: ICD-10-PCS | Mod: S$GLB,,, | Performed by: PHYSICIAN ASSISTANT

## 2020-11-16 PROCEDURE — 70220 XR SINUSES MIN 3 VIEWS: ICD-10-PCS | Mod: 26,,, | Performed by: RADIOLOGY

## 2020-11-16 PROCEDURE — 3077F SYST BP >= 140 MM HG: CPT | Mod: CPTII,S$GLB,, | Performed by: PHYSICIAN ASSISTANT

## 2020-11-16 PROCEDURE — 99214 PR OFFICE/OUTPT VISIT, EST, LEVL IV, 30-39 MIN: ICD-10-PCS | Mod: S$GLB,,, | Performed by: PHYSICIAN ASSISTANT

## 2020-11-16 PROCEDURE — 3080F DIAST BP >= 90 MM HG: CPT | Mod: CPTII,S$GLB,, | Performed by: PHYSICIAN ASSISTANT

## 2020-11-16 PROCEDURE — 1125F PR PAIN SEVERITY QUANTIFIED, PAIN PRESENT: ICD-10-PCS | Mod: S$GLB,,, | Performed by: PHYSICIAN ASSISTANT

## 2020-11-16 PROCEDURE — 3077F PR MOST RECENT SYSTOLIC BLOOD PRESSURE >= 140 MM HG: ICD-10-PCS | Mod: CPTII,S$GLB,, | Performed by: PHYSICIAN ASSISTANT

## 2020-11-16 PROCEDURE — 3008F PR BODY MASS INDEX (BMI) DOCUMENTED: ICD-10-PCS | Mod: CPTII,S$GLB,, | Performed by: PHYSICIAN ASSISTANT

## 2020-11-16 PROCEDURE — 3008F BODY MASS INDEX DOCD: CPT | Mod: CPTII,S$GLB,, | Performed by: PHYSICIAN ASSISTANT

## 2020-11-16 PROCEDURE — 99999 PR PBB SHADOW E&M-EST. PATIENT-LVL IV: ICD-10-PCS | Mod: PBBFAC,,, | Performed by: PHYSICIAN ASSISTANT

## 2020-11-16 PROCEDURE — 3080F PR MOST RECENT DIASTOLIC BLOOD PRESSURE >= 90 MM HG: ICD-10-PCS | Mod: CPTII,S$GLB,, | Performed by: PHYSICIAN ASSISTANT

## 2020-11-16 PROCEDURE — 70220 X-RAY EXAM OF SINUSES: CPT | Mod: 26,,, | Performed by: RADIOLOGY

## 2020-11-16 PROCEDURE — 1159F MED LIST DOCD IN RCRD: CPT | Mod: S$GLB,,, | Performed by: PHYSICIAN ASSISTANT

## 2020-11-16 PROCEDURE — 99999 PR PBB SHADOW E&M-EST. PATIENT-LVL IV: CPT | Mod: PBBFAC,,, | Performed by: PHYSICIAN ASSISTANT

## 2020-11-16 RX ORDER — DENOSUMAB 60 MG/ML
60 INJECTION SUBCUTANEOUS
COMMUNITY

## 2020-11-16 NOTE — PROGRESS NOTES
Subjective:       Patient ID: Manuela Reyes is a 70 y.o. female.    Chief Complaint: Ear Fullness, Sinus Problem, Headache, and Sinusitis    Patient is a very pleasant 70 year old female here to see me today for evaluation of sinus pressure, headaches and ears stopped up.  She says it's been going on for almost 2 weeks.  She says she feels miserable.  She says it started with nasal congestion and throat clearing and then progressed to sinus pressure (frontal and mid-face) with headaches.  She has been taking Xyzal daily; resumed Flonase which she says has helped somewhat.  Denies fever.  She denies cough or shortness of breath.  She has nasal drainage in AM.  She has been taking Tylenol for the headaches.  Her blood pressure is elevated today in clinic and she says her sinus pressure causing her blood pressure to spike.      Review of Systems   Constitutional: Positive for activity change and fatigue. Negative for fever.   HENT: Positive for nasal congestion, postnasal drip, rhinorrhea (in AM) and sinus pressure/congestion. Negative for ear discharge and ear pain (pressure AU).    Respiratory: Negative for cough and shortness of breath.    Neurological: Positive for headaches.         Objective:      Physical Exam  Vitals signs reviewed.   Constitutional:       General: She is not in acute distress.     Appearance: She is well-developed.   HENT:      Head: Normocephalic and atraumatic.      Jaw: No trismus.      Right Ear: Ear canal and external ear normal. No drainage. No middle ear effusion. Tympanic membrane is retracted. Tympanic membrane is not erythematous.      Left Ear: Ear canal and external ear normal. No drainage.  No middle ear effusion. Tympanic membrane is retracted. Tympanic membrane is not erythematous.      Nose: No nasal deformity, septal deviation, mucosal edema or rhinorrhea.      Right Sinus: Maxillary sinus tenderness and frontal sinus tenderness present.      Left Sinus: Maxillary  sinus tenderness and frontal sinus tenderness present.      Comments: Able to see of middle turbinates with no purulent drainage seen     Mouth/Throat:      Mouth: Mucous membranes are moist. Mucous membranes are not pale and not dry.      Dentition: Normal dentition.      Pharynx: Uvula midline. No oropharyngeal exudate, posterior oropharyngeal erythema or uvula swelling.   Eyes:      General: Lids are normal. No scleral icterus.     Extraocular Movements:      Right eye: Normal extraocular motion and no nystagmus.      Left eye: Normal extraocular motion and no nystagmus.      Conjunctiva/sclera: Conjunctivae normal.      Right eye: Right conjunctiva is not injected. No chemosis.     Left eye: Left conjunctiva is not injected. No chemosis.     Pupils: Pupils are equal, round, and reactive to light.   Neck:      Thyroid: No thyroid mass or thyromegaly.      Trachea: Trachea and phonation normal. No tracheal tenderness or tracheal deviation.   Pulmonary:      Effort: Pulmonary effort is normal. No respiratory distress.      Breath sounds: No stridor.   Abdominal:      General: There is no distension.   Lymphadenopathy:      Head:      Right side of head: No submental, submandibular, preauricular or posterior auricular adenopathy.      Left side of head: No submental, submandibular, preauricular or posterior auricular adenopathy.      Cervical: No cervical adenopathy.   Skin:     General: Skin is warm and dry.      Findings: No erythema or rash.   Neurological:      Mental Status: She is alert and oriented to person, place, and time.      Cranial Nerves: No cranial nerve deficit.   Psychiatric:         Behavior: Behavior normal. Behavior is cooperative.         Assessment:       1. Sinus pressure    2. Head ache        Plan:         Recommend COVID test and sinus xrays for further evaluation and will call her with results.  OK to continue Xyzal and Flonase daily.  I recommend she add plain Mucinex BID and frequent  nasal saline spray.  She has not tolerated Augmentin in the past due to GI upset.  I also recommend she monitor her blood pressures several times a day as elevated blood pressure can also cause headaches and sinus pressure.  It's elevated 174/93 in clinic today.

## 2020-11-18 DIAGNOSIS — I10 HYPERTENSION GOAL BP (BLOOD PRESSURE) < 140/90: Chronic | ICD-10-CM

## 2020-11-18 RX ORDER — AMLODIPINE BESYLATE 10 MG/1
10 TABLET ORAL DAILY
Qty: 90 TABLET | Refills: 1 | Status: SHIPPED | OUTPATIENT
Start: 2020-11-18 | End: 2020-12-02 | Stop reason: SDUPTHER

## 2020-11-25 ENCOUNTER — PATIENT MESSAGE (OUTPATIENT)
Dept: GASTROENTEROLOGY | Facility: CLINIC | Age: 70
End: 2020-11-25

## 2020-11-27 ENCOUNTER — PATIENT MESSAGE (OUTPATIENT)
Dept: PHARMACY | Facility: CLINIC | Age: 70
End: 2020-11-27

## 2020-11-30 ENCOUNTER — TELEPHONE (OUTPATIENT)
Dept: PHARMACY | Facility: CLINIC | Age: 70
End: 2020-11-30

## 2020-12-02 ENCOUNTER — PATIENT MESSAGE (OUTPATIENT)
Dept: INTERNAL MEDICINE | Facility: CLINIC | Age: 70
End: 2020-12-02

## 2020-12-02 DIAGNOSIS — F51.04 CHRONIC INSOMNIA: ICD-10-CM

## 2020-12-02 DIAGNOSIS — I10 HYPERTENSION GOAL BP (BLOOD PRESSURE) < 140/90: Chronic | ICD-10-CM

## 2020-12-02 DIAGNOSIS — F41.9 ANXIETY: ICD-10-CM

## 2020-12-02 RX ORDER — AMLODIPINE BESYLATE 10 MG/1
10 TABLET ORAL DAILY
Qty: 90 TABLET | Refills: 1 | Status: SHIPPED | OUTPATIENT
Start: 2020-12-02 | End: 2021-10-06 | Stop reason: SDUPTHER

## 2020-12-02 RX ORDER — LORAZEPAM 0.5 MG/1
1.5 TABLET ORAL NIGHTLY
Qty: 90 TABLET | Refills: 0 | Status: SHIPPED | OUTPATIENT
Start: 2020-12-02 | End: 2021-01-06 | Stop reason: SDUPTHER

## 2020-12-04 ENCOUNTER — SPECIALTY PHARMACY (OUTPATIENT)
Dept: PHARMACY | Facility: CLINIC | Age: 70
End: 2020-12-04

## 2020-12-04 ENCOUNTER — TELEPHONE (OUTPATIENT)
Dept: RADIOLOGY | Facility: HOSPITAL | Age: 70
End: 2020-12-04

## 2020-12-04 ENCOUNTER — OFFICE VISIT (OUTPATIENT)
Dept: GASTROENTEROLOGY | Facility: CLINIC | Age: 70
End: 2020-12-04
Payer: MEDICARE

## 2020-12-04 DIAGNOSIS — K50.819 CROHN'S DISEASE OF SMALL AND LARGE INTESTINES WITH COMPLICATION: Primary | ICD-10-CM

## 2020-12-04 PROCEDURE — 1159F MED LIST DOCD IN RCRD: CPT | Mod: 95,,, | Performed by: INTERNAL MEDICINE

## 2020-12-04 PROCEDURE — 1159F PR MEDICATION LIST DOCUMENTED IN MEDICAL RECORD: ICD-10-PCS | Mod: 95,,, | Performed by: INTERNAL MEDICINE

## 2020-12-04 PROCEDURE — 99212 PR OFFICE/OUTPT VISIT, EST, LEVL II, 10-19 MIN: ICD-10-PCS | Mod: 95,,, | Performed by: INTERNAL MEDICINE

## 2020-12-04 PROCEDURE — 99212 OFFICE O/P EST SF 10 MIN: CPT | Mod: 95,,, | Performed by: INTERNAL MEDICINE

## 2020-12-04 NOTE — TELEPHONE ENCOUNTER
Specialty Pharmacy - Refill Coordination    Specialty Medication Orders Linked to Encounter      Most Recent Value   Medication #1  methotrexate 2.5 MG Tab (Order#[id number], Rx#[id number])          Refill Questions - Documented Responses      Most Recent Value   Relationship to patient of person spoken to?  Self   HIPAA/medical authority confirmed?  Yes   Any changes in contact preferences or allowed representatives?  No   Has the patient had any insurance changes?  No   Has the patient had any changes to specialty medication, dose, or instructions?  No   Has the patient started taking any new medications, herbals, or supplements?  No   Has the patient been diagnosed with any new medical conditions?  No   Does the patient have any new allergies to medications or foods?  No   Does the patient have any concerns about side effects?  No   Can the patient store medication/sharps container properly (at the correct temperature, away from children/pets, etc.)?  Yes   Can the patient call emergency services (911) in the event of an emergency?  Yes   Does the patient have any concerns or questions about taking or administering this medication as prescribed?  No   How many doses did the patient miss in the past 4 weeks or since the last fill?  0   How many doses does the patient have on hand?  5   How many days does the patient report on hand quantity will last?  5   Does the number of doses/days supply remaining match pharmacy expected amounts?  Yes   Does the patient feel that this medication is effective?  Yes   During the past 4 weeks, has patient missed any activities due to condition or medication?  No   During the past 4 weeks, did patient have any of the following urgent care visits?  None   How will the patient receive the medication?  Mail   When does the patient need to receive the medication?  12/08/20   Shipping Address  Home   Address in [facility] confirmed and updated if neccessary?  Yes   Expected  Copay ($)  9.24   Is the patient able to afford the medication copay?  Yes   Payment Method  CC on file   Days supply of Refill  28   Would patient like to speak to a pharmacist?  No   Do you want to trigger an intervention?  No   Do you want to trigger an additional referral task?  No   Refill activity completed?  Yes   Refill activity plan  Refill scheduled   Shipment/Pickup Date:  12/07/20          Current Outpatient Medications   Medication Sig    adalimumab (HUMIRA,CF, PEN) 40 mg/0.4 mL PnKt Inject 0.4 mLs (40 mg total) into the skin every 14 (fourteen) days.    amLODIPine (NORVASC) 10 MG tablet Take 1 tablet (10 mg total) by mouth once daily.    aspirin (ECOTRIN) 81 MG EC tablet Take 1 tablet (81 mg total) by mouth once daily.    calcium carbonate (OS-COOPER) 600 mg (1,500 mg) Tab Take 1,200 mg by mouth once daily.     cyanocobalamin, vitamin B-12, 2,500 mcg Lozg Place 1 tablet under the tongue.     denosumab (PROLIA) 60 mg/mL Syrg Inject 60 mg into the skin.    folic acid (FOLVITE) 1 MG tablet Take by mouth once daily.    levocetirizine (XYZAL) 5 MG tablet Take 1 tablet (5 mg total) by mouth every evening.    LORazepam (ATIVAN) 0.5 MG tablet     LORazepam (ATIVAN) 0.5 MG tablet Take 3 tablets (1.5 mg total) by mouth every evening.    methotrexate 2.5 MG Tab Take 6 tablets (15 mg total) by mouth every 7 days.    methotrexate 2.5 mg/mL Soln     multivitamin with minerals tablet Take 1 tablet by mouth once daily at 6am.    mupirocin (BACTROBAN) 2 % ointment Apply a small amount to affected area twice a day    nut tx,urea cycle dis w-o iron (ESSENTIAL AMINO ACID MIX ORAL) once daily.     rosuvastatin (CRESTOR) 10 MG tablet Take 1 tablet (10 mg total) by mouth once daily.    UNABLE TO FIND Take 25 mg by mouth once daily. CBD Oil    valACYclovir (VALTREX) 500 MG tablet take 1 tablet by mouth as directed and take 1 tablet by mouth as needed    valACYclovir (VALTREX) 500 MG tablet Take one tablet by  mouth twice daily for 3 days, then as needed for flare ups    valACYclovir (VALTREX) 500 MG tablet Take 1 tablet by mouth twice a day for 3 days then as needed for flare ups    vitamin D 1000 units Tab Take 185 mg by mouth once daily.   Last reviewed on 12/4/2020 11:59 AM by Bj Harman    Review of patient's allergies indicates:   Allergen Reactions    Codeine sulfate Itching    Hydrochlorothiazide Other (See Comments)     Adverse Reaction    Lisinopril Swelling and Edema     Facial Swelling    Last reviewed on  12/2/2020 11:36 AM by Amber Carl      Tasks added this encounter   12/27/2020 - Refill Call (Auto Added)   Tasks due within next 3 months   No tasks due.     Bj Harman  [facility]  [address] [place] [id number]  Phone: [phone]  Fax: [phone]

## 2020-12-04 NOTE — PROGRESS NOTES
GASTROENTEROLOGY IBD CONSULTATION:   Manuela Reyes  MRN: [id number]  : 1950    Referring Provider: No ref. provider found  Primary Care Provider: Christelle Lawler DO      CHIEF COMPLAINT:   No chief complaint on file.      The patient location is: [place], [place]   The chief complaint leading to consultation is: follow up for Crohn's disease   Visit type: audiovisual  Total time spent with patient: 25 min   Each patient to whom he or she provides medical services by telemedicine is:  (1) informed of the relationship between the physician and patient and the respective role of any other health care provider with respect to management of the patient; and (2) notified that he or she may decline to receive medical services by telemedicine and may withdraw from such care at any time.    Notes:     History of Present Illness:  Ms. Reyes is a 70 y.o. female who is here today for Crohn's disease.     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  She had colonoscopy that showed stricture (known stricture), biopsies of that stricture show active ileitis.  NP sent her a message saying she needed to change to stelara because of this.  So she would like to discuss this today.       Denies diarrhea, nausea, vomiting, abdominal pain, hematochezia.  Takes miralax intermittently for constipation.      PREVENTIVE MEDICINE:  Immunization History   Administered Date(s) Administered    Influenza (FLUAD) - Quadrivalent - Adjuvanted  - PF *Preferred* (65+) 10/15/2020    Influenza (FLUAD) - Trivalent - Adjuvanted - PF (65+) 11/06/2017    Influenza - High Dose - PF (65 years and older) 12/17/2015, 11/17/2016, 11/05/2018, 09/25/2019    Pneumococcal Conjugate - 13 Valent 12/10/2017    Pneumococcal Polysaccharide - 23 Valent 01/17/2019      Date of Last Pap Smear, result n/a  Date of Last Surveillance Colonoscopy, result: 2019, active disease   Date of Last DEXA, result: 9/2018  Date of Last T-SPOT, result: ?  TPMT status: ?   Smoking status: non-smoker  NSAID use: none  History of C. difficile: none  Family planning: n/a  Vaccine: up to date, got flu this year       Past Medical History:  Past Medical History:   Diagnosis Date    Anxiety     Crohn's disease     Depression     Genital herpes     Hepatitis C infection     Hypertension     Insomnia     Mesenteric artery stenosis     Dr. 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 Malaika Mack--dermatology    TIA (transient ischemic attack)      Past Surgical History:  Past Surgical History:   Procedure Laterality Date    APPENDECTOMY      COLONOSCOPY N/A 5/19/2017    Procedure: COLONOSCOPY;  Surgeon: Jose Luis Leonard MD;  Location: [facility];  Service: Endoscopy;  Laterality: N/A;    COLONOSCOPY N/A 3/26/2018    Procedure: COLONOSCOPY;  Surgeon: Guillaume Whitman MD;  Location: [facility];  Service: Endoscopy;  Laterality: N/A;    COLONOSCOPY N/A 3/19/2019    Procedure: COLONOSCOPY;  Surgeon: Lili Ellis MD;  Location: [facility];  Service: Endoscopy;  Laterality: N/A;    COLONOSCOPY N/A 9/24/2020    Procedure: COLONOSCOPY;  Surgeon: Lili Ellis MD;  Location: [facility];  Service: Endoscopy;  Laterality: N/A;    SMALL INTESTINE SURGERY       Current Medications:   Current Outpatient Medications   Medication Sig Dispense Refill    adalimumab (HUMIRA,CF, PEN) 40 mg/0.4 mL PnKt Inject 0.4 mLs (40 mg  total) into the skin every 14 (fourteen) days. 2 pen 11    amLODIPine (NORVASC) 10 MG tablet Take 1 tablet (10 mg total) by mouth once daily. 90 tablet 1    aspirin (ECOTRIN) 81 MG EC tablet Take 1 tablet (81 mg total) by mouth once daily.      calcium carbonate (OS-COOPER) 600 mg (1,500 mg) Tab Take 1,200 mg by mouth once daily.       cyanocobalamin, vitamin B-12, 2,500 mcg Lozg Place 1 tablet under the tongue.       denosumab (PROLIA) 60 mg/mL Syrg Inject 60 mg into the skin.      folic acid (FOLVITE) 1 MG tablet Take by mouth once daily.      levocetirizine (XYZAL) 5 MG tablet Take 1 tablet (5 mg total) by mouth every evening. 90 tablet 1    LORazepam (ATIVAN) 0.5 MG tablet       LORazepam (ATIVAN) 0.5 MG tablet Take 3 tablets (1.5 mg total) by mouth every evening. 90 tablet 0    methotrexate 2.5 MG Tab Take 6 tablets (15 mg total) by mouth every 7 days. 24 tablet 11    methotrexate 2.5 mg/mL Soln       multivitamin with minerals tablet Take 1 tablet by mouth once daily at 6am.      mupirocin (BACTROBAN) 2 % ointment Apply a small amount to affected area twice a day 22 g 0    nut tx,urea cycle dis w-o iron (ESSENTIAL AMINO ACID MIX ORAL) once daily.       rosuvastatin (CRESTOR) 10 MG tablet Take 1 tablet (10 mg total) by mouth once daily. 90 tablet 3    UNABLE TO FIND Take 25 mg by mouth once daily. CBD Oil      valACYclovir (VALTREX) 500 MG tablet take 1 tablet by mouth as directed and take 1 tablet by mouth as needed  0    valACYclovir (VALTREX) 500 MG tablet Take one tablet by mouth twice daily for 3 days, then as needed for flare ups 30 tablet 0    valACYclovir (VALTREX) 500 MG tablet Take 1 tablet by mouth twice a day for 3 days then as needed for flare ups 30 tablet 0    vitamin D 1000 units Tab Take 185 mg by mouth once daily.       No current facility-administered medications for this visit.       Allergies:   Review of patient's allergies indicates:   Allergen Reactions    Codeine  sulfate Itching    Hydrochlorothiazide Other (See Comments)     Adverse Reaction    Lisinopril Swelling and Edema     Facial Swelling     Social History:  Social History     Socioeconomic History    Marital status: Single     Spouse name: Not on file    Number of children: Not on file    Years of education: Not on file    Highest education level: Not on file   Occupational History    Not on file   Social Needs    Financial resource strain: Not on file    Food insecurity     Worry: Not on file     Inability: Not on file    Transportation needs     Medical: Not on file     Non-medical: Not on file   Tobacco Use    Smoking status: Former Smoker     Years: 20.00     Types: Cigarettes     Quit date: 1/1/2005     Years since quitting: 15.9    Smokeless tobacco: Former User    Tobacco comment: Former Light Smoker less than 10 a day   Substance and Sexual Activity    Alcohol use: Yes     Alcohol/week: 4.0 standard drinks     Types: 4 Glasses of wine per week     Comment: occ    Drug use: No    Sexual activity: Never     Partners: Female   Lifestyle    Physical activity     Days per week: Not on file     Minutes per session: Not on file    Stress: Not at all   Relationships    Social connections     Talks on phone: Not on file     Gets together: Not on file     Attends Caodaism service: Not on file     Active member of club or organization: Not on file     Attends meetings of clubs or organizations: Not on file     Relationship status: Not on file   Other Topics Concern    Not on file   Social History Narrative    Not on file     Family History:  Family History   Problem Relation Age of Onset    Stroke Mother     Heart disease Mother     Cataracts Mother     Cancer Father         Lung    Heart disease Sister     Hypertension Brother     Glaucoma Neg Hx     Macular degeneration Neg Hx     Thyroid disease Neg Hx        Review of Systems:   CONSTITUTIONAL: Denies weight change,  fatigue, fevers,  chills, night sweats.  EYES: No changes in vision.   ENT: No oral lesions or sore throat.  HEMATOLOGICAL/Lymph: Denies bleeding tendency, bruising tendency. No swellings or enlarged lymph nodes.  CARDIOVASCULAR: Denies chest pain, shortness of breath, orthopnea and edema.  RESPIRATORY: Denies cough, hemoptysis, dyspnea, and wheezing.  GI: See HPI.  : Denies dysuria and hematuria  MUSCULOSKELETAL: Denies joint pain or swelling, back pain and muscle pain.  SKIN: Denies rashes.  NEUROLOGIC: Denies headaches, seizures and numbness.  PSYCHIATRIC: Denies depression or anxiety.  ENDOCRINE: Denies heat or cold intolerance and excessive thirst or urination.    Physical Examination:       There were no vitals taken for this visit.  General Appearance:  Alert, cooperative, no distress, appears stated age    Laboratory:  Lab Results   Component Value Date    WBC 5.40 10/23/2020     (H) 10/23/2020    RDW 12.4 10/23/2020     10/23/2020    BUN 23 10/23/2020    GLU 74 10/23/2020      Lab Results   Component Value Date    CRP 0.5 05/14/2020    TSH 1.326 01/04/2017    CHOL 161 07/08/2020    TRIG 48 07/08/2020    HDL 85 (H) 07/08/2020     Lab Results   Component Value Date    MG 2.1 02/12/2020        Imaging Review:   No results found.        Health Maintenance Review:  Health Maintenance   Topic Date Due    Mammogram  01/25/2021    High Dose Statin  11/16/2021    DEXA SCAN  09/09/2023    Lipid Panel  07/08/2025    TETANUS VACCINE  07/25/2026    Pneumococcal Vaccine (65+ Low/Medium Risk)  Completed    Hepatitis C Screening  Addressed       ASSESSMENT:  69 y.o. Female with longstanding history of ileocolonic Crohn's with h/o resection x 2 with anastomotic stricture, now on humira and doing well.  Humira drug levels are good and she continues on mtx.      PLAN:  - although there was some inflammation on biopsy, I do not think this is significant enough to warrant a change in medication at this time.  Her CRP is  normal, although it has been over the past 3 years.  It does not seem her CRP elevates with active disease.  I will get an MRE to further evaluate the small bowel.  For now, will continue with humira plus methotrexate.     Return to clinic: 6 months    Lili Ellis  12/4/2020  11:17 AM

## 2020-12-07 ENCOUNTER — HOSPITAL ENCOUNTER (OUTPATIENT)
Dept: RADIOLOGY | Facility: HOSPITAL | Age: 70
Discharge: HOME | End: 2020-12-07
Attending: UROLOGY
Payer: MEDICARE

## 2020-12-07 DIAGNOSIS — N20.0 RENAL STONE: ICD-10-CM

## 2020-12-07 DIAGNOSIS — I10 HYPERTENSION, UNSPECIFIED TYPE: ICD-10-CM

## 2020-12-07 DIAGNOSIS — N28.1 RENAL CYST: ICD-10-CM

## 2020-12-07 PROCEDURE — 76770 US EXAM ABDO BACK WALL COMP: CPT | Mod: TC

## 2020-12-07 PROCEDURE — 76770 US RETROPERITONEAL COMPLETE: ICD-10-PCS | Mod: 26,,, | Performed by: RADIOLOGY

## 2020-12-07 PROCEDURE — 76770 US EXAM ABDO BACK WALL COMP: CPT | Mod: 26,,, | Performed by: RADIOLOGY

## 2020-12-09 ENCOUNTER — PATIENT MESSAGE (OUTPATIENT)
Dept: CARDIOLOGY | Facility: CLINIC | Age: 70
End: 2020-12-09

## 2020-12-09 ENCOUNTER — LAB VISIT (OUTPATIENT)
Dept: LAB | Facility: HOSPITAL | Age: 70
End: 2020-12-09
Attending: UROLOGY
Payer: MEDICARE

## 2020-12-09 ENCOUNTER — PATIENT MESSAGE (OUTPATIENT)
Dept: GASTROENTEROLOGY | Facility: CLINIC | Age: 70
End: 2020-12-09

## 2020-12-09 ENCOUNTER — OFFICE VISIT (OUTPATIENT)
Dept: UROLOGY | Facility: CLINIC | Age: 70
End: 2020-12-09
Payer: MEDICARE

## 2020-12-09 VITALS
DIASTOLIC BLOOD PRESSURE: 68 MMHG | TEMPERATURE: 98 F | WEIGHT: 105 LBS | BODY MASS INDEX: 16.45 KG/M2 | SYSTOLIC BLOOD PRESSURE: 128 MMHG

## 2020-12-09 DIAGNOSIS — E13.9 RENAL CYSTS AND DIABETES SYNDROME: ICD-10-CM

## 2020-12-09 DIAGNOSIS — I10 HYPERTENSION, UNSPECIFIED TYPE: Primary | ICD-10-CM

## 2020-12-09 DIAGNOSIS — K50.80 CROHN'S DISEASE OF BOTH SMALL AND LARGE INTESTINE WITHOUT COMPLICATION: Primary | ICD-10-CM

## 2020-12-09 DIAGNOSIS — I10 HYPERTENSION, UNSPECIFIED TYPE: ICD-10-CM

## 2020-12-09 PROCEDURE — 1159F MED LIST DOCD IN RCRD: CPT | Mod: S$GLB,,, | Performed by: UROLOGY

## 2020-12-09 PROCEDURE — 3074F PR MOST RECENT SYSTOLIC BLOOD PRESSURE < 130 MM HG: ICD-10-PCS | Mod: CPTII,S$GLB,, | Performed by: UROLOGY

## 2020-12-09 PROCEDURE — 3078F DIAST BP <80 MM HG: CPT | Mod: CPTII,S$GLB,, | Performed by: UROLOGY

## 2020-12-09 PROCEDURE — 99999 PR PBB SHADOW E&M-EST. PATIENT-LVL IV: CPT | Mod: PBBFAC,,, | Performed by: UROLOGY

## 2020-12-09 PROCEDURE — 3008F PR BODY MASS INDEX (BMI) DOCUMENTED: ICD-10-PCS | Mod: CPTII,S$GLB,, | Performed by: UROLOGY

## 2020-12-09 PROCEDURE — 1159F PR MEDICATION LIST DOCUMENTED IN MEDICAL RECORD: ICD-10-PCS | Mod: S$GLB,,, | Performed by: UROLOGY

## 2020-12-09 PROCEDURE — 36415 COLL VENOUS BLD VENIPUNCTURE: CPT

## 2020-12-09 PROCEDURE — 3074F SYST BP LT 130 MM HG: CPT | Mod: CPTII,S$GLB,, | Performed by: UROLOGY

## 2020-12-09 PROCEDURE — 84153 ASSAY OF PSA TOTAL: CPT

## 2020-12-09 PROCEDURE — 99999 PR PBB SHADOW E&M-EST. PATIENT-LVL IV: ICD-10-PCS | Mod: PBBFAC,,, | Performed by: UROLOGY

## 2020-12-09 PROCEDURE — 3008F BODY MASS INDEX DOCD: CPT | Mod: CPTII,S$GLB,, | Performed by: UROLOGY

## 2020-12-09 PROCEDURE — 99213 OFFICE O/P EST LOW 20 MIN: CPT | Mod: S$GLB,,, | Performed by: UROLOGY

## 2020-12-09 PROCEDURE — 3078F PR MOST RECENT DIASTOLIC BLOOD PRESSURE < 80 MM HG: ICD-10-PCS | Mod: CPTII,S$GLB,, | Performed by: UROLOGY

## 2020-12-09 PROCEDURE — 99213 PR OFFICE/OUTPT VISIT, EST, LEVL III, 20-29 MIN: ICD-10-PCS | Mod: S$GLB,,, | Performed by: UROLOGY

## 2020-12-09 PROCEDURE — 1126F AMNT PAIN NOTED NONE PRSNT: CPT | Mod: S$GLB,,, | Performed by: UROLOGY

## 2020-12-09 PROCEDURE — 1126F PR PAIN SEVERITY QUANTIFIED, NO PAIN PRESENT: ICD-10-PCS | Mod: S$GLB,,, | Performed by: UROLOGY

## 2020-12-09 NOTE — PROGRESS NOTES
Chief Complaint: Annual Screening    HPI:   12/9/20: Recc PSA again. Otherwise fine.   Indep assessment of imaging agree with report:  Bilateral renal cysts, benign.   11/18/19: Back for followup on prior stone disease.   Indep assessment of imaging agree with report:  Punctate right renal stone, bilateral cysts some mildly complex.  Left flank pain fully resolved.  Reviewed history in detail. Discosed that she had sex reassignment surgery in 70s so is male genotype.  10/30/17: 68 yo woman since 12/16 has had some left flank pain.  CT 1/17 shows some simple renal cysts no sig stones.  US more recently redemonstrates similar findings no obstruction.  No abd/pelvic pain and no exac/rel factors.  No hematuria.  No prior urolithiasis.  No urinary bother.  No  history.      Allergies:  Codeine sulfate, Hydrochlorothiazide, and Lisinopril    Medications: has a current medication list which includes the following prescription(s): amlodipine, aspirin, calcium carbonate, cyanocobalamin (vitamin b-12), prolia, folic acid, levocetirizine, lorazepam, lorazepam, methotrexate, methotrexate, multivitamin with minerals, mupirocin, nut tx,urea cycle dis w-o iron, rosuvastatin, UNABLE TO FIND, valacyclovir, valacyclovir, valacyclovir, vitamin d, and adalimumab.    Review of Systems:  General: No fever, chills, fatigability, or weight loss.  Skin: No rashes, itching, or changes in color or texture of skin.  Chest: Denies HICKS, cyanosis, wheezing, cough, and sputum production.  Abdomen: Appetite fine. No weight loss. Denies diarrhea, abdominal pain, hematemesis, or blood in stool.  Musculoskeletal: No joint stiffness or swelling. Denies back pain.  : As above.  All other review of systems negative.    PMH:   has a past medical history of Anxiety, Crohn's disease, Depression, Genital herpes, Hepatitis C infection, Hypertension, Insomnia, Mesenteric artery stenosis, Osteoporosis, SCC (squamous cell carcinoma), hand, right (03/2019),  and TIA (transient ischemic attack).    PSH:   has a past surgical history that includes Appendectomy; Small intestine surgery; Colonoscopy (N/A, 5/19/2017); Colonoscopy (N/A, 3/26/2018); Colonoscopy (N/A, 3/19/2019); and Colonoscopy (N/A, 9/24/2020).    FamHx: family history includes Cancer in her father; Cataracts in her mother; Heart disease in her mother and sister; Hypertension in her brother; Stroke in her mother.    SocHx:  reports that she quit smoking about 15 years ago. Her smoking use included cigarettes. She quit after 20.00 years of use. She has quit using smokeless tobacco. She reports current alcohol use of about 4.0 standard drinks of alcohol per week. She reports that she does not use drugs.     Physical Exam:  Vitals:   Vitals:    12/09/20 1410   BP: 128/68   Temp: 97.6 °F (36.4 °C)     General: A&Ox3. No apparent distress. No deformities.  Neck: No masses. Normal thyroid.  Lungs: normal inspiration. No use of accessory muscles.  Heart: normal pulse. No arrhythmias.  Abdomen: Soft. NT. ND  Skin: The skin is warm and dry. No jaundice.  Ext: No c/c/e.  :   11/18/19: PRADEEP normal    Labs/Studies:   Urinalysis performed in clinic, summary: UA normal  PSA    11/17: undetect    Impression/Plan:   1. Renal stones and cysts stable, US/RTC 1 year to survey any changes.  2. HTN: discussed and referred to PCP for further management.  3. Low risk of prostate cancer - epic won't allow the dx of prostate cancer screening to be placed based on gender but does have a prostate.

## 2020-12-10 LAB — COMPLEXED PSA SERPL-MCNC: <0.01 NG/ML

## 2020-12-14 ENCOUNTER — DOCUMENTATION ONLY (OUTPATIENT)
Dept: GASTROENTEROLOGY | Facility: CLINIC | Age: 70
End: 2020-12-14

## 2020-12-14 NOTE — CLINICAL REVIEW
12/14/2020  CARE COORDINATION REVIEW - IBD    [facility]    LAST OV - 12/4/2020 [[facility]]  NEXT OV DUE -     MEDICATIONS:  -HUMIRA BIWEEKLY, 1ST DOSE: 2018  -MTX    IMMUNIZATION HISTORY:  MMR -   VARICELLA -   TDAP -   HEPATITIS A -   HEPATITIS B -   HERPES ZOSTER -   HPV -   INFLUENZA - UTD, 10/15/2020  MENINGOCOCCAL -   PNEUMOCOCCAL - UTD    BONE HEALTH:  VITAMIN D - TAKING    CANCER PREVENTION:  LAST COLONSCOPY - 9/24/2020  DERMATOLOGY VISIT -   EYE EXAM -     LABS / SCANS:  DEXA - 9/9/2020 - OSTEOPOROSIS  QUANT GOLD - 5/14/2020 - NEGATIVE  TPMT - 9/25/2018 - WNL    NOTES:  CBC / CMP Q3 MONTHS  MRE SCHEDULED 12/31/2020

## 2020-12-29 ENCOUNTER — PATIENT MESSAGE (OUTPATIENT)
Dept: GASTROENTEROLOGY | Facility: CLINIC | Age: 70
End: 2020-12-29

## 2020-12-30 ENCOUNTER — TELEPHONE (OUTPATIENT)
Dept: RADIOLOGY | Facility: HOSPITAL | Age: 70
End: 2020-12-30

## 2020-12-30 ENCOUNTER — SPECIALTY PHARMACY (OUTPATIENT)
Dept: PHARMACY | Facility: CLINIC | Age: 70
End: 2020-12-30

## 2020-12-30 ENCOUNTER — PATIENT MESSAGE (OUTPATIENT)
Dept: PHARMACY | Facility: CLINIC | Age: 70
End: 2020-12-30

## 2020-12-30 NOTE — TELEPHONE ENCOUNTER
Specialty Pharmacy - Refill Coordination    Specialty Medication Orders Linked to Encounter      Most Recent Value   Medication #1  methotrexate 2.5 MG Tab (Order#[id number], Rx#[id number])          Refill Questions - Documented Responses      Most Recent Value   Relationship to patient of person spoken to?  Self   HIPAA/medical authority confirmed?  Yes   Any changes in contact preferences or allowed representatives?  No   Has the patient had any insurance changes?  No   Has the patient had any changes to specialty medication, dose, or instructions?  No   Has the patient started taking any new medications, herbals, or supplements?  No   Has the patient been diagnosed with any new medical conditions?  No   Does the patient have any new allergies to medications or foods?  No   Does the patient have any concerns about side effects?  No   Can the patient store medication/sharps container properly (at the correct temperature, away from children/pets, etc.)?  Yes   Can the patient call emergency services (911) in the event of an emergency?  Yes   Does the patient have any concerns or questions about taking or administering this medication as prescribed?  No   How many doses did the patient miss in the past 4 weeks or since the last fill?  0   How many doses does the patient have on hand?  1   How many days does the patient report on hand quantity will last?  7   Does the number of doses/days supply remaining match pharmacy expected amounts?  Yes   Does the patient feel that this medication is effective?  Yes   During the past 4 weeks, has patient missed any activities due to condition or medication?  No   During the past 4 weeks, did patient have any of the following urgent care visits?  None   How will the patient receive the medication?  Mail   When does the patient need to receive the medication?  01/07/21   Shipping Address  Home   Address in [facility] confirmed and updated if neccessary?  Yes   Expected  Copay ($)  9.24   Is the patient able to afford the medication copay?  Yes   Payment Method  CC on file   Days supply of Refill  28   Would patient like to speak to a pharmacist?  No   Do you want to trigger an intervention?  No   Do you want to trigger an additional referral task?  No   Refill activity completed?  Yes   Refill activity plan  Refill scheduled   Shipment/Pickup Date:  01/05/21          Current Outpatient Medications   Medication Sig    adalimumab (HUMIRA,CF, PEN) 40 mg/0.4 mL PnKt Inject 0.4 mLs (40 mg total) into the skin every 14 (fourteen) days.    amLODIPine (NORVASC) 10 MG tablet Take 1 tablet (10 mg total) by mouth once daily.    aspirin (ECOTRIN) 81 MG EC tablet Take 1 tablet (81 mg total) by mouth once daily.    calcium carbonate (OS-COOPER) 600 mg (1,500 mg) Tab Take 1,200 mg by mouth once daily.     cyanocobalamin, vitamin B-12, 2,500 mcg Lozg Place 1 tablet under the tongue.     denosumab (PROLIA) 60 mg/mL Syrg Inject 60 mg into the skin.    folic acid (FOLVITE) 1 MG tablet Take by mouth once daily.    levocetirizine (XYZAL) 5 MG tablet Take 1 tablet (5 mg total) by mouth every evening.    LORazepam (ATIVAN) 0.5 MG tablet     LORazepam (ATIVAN) 0.5 MG tablet Take 3 tablets (1.5 mg total) by mouth every evening.    methotrexate 2.5 MG Tab Take 6 tablets (15 mg total) by mouth every 7 days.    methotrexate 2.5 mg/mL Soln     multivitamin with minerals tablet Take 1 tablet by mouth once daily at 6am.    mupirocin (BACTROBAN) 2 % ointment Apply a small amount to affected area twice a day    nut tx,urea cycle dis w-o iron (ESSENTIAL AMINO ACID MIX ORAL) once daily.     rosuvastatin (CRESTOR) 10 MG tablet Take 1 tablet (10 mg total) by mouth once daily.    UNABLE TO FIND Take 25 mg by mouth once daily. CBD Oil    valACYclovir (VALTREX) 500 MG tablet take 1 tablet by mouth as directed and take 1 tablet by mouth as needed    valACYclovir (VALTREX) 500 MG tablet Take one tablet by  mouth twice daily for 3 days, then as needed for flare ups    valACYclovir (VALTREX) 500 MG tablet Take 1 tablet by mouth twice a day for 3 days then as needed for flare ups    vitamin D 1000 units Tab Take 185 mg by mouth once daily.   Last reviewed on 12/9/2020  2:11 PM by Nubia Hull LPN    Review of patient's allergies indicates:   Allergen Reactions    Codeine sulfate Itching    Hydrochlorothiazide Other (See Comments)     Adverse Reaction    Lisinopril Swelling and Edema     Facial Swelling    Last reviewed on  12/9/2020 2:11 PM by Nubia Hull      Tasks added this encounter   1/27/2021 - Refill Call (Auto Added)   Tasks due within next 3 months   No tasks due.     Nadine Duarte  [facility]  [address] [place] [id number]  Phone: [phone]  Fax: [phone]

## 2021-01-04 ENCOUNTER — HOSPITAL ENCOUNTER (OUTPATIENT)
Dept: RADIOLOGY | Facility: HOSPITAL | Age: 71
Discharge: HOME | End: 2021-01-04
Attending: INTERNAL MEDICINE
Payer: MEDICARE

## 2021-01-04 ENCOUNTER — PATIENT MESSAGE (OUTPATIENT)
Dept: GASTROENTEROLOGY | Facility: CLINIC | Age: 71
End: 2021-01-04

## 2021-01-04 DIAGNOSIS — K50.80 CROHN'S DISEASE OF BOTH SMALL AND LARGE INTESTINE WITHOUT COMPLICATION: ICD-10-CM

## 2021-01-04 PROCEDURE — 74183 MRI ENTEROGRAPHY: ICD-10-PCS | Mod: 26,,, | Performed by: RADIOLOGY

## 2021-01-04 PROCEDURE — 72197 MRI PELVIS W/O & W/DYE: CPT | Mod: TC

## 2021-01-04 PROCEDURE — 72197 MRI ENTEROGRAPHY: ICD-10-PCS | Mod: 26,,, | Performed by: RADIOLOGY

## 2021-01-04 PROCEDURE — A9698 NON-RAD CONTRAST MATERIALNOC: HCPCS | Performed by: INTERNAL MEDICINE

## 2021-01-04 PROCEDURE — 74183 MRI ABD W/O CNTR FLWD CNTR: CPT | Mod: 26,,, | Performed by: RADIOLOGY

## 2021-01-04 PROCEDURE — 72197 MRI PELVIS W/O & W/DYE: CPT | Mod: 26,,, | Performed by: RADIOLOGY

## 2021-01-04 PROCEDURE — A9585 GADOBUTROL INJECTION: HCPCS | Performed by: INTERNAL MEDICINE

## 2021-01-04 PROCEDURE — 25500020 PHARM REV CODE 255: Performed by: INTERNAL MEDICINE

## 2021-01-04 RX ORDER — GADOBUTROL 604.72 MG/ML
5 INJECTION INTRAVENOUS
Status: COMPLETED | OUTPATIENT
Start: 2021-01-04 | End: 2021-01-04

## 2021-01-04 RX ADMIN — GADOBUTROL 5 ML: 604.72 INJECTION INTRAVENOUS at 12:01

## 2021-01-04 RX ADMIN — BARIUM SULFATE 1000 ML: 1 SUSPENSION ORAL at 11:01

## 2021-01-06 ENCOUNTER — PATIENT MESSAGE (OUTPATIENT)
Dept: INTERNAL MEDICINE | Facility: CLINIC | Age: 71
End: 2021-01-06

## 2021-01-06 DIAGNOSIS — F51.04 CHRONIC INSOMNIA: ICD-10-CM

## 2021-01-06 DIAGNOSIS — F41.9 ANXIETY: ICD-10-CM

## 2021-01-07 RX ORDER — LORAZEPAM 0.5 MG/1
1.5 TABLET ORAL NIGHTLY
Qty: 90 TABLET | Refills: 3 | Status: SHIPPED | OUTPATIENT
Start: 2021-01-07 | End: 2021-04-26 | Stop reason: SDUPTHER

## 2021-01-17 ENCOUNTER — IMMUNIZATION (OUTPATIENT)
Dept: INTERNAL MEDICINE | Facility: CLINIC | Age: 71
End: 2021-01-17
Payer: MEDICARE

## 2021-01-17 DIAGNOSIS — Z23 NEED FOR VACCINATION: Primary | ICD-10-CM

## 2021-01-17 PROCEDURE — 91300 COVID-19, MRNA, LNP-S, PF, 30 MCG/0.3 ML DOSE VACCINE: CPT | Mod: PBBFAC | Performed by: FAMILY MEDICINE

## 2021-01-20 ENCOUNTER — PATIENT MESSAGE (OUTPATIENT)
Dept: GASTROENTEROLOGY | Facility: CLINIC | Age: 71
End: 2021-01-20

## 2021-01-25 ENCOUNTER — PATIENT OUTREACH (OUTPATIENT)
Dept: ADMINISTRATIVE | Facility: OTHER | Age: 71
End: 2021-01-25

## 2021-01-25 DIAGNOSIS — Z12.31 ENCOUNTER FOR SCREENING MAMMOGRAM FOR MALIGNANT NEOPLASM OF BREAST: Primary | ICD-10-CM

## 2021-01-27 ENCOUNTER — TELEPHONE (OUTPATIENT)
Dept: ADMINISTRATIVE | Facility: HOSPITAL | Age: 71
End: 2021-01-27

## 2021-01-27 ENCOUNTER — OFFICE VISIT (OUTPATIENT)
Dept: PODIATRY | Facility: CLINIC | Age: 71
End: 2021-01-27
Payer: MEDICARE

## 2021-01-27 VITALS
HEIGHT: 67 IN | HEART RATE: 93 BPM | SYSTOLIC BLOOD PRESSURE: 158 MMHG | DIASTOLIC BLOOD PRESSURE: 91 MMHG | WEIGHT: 107.13 LBS | BODY MASS INDEX: 16.81 KG/M2

## 2021-01-27 DIAGNOSIS — B35.1 ONYCHOMYCOSIS: Primary | ICD-10-CM

## 2021-01-27 DIAGNOSIS — L84 CORN OR CALLUS: ICD-10-CM

## 2021-01-27 PROCEDURE — 1159F PR MEDICATION LIST DOCUMENTED IN MEDICAL RECORD: ICD-10-PCS | Mod: S$GLB,,, | Performed by: PODIATRIST

## 2021-01-27 PROCEDURE — 1125F AMNT PAIN NOTED PAIN PRSNT: CPT | Mod: S$GLB,,, | Performed by: PODIATRIST

## 2021-01-27 PROCEDURE — 1159F MED LIST DOCD IN RCRD: CPT | Mod: S$GLB,,, | Performed by: PODIATRIST

## 2021-01-27 PROCEDURE — 3288F FALL RISK ASSESSMENT DOCD: CPT | Mod: CPTII,S$GLB,, | Performed by: PODIATRIST

## 2021-01-27 PROCEDURE — 1101F PT FALLS ASSESS-DOCD LE1/YR: CPT | Mod: CPTII,S$GLB,, | Performed by: PODIATRIST

## 2021-01-27 PROCEDURE — 1125F PR PAIN SEVERITY QUANTIFIED, PAIN PRESENT: ICD-10-PCS | Mod: S$GLB,,, | Performed by: PODIATRIST

## 2021-01-27 PROCEDURE — 99999 PR PBB SHADOW E&M-EST. PATIENT-LVL IV: ICD-10-PCS | Mod: PBBFAC,,, | Performed by: PODIATRIST

## 2021-01-27 PROCEDURE — 99213 OFFICE O/P EST LOW 20 MIN: CPT | Mod: S$GLB,,, | Performed by: PODIATRIST

## 2021-01-27 PROCEDURE — 3080F DIAST BP >= 90 MM HG: CPT | Mod: CPTII,S$GLB,, | Performed by: PODIATRIST

## 2021-01-27 PROCEDURE — 3288F PR FALLS RISK ASSESSMENT DOCUMENTED: ICD-10-PCS | Mod: CPTII,S$GLB,, | Performed by: PODIATRIST

## 2021-01-27 PROCEDURE — 3077F SYST BP >= 140 MM HG: CPT | Mod: CPTII,S$GLB,, | Performed by: PODIATRIST

## 2021-01-27 PROCEDURE — 3077F PR MOST RECENT SYSTOLIC BLOOD PRESSURE >= 140 MM HG: ICD-10-PCS | Mod: CPTII,S$GLB,, | Performed by: PODIATRIST

## 2021-01-27 PROCEDURE — 1101F PR PT FALLS ASSESS DOC 0-1 FALLS W/OUT INJ PAST YR: ICD-10-PCS | Mod: CPTII,S$GLB,, | Performed by: PODIATRIST

## 2021-01-27 PROCEDURE — 3008F BODY MASS INDEX DOCD: CPT | Mod: CPTII,S$GLB,, | Performed by: PODIATRIST

## 2021-01-27 PROCEDURE — 99999 PR PBB SHADOW E&M-EST. PATIENT-LVL IV: CPT | Mod: PBBFAC,,, | Performed by: PODIATRIST

## 2021-01-27 PROCEDURE — 3008F PR BODY MASS INDEX (BMI) DOCUMENTED: ICD-10-PCS | Mod: CPTII,S$GLB,, | Performed by: PODIATRIST

## 2021-01-27 PROCEDURE — 99213 PR OFFICE/OUTPT VISIT, EST, LEVL III, 20-29 MIN: ICD-10-PCS | Mod: S$GLB,,, | Performed by: PODIATRIST

## 2021-01-27 PROCEDURE — 3080F PR MOST RECENT DIASTOLIC BLOOD PRESSURE >= 90 MM HG: ICD-10-PCS | Mod: CPTII,S$GLB,, | Performed by: PODIATRIST

## 2021-01-29 ENCOUNTER — PATIENT MESSAGE (OUTPATIENT)
Dept: RHEUMATOLOGY | Facility: CLINIC | Age: 71
End: 2021-01-29

## 2021-02-03 ENCOUNTER — TELEPHONE (OUTPATIENT)
Dept: CARDIOLOGY | Facility: CLINIC | Age: 71
End: 2021-02-03

## 2021-02-03 DIAGNOSIS — I10 ESSENTIAL HYPERTENSION: Primary | ICD-10-CM

## 2021-02-06 ENCOUNTER — PATIENT MESSAGE (OUTPATIENT)
Dept: PHARMACY | Facility: CLINIC | Age: 71
End: 2021-02-06

## 2021-02-07 ENCOUNTER — IMMUNIZATION (OUTPATIENT)
Dept: INTERNAL MEDICINE | Facility: CLINIC | Age: 71
End: 2021-02-07

## 2021-02-07 DIAGNOSIS — Z23 NEED FOR VACCINATION: Primary | ICD-10-CM

## 2021-02-07 PROCEDURE — 91300 COVID-19, MRNA, LNP-S, PF, 30 MCG/0.3 ML DOSE VACCINE: ICD-10-PCS | Mod: S$GLB,,, | Performed by: FAMILY MEDICINE

## 2021-02-07 PROCEDURE — 0002A COVID-19, MRNA, LNP-S, PF, 30 MCG/0.3 ML DOSE VACCINE: ICD-10-PCS | Mod: CV19,S$GLB,, | Performed by: FAMILY MEDICINE

## 2021-02-07 PROCEDURE — 0002A COVID-19, MRNA, LNP-S, PF, 30 MCG/0.3 ML DOSE VACCINE: CPT | Mod: CV19,S$GLB,, | Performed by: FAMILY MEDICINE

## 2021-02-07 PROCEDURE — 91300 COVID-19, MRNA, LNP-S, PF, 30 MCG/0.3 ML DOSE VACCINE: CPT | Mod: S$GLB,,, | Performed by: FAMILY MEDICINE

## 2021-02-11 ENCOUNTER — PATIENT MESSAGE (OUTPATIENT)
Dept: RHEUMATOLOGY | Facility: CLINIC | Age: 71
End: 2021-02-11

## 2021-02-12 ENCOUNTER — PATIENT MESSAGE (OUTPATIENT)
Dept: RHEUMATOLOGY | Facility: CLINIC | Age: 71
End: 2021-02-12

## 2021-02-17 ENCOUNTER — SPECIALTY PHARMACY (OUTPATIENT)
Dept: PHARMACY | Facility: CLINIC | Age: 71
End: 2021-02-17

## 2021-02-23 ENCOUNTER — PATIENT MESSAGE (OUTPATIENT)
Dept: PHARMACY | Facility: CLINIC | Age: 71
End: 2021-02-23

## 2021-02-24 ENCOUNTER — HOSPITAL ENCOUNTER (OUTPATIENT)
Dept: RADIOLOGY | Facility: HOSPITAL | Age: 71
Discharge: HOME | End: 2021-02-24
Attending: INTERNAL MEDICINE
Payer: MEDICARE

## 2021-02-24 VITALS — WEIGHT: 107.13 LBS | HEIGHT: 67 IN | BODY MASS INDEX: 16.81 KG/M2

## 2021-02-24 DIAGNOSIS — Z12.31 ENCOUNTER FOR SCREENING MAMMOGRAM FOR MALIGNANT NEOPLASM OF BREAST: ICD-10-CM

## 2021-02-24 PROCEDURE — 77063 BREAST TOMOSYNTHESIS BI: CPT | Mod: 26,,, | Performed by: RADIOLOGY

## 2021-02-24 PROCEDURE — 77067 MAMMO DIGITAL SCREENING BILAT WITH TOMO: ICD-10-PCS | Mod: 26,,, | Performed by: RADIOLOGY

## 2021-02-24 PROCEDURE — 77063 MAMMO DIGITAL SCREENING BILAT WITH TOMO: ICD-10-PCS | Mod: 26,,, | Performed by: RADIOLOGY

## 2021-02-24 PROCEDURE — 77067 SCR MAMMO BI INCL CAD: CPT | Mod: TC

## 2021-02-24 PROCEDURE — 77067 SCR MAMMO BI INCL CAD: CPT | Mod: 26,,, | Performed by: RADIOLOGY

## 2021-03-01 ENCOUNTER — PATIENT MESSAGE (OUTPATIENT)
Dept: CARDIOLOGY | Facility: CLINIC | Age: 71
End: 2021-03-01

## 2021-03-05 ENCOUNTER — OFFICE VISIT (OUTPATIENT)
Dept: CARDIOLOGY | Facility: CLINIC | Age: 71
End: 2021-03-05
Payer: MEDICARE

## 2021-03-05 ENCOUNTER — HOSPITAL ENCOUNTER (OUTPATIENT)
Dept: CARDIOLOGY | Facility: HOSPITAL | Age: 71
Discharge: HOME | End: 2021-03-05
Attending: INTERNAL MEDICINE
Payer: MEDICARE

## 2021-03-05 VITALS
SYSTOLIC BLOOD PRESSURE: 142 MMHG | OXYGEN SATURATION: 99 % | WEIGHT: 106.25 LBS | HEIGHT: 67 IN | BODY MASS INDEX: 16.67 KG/M2 | DIASTOLIC BLOOD PRESSURE: 88 MMHG | HEART RATE: 100 BPM

## 2021-03-05 DIAGNOSIS — I10 ESSENTIAL HYPERTENSION: ICD-10-CM

## 2021-03-05 DIAGNOSIS — K55.1 MESENTERIC ARTERY STENOSIS: ICD-10-CM

## 2021-03-05 DIAGNOSIS — R94.31 ABNORMAL ECG: ICD-10-CM

## 2021-03-05 DIAGNOSIS — E78.5 HYPERLIPIDEMIA LDL GOAL <70: Primary | ICD-10-CM

## 2021-03-05 DIAGNOSIS — M79.604 PAIN IN BOTH LOWER EXTREMITIES: ICD-10-CM

## 2021-03-05 DIAGNOSIS — M79.605 PAIN IN BOTH LOWER EXTREMITIES: ICD-10-CM

## 2021-03-05 DIAGNOSIS — I70.0 ATHEROSCLEROSIS OF ABDOMINAL AORTA: ICD-10-CM

## 2021-03-05 DIAGNOSIS — Z82.49 FAMILY HISTORY OF CARDIOVASCULAR DISEASE: ICD-10-CM

## 2021-03-05 PROCEDURE — 3079F PR MOST RECENT DIASTOLIC BLOOD PRESSURE 80-89 MM HG: ICD-10-PCS | Mod: CPTII,S$GLB,, | Performed by: INTERNAL MEDICINE

## 2021-03-05 PROCEDURE — 99499 RISK ADDL DX/OHS AUDIT: ICD-10-PCS | Mod: S$GLB,,, | Performed by: INTERNAL MEDICINE

## 2021-03-05 PROCEDURE — 1159F MED LIST DOCD IN RCRD: CPT | Mod: S$GLB,,, | Performed by: INTERNAL MEDICINE

## 2021-03-05 PROCEDURE — 99999 PR PBB SHADOW E&M-EST. PATIENT-LVL III: ICD-10-PCS | Mod: PBBFAC,,, | Performed by: INTERNAL MEDICINE

## 2021-03-05 PROCEDURE — 3008F PR BODY MASS INDEX (BMI) DOCUMENTED: ICD-10-PCS | Mod: CPTII,S$GLB,, | Performed by: INTERNAL MEDICINE

## 2021-03-05 PROCEDURE — 3077F PR MOST RECENT SYSTOLIC BLOOD PRESSURE >= 140 MM HG: ICD-10-PCS | Mod: CPTII,S$GLB,, | Performed by: INTERNAL MEDICINE

## 2021-03-05 PROCEDURE — 99214 PR OFFICE/OUTPT VISIT, EST, LEVL IV, 30-39 MIN: ICD-10-PCS | Mod: S$GLB,,, | Performed by: INTERNAL MEDICINE

## 2021-03-05 PROCEDURE — 99499 UNLISTED E&M SERVICE: CPT | Mod: S$GLB,,, | Performed by: INTERNAL MEDICINE

## 2021-03-05 PROCEDURE — 3079F DIAST BP 80-89 MM HG: CPT | Mod: CPTII,S$GLB,, | Performed by: INTERNAL MEDICINE

## 2021-03-05 PROCEDURE — 3008F BODY MASS INDEX DOCD: CPT | Mod: CPTII,S$GLB,, | Performed by: INTERNAL MEDICINE

## 2021-03-05 PROCEDURE — 1126F PR PAIN SEVERITY QUANTIFIED, NO PAIN PRESENT: ICD-10-PCS | Mod: S$GLB,,, | Performed by: INTERNAL MEDICINE

## 2021-03-05 PROCEDURE — 1126F AMNT PAIN NOTED NONE PRSNT: CPT | Mod: S$GLB,,, | Performed by: INTERNAL MEDICINE

## 2021-03-05 PROCEDURE — 99999 PR PBB SHADOW E&M-EST. PATIENT-LVL III: CPT | Mod: PBBFAC,,, | Performed by: INTERNAL MEDICINE

## 2021-03-05 PROCEDURE — 3077F SYST BP >= 140 MM HG: CPT | Mod: CPTII,S$GLB,, | Performed by: INTERNAL MEDICINE

## 2021-03-05 PROCEDURE — 1159F PR MEDICATION LIST DOCUMENTED IN MEDICAL RECORD: ICD-10-PCS | Mod: S$GLB,,, | Performed by: INTERNAL MEDICINE

## 2021-03-05 PROCEDURE — 99214 OFFICE O/P EST MOD 30 MIN: CPT | Mod: S$GLB,,, | Performed by: INTERNAL MEDICINE

## 2021-03-05 PROCEDURE — 93010 EKG 12-LEAD: ICD-10-PCS | Mod: ,,, | Performed by: INTERNAL MEDICINE

## 2021-03-05 PROCEDURE — 93010 ELECTROCARDIOGRAM REPORT: CPT | Mod: ,,, | Performed by: INTERNAL MEDICINE

## 2021-03-05 PROCEDURE — 93005 ELECTROCARDIOGRAM TRACING: CPT

## 2021-03-05 RX ORDER — ATENOLOL 25 MG/1
25 TABLET ORAL DAILY
Qty: 30 TABLET | Refills: 11 | Status: SHIPPED | OUTPATIENT
Start: 2021-03-05 | End: 2022-02-24 | Stop reason: SDUPTHER

## 2021-04-05 ENCOUNTER — PATIENT MESSAGE (OUTPATIENT)
Dept: INTERNAL MEDICINE | Facility: CLINIC | Age: 71
End: 2021-04-05

## 2021-04-05 ENCOUNTER — HOSPITAL ENCOUNTER (OUTPATIENT)
Dept: RADIOLOGY | Facility: HOSPITAL | Age: 71
Discharge: HOME | End: 2021-04-05
Attending: INTERNAL MEDICINE
Payer: MEDICARE

## 2021-04-05 ENCOUNTER — OFFICE VISIT (OUTPATIENT)
Dept: INTERNAL MEDICINE | Facility: CLINIC | Age: 71
End: 2021-04-05
Payer: MEDICARE

## 2021-04-05 VITALS
BODY MASS INDEX: 17.33 KG/M2 | SYSTOLIC BLOOD PRESSURE: 118 MMHG | TEMPERATURE: 98 F | HEART RATE: 68 BPM | DIASTOLIC BLOOD PRESSURE: 72 MMHG | OXYGEN SATURATION: 96 % | HEIGHT: 67 IN | WEIGHT: 110.44 LBS

## 2021-04-05 DIAGNOSIS — R10.10 PAIN OF UPPER ABDOMEN: ICD-10-CM

## 2021-04-05 DIAGNOSIS — E78.5 HYPERLIPIDEMIA LDL GOAL <70: ICD-10-CM

## 2021-04-05 DIAGNOSIS — I10 ESSENTIAL HYPERTENSION: ICD-10-CM

## 2021-04-05 DIAGNOSIS — K80.20 GALLSTONES: ICD-10-CM

## 2021-04-05 DIAGNOSIS — R10.10 PAIN OF UPPER ABDOMEN: Primary | ICD-10-CM

## 2021-04-05 PROCEDURE — 3078F DIAST BP <80 MM HG: CPT | Mod: CPTII,S$GLB,, | Performed by: INTERNAL MEDICINE

## 2021-04-05 PROCEDURE — 1126F PR PAIN SEVERITY QUANTIFIED, NO PAIN PRESENT: ICD-10-PCS | Mod: S$GLB,,, | Performed by: INTERNAL MEDICINE

## 2021-04-05 PROCEDURE — 74018 RADEX ABDOMEN 1 VIEW: CPT | Mod: TC

## 2021-04-05 PROCEDURE — 1101F PR PT FALLS ASSESS DOC 0-1 FALLS W/OUT INJ PAST YR: ICD-10-PCS | Mod: CPTII,S$GLB,, | Performed by: INTERNAL MEDICINE

## 2021-04-05 PROCEDURE — 99214 PR OFFICE/OUTPT VISIT, EST, LEVL IV, 30-39 MIN: ICD-10-PCS | Mod: S$GLB,,, | Performed by: INTERNAL MEDICINE

## 2021-04-05 PROCEDURE — 74018 XR ABDOMEN AP 1 VIEW: ICD-10-PCS | Mod: 26,,, | Performed by: RADIOLOGY

## 2021-04-05 PROCEDURE — 99999 PR PBB SHADOW E&M-EST. PATIENT-LVL V: ICD-10-PCS | Mod: PBBFAC,,, | Performed by: INTERNAL MEDICINE

## 2021-04-05 PROCEDURE — 1101F PT FALLS ASSESS-DOCD LE1/YR: CPT | Mod: CPTII,S$GLB,, | Performed by: INTERNAL MEDICINE

## 2021-04-05 PROCEDURE — 3288F PR FALLS RISK ASSESSMENT DOCUMENTED: ICD-10-PCS | Mod: CPTII,S$GLB,, | Performed by: INTERNAL MEDICINE

## 2021-04-05 PROCEDURE — 74018 RADEX ABDOMEN 1 VIEW: CPT | Mod: 26,,, | Performed by: RADIOLOGY

## 2021-04-05 PROCEDURE — 3008F PR BODY MASS INDEX (BMI) DOCUMENTED: ICD-10-PCS | Mod: CPTII,S$GLB,, | Performed by: INTERNAL MEDICINE

## 2021-04-05 PROCEDURE — 1159F MED LIST DOCD IN RCRD: CPT | Mod: S$GLB,,, | Performed by: INTERNAL MEDICINE

## 2021-04-05 PROCEDURE — 3078F PR MOST RECENT DIASTOLIC BLOOD PRESSURE < 80 MM HG: ICD-10-PCS | Mod: CPTII,S$GLB,, | Performed by: INTERNAL MEDICINE

## 2021-04-05 PROCEDURE — 1126F AMNT PAIN NOTED NONE PRSNT: CPT | Mod: S$GLB,,, | Performed by: INTERNAL MEDICINE

## 2021-04-05 PROCEDURE — 3008F BODY MASS INDEX DOCD: CPT | Mod: CPTII,S$GLB,, | Performed by: INTERNAL MEDICINE

## 2021-04-05 PROCEDURE — 99214 OFFICE O/P EST MOD 30 MIN: CPT | Mod: S$GLB,,, | Performed by: INTERNAL MEDICINE

## 2021-04-05 PROCEDURE — 3288F FALL RISK ASSESSMENT DOCD: CPT | Mod: CPTII,S$GLB,, | Performed by: INTERNAL MEDICINE

## 2021-04-05 PROCEDURE — 1159F PR MEDICATION LIST DOCUMENTED IN MEDICAL RECORD: ICD-10-PCS | Mod: S$GLB,,, | Performed by: INTERNAL MEDICINE

## 2021-04-05 PROCEDURE — 99999 PR PBB SHADOW E&M-EST. PATIENT-LVL V: CPT | Mod: PBBFAC,,, | Performed by: INTERNAL MEDICINE

## 2021-04-05 PROCEDURE — 3074F PR MOST RECENT SYSTOLIC BLOOD PRESSURE < 130 MM HG: ICD-10-PCS | Mod: CPTII,S$GLB,, | Performed by: INTERNAL MEDICINE

## 2021-04-05 PROCEDURE — 3074F SYST BP LT 130 MM HG: CPT | Mod: CPTII,S$GLB,, | Performed by: INTERNAL MEDICINE

## 2021-04-06 ENCOUNTER — TELEPHONE (OUTPATIENT)
Dept: GASTROENTEROLOGY | Facility: CLINIC | Age: 71
End: 2021-04-06

## 2021-04-06 ENCOUNTER — PATIENT MESSAGE (OUTPATIENT)
Dept: GASTROENTEROLOGY | Facility: CLINIC | Age: 71
End: 2021-04-06

## 2021-04-06 DIAGNOSIS — K50.80 CROHN'S DISEASE OF BOTH SMALL AND LARGE INTESTINE WITHOUT COMPLICATION: Primary | ICD-10-CM

## 2021-04-09 ENCOUNTER — OFFICE VISIT (OUTPATIENT)
Dept: URGENT CARE | Facility: CLINIC | Age: 71
End: 2021-04-09
Payer: MEDICARE

## 2021-04-09 ENCOUNTER — PATIENT MESSAGE (OUTPATIENT)
Dept: UROLOGY | Facility: CLINIC | Age: 71
End: 2021-04-09

## 2021-04-09 VITALS
DIASTOLIC BLOOD PRESSURE: 80 MMHG | SYSTOLIC BLOOD PRESSURE: 154 MMHG | HEART RATE: 91 BPM | BODY MASS INDEX: 17.02 KG/M2 | WEIGHT: 108.69 LBS | OXYGEN SATURATION: 95 % | TEMPERATURE: 99 F

## 2021-04-09 DIAGNOSIS — N30.01 ACUTE CYSTITIS WITH HEMATURIA: ICD-10-CM

## 2021-04-09 DIAGNOSIS — R30.0 DYSURIA: Primary | ICD-10-CM

## 2021-04-09 PROCEDURE — 81001 URINALYSIS AUTO W/SCOPE: CPT | Performed by: PHYSICIAN ASSISTANT

## 2021-04-09 PROCEDURE — 1159F PR MEDICATION LIST DOCUMENTED IN MEDICAL RECORD: ICD-10-PCS | Mod: S$GLB,,, | Performed by: PHYSICIAN ASSISTANT

## 2021-04-09 PROCEDURE — 99999 PR PBB SHADOW E&M-EST. PATIENT-LVL V: CPT | Mod: PBBFAC,,, | Performed by: PHYSICIAN ASSISTANT

## 2021-04-09 PROCEDURE — 3008F PR BODY MASS INDEX (BMI) DOCUMENTED: ICD-10-PCS | Mod: CPTII,S$GLB,, | Performed by: PHYSICIAN ASSISTANT

## 2021-04-09 PROCEDURE — 3008F BODY MASS INDEX DOCD: CPT | Mod: CPTII,S$GLB,, | Performed by: PHYSICIAN ASSISTANT

## 2021-04-09 PROCEDURE — 99214 OFFICE O/P EST MOD 30 MIN: CPT | Mod: S$GLB,,, | Performed by: PHYSICIAN ASSISTANT

## 2021-04-09 PROCEDURE — 99999 PR PBB SHADOW E&M-EST. PATIENT-LVL V: ICD-10-PCS | Mod: PBBFAC,,, | Performed by: PHYSICIAN ASSISTANT

## 2021-04-09 PROCEDURE — 1159F MED LIST DOCD IN RCRD: CPT | Mod: S$GLB,,, | Performed by: PHYSICIAN ASSISTANT

## 2021-04-09 PROCEDURE — 99214 PR OFFICE/OUTPT VISIT, EST, LEVL IV, 30-39 MIN: ICD-10-PCS | Mod: S$GLB,,, | Performed by: PHYSICIAN ASSISTANT

## 2021-04-09 PROCEDURE — 1125F PR PAIN SEVERITY QUANTIFIED, PAIN PRESENT: ICD-10-PCS | Mod: S$GLB,,, | Performed by: PHYSICIAN ASSISTANT

## 2021-04-09 PROCEDURE — 1125F AMNT PAIN NOTED PAIN PRSNT: CPT | Mod: S$GLB,,, | Performed by: PHYSICIAN ASSISTANT

## 2021-04-09 RX ORDER — NITROFURANTOIN 25; 75 MG/1; MG/1
100 CAPSULE ORAL 2 TIMES DAILY
Qty: 14 CAPSULE | Refills: 0 | Status: SHIPPED | OUTPATIENT
Start: 2021-04-09 | End: 2021-04-16

## 2021-04-10 LAB
BACTERIA #/AREA URNS AUTO: ABNORMAL /HPF
BILIRUB UR QL STRIP: NEGATIVE
CLARITY UR REFRACT.AUTO: CLEAR
COLOR UR AUTO: ABNORMAL
GLUCOSE UR QL STRIP: NEGATIVE
HGB UR QL STRIP: ABNORMAL
HYALINE CASTS UR QL AUTO: 3 /LPF
KETONES UR QL STRIP: NEGATIVE
LEUKOCYTE ESTERASE UR QL STRIP: ABNORMAL
MICROSCOPIC COMMENT: ABNORMAL
NITRITE UR QL STRIP: POSITIVE
NON-SQ EPI CELLS #/AREA URNS AUTO: 1 /HPF
PH UR STRIP: 7 [PH] (ref 5–8)
PROT UR QL STRIP: NEGATIVE
RBC #/AREA URNS AUTO: 1 /HPF (ref 0–4)
SP GR UR STRIP: 1 (ref 1–1.03)
URN SPEC COLLECT METH UR: ABNORMAL
WBC #/AREA URNS AUTO: 8 /HPF (ref 0–5)

## 2021-04-12 ENCOUNTER — PATIENT MESSAGE (OUTPATIENT)
Dept: UROLOGY | Facility: CLINIC | Age: 71
End: 2021-04-12

## 2021-04-15 ENCOUNTER — PATIENT MESSAGE (OUTPATIENT)
Dept: GASTROENTEROLOGY | Facility: CLINIC | Age: 71
End: 2021-04-15

## 2021-04-15 ENCOUNTER — LAB VISIT (OUTPATIENT)
Dept: LAB | Facility: HOSPITAL | Age: 71
End: 2021-04-15
Attending: INTERNAL MEDICINE
Payer: MEDICARE

## 2021-04-15 DIAGNOSIS — K50.80 CROHN'S DISEASE OF BOTH SMALL AND LARGE INTESTINE WITHOUT COMPLICATION: ICD-10-CM

## 2021-04-15 LAB
ALBUMIN SERPL BCP-MCNC: 4.1 G/DL (ref 3.5–5.2)
ALP SERPL-CCNC: 40 U/L (ref 55–135)
ALT SERPL W/O P-5'-P-CCNC: 13 U/L (ref 10–44)
ANION GAP SERPL CALC-SCNC: 11 MMOL/L (ref 8–16)
AST SERPL-CCNC: 17 U/L (ref 10–40)
BASOPHILS # BLD AUTO: 0.02 K/UL (ref 0–0.2)
BASOPHILS NFR BLD: 0.3 % (ref 0–1.9)
BILIRUB SERPL-MCNC: 0.4 MG/DL (ref 0.1–1)
BUN SERPL-MCNC: 18 MG/DL (ref 8–23)
CALCIUM SERPL-MCNC: 8.9 MG/DL (ref 8.7–10.5)
CHLORIDE SERPL-SCNC: 92 MMOL/L (ref 95–110)
CO2 SERPL-SCNC: 25 MMOL/L (ref 23–29)
CREAT SERPL-MCNC: 0.8 MG/DL (ref 0.5–1.4)
DIFFERENTIAL METHOD: ABNORMAL
EOSINOPHIL # BLD AUTO: 0 K/UL (ref 0–0.5)
EOSINOPHIL NFR BLD: 0.7 % (ref 0–8)
ERYTHROCYTE [DISTWIDTH] IN BLOOD BY AUTOMATED COUNT: 11.9 % (ref 11.5–14.5)
EST. GFR  (AFRICAN AMERICAN): >60 ML/MIN/1.73 M^2
EST. GFR  (NON AFRICAN AMERICAN): >60 ML/MIN/1.73 M^2
GLUCOSE SERPL-MCNC: 96 MG/DL (ref 70–110)
HCT VFR BLD AUTO: 35.6 % (ref 37–48.5)
HGB BLD-MCNC: 12.6 G/DL (ref 12–16)
IMM GRANULOCYTES # BLD AUTO: 0.02 K/UL (ref 0–0.04)
IMM GRANULOCYTES NFR BLD AUTO: 0.3 % (ref 0–0.5)
LYMPHOCYTES # BLD AUTO: 1.8 K/UL (ref 1–4.8)
LYMPHOCYTES NFR BLD: 31.3 % (ref 18–48)
MCH RBC QN AUTO: 33.8 PG (ref 27–31)
MCHC RBC AUTO-ENTMCNC: 35.4 G/DL (ref 32–36)
MCV RBC AUTO: 95 FL (ref 82–98)
MONOCYTES # BLD AUTO: 0.7 K/UL (ref 0.3–1)
MONOCYTES NFR BLD: 11.5 % (ref 4–15)
NEUTROPHILS # BLD AUTO: 3.2 K/UL (ref 1.8–7.7)
NEUTROPHILS NFR BLD: 55.9 % (ref 38–73)
NRBC BLD-RTO: 0 /100 WBC
PLATELET # BLD AUTO: 259 K/UL (ref 150–450)
PMV BLD AUTO: 8.4 FL (ref 9.2–12.9)
POTASSIUM SERPL-SCNC: 4.4 MMOL/L (ref 3.5–5.1)
PROT SERPL-MCNC: 7.5 G/DL (ref 6–8.4)
RBC # BLD AUTO: 3.73 M/UL (ref 4–5.4)
SODIUM SERPL-SCNC: 128 MMOL/L (ref 136–145)
WBC # BLD AUTO: 5.75 K/UL (ref 3.9–12.7)

## 2021-04-15 PROCEDURE — 80053 COMPREHEN METABOLIC PANEL: CPT | Performed by: NURSE PRACTITIONER

## 2021-04-15 PROCEDURE — 36415 COLL VENOUS BLD VENIPUNCTURE: CPT | Performed by: NURSE PRACTITIONER

## 2021-04-15 PROCEDURE — 85025 COMPLETE CBC W/AUTO DIFF WBC: CPT | Performed by: NURSE PRACTITIONER

## 2021-04-16 ENCOUNTER — PATIENT MESSAGE (OUTPATIENT)
Dept: GASTROENTEROLOGY | Facility: CLINIC | Age: 71
End: 2021-04-16

## 2021-04-19 ENCOUNTER — PATIENT MESSAGE (OUTPATIENT)
Dept: GASTROENTEROLOGY | Facility: CLINIC | Age: 71
End: 2021-04-19

## 2021-04-19 DIAGNOSIS — K52.9 IBD (INFLAMMATORY BOWEL DISEASE): Primary | ICD-10-CM

## 2021-04-20 ENCOUNTER — LAB VISIT (OUTPATIENT)
Dept: LAB | Facility: HOSPITAL | Age: 71
End: 2021-04-20
Attending: INTERNAL MEDICINE
Payer: MEDICARE

## 2021-04-20 ENCOUNTER — PATIENT OUTREACH (OUTPATIENT)
Dept: ADMINISTRATIVE | Facility: HOSPITAL | Age: 71
End: 2021-04-20

## 2021-04-20 DIAGNOSIS — K52.9 IBD (INFLAMMATORY BOWEL DISEASE): ICD-10-CM

## 2021-04-20 LAB
ANION GAP SERPL CALC-SCNC: 10 MMOL/L (ref 8–16)
BUN SERPL-MCNC: 12 MG/DL (ref 8–23)
CALCIUM SERPL-MCNC: 9 MG/DL (ref 8.7–10.5)
CHLORIDE SERPL-SCNC: 95 MMOL/L (ref 95–110)
CO2 SERPL-SCNC: 25 MMOL/L (ref 23–29)
CREAT SERPL-MCNC: 0.9 MG/DL (ref 0.5–1.4)
EST. GFR  (AFRICAN AMERICAN): >60 ML/MIN/1.73 M^2
EST. GFR  (NON AFRICAN AMERICAN): >60 ML/MIN/1.73 M^2
GLUCOSE SERPL-MCNC: 80 MG/DL (ref 70–110)
POTASSIUM SERPL-SCNC: 3.8 MMOL/L (ref 3.5–5.1)
SODIUM SERPL-SCNC: 130 MMOL/L (ref 136–145)

## 2021-04-20 PROCEDURE — 80048 BASIC METABOLIC PNL TOTAL CA: CPT | Performed by: INTERNAL MEDICINE

## 2021-04-20 PROCEDURE — 36415 COLL VENOUS BLD VENIPUNCTURE: CPT | Performed by: INTERNAL MEDICINE

## 2021-04-21 ENCOUNTER — PATIENT MESSAGE (OUTPATIENT)
Dept: GASTROENTEROLOGY | Facility: CLINIC | Age: 71
End: 2021-04-21

## 2021-04-26 ENCOUNTER — PATIENT MESSAGE (OUTPATIENT)
Dept: RHEUMATOLOGY | Facility: CLINIC | Age: 71
End: 2021-04-26

## 2021-04-26 ENCOUNTER — PATIENT MESSAGE (OUTPATIENT)
Dept: INTERNAL MEDICINE | Facility: CLINIC | Age: 71
End: 2021-04-26

## 2021-04-26 DIAGNOSIS — F51.04 CHRONIC INSOMNIA: ICD-10-CM

## 2021-04-26 DIAGNOSIS — F41.9 ANXIETY: ICD-10-CM

## 2021-04-26 RX ORDER — LEVOCETIRIZINE DIHYDROCHLORIDE 5 MG/1
5 TABLET, FILM COATED ORAL NIGHTLY
Qty: 90 TABLET | Refills: 3 | Status: SHIPPED | OUTPATIENT
Start: 2021-04-26 | End: 2022-07-25 | Stop reason: SDUPTHER

## 2021-04-28 ENCOUNTER — OFFICE VISIT (OUTPATIENT)
Dept: CARDIOLOGY | Facility: CLINIC | Age: 71
End: 2021-04-28
Payer: MEDICARE

## 2021-04-28 VITALS
SYSTOLIC BLOOD PRESSURE: 130 MMHG | BODY MASS INDEX: 17.13 KG/M2 | WEIGHT: 109.38 LBS | OXYGEN SATURATION: 97 % | HEART RATE: 69 BPM | DIASTOLIC BLOOD PRESSURE: 72 MMHG

## 2021-04-28 DIAGNOSIS — K50.90 CROHN'S DISEASE WITHOUT COMPLICATION, UNSPECIFIED GASTROINTESTINAL TRACT LOCATION: ICD-10-CM

## 2021-04-28 DIAGNOSIS — I70.0 ATHEROSCLEROSIS OF ABDOMINAL AORTA: ICD-10-CM

## 2021-04-28 DIAGNOSIS — F41.9 ANXIETY: ICD-10-CM

## 2021-04-28 DIAGNOSIS — I10 ESSENTIAL HYPERTENSION: Primary | ICD-10-CM

## 2021-04-28 DIAGNOSIS — E78.5 HYPERLIPIDEMIA LDL GOAL <70: ICD-10-CM

## 2021-04-28 DIAGNOSIS — F51.04 CHRONIC INSOMNIA: ICD-10-CM

## 2021-04-28 DIAGNOSIS — R94.31 ABNORMAL ECG: ICD-10-CM

## 2021-04-28 PROCEDURE — 99999 PR PBB SHADOW E&M-EST. PATIENT-LVL III: CPT | Mod: PBBFAC,,, | Performed by: NURSE PRACTITIONER

## 2021-04-28 PROCEDURE — 1159F PR MEDICATION LIST DOCUMENTED IN MEDICAL RECORD: ICD-10-PCS | Mod: S$GLB,,, | Performed by: NURSE PRACTITIONER

## 2021-04-28 PROCEDURE — 3008F BODY MASS INDEX DOCD: CPT | Mod: CPTII,S$GLB,, | Performed by: NURSE PRACTITIONER

## 2021-04-28 PROCEDURE — 3078F DIAST BP <80 MM HG: CPT | Mod: CPTII,S$GLB,, | Performed by: NURSE PRACTITIONER

## 2021-04-28 PROCEDURE — 3075F PR MOST RECENT SYSTOLIC BLOOD PRESS GE 130-139MM HG: ICD-10-PCS | Mod: CPTII,S$GLB,, | Performed by: NURSE PRACTITIONER

## 2021-04-28 PROCEDURE — 3078F PR MOST RECENT DIASTOLIC BLOOD PRESSURE < 80 MM HG: ICD-10-PCS | Mod: CPTII,S$GLB,, | Performed by: NURSE PRACTITIONER

## 2021-04-28 PROCEDURE — 3075F SYST BP GE 130 - 139MM HG: CPT | Mod: CPTII,S$GLB,, | Performed by: NURSE PRACTITIONER

## 2021-04-28 PROCEDURE — 99214 PR OFFICE/OUTPT VISIT, EST, LEVL IV, 30-39 MIN: ICD-10-PCS | Mod: S$GLB,,, | Performed by: NURSE PRACTITIONER

## 2021-04-28 PROCEDURE — 99214 OFFICE O/P EST MOD 30 MIN: CPT | Mod: S$GLB,,, | Performed by: NURSE PRACTITIONER

## 2021-04-28 PROCEDURE — 3008F PR BODY MASS INDEX (BMI) DOCUMENTED: ICD-10-PCS | Mod: CPTII,S$GLB,, | Performed by: NURSE PRACTITIONER

## 2021-04-28 PROCEDURE — 1159F MED LIST DOCD IN RCRD: CPT | Mod: S$GLB,,, | Performed by: NURSE PRACTITIONER

## 2021-04-28 PROCEDURE — 99999 PR PBB SHADOW E&M-EST. PATIENT-LVL III: ICD-10-PCS | Mod: PBBFAC,,, | Performed by: NURSE PRACTITIONER

## 2021-04-30 RX ORDER — LORAZEPAM 0.5 MG/1
1.5 TABLET ORAL NIGHTLY
Qty: 90 TABLET | Refills: 3 | OUTPATIENT
Start: 2021-04-30

## 2021-04-30 RX ORDER — LORAZEPAM 0.5 MG/1
1.5 TABLET ORAL NIGHTLY
Qty: 90 TABLET | Refills: 5 | Status: SHIPPED | OUTPATIENT
Start: 2021-04-30 | End: 2021-10-19 | Stop reason: SDUPTHER

## 2021-05-06 ENCOUNTER — TELEPHONE (OUTPATIENT)
Dept: CARDIOLOGY | Facility: CLINIC | Age: 71
End: 2021-05-06

## 2021-05-06 ENCOUNTER — PATIENT MESSAGE (OUTPATIENT)
Dept: CARDIOLOGY | Facility: CLINIC | Age: 71
End: 2021-05-06

## 2021-05-06 DIAGNOSIS — M79.604 PAIN IN BOTH LOWER EXTREMITIES: ICD-10-CM

## 2021-05-06 DIAGNOSIS — E78.5 HYPERLIPIDEMIA LDL GOAL <70: Primary | ICD-10-CM

## 2021-05-06 DIAGNOSIS — M79.605 PAIN IN BOTH LOWER EXTREMITIES: Primary | ICD-10-CM

## 2021-05-06 DIAGNOSIS — M79.604 PAIN IN BOTH LOWER EXTREMITIES: Primary | ICD-10-CM

## 2021-05-06 DIAGNOSIS — M79.605 PAIN IN BOTH LOWER EXTREMITIES: ICD-10-CM

## 2021-05-06 DIAGNOSIS — I70.0 ATHEROSCLEROSIS OF ABDOMINAL AORTA: ICD-10-CM

## 2021-05-06 RX ORDER — PRAVASTATIN SODIUM 10 MG/1
5 TABLET ORAL NIGHTLY
Qty: 30 TABLET | Refills: 3 | Status: SHIPPED | OUTPATIENT
Start: 2021-05-06 | End: 2021-07-12 | Stop reason: SINTOL

## 2021-05-09 ENCOUNTER — PATIENT MESSAGE (OUTPATIENT)
Dept: RHEUMATOLOGY | Facility: CLINIC | Age: 71
End: 2021-05-09

## 2021-05-10 ENCOUNTER — TELEPHONE (OUTPATIENT)
Dept: UROLOGY | Facility: CLINIC | Age: 71
End: 2021-05-10

## 2021-05-10 ENCOUNTER — PATIENT MESSAGE (OUTPATIENT)
Dept: RHEUMATOLOGY | Facility: CLINIC | Age: 71
End: 2021-05-10

## 2021-05-10 ENCOUNTER — PATIENT MESSAGE (OUTPATIENT)
Dept: UROLOGY | Facility: CLINIC | Age: 71
End: 2021-05-10

## 2021-05-13 ENCOUNTER — INFUSION (OUTPATIENT)
Dept: INFUSION THERAPY | Facility: HOSPITAL | Age: 71
End: 2021-05-13
Attending: INTERNAL MEDICINE
Payer: MEDICARE

## 2021-05-13 ENCOUNTER — HOSPITAL ENCOUNTER (OUTPATIENT)
Dept: RADIOLOGY | Facility: HOSPITAL | Age: 71
Discharge: HOME | End: 2021-05-13
Attending: INTERNAL MEDICINE
Payer: MEDICARE

## 2021-05-13 ENCOUNTER — OFFICE VISIT (OUTPATIENT)
Dept: RHEUMATOLOGY | Facility: CLINIC | Age: 71
End: 2021-05-13
Payer: MEDICARE

## 2021-05-13 ENCOUNTER — PATIENT MESSAGE (OUTPATIENT)
Dept: RHEUMATOLOGY | Facility: CLINIC | Age: 71
End: 2021-05-13

## 2021-05-13 VITALS
SYSTOLIC BLOOD PRESSURE: 135 MMHG | HEART RATE: 62 BPM | DIASTOLIC BLOOD PRESSURE: 79 MMHG | BODY MASS INDEX: 17.02 KG/M2 | WEIGHT: 108.69 LBS

## 2021-05-13 VITALS
HEART RATE: 61 BPM | DIASTOLIC BLOOD PRESSURE: 80 MMHG | OXYGEN SATURATION: 98 % | RESPIRATION RATE: 18 BRPM | SYSTOLIC BLOOD PRESSURE: 138 MMHG | HEIGHT: 67 IN | BODY MASS INDEX: 17.06 KG/M2 | WEIGHT: 108.69 LBS | TEMPERATURE: 98 F

## 2021-05-13 DIAGNOSIS — M54.9 DORSALGIA, UNSPECIFIED: ICD-10-CM

## 2021-05-13 DIAGNOSIS — M81.0 AGE-RELATED OSTEOPOROSIS WITHOUT CURRENT PATHOLOGICAL FRACTURE: Primary | ICD-10-CM

## 2021-05-13 DIAGNOSIS — Z51.81 MEDICATION MONITORING ENCOUNTER: ICD-10-CM

## 2021-05-13 DIAGNOSIS — M79.605 PAIN IN BOTH LOWER EXTREMITIES: ICD-10-CM

## 2021-05-13 DIAGNOSIS — M79.604 PAIN IN BOTH LOWER EXTREMITIES: ICD-10-CM

## 2021-05-13 DIAGNOSIS — K50.119 CROHN'S DISEASE OF COLON WITH COMPLICATION: ICD-10-CM

## 2021-05-13 PROCEDURE — 99215 PR OFFICE/OUTPT VISIT, EST, LEVL V, 40-54 MIN: ICD-10-PCS | Mod: 25,S$GLB,, | Performed by: INTERNAL MEDICINE

## 2021-05-13 PROCEDURE — 99499 RISK ADDL DX/OHS AUDIT: ICD-10-PCS | Mod: S$GLB,,, | Performed by: INTERNAL MEDICINE

## 2021-05-13 PROCEDURE — 1101F PR PT FALLS ASSESS DOC 0-1 FALLS W/OUT INJ PAST YR: ICD-10-PCS | Mod: CPTII,S$GLB,, | Performed by: INTERNAL MEDICINE

## 2021-05-13 PROCEDURE — 96372 THER/PROPH/DIAG INJ SC/IM: CPT | Mod: S$GLB,,, | Performed by: INTERNAL MEDICINE

## 2021-05-13 PROCEDURE — 99499 UNLISTED E&M SERVICE: CPT | Mod: S$GLB,,, | Performed by: INTERNAL MEDICINE

## 2021-05-13 PROCEDURE — 3288F FALL RISK ASSESSMENT DOCD: CPT | Mod: CPTII,S$GLB,, | Performed by: INTERNAL MEDICINE

## 2021-05-13 PROCEDURE — 1101F PT FALLS ASSESS-DOCD LE1/YR: CPT | Mod: CPTII,S$GLB,, | Performed by: INTERNAL MEDICINE

## 2021-05-13 PROCEDURE — 1125F AMNT PAIN NOTED PAIN PRSNT: CPT | Mod: S$GLB,,, | Performed by: INTERNAL MEDICINE

## 2021-05-13 PROCEDURE — 99999 PR PBB SHADOW E&M-EST. PATIENT-LVL IV: ICD-10-PCS | Mod: PBBFAC,,, | Performed by: INTERNAL MEDICINE

## 2021-05-13 PROCEDURE — 63600175 PHARM REV CODE 636 W HCPCS: Mod: JG | Performed by: INTERNAL MEDICINE

## 2021-05-13 PROCEDURE — 1159F MED LIST DOCD IN RCRD: CPT | Mod: S$GLB,,, | Performed by: INTERNAL MEDICINE

## 2021-05-13 PROCEDURE — 96372 PR INJECTION,THERAP/PROPH/DIAG2ST, IM OR SUBCUT: ICD-10-PCS | Mod: S$GLB,,, | Performed by: INTERNAL MEDICINE

## 2021-05-13 PROCEDURE — 72100 X-RAY EXAM L-S SPINE 2/3 VWS: CPT | Mod: 26,,, | Performed by: RADIOLOGY

## 2021-05-13 PROCEDURE — 1159F PR MEDICATION LIST DOCUMENTED IN MEDICAL RECORD: ICD-10-PCS | Mod: S$GLB,,, | Performed by: INTERNAL MEDICINE

## 2021-05-13 PROCEDURE — 3288F PR FALLS RISK ASSESSMENT DOCUMENTED: ICD-10-PCS | Mod: CPTII,S$GLB,, | Performed by: INTERNAL MEDICINE

## 2021-05-13 PROCEDURE — 96372 THER/PROPH/DIAG INJ SC/IM: CPT

## 2021-05-13 PROCEDURE — 3008F BODY MASS INDEX DOCD: CPT | Mod: CPTII,S$GLB,, | Performed by: INTERNAL MEDICINE

## 2021-05-13 PROCEDURE — 3008F PR BODY MASS INDEX (BMI) DOCUMENTED: ICD-10-PCS | Mod: CPTII,S$GLB,, | Performed by: INTERNAL MEDICINE

## 2021-05-13 PROCEDURE — 1125F PR PAIN SEVERITY QUANTIFIED, PAIN PRESENT: ICD-10-PCS | Mod: S$GLB,,, | Performed by: INTERNAL MEDICINE

## 2021-05-13 PROCEDURE — 72100 XR LUMBAR SPINE AP AND LATERAL: ICD-10-PCS | Mod: 26,,, | Performed by: RADIOLOGY

## 2021-05-13 PROCEDURE — 72100 X-RAY EXAM L-S SPINE 2/3 VWS: CPT | Mod: TC

## 2021-05-13 PROCEDURE — 99215 OFFICE O/P EST HI 40 MIN: CPT | Mod: 25,S$GLB,, | Performed by: INTERNAL MEDICINE

## 2021-05-13 PROCEDURE — 99999 PR PBB SHADOW E&M-EST. PATIENT-LVL IV: CPT | Mod: PBBFAC,,, | Performed by: INTERNAL MEDICINE

## 2021-05-13 RX ORDER — BETAMETHASONE SODIUM PHOSPHATE AND BETAMETHASONE ACETATE 3; 3 MG/ML; MG/ML
6 INJECTION, SUSPENSION INTRA-ARTICULAR; INTRALESIONAL; INTRAMUSCULAR; SOFT TISSUE
Status: COMPLETED | OUTPATIENT
Start: 2021-05-13 | End: 2021-05-13

## 2021-05-13 RX ADMIN — BETAMETHASONE SODIUM PHOSPHATE AND BETAMETHASONE ACETATE 6 MG: 3; 3 INJECTION, SUSPENSION INTRA-ARTICULAR; INTRALESIONAL; INTRAMUSCULAR; SOFT TISSUE at 01:05

## 2021-05-13 RX ADMIN — DENOSUMAB 60 MG: 60 INJECTION SUBCUTANEOUS at 02:05

## 2021-05-17 ENCOUNTER — PATIENT MESSAGE (OUTPATIENT)
Dept: PHARMACY | Facility: CLINIC | Age: 71
End: 2021-05-17

## 2021-05-24 ENCOUNTER — HOSPITAL ENCOUNTER (OUTPATIENT)
Dept: CARDIOLOGY | Facility: HOSPITAL | Age: 71
Discharge: HOME | End: 2021-05-24
Attending: INTERNAL MEDICINE
Payer: MEDICARE

## 2021-05-24 VITALS — BODY MASS INDEX: 16.95 KG/M2 | WEIGHT: 108 LBS | HEIGHT: 67 IN

## 2021-05-24 VITALS
WEIGHT: 108 LBS | BODY MASS INDEX: 16.95 KG/M2 | DIASTOLIC BLOOD PRESSURE: 80 MMHG | HEIGHT: 67 IN | SYSTOLIC BLOOD PRESSURE: 142 MMHG

## 2021-05-24 DIAGNOSIS — M79.604 PAIN IN BOTH LOWER EXTREMITIES: ICD-10-CM

## 2021-05-24 DIAGNOSIS — M79.605 PAIN IN BOTH LOWER EXTREMITIES: ICD-10-CM

## 2021-05-24 DIAGNOSIS — I70.0 ATHEROSCLEROSIS OF ABDOMINAL AORTA: ICD-10-CM

## 2021-05-24 LAB
LEFT ABI: 1.11
LEFT ANT TIBIAL SYS PSV: 66 CM/S
LEFT ARM BP: 142 MMHG
LEFT CFA PSV: 231 CM/S
LEFT DORSALIS PEDIS: 157 MMHG
LEFT PERONEAL SYS PSV: 53 CM/S
LEFT POPLITEAL PSV: 56 CM/S
LEFT POST TIBIAL SYS PSV: 62 CM/S
LEFT POSTERIOR TIBIAL: 150 MMHG
LEFT PROFUNDA SYS PSV: 98 CM/S
LEFT SUPER FEMORAL DIST SYS PSV: 143 CM/S
LEFT SUPER FEMORAL MID SYS PSV: 105 CM/S
LEFT SUPER FEMORAL OSTIAL SYS PSV: 123 CM/S
LEFT SUPER FEMORAL PROX SYS PSV: 69 CM/S
LEFT TBI: 0.63
LEFT TIB/PER TRUNK SYS PSV: 62 CM/S
LEFT TOE PRESSURE: 90 MMHG
OHS CV LEFT LOWER EXTREMITY ABI (NO CALC): 1.11
OHS CV RIGHT ABI LOWER EXTREMITY (NO CALC): 1.1
RIGHT ABI: 1.1
RIGHT ANT TIBIAL SYS PSV: 70 CM/S
RIGHT ARM BP: 134 MMHG
RIGHT CFA PSV: 153 CM/S
RIGHT DORSALIS PEDIS: 156 MMHG
RIGHT PERONEAL SYS PSV: 40 CM/S
RIGHT POPLITEAL PSV: 48 CM/S
RIGHT POST TIBIAL SYS PSV: 68 CM/S
RIGHT POSTERIOR TIBIAL: 154 MMHG
RIGHT PROFUNDA SYS PSV: 113 CM/S
RIGHT SUPER FEMORAL DIST SYS PSV: 92 CM/S
RIGHT SUPER FEMORAL MID SYS PSV: 88 CM/S
RIGHT SUPER FEMORAL OSTIAL SYS PSV: 107 CM/S
RIGHT SUPER FEMORAL PROX SYS PSV: 72 CM/S
RIGHT TBI: 0.83
RIGHT TIB/PER TRUNK SYS PSV: 55 CM/S
RIGHT TOE PRESSURE: 118 MMHG

## 2021-05-24 PROCEDURE — 93922 UPR/L XTREMITY ART 2 LEVELS: CPT

## 2021-05-24 PROCEDURE — 93925 CV US DOPPLER ARTERIAL LEGS BILATERAL (CUPID ONLY): ICD-10-PCS | Mod: 26,,, | Performed by: INTERNAL MEDICINE

## 2021-05-24 PROCEDURE — 93925 LOWER EXTREMITY STUDY: CPT

## 2021-05-24 PROCEDURE — 93922 ANKLE BRACHIAL INDICES (ABI): ICD-10-PCS | Mod: 26,,, | Performed by: INTERNAL MEDICINE

## 2021-05-24 PROCEDURE — 93925 LOWER EXTREMITY STUDY: CPT | Mod: 26,,, | Performed by: INTERNAL MEDICINE

## 2021-05-24 PROCEDURE — 93922 UPR/L XTREMITY ART 2 LEVELS: CPT | Mod: 26,,, | Performed by: INTERNAL MEDICINE

## 2021-05-27 ENCOUNTER — TELEPHONE (OUTPATIENT)
Dept: GASTROENTEROLOGY | Facility: CLINIC | Age: 71
End: 2021-05-27

## 2021-05-27 DIAGNOSIS — K52.9 IBD (INFLAMMATORY BOWEL DISEASE): Primary | ICD-10-CM

## 2021-05-28 ENCOUNTER — PATIENT MESSAGE (OUTPATIENT)
Dept: CARDIOLOGY | Facility: CLINIC | Age: 71
End: 2021-05-28

## 2021-06-02 ENCOUNTER — OFFICE VISIT (OUTPATIENT)
Dept: GASTROENTEROLOGY | Facility: CLINIC | Age: 71
End: 2021-06-02
Payer: MEDICARE

## 2021-06-02 VITALS
HEIGHT: 67 IN | WEIGHT: 107.38 LBS | SYSTOLIC BLOOD PRESSURE: 150 MMHG | DIASTOLIC BLOOD PRESSURE: 80 MMHG | OXYGEN SATURATION: 98 % | HEART RATE: 62 BPM | BODY MASS INDEX: 16.85 KG/M2

## 2021-06-02 DIAGNOSIS — R19.7 DIARRHEA, UNSPECIFIED TYPE: Primary | ICD-10-CM

## 2021-06-02 DIAGNOSIS — K50.90 CROHN'S DISEASE WITHOUT COMPLICATION, UNSPECIFIED GASTROINTESTINAL TRACT LOCATION: ICD-10-CM

## 2021-06-02 PROCEDURE — 99999 PR PBB SHADOW E&M-EST. PATIENT-LVL IV: CPT | Mod: PBBFAC,,, | Performed by: INTERNAL MEDICINE

## 2021-06-02 PROCEDURE — 99214 PR OFFICE/OUTPT VISIT, EST, LEVL IV, 30-39 MIN: ICD-10-PCS | Mod: S$GLB,,, | Performed by: INTERNAL MEDICINE

## 2021-06-02 PROCEDURE — 1159F MED LIST DOCD IN RCRD: CPT | Mod: S$GLB,,, | Performed by: INTERNAL MEDICINE

## 2021-06-02 PROCEDURE — 3008F BODY MASS INDEX DOCD: CPT | Mod: CPTII,S$GLB,, | Performed by: INTERNAL MEDICINE

## 2021-06-02 PROCEDURE — 1126F AMNT PAIN NOTED NONE PRSNT: CPT | Mod: S$GLB,,, | Performed by: INTERNAL MEDICINE

## 2021-06-02 PROCEDURE — 3008F PR BODY MASS INDEX (BMI) DOCUMENTED: ICD-10-PCS | Mod: CPTII,S$GLB,, | Performed by: INTERNAL MEDICINE

## 2021-06-02 PROCEDURE — 1101F PT FALLS ASSESS-DOCD LE1/YR: CPT | Mod: CPTII,S$GLB,, | Performed by: INTERNAL MEDICINE

## 2021-06-02 PROCEDURE — 3288F PR FALLS RISK ASSESSMENT DOCUMENTED: ICD-10-PCS | Mod: CPTII,S$GLB,, | Performed by: INTERNAL MEDICINE

## 2021-06-02 PROCEDURE — 3288F FALL RISK ASSESSMENT DOCD: CPT | Mod: CPTII,S$GLB,, | Performed by: INTERNAL MEDICINE

## 2021-06-02 PROCEDURE — 99999 PR PBB SHADOW E&M-EST. PATIENT-LVL IV: ICD-10-PCS | Mod: PBBFAC,,, | Performed by: INTERNAL MEDICINE

## 2021-06-02 PROCEDURE — 1126F PR PAIN SEVERITY QUANTIFIED, NO PAIN PRESENT: ICD-10-PCS | Mod: S$GLB,,, | Performed by: INTERNAL MEDICINE

## 2021-06-02 PROCEDURE — 1101F PR PT FALLS ASSESS DOC 0-1 FALLS W/OUT INJ PAST YR: ICD-10-PCS | Mod: CPTII,S$GLB,, | Performed by: INTERNAL MEDICINE

## 2021-06-02 PROCEDURE — 1159F PR MEDICATION LIST DOCUMENTED IN MEDICAL RECORD: ICD-10-PCS | Mod: S$GLB,,, | Performed by: INTERNAL MEDICINE

## 2021-06-02 PROCEDURE — 99214 OFFICE O/P EST MOD 30 MIN: CPT | Mod: S$GLB,,, | Performed by: INTERNAL MEDICINE

## 2021-06-03 ENCOUNTER — LAB VISIT (OUTPATIENT)
Dept: LAB | Facility: HOSPITAL | Age: 71
End: 2021-06-03
Attending: INTERNAL MEDICINE
Payer: MEDICARE

## 2021-06-03 ENCOUNTER — PATIENT MESSAGE (OUTPATIENT)
Dept: RHEUMATOLOGY | Facility: CLINIC | Age: 71
End: 2021-06-03

## 2021-06-03 DIAGNOSIS — R19.7 DIARRHEA, UNSPECIFIED TYPE: ICD-10-CM

## 2021-06-03 LAB
C DIFF GDH STL QL: NEGATIVE
C DIFF TOX A+B STL QL IA: NEGATIVE

## 2021-06-03 PROCEDURE — 87045 FECES CULTURE AEROBIC BACT: CPT | Performed by: INTERNAL MEDICINE

## 2021-06-03 PROCEDURE — 87324 CLOSTRIDIUM AG IA: CPT | Performed by: INTERNAL MEDICINE

## 2021-06-03 PROCEDURE — 87449 NOS EACH ORGANISM AG IA: CPT | Performed by: INTERNAL MEDICINE

## 2021-06-03 PROCEDURE — 87427 SHIGA-LIKE TOXIN AG IA: CPT | Performed by: INTERNAL MEDICINE

## 2021-06-03 PROCEDURE — 87046 STOOL CULTR AEROBIC BACT EA: CPT | Mod: 59 | Performed by: INTERNAL MEDICINE

## 2021-06-03 PROCEDURE — 83993 ASSAY FOR CALPROTECTIN FECAL: CPT | Performed by: INTERNAL MEDICINE

## 2021-06-03 PROCEDURE — 87209 SMEAR COMPLEX STAIN: CPT | Performed by: INTERNAL MEDICINE

## 2021-06-03 PROCEDURE — 87329 GIARDIA AG IA: CPT | Performed by: INTERNAL MEDICINE

## 2021-06-04 ENCOUNTER — PATIENT MESSAGE (OUTPATIENT)
Dept: GASTROENTEROLOGY | Facility: CLINIC | Age: 71
End: 2021-06-04

## 2021-06-04 LAB
CRYPTOSP AG STL QL IA: NEGATIVE
E COLI SXT1 STL QL IA: NEGATIVE
E COLI SXT2 STL QL IA: NEGATIVE
G LAMBLIA AG STL QL IA: NEGATIVE

## 2021-06-06 LAB — O+P STL MICRO: NORMAL

## 2021-06-07 ENCOUNTER — DOCUMENTATION ONLY (OUTPATIENT)
Dept: GASTROENTEROLOGY | Facility: CLINIC | Age: 71
End: 2021-06-07

## 2021-06-07 LAB — BACTERIA STL CULT: NORMAL

## 2021-06-08 LAB — CALPROTECTIN STL-MCNT: 48.3 MCG/G

## 2021-06-10 ENCOUNTER — PATIENT MESSAGE (OUTPATIENT)
Dept: GASTROENTEROLOGY | Facility: CLINIC | Age: 71
End: 2021-06-10

## 2021-06-15 RX ORDER — ONDANSETRON 4 MG/1
4 TABLET, ORALLY DISINTEGRATING ORAL EVERY 6 HOURS PRN
Qty: 42 TABLET | Refills: 2 | Status: SHIPPED | OUTPATIENT
Start: 2021-06-15 | End: 2021-07-15

## 2021-07-09 ENCOUNTER — TELEPHONE (OUTPATIENT)
Dept: RHEUMATOLOGY | Facility: CLINIC | Age: 71
End: 2021-07-09

## 2021-07-09 ENCOUNTER — LAB VISIT (OUTPATIENT)
Dept: LAB | Facility: HOSPITAL | Age: 71
End: 2021-07-09
Payer: MEDICARE

## 2021-07-09 DIAGNOSIS — K50.80 CROHN'S DISEASE OF BOTH SMALL AND LARGE INTESTINE WITHOUT COMPLICATION: ICD-10-CM

## 2021-07-09 DIAGNOSIS — M79.604 PAIN IN BOTH LOWER EXTREMITIES: ICD-10-CM

## 2021-07-09 DIAGNOSIS — I10 ESSENTIAL HYPERTENSION: ICD-10-CM

## 2021-07-09 DIAGNOSIS — E78.5 HYPERLIPIDEMIA LDL GOAL <70: ICD-10-CM

## 2021-07-09 DIAGNOSIS — M79.605 PAIN IN BOTH LOWER EXTREMITIES: ICD-10-CM

## 2021-07-09 LAB
ALBUMIN SERPL BCP-MCNC: 4.3 G/DL (ref 3.5–5.2)
ALP SERPL-CCNC: 38 U/L (ref 55–135)
ALT SERPL W/O P-5'-P-CCNC: 12 U/L (ref 10–44)
ANION GAP SERPL CALC-SCNC: 11 MMOL/L (ref 8–16)
AST SERPL-CCNC: 26 U/L (ref 10–40)
BASOPHILS # BLD AUTO: 0.03 K/UL (ref 0–0.2)
BASOPHILS NFR BLD: 0.6 % (ref 0–1.9)
BILIRUB SERPL-MCNC: 0.6 MG/DL (ref 0.1–1)
BUN SERPL-MCNC: 16 MG/DL (ref 8–23)
CALCIUM SERPL-MCNC: 9.5 MG/DL (ref 8.7–10.5)
CHLORIDE SERPL-SCNC: 100 MMOL/L (ref 95–110)
CHOLEST SERPL-MCNC: 196 MG/DL (ref 120–199)
CHOLEST/HDLC SERPL: 2.5 {RATIO} (ref 2–5)
CK SERPL-CCNC: 282 U/L (ref 20–180)
CO2 SERPL-SCNC: 25 MMOL/L (ref 23–29)
CREAT SERPL-MCNC: 0.8 MG/DL (ref 0.5–1.4)
CRP SERPL-MCNC: 0.5 MG/L (ref 0–8.2)
DIFFERENTIAL METHOD: ABNORMAL
EOSINOPHIL # BLD AUTO: 0.1 K/UL (ref 0–0.5)
EOSINOPHIL NFR BLD: 1.7 % (ref 0–8)
ERYTHROCYTE [DISTWIDTH] IN BLOOD BY AUTOMATED COUNT: 12.2 % (ref 11.5–14.5)
ERYTHROCYTE [SEDIMENTATION RATE] IN BLOOD BY WESTERGREN METHOD: 13 MM/HR (ref 0–20)
EST. GFR  (AFRICAN AMERICAN): >60 ML/MIN/1.73 M^2
EST. GFR  (NON AFRICAN AMERICAN): >60 ML/MIN/1.73 M^2
GLUCOSE SERPL-MCNC: 90 MG/DL (ref 70–110)
HCT VFR BLD AUTO: 38.8 % (ref 37–48.5)
HDLC SERPL-MCNC: 79 MG/DL (ref 40–75)
HDLC SERPL: 40.3 % (ref 20–50)
HGB BLD-MCNC: 13.5 G/DL (ref 12–16)
IMM GRANULOCYTES # BLD AUTO: 0 K/UL (ref 0–0.04)
IMM GRANULOCYTES NFR BLD AUTO: 0 % (ref 0–0.5)
LDLC SERPL CALC-MCNC: 103.4 MG/DL (ref 63–159)
LYMPHOCYTES # BLD AUTO: 2.8 K/UL (ref 1–4.8)
LYMPHOCYTES NFR BLD: 51.6 % (ref 18–48)
MCH RBC QN AUTO: 33.5 PG (ref 27–31)
MCHC RBC AUTO-ENTMCNC: 34.8 G/DL (ref 32–36)
MCV RBC AUTO: 96 FL (ref 82–98)
MONOCYTES # BLD AUTO: 0.6 K/UL (ref 0.3–1)
MONOCYTES NFR BLD: 11.5 % (ref 4–15)
NEUTROPHILS # BLD AUTO: 1.9 K/UL (ref 1.8–7.7)
NEUTROPHILS NFR BLD: 34.6 % (ref 38–73)
NONHDLC SERPL-MCNC: 117 MG/DL
NRBC BLD-RTO: 0 /100 WBC
PLATELET # BLD AUTO: 233 K/UL (ref 150–450)
PMV BLD AUTO: 8.5 FL (ref 9.2–12.9)
POTASSIUM SERPL-SCNC: 4 MMOL/L (ref 3.5–5.1)
PROT SERPL-MCNC: 7.8 G/DL (ref 6–8.4)
RBC # BLD AUTO: 4.03 M/UL (ref 4–5.4)
SODIUM SERPL-SCNC: 136 MMOL/L (ref 136–145)
TRIGL SERPL-MCNC: 68 MG/DL (ref 30–150)
WBC # BLD AUTO: 5.41 K/UL (ref 3.9–12.7)

## 2021-07-09 PROCEDURE — 82085 ASSAY OF ALDOLASE: CPT | Performed by: INTERNAL MEDICINE

## 2021-07-09 PROCEDURE — 80061 LIPID PANEL: CPT | Performed by: INTERNAL MEDICINE

## 2021-07-09 PROCEDURE — 85651 RBC SED RATE NONAUTOMATED: CPT | Performed by: INTERNAL MEDICINE

## 2021-07-09 PROCEDURE — 80053 COMPREHEN METABOLIC PANEL: CPT | Performed by: NURSE PRACTITIONER

## 2021-07-09 PROCEDURE — 82550 ASSAY OF CK (CPK): CPT | Performed by: INTERNAL MEDICINE

## 2021-07-09 PROCEDURE — 86140 C-REACTIVE PROTEIN: CPT | Performed by: INTERNAL MEDICINE

## 2021-07-09 PROCEDURE — 36415 COLL VENOUS BLD VENIPUNCTURE: CPT | Performed by: INTERNAL MEDICINE

## 2021-07-09 PROCEDURE — 85025 COMPLETE CBC W/AUTO DIFF WBC: CPT | Performed by: NURSE PRACTITIONER

## 2021-07-11 ENCOUNTER — PATIENT MESSAGE (OUTPATIENT)
Dept: CARDIOLOGY | Facility: CLINIC | Age: 71
End: 2021-07-11

## 2021-07-12 ENCOUNTER — TELEPHONE (OUTPATIENT)
Dept: RHEUMATOLOGY | Facility: CLINIC | Age: 71
End: 2021-07-12

## 2021-07-12 DIAGNOSIS — Z51.81 MEDICATION MONITORING ENCOUNTER: Primary | ICD-10-CM

## 2021-07-12 LAB — ALDOLASE SERPL-CCNC: 2.7 U/L (ref 1.2–7.6)

## 2021-07-15 ENCOUNTER — PATIENT MESSAGE (OUTPATIENT)
Dept: INTERNAL MEDICINE | Facility: CLINIC | Age: 71
End: 2021-07-15

## 2021-07-27 ENCOUNTER — PATIENT MESSAGE (OUTPATIENT)
Dept: INTERNAL MEDICINE | Facility: CLINIC | Age: 71
End: 2021-07-27

## 2021-07-27 DIAGNOSIS — S39.012D BACK STRAIN, SUBSEQUENT ENCOUNTER: Primary | ICD-10-CM

## 2021-07-28 RX ORDER — CYCLOBENZAPRINE HCL 5 MG
5 TABLET ORAL 3 TIMES DAILY PRN
Qty: 30 TABLET | Refills: 0 | Status: SHIPPED | OUTPATIENT
Start: 2021-07-28 | End: 2021-08-08

## 2021-07-30 ENCOUNTER — TELEPHONE (OUTPATIENT)
Dept: INTERNAL MEDICINE | Facility: CLINIC | Age: 71
End: 2021-07-30

## 2021-08-03 ENCOUNTER — CLINICAL SUPPORT (OUTPATIENT)
Dept: REHABILITATION | Facility: HOSPITAL | Age: 71
End: 2021-08-03
Attending: INTERNAL MEDICINE
Payer: MEDICARE

## 2021-08-03 DIAGNOSIS — R29.898 WEAKNESS OF BOTH LOWER EXTREMITIES: ICD-10-CM

## 2021-08-03 DIAGNOSIS — M53.86 DECREASED ROM OF LUMBAR SPINE: ICD-10-CM

## 2021-08-03 DIAGNOSIS — S39.012D BACK STRAIN, SUBSEQUENT ENCOUNTER: ICD-10-CM

## 2021-08-03 PROCEDURE — 97110 THERAPEUTIC EXERCISES: CPT

## 2021-08-03 PROCEDURE — 97161 PT EVAL LOW COMPLEX 20 MIN: CPT

## 2021-08-06 ENCOUNTER — TELEPHONE (OUTPATIENT)
Dept: RHEUMATOLOGY | Facility: CLINIC | Age: 71
End: 2021-08-06

## 2021-08-10 ENCOUNTER — CLINICAL SUPPORT (OUTPATIENT)
Dept: REHABILITATION | Facility: HOSPITAL | Age: 71
End: 2021-08-10
Payer: MEDICARE

## 2021-08-10 DIAGNOSIS — R29.898 WEAKNESS OF BOTH LOWER EXTREMITIES: ICD-10-CM

## 2021-08-10 DIAGNOSIS — M53.86 DECREASED ROM OF LUMBAR SPINE: ICD-10-CM

## 2021-08-10 PROCEDURE — 97014 ELECTRIC STIMULATION THERAPY: CPT

## 2021-08-10 PROCEDURE — 97110 THERAPEUTIC EXERCISES: CPT

## 2021-08-10 PROCEDURE — 97140 MANUAL THERAPY 1/> REGIONS: CPT

## 2021-08-13 ENCOUNTER — CLINICAL SUPPORT (OUTPATIENT)
Dept: REHABILITATION | Facility: HOSPITAL | Age: 71
End: 2021-08-13
Payer: MEDICARE

## 2021-08-13 DIAGNOSIS — R29.898 WEAKNESS OF BOTH LOWER EXTREMITIES: ICD-10-CM

## 2021-08-13 DIAGNOSIS — M53.86 DECREASED ROM OF LUMBAR SPINE: ICD-10-CM

## 2021-08-13 PROCEDURE — 97140 MANUAL THERAPY 1/> REGIONS: CPT

## 2021-08-13 PROCEDURE — 97110 THERAPEUTIC EXERCISES: CPT

## 2021-08-13 PROCEDURE — 97014 ELECTRIC STIMULATION THERAPY: CPT

## 2021-08-17 ENCOUNTER — CLINICAL SUPPORT (OUTPATIENT)
Dept: REHABILITATION | Facility: HOSPITAL | Age: 71
End: 2021-08-17
Payer: MEDICARE

## 2021-08-17 DIAGNOSIS — R29.898 WEAKNESS OF BOTH LOWER EXTREMITIES: ICD-10-CM

## 2021-08-17 DIAGNOSIS — M53.86 DECREASED ROM OF LUMBAR SPINE: ICD-10-CM

## 2021-08-17 PROCEDURE — 97014 ELECTRIC STIMULATION THERAPY: CPT

## 2021-08-17 PROCEDURE — 97110 THERAPEUTIC EXERCISES: CPT

## 2021-08-24 ENCOUNTER — CLINICAL SUPPORT (OUTPATIENT)
Dept: REHABILITATION | Facility: HOSPITAL | Age: 71
End: 2021-08-24
Payer: MEDICARE

## 2021-08-24 DIAGNOSIS — R29.898 WEAKNESS OF BOTH LOWER EXTREMITIES: ICD-10-CM

## 2021-08-24 DIAGNOSIS — M53.86 DECREASED ROM OF LUMBAR SPINE: ICD-10-CM

## 2021-08-24 PROCEDURE — 97014 ELECTRIC STIMULATION THERAPY: CPT

## 2021-08-24 PROCEDURE — 97110 THERAPEUTIC EXERCISES: CPT

## 2021-09-02 ENCOUNTER — PATIENT MESSAGE (OUTPATIENT)
Dept: GASTROENTEROLOGY | Facility: CLINIC | Age: 71
End: 2021-09-02

## 2021-09-06 ENCOUNTER — PATIENT MESSAGE (OUTPATIENT)
Dept: GASTROENTEROLOGY | Facility: CLINIC | Age: 71
End: 2021-09-06

## 2021-09-07 ENCOUNTER — CLINICAL SUPPORT (OUTPATIENT)
Dept: REHABILITATION | Facility: HOSPITAL | Age: 71
End: 2021-09-07
Payer: MEDICARE

## 2021-09-07 DIAGNOSIS — R29.898 WEAKNESS OF BOTH LOWER EXTREMITIES: ICD-10-CM

## 2021-09-07 DIAGNOSIS — M53.86 DECREASED ROM OF LUMBAR SPINE: ICD-10-CM

## 2021-09-07 PROCEDURE — 97110 THERAPEUTIC EXERCISES: CPT

## 2021-09-07 PROCEDURE — 97140 MANUAL THERAPY 1/> REGIONS: CPT

## 2021-09-07 PROCEDURE — 97014 ELECTRIC STIMULATION THERAPY: CPT

## 2021-09-10 ENCOUNTER — PATIENT OUTREACH (OUTPATIENT)
Dept: ADMINISTRATIVE | Facility: OTHER | Age: 71
End: 2021-09-10

## 2021-09-10 ENCOUNTER — CLINICAL SUPPORT (OUTPATIENT)
Dept: REHABILITATION | Facility: HOSPITAL | Age: 71
End: 2021-09-10
Payer: MEDICARE

## 2021-09-10 DIAGNOSIS — R29.898 WEAKNESS OF BOTH LOWER EXTREMITIES: ICD-10-CM

## 2021-09-10 DIAGNOSIS — M53.86 DECREASED ROM OF LUMBAR SPINE: ICD-10-CM

## 2021-09-10 PROCEDURE — 97110 THERAPEUTIC EXERCISES: CPT

## 2021-09-10 PROCEDURE — 97014 ELECTRIC STIMULATION THERAPY: CPT

## 2021-09-13 ENCOUNTER — DOCUMENTATION ONLY (OUTPATIENT)
Dept: GASTROENTEROLOGY | Facility: CLINIC | Age: 71
End: 2021-09-13

## 2021-09-13 ENCOUNTER — OFFICE VISIT (OUTPATIENT)
Dept: GASTROENTEROLOGY | Facility: CLINIC | Age: 71
End: 2021-09-13
Payer: MEDICARE

## 2021-09-13 VITALS
DIASTOLIC BLOOD PRESSURE: 80 MMHG | HEIGHT: 67 IN | WEIGHT: 104.94 LBS | BODY MASS INDEX: 16.47 KG/M2 | SYSTOLIC BLOOD PRESSURE: 140 MMHG

## 2021-09-13 DIAGNOSIS — K50.00 CROHN'S DISEASE OF SMALL INTESTINE WITHOUT COMPLICATION: Primary | ICD-10-CM

## 2021-09-13 DIAGNOSIS — R19.7 DIARRHEA, UNSPECIFIED TYPE: ICD-10-CM

## 2021-09-13 PROCEDURE — 99999 PR PBB SHADOW E&M-EST. PATIENT-LVL IV: ICD-10-PCS | Mod: PBBFAC,,, | Performed by: INTERNAL MEDICINE

## 2021-09-13 PROCEDURE — 3008F BODY MASS INDEX DOCD: CPT | Mod: CPTII,S$GLB,, | Performed by: INTERNAL MEDICINE

## 2021-09-13 PROCEDURE — 3079F PR MOST RECENT DIASTOLIC BLOOD PRESSURE 80-89 MM HG: ICD-10-PCS | Mod: CPTII,S$GLB,, | Performed by: INTERNAL MEDICINE

## 2021-09-13 PROCEDURE — 99499 UNLISTED E&M SERVICE: CPT | Mod: S$GLB,,, | Performed by: INTERNAL MEDICINE

## 2021-09-13 PROCEDURE — 3077F PR MOST RECENT SYSTOLIC BLOOD PRESSURE >= 140 MM HG: ICD-10-PCS | Mod: CPTII,S$GLB,, | Performed by: INTERNAL MEDICINE

## 2021-09-13 PROCEDURE — 3079F DIAST BP 80-89 MM HG: CPT | Mod: CPTII,S$GLB,, | Performed by: INTERNAL MEDICINE

## 2021-09-13 PROCEDURE — 1159F MED LIST DOCD IN RCRD: CPT | Mod: CPTII,S$GLB,, | Performed by: INTERNAL MEDICINE

## 2021-09-13 PROCEDURE — 3008F PR BODY MASS INDEX (BMI) DOCUMENTED: ICD-10-PCS | Mod: CPTII,S$GLB,, | Performed by: INTERNAL MEDICINE

## 2021-09-13 PROCEDURE — 99999 PR PBB SHADOW E&M-EST. PATIENT-LVL IV: CPT | Mod: PBBFAC,,, | Performed by: INTERNAL MEDICINE

## 2021-09-13 PROCEDURE — 3077F SYST BP >= 140 MM HG: CPT | Mod: CPTII,S$GLB,, | Performed by: INTERNAL MEDICINE

## 2021-09-13 PROCEDURE — 1159F PR MEDICATION LIST DOCUMENTED IN MEDICAL RECORD: ICD-10-PCS | Mod: CPTII,S$GLB,, | Performed by: INTERNAL MEDICINE

## 2021-09-13 PROCEDURE — 99213 OFFICE O/P EST LOW 20 MIN: CPT | Mod: S$GLB,,, | Performed by: INTERNAL MEDICINE

## 2021-09-13 PROCEDURE — 1126F AMNT PAIN NOTED NONE PRSNT: CPT | Mod: CPTII,S$GLB,, | Performed by: INTERNAL MEDICINE

## 2021-09-13 PROCEDURE — 99499 RISK ADDL DX/OHS AUDIT: ICD-10-PCS | Mod: S$GLB,,, | Performed by: INTERNAL MEDICINE

## 2021-09-13 PROCEDURE — 99213 PR OFFICE/OUTPT VISIT, EST, LEVL III, 20-29 MIN: ICD-10-PCS | Mod: S$GLB,,, | Performed by: INTERNAL MEDICINE

## 2021-09-13 PROCEDURE — 1126F PR PAIN SEVERITY QUANTIFIED, NO PAIN PRESENT: ICD-10-PCS | Mod: CPTII,S$GLB,, | Performed by: INTERNAL MEDICINE

## 2021-09-14 ENCOUNTER — CLINICAL SUPPORT (OUTPATIENT)
Dept: REHABILITATION | Facility: HOSPITAL | Age: 71
End: 2021-09-14
Payer: MEDICARE

## 2021-09-14 DIAGNOSIS — R29.898 WEAKNESS OF BOTH LOWER EXTREMITIES: ICD-10-CM

## 2021-09-14 DIAGNOSIS — M53.86 DECREASED ROM OF LUMBAR SPINE: ICD-10-CM

## 2021-09-14 PROCEDURE — 97014 ELECTRIC STIMULATION THERAPY: CPT

## 2021-09-14 PROCEDURE — 97110 THERAPEUTIC EXERCISES: CPT

## 2021-09-17 ENCOUNTER — CLINICAL SUPPORT (OUTPATIENT)
Dept: REHABILITATION | Facility: HOSPITAL | Age: 71
End: 2021-09-17
Payer: MEDICARE

## 2021-09-17 DIAGNOSIS — M53.86 DECREASED ROM OF LUMBAR SPINE: ICD-10-CM

## 2021-09-17 DIAGNOSIS — R29.898 WEAKNESS OF BOTH LOWER EXTREMITIES: ICD-10-CM

## 2021-09-17 PROCEDURE — 97014 ELECTRIC STIMULATION THERAPY: CPT

## 2021-09-17 PROCEDURE — 97140 MANUAL THERAPY 1/> REGIONS: CPT

## 2021-09-17 PROCEDURE — 97110 THERAPEUTIC EXERCISES: CPT

## 2021-09-21 ENCOUNTER — PATIENT MESSAGE (OUTPATIENT)
Dept: GASTROENTEROLOGY | Facility: CLINIC | Age: 71
End: 2021-09-21

## 2021-09-21 ENCOUNTER — CLINICAL SUPPORT (OUTPATIENT)
Dept: REHABILITATION | Facility: HOSPITAL | Age: 71
End: 2021-09-21
Payer: MEDICARE

## 2021-09-21 DIAGNOSIS — M53.86 DECREASED ROM OF LUMBAR SPINE: ICD-10-CM

## 2021-09-21 DIAGNOSIS — R29.898 WEAKNESS OF BOTH LOWER EXTREMITIES: ICD-10-CM

## 2021-09-21 PROCEDURE — 97014 ELECTRIC STIMULATION THERAPY: CPT

## 2021-09-21 PROCEDURE — 97110 THERAPEUTIC EXERCISES: CPT

## 2021-09-21 PROCEDURE — 97140 MANUAL THERAPY 1/> REGIONS: CPT

## 2021-09-22 RX ORDER — ADALIMUMAB 40MG/0.4ML
40 KIT SUBCUTANEOUS
Qty: 2 PEN | Refills: 11 | Status: SHIPPED | OUTPATIENT
Start: 2021-09-22 | End: 2021-10-01

## 2021-09-24 ENCOUNTER — CLINICAL SUPPORT (OUTPATIENT)
Dept: REHABILITATION | Facility: HOSPITAL | Age: 71
End: 2021-09-24
Payer: MEDICARE

## 2021-09-24 DIAGNOSIS — M53.86 DECREASED ROM OF LUMBAR SPINE: ICD-10-CM

## 2021-09-24 DIAGNOSIS — R29.898 WEAKNESS OF BOTH LOWER EXTREMITIES: ICD-10-CM

## 2021-09-24 PROCEDURE — 97110 THERAPEUTIC EXERCISES: CPT

## 2021-09-24 PROCEDURE — 97014 ELECTRIC STIMULATION THERAPY: CPT

## 2021-09-24 PROCEDURE — 97124 MASSAGE THERAPY: CPT

## 2021-09-28 ENCOUNTER — CLINICAL SUPPORT (OUTPATIENT)
Dept: REHABILITATION | Facility: HOSPITAL | Age: 71
End: 2021-09-28
Payer: MEDICARE

## 2021-09-28 DIAGNOSIS — M53.86 DECREASED ROM OF LUMBAR SPINE: ICD-10-CM

## 2021-09-28 DIAGNOSIS — R29.898 WEAKNESS OF BOTH LOWER EXTREMITIES: ICD-10-CM

## 2021-09-28 PROCEDURE — 97110 THERAPEUTIC EXERCISES: CPT

## 2021-09-28 PROCEDURE — 97014 ELECTRIC STIMULATION THERAPY: CPT

## 2021-09-30 ENCOUNTER — PATIENT MESSAGE (OUTPATIENT)
Dept: GASTROENTEROLOGY | Facility: CLINIC | Age: 71
End: 2021-09-30

## 2021-09-30 DIAGNOSIS — K50.00 CROHN'S DISEASE OF SMALL INTESTINE WITHOUT COMPLICATION: Primary | ICD-10-CM

## 2021-10-01 ENCOUNTER — CLINICAL SUPPORT (OUTPATIENT)
Dept: REHABILITATION | Facility: HOSPITAL | Age: 71
End: 2021-10-01
Payer: MEDICARE

## 2021-10-01 ENCOUNTER — PATIENT MESSAGE (OUTPATIENT)
Dept: GASTROENTEROLOGY | Facility: CLINIC | Age: 71
End: 2021-10-01

## 2021-10-01 DIAGNOSIS — M53.86 DECREASED ROM OF LUMBAR SPINE: ICD-10-CM

## 2021-10-01 DIAGNOSIS — R29.898 WEAKNESS OF BOTH LOWER EXTREMITIES: ICD-10-CM

## 2021-10-01 PROCEDURE — 97140 MANUAL THERAPY 1/> REGIONS: CPT

## 2021-10-01 PROCEDURE — 97110 THERAPEUTIC EXERCISES: CPT

## 2021-10-01 PROCEDURE — 97014 ELECTRIC STIMULATION THERAPY: CPT

## 2021-10-01 RX ORDER — ADALIMUMAB 40MG/0.4ML
40 KIT SUBCUTANEOUS
Qty: 6 PEN | Refills: 3 | Status: SHIPPED | OUTPATIENT
Start: 2021-10-01 | End: 2023-11-16 | Stop reason: SDUPTHER

## 2021-10-05 ENCOUNTER — CLINICAL SUPPORT (OUTPATIENT)
Dept: REHABILITATION | Facility: HOSPITAL | Age: 71
End: 2021-10-05
Payer: MEDICARE

## 2021-10-05 DIAGNOSIS — R29.898 WEAKNESS OF BOTH LOWER EXTREMITIES: ICD-10-CM

## 2021-10-05 DIAGNOSIS — M53.86 DECREASED ROM OF LUMBAR SPINE: ICD-10-CM

## 2021-10-05 PROCEDURE — 97110 THERAPEUTIC EXERCISES: CPT | Mod: KX

## 2021-10-05 PROCEDURE — 97140 MANUAL THERAPY 1/> REGIONS: CPT | Mod: KX

## 2021-10-06 RX ORDER — AMLODIPINE BESYLATE 10 MG/1
TABLET ORAL
Qty: 90 TABLET | Refills: 1 | Status: SHIPPED | OUTPATIENT
Start: 2021-10-06 | End: 2022-04-05 | Stop reason: SDUPTHER

## 2021-10-08 ENCOUNTER — CLINICAL SUPPORT (OUTPATIENT)
Dept: REHABILITATION | Facility: HOSPITAL | Age: 71
End: 2021-10-08
Payer: MEDICARE

## 2021-10-08 DIAGNOSIS — R29.898 WEAKNESS OF BOTH LOWER EXTREMITIES: ICD-10-CM

## 2021-10-08 DIAGNOSIS — M53.86 DECREASED ROM OF LUMBAR SPINE: ICD-10-CM

## 2021-10-08 PROCEDURE — 97140 MANUAL THERAPY 1/> REGIONS: CPT

## 2021-10-08 PROCEDURE — 97014 ELECTRIC STIMULATION THERAPY: CPT

## 2021-10-08 PROCEDURE — 97110 THERAPEUTIC EXERCISES: CPT

## 2021-10-12 ENCOUNTER — CLINICAL SUPPORT (OUTPATIENT)
Dept: REHABILITATION | Facility: HOSPITAL | Age: 71
End: 2021-10-12
Payer: MEDICARE

## 2021-10-12 DIAGNOSIS — R29.898 WEAKNESS OF BOTH LOWER EXTREMITIES: ICD-10-CM

## 2021-10-12 DIAGNOSIS — M53.86 DECREASED ROM OF LUMBAR SPINE: ICD-10-CM

## 2021-10-12 PROCEDURE — 97140 MANUAL THERAPY 1/> REGIONS: CPT

## 2021-10-12 PROCEDURE — 97110 THERAPEUTIC EXERCISES: CPT

## 2021-10-15 ENCOUNTER — CLINICAL SUPPORT (OUTPATIENT)
Dept: REHABILITATION | Facility: HOSPITAL | Age: 71
End: 2021-10-15
Payer: MEDICARE

## 2021-10-15 DIAGNOSIS — M53.86 DECREASED ROM OF LUMBAR SPINE: ICD-10-CM

## 2021-10-15 DIAGNOSIS — R29.898 WEAKNESS OF BOTH LOWER EXTREMITIES: ICD-10-CM

## 2021-10-15 PROCEDURE — 97110 THERAPEUTIC EXERCISES: CPT

## 2021-10-15 PROCEDURE — 97140 MANUAL THERAPY 1/> REGIONS: CPT

## 2021-10-19 ENCOUNTER — OFFICE VISIT (OUTPATIENT)
Dept: INTERNAL MEDICINE | Facility: CLINIC | Age: 71
End: 2021-10-19
Payer: MEDICARE

## 2021-10-19 ENCOUNTER — CLINICAL SUPPORT (OUTPATIENT)
Dept: REHABILITATION | Facility: HOSPITAL | Age: 71
End: 2021-10-19
Payer: MEDICARE

## 2021-10-19 VITALS
BODY MASS INDEX: 16.58 KG/M2 | DIASTOLIC BLOOD PRESSURE: 88 MMHG | OXYGEN SATURATION: 96 % | SYSTOLIC BLOOD PRESSURE: 138 MMHG | TEMPERATURE: 99 F | HEIGHT: 67 IN | HEART RATE: 86 BPM | WEIGHT: 105.63 LBS

## 2021-10-19 DIAGNOSIS — R29.898 WEAKNESS OF BOTH LOWER EXTREMITIES: ICD-10-CM

## 2021-10-19 DIAGNOSIS — T46.6X5A MYALGIA DUE TO STATIN: ICD-10-CM

## 2021-10-19 DIAGNOSIS — E78.5 HYPERLIPIDEMIA LDL GOAL <70: ICD-10-CM

## 2021-10-19 DIAGNOSIS — F51.04 CHRONIC INSOMNIA: ICD-10-CM

## 2021-10-19 DIAGNOSIS — M79.10 MYALGIA DUE TO STATIN: ICD-10-CM

## 2021-10-19 DIAGNOSIS — I10 ESSENTIAL HYPERTENSION: ICD-10-CM

## 2021-10-19 DIAGNOSIS — F41.9 ANXIETY: Primary | ICD-10-CM

## 2021-10-19 DIAGNOSIS — M53.86 DECREASED ROM OF LUMBAR SPINE: ICD-10-CM

## 2021-10-19 PROCEDURE — 3079F PR MOST RECENT DIASTOLIC BLOOD PRESSURE 80-89 MM HG: ICD-10-PCS | Mod: CPTII,S$GLB,, | Performed by: INTERNAL MEDICINE

## 2021-10-19 PROCEDURE — 3008F PR BODY MASS INDEX (BMI) DOCUMENTED: ICD-10-PCS | Mod: CPTII,S$GLB,, | Performed by: INTERNAL MEDICINE

## 2021-10-19 PROCEDURE — 3075F SYST BP GE 130 - 139MM HG: CPT | Mod: CPTII,S$GLB,, | Performed by: INTERNAL MEDICINE

## 2021-10-19 PROCEDURE — 99999 PR PBB SHADOW E&M-EST. PATIENT-LVL IV: ICD-10-PCS | Mod: PBBFAC,,, | Performed by: INTERNAL MEDICINE

## 2021-10-19 PROCEDURE — 1159F PR MEDICATION LIST DOCUMENTED IN MEDICAL RECORD: ICD-10-PCS | Mod: CPTII,S$GLB,, | Performed by: INTERNAL MEDICINE

## 2021-10-19 PROCEDURE — 1101F PR PT FALLS ASSESS DOC 0-1 FALLS W/OUT INJ PAST YR: ICD-10-PCS | Mod: CPTII,S$GLB,, | Performed by: INTERNAL MEDICINE

## 2021-10-19 PROCEDURE — 1159F MED LIST DOCD IN RCRD: CPT | Mod: CPTII,S$GLB,, | Performed by: INTERNAL MEDICINE

## 2021-10-19 PROCEDURE — 3288F FALL RISK ASSESSMENT DOCD: CPT | Mod: CPTII,S$GLB,, | Performed by: INTERNAL MEDICINE

## 2021-10-19 PROCEDURE — 1101F PT FALLS ASSESS-DOCD LE1/YR: CPT | Mod: CPTII,S$GLB,, | Performed by: INTERNAL MEDICINE

## 2021-10-19 PROCEDURE — 3288F PR FALLS RISK ASSESSMENT DOCUMENTED: ICD-10-PCS | Mod: CPTII,S$GLB,, | Performed by: INTERNAL MEDICINE

## 2021-10-19 PROCEDURE — 3075F PR MOST RECENT SYSTOLIC BLOOD PRESS GE 130-139MM HG: ICD-10-PCS | Mod: CPTII,S$GLB,, | Performed by: INTERNAL MEDICINE

## 2021-10-19 PROCEDURE — 99999 PR PBB SHADOW E&M-EST. PATIENT-LVL IV: CPT | Mod: PBBFAC,,, | Performed by: INTERNAL MEDICINE

## 2021-10-19 PROCEDURE — 3079F DIAST BP 80-89 MM HG: CPT | Mod: CPTII,S$GLB,, | Performed by: INTERNAL MEDICINE

## 2021-10-19 PROCEDURE — 1160F PR REVIEW ALL MEDS BY PRESCRIBER/CLIN PHARMACIST DOCUMENTED: ICD-10-PCS | Mod: CPTII,S$GLB,, | Performed by: INTERNAL MEDICINE

## 2021-10-19 PROCEDURE — 97140 MANUAL THERAPY 1/> REGIONS: CPT

## 2021-10-19 PROCEDURE — 99214 PR OFFICE/OUTPT VISIT, EST, LEVL IV, 30-39 MIN: ICD-10-PCS | Mod: S$GLB,,, | Performed by: INTERNAL MEDICINE

## 2021-10-19 PROCEDURE — 99214 OFFICE O/P EST MOD 30 MIN: CPT | Mod: S$GLB,,, | Performed by: INTERNAL MEDICINE

## 2021-10-19 PROCEDURE — 1125F PR PAIN SEVERITY QUANTIFIED, PAIN PRESENT: ICD-10-PCS | Mod: CPTII,S$GLB,, | Performed by: INTERNAL MEDICINE

## 2021-10-19 PROCEDURE — 97014 ELECTRIC STIMULATION THERAPY: CPT

## 2021-10-19 PROCEDURE — 1160F RVW MEDS BY RX/DR IN RCRD: CPT | Mod: CPTII,S$GLB,, | Performed by: INTERNAL MEDICINE

## 2021-10-19 PROCEDURE — 1125F AMNT PAIN NOTED PAIN PRSNT: CPT | Mod: CPTII,S$GLB,, | Performed by: INTERNAL MEDICINE

## 2021-10-19 PROCEDURE — 3008F BODY MASS INDEX DOCD: CPT | Mod: CPTII,S$GLB,, | Performed by: INTERNAL MEDICINE

## 2021-10-19 PROCEDURE — 97110 THERAPEUTIC EXERCISES: CPT

## 2021-10-19 RX ORDER — EZETIMIBE 10 MG/1
10 TABLET ORAL DAILY
Qty: 30 TABLET | Refills: 3 | Status: SHIPPED | OUTPATIENT
Start: 2021-10-19 | End: 2022-03-08

## 2021-10-19 RX ORDER — LORAZEPAM 0.5 MG/1
1.5 TABLET ORAL NIGHTLY
Qty: 90 TABLET | Refills: 5 | Status: SHIPPED | OUTPATIENT
Start: 2021-10-19 | End: 2022-04-05 | Stop reason: SDUPTHER

## 2021-10-20 PROBLEM — Z78.9 STATIN INTOLERANCE: Status: ACTIVE | Noted: 2021-10-20

## 2021-10-20 PROBLEM — M79.10 MYALGIA DUE TO STATIN: Status: ACTIVE | Noted: 2021-10-20

## 2021-10-20 PROBLEM — T46.6X5A MYALGIA DUE TO STATIN: Status: ACTIVE | Noted: 2021-10-20

## 2021-10-22 ENCOUNTER — CLINICAL SUPPORT (OUTPATIENT)
Dept: REHABILITATION | Facility: HOSPITAL | Age: 71
End: 2021-10-22
Payer: MEDICARE

## 2021-10-22 DIAGNOSIS — R29.898 WEAKNESS OF BOTH LOWER EXTREMITIES: ICD-10-CM

## 2021-10-22 DIAGNOSIS — M53.86 DECREASED ROM OF LUMBAR SPINE: ICD-10-CM

## 2021-10-22 PROCEDURE — 97014 ELECTRIC STIMULATION THERAPY: CPT

## 2021-10-22 PROCEDURE — 97140 MANUAL THERAPY 1/> REGIONS: CPT

## 2021-10-22 PROCEDURE — 97110 THERAPEUTIC EXERCISES: CPT

## 2021-10-26 ENCOUNTER — CLINICAL SUPPORT (OUTPATIENT)
Dept: REHABILITATION | Facility: HOSPITAL | Age: 71
End: 2021-10-26
Payer: MEDICARE

## 2021-10-26 DIAGNOSIS — R29.898 WEAKNESS OF BOTH LOWER EXTREMITIES: ICD-10-CM

## 2021-10-26 DIAGNOSIS — M53.86 DECREASED ROM OF LUMBAR SPINE: ICD-10-CM

## 2021-10-26 PROCEDURE — 97014 ELECTRIC STIMULATION THERAPY: CPT

## 2021-10-26 PROCEDURE — 97110 THERAPEUTIC EXERCISES: CPT

## 2021-10-26 PROCEDURE — 97140 MANUAL THERAPY 1/> REGIONS: CPT

## 2021-10-29 ENCOUNTER — CLINICAL SUPPORT (OUTPATIENT)
Dept: REHABILITATION | Facility: HOSPITAL | Age: 71
End: 2021-10-29
Payer: MEDICARE

## 2021-10-29 ENCOUNTER — TELEPHONE (OUTPATIENT)
Dept: ADMINISTRATIVE | Facility: HOSPITAL | Age: 71
End: 2021-10-29
Payer: MEDICARE

## 2021-10-29 DIAGNOSIS — M53.86 DECREASED ROM OF LUMBAR SPINE: ICD-10-CM

## 2021-10-29 DIAGNOSIS — R29.898 WEAKNESS OF BOTH LOWER EXTREMITIES: ICD-10-CM

## 2021-10-29 PROCEDURE — 97140 MANUAL THERAPY 1/> REGIONS: CPT

## 2021-10-29 PROCEDURE — 97110 THERAPEUTIC EXERCISES: CPT

## 2021-11-02 ENCOUNTER — CLINICAL SUPPORT (OUTPATIENT)
Dept: REHABILITATION | Facility: HOSPITAL | Age: 71
End: 2021-11-02
Payer: MEDICARE

## 2021-11-02 DIAGNOSIS — M53.86 DECREASED ROM OF LUMBAR SPINE: ICD-10-CM

## 2021-11-02 DIAGNOSIS — R29.898 WEAKNESS OF BOTH LOWER EXTREMITIES: ICD-10-CM

## 2021-11-02 PROCEDURE — 97110 THERAPEUTIC EXERCISES: CPT

## 2021-11-02 PROCEDURE — 97014 ELECTRIC STIMULATION THERAPY: CPT

## 2021-11-02 PROCEDURE — 97140 MANUAL THERAPY 1/> REGIONS: CPT

## 2021-11-05 ENCOUNTER — CLINICAL SUPPORT (OUTPATIENT)
Dept: REHABILITATION | Facility: HOSPITAL | Age: 71
End: 2021-11-05
Payer: MEDICARE

## 2021-11-05 DIAGNOSIS — R29.898 WEAKNESS OF BOTH LOWER EXTREMITIES: ICD-10-CM

## 2021-11-05 DIAGNOSIS — M53.86 DECREASED ROM OF LUMBAR SPINE: ICD-10-CM

## 2021-11-05 PROCEDURE — 97014 ELECTRIC STIMULATION THERAPY: CPT

## 2021-11-05 PROCEDURE — 97110 THERAPEUTIC EXERCISES: CPT

## 2021-11-05 PROCEDURE — 97140 MANUAL THERAPY 1/> REGIONS: CPT

## 2021-11-09 ENCOUNTER — CLINICAL SUPPORT (OUTPATIENT)
Dept: REHABILITATION | Facility: HOSPITAL | Age: 71
End: 2021-11-09
Payer: MEDICARE

## 2021-11-09 DIAGNOSIS — R29.898 WEAKNESS OF BOTH LOWER EXTREMITIES: ICD-10-CM

## 2021-11-09 DIAGNOSIS — M53.86 DECREASED ROM OF LUMBAR SPINE: ICD-10-CM

## 2021-11-09 PROCEDURE — 97140 MANUAL THERAPY 1/> REGIONS: CPT

## 2021-11-09 PROCEDURE — 97014 ELECTRIC STIMULATION THERAPY: CPT

## 2021-11-09 PROCEDURE — 97110 THERAPEUTIC EXERCISES: CPT

## 2021-11-12 ENCOUNTER — TELEPHONE (OUTPATIENT)
Dept: RHEUMATOLOGY | Facility: CLINIC | Age: 71
End: 2021-11-12
Payer: MEDICARE

## 2021-11-16 ENCOUNTER — TELEPHONE (OUTPATIENT)
Dept: RHEUMATOLOGY | Facility: CLINIC | Age: 71
End: 2021-11-16
Payer: MEDICARE

## 2021-11-25 ENCOUNTER — PATIENT MESSAGE (OUTPATIENT)
Dept: REHABILITATION | Facility: HOSPITAL | Age: 71
End: 2021-11-25
Payer: MEDICARE

## 2021-11-28 ENCOUNTER — PATIENT MESSAGE (OUTPATIENT)
Dept: INTERNAL MEDICINE | Facility: CLINIC | Age: 71
End: 2021-11-28
Payer: MEDICARE

## 2021-12-01 ENCOUNTER — PATIENT MESSAGE (OUTPATIENT)
Dept: REHABILITATION | Facility: HOSPITAL | Age: 71
End: 2021-12-01
Payer: MEDICARE

## 2021-12-01 ENCOUNTER — PATIENT MESSAGE (OUTPATIENT)
Dept: UROLOGY | Facility: CLINIC | Age: 71
End: 2021-12-01
Payer: MEDICARE

## 2021-12-01 ENCOUNTER — PATIENT MESSAGE (OUTPATIENT)
Dept: CARDIOLOGY | Facility: CLINIC | Age: 71
End: 2021-12-01
Payer: MEDICARE

## 2021-12-01 ENCOUNTER — TELEPHONE (OUTPATIENT)
Dept: CARDIOLOGY | Facility: CLINIC | Age: 71
End: 2021-12-01
Payer: MEDICARE

## 2021-12-01 DIAGNOSIS — E78.5 HYPERLIPIDEMIA LDL GOAL <70: ICD-10-CM

## 2021-12-01 DIAGNOSIS — I70.0 ATHEROSCLEROSIS OF ABDOMINAL AORTA: Primary | ICD-10-CM

## 2021-12-01 DIAGNOSIS — K55.1 MESENTERIC ARTERY STENOSIS: ICD-10-CM

## 2021-12-01 DIAGNOSIS — Z78.9 STATIN INTOLERANCE: ICD-10-CM

## 2021-12-02 ENCOUNTER — TELEPHONE (OUTPATIENT)
Dept: UROLOGY | Facility: CLINIC | Age: 71
End: 2021-12-02
Payer: MEDICARE

## 2021-12-02 DIAGNOSIS — E13.9 RENAL CYSTS AND DIABETES SYNDROME: Primary | ICD-10-CM

## 2021-12-03 ENCOUNTER — CLINICAL SUPPORT (OUTPATIENT)
Dept: REHABILITATION | Facility: HOSPITAL | Age: 71
End: 2021-12-03
Payer: MEDICARE

## 2021-12-03 DIAGNOSIS — M53.86 DECREASED ROM OF LUMBAR SPINE: ICD-10-CM

## 2021-12-03 DIAGNOSIS — R29.898 WEAKNESS OF BOTH LOWER EXTREMITIES: ICD-10-CM

## 2021-12-03 PROCEDURE — 97140 MANUAL THERAPY 1/> REGIONS: CPT

## 2021-12-03 PROCEDURE — 97110 THERAPEUTIC EXERCISES: CPT

## 2021-12-03 PROCEDURE — 97014 ELECTRIC STIMULATION THERAPY: CPT

## 2021-12-07 ENCOUNTER — TELEPHONE (OUTPATIENT)
Dept: RHEUMATOLOGY | Facility: CLINIC | Age: 71
End: 2021-12-07
Payer: MEDICARE

## 2021-12-08 ENCOUNTER — PATIENT MESSAGE (OUTPATIENT)
Dept: REHABILITATION | Facility: HOSPITAL | Age: 71
End: 2021-12-08
Payer: MEDICARE

## 2021-12-16 ENCOUNTER — INFUSION (OUTPATIENT)
Dept: INFUSION THERAPY | Facility: HOSPITAL | Age: 71
End: 2021-12-16
Attending: INTERNAL MEDICINE
Payer: MEDICARE

## 2021-12-16 VITALS
WEIGHT: 105.81 LBS | DIASTOLIC BLOOD PRESSURE: 76 MMHG | HEART RATE: 74 BPM | TEMPERATURE: 98 F | SYSTOLIC BLOOD PRESSURE: 150 MMHG | BODY MASS INDEX: 16.61 KG/M2 | HEIGHT: 67 IN | OXYGEN SATURATION: 98 % | RESPIRATION RATE: 16 BRPM

## 2021-12-16 DIAGNOSIS — M81.0 AGE-RELATED OSTEOPOROSIS WITHOUT CURRENT PATHOLOGICAL FRACTURE: Primary | ICD-10-CM

## 2021-12-16 PROCEDURE — 63600175 PHARM REV CODE 636 W HCPCS: Mod: JG | Performed by: INTERNAL MEDICINE

## 2021-12-16 PROCEDURE — 96372 THER/PROPH/DIAG INJ SC/IM: CPT

## 2021-12-16 RX ADMIN — DENOSUMAB 60 MG: 60 INJECTION SUBCUTANEOUS at 11:12

## 2021-12-17 ENCOUNTER — SPECIALTY PHARMACY (OUTPATIENT)
Dept: PHARMACY | Facility: CLINIC | Age: 71
End: 2021-12-17
Payer: MEDICARE

## 2021-12-27 ENCOUNTER — PATIENT MESSAGE (OUTPATIENT)
Dept: GASTROENTEROLOGY | Facility: CLINIC | Age: 71
End: 2021-12-27
Payer: MEDICARE

## 2021-12-28 NOTE — TELEPHONE ENCOUNTER
Repatha PA approved. Request Reference Number: [id number]. JORGE LUIS SURE INJ 140MG/ML is approved through 06/22/2022.

## 2021-12-29 ENCOUNTER — PATIENT MESSAGE (OUTPATIENT)
Dept: REHABILITATION | Facility: HOSPITAL | Age: 71
End: 2021-12-29
Payer: MEDICARE

## 2021-12-29 NOTE — TELEPHONE ENCOUNTER
BENEFIT INVESTIGATION:    Medicare Plan  [facility] in network  Co pay: $45    Forwarded to FA team for review.

## 2022-01-05 NOTE — TELEPHONE ENCOUNTER
Incoming call from patient for update on repatha. Informed her we are looking at FA options to help lower her $45 copay. Informed her [facility] will be in contact and to give us a call if she ever wants an update on the status.

## 2022-01-20 ENCOUNTER — SPECIALTY PHARMACY (OUTPATIENT)
Dept: PHARMACY | Facility: CLINIC | Age: 72
End: 2022-01-20
Payer: MEDICARE

## 2022-01-20 NOTE — TELEPHONE ENCOUNTER
Specialty Pharmacy - Initial Clinical Assessment    Specialty Medication Orders Linked to Encounter    Flowsheet Row Most Recent Value   Medication #1 evolocumab (REPATHA SURECLICK) 140 mg/mL PnIj (Order#[id number], Rx#[id number])        Janell Reyes is a 72 y.o. female, who is followed by the specialty pharmacy service for management and education.    Recent Encounters     Date Type Provider Description    01/20/2022 Specialty Pharmacy [facility] Initial Clinical Assessment    12/17/2021 Specialty Pharmacy Kayy Fenton, [facility] Referral Authorization    02/17/2021 Specialty Pharmacy Palma Sloan, [facility] Clinical Intervention    12/30/2020 Specialty Pharmacy Nadine Duarte, PharmD Refill Coordination    12/04/2020 Specialty Pharmacy Bj Harman Refill Coordination        Clinical call attempts since last clinical assessment   1/20/2022  7:07 PM - Specialty Pharmacy - Clinical Assessment by Vince Garland, PharmD     Today she received education before her first fill with [facility].    Current Outpatient Medications   Medication Sig    adalimumab (HUMIRA,CF, PEN) 40 mg/0.4 mL PnKt Inject 0.4 mLs (40 mg total) into the skin every 14 (fourteen) days.    amLODIPine (NORVASC) 10 MG tablet take 1 tablet by mouth once daily    aspirin (ECOTRIN) 81 MG EC tablet Take 1 tablet (81 mg total) by mouth once daily. (Patient not taking: Reported on 9/13/2021)    atenoloL (TENORMIN) 25 MG tablet Take 1 tablet (25 mg total) by mouth once daily.    calcium carbonate (OS-COOPER) 600 mg (1,500 mg) Tab Take 1,200 mg by mouth once daily.     cyanocobalamin, vitamin B-12, 2,500 mcg Lozg Place 1 tablet under the tongue.     denosumab (PROLIA) 60 mg/mL Syrg Inject 60 mg into the skin.    evolocumab (REPATHA SURECLICK) 140 mg/mL PnIj Inject 1 mL (140 mg total) into the skin every 14 (fourteen) days.    ezetimibe (ZETIA) 10 mg tablet Take 1 tablet (10 mg total) by mouth once daily.     folic acid (FOLVITE) 1 MG tablet Take by mouth once daily.    levocetirizine (XYZAL) 5 MG tablet Take 1 tablet (5 mg total) by mouth every evening.    LORazepam (ATIVAN) 0.5 MG tablet Take 3 tablets (1.5 mg total) by mouth every evening.    multivitamin with minerals tablet Take 1 tablet by mouth once daily at 6am.    mupirocin (BACTROBAN) 2 % ointment Apply a small amount to affected area twice a day    nut tx,urea cycle dis w-o iron (ESSENTIAL AMINO ACID MIX ORAL) once daily.     UNABLE TO FIND Take 25 mg by mouth once daily. CBD Oil    valACYclovir (VALTREX) 500 MG tablet Take 1 tablet by mouth twice a day as needed for 3 days per flare    vitamin D 1000 units Tab Take 185 mg by mouth once daily.   Last reviewed on 1/20/2022  3:19 PM by Vince Garland, PharmD    Review of patient's allergies indicates:   Allergen Reactions    Statins-hmg-coa reductase inhibitors Anaphylaxis     Myalgias    Codeine sulfate Itching    Hydrochlorothiazide Other (See Comments)     Adverse Reaction    Lisinopril Swelling and Edema     Facial Swelling   Last reviewed on  1/20/2022 3:19 PM by Vince Garland    Drug Interactions    Drug interactions evaluated: yes  Clinically relevant drug interactions identified: no  Provided the patient with educational material regarding drug interactions: not applicable           Assessment Questions - Documented Responses    Flowsheet Row Most Recent Value   Assessment    Medication Reconciliation completed for patient Yes   During the past 4 weeks, has patient missed any activities due to condition or medication? No   During the past 4 weeks, did patient have any of the following urgent care visits? None   Goals of Therapy Status Discussed (new start)   Welcome packet contents reviewed and discussed with patient? Yes   Assesment completed? Yes   Plan Therapy being initiated   Do you need to open a clinical intervention (i-vent)? No   Do you want to schedule first shipment? Yes   Medication  "#1 Assessment Info    Patient status New medication, New to [facility]   Is this medication appropriate for the patient? Yes   Is this medication effective? Not yet started        Refill Questions - Documented Responses    Flowsheet Row Most Recent Value   Patient Availability and HIPAA Verification    Does patient want to proceed with activity? Yes   HIPAA/medical authority confirmed? Yes   Relationship to patient of person spoken to? Self   Refill Screening Questions    When does the patient need to receive the medication? 01/25/22   Refill Delivery Questions    How will the patient receive the medication? Delivery Shaye   When does the patient need to receive the medication? 01/25/22   Shipping Address Home   Address in [facility] confirmed and updated if neccessary? Yes   Expected Copay ($) 0   Is the patient able to afford the medication copay? Yes   Payment Method zero copay   Days supply of Refill 28   Supplies needed? No supplies needed   Refill activity completed? Yes   Refill activity plan Refill scheduled   Shipment/Pickup Date: 01/25/22          Objective    She has a past medical history of Anxiety, Crohn's disease, Depression, Genital herpes, Hepatitis C infection, Hypertension, Insomnia, Mesenteric artery stenosis, Osteoporosis, SCC (squamous cell carcinoma), hand, right (03/2019), and TIA (transient ischemic attack).        BP Readings from Last 4 Encounters:   12/16/21 (!) 150/76   10/19/21 138/88   09/13/21 (!) 140/80   06/02/21 (!) 150/80     Ht Readings from Last 4 Encounters:   12/16/21 5' 7" (1.702 m)   10/19/21 5' 7" (1.702 m)   09/13/21 5' 7" (1.702 m)   06/02/21 5' 7" (1.702 m)     Wt Readings from Last 4 Encounters:   12/16/21 48 kg (105 lb 13.1 oz)   10/19/21 47.9 kg (105 lb 9.6 oz)   09/13/21 47.6 kg (104 lb 15 oz)   06/02/21 48.7 kg (107 lb 5.8 oz)       The goals of prescribed drug therapy management include:  · Supporting patient to meet the prescriber's medical treatment " objectives  · Improving or maintaining quality of life  · Maintaining optimal therapy adherence  · Minimizing and managing side effects      Goals of Therapy Status: Discussed (new start)    Assessment/Plan  Patient plans to start therapy on 01/25/22      Indication, dosage, appropriateness, effectiveness, safety and convenience of her specialty medication(s) were reviewed today.     Patient Counseling    Counseled the patient on the following: doses and administration discussed, safe handling, storage, and disposal discussed, possible adverse effects and management discussed, possible drug and prescription drug interactions discussed, possible drug and OTC drug and food interactions discussed, lab monitoring and follow-up discussed, therapeutic rationale discussed, cost of medications and cost implications discussed, adherence and missed doses discussed, pharmacy contact information discussed         Tasks added this encounter   No tasks added.   Tasks due within next 3 months   1/20/2022 - Clinical - Initial Assessment     [facility]  [address] [place] [id number]  Phone: [phone]  Fax: [phone]

## 2022-01-20 NOTE — TELEPHONE ENCOUNTER
Patient called into [facility] for FA assistance. Patient provided HH and income. Explained to patient I will apply for a zhen and have a [facility] call back to set up shipment. Pt agreed     [facility] Zhen Approved 2022  BIN [id number]  PCN [id number]  GP [id number]  ID [id number]  12/21/21-12/20/22  $2500/yr    Added to [facility]    ARS

## 2022-01-20 NOTE — TELEPHONE ENCOUNTER
Called patient for FA assistance. Pt did not answer and lvm. Need patient hh and income to apply to [employer]. Will f/u     ARS

## 2022-01-24 ENCOUNTER — PATIENT MESSAGE (OUTPATIENT)
Dept: CARDIOLOGY | Facility: CLINIC | Age: 72
End: 2022-01-24
Payer: MEDICARE

## 2022-01-24 ENCOUNTER — PATIENT MESSAGE (OUTPATIENT)
Dept: GASTROENTEROLOGY | Facility: CLINIC | Age: 72
End: 2022-01-24
Payer: MEDICARE

## 2022-01-25 ENCOUNTER — TELEPHONE (OUTPATIENT)
Dept: CARDIOLOGY | Facility: CLINIC | Age: 72
End: 2022-01-25
Payer: MEDICARE

## 2022-01-25 DIAGNOSIS — E78.5 HYPERLIPIDEMIA LDL GOAL <70: Primary | ICD-10-CM

## 2022-01-25 DIAGNOSIS — E78.2 MIXED HYPERLIPIDEMIA: ICD-10-CM

## 2022-01-25 NOTE — TELEPHONE ENCOUNTER
Please call pt.  She was approved for Repatha per chart.  Schedule fasting lipid panel and CMP in 2 months.    Dr White

## 2022-01-27 ENCOUNTER — TELEPHONE (OUTPATIENT)
Dept: ADMINISTRATIVE | Facility: HOSPITAL | Age: 72
End: 2022-01-27
Payer: MEDICARE

## 2022-02-10 ENCOUNTER — SPECIALTY PHARMACY (OUTPATIENT)
Dept: PHARMACY | Facility: CLINIC | Age: 72
End: 2022-02-10
Payer: MEDICARE

## 2022-02-10 DIAGNOSIS — E78.5 HYPERLIPIDEMIA LDL GOAL <70: Primary | ICD-10-CM

## 2022-02-10 NOTE — TELEPHONE ENCOUNTER
Specialty Pharmacy - Refill Coordination    Specialty Medication Orders Linked to Encounter    Flowsheet Row Most Recent Value   Medication #1 evolocumab (REPATHA SURECLICK) 140 mg/mL PnIj (Order#[id number], Rx#[id number])          Refill Questions - Documented Responses    Flowsheet Row Most Recent Value   Patient Availability and HIPAA Verification    Does patient want to proceed with activity? Yes   HIPAA/medical authority confirmed? Yes   Relationship to patient of person spoken to? Self   Refill Screening Questions    Would patient like to speak to a pharmacist? No   When does the patient need to receive the medication? 02/22/22   Refill Delivery Questions    How will the patient receive the medication? Delivery Shaye   When does the patient need to receive the medication? 02/22/22   Shipping Address Home   Address in [facility] confirmed and updated if neccessary? Yes   Expected Copay ($) 0   Is the patient able to afford the medication copay? Yes   Payment Method zero copay   Days supply of Refill 28   Supplies needed? No supplies needed   Refill activity completed? Yes   Refill activity plan Refill scheduled   Shipment/Pickup Date: 02/17/22          Current Outpatient Medications   Medication Sig    adalimumab (HUMIRA,CF, PEN) 40 mg/0.4 mL PnKt Inject 0.4 mLs (40 mg total) into the skin every 14 (fourteen) days.    amLODIPine (NORVASC) 10 MG tablet take 1 tablet by mouth once daily    aspirin (ECOTRIN) 81 MG EC tablet Take 1 tablet (81 mg total) by mouth once daily. (Patient not taking: Reported on 9/13/2021)    atenoloL (TENORMIN) 25 MG tablet Take 1 tablet (25 mg total) by mouth once daily.    calcium carbonate (OS-COOPER) 600 mg (1,500 mg) Tab Take 1,200 mg by mouth once daily.     cyanocobalamin, vitamin B-12, 2,500 mcg Lozg Place 1 tablet under the tongue.     denosumab (PROLIA) 60 mg/mL Syrg Inject 60 mg into the skin.    evolocumab (REPATHA SURECLICK) 140 mg/mL PnIj Inject 1 mL (140 mg  total) into the skin every 14 (fourteen) days.    ezetimibe (ZETIA) 10 mg tablet Take 1 tablet (10 mg total) by mouth once daily.    folic acid (FOLVITE) 1 MG tablet Take by mouth once daily.    levocetirizine (XYZAL) 5 MG tablet Take 1 tablet (5 mg total) by mouth every evening.    LORazepam (ATIVAN) 0.5 MG tablet Take 3 tablets (1.5 mg total) by mouth every evening.    multivitamin with minerals tablet Take 1 tablet by mouth once daily at 6am.    mupirocin (BACTROBAN) 2 % ointment Apply a small amount to affected area twice a day    nut tx,urea cycle dis w-o iron (ESSENTIAL AMINO ACID MIX ORAL) once daily.     UNABLE TO FIND Take 25 mg by mouth once daily. CBD Oil    valACYclovir (VALTREX) 500 MG tablet Take 1 tablet by mouth twice a day as needed for 3 days per flare    vitamin D 1000 units Tab Take 185 mg by mouth once daily.   Last reviewed on 1/20/2022  3:19 PM by Vince Garland, D[facility]    Review of patient's allergies indicates:   Allergen Reactions    Statins-hmg-coa reductase inhibitors Anaphylaxis     Myalgias    Codeine sulfate Itching    Hydrochlorothiazide Other (See Comments)     Adverse Reaction    Lisinopril Swelling and Edema     Facial Swelling    Last reviewed on  1/20/2022 3:19 PM by Vince Garland      Tasks added this encounter   3/15/2022 - Refill Call (Auto Added)   Tasks due within next 3 months   No tasks due.     Lady Lagunas, PharmD  [facility]  [address] [place] [id number]  Phone: [phone]  Fax: [phone]

## 2022-02-13 ENCOUNTER — PATIENT MESSAGE (OUTPATIENT)
Dept: PODIATRY | Facility: CLINIC | Age: 72
End: 2022-02-13
Payer: MEDICARE

## 2022-02-17 ENCOUNTER — PATIENT MESSAGE (OUTPATIENT)
Dept: INTERNAL MEDICINE | Facility: CLINIC | Age: 72
End: 2022-02-17
Payer: MEDICARE

## 2022-02-17 DIAGNOSIS — Z12.31 ENCOUNTER FOR SCREENING MAMMOGRAM FOR MALIGNANT NEOPLASM OF BREAST: Primary | ICD-10-CM

## 2022-02-24 DIAGNOSIS — I10 ESSENTIAL HYPERTENSION: ICD-10-CM

## 2022-02-24 RX ORDER — ATENOLOL 25 MG/1
25 TABLET ORAL DAILY
Qty: 30 TABLET | Refills: 11 | Status: SHIPPED | OUTPATIENT
Start: 2022-02-24 | End: 2023-02-06 | Stop reason: SDUPTHER

## 2022-03-01 ENCOUNTER — PATIENT MESSAGE (OUTPATIENT)
Dept: GASTROENTEROLOGY | Facility: CLINIC | Age: 72
End: 2022-03-01
Payer: MEDICARE

## 2022-03-07 ENCOUNTER — PATIENT MESSAGE (OUTPATIENT)
Dept: GASTROENTEROLOGY | Facility: CLINIC | Age: 72
End: 2022-03-07

## 2022-03-07 ENCOUNTER — OFFICE VISIT (OUTPATIENT)
Dept: GASTROENTEROLOGY | Facility: CLINIC | Age: 72
End: 2022-03-07
Payer: MEDICARE

## 2022-03-07 ENCOUNTER — HOSPITAL ENCOUNTER (OUTPATIENT)
Dept: RADIOLOGY | Facility: HOSPITAL | Age: 72
Discharge: HOME | End: 2022-03-07
Attending: NURSE PRACTITIONER
Payer: MEDICARE

## 2022-03-07 VITALS
DIASTOLIC BLOOD PRESSURE: 76 MMHG | HEIGHT: 67 IN | SYSTOLIC BLOOD PRESSURE: 138 MMHG | BODY MASS INDEX: 17.27 KG/M2 | HEART RATE: 83 BPM | WEIGHT: 110 LBS

## 2022-03-07 DIAGNOSIS — R10.11 RUQ ABDOMINAL PAIN: ICD-10-CM

## 2022-03-07 DIAGNOSIS — M81.0 OSTEOPOROSIS WITHOUT CURRENT PATHOLOGICAL FRACTURE, UNSPECIFIED OSTEOPOROSIS TYPE: ICD-10-CM

## 2022-03-07 DIAGNOSIS — K50.00 CROHN'S DISEASE OF SMALL INTESTINE WITHOUT COMPLICATION: ICD-10-CM

## 2022-03-07 DIAGNOSIS — K50.00 CROHN'S DISEASE OF SMALL INTESTINE WITHOUT COMPLICATION: Primary | ICD-10-CM

## 2022-03-07 DIAGNOSIS — D84.9 IMMUNOCOMPROMISED STATE: ICD-10-CM

## 2022-03-07 PROCEDURE — 3008F BODY MASS INDEX DOCD: CPT | Mod: CPTII,S$GLB,, | Performed by: NURSE PRACTITIONER

## 2022-03-07 PROCEDURE — 3078F DIAST BP <80 MM HG: CPT | Mod: CPTII,S$GLB,, | Performed by: NURSE PRACTITIONER

## 2022-03-07 PROCEDURE — 3075F PR MOST RECENT SYSTOLIC BLOOD PRESS GE 130-139MM HG: ICD-10-PCS | Mod: CPTII,S$GLB,, | Performed by: NURSE PRACTITIONER

## 2022-03-07 PROCEDURE — 99999 PR PBB SHADOW E&M-EST. PATIENT-LVL IV: ICD-10-PCS | Mod: PBBFAC,,, | Performed by: NURSE PRACTITIONER

## 2022-03-07 PROCEDURE — 1125F PR PAIN SEVERITY QUANTIFIED, PAIN PRESENT: ICD-10-PCS | Mod: CPTII,S$GLB,, | Performed by: NURSE PRACTITIONER

## 2022-03-07 PROCEDURE — 99999 PR PBB SHADOW E&M-EST. PATIENT-LVL IV: CPT | Mod: PBBFAC,,, | Performed by: NURSE PRACTITIONER

## 2022-03-07 PROCEDURE — 74019 XR ABDOMEN FLAT AND ERECT: ICD-10-PCS | Mod: 26,,, | Performed by: RADIOLOGY

## 2022-03-07 PROCEDURE — 1160F PR REVIEW ALL MEDS BY PRESCRIBER/CLIN PHARMACIST DOCUMENTED: ICD-10-PCS | Mod: CPTII,S$GLB,, | Performed by: NURSE PRACTITIONER

## 2022-03-07 PROCEDURE — 1159F MED LIST DOCD IN RCRD: CPT | Mod: CPTII,S$GLB,, | Performed by: NURSE PRACTITIONER

## 2022-03-07 PROCEDURE — 1160F RVW MEDS BY RX/DR IN RCRD: CPT | Mod: CPTII,S$GLB,, | Performed by: NURSE PRACTITIONER

## 2022-03-07 PROCEDURE — 3288F FALL RISK ASSESSMENT DOCD: CPT | Mod: CPTII,S$GLB,, | Performed by: NURSE PRACTITIONER

## 2022-03-07 PROCEDURE — 99499 UNLISTED E&M SERVICE: CPT | Mod: S$GLB,,, | Performed by: NURSE PRACTITIONER

## 2022-03-07 PROCEDURE — 99214 PR OFFICE/OUTPT VISIT, EST, LEVL IV, 30-39 MIN: ICD-10-PCS | Mod: S$GLB,,, | Performed by: NURSE PRACTITIONER

## 2022-03-07 PROCEDURE — 1125F AMNT PAIN NOTED PAIN PRSNT: CPT | Mod: CPTII,S$GLB,, | Performed by: NURSE PRACTITIONER

## 2022-03-07 PROCEDURE — 74019 RADEX ABDOMEN 2 VIEWS: CPT | Mod: 26,,, | Performed by: RADIOLOGY

## 2022-03-07 PROCEDURE — 99214 OFFICE O/P EST MOD 30 MIN: CPT | Mod: S$GLB,,, | Performed by: NURSE PRACTITIONER

## 2022-03-07 PROCEDURE — 74019 RADEX ABDOMEN 2 VIEWS: CPT | Mod: TC

## 2022-03-07 PROCEDURE — 3008F PR BODY MASS INDEX (BMI) DOCUMENTED: ICD-10-PCS | Mod: CPTII,S$GLB,, | Performed by: NURSE PRACTITIONER

## 2022-03-07 PROCEDURE — 3075F SYST BP GE 130 - 139MM HG: CPT | Mod: CPTII,S$GLB,, | Performed by: NURSE PRACTITIONER

## 2022-03-07 PROCEDURE — 3078F PR MOST RECENT DIASTOLIC BLOOD PRESSURE < 80 MM HG: ICD-10-PCS | Mod: CPTII,S$GLB,, | Performed by: NURSE PRACTITIONER

## 2022-03-07 PROCEDURE — 99499 RISK ADDL DX/OHS AUDIT: ICD-10-PCS | Mod: S$GLB,,, | Performed by: NURSE PRACTITIONER

## 2022-03-07 PROCEDURE — 1101F PT FALLS ASSESS-DOCD LE1/YR: CPT | Mod: CPTII,S$GLB,, | Performed by: NURSE PRACTITIONER

## 2022-03-07 PROCEDURE — 1101F PR PT FALLS ASSESS DOC 0-1 FALLS W/OUT INJ PAST YR: ICD-10-PCS | Mod: CPTII,S$GLB,, | Performed by: NURSE PRACTITIONER

## 2022-03-07 PROCEDURE — 1159F PR MEDICATION LIST DOCUMENTED IN MEDICAL RECORD: ICD-10-PCS | Mod: CPTII,S$GLB,, | Performed by: NURSE PRACTITIONER

## 2022-03-07 PROCEDURE — 3288F PR FALLS RISK ASSESSMENT DOCUMENTED: ICD-10-PCS | Mod: CPTII,S$GLB,, | Performed by: NURSE PRACTITIONER

## 2022-03-07 NOTE — PROGRESS NOTES
Clinic Follow Up:  [facility] Follow Up Note    Reason for Follow Up:  The primary encounter diagnosis was Crohn's disease of small intestine without complication. Diagnoses of RUQ abdominal pain, Immunocompromised state, and Osteoporosis without current pathological fracture, unspecified osteoporosis type were also pertinent to this visit.    PCP: Christelle Lawler       HPI:  This is a 72 y.o. female here for follow up of Crohn's disease.     IBD History  - Type: crohn's disease  - Disease Location: small bowel only  - Phenotype: stricture   - Diagnosed:   - Surgeries related to IBD: ileal resection in ,  (due to stricture)   - Extra-intestinal Manifestations: mild eczema on leg that resolved with cerave    Current Medications  Humira 40 mg every 14 days, started Aug 2017    Past Medications  Apriso 1.5 grams daily  Entocort x 3 months   MTX- side effects     Drug and Antibody Levels   18 Humira level 12, no AB formation   20 Humira drug level 10, no AB formation     Endoscopy Reports   - anastomotic stricture, no active disease;   - Rutgeert's i4 (disease in ileum and at anastomosis)    - normal colon, stricture about 5 cm past anastomosis     Pertinent Imagine Reports:  MRE (2021) no active disease     Interval History  She has been having RUQ abdominal pain that moves to epigastrium. Pain feels like pressure. She has had a little trouble with defecation the last few days.     Preventative Medicine    Immunizations  - Influenza: 10/1/21  - Pnemococcal: PCV-13: 12/10/17; PSV-23 19  - Hepatitis A/B: positive Hepatitis B core total Ab and surface Ab  - Herpes Zoster: NA  - COVID-19: 21, 21    Cancer Prevention   - Date of last pap smear: NA  - Date of last skin cancer screenin months ago, she has hx of dysplastic skin lesions. Goes every 6 months   - Date of last surveillance colonoscopy: up to date.     Bone Health  - Vitamin D level: normal    - Date of last DEXA 2020: osteoporosis- followed by Rheum    Therapy Related Testing  - Date of last TB testin20- quant gold negative   - TPMT status: (2018) normal     Miscellaneous  - Vitamin B 12 level (if ileal disease or resection): high  - Smoking status: no  - NSAID use: no  - History of C. Diff: no  - Family planning: NA    Recent Labs  Lab Results   Component Value Date    WBC 6.26 2022    HGB 12.8 2022    HCT 36.8 (L) 2022     2022    ALT 17 2021    AST 22 2021    BUN 18 2021    CREATININE 0.8 2021     (L) 2021    K 4.4 2021    GLU 77 2021     Lab Results   Component Value Date    CRP <0.3 2022    SEDRATE 13 2021    CALPROTECTIN 48.3 2021     Lab Results   Component Value Date    HEPBSAB Positive (A) 07/10/2017    HEPBSAG Negative 07/10/2017    HEPBCAB Positive (A) 07/10/2017     Lab Results   Component Value Date    NMXKBLZU13FT 91 2022    UJPWTBSP49 >2000 (H) 2022    FOLATE 14.0 2022     Lab Results   Component Value Date    TBGOLDPLUS Negative 2020     Review of Systems   Gastrointestinal: Positive for abdominal pain and constipation. Negative for anal bleeding, blood in stool, diarrhea, nausea, rectal pain and vomiting.       Medical History:  Past Medical History:   Diagnosis Date    Anxiety     Crohn's disease     Depression     Genital herpes     Hepatitis C infection     Hypertension     Insomnia     Mesenteric artery stenosis     Dr. Checo Reed--vascular surgery    Osteoporosis     SCC (squamous cell carcinoma), hand, right 2019    Dr. Malaika Mack--dermatology    TIA (transient ischemic attack)        Surgical History:   Past Surgical History:   Procedure Laterality Date    APPENDECTOMY      COLONOSCOPY N/A 2017    Procedure: COLONOSCOPY;  Surgeon: Jose Luis Leonard MD;  Location: [facility];  Service: Endoscopy;  Laterality: N/A;     COLONOSCOPY N/A 3/26/2018    Procedure: COLONOSCOPY;  Surgeon: Guillaume Whitman MD;  Location: [facility];  Service: Endoscopy;  Laterality: N/A;    COLONOSCOPY N/A 3/19/2019    Procedure: COLONOSCOPY;  Surgeon: Lili Ellis MD;  Location: [facility];  Service: Endoscopy;  Laterality: N/A;    COLONOSCOPY N/A 2020    Procedure: COLONOSCOPY;  Surgeon: Lili Ellis MD;  Location: [facility];  Service: Endoscopy;  Laterality: N/A;    SMALL INTESTINE SURGERY         Family History:   Family History   Problem Relation Age of Onset    Stroke Mother     Heart disease Mother     Cataracts Mother     Cancer Father         Lung    Heart disease Sister     Hypertension Brother     Glaucoma Neg Hx     Macular degeneration Neg Hx     Thyroid disease Neg Hx        Social History:   Social History     Tobacco Use    Smoking status: Former Smoker     Years: 20.00     Types: Cigarettes     Quit date: 2005     Years since quittin.1    Smokeless tobacco: Former User    Tobacco comment: Former Light Smoker less than 10 a day   Substance Use Topics    Alcohol use: Yes     Alcohol/week: 4.0 standard drinks     Types: 4 Glasses of wine per week     Comment: occ    Drug use: No       Allergies:   Review of patient's allergies indicates:   Allergen Reactions    Statins-hmg-coa reductase inhibitors Anaphylaxis     Myalgias    Codeine sulfate Itching    Hydrochlorothiazide Other (See Comments)     Adverse Reaction    Lisinopril Swelling and Edema     Facial Swelling       Home Medications:  Current Outpatient Medications on File Prior to Visit   Medication Sig Dispense Refill    adalimumab (HUMIRA,CF, PEN) 40 mg/0.4 mL PnKt Inject 0.4 mLs (40 mg total) into the skin every 14 (fourteen) days. 6 pen 3    amLODIPine (NORVASC) 10 MG tablet take 1 tablet by mouth once daily 90 tablet 1    aspirin (ECOTRIN) 81 MG EC tablet Take 1 tablet (81 mg total) by mouth once daily.      atenoloL (TENORMIN) 25 MG  "tablet Take 1 tablet (25 mg total) by mouth once daily. 30 tablet 11    calcium carbonate (OS-COOPER) 600 mg (1,500 mg) Tab Take 1,200 mg by mouth once daily.       cyanocobalamin, vitamin B-12, 2,500 mcg Lozg Place 1 tablet under the tongue.       denosumab (PROLIA) 60 mg/mL Syrg Inject 60 mg into the skin.      evolocumab (REPATHA SURECLICK) 140 mg/mL PnIj Inject 1 mL (140 mg total) into the skin every 14 (fourteen) days. 2 mL 12    levocetirizine (XYZAL) 5 MG tablet Take 1 tablet (5 mg total) by mouth every evening. 90 tablet 3    LORazepam (ATIVAN) 0.5 MG tablet Take 3 tablets (1.5 mg total) by mouth every evening. 90 tablet 5    multivitamin with minerals tablet Take 1 tablet by mouth once daily at 6am.      vitamin D 1000 units Tab Take 185 mg by mouth once daily.      ezetimibe (ZETIA) 10 mg tablet Take 1 tablet (10 mg total) by mouth once daily. 30 tablet 3    folic acid (FOLVITE) 1 MG tablet Take by mouth once daily.      mupirocin (BACTROBAN) 2 % ointment Apply a small amount to affected area twice a day (Patient not taking: Reported on 3/7/2022) 22 g 0    nut tx,urea cycle dis w-o iron (ESSENTIAL AMINO ACID MIX ORAL) once daily.       UNABLE TO FIND Take 25 mg by mouth once daily. CBD Oil      valACYclovir (VALTREX) 500 MG tablet Take 1 tablet by mouth twice a day as needed for 3 days per flare (Patient not taking: Reported on 3/7/2022) 30 tablet 0     No current facility-administered medications on file prior to visit.       /76 (BP Location: Left arm, Patient Position: Sitting, BP Method: Medium (Manual))   Pulse 83   Ht 5' 7" (1.702 m)   Wt 49.9 kg (110 lb 0.2 oz)   BMI 17.23 kg/m²   Body mass index is 17.23 kg/m².  Physical Exam  Constitutional:       General: She is not in acute distress.  HENT:      Head: Normocephalic and atraumatic.   Eyes:      General: No scleral icterus.     Conjunctiva/sclera: Conjunctivae normal.   Cardiovascular:      Rate and Rhythm: Normal rate and " regular rhythm.      Heart sounds: No murmur heard.  Pulmonary:      Effort: Pulmonary effort is normal. No respiratory distress.      Breath sounds: Normal breath sounds. No wheezing.   Abdominal:      General: Abdomen is flat. Bowel sounds are normal.      Palpations: Abdomen is soft.      Tenderness: There is no abdominal tenderness.   Skin:     General: Skin is warm and dry.   Neurological:      General: No focal deficit present.      Mental Status: She is alert and oriented to person, place, and time.      Cranial Nerves: No cranial nerve deficit.   Psychiatric:         Mood and Affect: Mood normal.         Judgment: Judgment normal.         Labs: Pertinent labs reviewed.    Assessment:   1. Crohn's disease of small intestine without complication    2. RUQ abdominal pain    3. Immunocompromised state    4. Osteoporosis without current pathological fracture, unspecified osteoporosis type      Patient with Crohn's small intestine in remission on Humira monotherapy. She does have some RUQ abdominal pain that she describes as discomfort. It also moves to epigastric region. This is very mild.     Recommendations:   - update labs  - xray to assess bowel gas pattern  - continue Humira  - osteoporosis management per Rheumatology    Crohn's disease of small intestine without complication  -     X-Ray Abdomen Flat And Erect; Future; Expected date: 03/07/2022  -     CBC Auto Differential; Future; Expected date: 03/07/2022  -     C-Reactive Protein; Future; Expected date: 03/07/2022  -     Vitamin B12; Future; Expected date: 03/07/2022  -     Folate; Future; Expected date: 03/07/2022  -     Vitamin D; Future; Expected date: 03/07/2022  -     QUANTIFERON GOLD TB; Future; Expected date: 03/07/2022    RUQ abdominal pain  -     X-Ray Abdomen Flat And Erect; Future; Expected date: 03/07/2022    Immunocompromised state    Osteoporosis without current pathological fracture, unspecified osteoporosis type        Return to  Clinic:  Follow up in about 1 year (around 3/7/2023).    Thank you for the opportunity to participate in the care of this patient.  DINA Ashraf

## 2022-03-08 ENCOUNTER — PATIENT MESSAGE (OUTPATIENT)
Dept: GASTROENTEROLOGY | Facility: CLINIC | Age: 72
End: 2022-03-08
Payer: MEDICARE

## 2022-03-10 ENCOUNTER — PATIENT MESSAGE (OUTPATIENT)
Dept: GASTROENTEROLOGY | Facility: CLINIC | Age: 72
End: 2022-03-10
Payer: MEDICARE

## 2022-03-14 ENCOUNTER — PATIENT MESSAGE (OUTPATIENT)
Dept: UROLOGY | Facility: CLINIC | Age: 72
End: 2022-03-14
Payer: MEDICARE

## 2022-03-14 ENCOUNTER — SPECIALTY PHARMACY (OUTPATIENT)
Dept: PHARMACY | Facility: CLINIC | Age: 72
End: 2022-03-14
Payer: MEDICARE

## 2022-03-15 DIAGNOSIS — N28.1 BILATERAL RENAL CYSTS: ICD-10-CM

## 2022-03-15 DIAGNOSIS — N20.0 RENAL STONES: Primary | ICD-10-CM

## 2022-03-23 ENCOUNTER — HOSPITAL ENCOUNTER (OUTPATIENT)
Dept: RADIOLOGY | Facility: HOSPITAL | Age: 72
Discharge: HOME | End: 2022-03-23
Attending: NURSE PRACTITIONER
Payer: MEDICARE

## 2022-03-23 DIAGNOSIS — N28.1 BILATERAL RENAL CYSTS: ICD-10-CM

## 2022-03-23 DIAGNOSIS — N20.0 RENAL STONES: ICD-10-CM

## 2022-03-23 PROCEDURE — 76770 US EXAM ABDO BACK WALL COMP: CPT | Mod: 26,,, | Performed by: RADIOLOGY

## 2022-03-23 PROCEDURE — 76770 US RETROPERITONEAL COMPLETE: ICD-10-PCS | Mod: 26,,, | Performed by: RADIOLOGY

## 2022-03-23 PROCEDURE — 76770 US EXAM ABDO BACK WALL COMP: CPT | Mod: TC

## 2022-03-25 ENCOUNTER — PATIENT MESSAGE (OUTPATIENT)
Dept: CARDIOLOGY | Facility: CLINIC | Age: 72
End: 2022-03-25
Payer: MEDICARE

## 2022-04-05 ENCOUNTER — OFFICE VISIT (OUTPATIENT)
Dept: INTERNAL MEDICINE | Facility: CLINIC | Age: 72
End: 2022-04-05
Payer: MEDICARE

## 2022-04-05 VITALS
HEART RATE: 90 BPM | HEIGHT: 67 IN | RESPIRATION RATE: 18 BRPM | BODY MASS INDEX: 16.22 KG/M2 | WEIGHT: 103.38 LBS | OXYGEN SATURATION: 96 % | SYSTOLIC BLOOD PRESSURE: 130 MMHG | TEMPERATURE: 97 F | DIASTOLIC BLOOD PRESSURE: 80 MMHG

## 2022-04-05 DIAGNOSIS — F51.04 CHRONIC INSOMNIA: ICD-10-CM

## 2022-04-05 DIAGNOSIS — F41.9 ANXIETY: ICD-10-CM

## 2022-04-05 DIAGNOSIS — I10 ESSENTIAL HYPERTENSION: Primary | ICD-10-CM

## 2022-04-05 DIAGNOSIS — K55.1 MESENTERIC ARTERY STENOSIS: ICD-10-CM

## 2022-04-05 DIAGNOSIS — E78.5 HYPERLIPIDEMIA LDL GOAL <70: ICD-10-CM

## 2022-04-05 PROCEDURE — 3008F PR BODY MASS INDEX (BMI) DOCUMENTED: ICD-10-PCS | Mod: CPTII,S$GLB,, | Performed by: INTERNAL MEDICINE

## 2022-04-05 PROCEDURE — 99999 PR PBB SHADOW E&M-EST. PATIENT-LVL IV: ICD-10-PCS | Mod: PBBFAC,,, | Performed by: INTERNAL MEDICINE

## 2022-04-05 PROCEDURE — 3075F PR MOST RECENT SYSTOLIC BLOOD PRESS GE 130-139MM HG: ICD-10-PCS | Mod: CPTII,S$GLB,, | Performed by: INTERNAL MEDICINE

## 2022-04-05 PROCEDURE — 1160F RVW MEDS BY RX/DR IN RCRD: CPT | Mod: CPTII,S$GLB,, | Performed by: INTERNAL MEDICINE

## 2022-04-05 PROCEDURE — 1159F PR MEDICATION LIST DOCUMENTED IN MEDICAL RECORD: ICD-10-PCS | Mod: CPTII,S$GLB,, | Performed by: INTERNAL MEDICINE

## 2022-04-05 PROCEDURE — 3079F PR MOST RECENT DIASTOLIC BLOOD PRESSURE 80-89 MM HG: ICD-10-PCS | Mod: CPTII,S$GLB,, | Performed by: INTERNAL MEDICINE

## 2022-04-05 PROCEDURE — 1160F PR REVIEW ALL MEDS BY PRESCRIBER/CLIN PHARMACIST DOCUMENTED: ICD-10-PCS | Mod: CPTII,S$GLB,, | Performed by: INTERNAL MEDICINE

## 2022-04-05 PROCEDURE — 99499 RISK ADDL DX/OHS AUDIT: ICD-10-PCS | Mod: S$GLB,,, | Performed by: INTERNAL MEDICINE

## 2022-04-05 PROCEDURE — 99214 PR OFFICE/OUTPT VISIT, EST, LEVL IV, 30-39 MIN: ICD-10-PCS | Mod: S$GLB,,, | Performed by: INTERNAL MEDICINE

## 2022-04-05 PROCEDURE — 99999 PR PBB SHADOW E&M-EST. PATIENT-LVL IV: CPT | Mod: PBBFAC,,, | Performed by: INTERNAL MEDICINE

## 2022-04-05 PROCEDURE — 99499 UNLISTED E&M SERVICE: CPT | Mod: S$GLB,,, | Performed by: INTERNAL MEDICINE

## 2022-04-05 PROCEDURE — 1101F PR PT FALLS ASSESS DOC 0-1 FALLS W/OUT INJ PAST YR: ICD-10-PCS | Mod: CPTII,S$GLB,, | Performed by: INTERNAL MEDICINE

## 2022-04-05 PROCEDURE — 1159F MED LIST DOCD IN RCRD: CPT | Mod: CPTII,S$GLB,, | Performed by: INTERNAL MEDICINE

## 2022-04-05 PROCEDURE — 99214 OFFICE O/P EST MOD 30 MIN: CPT | Mod: S$GLB,,, | Performed by: INTERNAL MEDICINE

## 2022-04-05 PROCEDURE — 3075F SYST BP GE 130 - 139MM HG: CPT | Mod: CPTII,S$GLB,, | Performed by: INTERNAL MEDICINE

## 2022-04-05 PROCEDURE — 1126F AMNT PAIN NOTED NONE PRSNT: CPT | Mod: CPTII,S$GLB,, | Performed by: INTERNAL MEDICINE

## 2022-04-05 PROCEDURE — 1126F PR PAIN SEVERITY QUANTIFIED, NO PAIN PRESENT: ICD-10-PCS | Mod: CPTII,S$GLB,, | Performed by: INTERNAL MEDICINE

## 2022-04-05 PROCEDURE — 3288F FALL RISK ASSESSMENT DOCD: CPT | Mod: CPTII,S$GLB,, | Performed by: INTERNAL MEDICINE

## 2022-04-05 PROCEDURE — 3008F BODY MASS INDEX DOCD: CPT | Mod: CPTII,S$GLB,, | Performed by: INTERNAL MEDICINE

## 2022-04-05 PROCEDURE — 1101F PT FALLS ASSESS-DOCD LE1/YR: CPT | Mod: CPTII,S$GLB,, | Performed by: INTERNAL MEDICINE

## 2022-04-05 PROCEDURE — 3079F DIAST BP 80-89 MM HG: CPT | Mod: CPTII,S$GLB,, | Performed by: INTERNAL MEDICINE

## 2022-04-05 PROCEDURE — 3288F PR FALLS RISK ASSESSMENT DOCUMENTED: ICD-10-PCS | Mod: CPTII,S$GLB,, | Performed by: INTERNAL MEDICINE

## 2022-04-05 RX ORDER — LORAZEPAM 0.5 MG/1
1.5 TABLET ORAL NIGHTLY
Qty: 90 TABLET | Refills: 5 | Status: SHIPPED | OUTPATIENT
Start: 2022-04-05 | End: 2022-09-13 | Stop reason: SDUPTHER

## 2022-04-05 RX ORDER — AMLODIPINE BESYLATE 10 MG/1
TABLET ORAL
Qty: 90 TABLET | Refills: 1 | Status: SHIPPED | OUTPATIENT
Start: 2022-04-05 | End: 2022-09-19 | Stop reason: SDUPTHER

## 2022-04-05 NOTE — PROGRESS NOTES
Manuela Reyes  72 y.o. White female  [id number]    Chief Complaint:  Chief Complaint   Patient presents with    6 mth f/u       History of Present Illness:  HTN--stable on amlodipine and atenolol. She needs refills on amlodipine.   HLD--improved on Repatha.   Lab Results   Component Value Date    LDLCALC 53.2 (L) 03/23/2022     Mesenteric artery stenosis--she is being managed by vascular surgery. She was recently seen. The notes are not available.   Anxiety/insomnia--stable on lorazepam. She needs refills.      WBC 6.26 03/07/2022    HGB 12.8 03/07/2022    HCT 36.8 (L) 03/07/2022     03/07/2022    CHOL 165 03/23/2022    TRIG 69 03/23/2022    HDL 98 (H) 03/23/2022    ALT 13 03/23/2022    AST 21 03/23/2022     (L) 03/23/2022    K 4.8 03/23/2022     03/23/2022    CREATININE 0.9 03/23/2022    BUN 12 03/23/2022    CO2 27 03/23/2022    TSH 1.326 01/04/2017    PSA <0.01 12/09/2020       History:  Past Medical History:   Diagnosis Date    Anxiety     Crohn's disease     Depression     Genital herpes     Hepatitis C infection     Hypertension     Insomnia     Mesenteric artery stenosis     Dr. 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 Malaika Mack--dermatology    TIA (transient ischemic attack)        Medications:  Current Outpatient Medications on File Prior to Visit   Medication Sig Dispense Refill    adalimumab (HUMIRA,CF, PEN) 40 mg/0.4 mL PnKt Inject 0.4 mLs (40 mg total) into the skin every 14 (fourteen) days. 6 pen 3    aspirin (ECOTRIN) 81 MG EC tablet Take 1 tablet (81 mg total) by mouth once daily.      atenoloL (TENORMIN) 25 MG tablet Take 1 tablet (25 mg total) by mouth once daily. 30 tablet 11    calcium carbonate (OS-COOPER) 600 mg (1,500 mg) Tab Take 1,200 mg by mouth once daily.       cyanocobalamin, vitamin B-12, 2,500 mcg Lozg Place 1 tablet under the tongue.       denosumab (PROLIA) 60 mg/mL Syrg  Inject 60 mg into the skin.      evolocumab (REPATHA SURECLICK) 140 mg/mL PnIj Inject 1 mL (140 mg total) into the skin every 14 (fourteen) days. 2 mL 12    levocetirizine (XYZAL) 5 MG tablet Take 1 tablet (5 mg total) by mouth every evening. 90 tablet 3    multivitamin with minerals tablet Take 1 tablet by mouth once daily at 6am.      mupirocin (BACTROBAN) 2 % ointment Apply a small amount to affected area twice a day 22 g 0    valACYclovir (VALTREX) 500 MG tablet Take 1 tablet by mouth twice a day as needed for 3 days per flare 30 tablet 0    vitamin D 1000 units Tab Take 185 mg by mouth once daily.      amLODIPine (NORVASC) 10 MG tablet take 1 tablet by mouth once daily 90 tablet 1    LORazepam (ATIVAN) 0.5 MG tablet Take 3 tablets (1.5 mg total) by mouth every evening. 90 tablet 5       Allergies:  Review of patient's allergies indicates:   Allergen Reactions    Statins-hmg-coa reductase inhibitors Anaphylaxis     Myalgias    Codeine sulfate Itching    Hydrochlorothiazide Other (See Comments)     Adverse Reaction    Lisinopril Swelling and Edema     Facial Swelling       Review of Systems   Constitutional: Negative for fever.   Respiratory: Negative for shortness of breath.    Cardiovascular: Negative for chest pain and leg swelling.   Genitourinary: Negative for dysuria.   Neurological: Negative for dizziness and headaches.   Psychiatric/Behavioral: The patient is nervous/anxious and has insomnia.        Exam:  Vitals:    04/05/22 0948   BP: 130/80   Pulse: 90   Resp: 18   Temp: 97.3 °F (36.3 °C)     Weight: 46.9 kg (103 lb 6.3 oz)   Body mass index is 16.19 kg/m².      Physical Exam  Vitals reviewed.   Constitutional:       General: She is not in acute distress.     Appearance: She is well-developed. She is not ill-appearing.   Eyes:      General: No scleral icterus.  Cardiovascular:      Rate and Rhythm: Normal rate and regular rhythm.      Heart sounds: Normal heart sounds.   Pulmonary:       Effort: Pulmonary effort is normal. No respiratory distress.      Breath sounds: Normal breath sounds. No wheezing or rales.   Skin:     General: Skin is warm and dry.   Neurological:      Mental Status: She is alert and oriented to person, place, and time.   Psychiatric:         Behavior: Behavior normal.       Assessment:  The primary encounter diagnosis was Essential hypertension. Diagnoses of Hyperlipidemia LDL goal <70, Mesenteric artery stenosis, Anxiety, and Chronic insomnia were also pertinent to this visit.    Plan:  Essential hypertension  -     Refill amLODIPine (NORVASC) 10 MG tablet; Take 1 tablet by mouth once daily  Dispense: 90 tablet; Refill: 1    Hyperlipidemia LDL goal <70  -     Continue Repatha    Mesenteric artery stenosis  -     Management per vascular surgery  -     Obtain records    Anxiety  -     LORazepam (ATIVAN) 0.5 MG tablet; Take 3 tablets (1.5 mg total) by mouth every evening.  Dispense: 90 tablet; Refill: 5    Chronic insomnia  -     LORazepam (ATIVAN) 0.5 MG tablet; Take 3 tablets (1.5 mg total) by mouth every evening.  Dispense: 90 tablet; Refill: 5      Follow up in about 6 months (around 10/5/2022).

## 2022-04-07 ENCOUNTER — OFFICE VISIT (OUTPATIENT)
Dept: PODIATRY | Facility: CLINIC | Age: 72
End: 2022-04-07
Payer: MEDICARE

## 2022-04-07 VITALS
SYSTOLIC BLOOD PRESSURE: 147 MMHG | WEIGHT: 103 LBS | HEART RATE: 66 BPM | HEIGHT: 67 IN | DIASTOLIC BLOOD PRESSURE: 84 MMHG | BODY MASS INDEX: 16.17 KG/M2

## 2022-04-07 DIAGNOSIS — R23.4 FISSURE IN SKIN OF FOOT: ICD-10-CM

## 2022-04-07 DIAGNOSIS — B35.1 ONYCHOMYCOSIS: Primary | ICD-10-CM

## 2022-04-07 PROCEDURE — 1160F PR REVIEW ALL MEDS BY PRESCRIBER/CLIN PHARMACIST DOCUMENTED: ICD-10-PCS | Mod: CPTII,S$GLB,, | Performed by: PODIATRIST

## 2022-04-07 PROCEDURE — 3079F PR MOST RECENT DIASTOLIC BLOOD PRESSURE 80-89 MM HG: ICD-10-PCS | Mod: CPTII,S$GLB,, | Performed by: PODIATRIST

## 2022-04-07 PROCEDURE — 1160F RVW MEDS BY RX/DR IN RCRD: CPT | Mod: CPTII,S$GLB,, | Performed by: PODIATRIST

## 2022-04-07 PROCEDURE — 1126F AMNT PAIN NOTED NONE PRSNT: CPT | Mod: CPTII,S$GLB,, | Performed by: PODIATRIST

## 2022-04-07 PROCEDURE — 1159F MED LIST DOCD IN RCRD: CPT | Mod: CPTII,S$GLB,, | Performed by: PODIATRIST

## 2022-04-07 PROCEDURE — 1101F PR PT FALLS ASSESS DOC 0-1 FALLS W/OUT INJ PAST YR: ICD-10-PCS | Mod: CPTII,S$GLB,, | Performed by: PODIATRIST

## 2022-04-07 PROCEDURE — 3288F FALL RISK ASSESSMENT DOCD: CPT | Mod: CPTII,S$GLB,, | Performed by: PODIATRIST

## 2022-04-07 PROCEDURE — 3008F PR BODY MASS INDEX (BMI) DOCUMENTED: ICD-10-PCS | Mod: CPTII,S$GLB,, | Performed by: PODIATRIST

## 2022-04-07 PROCEDURE — 99213 PR OFFICE/OUTPT VISIT, EST, LEVL III, 20-29 MIN: ICD-10-PCS | Mod: S$GLB,,, | Performed by: PODIATRIST

## 2022-04-07 PROCEDURE — 1101F PT FALLS ASSESS-DOCD LE1/YR: CPT | Mod: CPTII,S$GLB,, | Performed by: PODIATRIST

## 2022-04-07 PROCEDURE — 3077F SYST BP >= 140 MM HG: CPT | Mod: CPTII,S$GLB,, | Performed by: PODIATRIST

## 2022-04-07 PROCEDURE — 3008F BODY MASS INDEX DOCD: CPT | Mod: CPTII,S$GLB,, | Performed by: PODIATRIST

## 2022-04-07 PROCEDURE — 1126F PR PAIN SEVERITY QUANTIFIED, NO PAIN PRESENT: ICD-10-PCS | Mod: CPTII,S$GLB,, | Performed by: PODIATRIST

## 2022-04-07 PROCEDURE — 3077F PR MOST RECENT SYSTOLIC BLOOD PRESSURE >= 140 MM HG: ICD-10-PCS | Mod: CPTII,S$GLB,, | Performed by: PODIATRIST

## 2022-04-07 PROCEDURE — 3288F PR FALLS RISK ASSESSMENT DOCUMENTED: ICD-10-PCS | Mod: CPTII,S$GLB,, | Performed by: PODIATRIST

## 2022-04-07 PROCEDURE — 1159F PR MEDICATION LIST DOCUMENTED IN MEDICAL RECORD: ICD-10-PCS | Mod: CPTII,S$GLB,, | Performed by: PODIATRIST

## 2022-04-07 PROCEDURE — 99999 PR PBB SHADOW E&M-EST. PATIENT-LVL III: CPT | Mod: PBBFAC,,, | Performed by: PODIATRIST

## 2022-04-07 PROCEDURE — 3079F DIAST BP 80-89 MM HG: CPT | Mod: CPTII,S$GLB,, | Performed by: PODIATRIST

## 2022-04-07 PROCEDURE — 99213 OFFICE O/P EST LOW 20 MIN: CPT | Mod: S$GLB,,, | Performed by: PODIATRIST

## 2022-04-07 PROCEDURE — 99999 PR PBB SHADOW E&M-EST. PATIENT-LVL III: ICD-10-PCS | Mod: PBBFAC,,, | Performed by: PODIATRIST

## 2022-04-12 ENCOUNTER — PATIENT MESSAGE (OUTPATIENT)
Dept: UROLOGY | Facility: CLINIC | Age: 72
End: 2022-04-12
Payer: MEDICARE

## 2022-04-12 NOTE — PROGRESS NOTES
Subjective:     Patient ID: Manuela Reyes is a 72 y.o. female.    Chief Complaint: Diabetic Foot Exam (C/o nail discoloration, Possible fissure to right heel, c/o soreness, non-diabetic Pt, )    Manuela is a 72 y.o. female who presents to the clinic complaining of thick and discolored toenails on both feet. Manuela is inquiring about treatment options. Patient states she did do RX  topical medications with mild improvement.  Patient also would like callus on right heel checked, patient admits soreness to the area. Noah has no other pedal complaints at this time.      Patient Active Problem List   Diagnosis    Crohn's disease of small intestine    Crohn's colitis    Mesenteric artery stenosis    Hyperlipidemia LDL goal <70    Chronic insomnia    Anxiety    Crohn's disease    Family history of cardiovascular disease    Atherosclerosis of abdominal aorta    Age-related osteoporosis without current pathological fracture    Essential hypertension    Pain in both lower extremities    Abnormal ECG    Decreased ROM of lumbar spine    Weakness of both lower extremities    Myalgia due to statin       Medication List with Changes/Refills   New Medications    EFINACONAZOLE (JUBLIA) 10 % ESCOBAR    Apply 1 application topically once daily.   Current Medications    ADALIMUMAB (HUMIRA,CF, PEN) 40 MG/0.4 ML PNKT    Inject 0.4 mLs (40 mg total) into the skin every 14 (fourteen) days.    AMLODIPINE (NORVASC) 10 MG TABLET    Take 1 tablet by mouth once daily    ASPIRIN (ECOTRIN) 81 MG EC TABLET    Take 1 tablet (81 mg total) by mouth once daily.    ATENOLOL (TENORMIN) 25 MG TABLET    Take 1 tablet (25 mg total) by mouth once daily.    CALCIUM CARBONATE (OS-COOPER) 600 MG (1,500 MG) TAB    Take 1,200 mg by mouth once daily.     CYANOCOBALAMIN, VITAMIN B-12, 2,500 MCG LOZG    Place 1 tablet under the tongue.     DENOSUMAB (PROLIA) 60 MG/ML SYRG    Inject 60 mg into the skin.    EVOLOCUMAB (REPATHA SURECLICK)  140 MG/ML PNIJ    Inject 1 mL (140 mg total) into the skin every 14 (fourteen) days.    LEVOCETIRIZINE (XYZAL) 5 MG TABLET    Take 1 tablet (5 mg total) by mouth every evening.    LORAZEPAM (ATIVAN) 0.5 MG TABLET    Take 3 tablets (1.5 mg total) by mouth every evening.    MULTIVITAMIN WITH MINERALS TABLET    Take 1 tablet by mouth once daily at 6am.    MUPIROCIN (BACTROBAN) 2 % OINTMENT    Apply a small amount to affected area twice a day    VALACYCLOVIR (VALTREX) 500 MG TABLET    Take 1 tablet by mouth twice a day as needed for 3 days per flare    VITAMIN D 1000 UNITS TAB    Take 185 mg by mouth once daily.       Review of patient's allergies indicates:   Allergen Reactions    Statins-hmg-coa reductase inhibitors Anaphylaxis     Myalgias    Codeine sulfate Itching    Hydrochlorothiazide Other (See Comments)     Adverse Reaction    Lisinopril Swelling and Edema     Facial Swelling       Past Surgical History:   Procedure Laterality Date    APPENDECTOMY      COLONOSCOPY N/A 5/19/2017    Procedure: COLONOSCOPY;  Surgeon: Jose Luis Leonard MD;  Location: [facility];  Service: Endoscopy;  Laterality: N/A;    COLONOSCOPY N/A 3/26/2018    Procedure: COLONOSCOPY;  Surgeon: Guillaume Whitman MD;  Location: [facility];  Service: Endoscopy;  Laterality: N/A;    COLONOSCOPY N/A 3/19/2019    Procedure: COLONOSCOPY;  Surgeon: Lili Ellis MD;  Location: [facility];  Service: Endoscopy;  Laterality: N/A;    COLONOSCOPY N/A 9/24/2020    Procedure: COLONOSCOPY;  Surgeon: Lili Ellis MD;  Location: [facility];  Service: Endoscopy;  Laterality: N/A;    SMALL INTESTINE SURGERY         Family History   Problem Relation Age of Onset    Stroke Mother     Heart disease Mother     Cataracts Mother     Cancer Father         Lung    Heart disease Sister     Hypertension Brother     Glaucoma Neg Hx     Macular degeneration Neg Hx     Thyroid disease Neg Hx        Social History     Socioeconomic History     "Marital status: Single   Tobacco Use    Smoking status: Former Smoker     Years: 20.00     Types: Cigarettes     Quit date: 2005     Years since quittin.2    Smokeless tobacco: Former User    Tobacco comment: Former Light Smoker less than 10 a day   Substance and Sexual Activity    Alcohol use: Yes     Alcohol/week: 4.0 standard drinks     Types: 4 Glasses of wine per week     Comment: occ    Drug use: No    Sexual activity: Never     Partners: Female       Vitals:    22 1409   BP: (!) 147/84   Pulse: 66   Weight: 46.7 kg (103 lb)   Height: 5' 7" (1.702 m)   PainSc: 0-No pain   PainLoc: Foot       Review of Systems   Constitutional: Negative for chills and fever.   Respiratory: Negative for shortness of breath.    Cardiovascular: Negative for chest pain, palpitations, orthopnea, claudication and leg swelling.   Gastrointestinal: Negative for diarrhea, nausea and vomiting.   Musculoskeletal: Negative for joint pain.   Skin: Negative for rash.   Neurological: Negative for dizziness, tingling, sensory change, focal weakness and weakness.   Psychiatric/Behavioral: Negative.          Objective:      PHYSICAL EXAM: Apperance: Alert and orient in no distress,well developed, and with good attention to grooming and body habits  LOWER EXTREMITY EXAM:  VASCULAR: Dorsalis pedis pulses 2/4 bilateral and Posterior Tibial pulses 2/4 bilateral. Capillary fill time <4 seconds bilateral. No edema observed bilateral. Varicosities absent bilateral. Skin temperature of the lower extremities is warm to warm, proximal to distal. Hair growth WNL bilateral.  DERMATOLOGICAL: No skin rashes, subcutaneous nodules, lesions, or ulcers observed bilateral. Nails 1,2,3,4,5 bilateral decreased thickened, and discolored with subungual debris. Webspaces 1,2,3,4 bilateral clean, dry and without evidence of break in skin integrity. Minimal hyperkeratotic tissue noted to right heel. .   NEUROLOGICAL: Light touch, sharp-dull, " proprioception all present and equal bilaterally.     MUSCULOSKELETAL: Muscle strength is 5/5 for foot inverters, everters, plantarflexors, and dorsiflexors. Muscle tone is normal. Negative pain on palpation of nails bilateral. No pain on palpation of left 5th toe.         Assessment:       Encounter Diagnoses   Name Primary?    Onychomycosis Yes    Fissure in skin of foot - Right Foot          Plan:   Onychomycosis  -     efinaconazole (JUBLIA) 10 % Kike; Apply 1 application topically once daily.  Dispense: 8 mL; Refill: 11    Fissure in skin of foot - Right Foot      I counseled the patient on her conditions, regarding findings of my examination, my impressions, and usual treatment plan.   Prescription written for topical Jublia to be applied to nails once daily.  Patient instructed to spray all shoes with Lysol disinfectant spray and let dry before wearing. Patient instructed to wash all socks in hot water and bleach.  Discuss treatment options for nail fungus.  I explained that fungus lives in a warm dark moist environment and therefore patient should make every attempt to keep feet clean and dry.  We discussed drying feet thoroughly after shower particularly between the toes and then applying powder between the toes and in the shoes.    Patient to return in 6 months.             Halima Salas DPM  [facility]

## 2022-04-13 ENCOUNTER — SPECIALTY PHARMACY (OUTPATIENT)
Dept: PHARMACY | Facility: CLINIC | Age: 72
End: 2022-04-13
Payer: MEDICARE

## 2022-04-13 NOTE — TELEPHONE ENCOUNTER
Specialty Pharmacy - Refill Coordination    Specialty Medication Orders Linked to Encounter    Flowsheet Row Most Recent Value   Medication #1 evolocumab (REPATHA SURECLICK) 140 mg/mL PnIj (Order#[id number], Rx#[id number])          Refill Questions - Documented Responses    Flowsheet Row Most Recent Value   Patient Availability and HIPAA Verification    Does patient want to proceed with activity? Yes   HIPAA/medical authority confirmed? Yes   Relationship to patient of person spoken to? Self   Refill Screening Questions    Would patient like to speak to a pharmacist? No   When does the patient need to receive the medication? 04/20/22   Refill Delivery Questions    How will the patient receive the medication? Delivery Shaye   When does the patient need to receive the medication? 04/20/22   Shipping Address Home   Address in [facility] confirmed and updated if neccessary? Yes   Expected Copay ($) 0   Is the patient able to afford the medication copay? Yes   Payment Method zero copay   Days supply of Refill 28   Supplies needed? No supplies needed   Refill activity completed? Yes   Refill activity plan Refill scheduled   Shipment/Pickup Date: 04/14/22          Current Outpatient Medications   Medication Sig    adalimumab (HUMIRA,CF, PEN) 40 mg/0.4 mL PnKt Inject 0.4 mLs (40 mg total) into the skin every 14 (fourteen) days.    amLODIPine (NORVASC) 10 MG tablet Take 1 tablet by mouth once daily    aspirin (ECOTRIN) 81 MG EC tablet Take 1 tablet (81 mg total) by mouth once daily.    atenoloL (TENORMIN) 25 MG tablet Take 1 tablet (25 mg total) by mouth once daily.    calcium carbonate (OS-COOPER) 600 mg (1,500 mg) Tab Take 1,200 mg by mouth once daily.     cyanocobalamin, vitamin B-12, 2,500 mcg Lozg Place 1 tablet under the tongue.     denosumab (PROLIA) 60 mg/mL Syrg Inject 60 mg into the skin.    efinaconazole (JUBLIA) 10 % Kike Apply 1 application topically once daily.    evolocumab (REPATHA SURECLICK) 140  mg/mL PnIj Inject 1 mL (140 mg total) into the skin every 14 (fourteen) days.    levocetirizine (XYZAL) 5 MG tablet Take 1 tablet (5 mg total) by mouth every evening.    LORazepam (ATIVAN) 0.5 MG tablet Take 3 tablets (1.5 mg total) by mouth every evening.    multivitamin with minerals tablet Take 1 tablet by mouth once daily at 6am.    mupirocin (BACTROBAN) 2 % ointment Apply a small amount to affected area twice a day    valACYclovir (VALTREX) 500 MG tablet Take 1 tablet by mouth twice a day as needed for 3 days per flare    vitamin D 1000 units Tab Take 185 mg by mouth once daily.   Last reviewed on 4/12/2022  8:39 AM by Halima Salas DPM    Review of patient's allergies indicates:   Allergen Reactions    Statins-hmg-coa reductase inhibitors Anaphylaxis     Myalgias    Codeine sulfate Itching    Hydrochlorothiazide Other (See Comments)     Adverse Reaction    Lisinopril Swelling and Edema     Facial Swelling    Last reviewed on  4/12/2022 8:39 AM by Halima Salas      Tasks added this encounter   5/11/2022 - Refill Call (Auto Added)   Tasks due within next 3 months   No tasks due.     Miguel Thayer, PharmD  [facility]  [address] [facility] [address]  Phone: [phone]  Fax: [phone]

## 2022-04-18 ENCOUNTER — PATIENT MESSAGE (OUTPATIENT)
Dept: ADMINISTRATIVE | Facility: OTHER | Age: 72
End: 2022-04-18
Payer: MEDICARE

## 2022-04-19 ENCOUNTER — OFFICE VISIT (OUTPATIENT)
Dept: UROLOGY | Facility: CLINIC | Age: 72
End: 2022-04-19
Payer: MEDICARE

## 2022-04-19 VITALS
HEART RATE: 73 BPM | BODY MASS INDEX: 16.09 KG/M2 | DIASTOLIC BLOOD PRESSURE: 75 MMHG | HEIGHT: 67 IN | WEIGHT: 102.5 LBS | SYSTOLIC BLOOD PRESSURE: 132 MMHG | RESPIRATION RATE: 17 BRPM

## 2022-04-19 DIAGNOSIS — N28.1 RENAL CYST: Primary | ICD-10-CM

## 2022-04-19 LAB
BILIRUB SERPL-MCNC: NORMAL MG/DL
BLOOD URINE, POC: NORMAL
CLARITY, POC UA: CLEAR
COLOR, POC UA: YELLOW
GLUCOSE UR QL STRIP: NORMAL
KETONES UR QL STRIP: NORMAL
LEUKOCYTE ESTERASE URINE, POC: NORMAL
NITRITE, POC UA: NORMAL
PH, POC UA: 5
POC RESIDUAL URINE VOLUME: 1 ML (ref 0–100)
PROTEIN, POC: NORMAL
SPECIFIC GRAVITY, POC UA: 1.01
UROBILINOGEN, POC UA: NORMAL

## 2022-04-19 PROCEDURE — 3078F DIAST BP <80 MM HG: CPT | Mod: CPTII,S$GLB,, | Performed by: NURSE PRACTITIONER

## 2022-04-19 PROCEDURE — 1101F PR PT FALLS ASSESS DOC 0-1 FALLS W/OUT INJ PAST YR: ICD-10-PCS | Mod: CPTII,S$GLB,, | Performed by: NURSE PRACTITIONER

## 2022-04-19 PROCEDURE — 3075F SYST BP GE 130 - 139MM HG: CPT | Mod: CPTII,S$GLB,, | Performed by: NURSE PRACTITIONER

## 2022-04-19 PROCEDURE — 51798 POCT BLADDER SCAN: ICD-10-PCS | Mod: S$GLB,,, | Performed by: NURSE PRACTITIONER

## 2022-04-19 PROCEDURE — 3288F PR FALLS RISK ASSESSMENT DOCUMENTED: ICD-10-PCS | Mod: CPTII,S$GLB,, | Performed by: NURSE PRACTITIONER

## 2022-04-19 PROCEDURE — 1126F AMNT PAIN NOTED NONE PRSNT: CPT | Mod: CPTII,S$GLB,, | Performed by: NURSE PRACTITIONER

## 2022-04-19 PROCEDURE — 51798 US URINE CAPACITY MEASURE: CPT | Mod: S$GLB,,, | Performed by: NURSE PRACTITIONER

## 2022-04-19 PROCEDURE — 3008F BODY MASS INDEX DOCD: CPT | Mod: CPTII,S$GLB,, | Performed by: NURSE PRACTITIONER

## 2022-04-19 PROCEDURE — 1101F PT FALLS ASSESS-DOCD LE1/YR: CPT | Mod: CPTII,S$GLB,, | Performed by: NURSE PRACTITIONER

## 2022-04-19 PROCEDURE — 81002 POCT URINE DIPSTICK WITHOUT MICROSCOPE: ICD-10-PCS | Mod: S$GLB,,, | Performed by: NURSE PRACTITIONER

## 2022-04-19 PROCEDURE — 3008F PR BODY MASS INDEX (BMI) DOCUMENTED: ICD-10-PCS | Mod: CPTII,S$GLB,, | Performed by: NURSE PRACTITIONER

## 2022-04-19 PROCEDURE — 1159F MED LIST DOCD IN RCRD: CPT | Mod: CPTII,S$GLB,, | Performed by: NURSE PRACTITIONER

## 2022-04-19 PROCEDURE — 99214 PR OFFICE/OUTPT VISIT, EST, LEVL IV, 30-39 MIN: ICD-10-PCS | Mod: S$GLB,,, | Performed by: NURSE PRACTITIONER

## 2022-04-19 PROCEDURE — 1159F PR MEDICATION LIST DOCUMENTED IN MEDICAL RECORD: ICD-10-PCS | Mod: CPTII,S$GLB,, | Performed by: NURSE PRACTITIONER

## 2022-04-19 PROCEDURE — 99214 OFFICE O/P EST MOD 30 MIN: CPT | Mod: S$GLB,,, | Performed by: NURSE PRACTITIONER

## 2022-04-19 PROCEDURE — 3288F FALL RISK ASSESSMENT DOCD: CPT | Mod: CPTII,S$GLB,, | Performed by: NURSE PRACTITIONER

## 2022-04-19 PROCEDURE — 99999 PR PBB SHADOW E&M-EST. PATIENT-LVL IV: ICD-10-PCS | Mod: PBBFAC,,, | Performed by: NURSE PRACTITIONER

## 2022-04-19 PROCEDURE — 3075F PR MOST RECENT SYSTOLIC BLOOD PRESS GE 130-139MM HG: ICD-10-PCS | Mod: CPTII,S$GLB,, | Performed by: NURSE PRACTITIONER

## 2022-04-19 PROCEDURE — 1126F PR PAIN SEVERITY QUANTIFIED, NO PAIN PRESENT: ICD-10-PCS | Mod: CPTII,S$GLB,, | Performed by: NURSE PRACTITIONER

## 2022-04-19 PROCEDURE — 81002 URINALYSIS NONAUTO W/O SCOPE: CPT | Mod: S$GLB,,, | Performed by: NURSE PRACTITIONER

## 2022-04-19 PROCEDURE — 99999 PR PBB SHADOW E&M-EST. PATIENT-LVL IV: CPT | Mod: PBBFAC,,, | Performed by: NURSE PRACTITIONER

## 2022-04-19 PROCEDURE — 3078F PR MOST RECENT DIASTOLIC BLOOD PRESSURE < 80 MM HG: ICD-10-PCS | Mod: CPTII,S$GLB,, | Performed by: NURSE PRACTITIONER

## 2022-04-19 NOTE — PROGRESS NOTES
Chief Complaint:   Bilateral renal cysts and stones    HPI:   Patient is a 72-year-old female that is presenting to review renal ultrasound.  Patient has been followed by this clinic for renal cysts and stones.  Recent renal ultrasound indicated bilateral nonobstructing renal stones and simple renal cyst.  There is also a minimally complex cyst in the inferior right aspect of the kidney measuring 2.8 cm, not significantly changed compared to prior renal ultrasound, December 2020.  Patient is asymptomatic.No abd/pelvic pain and no exac/rel factors.  No hematuria.  No urolithiasis.  No urinary bother.  Patient had sex reassignment surgery in the 1970s, so is genotype.  In reviewing EMR, PSAs have been undetectable.  Patient is unaware if prostate was removed during reassignment surgery.    Allergies:  Statins-hmg-coa reductase inhibitors, Codeine sulfate, Hydrochlorothiazide, and Lisinopril    Medications:  has a current medication list which includes the following prescription(s): humira(cf) pen, amlodipine, aspirin, atenolol, calcium carbonate, cyanocobalamin (vitamin b-12), prolia, efinaconazole, evolocumab, levocetirizine, lorazepam, multivitamin with minerals, mupirocin, valacyclovir, and vitamin d.    Review of Systems:  General: No fever, chills, fatigability, or weight loss.  Skin: No rashes, itching, or changes in color or texture of skin.  Chest: Denies HICKS, cyanosis, wheezing, cough, and sputum production.  Abdomen: Appetite fine. No weight loss. Denies diarrhea, abdominal pain, hematemesis, or blood in stool.  Musculoskeletal: No joint stiffness or swelling. Denies back pain.  : As above.  All other review of systems negative.    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    PSH:   has a past surgical history that includes Appendectomy; Small intestine surgery; Colonoscopy (N/A, 5/19/2017); Colonoscopy (N/A, 3/26/2018); Colonoscopy (N/A, 3/19/2019); and Colonoscopy (N/A, 9/24/2020).    FamHx: family history includes Cancer in her father; Cataracts in her mother; Heart disease in her mother and sister; Hypertension in her brother; Stroke in her mother.    SocHx:  reports that she quit smoking about 17 years ago. Her smoking use included cigarettes. She quit after 20.00 years of use. She has quit using smokeless tobacco. She reports current alcohol use of about 4.0 standard drinks of alcohol per week. She reports that she does not use drugs.      Physical Exam:  Vitals:    04/19/22 1439   BP: 132/75   Pulse: 73   Resp: 17     General: A&Ox3, no apparent distress, no deformities  Neck: No masses, normal thyroid  Lungs: normal inspiration, no use of accessory muscles  Heart: normal pulse, no arrhythmias  Abdomen: Soft, NT, ND, no masses, no hernias, no hepatosplenomegaly  Lymphatic: Neck and groin nodes negative  Skin: The skin is warm and dry. No jaundice.  Ext: No c/c/e.  : Prostate undetectable    Impression/Plan:   1.Renal stones and cysts stable, US/RTC in 6 month secondary to mildly complex renal cyst.    2. PSA  See HPI

## 2022-04-20 ENCOUNTER — TELEPHONE (OUTPATIENT)
Dept: UROLOGY | Facility: CLINIC | Age: 72
End: 2022-04-20
Payer: MEDICARE

## 2022-04-20 DIAGNOSIS — R97.20 ELEVATED PSA MEASUREMENT: Primary | ICD-10-CM

## 2022-05-04 ENCOUNTER — PATIENT OUTREACH (OUTPATIENT)
Dept: ADMINISTRATIVE | Facility: OTHER | Age: 72
End: 2022-05-04
Payer: MEDICARE

## 2022-05-04 NOTE — PROGRESS NOTES
Health Maintenance Due   Topic Date Due    Shingles Vaccine (1 of 2) Never done    COVID-19 Vaccine (3 - Booster for Pfizer series) 07/07/2021     Updates were requested from care everywhere.  Chart was reviewed for overdue Proactive [facility] Encounters (MARK) topics (CRS, Breast Cancer Screening, Eye exam)  Health Maintenance has been updated.  LINKS immunization registry triggered.  Immunizations were reconciled.

## 2022-05-05 ENCOUNTER — PATIENT MESSAGE (OUTPATIENT)
Dept: OPHTHALMOLOGY | Facility: CLINIC | Age: 72
End: 2022-05-05

## 2022-05-05 ENCOUNTER — OFFICE VISIT (OUTPATIENT)
Dept: OPHTHALMOLOGY | Facility: CLINIC | Age: 72
End: 2022-05-05
Payer: MEDICARE

## 2022-05-05 ENCOUNTER — LAB VISIT (OUTPATIENT)
Dept: LAB | Facility: HOSPITAL | Age: 72
End: 2022-05-05
Attending: INTERNAL MEDICINE
Payer: MEDICARE

## 2022-05-05 DIAGNOSIS — H52.7 REFRACTIVE ERRORS: ICD-10-CM

## 2022-05-05 DIAGNOSIS — H35.362 DRUSEN OF LEFT MACULA: ICD-10-CM

## 2022-05-05 DIAGNOSIS — H25.13 CATARACT, NUCLEAR SCLEROTIC SENILE, BILATERAL: ICD-10-CM

## 2022-05-05 DIAGNOSIS — R97.20 ELEVATED PSA MEASUREMENT: ICD-10-CM

## 2022-05-05 DIAGNOSIS — I10 ESSENTIAL HYPERTENSION: Primary | ICD-10-CM

## 2022-05-05 PROCEDURE — 1126F AMNT PAIN NOTED NONE PRSNT: CPT | Mod: CPTII,S$GLB,, | Performed by: OPTOMETRIST

## 2022-05-05 PROCEDURE — 92015 DETERMINE REFRACTIVE STATE: CPT | Mod: S$GLB,,, | Performed by: OPTOMETRIST

## 2022-05-05 PROCEDURE — 1159F PR MEDICATION LIST DOCUMENTED IN MEDICAL RECORD: ICD-10-PCS | Mod: CPTII,S$GLB,, | Performed by: OPTOMETRIST

## 2022-05-05 PROCEDURE — 92014 PR EYE EXAM, EST PATIENT,COMPREHESV: ICD-10-PCS | Mod: S$GLB,,, | Performed by: OPTOMETRIST

## 2022-05-05 PROCEDURE — 1159F MED LIST DOCD IN RCRD: CPT | Mod: CPTII,S$GLB,, | Performed by: OPTOMETRIST

## 2022-05-05 PROCEDURE — 92014 COMPRE OPH EXAM EST PT 1/>: CPT | Mod: S$GLB,,, | Performed by: OPTOMETRIST

## 2022-05-05 PROCEDURE — 84154 ASSAY OF PSA FREE: CPT | Performed by: NURSE PRACTITIONER

## 2022-05-05 PROCEDURE — 99999 PR PBB SHADOW E&M-EST. PATIENT-LVL I: CPT | Mod: PBBFAC,,, | Performed by: OPTOMETRIST

## 2022-05-05 PROCEDURE — 36415 COLL VENOUS BLD VENIPUNCTURE: CPT | Performed by: NURSE PRACTITIONER

## 2022-05-05 PROCEDURE — 99999 PR PBB SHADOW E&M-EST. PATIENT-LVL I: ICD-10-PCS | Mod: PBBFAC,,, | Performed by: OPTOMETRIST

## 2022-05-05 PROCEDURE — 1126F PR PAIN SEVERITY QUANTIFIED, NO PAIN PRESENT: ICD-10-PCS | Mod: CPTII,S$GLB,, | Performed by: OPTOMETRIST

## 2022-05-05 PROCEDURE — 92015 PR REFRACTION: ICD-10-PCS | Mod: S$GLB,,, | Performed by: OPTOMETRIST

## 2022-05-05 PROCEDURE — 84153 ASSAY OF PSA TOTAL: CPT | Performed by: NURSE PRACTITIONER

## 2022-05-05 NOTE — PROGRESS NOTES
HPI     encounter for eye exam      Additional comments: Yearly eye exam/ update glasses              Comments     Yearly eye exam  NS  HTN          Last edited by Karina Reinoso on 5/5/2022 12:59 PM. (History)            Assessment /Plan     For exam results, see Encounter Report.    Essential hypertension    Cataract, nuclear sclerotic senile, bilateral    Drusen of left macula    Refractive errors      No HTN Retinopathy    Moderate cataracts OU, not surgical    Mild drusen, unchanged, takes vits    Dispense Final Rx for glasses.  RTC 1 year  Discussed above and answered questions.

## 2022-05-06 LAB
PROSTATE SPECIFIC ANTIGEN, TOTAL: <0.01 NG/ML
PSA FREE MFR SERPL: NORMAL %
PSA FREE SERPL-MCNC: <0.1 NG/ML

## 2022-05-07 ENCOUNTER — PATIENT MESSAGE (OUTPATIENT)
Dept: INTERNAL MEDICINE | Facility: CLINIC | Age: 72
End: 2022-05-07
Payer: MEDICARE

## 2022-05-11 ENCOUNTER — PATIENT MESSAGE (OUTPATIENT)
Dept: PHARMACY | Facility: CLINIC | Age: 72
End: 2022-05-11
Payer: MEDICARE

## 2022-05-11 ENCOUNTER — SPECIALTY PHARMACY (OUTPATIENT)
Dept: PHARMACY | Facility: CLINIC | Age: 72
End: 2022-05-11
Payer: MEDICARE

## 2022-05-11 NOTE — TELEPHONE ENCOUNTER
Specialty Pharmacy - Refill Coordination    Specialty Medication Orders Linked to Encounter    Flowsheet Row Most Recent Value   Medication #1 evolocumab (REPATHA SURECLICK) 140 mg/mL PnIj (Order#[id number], Rx#[id number])        Refill Questions - Documented Responses    Flowsheet Row Most Recent Value   Patient Availability and HIPAA Verification    Does patient want to proceed with activity? Yes   HIPAA/medical authority confirmed? Yes   Relationship to patient of person spoken to? Self   Refill Screening Questions    Would patient like to speak to a pharmacist? No   When does the patient need to receive the medication? 05/19/22   Refill Delivery Questions    How will the patient receive the medication? Delivery Shaye   When does the patient need to receive the medication? 05/19/22   Shipping Address Home   Address in [facility] confirmed and updated if neccessary? Yes   Expected Copay ($) 0   Is the patient able to afford the medication copay? Yes   Payment Method zero copay   Days supply of Refill 28   Supplies needed? Injection Device   Refill activity completed? Yes   Refill activity plan Refill scheduled   Shipment/Pickup Date: 05/17/22          Current Outpatient Medications   Medication Sig    adalimumab (HUMIRA,CF, PEN) 40 mg/0.4 mL PnKt Inject 0.4 mLs (40 mg total) into the skin every 14 (fourteen) days.    amLODIPine (NORVASC) 10 MG tablet Take 1 tablet by mouth once daily    aspirin (ECOTRIN) 81 MG EC tablet Take 1 tablet (81 mg total) by mouth once daily.    atenoloL (TENORMIN) 25 MG tablet Take 1 tablet (25 mg total) by mouth once daily.    calcium carbonate (OS-COOPER) 600 mg (1,500 mg) Tab Take 1,200 mg by mouth once daily.     cyanocobalamin, vitamin B-12, 2,500 mcg Lozg Place 1 tablet under the tongue.     denosumab (PROLIA) 60 mg/mL Syrg Inject 60 mg into the skin.    efinaconazole (JUBLIA) 10 % Kike Apply 1 application topically once daily.    evolocumab (REPATHA SURECLICK) 140  mg/mL PnIj Inject 1 mL (140 mg total) into the skin every 14 (fourteen) days.    levocetirizine (XYZAL) 5 MG tablet Take 1 tablet (5 mg total) by mouth every evening.    LORazepam (ATIVAN) 0.5 MG tablet Take 3 tablets (1.5 mg total) by mouth every evening.    multivitamin with minerals tablet Take 1 tablet by mouth once daily at 6am.    mupirocin (BACTROBAN) 2 % ointment Apply a small amount to affected area twice a day    valACYclovir (VALTREX) 500 MG tablet Take 1 tablet by mouth twice a day as needed for 3 days per flare    vitamin D 1000 units Tab Take 185 mg by mouth once daily.   Last reviewed on 5/5/2022  1:28 PM by Dioni Woodard OD    Review of patient's allergies indicates:   Allergen Reactions    Statins-hmg-coa reductase inhibitors Anaphylaxis     Myalgias    Codeine sulfate Itching    Hydrochlorothiazide Other (See Comments)     Adverse Reaction    Lisinopril Swelling and Edema     Facial Swelling    Last reviewed on  5/5/2022 12:59 PM by Karina Reinoso      Tasks added this encounter   6/9/2022 - Refill Call (Auto Added)   Tasks due within next 3 months   No tasks due.     Gonzalo Simms, PharmD  [facility]  [address]  [facility] [address]  Phone: [phone]  Fax: [phone]

## 2022-05-18 ENCOUNTER — PATIENT MESSAGE (OUTPATIENT)
Dept: RHEUMATOLOGY | Facility: CLINIC | Age: 72
End: 2022-05-18

## 2022-05-18 ENCOUNTER — TELEPHONE (OUTPATIENT)
Dept: RHEUMATOLOGY | Facility: CLINIC | Age: 72
End: 2022-05-18

## 2022-05-18 ENCOUNTER — OFFICE VISIT (OUTPATIENT)
Dept: RHEUMATOLOGY | Facility: CLINIC | Age: 72
End: 2022-05-18
Payer: MEDICARE

## 2022-05-18 ENCOUNTER — LAB VISIT (OUTPATIENT)
Dept: LAB | Facility: HOSPITAL | Age: 72
End: 2022-05-18
Attending: PHYSICIAN ASSISTANT
Payer: MEDICARE

## 2022-05-18 VITALS
HEART RATE: 74 BPM | BODY MASS INDEX: 16.13 KG/M2 | HEIGHT: 67 IN | DIASTOLIC BLOOD PRESSURE: 78 MMHG | SYSTOLIC BLOOD PRESSURE: 131 MMHG | WEIGHT: 102.75 LBS

## 2022-05-18 DIAGNOSIS — R74.8 LOW SERUM ALKALINE PHOSPHATASE: ICD-10-CM

## 2022-05-18 DIAGNOSIS — M81.0 AGE-RELATED OSTEOPOROSIS WITHOUT CURRENT PATHOLOGICAL FRACTURE: Primary | ICD-10-CM

## 2022-05-18 DIAGNOSIS — Z51.81 MEDICATION MONITORING ENCOUNTER: ICD-10-CM

## 2022-05-18 LAB
ALBUMIN SERPL BCP-MCNC: 3.9 G/DL (ref 3.5–5.2)
ALP SERPL-CCNC: 32 U/L (ref 55–135)
ALT SERPL W/O P-5'-P-CCNC: 14 U/L (ref 10–44)
ANION GAP SERPL CALC-SCNC: 14 MMOL/L (ref 8–16)
AST SERPL-CCNC: 20 U/L (ref 10–40)
BILIRUB SERPL-MCNC: 0.6 MG/DL (ref 0.1–1)
BUN SERPL-MCNC: 16 MG/DL (ref 8–23)
CALCIUM SERPL-MCNC: 9.2 MG/DL (ref 8.7–10.5)
CHLORIDE SERPL-SCNC: 95 MMOL/L (ref 95–110)
CK SERPL-CCNC: 87 U/L (ref 20–180)
CO2 SERPL-SCNC: 21 MMOL/L (ref 23–29)
CREAT SERPL-MCNC: 0.8 MG/DL (ref 0.5–1.4)
EST. GFR  (AFRICAN AMERICAN): >60 ML/MIN/1.73 M^2
EST. GFR  (NON AFRICAN AMERICAN): >60 ML/MIN/1.73 M^2
GLUCOSE SERPL-MCNC: 91 MG/DL (ref 70–110)
POTASSIUM SERPL-SCNC: 4.9 MMOL/L (ref 3.5–5.1)
PROT SERPL-MCNC: 6.8 G/DL (ref 6–8.4)
SODIUM SERPL-SCNC: 130 MMOL/L (ref 136–145)

## 2022-05-18 PROCEDURE — 1101F PT FALLS ASSESS-DOCD LE1/YR: CPT | Mod: CPTII,S$GLB,, | Performed by: INTERNAL MEDICINE

## 2022-05-18 PROCEDURE — 99999 PR PBB SHADOW E&M-EST. PATIENT-LVL IV: CPT | Mod: PBBFAC,,, | Performed by: INTERNAL MEDICINE

## 2022-05-18 PROCEDURE — 99999 PR PBB SHADOW E&M-EST. PATIENT-LVL IV: ICD-10-PCS | Mod: PBBFAC,,, | Performed by: INTERNAL MEDICINE

## 2022-05-18 PROCEDURE — 80053 COMPREHEN METABOLIC PANEL: CPT | Performed by: PHYSICIAN ASSISTANT

## 2022-05-18 PROCEDURE — 3075F PR MOST RECENT SYSTOLIC BLOOD PRESS GE 130-139MM HG: ICD-10-PCS | Mod: CPTII,S$GLB,, | Performed by: INTERNAL MEDICINE

## 2022-05-18 PROCEDURE — 99214 PR OFFICE/OUTPT VISIT, EST, LEVL IV, 30-39 MIN: ICD-10-PCS | Mod: S$GLB,,, | Performed by: INTERNAL MEDICINE

## 2022-05-18 PROCEDURE — 1125F AMNT PAIN NOTED PAIN PRSNT: CPT | Mod: CPTII,S$GLB,, | Performed by: INTERNAL MEDICINE

## 2022-05-18 PROCEDURE — 1101F PR PT FALLS ASSESS DOC 0-1 FALLS W/OUT INJ PAST YR: ICD-10-PCS | Mod: CPTII,S$GLB,, | Performed by: INTERNAL MEDICINE

## 2022-05-18 PROCEDURE — 3008F PR BODY MASS INDEX (BMI) DOCUMENTED: ICD-10-PCS | Mod: CPTII,S$GLB,, | Performed by: INTERNAL MEDICINE

## 2022-05-18 PROCEDURE — 1159F PR MEDICATION LIST DOCUMENTED IN MEDICAL RECORD: ICD-10-PCS | Mod: CPTII,S$GLB,, | Performed by: INTERNAL MEDICINE

## 2022-05-18 PROCEDURE — 1125F PR PAIN SEVERITY QUANTIFIED, PAIN PRESENT: ICD-10-PCS | Mod: CPTII,S$GLB,, | Performed by: INTERNAL MEDICINE

## 2022-05-18 PROCEDURE — 3078F DIAST BP <80 MM HG: CPT | Mod: CPTII,S$GLB,, | Performed by: INTERNAL MEDICINE

## 2022-05-18 PROCEDURE — 3288F FALL RISK ASSESSMENT DOCD: CPT | Mod: CPTII,S$GLB,, | Performed by: INTERNAL MEDICINE

## 2022-05-18 PROCEDURE — 3075F SYST BP GE 130 - 139MM HG: CPT | Mod: CPTII,S$GLB,, | Performed by: INTERNAL MEDICINE

## 2022-05-18 PROCEDURE — 82550 ASSAY OF CK (CPK): CPT | Performed by: PHYSICIAN ASSISTANT

## 2022-05-18 PROCEDURE — 99214 OFFICE O/P EST MOD 30 MIN: CPT | Mod: S$GLB,,, | Performed by: INTERNAL MEDICINE

## 2022-05-18 PROCEDURE — 3078F PR MOST RECENT DIASTOLIC BLOOD PRESSURE < 80 MM HG: ICD-10-PCS | Mod: CPTII,S$GLB,, | Performed by: INTERNAL MEDICINE

## 2022-05-18 PROCEDURE — 3008F BODY MASS INDEX DOCD: CPT | Mod: CPTII,S$GLB,, | Performed by: INTERNAL MEDICINE

## 2022-05-18 PROCEDURE — 1159F MED LIST DOCD IN RCRD: CPT | Mod: CPTII,S$GLB,, | Performed by: INTERNAL MEDICINE

## 2022-05-18 PROCEDURE — 3288F PR FALLS RISK ASSESSMENT DOCUMENTED: ICD-10-PCS | Mod: CPTII,S$GLB,, | Performed by: INTERNAL MEDICINE

## 2022-05-18 PROCEDURE — 36415 COLL VENOUS BLD VENIPUNCTURE: CPT | Performed by: PHYSICIAN ASSISTANT

## 2022-05-18 NOTE — PROGRESS NOTES
RHEUMATOLOGY OUTPATIENT CLINIC NOTE    5/18/2022    Attending Rheumatologist: John Brown  Primary Care Provider/Physician Requesting Consultation: Christelle Lawler DO   Chief Complaint/Reason For Consultation:  Osteoporosis and Crohn's Disease      Subjective:     Manuela Reyes is a 72 y.o. White female with Osteoporosis for follow-up visit.    No acute complaints.  Jose falls or fractures since last visit.  Currently not planned for invasive dental procedures.    Review of Systems   Constitutional: Negative for fever.   Genitourinary: Negative for dysuria and urgency.   Musculoskeletal: Positive for back pain (chronic). Negative for falls and joint pain.   Neurological: Negative for tingling and focal weakness.       Chronic comorbid conditions affecting medical decision making today:  Past Medical History:   Diagnosis Date    Anxiety     Crohn's disease     Depression     Genital herpes     Hepatitis C infection     Hypertension     Insomnia     Mesenteric artery stenosis     Dr. 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 Malaika Mack--dermatology    TIA (transient ischemic attack)      Past Surgical History:   Procedure Laterality Date    APPENDECTOMY      COLONOSCOPY N/A 5/19/2017    Procedure: COLONOSCOPY;  Surgeon: Jose Luis Leonard MD;  Location: [facility];  Service: Endoscopy;  Laterality: N/A;    COLONOSCOPY N/A 3/26/2018    Procedure: COLONOSCOPY;  Surgeon: Guillaume Whitman MD;  Location: [facility];  Service: Endoscopy;  Laterality: N/A;    COLONOSCOPY N/A 3/19/2019    Procedure: COLONOSCOPY;  Surgeon: Lili Ellis MD;  Location: [facility];  Service: Endoscopy;  Laterality: N/A;    COLONOSCOPY N/A 9/24/2020    Procedure: COLONOSCOPY;  Surgeon: Lili Ellis MD;  Location: [facility];  Service: Endoscopy;  Laterality: N/A;    SMALL INTESTINE SURGERY       Family History   Problem Relation Age of  Onset    Stroke Mother     Heart disease Mother     Cataracts Mother     Cancer Father         Lung    Heart disease Sister     Hypertension Brother     Glaucoma Neg Hx     Macular degeneration Neg Hx     Thyroid disease Neg Hx      Social History     Substance and Sexual Activity   Alcohol Use Yes    Alcohol/week: 4.0 standard drinks    Types: 4 Glasses of wine per week    Comment: occ     Social History     Tobacco Use   Smoking Status Former Smoker    Years: 20.00    Types: Cigarettes    Quit date: 2005    Years since quittin.3   Smokeless Tobacco Former User   Tobacco Comment    Former Light Smoker less than 10 a day     Social History     Substance and Sexual Activity   Drug Use No       Current Outpatient Medications:     adalimumab (HUMIRA,CF, PEN) 40 mg/0.4 mL PnKt, Inject 0.4 mLs (40 mg total) into the skin every 14 (fourteen) days., Disp: 6 pen, Rfl: 3    amLODIPine (NORVASC) 10 MG tablet, Take 1 tablet by mouth once daily, Disp: 90 tablet, Rfl: 1    aspirin (ECOTRIN) 81 MG EC tablet, Take 1 tablet (81 mg total) by mouth once daily., Disp: , Rfl:     atenoloL (TENORMIN) 25 MG tablet, Take 1 tablet (25 mg total) by mouth once daily., Disp: 30 tablet, Rfl: 11    calcium carbonate (OS-COOPER) 600 mg (1,500 mg) Tab, Take 1,200 mg by mouth once daily. , Disp: , Rfl:     cyanocobalamin, vitamin B-12, 2,500 mcg Lozg, Place 1 tablet under the tongue. , Disp: , Rfl:     denosumab (PROLIA) 60 mg/mL Syrg, Inject 60 mg into the skin., Disp: , Rfl:     efinaconazole (JUBLIA) 10 % Kike, Apply 1 application topically once daily., Disp: 8 mL, Rfl: 11    evolocumab (REPATHA SURECLICK) 140 mg/mL PnIj, Inject 1 mL (140 mg total) into the skin every 14 (fourteen) days., Disp: 2 mL, Rfl: 12    levocetirizine (XYZAL) 5 MG tablet, Take 1 tablet (5 mg total) by mouth every evening., Disp: 90 tablet, Rfl: 3    LORazepam (ATIVAN) 0.5 MG tablet, Take 3 tablets (1.5 mg total) by mouth every evening.,  Disp: 90 tablet, Rfl: 5    multivitamin with minerals tablet, Take 1 tablet by mouth once daily at 6am., Disp: , Rfl:     mupirocin (BACTROBAN) 2 % ointment, Apply a small amount to affected area twice a day, Disp: 22 g, Rfl: 0    valACYclovir (VALTREX) 500 MG tablet, Take 1 tablet by mouth twice a day as needed for 3 days per flare, Disp: 30 tablet, Rfl: 0    vitamin D 1000 units Tab, Take 185 mg by mouth once daily., Disp: , Rfl:      Objective:     Vitals:    05/18/22 1205   BP: 131/78   Pulse: 74     Physical Exam   Eyes: Conjunctivae are normal.   Pulmonary/Chest: Effort normal. No respiratory distress.   Musculoskeletal:         General: No swelling or tenderness. Normal range of motion.   Skin: No rash noted.       Reviewed available old and all outside pertinent medical records available.    All lab results personally reviewed and interpreted by me.  Lab Results   Component Value Date    WBC 6.26 03/07/2022    HGB 12.8 03/07/2022    HCT 36.8 (L) 03/07/2022    MCV 97 03/07/2022    RDW 12.4 03/07/2022     03/07/2022       Lab Results   Component Value Date     (L) 03/23/2022    K 4.8 03/23/2022     03/23/2022    CO2 27 03/23/2022     03/23/2022    BUN 12 03/23/2022    CALCIUM 10.1 03/23/2022    PROT 7.7 03/23/2022    ALBUMIN 4.3 03/23/2022    BILITOT 0.5 03/23/2022    AST 21 03/23/2022    ALKPHOS 34 (L) 03/23/2022    ALT 13 03/23/2022       Lab Results   Component Value Date    COLORU Yellow 04/19/2022    APPEARANCEUA Clear 04/09/2021    SPECGRAV 1.015 04/19/2022    PHUR 5 04/19/2022    PROTEINUA Negative 04/09/2021    KETONESU Negative 04/09/2021    LEUKOCYTESUR 3+ (A) 04/09/2021    NITRITE neg 04/19/2022    UROBILINOGEN neg 04/19/2022       Lab Results   Component Value Date    PTH 65.3 10/15/2020       No results found for: URICACID    Lab Results   Component Value Date    CRP <0.3 03/07/2022       No results found for: ANATITER    No components found for: TSPOTTB,   QUANTIFERON     ASSESSMENT:     History of osteoporosis per densitometry criteria denies past fragility fractures.  Previously on Boniva [id number], receiving Prolia since 10/2020 last given on 12/2021.  History of nephrolithiasis with no acute events.  No history of radiation.  Will continue Prolia every 6 months unless absolute contraindication to continue medication.  Planning on repeating densitometry test to reassess fragility fracture risk after 4 doses of Prolia.  Repeat CMP within 2 weeks of receive medication to ensure no hypocalcemia.  Continue adequate calcium and vitamin-D dietary intake, may continue with supplementation.  Avoid immobility, fall prevention.    PLAN:     1. Osteoporosis    2. Other specified counseling      Disclaimer: This note is prepared using voice recognition software and as such is likely to have errors and has not been proof read. Please contact me for questions.     John Brown M.D.

## 2022-06-01 ENCOUNTER — PATIENT MESSAGE (OUTPATIENT)
Dept: GASTROENTEROLOGY | Facility: CLINIC | Age: 72
End: 2022-06-01
Payer: MEDICARE

## 2022-06-09 ENCOUNTER — SPECIALTY PHARMACY (OUTPATIENT)
Dept: PHARMACY | Facility: CLINIC | Age: 72
End: 2022-06-09
Payer: MEDICARE

## 2022-06-09 NOTE — TELEPHONE ENCOUNTER
Specialty Pharmacy - Refill Coordination    Specialty Medication Orders Linked to Encounter    Flowsheet Row Most Recent Value   Medication #1 evolocumab (REPATHA SURECLICK) 140 mg/mL PnIj (Order#[id number], Rx#[id number])          Refill Questions - Documented Responses    Flowsheet Row Most Recent Value   Patient Availability and HIPAA Verification    Does patient want to proceed with activity? Yes   HIPAA/medical authority confirmed? Yes   Relationship to patient of person spoken to? Self   Refill Screening Questions    Would patient like to speak to a pharmacist? No   When does the patient need to receive the medication? 06/17/22   Refill Delivery Questions    How will the patient receive the medication? Delivery Shaye   When does the patient need to receive the medication? 06/17/22   Shipping Address Home   Address in [facility] confirmed and updated if neccessary? Yes   Expected Copay ($) 0   Is the patient able to afford the medication copay? Yes   Payment Method zero copay   Days supply of Refill 28   Supplies needed? No supplies needed   Refill activity completed? Yes   Refill activity plan Refill scheduled   Shipment/Pickup Date: 06/14/22          Current Outpatient Medications   Medication Sig    adalimumab (HUMIRA,CF, PEN) 40 mg/0.4 mL PnKt Inject 0.4 mLs (40 mg total) into the skin every 14 (fourteen) days.    amLODIPine (NORVASC) 10 MG tablet Take 1 tablet by mouth once daily    aspirin (ECOTRIN) 81 MG EC tablet Take 1 tablet (81 mg total) by mouth once daily.    atenoloL (TENORMIN) 25 MG tablet Take 1 tablet (25 mg total) by mouth once daily.    calcium carbonate (OS-COOPER) 600 mg (1,500 mg) Tab Take 1,200 mg by mouth once daily.     cyanocobalamin, vitamin B-12, 2,500 mcg Lozg Place 1 tablet under the tongue.     denosumab (PROLIA) 60 mg/mL Syrg Inject 60 mg into the skin.    efinaconazole (JUBLIA) 10 % Kike Apply 1 application topically once daily.    evolocumab (REPATHA SURECLICK) 140  mg/mL PnIj Inject 1 mL (140 mg total) into the skin every 14 (fourteen) days.    levocetirizine (XYZAL) 5 MG tablet Take 1 tablet (5 mg total) by mouth every evening.    LORazepam (ATIVAN) 0.5 MG tablet Take 3 tablets (1.5 mg total) by mouth every evening.    multivitamin with minerals tablet Take 1 tablet by mouth once daily at 6am.    mupirocin (BACTROBAN) 2 % ointment Apply a small amount to affected area twice a day    valACYclovir (VALTREX) 500 MG tablet Take 1 tablet by mouth twice a day as needed for 3 days per flare    vitamin D 1000 units Tab Take 185 mg by mouth once daily.   Last reviewed on 5/18/2022 12:25 PM by Sarah Zavala MA    Review of patient's allergies indicates:   Allergen Reactions    Statins-hmg-coa reductase inhibitors Anaphylaxis     Myalgias    Codeine sulfate Itching    Hydrochlorothiazide Other (See Comments)     Adverse Reaction    Lisinopril Swelling and Edema     Facial Swelling    Last reviewed on  5/18/2022 12:23 PM by Sarah Zavala      Tasks added this encounter   7/8/2022 - Refill Call (Auto Added)   Tasks due within next 3 months   No tasks due.     Enoc Acevedo, PharmD  [facility]  [address] [place] [address]  Phone: [phone]  Fax: [phone]

## 2022-06-14 ENCOUNTER — LAB VISIT (OUTPATIENT)
Dept: LAB | Facility: HOSPITAL | Age: 72
End: 2022-06-14
Attending: INTERNAL MEDICINE
Payer: MEDICARE

## 2022-06-14 DIAGNOSIS — M81.0 AGE-RELATED OSTEOPOROSIS WITHOUT CURRENT PATHOLOGICAL FRACTURE: ICD-10-CM

## 2022-06-14 LAB
ALBUMIN SERPL BCP-MCNC: 3.9 G/DL (ref 3.5–5.2)
ALP SERPL-CCNC: 31 U/L (ref 55–135)
ALT SERPL W/O P-5'-P-CCNC: 15 U/L (ref 10–44)
ANION GAP SERPL CALC-SCNC: 8 MMOL/L (ref 8–16)
AST SERPL-CCNC: 21 U/L (ref 10–40)
BILIRUB SERPL-MCNC: 0.5 MG/DL (ref 0.1–1)
BUN SERPL-MCNC: 13 MG/DL (ref 8–23)
CALCIUM SERPL-MCNC: 8.8 MG/DL (ref 8.7–10.5)
CHLORIDE SERPL-SCNC: 97 MMOL/L (ref 95–110)
CO2 SERPL-SCNC: 25 MMOL/L (ref 23–29)
CREAT SERPL-MCNC: 0.8 MG/DL (ref 0.5–1.4)
EST. GFR  (AFRICAN AMERICAN): >60 ML/MIN/1.73 M^2
EST. GFR  (NON AFRICAN AMERICAN): >60 ML/MIN/1.73 M^2
GLUCOSE SERPL-MCNC: 83 MG/DL (ref 70–110)
POTASSIUM SERPL-SCNC: 4.6 MMOL/L (ref 3.5–5.1)
PROT SERPL-MCNC: 6.8 G/DL (ref 6–8.4)
SODIUM SERPL-SCNC: 130 MMOL/L (ref 136–145)

## 2022-06-14 PROCEDURE — 36415 COLL VENOUS BLD VENIPUNCTURE: CPT | Performed by: INTERNAL MEDICINE

## 2022-06-14 PROCEDURE — 80053 COMPREHEN METABOLIC PANEL: CPT | Performed by: INTERNAL MEDICINE

## 2022-06-15 ENCOUNTER — PATIENT MESSAGE (OUTPATIENT)
Dept: RHEUMATOLOGY | Facility: CLINIC | Age: 72
End: 2022-06-15
Payer: MEDICARE

## 2022-06-15 ENCOUNTER — PATIENT MESSAGE (OUTPATIENT)
Dept: INTERNAL MEDICINE | Facility: CLINIC | Age: 72
End: 2022-06-15
Payer: MEDICARE

## 2022-06-16 ENCOUNTER — PATIENT MESSAGE (OUTPATIENT)
Dept: GASTROENTEROLOGY | Facility: CLINIC | Age: 72
End: 2022-06-16
Payer: MEDICARE

## 2022-06-16 ENCOUNTER — PATIENT MESSAGE (OUTPATIENT)
Dept: OTOLARYNGOLOGY | Facility: CLINIC | Age: 72
End: 2022-06-16
Payer: MEDICARE

## 2022-06-16 NOTE — TELEPHONE ENCOUNTER
MD Kevin Fletcher Staff 2 hours ago (10:38 AM)         Will provide endocrinology referral for further evaluation and management of low alkaline phosphatase.

## 2022-06-17 ENCOUNTER — TELEPHONE (OUTPATIENT)
Dept: RHEUMATOLOGY | Facility: CLINIC | Age: 72
End: 2022-06-17
Payer: MEDICARE

## 2022-06-17 ENCOUNTER — PATIENT MESSAGE (OUTPATIENT)
Dept: RHEUMATOLOGY | Facility: CLINIC | Age: 72
End: 2022-06-17
Payer: MEDICARE

## 2022-06-17 NOTE — TELEPHONE ENCOUNTER
----- Message from Abby Cruz sent at 6/17/2022 10:45 AM CDT -----  Contact: Manuela  Patient is calling to speak to the nurse regarding rescheduling injection appt. Reports missing injection  appt and would like to rescheduled. Please give patient a call back at .[phone].

## 2022-06-23 ENCOUNTER — INFUSION (OUTPATIENT)
Dept: INFUSION THERAPY | Facility: HOSPITAL | Age: 72
End: 2022-06-23
Attending: ORTHOPAEDIC SURGERY
Payer: MEDICARE

## 2022-06-23 VITALS
RESPIRATION RATE: 18 BRPM | DIASTOLIC BLOOD PRESSURE: 80 MMHG | OXYGEN SATURATION: 98 % | SYSTOLIC BLOOD PRESSURE: 140 MMHG | TEMPERATURE: 98 F | HEART RATE: 68 BPM

## 2022-06-23 DIAGNOSIS — M81.0 AGE-RELATED OSTEOPOROSIS WITHOUT CURRENT PATHOLOGICAL FRACTURE: Primary | ICD-10-CM

## 2022-06-23 PROCEDURE — 63600175 PHARM REV CODE 636 W HCPCS: Mod: JG | Performed by: INTERNAL MEDICINE

## 2022-06-23 PROCEDURE — 96372 THER/PROPH/DIAG INJ SC/IM: CPT

## 2022-06-23 RX ADMIN — DENOSUMAB 60 MG: 60 INJECTION SUBCUTANEOUS at 01:06

## 2022-06-27 ENCOUNTER — TELEPHONE (OUTPATIENT)
Dept: CARDIOLOGY | Facility: CLINIC | Age: 72
End: 2022-06-27
Payer: MEDICARE

## 2022-06-27 NOTE — TELEPHONE ENCOUNTER
Please call pt.  Stop Repatha if she is experiencing side effects.  Just use diet for lipids.  Also f/u with PCP if needed.    Dr White

## 2022-06-27 NOTE — TELEPHONE ENCOUNTER
Per Dr. White      Please call pt.  Stop Repatha if she is experiencing side effects.  Just use diet for lipids.  Also f/u with PCP if needed.     Dr White    Left detailed voicemail to return call

## 2022-06-28 ENCOUNTER — TELEPHONE (OUTPATIENT)
Dept: CARDIOLOGY | Facility: CLINIC | Age: 72
End: 2022-06-28
Payer: MEDICARE

## 2022-06-28 NOTE — TELEPHONE ENCOUNTER
Pt advised to call and discuss w PCP on prescription for noncardiac meds such as muscle relaxers.    Thanks    Dr White

## 2022-06-28 NOTE — TELEPHONE ENCOUNTER
Pt advised to call and discuss w PCP on prescription for noncardiac meds such as muscle relaxers.     Thanks     Dr White    Left detailed message

## 2022-06-29 ENCOUNTER — PATIENT MESSAGE (OUTPATIENT)
Dept: INTERNAL MEDICINE | Facility: CLINIC | Age: 72
End: 2022-06-29
Payer: MEDICARE

## 2022-07-05 ENCOUNTER — PATIENT MESSAGE (OUTPATIENT)
Dept: PODIATRY | Facility: CLINIC | Age: 72
End: 2022-07-05
Payer: MEDICARE

## 2022-07-05 ENCOUNTER — PATIENT MESSAGE (OUTPATIENT)
Dept: PHARMACY | Facility: CLINIC | Age: 72
End: 2022-07-05
Payer: MEDICARE

## 2022-07-05 RX ORDER — CYCLOBENZAPRINE HCL 5 MG
5 TABLET ORAL 3 TIMES DAILY PRN
Qty: 30 TABLET | Refills: 0 | Status: SHIPPED | OUTPATIENT
Start: 2022-07-05 | End: 2022-07-16

## 2022-07-07 DIAGNOSIS — B35.1 ONYCHOMYCOSIS: Primary | ICD-10-CM

## 2022-07-08 ENCOUNTER — SPECIALTY PHARMACY (OUTPATIENT)
Dept: PHARMACY | Facility: CLINIC | Age: 72
End: 2022-07-08
Payer: MEDICARE

## 2022-07-08 ENCOUNTER — PATIENT MESSAGE (OUTPATIENT)
Dept: INTERNAL MEDICINE | Facility: CLINIC | Age: 72
End: 2022-07-08
Payer: MEDICARE

## 2022-07-08 NOTE — TELEPHONE ENCOUNTER
Incoming call regarding Repatha side effects. Pt asked to  pt is having calf pain and pt stating the mdo is taking her off the medication and had not had a dose in 3 weeks. pt wants to know how long with the side effects take to start getting some relief. Transferred to [facility].

## 2022-07-08 NOTE — TELEPHONE ENCOUNTER
Pt states that she discontinued Repatha due to leg cramps. Chart notes indicate that the patient notified her provider, and the provider instructed the patient to discontinue the medication. Closing referral.

## 2022-07-25 RX ORDER — LEVOCETIRIZINE DIHYDROCHLORIDE 5 MG/1
5 TABLET, FILM COATED ORAL NIGHTLY
Qty: 90 TABLET | Refills: 3 | Status: SHIPPED | OUTPATIENT
Start: 2022-07-25 | End: 2023-08-15 | Stop reason: SDUPTHER

## 2022-07-25 NOTE — TELEPHONE ENCOUNTER
No new care gaps identified.  [facility] Embedded Care Gaps. Reference number: [id number]. 7/25/2022   9:17:03 AM CDT

## 2022-07-29 NOTE — PROGRESS NOTES
Low level antibodies. Discussed case with Dr. Sanchez. Will add 6-MP to Humira. Need TPMT first.  
during childhood

## 2022-08-11 ENCOUNTER — HOSPITAL ENCOUNTER (OUTPATIENT)
Dept: RADIOLOGY | Facility: HOSPITAL | Age: 72
Discharge: HOME | End: 2022-08-11
Attending: INTERNAL MEDICINE
Payer: MEDICARE

## 2022-08-11 DIAGNOSIS — Z12.31 ENCOUNTER FOR SCREENING MAMMOGRAM FOR MALIGNANT NEOPLASM OF BREAST: ICD-10-CM

## 2022-08-11 PROCEDURE — 77067 SCR MAMMO BI INCL CAD: CPT | Mod: TC

## 2022-08-11 PROCEDURE — 77063 MAMMO DIGITAL SCREENING BILAT WITH TOMO: ICD-10-PCS | Mod: 26,,, | Performed by: RADIOLOGY

## 2022-08-11 PROCEDURE — 77067 MAMMO DIGITAL SCREENING BILAT WITH TOMO: ICD-10-PCS | Mod: 26,,, | Performed by: RADIOLOGY

## 2022-08-11 PROCEDURE — 77067 SCR MAMMO BI INCL CAD: CPT | Mod: 26,,, | Performed by: RADIOLOGY

## 2022-08-11 PROCEDURE — 77063 BREAST TOMOSYNTHESIS BI: CPT | Mod: 26,,, | Performed by: RADIOLOGY

## 2022-08-30 ENCOUNTER — PATIENT MESSAGE (OUTPATIENT)
Dept: INTERNAL MEDICINE | Facility: CLINIC | Age: 72
End: 2022-08-30
Payer: MEDICARE

## 2022-08-30 ENCOUNTER — PES CALL (OUTPATIENT)
Dept: ADMINISTRATIVE | Facility: CLINIC | Age: 72
End: 2022-08-30
Payer: MEDICARE

## 2022-09-01 ENCOUNTER — PES CALL (OUTPATIENT)
Dept: ADMINISTRATIVE | Facility: CLINIC | Age: 72
End: 2022-09-01
Payer: MEDICARE

## 2022-09-07 ENCOUNTER — PATIENT MESSAGE (OUTPATIENT)
Dept: GASTROENTEROLOGY | Facility: CLINIC | Age: 72
End: 2022-09-07
Payer: MEDICARE

## 2022-09-13 ENCOUNTER — OFFICE VISIT (OUTPATIENT)
Dept: INTERNAL MEDICINE | Facility: CLINIC | Age: 72
End: 2022-09-13
Payer: MEDICARE

## 2022-09-13 VITALS — DIASTOLIC BLOOD PRESSURE: 70 MMHG | SYSTOLIC BLOOD PRESSURE: 130 MMHG

## 2022-09-13 DIAGNOSIS — E78.5 HYPERLIPIDEMIA LDL GOAL <70: ICD-10-CM

## 2022-09-13 DIAGNOSIS — F41.9 ANXIETY: Primary | ICD-10-CM

## 2022-09-13 DIAGNOSIS — I10 ESSENTIAL HYPERTENSION: ICD-10-CM

## 2022-09-13 DIAGNOSIS — F51.04 CHRONIC INSOMNIA: ICD-10-CM

## 2022-09-13 PROCEDURE — 99214 PR OFFICE/OUTPT VISIT, EST, LEVL IV, 30-39 MIN: ICD-10-PCS | Mod: 95,,, | Performed by: INTERNAL MEDICINE

## 2022-09-13 PROCEDURE — 3078F PR MOST RECENT DIASTOLIC BLOOD PRESSURE < 80 MM HG: ICD-10-PCS | Mod: CPTII,95,, | Performed by: INTERNAL MEDICINE

## 2022-09-13 PROCEDURE — 99214 OFFICE O/P EST MOD 30 MIN: CPT | Mod: 95,,, | Performed by: INTERNAL MEDICINE

## 2022-09-13 PROCEDURE — 1160F PR REVIEW ALL MEDS BY PRESCRIBER/CLIN PHARMACIST DOCUMENTED: ICD-10-PCS | Mod: CPTII,95,, | Performed by: INTERNAL MEDICINE

## 2022-09-13 PROCEDURE — 3075F SYST BP GE 130 - 139MM HG: CPT | Mod: CPTII,95,, | Performed by: INTERNAL MEDICINE

## 2022-09-13 PROCEDURE — 1159F MED LIST DOCD IN RCRD: CPT | Mod: CPTII,95,, | Performed by: INTERNAL MEDICINE

## 2022-09-13 PROCEDURE — 3078F DIAST BP <80 MM HG: CPT | Mod: CPTII,95,, | Performed by: INTERNAL MEDICINE

## 2022-09-13 PROCEDURE — 1159F PR MEDICATION LIST DOCUMENTED IN MEDICAL RECORD: ICD-10-PCS | Mod: CPTII,95,, | Performed by: INTERNAL MEDICINE

## 2022-09-13 PROCEDURE — 1160F RVW MEDS BY RX/DR IN RCRD: CPT | Mod: CPTII,95,, | Performed by: INTERNAL MEDICINE

## 2022-09-13 PROCEDURE — 3075F PR MOST RECENT SYSTOLIC BLOOD PRESS GE 130-139MM HG: ICD-10-PCS | Mod: CPTII,95,, | Performed by: INTERNAL MEDICINE

## 2022-09-13 RX ORDER — LORAZEPAM 0.5 MG/1
1.5 TABLET ORAL NIGHTLY
Qty: 90 TABLET | Refills: 5 | Status: SHIPPED | OUTPATIENT
Start: 2022-09-13 | End: 2023-03-01 | Stop reason: SDUPTHER

## 2022-09-13 NOTE — PROGRESS NOTES
Subjective:       Patient ID: Manuela Reyes is a 72 y.o. female.    Chief Complaint: Follow-up      Manuela Reyes  72 y.o. White female    Patient presents with:  Follow-up    HPI: Presents for a telemedicine visit.   The patient location is: [place]   The chief complaint leading to consultation is: follow up    Visit type: audiovisual    Face to Face time with patient: 16 minutes   16 minutes of total time spent on the encounter, which includes face to face time and non-face to face time preparing to see the patient (eg, review of tests), Obtaining and/or reviewing separately obtained history, Documenting clinical information in the electronic or other health record, Independently interpreting results (not separately reported) and communicating results to the patient/family/caregiver, or Care coordination (not separately reported).         Each patient to whom he or she provides medical services by telemedicine is:  (1) informed of the relationship between the physician and patient and the respective role of any other health care provider with respect to management of the patient; and (2) notified that he or she may decline to receive medical services by telemedicine and may withdraw from such care at any time.    Notes:   Anxiety/insomnia--stable on lorazepam. She needs refills.   HTN--stable on amlodipine and atenolol.   She no longer takes Repatha. She states it caused muscle spasms/leg pain.     Past Medical History:  Anxiety  Crohn's disease  Depression  Genital herpes  Hepatitis C infection  Hypertension  Insomnia  Mesenteric artery stenosis      Comment:  Dr. 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 Malaika Mack--dermatology  TIA (transient ischemic attack)    Current Outpatient Medications on File Prior to Visit:  adalimumab (HUMIRA,CF, PEN) 40 mg/0.4 mL PnKt, Inject 0.4 mLs (40 mg total) into the skin every  14 (fourteen) days., Disp: 6 pen, Rfl: 3  amLODIPine (NORVASC) 10 MG tablet, Take 1 tablet by mouth once daily, Disp: 90 tablet, Rfl: 1  aspirin (ECOTRIN) 81 MG EC tablet, Take 1 tablet (81 mg total) by mouth once daily., Disp: , Rfl:   atenoloL (TENORMIN) 25 MG tablet, Take 1 tablet (25 mg total) by mouth once daily., Disp: 30 tablet, Rfl: 11  calcium carbonate (OS-COOPER) 600 mg (1,500 mg) Tab, Take 1,200 mg by mouth once daily. , Disp: , Rfl:   cyanocobalamin, vitamin B-12, 2,500 mcg Lozg, Place 1 tablet under the tongue. , Disp: , Rfl:   denosumab (PROLIA) 60 mg/mL Syrg, Inject 60 mg into the skin., Disp: , Rfl:    efinaconazole (JUBLIA) 10 % Kike, Apply 1 application topically once daily., Disp: 8 mL, Rfl: 3  levocetirizine (XYZAL) 5 MG tablet, Take 1 tablet (5 mg total) by mouth every evening., Disp: 90 tablet, Rfl: 3  multivitamin with minerals tablet, Take 1 tablet by mouth once daily at 6am., Disp: , Rfl:   mupirocin (BACTROBAN) 2 % ointment, Apply a small amount to affected area twice a day, Disp: 22 g, Rfl: 0  valACYclovir (VALTREX) 500 MG tablet, Take 1 tablet by mouth twice a day as needed for 3 days per flare, Disp: 30 tablet, Rfl: 0  vitamin D 1000 units Tab, Take 185 mg by mouth once daily., Disp: , Rfl:   LORazepam (ATIVAN) 0.5 MG tablet, Take 3 tablets (1.5 mg total) by mouth every evening., Disp: 90 tablet, Rfl: 5    Allergies:  Review of patient's allergies indicates:   -- Statins-hmg-coa reductase inhibitors -- Anaphylaxis    --  Myalgias   -- Codeine sulfate -- Itching   -- Hydrochlorothiazide -- Other (See Comments)    --  Adverse Reaction   -- Lisinopril -- Swelling and Edema    --  Facial Swelling        Review of Systems   Constitutional:  Negative for activity change and unexpected weight change.   HENT:  Negative for hearing loss, rhinorrhea and trouble swallowing.    Eyes:  Negative for discharge and visual disturbance.   Respiratory:  Negative for chest tightness and wheezing.     Cardiovascular:  Negative for chest pain and palpitations.   Gastrointestinal:  Negative for blood in stool, constipation, diarrhea and vomiting.   Endocrine: Negative for polydipsia and polyuria.   Genitourinary:  Negative for difficulty urinating, dysuria, hematuria and menstrual problem.   Musculoskeletal:  Negative for arthralgias, joint swelling and neck pain.   Neurological:  Negative for weakness and headaches.   Psychiatric/Behavioral:  Negative for confusion and dysphoric mood.        Objective:      Physical Exam  Constitutional:       General: She is not in acute distress.     Appearance: She is well-developed. She is not ill-appearing.   Pulmonary:      Effort: Pulmonary effort is normal. No respiratory distress.   Neurological:      Mental Status: She is alert and oriented to person, place, and time.   Psychiatric:         Behavior: Behavior normal.         Thought Content: Thought content normal.         Judgment: Judgment normal.       Assessment:       Problem List Items Addressed This Visit       Hyperlipidemia LDL goal <70    Chronic insomnia    Relevant Medications    LORazepam (ATIVAN) 0.5 MG tablet    Anxiety - Primary    Relevant Medications    LORazepam (ATIVAN) 0.5 MG tablet    Essential hypertension         Plan:       Manuela was seen today for follow-up.    Diagnoses and all orders for this visit:    Anxiety  -     LORazepam (ATIVAN) 0.5 MG tablet; Take 3 tablets (1.5 mg total) by mouth every evening.    Chronic insomnia  -     LORazepam (ATIVAN) 0.5 MG tablet; Take 3 tablets (1.5 mg total) by mouth every evening.    Essential hypertension  -     Continue current medication  -     continue home b/p monitoring     Hyperlipidemia LDL goal <70  -     check lipids    RTC in 6 months for annual visit.

## 2022-09-14 ENCOUNTER — PATIENT MESSAGE (OUTPATIENT)
Dept: GASTROENTEROLOGY | Facility: CLINIC | Age: 72
End: 2022-09-14
Payer: MEDICARE

## 2022-09-14 DIAGNOSIS — K50.00 CROHN'S DISEASE OF SMALL INTESTINE WITHOUT COMPLICATION: Primary | ICD-10-CM

## 2022-09-16 ENCOUNTER — LAB VISIT (OUTPATIENT)
Dept: LAB | Facility: HOSPITAL | Age: 72
End: 2022-09-16
Attending: INTERNAL MEDICINE
Payer: MEDICARE

## 2022-09-16 DIAGNOSIS — K50.00 CROHN'S DISEASE OF SMALL INTESTINE WITHOUT COMPLICATION: ICD-10-CM

## 2022-09-16 LAB
ALBUMIN SERPL BCP-MCNC: 4.3 G/DL (ref 3.5–5.2)
ALP SERPL-CCNC: 31 U/L (ref 55–135)
ALT SERPL W/O P-5'-P-CCNC: 12 U/L (ref 10–44)
ANION GAP SERPL CALC-SCNC: 11 MMOL/L (ref 8–16)
AST SERPL-CCNC: 18 U/L (ref 10–40)
BASOPHILS # BLD AUTO: 0.04 K/UL (ref 0–0.2)
BASOPHILS NFR BLD: 0.7 % (ref 0–1.9)
BILIRUB SERPL-MCNC: 0.5 MG/DL (ref 0.1–1)
BUN SERPL-MCNC: 18 MG/DL (ref 8–23)
CALCIUM SERPL-MCNC: 9.3 MG/DL (ref 8.7–10.5)
CHLORIDE SERPL-SCNC: 96 MMOL/L (ref 95–110)
CO2 SERPL-SCNC: 25 MMOL/L (ref 23–29)
CREAT SERPL-MCNC: 0.9 MG/DL (ref 0.5–1.4)
DIFFERENTIAL METHOD: ABNORMAL
EOSINOPHIL # BLD AUTO: 0.1 K/UL (ref 0–0.5)
EOSINOPHIL NFR BLD: 1.1 % (ref 0–8)
ERYTHROCYTE [DISTWIDTH] IN BLOOD BY AUTOMATED COUNT: 12.8 % (ref 11.5–14.5)
EST. GFR  (NO RACE VARIABLE): >60 ML/MIN/1.73 M^2
GLUCOSE SERPL-MCNC: 91 MG/DL (ref 70–110)
HCT VFR BLD AUTO: 37.6 % (ref 37–48.5)
HGB BLD-MCNC: 12.8 G/DL (ref 12–16)
IMM GRANULOCYTES # BLD AUTO: 0 K/UL (ref 0–0.04)
IMM GRANULOCYTES NFR BLD AUTO: 0 % (ref 0–0.5)
LYMPHOCYTES # BLD AUTO: 2.4 K/UL (ref 1–4.8)
LYMPHOCYTES NFR BLD: 45.1 % (ref 18–48)
MCH RBC QN AUTO: 35 PG (ref 27–31)
MCHC RBC AUTO-ENTMCNC: 34 G/DL (ref 32–36)
MCV RBC AUTO: 103 FL (ref 82–98)
MONOCYTES # BLD AUTO: 0.6 K/UL (ref 0.3–1)
MONOCYTES NFR BLD: 10.7 % (ref 4–15)
NEUTROPHILS # BLD AUTO: 2.3 K/UL (ref 1.8–7.7)
NEUTROPHILS NFR BLD: 42.4 % (ref 38–73)
NRBC BLD-RTO: 0 /100 WBC
PLATELET # BLD AUTO: 235 K/UL (ref 150–450)
PMV BLD AUTO: 8.9 FL (ref 9.2–12.9)
POTASSIUM SERPL-SCNC: 3.9 MMOL/L (ref 3.5–5.1)
PROT SERPL-MCNC: 7.5 G/DL (ref 6–8.4)
RBC # BLD AUTO: 3.66 M/UL (ref 4–5.4)
SODIUM SERPL-SCNC: 132 MMOL/L (ref 136–145)
WBC # BLD AUTO: 5.41 K/UL (ref 3.9–12.7)

## 2022-09-16 PROCEDURE — 85025 COMPLETE CBC W/AUTO DIFF WBC: CPT | Performed by: NURSE PRACTITIONER

## 2022-09-16 PROCEDURE — 36415 COLL VENOUS BLD VENIPUNCTURE: CPT | Performed by: NURSE PRACTITIONER

## 2022-09-16 PROCEDURE — 80053 COMPREHEN METABOLIC PANEL: CPT | Performed by: NURSE PRACTITIONER

## 2022-09-17 ENCOUNTER — PATIENT MESSAGE (OUTPATIENT)
Dept: GASTROENTEROLOGY | Facility: CLINIC | Age: 72
End: 2022-09-17
Payer: MEDICARE

## 2022-09-18 ENCOUNTER — PATIENT MESSAGE (OUTPATIENT)
Dept: GASTROENTEROLOGY | Facility: CLINIC | Age: 72
End: 2022-09-18
Payer: MEDICARE

## 2022-09-19 DIAGNOSIS — I10 ESSENTIAL HYPERTENSION: ICD-10-CM

## 2022-09-19 NOTE — TELEPHONE ENCOUNTER
No new care gaps identified.  [facility] Embedded Care Gaps. Reference number: [id number]. 9/19/2022   10:53:37 AM SERGIOT

## 2022-09-20 RX ORDER — AMLODIPINE BESYLATE 10 MG/1
TABLET ORAL
Qty: 90 TABLET | Refills: 1 | Status: SHIPPED | OUTPATIENT
Start: 2022-09-20 | End: 2023-02-21 | Stop reason: SDUPTHER

## 2022-10-05 ENCOUNTER — TELEPHONE (OUTPATIENT)
Dept: ADMINISTRATIVE | Facility: HOSPITAL | Age: 72
End: 2022-10-05
Payer: MEDICARE

## 2022-11-03 ENCOUNTER — PES CALL (OUTPATIENT)
Dept: ADMINISTRATIVE | Facility: CLINIC | Age: 72
End: 2022-11-03
Payer: MEDICARE

## 2022-11-14 ENCOUNTER — PATIENT MESSAGE (OUTPATIENT)
Dept: INTERNAL MEDICINE | Facility: CLINIC | Age: 72
End: 2022-11-14
Payer: MEDICARE

## 2022-11-17 ENCOUNTER — PATIENT MESSAGE (OUTPATIENT)
Dept: GASTROENTEROLOGY | Facility: CLINIC | Age: 72
End: 2022-11-17
Payer: MEDICARE

## 2022-11-21 ENCOUNTER — TELEPHONE (OUTPATIENT)
Dept: VASCULAR SURGERY | Facility: CLINIC | Age: 72
End: 2022-11-21
Payer: MEDICARE

## 2022-11-21 NOTE — TELEPHONE ENCOUNTER
----- Message from Zi Solorzano MA sent at 11/15/2022  3:32 PM CST -----  Can someone please reach out to patient. She used to see Dr. Reed.

## 2022-12-09 ENCOUNTER — HOSPITAL ENCOUNTER (OUTPATIENT)
Dept: RADIOLOGY | Facility: HOSPITAL | Age: 72
Discharge: HOME | End: 2022-12-09
Attending: NURSE PRACTITIONER
Payer: MEDICARE

## 2022-12-09 DIAGNOSIS — N28.1 RENAL CYST: ICD-10-CM

## 2022-12-09 PROCEDURE — 76770 US EXAM ABDO BACK WALL COMP: CPT | Mod: TC

## 2022-12-09 PROCEDURE — 76770 US EXAM ABDO BACK WALL COMP: CPT | Mod: 26,,, | Performed by: RADIOLOGY

## 2022-12-09 PROCEDURE — 76770 US RETROPERITONEAL COMPLETE: ICD-10-PCS | Mod: 26,,, | Performed by: RADIOLOGY

## 2022-12-16 ENCOUNTER — PATIENT MESSAGE (OUTPATIENT)
Dept: GASTROENTEROLOGY | Facility: CLINIC | Age: 72
End: 2022-12-16
Payer: MEDICARE

## 2022-12-21 ENCOUNTER — APPOINTMENT (OUTPATIENT)
Dept: RADIOLOGY | Facility: HOSPITAL | Age: 72
End: 2022-12-21
Attending: INTERNAL MEDICINE
Payer: MEDICARE

## 2022-12-21 DIAGNOSIS — M81.0 AGE-RELATED OSTEOPOROSIS WITHOUT CURRENT PATHOLOGICAL FRACTURE: ICD-10-CM

## 2022-12-21 PROCEDURE — 77080 DEXA BONE DENSITY SPINE HIP: ICD-10-PCS | Mod: 26,,, | Performed by: RADIOLOGY

## 2022-12-21 PROCEDURE — 77080 DXA BONE DENSITY AXIAL: CPT | Mod: 26,,, | Performed by: RADIOLOGY

## 2022-12-21 PROCEDURE — 77080 DXA BONE DENSITY AXIAL: CPT | Mod: TC

## 2022-12-28 ENCOUNTER — INFUSION (OUTPATIENT)
Dept: INFUSION THERAPY | Facility: HOSPITAL | Age: 72
End: 2022-12-28
Attending: INTERNAL MEDICINE
Payer: MEDICARE

## 2022-12-28 ENCOUNTER — OFFICE VISIT (OUTPATIENT)
Dept: RHEUMATOLOGY | Facility: CLINIC | Age: 72
End: 2022-12-28
Payer: MEDICARE

## 2022-12-28 VITALS
SYSTOLIC BLOOD PRESSURE: 140 MMHG | HEART RATE: 63 BPM | DIASTOLIC BLOOD PRESSURE: 72 MMHG | OXYGEN SATURATION: 98 % | TEMPERATURE: 97 F | RESPIRATION RATE: 18 BRPM

## 2022-12-28 VITALS
WEIGHT: 106.81 LBS | HEIGHT: 67 IN | HEART RATE: 68 BPM | DIASTOLIC BLOOD PRESSURE: 76 MMHG | SYSTOLIC BLOOD PRESSURE: 129 MMHG | BODY MASS INDEX: 16.76 KG/M2 | RESPIRATION RATE: 17 BRPM

## 2022-12-28 DIAGNOSIS — M81.0 AGE-RELATED OSTEOPOROSIS WITHOUT CURRENT PATHOLOGICAL FRACTURE: Primary | ICD-10-CM

## 2022-12-28 DIAGNOSIS — M15.9 PRIMARY OSTEOARTHRITIS INVOLVING MULTIPLE JOINTS: ICD-10-CM

## 2022-12-28 DIAGNOSIS — Z71.89 OTHER SPECIFIED COUNSELING: ICD-10-CM

## 2022-12-28 DIAGNOSIS — Z51.81 MEDICATION MONITORING ENCOUNTER: ICD-10-CM

## 2022-12-28 PROCEDURE — 99214 PR OFFICE/OUTPT VISIT, EST, LEVL IV, 30-39 MIN: ICD-10-PCS | Mod: S$GLB,,, | Performed by: INTERNAL MEDICINE

## 2022-12-28 PROCEDURE — 99214 OFFICE O/P EST MOD 30 MIN: CPT | Mod: S$GLB,,, | Performed by: INTERNAL MEDICINE

## 2022-12-28 PROCEDURE — 3078F PR MOST RECENT DIASTOLIC BLOOD PRESSURE < 80 MM HG: ICD-10-PCS | Mod: CPTII,S$GLB,, | Performed by: INTERNAL MEDICINE

## 2022-12-28 PROCEDURE — 1126F PR PAIN SEVERITY QUANTIFIED, NO PAIN PRESENT: ICD-10-PCS | Mod: CPTII,S$GLB,, | Performed by: INTERNAL MEDICINE

## 2022-12-28 PROCEDURE — 1159F MED LIST DOCD IN RCRD: CPT | Mod: CPTII,S$GLB,, | Performed by: INTERNAL MEDICINE

## 2022-12-28 PROCEDURE — 3288F PR FALLS RISK ASSESSMENT DOCUMENTED: ICD-10-PCS | Mod: CPTII,S$GLB,, | Performed by: INTERNAL MEDICINE

## 2022-12-28 PROCEDURE — 99999 PR PBB SHADOW E&M-EST. PATIENT-LVL III: CPT | Mod: PBBFAC,,, | Performed by: INTERNAL MEDICINE

## 2022-12-28 PROCEDURE — 3074F SYST BP LT 130 MM HG: CPT | Mod: CPTII,S$GLB,, | Performed by: INTERNAL MEDICINE

## 2022-12-28 PROCEDURE — 1159F PR MEDICATION LIST DOCUMENTED IN MEDICAL RECORD: ICD-10-PCS | Mod: CPTII,S$GLB,, | Performed by: INTERNAL MEDICINE

## 2022-12-28 PROCEDURE — 3288F FALL RISK ASSESSMENT DOCD: CPT | Mod: CPTII,S$GLB,, | Performed by: INTERNAL MEDICINE

## 2022-12-28 PROCEDURE — 3008F PR BODY MASS INDEX (BMI) DOCUMENTED: ICD-10-PCS | Mod: CPTII,S$GLB,, | Performed by: INTERNAL MEDICINE

## 2022-12-28 PROCEDURE — 1101F PR PT FALLS ASSESS DOC 0-1 FALLS W/OUT INJ PAST YR: ICD-10-PCS | Mod: CPTII,S$GLB,, | Performed by: INTERNAL MEDICINE

## 2022-12-28 PROCEDURE — 1126F AMNT PAIN NOTED NONE PRSNT: CPT | Mod: CPTII,S$GLB,, | Performed by: INTERNAL MEDICINE

## 2022-12-28 PROCEDURE — 1101F PT FALLS ASSESS-DOCD LE1/YR: CPT | Mod: CPTII,S$GLB,, | Performed by: INTERNAL MEDICINE

## 2022-12-28 PROCEDURE — 3078F DIAST BP <80 MM HG: CPT | Mod: CPTII,S$GLB,, | Performed by: INTERNAL MEDICINE

## 2022-12-28 PROCEDURE — 3074F PR MOST RECENT SYSTOLIC BLOOD PRESSURE < 130 MM HG: ICD-10-PCS | Mod: CPTII,S$GLB,, | Performed by: INTERNAL MEDICINE

## 2022-12-28 PROCEDURE — 3008F BODY MASS INDEX DOCD: CPT | Mod: CPTII,S$GLB,, | Performed by: INTERNAL MEDICINE

## 2022-12-28 PROCEDURE — 96372 THER/PROPH/DIAG INJ SC/IM: CPT

## 2022-12-28 PROCEDURE — 99999 PR PBB SHADOW E&M-EST. PATIENT-LVL III: ICD-10-PCS | Mod: PBBFAC,,, | Performed by: INTERNAL MEDICINE

## 2022-12-28 PROCEDURE — 63600175 PHARM REV CODE 636 W HCPCS: Mod: JG | Performed by: INTERNAL MEDICINE

## 2022-12-28 RX ADMIN — DENOSUMAB 60 MG: 60 INJECTION SUBCUTANEOUS at 02:12

## 2022-12-28 NOTE — DISCHARGE INSTRUCTIONS
[facility]  [address] [address]  [address]  [phone] phone     [phone] fax  Hours of Operation: Monday- Friday 8:00am- 5:00pm  After hours phone  [phone]  Hematology / Oncology Physicians on call      SHRUTHI Tinajero Dr., Dr., NP Sydney Prescott, VIANEY Bolaños FNP    Please call with any concerns regarding your appointment today.

## 2022-12-28 NOTE — NURSING
Prolia 60 mg q 6 months  Last dose given-6/23/22    Any invasive dental procedures in past 3 months or upcoming 3 months: denies    Last Rheumatology provider visit- Seen by Dr. Brown on 12/28/22    Recent labs? 12/21/22   Lab Results   Component Value Date    CALCIUM 9.6 12/21/2022     Lab Results   Component Value Date    CREATININE 0.8 12/21/2022     Lab Results   Component Value Date    ESTGFRAFRICA >60 06/14/2022     Lab Results   Component Value Date    EGFRNONAA >60 06/14/2022     Lab Results   Component Value Date    MSGLOVFR29AT 91 03/07/2022       Prolia 60 mg/ml administered SQ to right arm. Tolerated without any complaints. No adverse reaction noted or reported after 15 minutes of administration.

## 2022-12-28 NOTE — PROGRESS NOTES
RHEUMATOLOGY OUTPATIENT CLINIC NOTE    12/28/2022    Attending Rheumatologist: John Brown  Primary Care Provider/Physician Requesting Consultation: Christelle Lawler DO   Chief Complaint/Reason For Consultation:  Osteoporosis      Subjective:     Manuela Reyes is a 72 y.o. White female with osteoporosis for f/u.    No acute complaints at present.  No falls or fx since last visit.  Currently not planned for invasive dental procedures.    Review of Systems   Constitutional:  Negative for fever.   Genitourinary:  Negative for dysuria and urgency.   Musculoskeletal:  Negative for joint pain.   Skin:  Negative for rash.   Neurological:  Negative for focal weakness.     Chronic comorbid conditions affecting medical decision making today:  Past Medical History:   Diagnosis Date    Anxiety     Crohn's disease     Depression     Genital herpes     Hepatitis C infection     Hypertension     Insomnia     Mesenteric artery stenosis     Dr. 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 Malaika Mack--dermatology    TIA (transient ischemic attack)      Past Surgical History:   Procedure Laterality Date    APPENDECTOMY      COLONOSCOPY N/A 05/19/2017    Procedure: COLONOSCOPY;  Surgeon: Jose Luis Leonard MD;  Location: [facility];  Service: Endoscopy;  Laterality: N/A;    COLONOSCOPY N/A 03/26/2018    Procedure: COLONOSCOPY;  Surgeon: Guillaume Whitman MD;  Location: [facility];  Service: Endoscopy;  Laterality: N/A;    COLONOSCOPY N/A 03/19/2019    Procedure: COLONOSCOPY;  Surgeon: Lili Ellis MD;  Location: [facility];  Service: Endoscopy;  Laterality: N/A;    COLONOSCOPY N/A 09/24/2020    Procedure: COLONOSCOPY;  Surgeon: Lili Ellis MD;  Location: [facility];  Service: Endoscopy;  Laterality: N/A;    SMALL INTESTINE SURGERY       Family History   Problem Relation Age of Onset    Stroke Mother     Heart disease Mother     Cataracts Mother      Cancer Father         Lung    Heart disease Sister     Hypertension Brother     Glaucoma Neg Hx     Macular degeneration Neg Hx     Thyroid disease Neg Hx      Social History     Tobacco Use   Smoking Status Former    Years: 20.00    Types: Cigarettes    Quit date: 2005    Years since quittin.0   Smokeless Tobacco Former   Tobacco Comments    Former Light Smoker less than 10 a day       Current Outpatient Medications:     amLODIPine (NORVASC) 10 MG tablet, Take 1 tablet by mouth once daily, Disp: 90 tablet, Rfl: 1    aspirin (ECOTRIN) 81 MG EC tablet, Take 1 tablet (81 mg total) by mouth once daily., Disp: , Rfl:     atenoloL (TENORMIN) 25 MG tablet, Take 1 tablet (25 mg total) by mouth once daily., Disp: 30 tablet, Rfl: 11    calcium carbonate (OS-COOPER) 600 mg (1,500 mg) Tab, Take 1,200 mg by mouth once daily. , Disp: , Rfl:     cyanocobalamin, vitamin B-12, 2,500 mcg Lozg, Place 1 tablet under the tongue. , Disp: , Rfl:     denosumab (PROLIA) 60 mg/mL Syrg, Inject 60 mg into the skin., Disp: , Rfl:     efinaconazole (JUBLIA) 10 % Kike, Apply 1 application topically once daily., Disp: 8 mL, Rfl: 3    levocetirizine (XYZAL) 5 MG tablet, Take 1 tablet (5 mg total) by mouth every evening., Disp: 90 tablet, Rfl: 3    LORazepam (ATIVAN) 0.5 MG tablet, Take 3 tablets (1.5 mg total) by mouth every evening., Disp: 90 tablet, Rfl: 5    multivitamin with minerals tablet, Take 1 tablet by mouth once daily at 6am., Disp: , Rfl:     mupirocin (BACTROBAN) 2 % ointment, Apply a small amount to affected area twice a day, Disp: 22 g, Rfl: 0    valACYclovir (VALTREX) 500 MG tablet, Take 1 tablet by mouth twice a day as needed for 3 days per flare, Disp: 30 tablet, Rfl: 0    vitamin D 1000 units Tab, Take 185 mg by mouth once daily., Disp: , Rfl:     adalimumab (HUMIRA,CF, PEN) 40 mg/0.4 mL PnKt, Inject 0.4 mLs (40 mg total) into the skin every 14 (fourteen) days., Disp: 6 pen, Rfl: 3     Objective:     Vitals:    22  1349   BP: 129/76   Pulse: 68   Resp: 17     Physical Exam   Eyes: Conjunctivae are normal.   Pulmonary/Chest: Effort normal. No respiratory distress.   Musculoskeletal:         General: No swelling or tenderness. Normal range of motion.   Neurological: She displays no weakness.   Skin: No rash noted.     Reviewed available old and all outside pertinent medical records available.    All lab results personally reviewed and interpreted by me.       ASSESSMENT:     Osteoporosis    Medication monitoring encounter    Other specified counseling    OA    PLAN:     No past fragility fractures.  Prior inadequate response to oral bisphosphonate.  On Prolia since 10/2020.  Repeat DXA with statistically significant improvement, still within osteoporotic range. Continue Ca/Vit D supplementation.  Will continue Prolia every 6 months with CMP prior medication administration to monitor for hypocalcemia.      Disclaimer: This note is prepared using voice recognition software and as such is likely to have errors and has not been proof read. Please contact me for questions.         John Brown M.D.

## 2023-01-12 ENCOUNTER — PATIENT MESSAGE (OUTPATIENT)
Dept: PODIATRY | Facility: CLINIC | Age: 73
End: 2023-01-12
Payer: MEDICARE

## 2023-01-12 ENCOUNTER — OFFICE VISIT (OUTPATIENT)
Dept: OTOLARYNGOLOGY | Facility: CLINIC | Age: 73
End: 2023-01-12
Payer: MEDICARE

## 2023-01-12 VITALS — TEMPERATURE: 98 F

## 2023-01-12 DIAGNOSIS — K14.6 TONGUE PAIN: Primary | ICD-10-CM

## 2023-01-12 PROCEDURE — 1101F PT FALLS ASSESS-DOCD LE1/YR: CPT | Mod: CPTII,S$GLB,, | Performed by: PHYSICIAN ASSISTANT

## 2023-01-12 PROCEDURE — 99213 OFFICE O/P EST LOW 20 MIN: CPT | Mod: S$GLB,,, | Performed by: PHYSICIAN ASSISTANT

## 2023-01-12 PROCEDURE — 3288F PR FALLS RISK ASSESSMENT DOCUMENTED: ICD-10-PCS | Mod: CPTII,S$GLB,, | Performed by: PHYSICIAN ASSISTANT

## 2023-01-12 PROCEDURE — 99999 PR PBB SHADOW E&M-EST. PATIENT-LVL III: ICD-10-PCS | Mod: PBBFAC,,, | Performed by: PHYSICIAN ASSISTANT

## 2023-01-12 PROCEDURE — 1159F PR MEDICATION LIST DOCUMENTED IN MEDICAL RECORD: ICD-10-PCS | Mod: CPTII,S$GLB,, | Performed by: PHYSICIAN ASSISTANT

## 2023-01-12 PROCEDURE — 1101F PR PT FALLS ASSESS DOC 0-1 FALLS W/OUT INJ PAST YR: ICD-10-PCS | Mod: CPTII,S$GLB,, | Performed by: PHYSICIAN ASSISTANT

## 2023-01-12 PROCEDURE — 1159F MED LIST DOCD IN RCRD: CPT | Mod: CPTII,S$GLB,, | Performed by: PHYSICIAN ASSISTANT

## 2023-01-12 PROCEDURE — 3288F FALL RISK ASSESSMENT DOCD: CPT | Mod: CPTII,S$GLB,, | Performed by: PHYSICIAN ASSISTANT

## 2023-01-12 PROCEDURE — 99213 PR OFFICE/OUTPT VISIT, EST, LEVL III, 20-29 MIN: ICD-10-PCS | Mod: S$GLB,,, | Performed by: PHYSICIAN ASSISTANT

## 2023-01-12 PROCEDURE — 99999 PR PBB SHADOW E&M-EST. PATIENT-LVL III: CPT | Mod: PBBFAC,,, | Performed by: PHYSICIAN ASSISTANT

## 2023-01-12 NOTE — PROGRESS NOTES
Subjective:       Patient ID: Manuela Reyes is a 73 y.o. female.    Chief Complaint: Other (Sore spot on tongue)    Patient is a very pleasant 73 year old female here to see me today for evaluation of sore spot on her tongue.  She reports having dental work including several fillings on LEFT side (upper and lower) in 11/2022.  She felt as though the local anesthetic took longer than normal to wear off.  Since that time, she's noticed intermittent tongue sensitivity (left lateral surface); no triggers; seems better after she eats.  Has some days where tongue feels completely normal with no sensitivity.  She says it's not really pain but feels like something touches her tongue.  She has noticed a small fissure along left lateral surface.  Denies any burning sensation.  Denies tongue swelling or throbbing.  Denies any sore throat or facial swelling.  She is aware of an irregular tooth surface on left lower side.  Unsure if that's the issue.  She uses peroxide and water to gargle (no burning or pain with that).  Denies significant dry mouth.  She is a former light smoker.      Review of Systems   Constitutional:  Negative for fever.   HENT:  Positive for mouth sores (tongue soreness). Negative for facial swelling, sore throat and trouble swallowing.        Objective:      Physical Exam  Constitutional:       General: She is not in acute distress.     Appearance: She is well-developed.   HENT:      Head: Normocephalic and atraumatic.      Jaw: No trismus.      Right Ear: Ear canal and external ear normal. No drainage. No middle ear effusion. Tympanic membrane is retracted. Tympanic membrane is not erythematous.      Left Ear: Ear canal and external ear normal. No drainage.  No middle ear effusion. Tympanic membrane is retracted. Tympanic membrane is not erythematous.      Nose: No mucosal edema or rhinorrhea.      Mouth/Throat:      Mouth: Mucous membranes are moist. Mucous membranes are not pale and not dry. No  oral lesions.      Dentition: Normal dentition. No gum lesions.      Tongue: No lesions. Tongue does not deviate from midline.      Pharynx: Uvula midline. No oropharyngeal exudate, posterior oropharyngeal erythema or uvula swelling.      Comments: Tongue is soft with no masses palpated and no tenderness noted.  No lesions visualized.  She has mild scalloping of lateral tongue bilaterally.  She has rough edges noted on two left lower teeth (most posterior tooth seems to approximate with her area of concern)  Eyes:      General: Lids are normal. No scleral icterus.     Extraocular Movements:      Right eye: Normal extraocular motion and no nystagmus.      Left eye: Normal extraocular motion and no nystagmus.      Conjunctiva/sclera: Conjunctivae normal.      Right eye: Right conjunctiva is not injected. No chemosis.     Left eye: Left conjunctiva is not injected. No chemosis.     Pupils: Pupils are equal, round, and reactive to light.   Neck:      Trachea: Trachea and phonation normal.   Pulmonary:      Effort: Pulmonary effort is normal. No respiratory distress.      Breath sounds: No stridor.   Abdominal:      General: There is no distension.   Lymphadenopathy:      Head:      Right side of head: No submental, submandibular, preauricular or posterior auricular adenopathy.      Left side of head: No submental, submandibular, preauricular or posterior auricular adenopathy.      Cervical: No cervical adenopathy.   Skin:     General: Skin is warm and dry.      Findings: No erythema or rash.   Neurological:      Mental Status: She is alert and oriented to person, place, and time.      Cranial Nerves: No cranial nerve deficit.   Psychiatric:         Behavior: Behavior normal. Behavior is cooperative.       Assessment:       Problem List Items Addressed This Visit    None  Visit Diagnoses       Tongue pain    -  Primary              Plan:       Reassured patient that I do not see any obvious tongue lesion today and her  tongue is soft with no masses palpated.  I recommend she get back in to see her dentist.  Discussed that the rough tooth edges (left lower) could be causing repeated micro-trauma to her lateral tongue surface when she's talking or chewing.  Dentist may be able to file down that tooth.  We discussed the signs of head and neck cancer and that she should alert my office should they occur for more than 10-14 days (unintentional weight loss, pain or difficulty with swallowing, bleeding in the oral cavity or throat, coughing or spitting up blood, pain with mouth opening, persistent ulceration of the skin or mucosa, persistent or enlarging neck masses/swelling).  I asked her to email me with her progress once she follows up with her dentist.  If tongue sensitivity persists once jagged tooth edge addressed, I recommend she RTC with MD for another evaluation.  She voiced understanding.

## 2023-02-06 DIAGNOSIS — I10 ESSENTIAL HYPERTENSION: ICD-10-CM

## 2023-02-06 RX ORDER — ATENOLOL 25 MG/1
25 TABLET ORAL DAILY
Qty: 30 TABLET | Refills: 11 | Status: SHIPPED | OUTPATIENT
Start: 2023-02-06 | End: 2024-03-08 | Stop reason: SDUPTHER

## 2023-02-08 ENCOUNTER — PATIENT MESSAGE (OUTPATIENT)
Dept: INTERNAL MEDICINE | Facility: CLINIC | Age: 73
End: 2023-02-08
Payer: MEDICARE

## 2023-02-09 DIAGNOSIS — M81.0 AGE-RELATED OSTEOPOROSIS WITHOUT CURRENT PATHOLOGICAL FRACTURE: Primary | ICD-10-CM

## 2023-02-20 ENCOUNTER — PATIENT MESSAGE (OUTPATIENT)
Dept: UROLOGY | Facility: CLINIC | Age: 73
End: 2023-02-20
Payer: MEDICARE

## 2023-02-21 ENCOUNTER — OFFICE VISIT (OUTPATIENT)
Dept: INTERNAL MEDICINE | Facility: CLINIC | Age: 73
End: 2023-02-21
Payer: MEDICARE

## 2023-02-21 VITALS
BODY MASS INDEX: 16.94 KG/M2 | TEMPERATURE: 98 F | WEIGHT: 107.94 LBS | OXYGEN SATURATION: 97 % | DIASTOLIC BLOOD PRESSURE: 80 MMHG | SYSTOLIC BLOOD PRESSURE: 122 MMHG | HEART RATE: 73 BPM | HEIGHT: 67 IN | RESPIRATION RATE: 16 BRPM

## 2023-02-21 DIAGNOSIS — F41.9 ANXIETY: ICD-10-CM

## 2023-02-21 DIAGNOSIS — F51.04 CHRONIC INSOMNIA: ICD-10-CM

## 2023-02-21 DIAGNOSIS — I10 ESSENTIAL HYPERTENSION: ICD-10-CM

## 2023-02-21 DIAGNOSIS — E78.5 HYPERLIPIDEMIA LDL GOAL <70: ICD-10-CM

## 2023-02-21 DIAGNOSIS — Z00.00 ANNUAL PHYSICAL EXAM: Primary | ICD-10-CM

## 2023-02-21 PROCEDURE — 3079F DIAST BP 80-89 MM HG: CPT | Mod: CPTII,S$GLB,, | Performed by: PHYSICIAN ASSISTANT

## 2023-02-21 PROCEDURE — 3079F PR MOST RECENT DIASTOLIC BLOOD PRESSURE 80-89 MM HG: ICD-10-PCS | Mod: CPTII,S$GLB,, | Performed by: PHYSICIAN ASSISTANT

## 2023-02-21 PROCEDURE — 3288F PR FALLS RISK ASSESSMENT DOCUMENTED: ICD-10-PCS | Mod: CPTII,S$GLB,, | Performed by: PHYSICIAN ASSISTANT

## 2023-02-21 PROCEDURE — 1101F PR PT FALLS ASSESS DOC 0-1 FALLS W/OUT INJ PAST YR: ICD-10-PCS | Mod: CPTII,S$GLB,, | Performed by: PHYSICIAN ASSISTANT

## 2023-02-21 PROCEDURE — 3074F SYST BP LT 130 MM HG: CPT | Mod: CPTII,S$GLB,, | Performed by: PHYSICIAN ASSISTANT

## 2023-02-21 PROCEDURE — 99999 PR PBB SHADOW E&M-EST. PATIENT-LVL IV: CPT | Mod: PBBFAC,,, | Performed by: PHYSICIAN ASSISTANT

## 2023-02-21 PROCEDURE — 1125F PR PAIN SEVERITY QUANTIFIED, PAIN PRESENT: ICD-10-PCS | Mod: CPTII,S$GLB,, | Performed by: PHYSICIAN ASSISTANT

## 2023-02-21 PROCEDURE — 1159F MED LIST DOCD IN RCRD: CPT | Mod: CPTII,S$GLB,, | Performed by: PHYSICIAN ASSISTANT

## 2023-02-21 PROCEDURE — 1160F RVW MEDS BY RX/DR IN RCRD: CPT | Mod: CPTII,S$GLB,, | Performed by: PHYSICIAN ASSISTANT

## 2023-02-21 PROCEDURE — 3008F BODY MASS INDEX DOCD: CPT | Mod: CPTII,S$GLB,, | Performed by: PHYSICIAN ASSISTANT

## 2023-02-21 PROCEDURE — 3074F PR MOST RECENT SYSTOLIC BLOOD PRESSURE < 130 MM HG: ICD-10-PCS | Mod: CPTII,S$GLB,, | Performed by: PHYSICIAN ASSISTANT

## 2023-02-21 PROCEDURE — 3008F PR BODY MASS INDEX (BMI) DOCUMENTED: ICD-10-PCS | Mod: CPTII,S$GLB,, | Performed by: PHYSICIAN ASSISTANT

## 2023-02-21 PROCEDURE — 1160F PR REVIEW ALL MEDS BY PRESCRIBER/CLIN PHARMACIST DOCUMENTED: ICD-10-PCS | Mod: CPTII,S$GLB,, | Performed by: PHYSICIAN ASSISTANT

## 2023-02-21 PROCEDURE — 99214 PR OFFICE/OUTPT VISIT, EST, LEVL IV, 30-39 MIN: ICD-10-PCS | Mod: S$GLB,,, | Performed by: PHYSICIAN ASSISTANT

## 2023-02-21 PROCEDURE — 1125F AMNT PAIN NOTED PAIN PRSNT: CPT | Mod: CPTII,S$GLB,, | Performed by: PHYSICIAN ASSISTANT

## 2023-02-21 PROCEDURE — 99214 OFFICE O/P EST MOD 30 MIN: CPT | Mod: S$GLB,,, | Performed by: PHYSICIAN ASSISTANT

## 2023-02-21 PROCEDURE — 1101F PT FALLS ASSESS-DOCD LE1/YR: CPT | Mod: CPTII,S$GLB,, | Performed by: PHYSICIAN ASSISTANT

## 2023-02-21 PROCEDURE — 1159F PR MEDICATION LIST DOCUMENTED IN MEDICAL RECORD: ICD-10-PCS | Mod: CPTII,S$GLB,, | Performed by: PHYSICIAN ASSISTANT

## 2023-02-21 PROCEDURE — 3288F FALL RISK ASSESSMENT DOCD: CPT | Mod: CPTII,S$GLB,, | Performed by: PHYSICIAN ASSISTANT

## 2023-02-21 PROCEDURE — 99999 PR PBB SHADOW E&M-EST. PATIENT-LVL IV: ICD-10-PCS | Mod: PBBFAC,,, | Performed by: PHYSICIAN ASSISTANT

## 2023-02-21 RX ORDER — AMLODIPINE BESYLATE 10 MG/1
TABLET ORAL
Qty: 90 TABLET | Refills: 1 | Status: SHIPPED | OUTPATIENT
Start: 2023-02-21 | End: 2023-10-31 | Stop reason: SDUPTHER

## 2023-02-21 NOTE — PROGRESS NOTES
"Subjective:      Patient ID: Manuela Reyes is a 73 y.o. female.    Chief Complaint: Annual Exam    Patient is new to me, being seen today for annual.  Denies current concerns/complaints     HTN- atenolol 25mg, amlodipine 10mg  Controlled at home   HLD  Takes ativan 1.5mg nightly, needing refills, working well for her     Due for labs     Last visit Sept 2022 with PCP    Review of Systems   Constitutional:  Negative for chills, diaphoresis and fever.   HENT:  Negative for congestion, rhinorrhea and sore throat.    Respiratory:  Negative for cough, shortness of breath and wheezing.    Cardiovascular:  Negative for leg swelling.   Gastrointestinal:  Negative for abdominal pain, constipation, diarrhea, nausea and vomiting.   Skin:  Negative for rash.   Neurological:  Negative for dizziness, light-headedness and headaches.     Objective:   /80   Pulse 73   Temp 98.1 °F (36.7 °C)   Resp 16   Ht 5' 7" (1.702 m)   Wt 48.9 kg (107 lb 14.6 oz)   SpO2 97%   BMI 16.90 kg/m²   Physical Exam  Constitutional:       General: She is not in acute distress.     Appearance: Normal appearance. She is well-developed. She is not ill-appearing.   HENT:      Head: Normocephalic and atraumatic.      Right Ear: Hearing, tympanic membrane, ear canal and external ear normal.      Left Ear: Hearing, tympanic membrane, ear canal and external ear normal.      Nose: Nose normal.      Mouth/Throat:      Mouth: Mucous membranes are moist.      Pharynx: Oropharynx is clear.   Cardiovascular:      Rate and Rhythm: Normal rate and regular rhythm.      Heart sounds: Normal heart sounds. No murmur heard.  Pulmonary:      Effort: Pulmonary effort is normal. No respiratory distress.      Breath sounds: Normal breath sounds. No decreased breath sounds.   Musculoskeletal:      Right lower leg: No edema.      Left lower leg: No edema.   Skin:     General: Skin is warm and dry.      Findings: No rash.   Psychiatric:         Speech: Speech " normal.         Behavior: Behavior normal.         Thought Content: Thought content normal.     Assessment:      1. Annual physical exam    2. Hyperlipidemia LDL goal <70    3. Essential hypertension       Plan:   Annual physical exam    Hyperlipidemia LDL goal <70  -     Lipid Panel; Future; Expected date: 02/21/2023    Essential hypertension  -     amLODIPine (NORVASC) 10 MG tablet; Take 1 tablet by mouth once daily  Dispense: 90 tablet; Refill: 1  -     CBC Auto Differential; Future; Expected date: 02/21/2023  -     Comprehensive Metabolic Panel; Future; Expected date: 02/21/2023      Will pend ativan refill to Dr. Lawler     Fasting labs     6mth f/u PCP

## 2023-02-21 NOTE — TELEPHONE ENCOUNTER
No new care gaps identified.  [facility] Embedded Care Gaps. Reference number: [id number]. 2/21/2023   3:09:38 PM CST   HbA1c 6.5%  - continue NPH to 12 units q6h  - monitor FS q6h

## 2023-02-22 RX ORDER — LORAZEPAM 0.5 MG/1
1.5 TABLET ORAL NIGHTLY
Qty: 90 TABLET | Refills: 5 | OUTPATIENT
Start: 2023-02-22

## 2023-03-01 ENCOUNTER — PES CALL (OUTPATIENT)
Dept: ADMINISTRATIVE | Facility: CLINIC | Age: 73
End: 2023-03-01
Payer: MEDICARE

## 2023-03-01 ENCOUNTER — PATIENT MESSAGE (OUTPATIENT)
Dept: INTERNAL MEDICINE | Facility: CLINIC | Age: 73
End: 2023-03-01
Payer: MEDICARE

## 2023-03-01 ENCOUNTER — PATIENT MESSAGE (OUTPATIENT)
Dept: GASTROENTEROLOGY | Facility: CLINIC | Age: 73
End: 2023-03-01
Payer: MEDICARE

## 2023-03-01 DIAGNOSIS — F51.04 CHRONIC INSOMNIA: ICD-10-CM

## 2023-03-01 DIAGNOSIS — F41.9 ANXIETY: ICD-10-CM

## 2023-03-01 RX ORDER — LORAZEPAM 0.5 MG/1
1.5 TABLET ORAL NIGHTLY
Qty: 90 TABLET | Refills: 3 | Status: SHIPPED | OUTPATIENT
Start: 2023-03-01 | End: 2023-06-23 | Stop reason: SDUPTHER

## 2023-03-01 NOTE — TELEPHONE ENCOUNTER
No new care gaps identified.  [facility] Embedded Care Gaps. Reference number: [id number]. 3/01/2023   11:18:03 AM CST

## 2023-03-03 ENCOUNTER — OFFICE VISIT (OUTPATIENT)
Dept: OPHTHALMOLOGY | Facility: CLINIC | Age: 73
End: 2023-03-03
Payer: MEDICARE

## 2023-03-03 DIAGNOSIS — H01.02A SQUAMOUS BLEPHARITIS OF UPPER AND LOWER EYELIDS OF BOTH EYES: ICD-10-CM

## 2023-03-03 DIAGNOSIS — H00.15 CHALAZION LEFT LOWER EYELID: Primary | ICD-10-CM

## 2023-03-03 DIAGNOSIS — H01.02B SQUAMOUS BLEPHARITIS OF UPPER AND LOWER EYELIDS OF BOTH EYES: ICD-10-CM

## 2023-03-03 PROCEDURE — 99999 PR PBB SHADOW E&M-EST. PATIENT-LVL II: CPT | Mod: PBBFAC,,, | Performed by: OPTOMETRIST

## 2023-03-03 PROCEDURE — 1159F MED LIST DOCD IN RCRD: CPT | Mod: CPTII,S$GLB,, | Performed by: OPTOMETRIST

## 2023-03-03 PROCEDURE — 99999 PR PBB SHADOW E&M-EST. PATIENT-LVL II: ICD-10-PCS | Mod: PBBFAC,,, | Performed by: OPTOMETRIST

## 2023-03-03 PROCEDURE — 92014 COMPRE OPH EXAM EST PT 1/>: CPT | Mod: S$GLB,,, | Performed by: OPTOMETRIST

## 2023-03-03 PROCEDURE — 1159F PR MEDICATION LIST DOCUMENTED IN MEDICAL RECORD: ICD-10-PCS | Mod: CPTII,S$GLB,, | Performed by: OPTOMETRIST

## 2023-03-03 PROCEDURE — 92014 PR EYE EXAM, EST PATIENT,COMPREHESV: ICD-10-PCS | Mod: S$GLB,,, | Performed by: OPTOMETRIST

## 2023-03-03 RX ORDER — NEOMYCIN SULFATE, POLYMYXIN B SULFATE AND DEXAMETHASONE 3.5; 10000; 1 MG/ML; [USP'U]/ML; MG/ML
1 SUSPENSION/ DROPS OPHTHALMIC 4 TIMES DAILY
Qty: 10 ML | Refills: 0 | Status: SHIPPED | OUTPATIENT
Start: 2023-03-03 | End: 2023-03-28

## 2023-03-03 NOTE — PROGRESS NOTES
"HPI     Stye     Additional comments: Pt here for stye check LLL. Pt says she woke up with   a stye forming in her LLL about a week ago. She says that it started off   itching and watering. She used warm compresses and an OTC medication   called "Stye". She says that it seemed to help, but she can still feel   something there itching her, so she wants it checked out.           Last edited by Alberto Britton MA on 3/3/2023 10:48 AM.            Assessment /Plan     For exam results, see Encounter Report.    Chalazion left lower eyelid    Squamous blepharitis of upper and lower eyelids of both eyes  -     neomycin-polymyxin-dexamethasone (MAXITROL) 3.5mg/mL-10,000 unit/mL-0.1 % DrpS; Place 1 drop into both eyes 4 (four) times daily. for 10 days  Dispense: 10 mL; Refill: 0      Worksheet given. Warm compresses followed by lid scrubs, tid.    RTC full exam                   "

## 2023-03-09 ENCOUNTER — LAB VISIT (OUTPATIENT)
Dept: LAB | Facility: HOSPITAL | Age: 73
End: 2023-03-09
Attending: PHYSICIAN ASSISTANT
Payer: MEDICARE

## 2023-03-09 DIAGNOSIS — E78.5 HYPERLIPIDEMIA LDL GOAL <70: ICD-10-CM

## 2023-03-09 DIAGNOSIS — I10 ESSENTIAL HYPERTENSION: ICD-10-CM

## 2023-03-09 LAB
ALBUMIN SERPL BCP-MCNC: 4.1 G/DL (ref 3.5–5.2)
ALP SERPL-CCNC: 34 U/L (ref 55–135)
ALT SERPL W/O P-5'-P-CCNC: 14 U/L (ref 10–44)
ANION GAP SERPL CALC-SCNC: 9 MMOL/L (ref 8–16)
AST SERPL-CCNC: 19 U/L (ref 10–40)
BASOPHILS # BLD AUTO: 0.02 K/UL (ref 0–0.2)
BASOPHILS NFR BLD: 0.5 % (ref 0–1.9)
BILIRUB SERPL-MCNC: 0.5 MG/DL (ref 0.1–1)
BUN SERPL-MCNC: 11 MG/DL (ref 8–23)
CALCIUM SERPL-MCNC: 8.8 MG/DL (ref 8.7–10.5)
CHLORIDE SERPL-SCNC: 102 MMOL/L (ref 95–110)
CHOLEST SERPL-MCNC: 210 MG/DL (ref 120–199)
CHOLEST/HDLC SERPL: 2.5 {RATIO} (ref 2–5)
CO2 SERPL-SCNC: 22 MMOL/L (ref 23–29)
CREAT SERPL-MCNC: 0.8 MG/DL (ref 0.5–1.4)
DIFFERENTIAL METHOD: ABNORMAL
EOSINOPHIL # BLD AUTO: 0.1 K/UL (ref 0–0.5)
EOSINOPHIL NFR BLD: 2.1 % (ref 0–8)
ERYTHROCYTE [DISTWIDTH] IN BLOOD BY AUTOMATED COUNT: 12.2 % (ref 11.5–14.5)
EST. GFR  (NO RACE VARIABLE): >60 ML/MIN/1.73 M^2
GLUCOSE SERPL-MCNC: 100 MG/DL (ref 70–110)
HCT VFR BLD AUTO: 36.8 % (ref 37–48.5)
HDLC SERPL-MCNC: 83 MG/DL (ref 40–75)
HDLC SERPL: 39.5 % (ref 20–50)
HGB BLD-MCNC: 12.7 G/DL (ref 12–16)
IMM GRANULOCYTES # BLD AUTO: 0.01 K/UL (ref 0–0.04)
IMM GRANULOCYTES NFR BLD AUTO: 0.2 % (ref 0–0.5)
LDLC SERPL CALC-MCNC: 114.4 MG/DL (ref 63–159)
LYMPHOCYTES # BLD AUTO: 1.9 K/UL (ref 1–4.8)
LYMPHOCYTES NFR BLD: 46.1 % (ref 18–48)
MCH RBC QN AUTO: 33.7 PG (ref 27–31)
MCHC RBC AUTO-ENTMCNC: 34.5 G/DL (ref 32–36)
MCV RBC AUTO: 98 FL (ref 82–98)
MONOCYTES # BLD AUTO: 0.5 K/UL (ref 0.3–1)
MONOCYTES NFR BLD: 12.4 % (ref 4–15)
NEUTROPHILS # BLD AUTO: 1.6 K/UL (ref 1.8–7.7)
NEUTROPHILS NFR BLD: 38.7 % (ref 38–73)
NONHDLC SERPL-MCNC: 127 MG/DL
NRBC BLD-RTO: 0 /100 WBC
PLATELET # BLD AUTO: 236 K/UL (ref 150–450)
PMV BLD AUTO: 9.3 FL (ref 9.2–12.9)
POTASSIUM SERPL-SCNC: 3.8 MMOL/L (ref 3.5–5.1)
PROT SERPL-MCNC: 6.8 G/DL (ref 6–8.4)
RBC # BLD AUTO: 3.77 M/UL (ref 4–5.4)
SODIUM SERPL-SCNC: 133 MMOL/L (ref 136–145)
TRIGL SERPL-MCNC: 63 MG/DL (ref 30–150)
WBC # BLD AUTO: 4.19 K/UL (ref 3.9–12.7)

## 2023-03-09 PROCEDURE — 80053 COMPREHEN METABOLIC PANEL: CPT | Performed by: PHYSICIAN ASSISTANT

## 2023-03-09 PROCEDURE — 36415 COLL VENOUS BLD VENIPUNCTURE: CPT | Performed by: PHYSICIAN ASSISTANT

## 2023-03-09 PROCEDURE — 80061 LIPID PANEL: CPT | Performed by: PHYSICIAN ASSISTANT

## 2023-03-09 PROCEDURE — 85025 COMPLETE CBC W/AUTO DIFF WBC: CPT | Performed by: PHYSICIAN ASSISTANT

## 2023-03-10 ENCOUNTER — PATIENT MESSAGE (OUTPATIENT)
Dept: GASTROENTEROLOGY | Facility: CLINIC | Age: 73
End: 2023-03-10
Payer: MEDICARE

## 2023-03-10 ENCOUNTER — PATIENT MESSAGE (OUTPATIENT)
Dept: UROLOGY | Facility: CLINIC | Age: 73
End: 2023-03-10
Payer: MEDICARE

## 2023-03-20 ENCOUNTER — OFFICE VISIT (OUTPATIENT)
Dept: UROLOGY | Facility: CLINIC | Age: 73
End: 2023-03-20
Payer: MEDICARE

## 2023-03-20 VITALS
SYSTOLIC BLOOD PRESSURE: 150 MMHG | DIASTOLIC BLOOD PRESSURE: 82 MMHG | WEIGHT: 111.13 LBS | HEIGHT: 67 IN | BODY MASS INDEX: 17.44 KG/M2 | HEART RATE: 62 BPM

## 2023-03-20 DIAGNOSIS — N20.0 RENAL STONES: ICD-10-CM

## 2023-03-20 DIAGNOSIS — N28.1 RENAL CYST: Primary | ICD-10-CM

## 2023-03-20 PROBLEM — T46.6X5A MYALGIA DUE TO STATIN: Status: RESOLVED | Noted: 2021-10-20 | Resolved: 2023-03-20

## 2023-03-20 PROBLEM — M79.605 PAIN IN BOTH LOWER EXTREMITIES: Status: RESOLVED | Noted: 2020-02-11 | Resolved: 2023-03-20

## 2023-03-20 PROBLEM — R29.898 WEAKNESS OF BOTH LOWER EXTREMITIES: Status: RESOLVED | Noted: 2021-08-03 | Resolved: 2023-03-20

## 2023-03-20 PROBLEM — M79.604 PAIN IN BOTH LOWER EXTREMITIES: Status: RESOLVED | Noted: 2020-02-11 | Resolved: 2023-03-20

## 2023-03-20 PROBLEM — M79.10 MYALGIA DUE TO STATIN: Status: RESOLVED | Noted: 2021-10-20 | Resolved: 2023-03-20

## 2023-03-20 PROBLEM — R94.31 ABNORMAL ECG: Status: RESOLVED | Noted: 2020-02-11 | Resolved: 2023-03-20

## 2023-03-20 LAB
BILIRUB SERPL-MCNC: NORMAL MG/DL
BLOOD URINE, POC: NORMAL
CLARITY, POC UA: CLEAR
COLOR, POC UA: YELLOW
GLUCOSE UR QL STRIP: NORMAL
KETONES UR QL STRIP: NORMAL
LEUKOCYTE ESTERASE URINE, POC: NORMAL
NITRITE, POC UA: NORMAL
PH, POC UA: 6
POC RESIDUAL URINE VOLUME: 1 ML (ref 0–100)
PROTEIN, POC: NORMAL
SPECIFIC GRAVITY, POC UA: 1.01
UROBILINOGEN, POC UA: NORMAL

## 2023-03-20 PROCEDURE — 51798 POCT BLADDER SCAN: ICD-10-PCS | Mod: S$GLB,,, | Performed by: NURSE PRACTITIONER

## 2023-03-20 PROCEDURE — 3079F PR MOST RECENT DIASTOLIC BLOOD PRESSURE 80-89 MM HG: ICD-10-PCS | Mod: CPTII,S$GLB,, | Performed by: NURSE PRACTITIONER

## 2023-03-20 PROCEDURE — 99999 PR PBB SHADOW E&M-EST. PATIENT-LVL III: CPT | Mod: PBBFAC,,, | Performed by: NURSE PRACTITIONER

## 2023-03-20 PROCEDURE — 3079F DIAST BP 80-89 MM HG: CPT | Mod: CPTII,S$GLB,, | Performed by: NURSE PRACTITIONER

## 2023-03-20 PROCEDURE — 3008F BODY MASS INDEX DOCD: CPT | Mod: CPTII,S$GLB,, | Performed by: NURSE PRACTITIONER

## 2023-03-20 PROCEDURE — 3008F PR BODY MASS INDEX (BMI) DOCUMENTED: ICD-10-PCS | Mod: CPTII,S$GLB,, | Performed by: NURSE PRACTITIONER

## 2023-03-20 PROCEDURE — 99214 OFFICE O/P EST MOD 30 MIN: CPT | Mod: S$GLB,,, | Performed by: NURSE PRACTITIONER

## 2023-03-20 PROCEDURE — 81002 URINALYSIS NONAUTO W/O SCOPE: CPT | Mod: S$GLB,,, | Performed by: NURSE PRACTITIONER

## 2023-03-20 PROCEDURE — 81002 POCT URINE DIPSTICK WITHOUT MICROSCOPE: ICD-10-PCS | Mod: S$GLB,,, | Performed by: NURSE PRACTITIONER

## 2023-03-20 PROCEDURE — 1126F PR PAIN SEVERITY QUANTIFIED, NO PAIN PRESENT: ICD-10-PCS | Mod: CPTII,S$GLB,, | Performed by: NURSE PRACTITIONER

## 2023-03-20 PROCEDURE — 99214 PR OFFICE/OUTPT VISIT, EST, LEVL IV, 30-39 MIN: ICD-10-PCS | Mod: S$GLB,,, | Performed by: NURSE PRACTITIONER

## 2023-03-20 PROCEDURE — 3077F PR MOST RECENT SYSTOLIC BLOOD PRESSURE >= 140 MM HG: ICD-10-PCS | Mod: CPTII,S$GLB,, | Performed by: NURSE PRACTITIONER

## 2023-03-20 PROCEDURE — 99999 PR PBB SHADOW E&M-EST. PATIENT-LVL III: ICD-10-PCS | Mod: PBBFAC,,, | Performed by: NURSE PRACTITIONER

## 2023-03-20 PROCEDURE — 51798 US URINE CAPACITY MEASURE: CPT | Mod: S$GLB,,, | Performed by: NURSE PRACTITIONER

## 2023-03-20 PROCEDURE — 1126F AMNT PAIN NOTED NONE PRSNT: CPT | Mod: CPTII,S$GLB,, | Performed by: NURSE PRACTITIONER

## 2023-03-20 PROCEDURE — 3077F SYST BP >= 140 MM HG: CPT | Mod: CPTII,S$GLB,, | Performed by: NURSE PRACTITIONER

## 2023-03-20 NOTE — PROGRESS NOTES
Chief Complaint:   Renal stones  Renal cysts    HPI:   Patient is a 73-year-old female, status post sex reassignment surgery in 1970s, to discuss renal ultrasound secondary to renal cysts and stones.  Patient states she is been completely asymptomatic.  Denies flank pain or gross hematuria.  Urine in clinic is negative and PVR is 1 mL.  Renal ultrasound indicates bilateral, nonobstructing renal stones and decrease in size of simple renal cyst.    04/19/2022  Patient is a 72-year-old female that is presenting to review renal ultrasound.  Patient has been followed by this clinic for renal cysts and stones.  Recent renal ultrasound indicated bilateral nonobstructing renal stones and simple renal cyst.  There is also a minimally complex cyst in the inferior right aspect of the kidney measuring 2.8 cm, not significantly changed compared to prior renal ultrasound, December 2020.  Patient is asymptomatic.No abd/pelvic pain and no exac/rel factors.  No hematuria.  No urolithiasis.  No urinary bother.  Patient had sex reassignment surgery in the 1970s, so is genotype.  In reviewing EMR, PSAs have been undetectable.  Patient is unaware if prostate was removed during reassignment surgery.     Allergies:  Statins-hmg-coa reductase inhibitors, Codeine sulfate, Hydrochlorothiazide, and Lisinopril    Medications:  has a current medication list which includes the following prescription(s): humira(cf) pen, amlodipine, aspirin, atenolol, calcium carbonate, cyanocobalamin (vitamin b-12), prolia, efinaconazole, levocetirizine, lorazepam, multivitamin with minerals, neomycin-polymyxin-dexamethasone, and vitamin d.    Review of Systems:  General: No fever, chills, fatigability, or weight loss.  Skin: No rashes, itching, or changes in color or texture of skin.  Chest: Denies HICKS, cyanosis, wheezing, cough, and sputum production.  Abdomen: Appetite fine. No weight loss. Denies diarrhea, abdominal pain, hematemesis, or blood in  stool.  Musculoskeletal: No joint stiffness or swelling. Denies back pain.  : As above.  All other review of systems negative.    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    PSH:   has a past surgical history that includes Appendectomy; Small intestine surgery; Colonoscopy (N/A, 05/19/2017); Colonoscopy (N/A, 03/26/2018); Colonoscopy (N/A, 03/19/2019); and Colonoscopy (N/A, 09/24/2020).    FamHx: family history includes Cancer in her father; Cataracts in her mother; Heart disease in her mother and sister; Hypertension in her brother; Stroke in her mother.    SocHx:  reports that she quit smoking about 18 years ago. Her smoking use included cigarettes. She has quit using smokeless tobacco. She reports current alcohol use of about 4.0 standard drinks per week. She reports that she does not use drugs.      Physical Exam:  General: A&Ox3, no apparent distress, no deformities  Neck: No masses, normal thyroid  Lungs: normal inspiration, no use of accessory muscles  Heart: normal pulse, no arrhythmias  Abdomen: Soft, NT, ND, no masses, no hernias, no hepatosplenomegaly  Lymphatic: Neck and groin nodes negative  Skin: The skin is warm and dry. No jaundice.  Ext: No c/c/e.  : 04/2022 Prostate undetectable    Labs/Studies:   12/19/2022  Narrative & Impression  EXAMINATION:  US RETROPERITONEAL COMPLETE     CLINICAL HISTORY:  Cyst of kidney, acquired     TECHNIQUE:  Ultrasound of the kidneys and urinary bladder was performed including color flow and Doppler evaluation of the kidneys.     COMPARISON:  03/23/2022     FINDINGS:  The right kidney measures 10.6 cm in sagittal length and is increased in echogenicity with mild decreased cortical thickness.  There are multiple cyst about the right kidney  gluteal 9 mm x 8 mm upper pole cyst, and a 3.0 x 3.5 cm lower pole complex exophytic cortical cyst with increased echogenicity.  The echogenicity of this complex cyst has not changed significantly when compared to the previous exam.     The left kidney measures 10.7 cm in sagittal length and demonstrates cortical thinning with increased echogenicity.  There is a persistent 5.5 x 5.3 cm simple upper pole left renal cyst, decreased in size from the previous study where it measured 5.8 x 5.7 cm.  There is a 1.1 x 1.1 cm adjacent left upper pole cyst, slightly increased in size from the previous study where it measured 0.9 x 0.9 cm.     There is a 3 mm echogenic calculus in the midpole the right kidney, increased in size from the previous study where it measured 2 mm.     There is a 4 mm echogenic midpole left renal calculus, stable in appearance from the previous exam.     Renal resistive indices are normal bilaterally measuring 0.6.     There is incomplete urinary bladder distension.     Impression:     1. Interval decrease in the size of a simple large upper pole left renal cyst.  Relatively stable appearance of bilateral renal cysts elsewhere throughout the renal parenchyma.  2. Both kidneys demonstrate cortical thinning with increased echogenicity.  This is unchanged from the previous exam.  3. Bilateral nonobstructing nephrolithiasis.      Latest Reference Range & Units 11/07/17 11:42 12/09/20 14:56   PSA, Screen Not established ng/mL <0.01 <0.01     Impression/Plan:   Renal stones and cyst  Patient is currently asymptomatic, will repeat renal ultrasound if stable will monitor renal stones and cysts with annual renal ultrasounds.    Prostate cancer screening, see HPI  PRADEEP, last visit, was undetectable and PSA has been undetectable, low risk for prostate cancer.

## 2023-04-20 ENCOUNTER — PATIENT MESSAGE (OUTPATIENT)
Dept: RHEUMATOLOGY | Facility: CLINIC | Age: 73
End: 2023-04-20
Payer: MEDICARE

## 2023-04-20 ENCOUNTER — PATIENT MESSAGE (OUTPATIENT)
Dept: ENDOCRINOLOGY | Facility: CLINIC | Age: 73
End: 2023-04-20
Payer: MEDICARE

## 2023-04-20 NOTE — TELEPHONE ENCOUNTER
"Dr. Brown, please see patients other message between her and Dr. Kendrick dated today.    Nichelle Pham (Allye), [facility]  Rheumatology Department   "

## 2023-05-01 ENCOUNTER — OFFICE VISIT (OUTPATIENT)
Dept: GASTROENTEROLOGY | Facility: CLINIC | Age: 73
End: 2023-05-01
Payer: MEDICARE

## 2023-05-01 ENCOUNTER — PATIENT MESSAGE (OUTPATIENT)
Dept: GASTROENTEROLOGY | Facility: CLINIC | Age: 73
End: 2023-05-01

## 2023-05-01 DIAGNOSIS — K50.00 CROHN'S DISEASE OF SMALL INTESTINE WITHOUT COMPLICATION: Primary | ICD-10-CM

## 2023-05-01 DIAGNOSIS — D84.9 IMMUNOSUPPRESSED STATUS: ICD-10-CM

## 2023-05-01 PROCEDURE — 99213 PR OFFICE/OUTPT VISIT, EST, LEVL III, 20-29 MIN: ICD-10-PCS | Mod: 95,,, | Performed by: INTERNAL MEDICINE

## 2023-05-01 PROCEDURE — 99213 OFFICE O/P EST LOW 20 MIN: CPT | Mod: 95,,, | Performed by: INTERNAL MEDICINE

## 2023-05-01 NOTE — PROGRESS NOTES
Clinic Consult:  [facility] Consultation Note    Reason for Consult:  The primary encounter diagnosis was Crohn's disease of small intestine without complication. A diagnosis of Immunosuppressed status was also pertinent to this visit.    PCP: Christelle Lawler       The patient location is: [place]  The chief complaint leading to consultation is: follow up CD    Visit type: audiovisual    Face to Face time with patient: 20  25 minutes of total time spent on the encounter, which includes face to face time and non-face to face time preparing to see the patient (eg, review of tests), Obtaining and/or reviewing separately obtained history, Documenting clinical information in the electronic or other health record, Independently interpreting results (not separately reported) and communicating results to the patient/family/caregiver, or Care coordination (not separately reported).         Each patient to whom he or she provides medical services by telemedicine is:  (1) informed of the relationship between the physician and patient and the respective role of any other health care provider with respect to management of the patient; and (2) notified that he or she may decline to receive medical services by telemedicine and may withdraw from such care at any time.    Notes:      HPI:  This is a 73 y.o. female here for evaluation of CD    IBD History  - Type: crohn's disease  - Disease Location: small bowel only  - Phenotype: stricture   - Diagnosed: 1975  - Surgeries related to IBD: ileal resection in 1979, 2009 (due to stricture)   - Extra-intestinal Manifestations: mild eczema on leg that resolved with cerave     Current Medications  Humira 40 mg every 14 days, started Aug 2017     Past Medications  Apriso 1.5 grams daily  Entocort x 3 months   MTX- side effects      Drug and Antibody Levels   8/28/18 Humira level 12, no AB formation   11/6/20 Humira drug level 10, no AB formation      Endoscopy Reports  2018 -  "anastomotic stricture, no active disease;  2017 - 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  Did have a "bout" in March but resolved after a week.  Takes miralax for constipation.       Preventative Medicine     Immunizations  - Influenza: 10/1/21  - Pnemococcal: PCV-13: 12/10/17; PSV-23 19  - Hepatitis A/B: positive Hepatitis B core total Ab and surface Ab  - Herpes Zoster: NA  - COVID-19: 21, 21     Cancer Prevention   - Date of last pap smear: NA  - Date of last skin cancer screenin months ago, she has hx of dysplastic skin lesions. Goes every 6 months   - Date of last surveillance colonoscopy: up to date.      Bone Health  - Vitamin D level: normal   - Date of last DEXA 2020: osteoporosis- followed by Rheum     Therapy Related Testing  - Date of last TB testin20- quant gold negative   - TPMT status: (2018) normal      Miscellaneous  - Vitamin B 12 level (if ileal disease or resection): high  - Smoking status: no  - NSAID use: no  - History of C. Diff: no  - Family planning: NA      ROS:  CONSTITUTIONAL: Denies weight change,  fatigue, fevers, chills, night sweats.  EYES: No changes in vision.   ENT: No oral lesions or sore throat.  HEMATOLOGICAL/Lymph: Denies bleeding tendency, bruising tendency. No swellings or enlarged lymph nodes.  CARDIOVASCULAR: Denies chest pain, shortness of breath, orthopnea and edema.  RESPIRATORY: Denies cough, hemoptysis, dyspnea, and wheezing.  GI: See HPI.  : Denies dysuria and hematuria  MUSCULOSKELETAL: Denies joint pain or swelling, back pain and muscle pain.  SKIN: Denies rashes.  NEUROLOGIC: Denies headaches, seizures and numbness.  PSYCHIATRIC: Denies depression or anxiety.  ENDOCRINE: Denies heat or cold intolerance and excessive thirst or urination.    Medical " History:   Past Medical History:   Diagnosis Date    Anxiety     Crohn's disease     Depression     Genital herpes     Hepatitis C infection     Hypertension     Insomnia     Mesenteric artery stenosis     Dr. Checo Reed--vascular surgery    Osteoporosis     SCC (squamous cell carcinoma), hand, right 2019    Dr. Malaika Mack--dermatology    TIA (transient ischemic attack)        Surgical History:  Past Surgical History:   Procedure Laterality Date    APPENDECTOMY      COLONOSCOPY N/A 2017    Procedure: COLONOSCOPY;  Surgeon: Jose Luis Leonard MD;  Location: [facility] ENDO;  Service: Endoscopy;  Laterality: N/A;    COLONOSCOPY N/A 2018    Procedure: COLONOSCOPY;  Surgeon: Guillaume Whitman MD;  Location: [facility] ENDO;  Service: Endoscopy;  Laterality: N/A;    COLONOSCOPY N/A 2019    Procedure: COLONOSCOPY;  Surgeon: Lili Ellis MD;  Location: [facility] ENDO;  Service: Endoscopy;  Laterality: N/A;    COLONOSCOPY N/A 2020    Procedure: COLONOSCOPY;  Surgeon: Lili Ellis MD;  Location: [facility];  Service: Endoscopy;  Laterality: N/A;    SMALL INTESTINE SURGERY         Family History:   Family History   Problem Relation Age of Onset    Stroke Mother     Heart disease Mother     Cataracts Mother     Cancer Father         Lung    Heart disease Sister     Hypertension Brother     Glaucoma Neg Hx     Macular degeneration Neg Hx     Thyroid disease Neg Hx        Social History:   Social History     Tobacco Use    Smoking status: Former     Years: 20.00     Types: Cigarettes     Quit date: 2005     Years since quittin.3    Smokeless tobacco: Former    Tobacco comments:     Former Light Smoker less than 10 a day   Substance Use Topics    Alcohol use: Yes     Alcohol/week: 4.0 standard drinks     Types: 4 Glasses of wine per week     Comment: occ    Drug use: No       Allergies: Reviewed    Home Medications:   Medication List with Changes/Refills   Current Medications    ADALIMUMAB  (HUMIRA,CF, PEN) 40 MG/0.4 ML PNKT    Inject 0.4 mLs (40 mg total) into the skin every 14 (fourteen) days.    AMLODIPINE (NORVASC) 10 MG TABLET    Take 1 tablet by mouth once daily    ASPIRIN (ECOTRIN) 81 MG EC TABLET    Take 1 tablet (81 mg total) by mouth once daily.    ATENOLOL (TENORMIN) 25 MG TABLET    Take 1 tablet (25 mg total) by mouth once daily.    CALCIUM CARBONATE (OS-COOPER) 600 MG (1,500 MG) TAB    Take 1,200 mg by mouth once daily.     CYANOCOBALAMIN, VITAMIN B-12, 2,500 MCG LOZG    Place 1 tablet under the tongue.     DENOSUMAB (PROLIA) 60 MG/ML SYRG    Inject 60 mg into the skin.    EFINACONAZOLE (JUBLIA) 10 % ESCOBAR    Apply 1 application topically once daily.    LEVOCETIRIZINE (XYZAL) 5 MG TABLET    Take 1 tablet (5 mg total) by mouth every evening.    LORAZEPAM (ATIVAN) 0.5 MG TABLET    Take 3 tablets (1.5 mg total) by mouth every evening.    MULTIVITAMIN WITH MINERALS TABLET    Take 1 tablet by mouth once daily at 6am.    VITAMIN D 1000 UNITS TAB    Take 185 mg by mouth once daily.         Physical Exam:  Vital Signs:  There were no vitals taken for this visit.  There is no height or weight on file to calculate BMI.          Labs: Pertinent labs reviewed.    Assessment and Plan:  Crohn's disease of small intestine without complication  Doing well, in remission.  Continue humira.    Small bowel only. Will plan on colonoscopy for screening in the next 2 years, which will likely be last one.     Immunosuppressed status        Health Maintenance Discussed  - Vitamin D supplement: 1000 daily plus calcium   - Vaccines due: none   - DEXA scan: 2022, osteoporosis, followed by rheum   - Skin cancer screening with dermatologist: sees regularly, every 6 months (h/o skin cancer)  - Sun exposure precautions: Stay in the shade, especially during midday. Wear clothing to protect exposed skin. Wear a hat with a wide brim to shade the face, head, ears, and neck. Wear sunglasses to protect your eyes. Use sunscreen  with at least SPF 30 before you go outside.   - Cervical cancer screening with GYN: n/a   - Screening colonoscopy: will do in the next year or so for average risk   - Eye exam: utd        Follow up in about 1 year (around 5/1/2024).        Thank you so much for allowing me to participate in the care of Manuela Ellis MD

## 2023-06-05 ENCOUNTER — PATIENT MESSAGE (OUTPATIENT)
Dept: GASTROENTEROLOGY | Facility: CLINIC | Age: 73
End: 2023-06-05
Payer: MEDICARE

## 2023-06-05 ENCOUNTER — PATIENT MESSAGE (OUTPATIENT)
Dept: INTERNAL MEDICINE | Facility: CLINIC | Age: 73
End: 2023-06-05
Payer: MEDICARE

## 2023-06-05 DIAGNOSIS — F41.9 ANXIETY: ICD-10-CM

## 2023-06-05 DIAGNOSIS — F51.04 CHRONIC INSOMNIA: ICD-10-CM

## 2023-06-05 RX ORDER — LORAZEPAM 0.5 MG/1
1.5 TABLET ORAL NIGHTLY
Qty: 90 TABLET | Refills: 3 | Status: CANCELLED | OUTPATIENT
Start: 2023-06-05

## 2023-06-05 NOTE — TELEPHONE ENCOUNTER
No care due was identified.  Health [facility] Embedded Care Due Messages. Reference number: [id number].   6/05/2023 10:45:22 AM CDT

## 2023-06-12 ENCOUNTER — PATIENT MESSAGE (OUTPATIENT)
Dept: PODIATRY | Facility: CLINIC | Age: 73
End: 2023-06-12
Payer: MEDICARE

## 2023-06-12 DIAGNOSIS — B35.1 ONYCHOMYCOSIS: Primary | ICD-10-CM

## 2023-06-12 RX ORDER — EFINACONAZOLE 100 MG/ML
1 SOLUTION TOPICAL DAILY
Qty: 8 ML | Refills: 3 | Status: SHIPPED | OUTPATIENT
Start: 2023-06-12 | End: 2024-04-02 | Stop reason: SDUPTHER

## 2023-06-16 ENCOUNTER — TELEPHONE (OUTPATIENT)
Dept: GASTROENTEROLOGY | Facility: CLINIC | Age: 73
End: 2023-06-16
Payer: MEDICARE

## 2023-06-16 NOTE — TELEPHONE ENCOUNTER
----- Message from Annie Boateng RN sent at 6/16/2023 10:11 AM CDT -----  Contact: Manuela    ----- Message -----  From: Estella Read  Sent: 6/16/2023  10:05 AM CDT  To: Rhonda Pearson Staff    Pt called to request her 6/19 appt be in person. Spoke to the nurse about it yesterday. Please call her back at [phone].    Thanks  TS

## 2023-06-16 NOTE — TELEPHONE ENCOUNTER
Patient is requesting her appointment to be in-person on 6/1. I informed her due to her appointment being15 min virtual it will have to be rescheduled to to 6/26 for a 30 min in-person visit. She preferred to keep her appointment on 6/19.

## 2023-06-19 ENCOUNTER — OFFICE VISIT (OUTPATIENT)
Dept: GASTROENTEROLOGY | Facility: CLINIC | Age: 73
End: 2023-06-19
Payer: MEDICARE

## 2023-06-19 DIAGNOSIS — K50.00 CROHN'S DISEASE OF SMALL INTESTINE WITHOUT COMPLICATION: Primary | ICD-10-CM

## 2023-06-19 DIAGNOSIS — D84.9 IMMUNOSUPPRESSED STATUS: ICD-10-CM

## 2023-06-19 PROCEDURE — 99214 OFFICE O/P EST MOD 30 MIN: CPT | Mod: 95,,, | Performed by: INTERNAL MEDICINE

## 2023-06-19 PROCEDURE — 99214 PR OFFICE/OUTPT VISIT, EST, LEVL IV, 30-39 MIN: ICD-10-PCS | Mod: 95,,, | Performed by: INTERNAL MEDICINE

## 2023-06-19 RX ORDER — SODIUM, POTASSIUM,MAG SULFATES 17.5-3.13G
2 SOLUTION, RECONSTITUTED, ORAL ORAL ONCE
Qty: 354 ML | Refills: 0 | Status: SHIPPED | OUTPATIENT
Start: 2023-06-19 | End: 2023-06-27

## 2023-06-19 RX ORDER — SODIUM, POTASSIUM,MAG SULFATES 17.5-3.13G
1 SOLUTION, RECONSTITUTED, ORAL ORAL ONCE
Qty: 1 KIT | Refills: 0 | Status: SHIPPED | OUTPATIENT
Start: 2023-06-19 | End: 2023-06-19

## 2023-06-19 NOTE — PROGRESS NOTES
Clinic Consult:  [facility] Consultation Note    Reason for Consult:  The primary encounter diagnosis was Crohn's disease of small intestine without complication. A diagnosis of Immunosuppressed status was also pertinent to this visit.    PCP: Christelle Lawler       The patient location is: [place]   The chief complaint leading to consultation is: follow up     Visit type: audiovisual    Face to Face time with patient: 25  30  minutes of total time spent on the encounter, which includes face to face time and non-face to face time preparing to see the patient (eg, review of tests), Obtaining and/or reviewing separately obtained history, Documenting clinical information in the electronic or other health record, Independently interpreting results (not separately reported) and communicating results to the patient/family/caregiver, or Care coordination (not separately reported).         Each patient to whom he or she provides medical services by telemedicine is:  (1) informed of the relationship between the physician and patient and the respective role of any other health care provider with respect to management of the patient; and (2) notified that he or she may decline to receive medical services by telemedicine and may withdraw from such care at any time.    Notes:      HPI:  This is a 73 y.o. female here for follow up of IBS and  Crohn's     IBD History  - Type: crohn's disease  - Disease Location: small bowel only  - Phenotype: stricture   - Diagnosed: 1975  - Surgeries related to IBD: ileal resection in 1979, 2009 (due to stricture)   - Extra-intestinal Manifestations: mild eczema on leg that resolved with cerave     Current Medications  Humira 40 mg every 14 days, started Aug 2017     Past Medications  Apriso 1.5 grams daily  Entocort x 3 months   MTX- side effects      Drug and Antibody Levels   8/28/18 Humira level 12, no AB formation   11/6/20 Humira drug level 10, no AB formation      Endoscopy  Reports  2018 - anastomotic stricture, no active disease;  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        Preventative Medicine     Immunizations  - Influenza: 10/1/21  - Pnemococcal: PCV-13: 12/10/17; PSV-23 19  - Hepatitis A/B: positive Hepatitis B core total Ab and surface Ab  - Herpes Zoster: NA  - COVID-19: 21, 21     Cancer Prevention   - Date of last pap smear: NA  - Date of last skin cancer screenin months ago, she has hx of dysplastic skin lesions. Goes every 6 months   - Date of last surveillance colonoscopy: up to date.      Bone Health  - Vitamin D level: normal   - Date of last DEXA 2020: osteoporosis- followed by Rheum     Therapy Related Testing  - Date of last TB testin20- quant gold negative   - TPMT status: (2018) normal      Miscellaneous  - Vitamin B 12 level (if ileal disease or resection): high  - Smoking status: no  - NSAID use: no  - History of C. Diff: no  - Family planning: NA         ROS:  CONSTITUTIONAL: Denies weight change,  fatigue, fevers, chills, night sweats.  EYES: No changes in vision.   ENT: No oral lesions or sore throat.  HEMATOLOGICAL/Lymph: Denies bleeding tendency, bruising tendency. No swellings or enlarged lymph nodes.  CARDIOVASCULAR: Denies chest pain, shortness of breath, orthopnea and edema.  RESPIRATORY: Denies cough, hemoptysis, dyspnea, and wheezing.  GI: See HPI.  : Denies dysuria and hematuria  MUSCULOSKELETAL: Denies joint pain or swelling, back pain and muscle pain.  SKIN: Denies rashes.  NEUROLOGIC: Denies headaches, seizures and numbness.  PSYCHIATRIC: Denies depression or anxiety.  ENDOCRINE: Denies heat or cold intolerance and excessive thirst  or urination.    Medical History:   Past Medical History:   Diagnosis Date    Anxiety     Crohn's disease     Depression     Genital herpes     Hepatitis C infection     Hypertension     Insomnia     Mesenteric artery stenosis     Dr. Checo Reed--vascular surgery    Osteoporosis     SCC (squamous cell carcinoma), hand, right 2019    Dr. Malaika Mack--dermatology    TIA (transient ischemic attack)        Surgical History:  Past Surgical History:   Procedure Laterality Date    APPENDECTOMY      COLONOSCOPY N/A 2017    Procedure: COLONOSCOPY;  Surgeon: Jose Luis Leonard MD;  Location: [facility] ENDO;  Service: Endoscopy;  Laterality: N/A;    COLONOSCOPY N/A 2018    Procedure: COLONOSCOPY;  Surgeon: Guillaume Whitman MD;  Location: [facility] ENDO;  Service: Endoscopy;  Laterality: N/A;    COLONOSCOPY N/A 2019    Procedure: COLONOSCOPY;  Surgeon: Lili Ellis MD;  Location: [facility] ENDO;  Service: Endoscopy;  Laterality: N/A;    COLONOSCOPY N/A 2020    Procedure: COLONOSCOPY;  Surgeon: Lili Ellis MD;  Location: [facility];  Service: Endoscopy;  Laterality: N/A;    SMALL INTESTINE SURGERY         Family History:   Family History   Problem Relation Age of Onset    Stroke Mother     Heart disease Mother     Cataracts Mother     Cancer Father         Lung    Heart disease Sister     Hypertension Brother     Glaucoma Neg Hx     Macular degeneration Neg Hx     Thyroid disease Neg Hx        Social History:   Social History     Tobacco Use    Smoking status: Former     Years: 20.00     Types: Cigarettes     Quit date: 2005     Years since quittin.4    Smokeless tobacco: Former    Tobacco comments:     Former Light Smoker less than 10 a day   Substance Use Topics    Alcohol use: Yes     Alcohol/week: 4.0 standard drinks     Types: 4 Glasses of wine per week     Comment: occ    Drug use: No       Allergies: Reviewed    Home Medications:   Medication List with Changes/Refills   Current  Medications    ADALIMUMAB (HUMIRA,CF, PEN) 40 MG/0.4 ML PNKT    Inject 0.4 mLs (40 mg total) into the skin every 14 (fourteen) days.    AMLODIPINE (NORVASC) 10 MG TABLET    Take 1 tablet by mouth once daily    ASPIRIN (ECOTRIN) 81 MG EC TABLET    Take 1 tablet (81 mg total) by mouth once daily.    ATENOLOL (TENORMIN) 25 MG TABLET    Take 1 tablet (25 mg total) by mouth once daily.    CALCIUM CARBONATE (OS-COOPER) 600 MG (1,500 MG) TAB    Take 1,200 mg by mouth once daily.     CYANOCOBALAMIN, VITAMIN B-12, 2,500 MCG LOZG    Place 1 tablet under the tongue.     DENOSUMAB (PROLIA) 60 MG/ML SYRG    Inject 60 mg into the skin.    EFINACONAZOLE (JUBLIA) 10 % ESCOBAR    Apply 1 Act topically once daily.    LEVOCETIRIZINE (XYZAL) 5 MG TABLET    Take 1 tablet (5 mg total) by mouth every evening.    LORAZEPAM (ATIVAN) 0.5 MG TABLET    Take 3 tablets (1.5 mg total) by mouth every evening.    MULTIVITAMIN WITH MINERALS TABLET    Take 1 tablet by mouth once daily at 6am.    VALACYCLOVIR (VALTREX) 500 MG TABLET    Take 1 tablet by mouth twice a day as needed for 3 days per flare    VITAMIN D 1000 UNITS TAB    Take 185 mg by mouth once daily.         Physical Exam:  Vital Signs:  There were no vitals taken for this visit.  There is no height or weight on file to calculate BMI.      Labs: Pertinent labs reviewed.    Assessment and Plan:  Crohn's disease of small intestine without complication  On humira and has done well.  Has overlapping IBS and that seems to have worsened.  She is now having more issues with bloat and constipation.  It has been 3 years since last scope so will repeat a colonoscopy to ensure no new strictures, active disease or any other explanation.  Will do EGD at that time and biopsy stomach.    -     Case Request Endoscopy: COLONOSCOPY, EGD (ESOPHAGOGASTRODUODENOSCOPY)  -     CBC Auto Differential; Future; Expected date: 06/19/2023  -     Sedimentation rate; Future; Expected date: 06/19/2023  -     C-reactive  protein; Future; Expected date: 06/19/2023    Immunosuppressed status    Other orders  -     Discontinue: sodium,potassium,mag sulfates (SUPREP BOWEL PREP KIT) 17.5-3.13-1.6 gram SolR; Take 177 mLs by mouth once. Use as directed for 1 dose  Dispense: 1 kit; Refill: 0  -     sodium,potassium,mag sulfates (SUPREP BOWEL PREP KIT) 17.5-3.13-1.6 gram SolR; Take 177ml by mouth daily for two days as directed  Dispense: 354 mL; Refill: 0        Thank you so much for allowing me to participate in the care of Manuela Hester Amy Ellis MD

## 2023-06-20 ENCOUNTER — PATIENT MESSAGE (OUTPATIENT)
Dept: GASTROENTEROLOGY | Facility: CLINIC | Age: 73
End: 2023-06-20
Payer: MEDICARE

## 2023-06-20 ENCOUNTER — LAB VISIT (OUTPATIENT)
Dept: LAB | Facility: HOSPITAL | Age: 73
End: 2023-06-20
Attending: NURSE PRACTITIONER
Payer: MEDICARE

## 2023-06-20 ENCOUNTER — TELEPHONE (OUTPATIENT)
Dept: GASTROENTEROLOGY | Facility: CLINIC | Age: 73
End: 2023-06-20
Payer: MEDICARE

## 2023-06-20 DIAGNOSIS — Z71.89 OTHER SPECIFIED COUNSELING: ICD-10-CM

## 2023-06-20 DIAGNOSIS — K50.00 CROHN'S DISEASE OF SMALL INTESTINE WITHOUT COMPLICATION: ICD-10-CM

## 2023-06-20 DIAGNOSIS — Z51.81 MEDICATION MONITORING ENCOUNTER: ICD-10-CM

## 2023-06-20 DIAGNOSIS — M81.0 AGE-RELATED OSTEOPOROSIS WITHOUT CURRENT PATHOLOGICAL FRACTURE: ICD-10-CM

## 2023-06-20 LAB
25(OH)D3+25(OH)D2 SERPL-MCNC: 58 NG/ML (ref 30–96)
ALBUMIN SERPL BCP-MCNC: 4.3 G/DL (ref 3.5–5.2)
ALP SERPL-CCNC: 34 U/L (ref 55–135)
ALT SERPL W/O P-5'-P-CCNC: 18 U/L (ref 10–44)
ANION GAP SERPL CALC-SCNC: 9 MMOL/L (ref 8–16)
AST SERPL-CCNC: 24 U/L (ref 10–40)
BASOPHILS # BLD AUTO: 0.03 K/UL (ref 0–0.2)
BASOPHILS NFR BLD: 0.6 % (ref 0–1.9)
BILIRUB SERPL-MCNC: 0.5 MG/DL (ref 0.1–1)
BUN SERPL-MCNC: 12 MG/DL (ref 8–23)
CALCIUM SERPL-MCNC: 9.7 MG/DL (ref 8.7–10.5)
CHLORIDE SERPL-SCNC: 97 MMOL/L (ref 95–110)
CO2 SERPL-SCNC: 26 MMOL/L (ref 23–29)
CREAT SERPL-MCNC: 0.8 MG/DL (ref 0.5–1.4)
CRP SERPL-MCNC: 0.3 MG/L (ref 0–8.2)
DIFFERENTIAL METHOD: ABNORMAL
EOSINOPHIL # BLD AUTO: 0.1 K/UL (ref 0–0.5)
EOSINOPHIL NFR BLD: 2.3 % (ref 0–8)
ERYTHROCYTE [DISTWIDTH] IN BLOOD BY AUTOMATED COUNT: 12.1 % (ref 11.5–14.5)
ERYTHROCYTE [SEDIMENTATION RATE] IN BLOOD BY WESTERGREN METHOD: 7 MM/HR (ref 0–20)
EST. GFR  (NO RACE VARIABLE): >60 ML/MIN/1.73 M^2
GLUCOSE SERPL-MCNC: 82 MG/DL (ref 70–110)
HCT VFR BLD AUTO: 39.8 % (ref 37–48.5)
HGB BLD-MCNC: 13.5 G/DL (ref 12–16)
IMM GRANULOCYTES # BLD AUTO: 0 K/UL (ref 0–0.04)
IMM GRANULOCYTES NFR BLD AUTO: 0 % (ref 0–0.5)
LYMPHOCYTES # BLD AUTO: 2.3 K/UL (ref 1–4.8)
LYMPHOCYTES NFR BLD: 48 % (ref 18–48)
MCH RBC QN AUTO: 32.8 PG (ref 27–31)
MCHC RBC AUTO-ENTMCNC: 33.9 G/DL (ref 32–36)
MCV RBC AUTO: 97 FL (ref 82–98)
MONOCYTES # BLD AUTO: 0.6 K/UL (ref 0.3–1)
MONOCYTES NFR BLD: 11.6 % (ref 4–15)
NEUTROPHILS # BLD AUTO: 1.8 K/UL (ref 1.8–7.7)
NEUTROPHILS NFR BLD: 37.5 % (ref 38–73)
NRBC BLD-RTO: 0 /100 WBC
PLATELET # BLD AUTO: 243 K/UL (ref 150–450)
PMV BLD AUTO: 8.8 FL (ref 9.2–12.9)
POTASSIUM SERPL-SCNC: 4.4 MMOL/L (ref 3.5–5.1)
PROT SERPL-MCNC: 7.6 G/DL (ref 6–8.4)
PTH-INTACT SERPL-MCNC: 81.2 PG/ML (ref 9–77)
RBC # BLD AUTO: 4.11 M/UL (ref 4–5.4)
SODIUM SERPL-SCNC: 132 MMOL/L (ref 136–145)
WBC # BLD AUTO: 4.75 K/UL (ref 3.9–12.7)

## 2023-06-20 PROCEDURE — 83970 ASSAY OF PARATHORMONE: CPT | Performed by: INTERNAL MEDICINE

## 2023-06-20 PROCEDURE — 85651 RBC SED RATE NONAUTOMATED: CPT | Performed by: INTERNAL MEDICINE

## 2023-06-20 PROCEDURE — 36415 COLL VENOUS BLD VENIPUNCTURE: CPT | Performed by: INTERNAL MEDICINE

## 2023-06-20 PROCEDURE — 85025 COMPLETE CBC W/AUTO DIFF WBC: CPT | Performed by: INTERNAL MEDICINE

## 2023-06-20 PROCEDURE — 80053 COMPREHEN METABOLIC PANEL: CPT | Performed by: INTERNAL MEDICINE

## 2023-06-20 PROCEDURE — 86140 C-REACTIVE PROTEIN: CPT | Performed by: INTERNAL MEDICINE

## 2023-06-20 PROCEDURE — 82306 VITAMIN D 25 HYDROXY: CPT | Performed by: INTERNAL MEDICINE

## 2023-06-20 NOTE — TELEPHONE ENCOUNTER
Combined [facility] Questionnaire    Goals:  1. to safely screen and evaluate patients for endoscopic procedures  2. schedule healthy patients at [facility]  3. schedule patients with complex medical comorbidities at [facility]    Location Screening:    If answers Yes to any of the following, schedule at [facility] ONLY. If No, OK for either location.  Esophageal varices no  BMI >50 no  AICD no  Severe valvular disease no  Severe aortic stenosis (LORNA <1cm2) no  Pacemaker dependent no  Heart Failure (EF < 45%) no  Oxygen dependent no  Unable to walk 6 minutes no  Chronic respiratory failure no  Moderate or severe PFTs no  Advance procedures* no  Hemoglobin A1C >10 no    If answers Yes to any of the following, schedule at [facility].  Can independently transfer? yes  Bariatric endoscopic procedures* no  *Advance cases and bariatric endoscopic procedures are scheduled by [facility] charge nurse and Edilson Oliver    If answers Yes to any of the following, OK for either location.  Pulmonary Hypertension (PAP <60) no  Pacemaker no  Nocturnal CPAP no  Insulin Pump no      Endoscopy screenin. Is the patient taking antibiotics for an infection? no  a. Yes - Defer endoscopic procedure till completion of antibiotics  i. Respiratory infections should be scheduled two weeks after antibiotics completed  ii. Diverticulitis must have been seen by GI or PCP. Schedule colonoscopy 2-3 months out  b. No - Schedule procedure    2. Are platelets less than 50? no  a. Yes - Procedure cannot be scheduled at ANY location  i. Send message to ordering provider and do NOT schedule the procedure  b. No - Schedule procedure    3. Does the patient have anemia? no  a. Is the hemoglobin <7?  i. Yes - Endoscopic procedure cannot be scheduled at any location  ii. Contact ordering providers office to arrange outpatient transfusion or review with NAOMIE/MD  iii. No - Schedule procedure  b. Are there multiple comorbidities -  Schedule at [facility]  c. Is the patient symptomatic - Consult PAT provider    4. Has the patient been admitted for cardiac, kidney or pulmonary causes at any hospital in the past 3 months? no  a. Yes - Schedule appointment with PCP  i. If patient has been seen PCP, Cardiology, Nephrology, Pulmonary or in GI clinic within 3 months then schedule procedure.  b. No - Schedule procedure    5. Has a cardiac stent been placed in the last 12 months? no  a. Yes - Review medical record and confirm patient has been cleared by Cardiology  b. No - Schedule procedure    6. Is patient on blood thinner or antiplatelet agent? no  a. Yes - PAT to send telephone encounter to endoscopist. Endoscopist to send Cardiology e-consult for cardiac clearance  b. No - Schedule procedure  c. Refer to anticoagulation medication list    7. Have you had a stroke or heart attack in the past 6 months? no  a. Yes - Review medical record to determine if patient has been seen by PCP, Cardiology or Neurology and cleared to undergo endoscopic procedure  b. No - Schedule procedure    8. Has there been chest pain in the past 3 months? no  a. Yes - Review medical record to confirm Cardiology has cleared patient for endoscopic procedure  i. If no previous Cardiology evaluation refer to Cardiology. Do not schedule procedure.  c. No - Schedule procedure    9. Is this patient taking weight loss medications? no  a. Yes - Medication must be held 2 weeks before scheduling endoscopic procedure  b. No - Schedule procedure    10. Is the patient on dialysis? no  a. Yes - Schedule day AFTER dialysis and schedule after 9am  i. Patient arrival time is 2 hours prior to procedure, for STAT labs  ii. Nulytely or miralax prep must be used for all patients with kidney disease  d. No - Schedule procedure    11. Is the patient diabetic? no  a. Yes - Schedule morning appt  i. Advise patient to hold all diabetic meds day of procedure.  c. No - Schedule procedure    12. Does the  patient have any piercings? no  a. Yes - Must be removed before endoscopic procedure  b. No - Schedule procedure    Additional Information:  - All patients 80 years of age and older must be seen in the GI clinic. Do not schedule endoscopic procedure. Schedule GI clinic appointment.  - Review previous colonoscopy recommendations. Look for history of poor prep  - Review medications and allergies  - Verify pharmacy information and appropriate prep sent  - Confirm prep instructions have been mailed or sent to patient

## 2023-06-20 NOTE — TELEPHONE ENCOUNTER
Patient agreed to colonoscopy on 7/6. Patient has Prolia injection on 6/29 and wants to know if she should get it or push that injection back. I told her I would ask Dr. Ellis and let her know the recommendations.

## 2023-06-21 ENCOUNTER — PATIENT MESSAGE (OUTPATIENT)
Dept: RHEUMATOLOGY | Facility: CLINIC | Age: 73
End: 2023-06-21
Payer: MEDICARE

## 2023-06-21 ENCOUNTER — TELEPHONE (OUTPATIENT)
Dept: RHEUMATOLOGY | Facility: CLINIC | Age: 73
End: 2023-06-21
Payer: MEDICARE

## 2023-06-21 DIAGNOSIS — F41.9 ANXIETY: ICD-10-CM

## 2023-06-21 DIAGNOSIS — F51.04 CHRONIC INSOMNIA: ICD-10-CM

## 2023-06-21 RX ORDER — LORAZEPAM 0.5 MG/1
1.5 TABLET ORAL NIGHTLY
Qty: 90 TABLET | Refills: 3 | Status: CANCELLED | OUTPATIENT
Start: 2023-06-21

## 2023-06-21 NOTE — TELEPHONE ENCOUNTER
----- Message from Yvonne Lovell sent at 2023  3:25 PM CDT -----  Contact: self [phone]  Patient called in this afternoon to reschedule her appointment scheduled for the . Its states that it has . Please call back [phone]

## 2023-06-21 NOTE — TELEPHONE ENCOUNTER
No care due was identified.  [facility] Embedded Care Due Messages. Reference number: [id number].   6/21/2023 8:33:24 AM CDT

## 2023-06-22 ENCOUNTER — PATIENT MESSAGE (OUTPATIENT)
Dept: RHEUMATOLOGY | Facility: CLINIC | Age: 73
End: 2023-06-22
Payer: MEDICARE

## 2023-06-23 ENCOUNTER — PATIENT MESSAGE (OUTPATIENT)
Dept: INTERNAL MEDICINE | Facility: CLINIC | Age: 73
End: 2023-06-23
Payer: MEDICARE

## 2023-06-23 DIAGNOSIS — F51.04 CHRONIC INSOMNIA: ICD-10-CM

## 2023-06-23 DIAGNOSIS — F41.9 ANXIETY: ICD-10-CM

## 2023-06-23 RX ORDER — LORAZEPAM 0.5 MG/1
1.5 TABLET ORAL NIGHTLY
Qty: 90 TABLET | Refills: 1 | Status: SHIPPED | OUTPATIENT
Start: 2023-06-23 | End: 2023-08-15 | Stop reason: SDUPTHER

## 2023-06-23 NOTE — TELEPHONE ENCOUNTER
No care due was identified.  [facility] Embedded Care Due Messages. Reference number: [id number].   6/23/2023 7:52:33 AM CDT

## 2023-06-23 NOTE — TELEPHONE ENCOUNTER
No care due was identified.  [facility] Embedded Care Due Messages. Reference number: [id number].   6/23/2023 8:47:33 AM CDT

## 2023-06-23 NOTE — TELEPHONE ENCOUNTER
Refill Routing Note   Medication(s) are not appropriate for processing by [facility] for the following reason(s):      Medication outside of protocol    [facility] action(s):  Route None identified          Appointments  past 12m or future 3m with PCP    Date Provider   Last Visit   9/13/2022 Christelle Lawler DO   Next Visit   8/24/2023 Christelle Lawler DO   ED visits in past 90 days: 0        Note composed:3:59 PM 06/23/2023

## 2023-06-26 ENCOUNTER — PATIENT MESSAGE (OUTPATIENT)
Dept: ENDOSCOPY | Facility: HOSPITAL | Age: 73
End: 2023-06-26
Payer: MEDICARE

## 2023-06-26 RX ORDER — LORAZEPAM 0.5 MG/1
1.5 TABLET ORAL NIGHTLY
Qty: 90 TABLET | Refills: 3 | OUTPATIENT
Start: 2023-06-26

## 2023-06-29 NOTE — TELEPHONE ENCOUNTER
Zaida Fields, DIAMOND  You 28 minutes ago (8:17 AM)     LM  Looks like it's approved now. I removed the comment from the notes on appt desk. Thank you bunches.

## 2023-06-29 NOTE — TELEPHONE ENCOUNTER
"Thank you so much Zaida. I really appreciate it!    Nichelle Canales" Vero, [facility]  Rheumatology Department   "

## 2023-07-03 ENCOUNTER — PATIENT MESSAGE (OUTPATIENT)
Dept: ENDOSCOPY | Facility: HOSPITAL | Age: 73
End: 2023-07-03
Payer: MEDICARE

## 2023-07-03 ENCOUNTER — INFUSION (OUTPATIENT)
Dept: INFUSION THERAPY | Facility: HOSPITAL | Age: 73
End: 2023-07-03
Attending: INTERNAL MEDICINE
Payer: MEDICARE

## 2023-07-03 VITALS
SYSTOLIC BLOOD PRESSURE: 145 MMHG | OXYGEN SATURATION: 98 % | RESPIRATION RATE: 18 BRPM | TEMPERATURE: 97 F | DIASTOLIC BLOOD PRESSURE: 84 MMHG | HEART RATE: 73 BPM

## 2023-07-03 DIAGNOSIS — M81.0 AGE-RELATED OSTEOPOROSIS WITHOUT CURRENT PATHOLOGICAL FRACTURE: Primary | ICD-10-CM

## 2023-07-03 PROCEDURE — 63600175 PHARM REV CODE 636 W HCPCS: Mod: JZ,JG | Performed by: INTERNAL MEDICINE

## 2023-07-03 PROCEDURE — 96372 THER/PROPH/DIAG INJ SC/IM: CPT

## 2023-07-03 RX ADMIN — DENOSUMAB 60 MG: 60 INJECTION SUBCUTANEOUS at 10:07

## 2023-07-03 NOTE — NURSING
Prolia administered as documented on MAR using aseptic technique. Patent tolerated well. Band aid applied to site.  Patient instructed to wait in lobby for 15 mins. Patient agreeable.

## 2023-07-03 NOTE — DISCHARGE INSTRUCTIONS
Remember to take your calcium with vitamin D supplement as directed.   It is not advisable to undergo invasive dental procedure, within 3 months of your injection, unless approved by your treating provider.        [facility]  [address] [address]  [phone] phone     [phone] fax  Hours of Operation: Monday- Friday 8:00am- 5:00pm  After hours phone  [phone]  Hematology / Oncology Physicians on call      SHRUTHI Christensen Dr., Dr., NP Sydney Prescott, VIANEY Ambrocio, LYNDSAY Encarnacion    Please call with any concerns regarding your appointment today.

## 2023-07-06 ENCOUNTER — TELEPHONE (OUTPATIENT)
Dept: GASTROENTEROLOGY | Facility: CLINIC | Age: 73
End: 2023-07-06
Payer: MEDICARE

## 2023-07-06 NOTE — TELEPHONE ENCOUNTER
Patient states she feels her symptoms are related to a stricture? She states the more she thought about it, this is how she felt before her surgeries for strictures. She is wondering if she can get something to help with passing gas and keeping her bowels moving? Patient has tried miralax daily and it has not helped. She will only go a little at a time. She also agreed to reschedule her colonoscopy for 7/20.

## 2023-07-10 ENCOUNTER — PES CALL (OUTPATIENT)
Dept: ADMINISTRATIVE | Facility: CLINIC | Age: 73
End: 2023-07-10
Payer: MEDICARE

## 2023-07-20 ENCOUNTER — ANESTHESIA (OUTPATIENT)
Dept: ENDOSCOPY | Facility: HOSPITAL | Age: 73
End: 2023-07-20
Payer: MEDICARE

## 2023-07-20 ENCOUNTER — HOSPITAL ENCOUNTER (OUTPATIENT)
Facility: HOSPITAL | Age: 73
Discharge: HOME | End: 2023-07-20
Attending: INTERNAL MEDICINE | Admitting: INTERNAL MEDICINE
Payer: MEDICARE

## 2023-07-20 ENCOUNTER — ANESTHESIA EVENT (OUTPATIENT)
Dept: ENDOSCOPY | Facility: HOSPITAL | Age: 73
End: 2023-07-20
Payer: MEDICARE

## 2023-07-20 DIAGNOSIS — K50.00 CROHN'S DISEASE OF SMALL INTESTINE: ICD-10-CM

## 2023-07-20 DIAGNOSIS — K50.00 CROHN'S DISEASE OF SMALL INTESTINE WITHOUT COMPLICATION: Primary | ICD-10-CM

## 2023-07-20 PROCEDURE — 88305 TISSUE EXAM BY PATHOLOGIST: ICD-10-PCS | Mod: 26,,, | Performed by: PATHOLOGY

## 2023-07-20 PROCEDURE — 43239 PR EGD, FLEX, W/BIOPSY, SGL/MULTI: ICD-10-PCS | Mod: 51,,, | Performed by: INTERNAL MEDICINE

## 2023-07-20 PROCEDURE — 37000008 HC ANESTHESIA 1ST 15 MINUTES: Performed by: INTERNAL MEDICINE

## 2023-07-20 PROCEDURE — 37000009 HC ANESTHESIA EA ADD 15 MINS: Performed by: INTERNAL MEDICINE

## 2023-07-20 PROCEDURE — 88305 TISSUE EXAM BY PATHOLOGIST: CPT | Mod: 26,,, | Performed by: PATHOLOGY

## 2023-07-20 PROCEDURE — 63600175 PHARM REV CODE 636 W HCPCS: Performed by: NURSE ANESTHETIST, CERTIFIED REGISTERED

## 2023-07-20 PROCEDURE — 27201012 HC FORCEPS, HOT/COLD, DISP: Performed by: INTERNAL MEDICINE

## 2023-07-20 PROCEDURE — 45378 DIAGNOSTIC COLONOSCOPY: CPT | Mod: ,,, | Performed by: INTERNAL MEDICINE

## 2023-07-20 PROCEDURE — 43239 EGD BIOPSY SINGLE/MULTIPLE: CPT | Mod: 51,,, | Performed by: INTERNAL MEDICINE

## 2023-07-20 PROCEDURE — 43239 EGD BIOPSY SINGLE/MULTIPLE: CPT | Performed by: INTERNAL MEDICINE

## 2023-07-20 PROCEDURE — 45378 PR COLONOSCOPY,DIAGNOSTIC: ICD-10-PCS | Mod: ,,, | Performed by: INTERNAL MEDICINE

## 2023-07-20 PROCEDURE — 88305 TISSUE EXAM BY PATHOLOGIST: CPT | Mod: 59 | Performed by: PATHOLOGY

## 2023-07-20 PROCEDURE — 25000003 PHARM REV CODE 250: Performed by: NURSE ANESTHETIST, CERTIFIED REGISTERED

## 2023-07-20 RX ORDER — SODIUM CHLORIDE, SODIUM LACTATE, POTASSIUM CHLORIDE, CALCIUM CHLORIDE 600; 310; 30; 20 MG/100ML; MG/100ML; MG/100ML; MG/100ML
INJECTION, SOLUTION INTRAVENOUS CONTINUOUS PRN
Status: DISCONTINUED | OUTPATIENT
Start: 2023-07-20 | End: 2023-07-20

## 2023-07-20 RX ORDER — SODIUM CHLORIDE, SODIUM LACTATE, POTASSIUM CHLORIDE, CALCIUM CHLORIDE 600; 310; 30; 20 MG/100ML; MG/100ML; MG/100ML; MG/100ML
INJECTION, SOLUTION INTRAVENOUS CONTINUOUS
Status: DISCONTINUED | OUTPATIENT
Start: 2023-07-20 | End: 2023-07-20 | Stop reason: HOSPADM

## 2023-07-20 RX ORDER — PROPOFOL 10 MG/ML
VIAL (ML) INTRAVENOUS
Status: DISCONTINUED | OUTPATIENT
Start: 2023-07-20 | End: 2023-07-20

## 2023-07-20 RX ORDER — LIDOCAINE HYDROCHLORIDE 10 MG/ML
INJECTION, SOLUTION EPIDURAL; INFILTRATION; INTRACAUDAL; PERINEURAL
Status: DISCONTINUED | OUTPATIENT
Start: 2023-07-20 | End: 2023-07-20

## 2023-07-20 RX ADMIN — PROPOFOL 30 MG: 10 INJECTION, EMULSION INTRAVENOUS at 01:07

## 2023-07-20 RX ADMIN — PROPOFOL 70 MG: 10 INJECTION, EMULSION INTRAVENOUS at 12:07

## 2023-07-20 RX ADMIN — SODIUM CHLORIDE, SODIUM LACTATE, POTASSIUM CHLORIDE, AND CALCIUM CHLORIDE: 600; 310; 30; 20 INJECTION, SOLUTION INTRAVENOUS at 12:07

## 2023-07-20 RX ADMIN — PROPOFOL 30 MG: 10 INJECTION, EMULSION INTRAVENOUS at 12:07

## 2023-07-20 RX ADMIN — LIDOCAINE HYDROCHLORIDE 50 MG: 10 SOLUTION INTRAVENOUS at 12:07

## 2023-07-20 RX ADMIN — PROPOFOL 40 MG: 10 INJECTION, EMULSION INTRAVENOUS at 01:07

## 2023-07-20 NOTE — ANESTHESIA POSTPROCEDURE EVALUATION
Anesthesia Post Evaluation    Patient: Manuela Reyes    Procedure(s) Performed: Procedure(s) (LRB):  EGD (ESOPHAGOGASTRODUODENOSCOPY) (N/A)  COLONOSCOPY (N/A)    Final Anesthesia Type: MAC      Patient location during evaluation: GI PACU  Patient participation: Yes- Able to Participate  Level of consciousness: awake and alert and oriented  Post-procedure vital signs: reviewed and stable  Pain management: adequate  Airway patency: patent  CAMI mitigation strategies: Multimodal analgesia  PONV status at discharge: No PONV  Anesthetic complications: no      Cardiovascular status: hemodynamically stable  Respiratory status: unassisted, spontaneous ventilation and room air  Hydration status: euvolemic  Follow-up not needed.          Vitals Value Taken Time   /56 07/20/23 1322   Temp 36.8 °C (98.2 °F) 07/20/23 1322   Pulse 64 07/20/23 1322   Resp 20 07/20/23 1322   SpO2 98 07/20/23 1329         No case tracking events are documented in the log.      Pain/Aileen Score: Aileen Score: 10 (7/20/2023  1:22 PM)

## 2023-07-20 NOTE — PROVATION PATIENT INSTRUCTIONS
Discharge Summary/Instructions after an Endoscopic Procedure  Patient Name: Manuela Reyes  Patient MRN: [id number]  Patient YOB: 1950 Thursday, July 20, 2023 Lili Ellis MD  Dear patient,  As a result of recent federal legislation (The Federal Cures Act), you may   receive lab or pathology results from your procedure in your [facility]   account before your physician is able to contact you. Your physician or   their representative will relay the results to you with their   recommendations at their soonest availability.  Thank you,  RESTRICTIONS:  During your procedure today, you received medications for sedation.  These   medications may affect your judgment, balance and coordination.  Therefore,   for 24 hours, you have the following restrictions:   - DO NOT drive a car, operate machinery, make legal/financial decisions,   sign important papers or drink alcohol.    ACTIVITY:  Today: no heavy lifting, straining or running due to procedural   sedation/anesthesia.  The following day: return to full activity including work.  DIET:  Eat and drink normally unless instructed otherwise.     TREATMENT FOR COMMON SIDE EFFECTS:  - Mild abdominal pain, nausea, belching, bloating or excessive gas:  rest,   eat lightly and use a heating pad.  - Sore Throat: treat with throat lozenges and/or gargle with warm salt   water.  - Because air was used during the procedure, expelling large amounts of air   from your rectum or belching is normal.  - If a bowel prep was taken, you may not have a bowel movement for 1-3 days.    This is normal.  SYMPTOMS TO WATCH FOR AND REPORT TO YOUR PHYSICIAN:  1. Abdominal pain or bloating, other than gas cramps.  2. Chest pain.  3. Back pain.  4. Signs of infection such as: chills or fever occurring within 24 hours   after the procedure.  5. Rectal bleeding, which would show as bright red, maroon, or black stools.   (A tablespoon of blood from the rectum is not serious, especially if    hemorrhoids are present.)  6. Vomiting.  7. Weakness or dizziness.  GO DIRECTLY TO THE NEAREST EMERGENCY ROOM IF YOU HAVE ANY OF THE FOLLOWING:      Difficulty breathing              Chills and/or fever over 101 F   Persistent vomiting and/or vomiting blood   Severe abdominal pain   Severe chest pain   Black, tarry stools   Bleeding- more than one tablespoon   Any other symptom or condition that you feel may need urgent attention  Your doctor recommends these additional instructions:  If any biopsies were taken, your doctors clinic will contact you in 1 to 2   weeks with any results.  - Patient has a contact number available for emergencies.  The signs and   symptoms of potential delayed complications were discussed with the   patient.  Return to normal activities tomorrow.  Written discharge   instructions were provided to the patient.   - Discharge patient to home (via wheelchair).   - Resume previous diet today.   - Continue present medications.   - Await pathology results.  For questions, problems or results please call your physician Lili Ellis MD at Work:  [phone]  If you have any questions about the above instructions, call the GI   department at [phone] or call the endoscopy unit at [phone]   from 7am until 3 pm.  [facility], EMERGENCY ROOM PHONE NUMBER:   [phone]  IF A COMPLICATION OR EMERGENCY SITUATION ARISES AND YOU ARE UNABLE TO REACH   YOUR PHYSICIAN - GO DIRECTLY TO THE EMERGENCY ROOM.  I have read or have had read to me these discharge instructions for my   procedure and have received a written copy.  I understand these   instructions and will follow-up with my physician if I have any questions.     __________________________________       _____________________________________  Nurse Signature                                          Patient/Designated   Responsible Party Signature  MD Lili Lopez MD  7/20/2023 1:30:46  PM  This report has been verified and signed electronically.  Dear patient,  As a result of recent federal legislation (The Federal Cures Act), you may   receive lab or pathology results from your procedure in your [facility]   account before your physician is able to contact you. Your physician or   their representative will relay the results to you with their   recommendations at their soonest availability.  Thank you,  PROVATION

## 2023-07-20 NOTE — PLAN OF CARE
Phoned niece to inform of pt status , in recovery , timeframe till discharge. Reports not in hospital , instructed to head to hospital at this time

## 2023-07-20 NOTE — H&P
PRE PROCEDURE H&P    Patient Name: Manuela Reyes  MRN: [id number]  : 1950  Date of Procedure:  2023  Referring Physician: Lili Ellis MD  Primary Physician: Christelle Lawler DO  Procedure Physician: Lili Ellis MD       Planned Procedure: Colonoscopy and EGD  Diagnosis: CD   Chief Complaint: Same as above    HPI: Patient is an 73 y.o. female is here for the above.     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 Checo Reed--vascular surgery    Osteoporosis     SCC (squamous cell carcinoma), hand, right 2019    Dr. Malaika Mack--dermatology    TIA (transient ischemic attack)         Past Surgical History:  Past Surgical History:   Procedure Laterality Date    APPENDECTOMY      COLONOSCOPY N/A 2017    Procedure: COLONOSCOPY;  Surgeon: Jose Luis Leonard MD;  Location: [facility];  Service: Endoscopy;  Laterality: N/A;    COLONOSCOPY N/A 2018    Procedure: COLONOSCOPY;  Surgeon: Guillaume Whitman MD;  Location: [facility];  Service: Endoscopy;  Laterality: N/A;    COLONOSCOPY N/A 2019    Procedure: COLONOSCOPY;  Surgeon: Lili Ellis MD;  Location: [facility];  Service: Endoscopy;  Laterality: N/A;    COLONOSCOPY N/A 2020    Procedure: COLONOSCOPY;  Surgeon: Lili Ellis MD;  Location: [facility];  Service: Endoscopy;  Laterality: N/A;    SMALL INTESTINE SURGERY          Home Medications:  Prior to Admission medications    Medication Sig Start Date End Date Taking? Authorizing Provider   amLODIPine (NORVASC) 10 MG tablet Take 1 tablet by mouth once daily 23  Yes Yvette Turner,  SHRUTHI   atenoloL (TENORMIN) 25 MG tablet Take 1 tablet (25 mg total) by mouth once daily. 2/6/23 2/6/24 Yes Allen White MD   calcium carbonate (OS-COOPER) 600 mg (1,500 mg) Tab Take 1,200 mg by mouth once daily.    Yes Historical Provider   cyanocobalamin, vitamin B-12, 2,500 mcg Lozg Place 1 tablet under the tongue.    Yes Historical Provider   efinaconazole (JUBLIA) 10 % Kike Apply 1 Act topically once daily. 6/12/23  Yes Halima Salas DPM   levocetirizine (XYZAL) 5 MG tablet Take 1 tablet (5 mg total) by mouth every evening. 7/25/22  Yes Yvette Turner PA-C   LORazepam (ATIVAN) 0.5 MG tablet Take 3 tablets (1.5 mg total) by mouth every evening. 6/23/23  Yes Christelle Lawler,    multivitamin with minerals tablet Take 1 tablet by mouth once daily at 6am. 4/17/17  Yes Historical Provider   vitamin D 1000 units Tab Take 185 mg by mouth once daily.   Yes Historical Provider   adalimumab (HUMIRA,CF, PEN) 40 mg/0.4 mL PnKt Inject 0.4 mLs (40 mg total) into the skin every 14 (fourteen) days. 10/1/21 10/1/22  Sandra Hunt NP   aspirin (ECOTRIN) 81 MG EC tablet Take 1 tablet (81 mg total) by mouth once daily. 5/19/17   Jose Luis Leonard MD   denosumab (PROLIA) 60 mg/mL Syrg Inject 60 mg into the skin.    Historical Provider   triamcinolone acetonide 0.1% (KENALOG) 0.1 % ointment Apply a small amount to affected area twice a day x 2 weeks as needed for flares 6/30/23      valACYclovir (VALTREX) 500 MG tablet Take 1 tablet by mouth twice a day as needed for 3 days per flare 5/17/23           Allergies:  Review of patient's allergies indicates:   Allergen Reactions    Statins-hmg-coa reductase inhibitors Anaphylaxis     Myalgias    Codeine sulfate Itching    Hydrochlorothiazide Other (See Comments)     Adverse Reaction    Lisinopril Swelling and Edema     Facial Swelling        Social History:   Social History     Socioeconomic History    Marital status: Single   Tobacco Use    Smoking status: Former      "Years: 20.00     Types: Cigarettes     Quit date: 2005     Years since quittin.5    Smokeless tobacco: Former    Tobacco comments:     Former Light Smoker less than 10 a day   Substance and Sexual Activity    Alcohol use: Yes     Alcohol/week: 4.0 standard drinks     Types: 4 Glasses of wine per week     Comment: occ    Drug use: No    Sexual activity: Never     Partners: Female       Family History:  Family History   Problem Relation Age of Onset    Stroke Mother     Heart disease Mother     Cataracts Mother     Cancer Father         Lung    Heart disease Sister     Hypertension Brother     Glaucoma Neg Hx     Macular degeneration Neg Hx     Thyroid disease Neg Hx        ROS: No acute cardiac events, no acute respiratory complaints.     Physical Exam (all patients):    BP (!) 155/75 (BP Location: Left arm, Patient Position: Lying)   Pulse 68   Temp 98.1 °F (36.7 °C) (Temporal)   Resp 16   Ht 5' 7" (1.702 m)   Wt 49.9 kg (110 lb)   SpO2 97%   Breastfeeding No   BMI 17.23 kg/m²   Lungs: Clear to auscultation bilaterally, respirations unlabored  Heart: Regular rate and rhythm, S1 and S2 normal, no obvious murmurs  Abdomen:         Soft, non-tender, bowel sounds normal, no masses, no organomegaly    Lab Results   Component Value Date    WBC 4.75 2023    MCV 97 2023    RDW 12.1 2023     2023    GLU 82 2023    BUN 12 2023     (L) 2023    K 4.4 2023    CL 97 2023        SEDATION PLAN: per anesthesia      History reviewed, vital signs satisfactory, cardiopulmonary status satisfactory, sedation options, risks and plans have been discussed with the patient  All their questions were answered and the patient agrees to the sedation procedures as planned and the patient is deemed an appropriate candidate for the sedation as planned.    Procedure explained to patient, informed consent obtained and placed in chart.    Lili FAN" Rhonda  7/20/2023  12:27 PM

## 2023-07-20 NOTE — ANESTHESIA PREPROCEDURE EVALUATION
07/20/2023  Manuela Reyes is a 73 y.o., female.      Pre-op Assessment    I have reviewed the Patient Summary Reports.    I have reviewed the NPO Status.   I have reviewed the Medications.     Review of Systems  Anesthesia Hx:  No problems with previous Anesthesia    Social:  Non-Smoker, No Alcohol Use    Hematology/Oncology:  Hematology Normal   Oncology Normal     EENT/Dental:EENT/Dental Normal   Cardiovascular:   Hypertension, well controlled ECG has been reviewed.    Pulmonary:  Pulmonary Normal    Renal/:  Renal/ Normal     Hepatic/GI:   Bowel Prep. Hepatitis, C Crohn's    Musculoskeletal:  Musculoskeletal Normal    Neurological:   TIA,    Endocrine:  Endocrine Normal    Dermatological:  Skin Normal    Psych:   anxiety depression          Physical Exam  General: Well nourished, Cooperative, Alert and Oriented    Airway:  Mallampati: II   Mouth Opening: Normal  TM Distance: 4 - 6 cm  Tongue: Normal  Neck ROM: Normal ROM    Dental:  Intact        Anesthesia Plan  Type of Anesthesia, risks & benefits discussed:    Anesthesia Type: MAC  Intra-op Monitoring Plan: Standard ASA Monitors  Post Op Pain Control Plan: IV/PO Opioids PRN  Informed Consent: Informed consent signed with the Patient and all parties understand the risks and agree with anesthesia plan.  All questions answered.   ASA Score: 2  Day of Surgery Review of History & Physical: H&P Update referred to the surgeon/provider.    Ready For Surgery From Anesthesia Perspective.     .

## 2023-07-20 NOTE — PROVATION PATIENT INSTRUCTIONS
Discharge Summary/Instructions after an Endoscopic Procedure  Patient Name: Manuela Reyes  Patient MRN: [id number]  Patient YOB: 1950 Thursday, July 20, 2023 Lili Ellis MD  Dear patient,  As a result of recent federal legislation (The Federal Cures Act), you may   receive lab or pathology results from your procedure in your [facility]   account before your physician is able to contact you. Your physician or   their representative will relay the results to you with their   recommendations at their soonest availability.  Thank you,  RESTRICTIONS:  During your procedure today, you received medications for sedation.  These   medications may affect your judgment, balance and coordination.  Therefore,   for 24 hours, you have the following restrictions:   - DO NOT drive a car, operate machinery, make legal/financial decisions,   sign important papers or drink alcohol.    ACTIVITY:  Today: no heavy lifting, straining or running due to procedural   sedation/anesthesia.  The following day: return to full activity including work.  DIET:  Eat and drink normally unless instructed otherwise.     TREATMENT FOR COMMON SIDE EFFECTS:  - Mild abdominal pain, nausea, belching, bloating or excessive gas:  rest,   eat lightly and use a heating pad.  - Sore Throat: treat with throat lozenges and/or gargle with warm salt   water.  - Because air was used during the procedure, expelling large amounts of air   from your rectum or belching is normal.  - If a bowel prep was taken, you may not have a bowel movement for 1-3 days.    This is normal.  SYMPTOMS TO WATCH FOR AND REPORT TO YOUR PHYSICIAN:  1. Abdominal pain or bloating, other than gas cramps.  2. Chest pain.  3. Back pain.  4. Signs of infection such as: chills or fever occurring within 24 hours   after the procedure.  5. Rectal bleeding, which would show as bright red, maroon, or black stools.   (A tablespoon of blood from the rectum is not serious, especially if    hemorrhoids are present.)  6. Vomiting.  7. Weakness or dizziness.  GO DIRECTLY TO THE NEAREST EMERGENCY ROOM IF YOU HAVE ANY OF THE FOLLOWING:      Difficulty breathing              Chills and/or fever over 101 F   Persistent vomiting and/or vomiting blood   Severe abdominal pain   Severe chest pain   Black, tarry stools   Bleeding- more than one tablespoon   Any other symptom or condition that you feel may need urgent attention  Your doctor recommends these additional instructions:  If any biopsies were taken, your doctors clinic will contact you in 1 to 2   weeks with any results.  - Patient has a contact number available for emergencies.  The signs and   symptoms of potential delayed complications were discussed with the   patient.  Return to normal activities tomorrow.  Written discharge   instructions were provided to the patient.   - Discharge patient to home (via wheelchair).   - Resume previous diet today.   - Continue present medications.   - Repeat colonoscopy is not recommended due to current age (66 years or   older) for screening purposes.  For questions, problems or results please call your physician Lili Ellis MD at Work:  [phone]  If you have any questions about the above instructions, call the GI   department at [phone] or call the endoscopy unit at [phone]   from 7am until 3 pm.  [facility] [place], EMERGENCY ROOM PHONE NUMBER:   [phone]  IF A COMPLICATION OR EMERGENCY SITUATION ARISES AND YOU ARE UNABLE TO REACH   YOUR PHYSICIAN - GO DIRECTLY TO THE EMERGENCY ROOM.  I have read or have had read to me these discharge instructions for my   procedure and have received a written copy.  I understand these   instructions and will follow-up with my physician if I have any questions.     __________________________________       _____________________________________  Nurse Signature                                          Patient/Designated   Responsible  Party Signature  MD Lili Lopez MD  7/20/2023 1:27:20 PM  This report has been verified and signed electronically.  Dear patient,  As a result of recent federal legislation (The Federal Cures Act), you may   receive lab or pathology results from your procedure in your [facility]   account before your physician is able to contact you. Your physician or   their representative will relay the results to you with their   recommendations at their soonest availability.  Thank you,  PROVATION

## 2023-07-20 NOTE — TRANSFER OF CARE
"Anesthesia Transfer of Care Note    Patient: Manuela Reyes    Procedure(s) Performed: Procedure(s) (LRB):  EGD (ESOPHAGOGASTRODUODENOSCOPY) (N/A)  COLONOSCOPY (N/A)    Patient location: GI    Anesthesia Type: MAC    Transport from OR: Transported from OR on room air with adequate spontaneous ventilation    Post pain: adequate analgesia    Post assessment: no apparent anesthetic complications and tolerated procedure well    Post vital signs: stable    Level of consciousness: awake, alert and oriented    Nausea/Vomiting: no nausea/vomiting    Complications: none    Transfer of care protocol was followed      Last vitals:   Visit Vitals  BP (!) 155/75 (BP Location: Left arm, Patient Position: Lying)   Pulse 68   Temp 36.7 °C (98.1 °F) (Temporal)   Resp 16   Ht 5' 7" (1.702 m)   Wt 49.9 kg (110 lb)   SpO2 97%   Breastfeeding No   BMI 17.23 kg/m²     "

## 2023-07-21 VITALS
HEART RATE: 66 BPM | WEIGHT: 110 LBS | SYSTOLIC BLOOD PRESSURE: 123 MMHG | TEMPERATURE: 98 F | OXYGEN SATURATION: 97 % | DIASTOLIC BLOOD PRESSURE: 74 MMHG | BODY MASS INDEX: 17.27 KG/M2 | HEIGHT: 67 IN | RESPIRATION RATE: 22 BRPM

## 2023-07-23 ENCOUNTER — PATIENT MESSAGE (OUTPATIENT)
Dept: GASTROENTEROLOGY | Facility: CLINIC | Age: 73
End: 2023-07-23
Payer: MEDICARE

## 2023-07-24 LAB
FINAL PATHOLOGIC DIAGNOSIS: NORMAL
GROSS: NORMAL
Lab: NORMAL

## 2023-08-15 ENCOUNTER — OFFICE VISIT (OUTPATIENT)
Dept: INTERNAL MEDICINE | Facility: CLINIC | Age: 73
End: 2023-08-15
Payer: MEDICARE

## 2023-08-15 VITALS
OXYGEN SATURATION: 98 % | HEIGHT: 67 IN | HEART RATE: 70 BPM | DIASTOLIC BLOOD PRESSURE: 68 MMHG | TEMPERATURE: 97 F | WEIGHT: 108 LBS | BODY MASS INDEX: 16.95 KG/M2 | RESPIRATION RATE: 20 BRPM | SYSTOLIC BLOOD PRESSURE: 130 MMHG

## 2023-08-15 DIAGNOSIS — E78.5 HYPERLIPIDEMIA LDL GOAL <70: ICD-10-CM

## 2023-08-15 DIAGNOSIS — Z12.31 ENCOUNTER FOR SCREENING MAMMOGRAM FOR MALIGNANT NEOPLASM OF BREAST: ICD-10-CM

## 2023-08-15 DIAGNOSIS — F41.9 ANXIETY: ICD-10-CM

## 2023-08-15 DIAGNOSIS — F51.04 CHRONIC INSOMNIA: ICD-10-CM

## 2023-08-15 DIAGNOSIS — I70.0 ATHEROSCLEROSIS OF ABDOMINAL AORTA: ICD-10-CM

## 2023-08-15 DIAGNOSIS — M81.0 AGE-RELATED OSTEOPOROSIS WITHOUT CURRENT PATHOLOGICAL FRACTURE: ICD-10-CM

## 2023-08-15 DIAGNOSIS — I10 ESSENTIAL HYPERTENSION: Primary | ICD-10-CM

## 2023-08-15 PROCEDURE — 3075F SYST BP GE 130 - 139MM HG: CPT | Mod: CPTII,S$GLB,, | Performed by: INTERNAL MEDICINE

## 2023-08-15 PROCEDURE — 99214 PR OFFICE/OUTPT VISIT, EST, LEVL IV, 30-39 MIN: ICD-10-PCS | Mod: S$GLB,,, | Performed by: INTERNAL MEDICINE

## 2023-08-15 PROCEDURE — 1101F PR PT FALLS ASSESS DOC 0-1 FALLS W/OUT INJ PAST YR: ICD-10-PCS | Mod: CPTII,S$GLB,, | Performed by: INTERNAL MEDICINE

## 2023-08-15 PROCEDURE — 3008F PR BODY MASS INDEX (BMI) DOCUMENTED: ICD-10-PCS | Mod: CPTII,S$GLB,, | Performed by: INTERNAL MEDICINE

## 2023-08-15 PROCEDURE — 3078F PR MOST RECENT DIASTOLIC BLOOD PRESSURE < 80 MM HG: ICD-10-PCS | Mod: CPTII,S$GLB,, | Performed by: INTERNAL MEDICINE

## 2023-08-15 PROCEDURE — 3288F FALL RISK ASSESSMENT DOCD: CPT | Mod: CPTII,S$GLB,, | Performed by: INTERNAL MEDICINE

## 2023-08-15 PROCEDURE — 99999 PR PBB SHADOW E&M-EST. PATIENT-LVL V: ICD-10-PCS | Mod: PBBFAC,,, | Performed by: INTERNAL MEDICINE

## 2023-08-15 PROCEDURE — 1160F PR REVIEW ALL MEDS BY PRESCRIBER/CLIN PHARMACIST DOCUMENTED: ICD-10-PCS | Mod: CPTII,S$GLB,, | Performed by: INTERNAL MEDICINE

## 2023-08-15 PROCEDURE — 1126F PR PAIN SEVERITY QUANTIFIED, NO PAIN PRESENT: ICD-10-PCS | Mod: CPTII,S$GLB,, | Performed by: INTERNAL MEDICINE

## 2023-08-15 PROCEDURE — 3075F PR MOST RECENT SYSTOLIC BLOOD PRESS GE 130-139MM HG: ICD-10-PCS | Mod: CPTII,S$GLB,, | Performed by: INTERNAL MEDICINE

## 2023-08-15 PROCEDURE — 3008F BODY MASS INDEX DOCD: CPT | Mod: CPTII,S$GLB,, | Performed by: INTERNAL MEDICINE

## 2023-08-15 PROCEDURE — 3288F PR FALLS RISK ASSESSMENT DOCUMENTED: ICD-10-PCS | Mod: CPTII,S$GLB,, | Performed by: INTERNAL MEDICINE

## 2023-08-15 PROCEDURE — 99999 PR PBB SHADOW E&M-EST. PATIENT-LVL V: CPT | Mod: PBBFAC,,, | Performed by: INTERNAL MEDICINE

## 2023-08-15 PROCEDURE — 1160F RVW MEDS BY RX/DR IN RCRD: CPT | Mod: CPTII,S$GLB,, | Performed by: INTERNAL MEDICINE

## 2023-08-15 PROCEDURE — 1126F AMNT PAIN NOTED NONE PRSNT: CPT | Mod: CPTII,S$GLB,, | Performed by: INTERNAL MEDICINE

## 2023-08-15 PROCEDURE — 1159F MED LIST DOCD IN RCRD: CPT | Mod: CPTII,S$GLB,, | Performed by: INTERNAL MEDICINE

## 2023-08-15 PROCEDURE — 3078F DIAST BP <80 MM HG: CPT | Mod: CPTII,S$GLB,, | Performed by: INTERNAL MEDICINE

## 2023-08-15 PROCEDURE — 99214 OFFICE O/P EST MOD 30 MIN: CPT | Mod: S$GLB,,, | Performed by: INTERNAL MEDICINE

## 2023-08-15 PROCEDURE — 1159F PR MEDICATION LIST DOCUMENTED IN MEDICAL RECORD: ICD-10-PCS | Mod: CPTII,S$GLB,, | Performed by: INTERNAL MEDICINE

## 2023-08-15 PROCEDURE — 1101F PT FALLS ASSESS-DOCD LE1/YR: CPT | Mod: CPTII,S$GLB,, | Performed by: INTERNAL MEDICINE

## 2023-08-15 RX ORDER — LORAZEPAM 0.5 MG/1
1.5 TABLET ORAL NIGHTLY
Qty: 90 TABLET | Refills: 5 | Status: SHIPPED | OUTPATIENT
Start: 2023-08-15 | End: 2024-02-05 | Stop reason: SDUPTHER

## 2023-08-15 RX ORDER — LEVOCETIRIZINE DIHYDROCHLORIDE 5 MG/1
5 TABLET, FILM COATED ORAL NIGHTLY
Qty: 90 TABLET | Refills: 3 | Status: SHIPPED | OUTPATIENT
Start: 2023-08-15

## 2023-08-15 NOTE — TELEPHONE ENCOUNTER
Refill Routing Note   Medication(s) are not appropriate for processing by [facility] for the following reason(s):      No active prescription written by provider    [facility] action(s):  Defer Care Due:  None identified            Appointments  past 12m or future 3m with PCP    Date Provider   Last Visit   8/15/2023 Christelle Lawler DO   Next Visit   Visit date not found Christelle Lawler DO   ED visits in past 90 days: 0        Note composed:3:57 PM 08/15/2023

## 2023-08-15 NOTE — PROGRESS NOTES
Manuela Reyes  73 y.o. White female  [id number]    Chief Complaint:  Chief Complaint   Patient presents with    Follow-up       History of Present Illness:  HTN--stable.   HLD--not on medication due to side effects.   Anxeity/insomnia--stable on lorazepam. She needs refills.   Osteoporosis--management per rheumatology.     Laboratory:  Lab Results   Component Value Date    WBC 4.75 06/20/2023    HGB 13.5 06/20/2023    HCT 39.8 06/20/2023     06/20/2023    CHOL 210 (H) 03/09/2023    TRIG 63 03/09/2023    HDL 83 (H) 03/09/2023    ALT 18 06/20/2023    AST 24 06/20/2023     (L) 06/20/2023    K 4.4 06/20/2023    CL 97 06/20/2023    CREATININE 0.8 06/20/2023    BUN 12 06/20/2023    CO2 26 06/20/2023    TSH 1.326 01/04/2017    PSA <0.01 12/09/2020     Lab Results   Component Value Date    LDLCALC 114.4 03/09/2023     History:  Past Medical History:   Diagnosis Date    Anxiety     Crohn's disease     Depression     Genital herpes     Hepatitis C infection     Hypertension     Insomnia     Mesenteric artery stenosis     Dr. 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 Malaika Mack--dermatology    TIA (transient ischemic attack)        Medications:  Current Outpatient Medications on File Prior to Visit   Medication Sig Dispense Refill    adalimumab (HUMIRA,CF, PEN) 40 mg/0.4 mL PnKt Inject 0.4 mLs (40 mg total) into the skin every 14 (fourteen) days. 6 pen 3    amLODIPine (NORVASC) 10 MG tablet Take 1 tablet by mouth once daily 90 tablet 1    aspirin (ECOTRIN) 81 MG EC tablet Take 1 tablet (81 mg total) by mouth once daily.      atenoloL (TENORMIN) 25 MG tablet Take 1 tablet (25 mg total) by mouth once daily. 30 tablet 11    calcium carbonate (OS-COOPER) 600 mg (1,500 mg) Tab Take 1,200 mg by mouth once daily.       cyanocobalamin, vitamin B-12, 2,500 mcg Lo Place 1 tablet under the tongue.       denosumab (PROLIA) 60 mg/mL Syrg Inject 60 mg  into the skin.      efinaconazole (JUBLIA) 10 % Kike Apply 1 Act topically once daily. 8 mL 3    multivitamin with minerals tablet Take 1 tablet by mouth once daily at 6am.      triamcinolone acetonide 0.1% (KENALOG) 0.1 % ointment Apply a small amount to affected area twice a day x 2 weeks as needed for flares 30 g 4    valACYclovir (VALTREX) 500 MG tablet Take 1 tablet by mouth twice a day as needed for 3 days per flare 30 tablet 0    vitamin D 1000 units Tab Take 185 mg by mouth once daily.       No current facility-administered medications on file prior to visit.       Allergies:  Review of patient's allergies indicates:   Allergen Reactions    Statins-hmg-coa reductase inhibitors Anaphylaxis     Myalgias    Codeine sulfate Itching    Hydrochlorothiazide Other (See Comments)     Adverse Reaction    Lisinopril Swelling and Edema     Facial Swelling       Review of Systems   Constitutional:  Negative for fever.   Respiratory:  Negative for shortness of breath.    Cardiovascular:  Negative for chest pain and leg swelling.   Gastrointestinal:  Negative for abdominal pain.   Genitourinary:  Negative for dysuria.   Neurological:  Negative for dizziness and headaches.   Psychiatric/Behavioral:  The patient is nervous/anxious and has insomnia.        Exam:  Vitals:    08/15/23 1133   BP: 130/68   Pulse: 70   Resp: 20   Temp: 96.7 °F (35.9 °C)     Weight: 49 kg (108 lb 0.4 oz)   Body mass index is 16.92 kg/m².      Physical Exam  Vitals reviewed.   Constitutional:       General: She is not in acute distress.     Appearance: She is well-developed. She is not ill-appearing.   Eyes:      General: No scleral icterus.  Cardiovascular:      Rate and Rhythm: Normal rate and regular rhythm.      Heart sounds: Normal heart sounds.   Pulmonary:      Effort: Pulmonary effort is normal. No respiratory distress.      Breath sounds: Normal breath sounds.   Abdominal:      General: Bowel sounds are normal.      Palpations: Abdomen is  soft.   Skin:     General: Skin is warm and dry.   Neurological:      Mental Status: She is alert and oriented to person, place, and time.   Psychiatric:         Behavior: Behavior normal.         Assessment:  The primary encounter diagnosis was Essential hypertension. Diagnoses of Hyperlipidemia LDL goal <70, Atherosclerosis of abdominal aorta, Anxiety, Chronic insomnia, Age-related osteoporosis without current pathological fracture, and Encounter for screening mammogram for malignant neoplasm of breast were also pertinent to this visit.    Plan:  Essential hypertension  -     continue amlodipine and atenolol    Hyperlipidemia LDL goal <70  -     unable to tolerate statins and ezetimibe  -     monitor    Atherosclerosis of abdominal aorta  -     denies symptoms    Anxiety  -     refill LORazepam (ATIVAN) 0.5 MG tablet; Take 3 tablets (1.5 mg total) by mouth every evening.  Dispense: 90 tablet; Refill: 5    Chronic insomnia  -     refill LORazepam (ATIVAN) 0.5 MG tablet; Take 3 tablets (1.5 mg total) by mouth every evening.  Dispense: 90 tablet; Refill: 5    Age-related osteoporosis without current pathological fracture  -     continue vitamin D  -     continue Prolia  -     f/u with rheumatology    Encounter for screening mammogram for malignant neoplasm of breast  -     Mammo Digital Screening Bilat w/ Olvin; Future    Follow up in about 6 months (around 2/15/2024).

## 2023-08-18 ENCOUNTER — HOSPITAL ENCOUNTER (OUTPATIENT)
Dept: RADIOLOGY | Facility: HOSPITAL | Age: 73
Discharge: HOME | End: 2023-08-18
Attending: NURSE PRACTITIONER
Payer: MEDICARE

## 2023-08-18 DIAGNOSIS — N28.1 RENAL CYST: ICD-10-CM

## 2023-08-18 DIAGNOSIS — N20.0 RENAL STONES: ICD-10-CM

## 2023-08-18 PROCEDURE — 76770 US RETROPERITONEAL COMPLETE: ICD-10-PCS | Mod: 26,,, | Performed by: RADIOLOGY

## 2023-08-18 PROCEDURE — 76770 US EXAM ABDO BACK WALL COMP: CPT | Mod: 26,,, | Performed by: RADIOLOGY

## 2023-08-18 PROCEDURE — 76770 US EXAM ABDO BACK WALL COMP: CPT | Mod: TC

## 2023-08-21 ENCOUNTER — PATIENT MESSAGE (OUTPATIENT)
Dept: UROLOGY | Facility: CLINIC | Age: 73
End: 2023-08-21
Payer: MEDICARE

## 2023-08-21 DIAGNOSIS — N20.0 RENAL STONES: Primary | ICD-10-CM

## 2023-08-21 DIAGNOSIS — N28.1 BILATERAL RENAL CYSTS: ICD-10-CM

## 2023-08-23 ENCOUNTER — PATIENT MESSAGE (OUTPATIENT)
Dept: INTERNAL MEDICINE | Facility: CLINIC | Age: 73
End: 2023-08-23
Payer: MEDICARE

## 2023-08-23 ENCOUNTER — TELEPHONE (OUTPATIENT)
Dept: UROLOGY | Facility: CLINIC | Age: 73
End: 2023-08-23
Payer: MEDICARE

## 2023-08-23 NOTE — TELEPHONE ENCOUNTER
----- Message from Melyssa Cee NP sent at 8/21/2023  7:48 AM CDT -----  Please call patient and inform her that renal ultrasound indicated bilateral, nonobstructing renal stones.  Largest measuring 4 mm.  Also indicates bilateral simple, noncancerous, renal cyst.  This is a reassuring renal ultrasound.  If she is asymptomatic secondary to renal stones and cysts, we will repeat renal ultrasound in 12 months with a return visit.  Please schedule ultrasound and visit.

## 2023-09-20 ENCOUNTER — PATIENT MESSAGE (OUTPATIENT)
Dept: GASTROENTEROLOGY | Facility: CLINIC | Age: 73
End: 2023-09-20
Payer: MEDICARE

## 2023-09-21 ENCOUNTER — LAB VISIT (OUTPATIENT)
Dept: LAB | Facility: HOSPITAL | Age: 73
End: 2023-09-21
Attending: NURSE PRACTITIONER
Payer: MEDICARE

## 2023-09-21 DIAGNOSIS — K50.00 CROHN'S DISEASE OF SMALL INTESTINE WITHOUT COMPLICATION: ICD-10-CM

## 2023-09-21 LAB
ALBUMIN SERPL BCP-MCNC: 4.1 G/DL (ref 3.5–5.2)
ALP SERPL-CCNC: 32 U/L (ref 55–135)
ALT SERPL W/O P-5'-P-CCNC: 16 U/L (ref 10–44)
ANION GAP SERPL CALC-SCNC: 8 MMOL/L (ref 8–16)
AST SERPL-CCNC: 24 U/L (ref 10–40)
BILIRUB SERPL-MCNC: 0.5 MG/DL (ref 0.1–1)
BUN SERPL-MCNC: 20 MG/DL (ref 8–23)
CALCIUM SERPL-MCNC: 9.8 MG/DL (ref 8.7–10.5)
CHLORIDE SERPL-SCNC: 95 MMOL/L (ref 95–110)
CO2 SERPL-SCNC: 26 MMOL/L (ref 23–29)
CREAT SERPL-MCNC: 0.8 MG/DL (ref 0.5–1.4)
EST. GFR  (NO RACE VARIABLE): >60 ML/MIN/1.73 M^2
GLUCOSE SERPL-MCNC: 93 MG/DL (ref 70–110)
POTASSIUM SERPL-SCNC: 4.5 MMOL/L (ref 3.5–5.1)
PROT SERPL-MCNC: 7.2 G/DL (ref 6–8.4)
SODIUM SERPL-SCNC: 129 MMOL/L (ref 136–145)

## 2023-09-21 PROCEDURE — 36415 COLL VENOUS BLD VENIPUNCTURE: CPT | Performed by: NURSE PRACTITIONER

## 2023-09-21 PROCEDURE — 85025 COMPLETE CBC W/AUTO DIFF WBC: CPT | Performed by: NURSE PRACTITIONER

## 2023-09-21 PROCEDURE — 80053 COMPREHEN METABOLIC PANEL: CPT | Performed by: NURSE PRACTITIONER

## 2023-09-22 LAB
BASOPHILS # BLD AUTO: 0.03 K/UL (ref 0–0.2)
BASOPHILS NFR BLD: 0.5 % (ref 0–1.9)
DIFFERENTIAL METHOD: ABNORMAL
EOSINOPHIL # BLD AUTO: 0.1 K/UL (ref 0–0.5)
EOSINOPHIL NFR BLD: 1.1 % (ref 0–8)
ERYTHROCYTE [DISTWIDTH] IN BLOOD BY AUTOMATED COUNT: 12.7 % (ref 11.5–14.5)
HCT VFR BLD AUTO: 36.6 % (ref 37–48.5)
HGB BLD-MCNC: 12.5 G/DL (ref 12–16)
IMM GRANULOCYTES # BLD AUTO: 0.01 K/UL (ref 0–0.04)
IMM GRANULOCYTES NFR BLD AUTO: 0.2 % (ref 0–0.5)
LYMPHOCYTES # BLD AUTO: 1.7 K/UL (ref 1–4.8)
LYMPHOCYTES NFR BLD: 31 % (ref 18–48)
MCH RBC QN AUTO: 34.3 PG (ref 27–31)
MCHC RBC AUTO-ENTMCNC: 34.2 G/DL (ref 32–36)
MCV RBC AUTO: 101 FL (ref 82–98)
MONOCYTES # BLD AUTO: 0.7 K/UL (ref 0.3–1)
MONOCYTES NFR BLD: 12.2 % (ref 4–15)
NEUTROPHILS # BLD AUTO: 3 K/UL (ref 1.8–7.7)
NEUTROPHILS NFR BLD: 55 % (ref 38–73)
NRBC BLD-RTO: 0 /100 WBC
PLATELET # BLD AUTO: 236 K/UL (ref 150–450)
PMV BLD AUTO: 9.4 FL (ref 9.2–12.9)
RBC # BLD AUTO: 3.64 M/UL (ref 4–5.4)
WBC # BLD AUTO: 5.48 K/UL (ref 3.9–12.7)

## 2023-09-25 ENCOUNTER — PATIENT MESSAGE (OUTPATIENT)
Dept: INTERNAL MEDICINE | Facility: CLINIC | Age: 73
End: 2023-09-25
Payer: MEDICARE

## 2023-10-07 ENCOUNTER — PATIENT MESSAGE (OUTPATIENT)
Dept: ADMINISTRATIVE | Facility: CLINIC | Age: 73
End: 2023-10-07
Payer: MEDICARE

## 2023-10-26 ENCOUNTER — PATIENT MESSAGE (OUTPATIENT)
Dept: GASTROENTEROLOGY | Facility: CLINIC | Age: 73
End: 2023-10-26
Payer: MEDICARE

## 2023-10-31 DIAGNOSIS — I10 ESSENTIAL HYPERTENSION: ICD-10-CM

## 2023-10-31 RX ORDER — AMLODIPINE BESYLATE 10 MG/1
TABLET ORAL
Qty: 90 TABLET | Refills: 1 | Status: SHIPPED | OUTPATIENT
Start: 2023-10-31 | End: 2024-02-05 | Stop reason: SDUPTHER

## 2023-11-15 ENCOUNTER — OFFICE VISIT (OUTPATIENT)
Dept: OPHTHALMOLOGY | Facility: CLINIC | Age: 73
End: 2023-11-15
Payer: MEDICARE

## 2023-11-15 DIAGNOSIS — H02.831 DERMATOCHALASIS OF BOTH UPPER EYELIDS: ICD-10-CM

## 2023-11-15 DIAGNOSIS — H02.834 DERMATOCHALASIS OF BOTH UPPER EYELIDS: ICD-10-CM

## 2023-11-15 DIAGNOSIS — I10 ESSENTIAL HYPERTENSION: ICD-10-CM

## 2023-11-15 DIAGNOSIS — H35.362 DRUSEN OF LEFT MACULA: ICD-10-CM

## 2023-11-15 DIAGNOSIS — H25.13 CATARACT, NUCLEAR SCLEROTIC SENILE, BILATERAL: Primary | ICD-10-CM

## 2023-11-15 PROCEDURE — 1159F MED LIST DOCD IN RCRD: CPT | Mod: CPTII,S$GLB,, | Performed by: OPTOMETRIST

## 2023-11-15 PROCEDURE — 92014 PR EYE EXAM, EST PATIENT,COMPREHESV: ICD-10-PCS | Mod: S$GLB,,, | Performed by: OPTOMETRIST

## 2023-11-15 PROCEDURE — 99999 PR PBB SHADOW E&M-EST. PATIENT-LVL III: CPT | Mod: PBBFAC,,, | Performed by: OPTOMETRIST

## 2023-11-15 PROCEDURE — 99999 PR PBB SHADOW E&M-EST. PATIENT-LVL III: ICD-10-PCS | Mod: PBBFAC,,, | Performed by: OPTOMETRIST

## 2023-11-15 PROCEDURE — 1159F PR MEDICATION LIST DOCUMENTED IN MEDICAL RECORD: ICD-10-PCS | Mod: CPTII,S$GLB,, | Performed by: OPTOMETRIST

## 2023-11-15 PROCEDURE — 92015 PR REFRACTION: ICD-10-PCS | Mod: S$GLB,,, | Performed by: OPTOMETRIST

## 2023-11-15 PROCEDURE — 92015 DETERMINE REFRACTIVE STATE: CPT | Mod: S$GLB,,, | Performed by: OPTOMETRIST

## 2023-11-15 PROCEDURE — 92014 COMPRE OPH EXAM EST PT 1/>: CPT | Mod: S$GLB,,, | Performed by: OPTOMETRIST

## 2023-11-15 NOTE — PROGRESS NOTES
HPI    Patient states no visual complaints   Little trouble with dry eyes   Using Thera tears drops and ocusoft wipes prn   Wear PAL glasses   Last edited by Traci Tang on 11/15/2023  2:39 PM.            Assessment /Plan     For exam results, see Encounter Report.    Cataract, nuclear sclerotic senile, bilateral    Essential hypertension    Drusen of left macula    Dermatochalasis of both upper eyelids      Moderate cataracts OU, not surgical  Follow annually.    No HTN Retinopathy    Unchanged drusen OS    Worsening dermatochalasis OU, will like to see new surgeon when she starts.    Dispense Final Rx for glasses.  RTC 1 year  Discussed above and answered questions.

## 2023-11-16 DIAGNOSIS — K50.00 CROHN'S DISEASE OF SMALL INTESTINE WITHOUT COMPLICATION: ICD-10-CM

## 2023-11-16 RX ORDER — ADALIMUMAB 40MG/0.4ML
40 KIT SUBCUTANEOUS
Qty: 6 PEN | Refills: 3 | Status: SHIPPED | OUTPATIENT
Start: 2023-11-16 | End: 2024-11-15

## 2023-11-20 ENCOUNTER — OFFICE VISIT (OUTPATIENT)
Dept: GASTROENTEROLOGY | Facility: CLINIC | Age: 73
End: 2023-11-20
Payer: MEDICARE

## 2023-11-20 VITALS
OXYGEN SATURATION: 98 % | WEIGHT: 106.69 LBS | BODY MASS INDEX: 16.74 KG/M2 | DIASTOLIC BLOOD PRESSURE: 86 MMHG | SYSTOLIC BLOOD PRESSURE: 136 MMHG | HEART RATE: 74 BPM | HEIGHT: 67 IN

## 2023-11-20 DIAGNOSIS — D84.9 IMMUNOSUPPRESSED STATUS: ICD-10-CM

## 2023-11-20 DIAGNOSIS — K50.00 CROHN'S DISEASE OF SMALL INTESTINE WITHOUT COMPLICATION: Primary | ICD-10-CM

## 2023-11-20 PROCEDURE — 99999 PR PBB SHADOW E&M-EST. PATIENT-LVL IV: CPT | Mod: PBBFAC,,, | Performed by: INTERNAL MEDICINE

## 2023-11-20 PROCEDURE — 3075F PR MOST RECENT SYSTOLIC BLOOD PRESS GE 130-139MM HG: ICD-10-PCS | Mod: CPTII,S$GLB,, | Performed by: INTERNAL MEDICINE

## 2023-11-20 PROCEDURE — 99499 UNLISTED E&M SERVICE: CPT | Mod: S$GLB,,, | Performed by: INTERNAL MEDICINE

## 2023-11-20 PROCEDURE — 3008F PR BODY MASS INDEX (BMI) DOCUMENTED: ICD-10-PCS | Mod: CPTII,S$GLB,, | Performed by: INTERNAL MEDICINE

## 2023-11-20 PROCEDURE — 99213 OFFICE O/P EST LOW 20 MIN: CPT | Mod: S$GLB,,, | Performed by: INTERNAL MEDICINE

## 2023-11-20 PROCEDURE — 3075F SYST BP GE 130 - 139MM HG: CPT | Mod: CPTII,S$GLB,, | Performed by: INTERNAL MEDICINE

## 2023-11-20 PROCEDURE — 1159F PR MEDICATION LIST DOCUMENTED IN MEDICAL RECORD: ICD-10-PCS | Mod: CPTII,S$GLB,, | Performed by: INTERNAL MEDICINE

## 2023-11-20 PROCEDURE — 3079F DIAST BP 80-89 MM HG: CPT | Mod: CPTII,S$GLB,, | Performed by: INTERNAL MEDICINE

## 2023-11-20 PROCEDURE — 3079F PR MOST RECENT DIASTOLIC BLOOD PRESSURE 80-89 MM HG: ICD-10-PCS | Mod: CPTII,S$GLB,, | Performed by: INTERNAL MEDICINE

## 2023-11-20 PROCEDURE — 1159F MED LIST DOCD IN RCRD: CPT | Mod: CPTII,S$GLB,, | Performed by: INTERNAL MEDICINE

## 2023-11-20 PROCEDURE — 3008F BODY MASS INDEX DOCD: CPT | Mod: CPTII,S$GLB,, | Performed by: INTERNAL MEDICINE

## 2023-11-20 PROCEDURE — 99213 PR OFFICE/OUTPT VISIT, EST, LEVL III, 20-29 MIN: ICD-10-PCS | Mod: S$GLB,,, | Performed by: INTERNAL MEDICINE

## 2023-11-20 PROCEDURE — 99999 PR PBB SHADOW E&M-EST. PATIENT-LVL IV: ICD-10-PCS | Mod: PBBFAC,,, | Performed by: INTERNAL MEDICINE

## 2023-11-20 NOTE — PROGRESS NOTES
Clinic Consult:  [facility] Consultation Note    Reason for Consult:  The primary encounter diagnosis was Crohn's disease of small intestine without complication. A diagnosis of Immunosuppressed status was also pertinent to this visit.    PCP: Christelle Lawler       HPI:  This is a 73 y.o. female here for follow up.     IBD History  - Type: crohn's disease  - Disease Location: small bowel only  - Phenotype: stricture   - Diagnosed:   - Surgeries related to IBD: ileal resection in ,  (due to stricture)   - Extra-intestinal Manifestations: mild eczema on leg that resolved with cerave     Current Medications  Humira 40 mg every 14 days, started Aug 2017     Past Medications  Apriso 1.5 grams daily  Entocort x 3 months   MTX- side effects      Drug and Antibody Levels   18 Humira level 12, no AB formation   20 Humira drug level 10, no AB formation      Endoscopy Reports   - anastomotic stricture, no active disease;   - Rutgeert's i4 (disease in ileum and at anastomosis)    - normal colon, stricture about 5 cm past anastomosis    - anastomosis with stricture, no active disease       Pertinent Imagine Reports:  MRE (2021) no active disease      Interval History    Says she has been feeling  much better since the colonoscopy.  She says her bloat has resolved.  She is taking a probiotic at night.  Says she walks 3 miles several times per week.  Is active, not having issues with fatigue.      Preventative Medicine     Immunizations  - Influenza: 10/1/21  - Pnemococcal: PCV-13: 12/10/17; PSV-23 19  - Hepatitis A/B: positive Hepatitis B core total Ab and surface Ab  - Herpes Zoster: NA  - COVID-19: 21, 21     Cancer Prevention   - Date of last pap smear: NA  - Date of last skin cancer screenin months ago, she has hx of dysplastic skin lesions. Goes every 6 months   - Date of last surveillance colonoscopy: up to date.      Bone Health  - Vitamin D level:  normal   - Date of last DEXA:   2020: osteoporosis  2022: osteoporosis (improved, on prolia with rheum)     Therapy Related Testing  - Date of last TB testin20- quant gold negative   - TPMT status: (2018) normal      Miscellaneous  - Vitamin B 12 level (if ileal disease or resection): high  - Smoking status: no  - NSAID use: no  - History of C. Diff: no  - Family planning: NA         ROS:  CONSTITUTIONAL: Denies weight change,  fatigue, fevers, chills, night sweats.  EYES: No changes in vision.   ENT: No oral lesions or sore throat.  HEMATOLOGICAL/Lymph: Denies bleeding tendency, bruising tendency. No swellings or enlarged lymph nodes.  CARDIOVASCULAR: Denies chest pain, shortness of breath, orthopnea and edema.  RESPIRATORY: Denies cough, hemoptysis, dyspnea, and wheezing.  GI: See HPI.  : Denies dysuria and hematuria  MUSCULOSKELETAL: Denies joint pain or swelling, back pain and muscle pain.  SKIN: Denies rashes.  NEUROLOGIC: Denies headaches, seizures and numbness.  PSYCHIATRIC: Denies depression or anxiety.  ENDOCRINE: Denies heat or cold intolerance and excessive thirst or urination.    Medical History:   Past Medical History:   Diagnosis Date    Anxiety     Crohn's disease     Depression     Genital herpes     Hepatitis C infection     Hypertension     Insomnia     Mesenteric artery stenosis     Dr. Checo Reed--vascular surgery    Osteoporosis     SCC (squamous cell carcinoma), hand, right 2019    Dr. Malaika Mack--dermatology    TIA (transient ischemic attack)        Surgical History:  Past Surgical History:   Procedure Laterality Date    APPENDECTOMY      COLONOSCOPY N/A 2017    Procedure: COLONOSCOPY;  Surgeon: Jose Luis Leonard MD;  Location: [facility] ENDO;  Service: Endoscopy;  Laterality: N/A;    COLONOSCOPY N/A 2018    Procedure: COLONOSCOPY;  Surgeon: Guillaume Whitman MD;  Location: [facility] ENDO;  Service: Endoscopy;  Laterality: N/A;    COLONOSCOPY N/A 2019     Procedure: COLONOSCOPY;  Surgeon: Lili Ellis MD;  Location: [facility] ENDO;  Service: Endoscopy;  Laterality: N/A;    COLONOSCOPY N/A 2020    Procedure: COLONOSCOPY;  Surgeon: Lili Ellis MD;  Location: [facility] ENDO;  Service: Endoscopy;  Laterality: N/A;    COLONOSCOPY N/A 2023    Procedure: COLONOSCOPY;  Surgeon: Lili Ellis MD;  Location: [facility] ENDO;  Service: Endoscopy;  Laterality: N/A;    ESOPHAGOGASTRODUODENOSCOPY N/A 2023    Procedure: EGD (ESOPHAGOGASTRODUODENOSCOPY);  Surgeon: Lili Ellis MD;  Location: [facility] ENDO;  Service: Endoscopy;  Laterality: N/A;    SMALL INTESTINE SURGERY         Family History:   Family History   Problem Relation Age of Onset    Stroke Mother     Heart disease Mother     Cataracts Mother     Cancer Father         Lung    Heart disease Sister     Hypertension Brother     Glaucoma Neg Hx     Macular degeneration Neg Hx     Thyroid disease Neg Hx        Social History:   Social History     Tobacco Use    Smoking status: Former     Current packs/day: 0.00     Types: Cigarettes     Start date: 1985     Quit date: 2005     Years since quittin.8    Smokeless tobacco: Former    Tobacco comments:     Former Light Smoker less than 10 a day   Substance Use Topics    Alcohol use: Yes     Alcohol/week: 4.0 standard drinks of alcohol     Types: 4 Glasses of wine per week     Comment: occ    Drug use: No       Allergies: Reviewed    Home Medications:   Medication List with Changes/Refills   Current Medications    ADALIMUMAB (HUMIRA,CF, PEN) 40 MG/0.4 ML PNKT    Inject 0.4 mLs (40 mg total) into the skin every 14 (fourteen) days.    AMLODIPINE (NORVASC) 10 MG TABLET    Take 1 tablet by mouth once daily    ASPIRIN (ECOTRIN) 81 MG EC TABLET    Take 1 tablet (81 mg total) by mouth once daily.    ATENOLOL (TENORMIN) 25 MG TABLET    Take 1 tablet (25 mg total) by mouth once daily.    CALCIUM CARBONATE (OS-COOPER) 600 MG (1,500 MG) TAB    Take 1,200 mg by mouth once  "daily.     CYANOCOBALAMIN, VITAMIN B-12, 2,500 MCG LOZG    Place 1 tablet under the tongue.     DENOSUMAB (PROLIA) 60 MG/ML SYRG    Inject 60 mg into the skin.    EFINACONAZOLE (JUBLIA) 10 % ESCOBAR    Apply 1 Act topically once daily.    HYDROCODONE-ACETAMINOPHEN (NORCO) 7.5-325 MG PER TABLET    Take 1 tablet by mouth every 12 (twelve) hours as needed for pain.    LEVOCETIRIZINE (XYZAL) 5 MG TABLET    Take 1 tablet (5 mg total) by mouth every evening.    LORAZEPAM (ATIVAN) 0.5 MG TABLET    Take 3 tablets (1.5 mg total) by mouth every evening.    MULTIVITAMIN WITH MINERALS TABLET    Take 1 tablet by mouth once daily at 6am.    MUPIROCIN (BACTROBAN) 2 % OINTMENT    Apply a small amount to affected area twice a day x 10 days    TRIAMCINOLONE ACETONIDE 0.1% (KENALOG) 0.1 % OINTMENT    Apply a small amount to affected area twice a day x 2 weeks as needed for flares    VALACYCLOVIR (VALTREX) 500 MG TABLET    Take 1 tablet by mouth twice a day as needed for 3 days per flare    VITAMIN D 1000 UNITS TAB    Take 185 mg by mouth once daily.         Physical Exam:  Vital Signs:  /86 (BP Location: Left arm, Patient Position: Sitting, BP Method: Small (Manual))   Pulse 74   Ht 5' 7" (1.702 m)   Wt 48.4 kg (106 lb 11.2 oz)   SpO2 98%   BMI 16.71 kg/m²   Body mass index is 16.71 kg/m².      GENERAL: No acute distress, A&Ox3  EYES: Anicteric, no pallor noted.  ENT: OP clear  NECK: Supple, no masses, no thyromegally.  CHEST: Equal breath sounds bilaterally, no wheezing.  CARDIOVASCULAR: Regular rate and rhythm. Murmurs, rubs and gallops absent.  ABDOMEN: soft, non-tender, non-distended, normal bowel sounds, no hepatosplenomegaly   EXTREMITIES: No clubbing, cyanosis or edema.  SKIN: Without lesion or erythema.  LYMPH: No cervical, axillary lymphadenopathy palpable.   NEUROLOGICAL: Grossly normal, no asterixis present.    Labs: Pertinent labs reviewed.    Assessment and Plan:  Crohn's disease of small intestine without " complication  In remission on humira.  Doing well clinically.  Has had issues with constipation and bloat but that improved after colonoscopy.     -     Quantiferon Gold TB; Future; Expected date: 11/20/2023  -     CBC Auto Differential; Future; Expected date: 11/20/2023  -     Comprehensive Metabolic Panel; Future; Expected date: 11/20/2023    Immunosuppressed status  Monitor with standing labs q 12 weeks       Health Maintenance Discussed  - Vitamin D supplement: on calcium and vitamin D (followed by Dr. Brown for osteoporosis   - Vaccines due: none   - DEXA scan: 2022, osteoporosis, followed by rheum and on prolia  - Skin cancer screening with dermatologist: up to date (sees outside derm)  - Sun exposure precautions: Stay in the shade, especially during midday. Wear clothing to protect exposed skin. Wear a hat with a wide brim to shade the face, head, ears, and neck. Wear sunglasses to protect your eyes. Use sunscreen with at least SPF 30 before you go outside.   - Cervical cancer screening with GYN: n/a   - Screening colonoscopy: no screening, small bowel only   - Eye exam: recommend yearly        Follow up in about 6 months (around 5/20/2024) for for follow up with Sandra Hunt NP .          Thank you so much for allowing me to participate in the care of Manuela Ellis MD

## 2023-11-21 ENCOUNTER — LAB VISIT (OUTPATIENT)
Dept: LAB | Facility: HOSPITAL | Age: 73
End: 2023-11-21
Attending: INTERNAL MEDICINE
Payer: MEDICARE

## 2023-11-21 DIAGNOSIS — K50.00 CROHN'S DISEASE OF SMALL INTESTINE WITHOUT COMPLICATION: ICD-10-CM

## 2023-11-21 LAB
ALBUMIN SERPL BCP-MCNC: 4.2 G/DL (ref 3.5–5.2)
ALP SERPL-CCNC: 35 U/L (ref 55–135)
ALT SERPL W/O P-5'-P-CCNC: 19 U/L (ref 10–44)
ANION GAP SERPL CALC-SCNC: 13 MMOL/L (ref 8–16)
AST SERPL-CCNC: 27 U/L (ref 10–40)
BASOPHILS # BLD AUTO: 0.03 K/UL (ref 0–0.2)
BASOPHILS NFR BLD: 0.5 % (ref 0–1.9)
BILIRUB SERPL-MCNC: 0.5 MG/DL (ref 0.1–1)
BUN SERPL-MCNC: 18 MG/DL (ref 8–23)
CALCIUM SERPL-MCNC: 9.6 MG/DL (ref 8.7–10.5)
CHLORIDE SERPL-SCNC: 96 MMOL/L (ref 95–110)
CO2 SERPL-SCNC: 21 MMOL/L (ref 23–29)
CREAT SERPL-MCNC: 0.8 MG/DL (ref 0.5–1.4)
DIFFERENTIAL METHOD: ABNORMAL
EOSINOPHIL # BLD AUTO: 0 K/UL (ref 0–0.5)
EOSINOPHIL NFR BLD: 0.5 % (ref 0–8)
ERYTHROCYTE [DISTWIDTH] IN BLOOD BY AUTOMATED COUNT: 12.4 % (ref 11.5–14.5)
EST. GFR  (NO RACE VARIABLE): >60 ML/MIN/1.73 M^2
GLUCOSE SERPL-MCNC: 100 MG/DL (ref 70–110)
HCT VFR BLD AUTO: 37.8 % (ref 37–48.5)
HGB BLD-MCNC: 13.3 G/DL (ref 12–16)
IMM GRANULOCYTES # BLD AUTO: 0.01 K/UL (ref 0–0.04)
IMM GRANULOCYTES NFR BLD AUTO: 0.2 % (ref 0–0.5)
LYMPHOCYTES # BLD AUTO: 1.4 K/UL (ref 1–4.8)
LYMPHOCYTES NFR BLD: 21.5 % (ref 18–48)
MCH RBC QN AUTO: 34.2 PG (ref 27–31)
MCHC RBC AUTO-ENTMCNC: 35.2 G/DL (ref 32–36)
MCV RBC AUTO: 97 FL (ref 82–98)
MONOCYTES # BLD AUTO: 0.6 K/UL (ref 0.3–1)
MONOCYTES NFR BLD: 9.9 % (ref 4–15)
NEUTROPHILS # BLD AUTO: 4.2 K/UL (ref 1.8–7.7)
NEUTROPHILS NFR BLD: 67.4 % (ref 38–73)
NRBC BLD-RTO: 0 /100 WBC
PLATELET # BLD AUTO: 255 K/UL (ref 150–450)
PMV BLD AUTO: 9.4 FL (ref 9.2–12.9)
POTASSIUM SERPL-SCNC: 4.2 MMOL/L (ref 3.5–5.1)
PROT SERPL-MCNC: 7.6 G/DL (ref 6–8.4)
RBC # BLD AUTO: 3.89 M/UL (ref 4–5.4)
SODIUM SERPL-SCNC: 130 MMOL/L (ref 136–145)
WBC # BLD AUTO: 6.27 K/UL (ref 3.9–12.7)

## 2023-11-21 PROCEDURE — 36415 COLL VENOUS BLD VENIPUNCTURE: CPT | Performed by: INTERNAL MEDICINE

## 2023-11-21 PROCEDURE — 85025 COMPLETE CBC W/AUTO DIFF WBC: CPT | Performed by: INTERNAL MEDICINE

## 2023-11-21 PROCEDURE — 80053 COMPREHEN METABOLIC PANEL: CPT | Performed by: INTERNAL MEDICINE

## 2023-11-21 PROCEDURE — 86480 TB TEST CELL IMMUN MEASURE: CPT | Performed by: INTERNAL MEDICINE

## 2023-11-24 LAB
GAMMA INTERFERON BACKGROUND BLD IA-ACNC: 0.06 IU/ML
M TB IFN-G CD4+ BCKGRND COR BLD-ACNC: -0 IU/ML
M TB IFN-G CD4+ BCKGRND COR BLD-ACNC: -0.01 IU/ML
MITOGEN IGNF BCKGRD COR BLD-ACNC: 9.94 IU/ML
TB GOLD PLUS: NEGATIVE

## 2023-11-30 ENCOUNTER — PATIENT MESSAGE (OUTPATIENT)
Dept: GASTROENTEROLOGY | Facility: CLINIC | Age: 73
End: 2023-11-30
Payer: MEDICARE

## 2023-12-12 ENCOUNTER — PATIENT MESSAGE (OUTPATIENT)
Dept: RHEUMATOLOGY | Facility: CLINIC | Age: 73
End: 2023-12-12
Payer: MEDICARE

## 2023-12-14 ENCOUNTER — PATIENT MESSAGE (OUTPATIENT)
Dept: OTOLARYNGOLOGY | Facility: CLINIC | Age: 73
End: 2023-12-14

## 2023-12-14 ENCOUNTER — OFFICE VISIT (OUTPATIENT)
Dept: OTOLARYNGOLOGY | Facility: CLINIC | Age: 73
End: 2023-12-14
Payer: MEDICARE

## 2023-12-14 VITALS — WEIGHT: 109.56 LBS | BODY MASS INDEX: 17.16 KG/M2

## 2023-12-14 DIAGNOSIS — R60.0 SALIVARY GLAND SWELLING: Primary | ICD-10-CM

## 2023-12-14 PROCEDURE — 3008F BODY MASS INDEX DOCD: CPT | Mod: CPTII,S$GLB,, | Performed by: PHYSICIAN ASSISTANT

## 2023-12-14 PROCEDURE — 3008F PR BODY MASS INDEX (BMI) DOCUMENTED: ICD-10-PCS | Mod: CPTII,S$GLB,, | Performed by: PHYSICIAN ASSISTANT

## 2023-12-14 PROCEDURE — 99214 OFFICE O/P EST MOD 30 MIN: CPT | Mod: S$GLB,,, | Performed by: PHYSICIAN ASSISTANT

## 2023-12-14 PROCEDURE — 1101F PT FALLS ASSESS-DOCD LE1/YR: CPT | Mod: CPTII,S$GLB,, | Performed by: PHYSICIAN ASSISTANT

## 2023-12-14 PROCEDURE — 99999 PR PBB SHADOW E&M-EST. PATIENT-LVL III: CPT | Mod: PBBFAC,,, | Performed by: PHYSICIAN ASSISTANT

## 2023-12-14 PROCEDURE — 1159F MED LIST DOCD IN RCRD: CPT | Mod: CPTII,S$GLB,, | Performed by: PHYSICIAN ASSISTANT

## 2023-12-14 PROCEDURE — 3288F PR FALLS RISK ASSESSMENT DOCUMENTED: ICD-10-PCS | Mod: CPTII,S$GLB,, | Performed by: PHYSICIAN ASSISTANT

## 2023-12-14 PROCEDURE — 3288F FALL RISK ASSESSMENT DOCD: CPT | Mod: CPTII,S$GLB,, | Performed by: PHYSICIAN ASSISTANT

## 2023-12-14 PROCEDURE — 1101F PR PT FALLS ASSESS DOC 0-1 FALLS W/OUT INJ PAST YR: ICD-10-PCS | Mod: CPTII,S$GLB,, | Performed by: PHYSICIAN ASSISTANT

## 2023-12-14 PROCEDURE — 1159F PR MEDICATION LIST DOCUMENTED IN MEDICAL RECORD: ICD-10-PCS | Mod: CPTII,S$GLB,, | Performed by: PHYSICIAN ASSISTANT

## 2023-12-14 PROCEDURE — 99214 PR OFFICE/OUTPT VISIT, EST, LEVL IV, 30-39 MIN: ICD-10-PCS | Mod: S$GLB,,, | Performed by: PHYSICIAN ASSISTANT

## 2023-12-14 PROCEDURE — 99999 PR PBB SHADOW E&M-EST. PATIENT-LVL III: ICD-10-PCS | Mod: PBBFAC,,, | Performed by: PHYSICIAN ASSISTANT

## 2023-12-14 NOTE — PROGRESS NOTES
Subjective     Patient ID: Manuela Reyes is a 73 y.o. female.    Chief Complaint: Sinus Problem and Neck Pain (Pt complains of neck pain and swelling that comes and goes, pt does complain of a little pain the neck today. Pt also states it hurts worse in the morning then at night )    Patient is a pleasant 73 year old female here to see me today for evaluation of fullness and pressure in right side of her neck, under the jaw.  States it's now going on second week; comes and goes and lasts for hours and then gradually resolves.  Mild soreness when she palpates that area and beneath the right ear.  No drainage from the ear or drainage in the mouth.  No oral sensitivity.  No fever.  She does not smoke.  She has not had any recent dental work or recent dehydration.  She denies sore throat or dysphagia.  She did have increased sneezing and nasal drainage several days prior to onset of right neck fullness.  Took Xyzal and used Flonase and her nasal symptoms improved.      Review of Systems   Constitutional:  Negative for fever.   HENT:  Positive for facial swelling (right neck, fullness and pressure, comes and goes), rhinorrhea and sneezing. Negative for nasal congestion, ear discharge, ear pain, mouth dryness, mouth sores, sore throat and trouble swallowing.    Respiratory:  Negative for choking.           Objective     Physical Exam  Constitutional:       General: She is not in acute distress.     Appearance: She is well-developed. She is not ill-appearing.   HENT:      Head: Normocephalic and atraumatic.      Jaw: No trismus, swelling or pain on movement.      Salivary Glands: Right salivary gland is tender.      Comments: Mild tenderness with palpation of right submandibular gland; ptotic; not enlarged; no overlying erythema     Right Ear: Tympanic membrane, ear canal and external ear normal. No middle ear effusion. Tympanic membrane is not injected or erythematous.      Left Ear: Tympanic membrane, ear canal  and external ear normal.  No middle ear effusion. Tympanic membrane is not injected or erythematous.      Nose: Nose normal. No mucosal edema, congestion or rhinorrhea.      Mouth/Throat:      Mouth: Mucous membranes are moist. Mucous membranes are not pale and not dry. No oral lesions.      Tongue: No lesions.      Pharynx: Uvula midline. No oropharyngeal exudate or posterior oropharyngeal erythema.   Eyes:      General: Lids are normal. No scleral icterus.     Extraocular Movements:      Right eye: Normal extraocular motion and no nystagmus.      Left eye: Normal extraocular motion and no nystagmus.      Conjunctiva/sclera: Conjunctivae normal.      Right eye: Right conjunctiva is not injected. No chemosis.     Left eye: Left conjunctiva is not injected. No chemosis.     Pupils: Pupils are equal, round, and reactive to light.   Neck:      Trachea: Trachea and phonation normal. No tracheal tenderness or tracheal deviation.   Pulmonary:      Effort: Pulmonary effort is normal. No respiratory distress.      Breath sounds: No stridor.   Lymphadenopathy:      Head:      Right side of head: No submental, submandibular, preauricular or posterior auricular adenopathy.      Left side of head: No submental, submandibular, preauricular or posterior auricular adenopathy.      Cervical: No cervical adenopathy.   Skin:     General: Skin is warm and dry.      Findings: No erythema or rash.   Neurological:      Mental Status: She is alert and oriented to person, place, and time.      Cranial Nerves: No cranial nerve deficit.   Psychiatric:         Behavior: Behavior normal. Behavior is cooperative.            Assessment and Plan     1. Salivary gland swelling  -     US Soft Tissue Head Neck Thyroid; Future; Expected date: 12/14/2023      I would recommend US Neck for further evaluation of area of concern (right submandibular region) and will contact her once I see those results with additional recommendations.  Discussed  differential includes sialoadenitis or sialolithiasis of the right submandibular gland.  We could consider trial of oral antibiotics, Augmentin but will wait for US results given that her exam today is unremarkable.  We also discussed conservative measures to help treat sialoadenitis, including massage, warm compresses, aggressive hydration, and oral sialogogues (lemon drops or peppermints).   Instructed her to call with any new or worsening symptoms.         No follow-ups on file.

## 2023-12-18 ENCOUNTER — PATIENT MESSAGE (OUTPATIENT)
Dept: OTOLARYNGOLOGY | Facility: CLINIC | Age: 73
End: 2023-12-18
Payer: MEDICARE

## 2023-12-20 ENCOUNTER — HOSPITAL ENCOUNTER (OUTPATIENT)
Dept: RADIOLOGY | Facility: HOSPITAL | Age: 73
Discharge: HOME | End: 2023-12-20
Attending: PHYSICIAN ASSISTANT
Payer: MEDICARE

## 2023-12-20 ENCOUNTER — PATIENT MESSAGE (OUTPATIENT)
Dept: OTOLARYNGOLOGY | Facility: CLINIC | Age: 73
End: 2023-12-20
Payer: MEDICARE

## 2023-12-20 DIAGNOSIS — R60.0 SALIVARY GLAND SWELLING: ICD-10-CM

## 2023-12-20 PROCEDURE — 76536 US EXAM OF HEAD AND NECK: CPT | Mod: 26,,, | Performed by: RADIOLOGY

## 2023-12-20 PROCEDURE — 76536 US EXAM OF HEAD AND NECK: CPT | Mod: TC

## 2023-12-20 PROCEDURE — 76536 US SOFT TISSUE HEAD NECK THYROID: ICD-10-PCS | Mod: 26,,, | Performed by: RADIOLOGY

## 2023-12-26 ENCOUNTER — PATIENT MESSAGE (OUTPATIENT)
Dept: GASTROENTEROLOGY | Facility: CLINIC | Age: 73
End: 2023-12-26
Payer: MEDICARE

## 2024-01-02 ENCOUNTER — PATIENT MESSAGE (OUTPATIENT)
Dept: RHEUMATOLOGY | Facility: CLINIC | Age: 74
End: 2024-01-02
Payer: MEDICARE

## 2024-01-02 ENCOUNTER — TELEPHONE (OUTPATIENT)
Dept: GASTROENTEROLOGY | Facility: CLINIC | Age: 74
End: 2024-01-02
Payer: MEDICARE

## 2024-01-02 RX ORDER — METRONIDAZOLE 500 MG/1
500 TABLET ORAL 3 TIMES DAILY
Qty: 30 TABLET | Refills: 0 | Status: SHIPPED | OUTPATIENT
Start: 2024-01-02 | End: 2024-01-12

## 2024-01-02 NOTE — TELEPHONE ENCOUNTER
"Contacted patient to discuss this with her. Patient will keep appointment with Dr. Brown as scheduled on Thursday and just reschedule her Prolia injection with blood work for when she is finished with her course of anti biotics.  All questions answered.     Nichelle Pham (Allye), [facility]  Rheumatology Department   "

## 2024-01-04 ENCOUNTER — OFFICE VISIT (OUTPATIENT)
Dept: RHEUMATOLOGY | Facility: CLINIC | Age: 74
End: 2024-01-04
Payer: MEDICARE

## 2024-01-04 VITALS
WEIGHT: 109.25 LBS | HEIGHT: 67 IN | HEART RATE: 66 BPM | SYSTOLIC BLOOD PRESSURE: 131 MMHG | DIASTOLIC BLOOD PRESSURE: 83 MMHG | BODY MASS INDEX: 17.15 KG/M2

## 2024-01-04 DIAGNOSIS — Z87.442 HISTORY OF NEPHROLITHIASIS: ICD-10-CM

## 2024-01-04 DIAGNOSIS — E55.9 VITAMIN D INSUFFICIENCY: ICD-10-CM

## 2024-01-04 DIAGNOSIS — Z51.81 MEDICATION MONITORING ENCOUNTER: ICD-10-CM

## 2024-01-04 DIAGNOSIS — M81.0 AGE-RELATED OSTEOPOROSIS WITHOUT CURRENT PATHOLOGICAL FRACTURE: Primary | ICD-10-CM

## 2024-01-04 DIAGNOSIS — K50.90 CROHN'S DISEASE WITHOUT COMPLICATION, UNSPECIFIED GASTROINTESTINAL TRACT LOCATION: ICD-10-CM

## 2024-01-04 PROCEDURE — 99999 PR PBB SHADOW E&M-EST. PATIENT-LVL III: CPT | Mod: PBBFAC,,, | Performed by: INTERNAL MEDICINE

## 2024-01-04 PROCEDURE — 99214 OFFICE O/P EST MOD 30 MIN: CPT | Mod: S$GLB,,, | Performed by: INTERNAL MEDICINE

## 2024-01-04 NOTE — PROGRESS NOTES
RHEUMATOLOGY OUTPATIENT CLINIC NOTE    1/4/2024    Attending Rheumatologist: John Brown  Primary Care Provider/Physician Requesting Consultation: Christelle Lawler DO   Chief Complaint/Reason For Consultation:  Osteoporosis      Subjective:     Manuela Reyes is a 74 y.o. White female with OP    No acute complaints. No fragility fracture since last visit.  Not currently planned for invasive dental procedures.      Review of Systems   Cardiovascular:         No hx of MI   Genitourinary:         Hx of nephrolithiasis   Musculoskeletal:  Negative for back pain, falls and joint pain.        No past fragility fractures.  No hx of gout.   Neurological:         No hx of CVA     Chronic comorbid conditions affecting medical decision making today:  Past Medical History:   Diagnosis Date    Anxiety     Crohn's disease     Depression     Genital herpes     Hepatitis C infection     Hypertension     Insomnia     Mesenteric artery stenosis     Dr. 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 Malaika Mack--dermatology    TIA (transient ischemic attack)      Past Surgical History:   Procedure Laterality Date    APPENDECTOMY      COLONOSCOPY N/A 05/19/2017    Procedure: COLONOSCOPY;  Surgeon: Jose Luis Leonard MD;  Location: [facility];  Service: Endoscopy;  Laterality: N/A;    COLONOSCOPY N/A 03/26/2018    Procedure: COLONOSCOPY;  Surgeon: Guillaume Whitman MD;  Location: [facility];  Service: Endoscopy;  Laterality: N/A;    COLONOSCOPY N/A 03/19/2019    Procedure: COLONOSCOPY;  Surgeon: Lili Ellis MD;  Location: [facility];  Service: Endoscopy;  Laterality: N/A;    COLONOSCOPY N/A 09/24/2020    Procedure: COLONOSCOPY;  Surgeon: Lili Ellis MD;  Location: [facility];  Service: Endoscopy;  Laterality: N/A;    COLONOSCOPY N/A 7/20/2023    Procedure: COLONOSCOPY;  Surgeon: Lili Ellis MD;  Location: [facility];  Service: Endoscopy;   Laterality: N/A;    ESOPHAGOGASTRODUODENOSCOPY N/A 2023    Procedure: EGD (ESOPHAGOGASTRODUODENOSCOPY);  Surgeon: Lili Ellis MD;  Location: [facility];  Service: Endoscopy;  Laterality: N/A;    SMALL INTESTINE SURGERY       Family History   Problem Relation Age of Onset    Stroke Mother     Heart disease Mother     Cataracts Mother     Cancer Father         Lung    Heart disease Sister     Hypertension Brother     Glaucoma Neg Hx     Macular degeneration Neg Hx     Thyroid disease Neg Hx      Social History     Tobacco Use   Smoking Status Former    Current packs/day: 0.00    Types: Cigarettes    Start date: 1985    Quit date: 2005    Years since quittin.0   Smokeless Tobacco Former   Tobacco Comments    Former Light Smoker less than 10 a day       Current Outpatient Medications:     adalimumab (HUMIRA,CF, PEN) 40 mg/0.4 mL PnKt, Inject 0.4 mLs (40 mg total) into the skin every 14 (fourteen) days., Disp: 6 pen , Rfl: 3    amLODIPine (NORVASC) 10 MG tablet, Take 1 tablet by mouth once daily, Disp: 90 tablet, Rfl: 1    aspirin (ECOTRIN) 81 MG EC tablet, Take 1 tablet (81 mg total) by mouth once daily., Disp: , Rfl:     atenoloL (TENORMIN) 25 MG tablet, Take 1 tablet (25 mg total) by mouth once daily., Disp: 30 tablet, Rfl: 11    calcium carbonate (OS-COOPER) 600 mg (1,500 mg) Tab, Take 1,200 mg by mouth once daily. , Disp: , Rfl:     cyanocobalamin, vitamin B-12, 2,500 mcg Lozg, Place 1 tablet under the tongue. , Disp: , Rfl:     denosumab (PROLIA) 60 mg/mL Syrg, Inject 60 mg into the skin., Disp: , Rfl:     efinaconazole (JUBLIA) 10 % Kike, Apply 1 Act topically once daily., Disp: 8 mL, Rfl: 3    HYDROcodone-acetaminophen (NORCO) 7.5-325 mg per tablet, Take 1 tablet by mouth every 12 (twelve) hours as needed for pain., Disp: 5 tablet, Rfl: 0    levocetirizine (XYZAL) 5 MG tablet, Take 1 tablet (5 mg total) by mouth every evening., Disp: 90 tablet, Rfl: 3    LORazepam (ATIVAN) 0.5 MG tablet, Take 3  tablets (1.5 mg total) by mouth every evening., Disp: 90 tablet, Rfl: 5    metroNIDAZOLE (FLAGYL) 500 MG tablet, Take 1 tablet (500 mg total) by mouth 3 (three) times daily. for 10 days, Disp: 30 tablet, Rfl: 0    multivitamin with minerals tablet, Take 1 tablet by mouth once daily at 6am., Disp: , Rfl:     mupirocin (BACTROBAN) 2 % ointment, Apply a small amount to affected area twice a day x 10 days, Disp: 22 g, Rfl: 0    rifAXIMin (XIFAXAN) 550 mg Tab, Take 1 tablet (550 mg total) by mouth 3 (three) times daily. for 14 days, Disp: 42 tablet, Rfl: 0    triamcinolone acetonide 0.1% (KENALOG) 0.1 % ointment, Apply a small amount to affected area twice a day x 2 weeks as needed for flares, Disp: 30 g, Rfl: 4    valACYclovir (VALTREX) 500 MG tablet, Take 1 tablet by mouth twice a day as needed for 3 days per flare, Disp: 30 tablet, Rfl: 0    vitamin D 1000 units Tab, Take 185 mg by mouth once daily., Disp: , Rfl:      Objective:     Vitals:    01/04/24 1224   BP: 131/83   Pulse: 66     Physical Exam   Eyes: Conjunctivae are normal.   Pulmonary/Chest: Effort normal. No respiratory distress.   Musculoskeletal:         General: No swelling or tenderness. Normal range of motion.   Neurological: She displays no weakness.   Skin: No rash noted.       Reviewed available old and all outside pertinent medical records available.    All lab results personally reviewed and interpreted by me.       ASSESSMENT      Encounter Diagnoses   Name Primary?    Age-related osteoporosis without current pathological fracture Yes    History of nephrolithiasis     Medication monitoring encounter     Vitamin D insufficiency     Crohn's disease without complication, unspecified gastrointestinal tract location       PLAN     - tolerating Prolia w/o side effects.  No fractures since last encounter.  - kidney function stable.  No hypocalcemia.  Vit D last measured WNR.    - no vertebral compression deformities reported.  Last DXA12/ 2022 w/  improved osteoporotic T scores  - Plan to c/w Prolia q6 m.  CMP within 2 weeks of injections.  Screen for vit D insuff.  C/w Ca/vit D supp.   - repeat DXA close to f/u    John Brown M.D.

## 2024-01-14 ENCOUNTER — PATIENT MESSAGE (OUTPATIENT)
Dept: RHEUMATOLOGY | Facility: CLINIC | Age: 74
End: 2024-01-14
Payer: MEDICARE

## 2024-01-16 NOTE — TELEPHONE ENCOUNTER
"Dr. Brown, how long after Prolia injections do patients need to wait to get Shingrix injections?    Nichelle Pham (Allye), [facility]  Rheumatology Department   "

## 2024-01-16 NOTE — TELEPHONE ENCOUNTER
John Brown MD [facility] Staff9 minutes ago (3:13 PM)       Suggest to receive vaccine 7 days after last prolia injection.    Shingrix is administered as a two-dose series, with the second dose given anytime between 2 and 6 months after the first dose.  Follow with primary provider for further vaccination questions.

## 2024-01-17 ENCOUNTER — LAB VISIT (OUTPATIENT)
Dept: LAB | Facility: HOSPITAL | Age: 74
End: 2024-01-17
Attending: INTERNAL MEDICINE
Payer: MEDICARE

## 2024-01-17 DIAGNOSIS — M81.0 AGE-RELATED OSTEOPOROSIS WITHOUT CURRENT PATHOLOGICAL FRACTURE: ICD-10-CM

## 2024-01-17 LAB
ALBUMIN SERPL BCP-MCNC: 3.9 G/DL (ref 3.5–5.2)
ALP SERPL-CCNC: 33 U/L (ref 55–135)
ALT SERPL W/O P-5'-P-CCNC: 20 U/L (ref 10–44)
ANION GAP SERPL CALC-SCNC: 8 MMOL/L (ref 8–16)
AST SERPL-CCNC: 28 U/L (ref 10–40)
BILIRUB SERPL-MCNC: 0.6 MG/DL (ref 0.1–1)
BUN SERPL-MCNC: 20 MG/DL (ref 8–23)
CALCIUM SERPL-MCNC: 9.2 MG/DL (ref 8.7–10.5)
CHLORIDE SERPL-SCNC: 93 MMOL/L (ref 95–110)
CO2 SERPL-SCNC: 28 MMOL/L (ref 23–29)
CREAT SERPL-MCNC: 0.8 MG/DL (ref 0.5–1.4)
EST. GFR  (NO RACE VARIABLE): >60 ML/MIN/1.73 M^2
GLUCOSE SERPL-MCNC: 95 MG/DL (ref 70–110)
POTASSIUM SERPL-SCNC: 4.5 MMOL/L (ref 3.5–5.1)
PROT SERPL-MCNC: 7.2 G/DL (ref 6–8.4)
SODIUM SERPL-SCNC: 129 MMOL/L (ref 136–145)

## 2024-01-17 PROCEDURE — 80053 COMPREHEN METABOLIC PANEL: CPT | Performed by: INTERNAL MEDICINE

## 2024-01-17 PROCEDURE — 36415 COLL VENOUS BLD VENIPUNCTURE: CPT | Performed by: INTERNAL MEDICINE

## 2024-01-19 ENCOUNTER — TELEPHONE (OUTPATIENT)
Dept: INFUSION THERAPY | Facility: HOSPITAL | Age: 74
End: 2024-01-19
Payer: MEDICARE

## 2024-01-19 NOTE — TELEPHONE ENCOUNTER
Spoke with Ms. Reyes and informed her that due to changes within [facility], Pre-Service has not obtained authorization for her treatment scheduled for 1.22.24 and will need to reschedule. Ms. Reyes states understanding. Infusion notified.

## 2024-01-22 ENCOUNTER — TELEPHONE (OUTPATIENT)
Dept: RHEUMATOLOGY | Facility: CLINIC | Age: 74
End: 2024-01-22
Payer: MEDICARE

## 2024-01-22 NOTE — TELEPHONE ENCOUNTER
----- Message from Teresa Kwong, RT sent at 1/22/2024  3:09 PM CST -----  Unable to contact patient to schedule DEXA.    She can be scheduled with her labs in July or on same day visit wit Dr gilliam.    Thank you,  Teresa

## 2024-01-23 ENCOUNTER — PATIENT MESSAGE (OUTPATIENT)
Dept: RHEUMATOLOGY | Facility: CLINIC | Age: 74
End: 2024-01-23
Payer: MEDICARE

## 2024-01-23 DIAGNOSIS — Z76.89 ESTABLISHING CARE WITH NEW DOCTOR, ENCOUNTER FOR: Primary | ICD-10-CM

## 2024-01-25 ENCOUNTER — OFFICE VISIT (OUTPATIENT)
Dept: CARDIOLOGY | Facility: CLINIC | Age: 74
End: 2024-01-25
Payer: MEDICARE

## 2024-01-25 ENCOUNTER — HOSPITAL ENCOUNTER (OUTPATIENT)
Dept: CARDIOLOGY | Facility: HOSPITAL | Age: 74
Discharge: HOME | End: 2024-01-25
Attending: INTERNAL MEDICINE
Payer: MEDICARE

## 2024-01-25 VITALS
SYSTOLIC BLOOD PRESSURE: 130 MMHG | WEIGHT: 108.94 LBS | HEART RATE: 62 BPM | RESPIRATION RATE: 16 BRPM | DIASTOLIC BLOOD PRESSURE: 70 MMHG | OXYGEN SATURATION: 98 % | HEIGHT: 67 IN | BODY MASS INDEX: 17.1 KG/M2

## 2024-01-25 DIAGNOSIS — K50.00 CROHN'S DISEASE OF SMALL INTESTINE WITHOUT COMPLICATION: ICD-10-CM

## 2024-01-25 DIAGNOSIS — I70.0 ATHEROSCLEROSIS OF ABDOMINAL AORTA: ICD-10-CM

## 2024-01-25 DIAGNOSIS — Z82.49 FAMILY HISTORY OF CARDIOVASCULAR DISEASE: ICD-10-CM

## 2024-01-25 DIAGNOSIS — Z76.89 ESTABLISHING CARE WITH NEW DOCTOR, ENCOUNTER FOR: ICD-10-CM

## 2024-01-25 DIAGNOSIS — K55.1 MESENTERIC ARTERY STENOSIS: ICD-10-CM

## 2024-01-25 DIAGNOSIS — I10 ESSENTIAL HYPERTENSION: Primary | ICD-10-CM

## 2024-01-25 DIAGNOSIS — K50.119 CROHN'S DISEASE OF COLON WITH COMPLICATION: ICD-10-CM

## 2024-01-25 DIAGNOSIS — E78.5 HYPERLIPIDEMIA LDL GOAL <70: ICD-10-CM

## 2024-01-25 PROCEDURE — 99999 PR PBB SHADOW E&M-EST. PATIENT-LVL IV: CPT | Mod: PBBFAC,,, | Performed by: INTERNAL MEDICINE

## 2024-01-25 PROCEDURE — 99214 OFFICE O/P EST MOD 30 MIN: CPT | Mod: S$GLB,,, | Performed by: INTERNAL MEDICINE

## 2024-01-25 PROCEDURE — 93010 ELECTROCARDIOGRAM REPORT: CPT | Mod: ,,, | Performed by: INTERNAL MEDICINE

## 2024-01-25 PROCEDURE — 93005 ELECTROCARDIOGRAM TRACING: CPT

## 2024-01-25 NOTE — PROGRESS NOTES
Subjective:   Patient ID:  Manuela Reyes is a 74 y.o. female who presents for follow up of No chief complaint on file.      HPI  4/28/21 RUPAL ADAM NP  Manuela Reyes is a 71 year old  Female who presents to Cardiology clinic for 6 week follow  Up. Her current medical conditions include HTN, HLP, TIA, Crohns disease, Mesenteric artery stenosis, nonsmoker. She stopped her statin due to aches and pains and BLE calf pain when walking and had noted improvement in symptoms at last office visit. She returns today and states she is doing well.      She has complete resolution of calf pain since stopping statin after last visit.      Home BP log reviewed today which shows excellent improvement in BP since med adjustments. She continues to walk 1-2 miles most days of the week without any issues.      She had CMP in April with Na of 128 but repeat was 130 mmol/L. She has noted chronic issues with hyponatremia.      Denies chest pain or anginal equivalents. No shortness of breath, HICKS or palpitations. Denies orthopnea, PND or abdominal bloating. Reports regular walking without any issues lately. NO leg swelling or claudications. No recent falls, syncope or near syncopal events. Reports compliance with medications and dietary restrictions. NO CNS complaints to suggest TIA or CVA today. No signs of abnormal bleeding on ASA daily.       1/25/24   HER TO ESTABLISH CARE .    HAS HLP ALLERGIC TO STATINS ON OVER THE COUNTER MEDS   NOT NEEDING ANY INTERVENTION HAS MESENTERIC ARTERY DISEASE ASYMPTOMATIC CHRON'S IN REMISSION   SHE WALKS 2-3 MILES DAILY   Past Medical History:   Diagnosis Date    Anxiety     Crohn's disease     Depression     Genital herpes     Hepatitis C infection     Hypertension     Insomnia     Mesenteric artery stenosis     Dr. 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 Rupal Mack--dermatology    TIA (transient ischemic attack)         Past Surgical History:   Procedure Laterality Date    APPENDECTOMY      COLONOSCOPY N/A 2017    Procedure: COLONOSCOPY;  Surgeon: Jose Luis Leonard MD;  Location: [facility];  Service: Endoscopy;  Laterality: N/A;    COLONOSCOPY N/A 2018    Procedure: COLONOSCOPY;  Surgeon: Guillaume Whitman MD;  Location: [facility];  Service: Endoscopy;  Laterality: N/A;    COLONOSCOPY N/A 2019    Procedure: COLONOSCOPY;  Surgeon: Lili Ellis MD;  Location: [facility] ENDO;  Service: Endoscopy;  Laterality: N/A;    COLONOSCOPY N/A 2020    Procedure: COLONOSCOPY;  Surgeon: Lili Ellis MD;  Location: [facility];  Service: Endoscopy;  Laterality: N/A;    COLONOSCOPY N/A 2023    Procedure: COLONOSCOPY;  Surgeon: Lili Ellis MD;  Location: [facility];  Service: Endoscopy;  Laterality: N/A;    ESOPHAGOGASTRODUODENOSCOPY N/A 2023    Procedure: EGD (ESOPHAGOGASTRODUODENOSCOPY);  Surgeon: Lili Ellis MD;  Location: [facility];  Service: Endoscopy;  Laterality: N/A;    SMALL INTESTINE SURGERY         Social History     Tobacco Use    Smoking status: Former     Current packs/day: 0.00     Types: Cigarettes     Start date: 1985     Quit date: 2005     Years since quittin.0    Smokeless tobacco: Former    Tobacco comments:     Former Light Smoker less than 10 a day   Substance Use Topics    Alcohol use: Yes     Alcohol/week: 4.0 standard drinks of alcohol     Types: 4 Glasses of wine per week     Comment: occ    Drug use: No       Family History   Problem Relation Age of Onset    Stroke Mother     Heart disease Mother     Cataracts Mother     Cancer Father         Lung    Heart disease Sister     Hypertension Brother     Glaucoma Neg Hx     Macular degeneration Neg Hx     Thyroid disease Neg Hx        Current Outpatient Medications   Medication Sig    adalimumab (HUMIRA,CF, PEN) 40 mg/0.4 mL PnKt Inject 0.4 mLs (40 mg total) into the skin every 14 (fourteen) days.    amLODIPine  (NORVASC) 10 MG tablet Take 1 tablet by mouth once daily    aspirin (ECOTRIN) 81 MG EC tablet Take 1 tablet (81 mg total) by mouth once daily.    atenoloL (TENORMIN) 25 MG tablet Take 1 tablet (25 mg total) by mouth once daily.    calcium carbonate (OS-COOPER) 600 mg (1,500 mg) Tab Take 1,200 mg by mouth once daily.     cyanocobalamin, vitamin B-12, 2,500 mcg Lozg Place 1 tablet under the tongue.     denosumab (PROLIA) 60 mg/mL Syrg Inject 60 mg into the skin.    efinaconazole (JUBLIA) 10 % Kike Apply 1 Act topically once daily.    HYDROcodone-acetaminophen (NORCO) 7.5-325 mg per tablet Take 1 tablet by mouth every 12 (twelve) hours as needed for pain.    levocetirizine (XYZAL) 5 MG tablet Take 1 tablet (5 mg total) by mouth every evening.    LORazepam (ATIVAN) 0.5 MG tablet Take 3 tablets (1.5 mg total) by mouth every evening.    multivitamin with minerals tablet Take 1 tablet by mouth once daily at 6am.    mupirocin (BACTROBAN) 2 % ointment Apply a small amount to affected area twice a day x 10 days    triamcinolone acetonide 0.1% (KENALOG) 0.1 % ointment Apply a small amount to affected area twice a day x 2 weeks as needed for flares    valACYclovir (VALTREX) 500 MG tablet Take 1 tablet by mouth twice a day as needed for 3 days per flare    vitamin D 1000 units Tab Take 185 mg by mouth once daily.     No current facility-administered medications for this visit.     Current Outpatient Medications on File Prior to Visit   Medication Sig    adalimumab (HUMIRA,CF, PEN) 40 mg/0.4 mL PnKt Inject 0.4 mLs (40 mg total) into the skin every 14 (fourteen) days.    amLODIPine (NORVASC) 10 MG tablet Take 1 tablet by mouth once daily    aspirin (ECOTRIN) 81 MG EC tablet Take 1 tablet (81 mg total) by mouth once daily.    atenoloL (TENORMIN) 25 MG tablet Take 1 tablet (25 mg total) by mouth once daily.    calcium carbonate (OS-COOPER) 600 mg (1,500 mg) Tab Take 1,200 mg by mouth once daily.     cyanocobalamin, vitamin B-12, 2,500 mcg  Lozg Place 1 tablet under the tongue.     denosumab (PROLIA) 60 mg/mL Syrg Inject 60 mg into the skin.    efinaconazole (JUBLIA) 10 % Kike Apply 1 Act topically once daily.    HYDROcodone-acetaminophen (NORCO) 7.5-325 mg per tablet Take 1 tablet by mouth every 12 (twelve) hours as needed for pain.    levocetirizine (XYZAL) 5 MG tablet Take 1 tablet (5 mg total) by mouth every evening.    LORazepam (ATIVAN) 0.5 MG tablet Take 3 tablets (1.5 mg total) by mouth every evening.    multivitamin with minerals tablet Take 1 tablet by mouth once daily at 6am.    mupirocin (BACTROBAN) 2 % ointment Apply a small amount to affected area twice a day x 10 days    triamcinolone acetonide 0.1% (KENALOG) 0.1 % ointment Apply a small amount to affected area twice a day x 2 weeks as needed for flares    valACYclovir (VALTREX) 500 MG tablet Take 1 tablet by mouth twice a day as needed for 3 days per flare    vitamin D 1000 units Tab Take 185 mg by mouth once daily.     No current facility-administered medications on file prior to visit.     Review of patient's allergies indicates:   Allergen Reactions    Statins-hmg-coa reductase inhibitors Anaphylaxis     Myalgias    Codeine sulfate Itching    Hydrochlorothiazide Other (See Comments)     Adverse Reaction    Lisinopril Swelling and Edema     Facial Swelling      Review of Systems   Constitutional: Negative for diaphoresis, malaise/fatigue and weight gain.   HENT:  Negative for hoarse voice.    Eyes:  Negative for blurred vision, double vision and visual disturbance.   Cardiovascular:  Negative for chest pain, claudication, cyanosis, dyspnea on exertion, irregular heartbeat, leg swelling, near-syncope, orthopnea, palpitations, paroxysmal nocturnal dyspnea and syncope.   Respiratory:  Negative for cough, hemoptysis, shortness of breath and snoring.    Hematologic/Lymphatic: Negative for bleeding problem. Does not bruise/bleed easily.   Skin:  Negative for color change, dry skin, itching  and poor wound healing.   Musculoskeletal:  Negative for falls, muscle cramps, muscle weakness and myalgias.   Gastrointestinal:  Negative for bloating, abdominal pain, change in bowel habit, diarrhea, heartburn, hematemesis, hematochezia, melena and nausea.   Genitourinary:  Negative for flank pain and hematuria.   Neurological:  Negative for excessive daytime sleepiness, dizziness, focal weakness, headaches, light-headedness, loss of balance, numbness, paresthesias, seizures and weakness.   Psychiatric/Behavioral:  Negative for altered mental status and memory loss. The patient does not have insomnia and is not nervous/anxious.    Allergic/Immunologic: Negative for hives.       Objective:   Physical Exam  Constitutional:       General: She is not in acute distress.     Appearance: Normal appearance. She is well-developed. She is not ill-appearing.   HENT:      Head: Normocephalic and atraumatic.   Eyes:      General: No scleral icterus.     Pupils: Pupils are equal, round, and reactive to light.   Neck:      Thyroid: No thyromegaly.      Vascular: Normal carotid pulses. No carotid bruit, hepatojugular reflux or JVD.      Trachea: No tracheal deviation.   Cardiovascular:      Rate and Rhythm: Normal rate and regular rhythm.      Pulses: Normal pulses.      Heart sounds: Normal heart sounds. No murmur heard.     No friction rub. No gallop.   Pulmonary:      Effort: Pulmonary effort is normal. No respiratory distress.      Breath sounds: Normal breath sounds. No wheezing, rhonchi or rales.   Chest:      Chest wall: No tenderness.   Abdominal:      General: Bowel sounds are normal. There is no abdominal bruit.      Palpations: Abdomen is soft. There is no hepatomegaly or pulsatile mass.      Tenderness: There is no abdominal tenderness.   Musculoskeletal:      Right shoulder: No deformity.      Cervical back: Normal range of motion and neck supple.      Right lower leg: No edema.      Left lower leg: No edema.  "  Skin:     General: Skin is warm and dry.      Findings: No erythema or rash.      Nails: There is no clubbing.   Neurological:      General: No focal deficit present.      Mental Status: She is alert and oriented to person, place, and time.      Cranial Nerves: No cranial nerve deficit.      Coordination: Coordination normal.   Psychiatric:         Mood and Affect: Mood normal.         Speech: Speech normal.         Behavior: Behavior normal.       Vitals:    01/25/24 1111 01/25/24 1112 01/25/24 1127   BP: (!) 145/79 139/81 130/70   BP Location:   Left arm   Patient Position:   Sitting   BP Method:   Small (Manual)   Pulse: 62     Resp: 16     SpO2: 98%     Weight: 49.4 kg (108 lb 14.5 oz)     Height: 5' 7" (1.702 m)       Lab Results   Component Value Date    CHOL 177 08/18/2023    CHOL 210 (H) 03/09/2023    CHOL 165 03/23/2022      Body mass index is 17.06 kg/m².   No results found for: "LABA1C", "HGBA1C"   BMP  Lab Results   Component Value Date     (L) 01/17/2024    K 4.5 01/17/2024    CL 93 (L) 01/17/2024    CO2 28 01/17/2024    BUN 20 01/17/2024    CREATININE 0.8 01/17/2024    CALCIUM 9.2 01/17/2024    ANIONGAP 8 01/17/2024    EGFRNORACEVR >60 01/17/2024      Lab Results   Component Value Date    HDL 80 (H) 08/18/2023    HDL 83 (H) 03/09/2023    HDL 98 (H) 03/23/2022     Lab Results   Component Value Date    LDLCALC 87.8 08/18/2023    LDLCALC 114.4 03/09/2023    LDLCALC 53.2 (L) 03/23/2022     Lab Results   Component Value Date    TRIG 46 08/18/2023    TRIG 63 03/09/2023    TRIG 69 03/23/2022     Lab Results   Component Value Date    CHOLHDL 45.2 08/18/2023    CHOLHDL 39.5 03/09/2023    CHOLHDL 59.4 (H) 03/23/2022       Chemistry        Component Value Date/Time     (L) 01/17/2024 1201    K 4.5 01/17/2024 1201    CL 93 (L) 01/17/2024 1201    CO2 28 01/17/2024 1201    BUN 20 01/17/2024 1201    CREATININE 0.8 01/17/2024 1201    GLU 95 01/17/2024 1201        Component Value Date/Time    CALCIUM 9.2 " "01/17/2024 1201    ALKPHOS 33 (L) 01/17/2024 1201    AST 28 01/17/2024 1201    ALT 20 01/17/2024 1201    BILITOT 0.6 01/17/2024 1201    ESTGFRAFRICA >60 06/14/2022 1144    EGFRNONAA >60 06/14/2022 1144          Lab Results   Component Value Date    TSH 1.326 01/04/2017     No results found for: "INR", "PROTIME"  Lab Results   Component Value Date    WBC 6.27 11/21/2023    HGB 13.3 11/21/2023    HCT 37.8 11/21/2023    MCV 97 11/21/2023     11/21/2023     BMP  Sodium   Date Value Ref Range Status   01/17/2024 129 (L) 136 - 145 mmol/L Final     Potassium   Date Value Ref Range Status   01/17/2024 4.5 3.5 - 5.1 mmol/L Final     Chloride   Date Value Ref Range Status   01/17/2024 93 (L) 95 - 110 mmol/L Final     CO2   Date Value Ref Range Status   01/17/2024 28 23 - 29 mmol/L Final     BUN   Date Value Ref Range Status   01/17/2024 20 8 - 23 mg/dL Final     Creatinine   Date Value Ref Range Status   01/17/2024 0.8 0.5 - 1.4 mg/dL Final     Calcium   Date Value Ref Range Status   01/17/2024 9.2 8.7 - 10.5 mg/dL Final     Anion Gap   Date Value Ref Range Status   01/17/2024 8 8 - 16 mmol/L Final     eGFR if    Date Value Ref Range Status   06/14/2022 >60 >60 mL/min/1.73 m^2 Final     eGFR if non    Date Value Ref Range Status   06/14/2022 >60 >60 mL/min/1.73 m^2 Final     Comment:     Calculation used to obtain the estimated glomerular filtration  rate (eGFR) is the CKD-EPI equation.        CrCl cannot be calculated (Patient's most recent lab result is older than the maximum 7 days allowed.).    Assessment:     1. Essential hypertension    2. Atherosclerosis of abdominal aorta    3. Crohn's disease of small intestine without complication    4. Crohn's disease of colon with complication    5. Mesenteric artery stenosis    6. Hyperlipidemia LDL goal <70    7. Family history of cardiovascular disease      ASYMPTOMATIC CARDIOVASCULAR WISE WITH GOOD CONTROL OF LIPIDS WITH NATURAL THERAPY " AND DIET HER HTN IS CONTROLLED LOW SALT DIET EMPHASIZED SHE EXERCISES REGULARILY SHE IS NOT TOLERATED ASA SHE BRUISES  COUNSELED ABOUT ASA INTAKE DUE TO ATHEROSCLEROSIS AND MESENTERIC DISEASE.   CHRON'S CONTROLLED CONTINUE SAME MEDS   DISCUSSED SYMPTOMS OF MESENTERIC ISCHEMIA.   DISCUSSED OATMEAL AND CHEERIOS TO TREAT LIPIDS  HAS HYPONATREMIA ? EXCESS FREE WATER INTAKE DISCUSSED GATORADE VEGETABLE JUICE.  Plan:     Continue current therapy  Cardiac low salt diet.  Risk factor modification and excercise program.  F/U IN 1 YEAR EARLIER IF NEED BE   LIPIDS IN 6 MONTHS

## 2024-01-29 ENCOUNTER — INFUSION (OUTPATIENT)
Dept: INFUSION THERAPY | Facility: HOSPITAL | Age: 74
End: 2024-01-29
Attending: INTERNAL MEDICINE
Payer: MEDICARE

## 2024-01-29 VITALS
OXYGEN SATURATION: 99 % | DIASTOLIC BLOOD PRESSURE: 74 MMHG | HEART RATE: 71 BPM | RESPIRATION RATE: 16 BRPM | TEMPERATURE: 97 F | SYSTOLIC BLOOD PRESSURE: 159 MMHG

## 2024-01-29 DIAGNOSIS — M81.0 AGE-RELATED OSTEOPOROSIS WITHOUT CURRENT PATHOLOGICAL FRACTURE: Primary | ICD-10-CM

## 2024-01-29 PROCEDURE — 63600175 PHARM REV CODE 636 W HCPCS: Mod: JZ,JG | Performed by: INTERNAL MEDICINE

## 2024-01-29 PROCEDURE — 96372 THER/PROPH/DIAG INJ SC/IM: CPT

## 2024-01-29 RX ADMIN — DENOSUMAB 60 MG: 60 INJECTION SUBCUTANEOUS at 11:01

## 2024-02-05 ENCOUNTER — OFFICE VISIT (OUTPATIENT)
Dept: INTERNAL MEDICINE | Facility: CLINIC | Age: 74
End: 2024-02-05
Payer: MEDICARE

## 2024-02-05 ENCOUNTER — PATIENT MESSAGE (OUTPATIENT)
Dept: GASTROENTEROLOGY | Facility: CLINIC | Age: 74
End: 2024-02-05
Payer: MEDICARE

## 2024-02-05 VITALS
SYSTOLIC BLOOD PRESSURE: 130 MMHG | BODY MASS INDEX: 17.37 KG/M2 | WEIGHT: 110.69 LBS | HEIGHT: 67 IN | OXYGEN SATURATION: 97 % | RESPIRATION RATE: 18 BRPM | HEART RATE: 79 BPM | DIASTOLIC BLOOD PRESSURE: 66 MMHG | TEMPERATURE: 96 F

## 2024-02-05 DIAGNOSIS — D84.9 IMMUNOSUPPRESSED STATUS: ICD-10-CM

## 2024-02-05 DIAGNOSIS — F41.9 ANXIETY: ICD-10-CM

## 2024-02-05 DIAGNOSIS — F51.04 CHRONIC INSOMNIA: ICD-10-CM

## 2024-02-05 DIAGNOSIS — E78.5 HYPERLIPIDEMIA LDL GOAL <70: ICD-10-CM

## 2024-02-05 DIAGNOSIS — I10 ESSENTIAL HYPERTENSION: ICD-10-CM

## 2024-02-05 DIAGNOSIS — I70.0 ATHEROSCLEROSIS OF ABDOMINAL AORTA: ICD-10-CM

## 2024-02-05 DIAGNOSIS — M81.0 AGE-RELATED OSTEOPOROSIS WITHOUT CURRENT PATHOLOGICAL FRACTURE: ICD-10-CM

## 2024-02-05 DIAGNOSIS — K50.00 CROHN'S DISEASE OF SMALL INTESTINE WITHOUT COMPLICATION: ICD-10-CM

## 2024-02-05 DIAGNOSIS — Z00.00 ANNUAL PHYSICAL EXAM: Primary | ICD-10-CM

## 2024-02-05 PROCEDURE — 99999 PR PBB SHADOW E&M-EST. PATIENT-LVL IV: CPT | Mod: PBBFAC,,, | Performed by: INTERNAL MEDICINE

## 2024-02-05 PROCEDURE — 99214 OFFICE O/P EST MOD 30 MIN: CPT | Mod: S$GLB,,, | Performed by: INTERNAL MEDICINE

## 2024-02-05 RX ORDER — AMLODIPINE BESYLATE 10 MG/1
TABLET ORAL
Qty: 90 TABLET | Refills: 1 | Status: SHIPPED | OUTPATIENT
Start: 2024-02-05

## 2024-02-05 RX ORDER — LORAZEPAM 0.5 MG/1
1.5 TABLET ORAL NIGHTLY
Qty: 90 TABLET | Refills: 5 | Status: SHIPPED | OUTPATIENT
Start: 2024-02-05

## 2024-02-05 NOTE — PROGRESS NOTES
Manuela Reyes  74 y.o. White female  [id number]    Chief Complaint:  Chief Complaint   Patient presents with    Annual Exam       History of Present Illness:  No significant complaints.   HTN--stable.   HLD--taking an OTC cholesterol medication.   Aortic atherosclerosis--denies symptoms.   Crohn's disease--on Humira. Management per GI.   Osteoporosis--management per rheumatology.   Anxiety/insomnia--stable on lorazepam.     Laboratory   Lab Results   Component Value Date    WBC 6.27 11/21/2023    HGB 13.3 11/21/2023    HCT 37.8 11/21/2023     11/21/2023    CHOL 177 08/18/2023    TRIG 46 08/18/2023    HDL 80 (H) 08/18/2023    ALT 20 01/17/2024    AST 28 01/17/2024     (L) 01/17/2024    K 4.5 01/17/2024    CL 93 (L) 01/17/2024    CREATININE 0.8 01/17/2024    BUN 20 01/17/2024    CO2 28 01/17/2024    TSH 1.326 01/04/2017    PSA <0.01 12/09/2020     Lab Results   Component Value Date    LDLCALC 87.8 08/18/2023     Review of Systems   Constitutional:  Negative for fever.   Respiratory:  Negative for shortness of breath.    Cardiovascular:  Negative for chest pain and leg swelling.   Gastrointestinal:  Negative for abdominal pain.   Genitourinary:  Negative for dysuria.   Neurological:  Negative for dizziness and headaches.   Psychiatric/Behavioral:  The patient is nervous/anxious and has insomnia.        The following were reviewed: Active problem list, medication list, allergies, family history, social history, and Health Maintenance.     History:  Past Medical History:   Diagnosis Date    Anxiety     Crohn's disease     Depression     Genital herpes     Hepatitis C infection     Hypertension     Insomnia     Mesenteric artery stenosis     Dr. 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 Malaika Mack--dermatology    TIA (transient ischemic attack)      Past Surgical History:   Procedure Laterality Date    APPENDECTOMY      COLONOSCOPY  N/A 2017    Procedure: COLONOSCOPY;  Surgeon: Jose Luis Leonard MD;  Location: [facility] ENDO;  Service: Endoscopy;  Laterality: N/A;    COLONOSCOPY N/A 2018    Procedure: COLONOSCOPY;  Surgeon: Guillaume Whitman MD;  Location: [facility] ENDO;  Service: Endoscopy;  Laterality: N/A;    COLONOSCOPY N/A 2019    Procedure: COLONOSCOPY;  Surgeon: Lili Ellis MD;  Location: [facility] ENDO;  Service: Endoscopy;  Laterality: N/A;    COLONOSCOPY N/A 2020    Procedure: COLONOSCOPY;  Surgeon: Lili Ellis MD;  Location: [facility] ENDO;  Service: Endoscopy;  Laterality: N/A;    COLONOSCOPY N/A 2023    Procedure: COLONOSCOPY;  Surgeon: Lili Ellis MD;  Location: [facility] ENDO;  Service: Endoscopy;  Laterality: N/A;    ESOPHAGOGASTRODUODENOSCOPY N/A 2023    Procedure: EGD (ESOPHAGOGASTRODUODENOSCOPY);  Surgeon: Lili Ellis MD;  Location: [facility];  Service: Endoscopy;  Laterality: N/A;    SMALL INTESTINE SURGERY       Family History   Problem Relation Age of Onset    Stroke Mother     Heart disease Mother     Cataracts Mother     Cancer Father         Lung    Heart disease Sister     Hypertension Brother     Glaucoma Neg Hx     Macular degeneration Neg Hx     Thyroid disease Neg Hx      Social History     Socioeconomic History    Marital status: Single   Tobacco Use    Smoking status: Former     Current packs/day: 0.00     Types: Cigarettes     Start date: 1985     Quit date: 2005     Years since quittin.1    Smokeless tobacco: Former    Tobacco comments:     Former Light Smoker less than 10 a day   Substance and Sexual Activity    Alcohol use: Yes     Alcohol/week: 4.0 standard drinks of alcohol     Types: 4 Glasses of wine per week     Comment: occ    Drug use: No    Sexual activity: Never     Partners: Female     Social Determinants of Health     Stress: No Stress Concern Present (2020)    [employer] - Occupational Stress Questionnaire      Feeling of Stress : Not at all     Patient Active Problem List   Diagnosis    Crohn's disease of small intestine    Crohn's colitis    Mesenteric artery stenosis    Hyperlipidemia LDL goal <70    Chronic insomnia    Anxiety    Crohn's disease    Family history of cardiovascular disease    Atherosclerosis of abdominal aorta    Age-related osteoporosis without current pathological fracture    Essential hypertension    Decreased ROM of lumbar spine    Immunosuppressed status     Review of patient's allergies indicates:   Allergen Reactions    Statins-hmg-coa reductase inhibitors Anaphylaxis     Myalgias    Codeine sulfate Itching    Hydrochlorothiazide Other (See Comments)     Adverse Reaction    Lisinopril Swelling and Edema     Facial Swelling       Health Maintenance  Health Maintenance Topics with due status: Not Due       Topic Last Completion Date    TETANUS VACCINE 07/25/2016    DEXA Scan 12/21/2022    Lipid Panel 08/18/2023     Health Maintenance Due   Topic Date Due    High Dose Statin  Never done    RSV Vaccine (Age 60+ and Pregnant patients) (1 - 1-dose 60+ series) Never done    Shingles Vaccine (2 of 2) 10/10/2023       Medications:  Current Outpatient Medications on File Prior to Visit   Medication Sig Dispense Refill    adalimumab (HUMIRA,CF, PEN) 40 mg/0.4 mL PnKt Inject 0.4 mLs (40 mg total) into the skin every 14 (fourteen) days. 6 pen 3    aspirin (ECOTRIN) 81 MG EC tablet Take 1 tablet (81 mg total) by mouth once daily.      atenoloL (TENORMIN) 25 MG tablet Take 1 tablet (25 mg total) by mouth once daily. 30 tablet 11    calcium carbonate (OS-COOPER) 600 mg (1,500 mg) Tab Take 1,200 mg by mouth once daily.       cyanocobalamin, vitamin B-12, 2,500 mcg Lozg Place 1 tablet under the tongue.       denosumab (PROLIA) 60 mg/mL Syrg Inject 60 mg into the skin.      efinaconazole (JUBLIA) 10 % Kike Apply 1 Act topically once daily. 8 mL 3    HYDROcodone-acetaminophen (NORCO) 7.5-325 mg per tablet Take 1 tablet  by mouth every 12 (twelve) hours as needed for pain. 5 tablet 0    levocetirizine (XYZAL) 5 MG tablet Take 1 tablet (5 mg total) by mouth every evening. 90 tablet 3    multivitamin with minerals tablet Take 1 tablet by mouth once daily at 6am.      mupirocin (BACTROBAN) 2 % ointment Apply a small amount to affected area twice a day x 10 days 22 g 0    triamcinolone acetonide 0.1% (KENALOG) 0.1 % ointment Apply a small amount to affected area twice a day x 2 weeks as needed for flares 30 g 4    valACYclovir (VALTREX) 500 MG tablet Take 1 tablet by mouth twice a day as needed for 3 days per flare 30 tablet 0    vitamin D 1000 units Tab Take 185 mg by mouth once daily.      amLODIPine (NORVASC) 10 MG tablet Take 1 tablet by mouth once daily 90 tablet 1    LORazepam (ATIVAN) 0.5 MG tablet Take 3 tablets (1.5 mg total) by mouth every evening. 90 tablet 5     No current facility-administered medications on file prior to visit.       Medications have been reviewed and reconciled with patient at visit today.    Exam:  Vitals:    02/05/24 1122   BP: 130/66   Pulse: 79   Resp: 18   Temp: 96.1 °F (35.6 °C)     Weight: 50.2 kg (110 lb 10.7 oz)   Body mass index is 17.33 kg/m².      Physical Exam  Vitals reviewed.   Constitutional:       General: She is not in acute distress.     Appearance: She is well-developed. She is not ill-appearing.   Eyes:      General: No scleral icterus.  Cardiovascular:      Rate and Rhythm: Normal rate and regular rhythm.      Heart sounds: Normal heart sounds.   Pulmonary:      Effort: Pulmonary effort is normal. No respiratory distress.      Breath sounds: Normal breath sounds.   Abdominal:      General: Bowel sounds are normal.      Palpations: Abdomen is soft.   Skin:     General: Skin is warm and dry.   Neurological:      Mental Status: She is alert and oriented to person, place, and time.   Psychiatric:         Behavior: Behavior normal.       Assessment:  The primary encounter diagnosis was  Annual physical exam. Diagnoses of Essential hypertension, Hyperlipidemia LDL goal <70, Atherosclerosis of abdominal aorta, Crohn's disease of small intestine without complication, Immunosuppressed status, Age-related osteoporosis without current pathological fracture, Anxiety, and Chronic insomnia were also pertinent to this visit.    Plan:  Annual physical exam  -     Counseled regarding age appropriate screenings and immunizations  -     Counseled regarding lifestyle modifications    Essential hypertension  -     refill amLODIPine (NORVASC) 10 MG tablet; Take 1 tablet by mouth once daily  Dispense: 90 tablet; Refill: 1    Hyperlipidemia LDL goal <70  -     Lifestyle modifications discussed  -     monitor    Atherosclerosis of abdominal aorta  -     asymptomatic    Crohn's disease of small intestine without complication  -     f/u with GI    Immunosuppressed status    Age-related osteoporosis without current pathological fracture  -     f/u with rheumatology    Anxiety  -     refill LORazepam (ATIVAN) 0.5 MG tablet; Take 3 tablets (1.5 mg total) by mouth every evening.  Dispense: 90 tablet; Refill: 5    Chronic insomnia  -     refill LORazepam (ATIVAN) 0.5 MG tablet; Take 3 tablets (1.5 mg total) by mouth every evening.  Dispense: 90 tablet; Refill: 5    Follow up in about 6 months (around 8/5/2024).

## 2024-03-08 DIAGNOSIS — I10 ESSENTIAL HYPERTENSION: ICD-10-CM

## 2024-03-08 RX ORDER — ATENOLOL 25 MG/1
25 TABLET ORAL DAILY
Qty: 30 TABLET | Refills: 11 | Status: SHIPPED | OUTPATIENT
Start: 2024-03-08 | End: 2025-03-08

## 2024-03-26 DIAGNOSIS — B35.1 ONYCHOMYCOSIS: ICD-10-CM

## 2024-04-02 DIAGNOSIS — B35.1 ONYCHOMYCOSIS: ICD-10-CM

## 2024-04-02 RX ORDER — EFINACONAZOLE 100 MG/ML
1 SOLUTION TOPICAL DAILY
Qty: 8 ML | Refills: 3 | Status: SHIPPED | OUTPATIENT
Start: 2024-04-02

## 2024-04-18 RX ORDER — EFINACONAZOLE 100 MG/ML
1 SOLUTION TOPICAL
Qty: 4 ML | Refills: 3 | Status: SHIPPED | OUTPATIENT
Start: 2024-04-18

## 2024-05-10 ENCOUNTER — PATIENT MESSAGE (OUTPATIENT)
Dept: GASTROENTEROLOGY | Facility: CLINIC | Age: 74
End: 2024-05-10
Payer: MEDICARE

## 2024-05-18 ENCOUNTER — OFFICE VISIT (OUTPATIENT)
Dept: URGENT CARE | Facility: CLINIC | Age: 74
End: 2024-05-18
Payer: MEDICARE

## 2024-05-18 VITALS
WEIGHT: 110 LBS | HEIGHT: 67 IN | DIASTOLIC BLOOD PRESSURE: 74 MMHG | TEMPERATURE: 97 F | HEART RATE: 78 BPM | BODY MASS INDEX: 17.27 KG/M2 | RESPIRATION RATE: 18 BRPM | SYSTOLIC BLOOD PRESSURE: 143 MMHG | OXYGEN SATURATION: 99 %

## 2024-05-18 DIAGNOSIS — S29.012A MUSCLE STRAIN OF RIGHT UPPER BACK, INITIAL ENCOUNTER: Primary | ICD-10-CM

## 2024-05-18 PROCEDURE — 99214 OFFICE O/P EST MOD 30 MIN: CPT | Mod: 25,S$GLB,, | Performed by: FAMILY MEDICINE

## 2024-05-18 PROCEDURE — 96372 THER/PROPH/DIAG INJ SC/IM: CPT | Mod: S$GLB,,, | Performed by: FAMILY MEDICINE

## 2024-05-18 RX ORDER — KETOROLAC TROMETHAMINE 30 MG/ML
30 INJECTION, SOLUTION INTRAMUSCULAR; INTRAVENOUS
Status: COMPLETED | OUTPATIENT
Start: 2024-05-18 | End: 2024-05-18

## 2024-05-18 RX ORDER — METHOCARBAMOL 750 MG/1
750 TABLET, FILM COATED ORAL 3 TIMES DAILY PRN
Qty: 15 TABLET | Refills: 0 | Status: SHIPPED | OUTPATIENT
Start: 2024-05-18 | End: 2024-05-23

## 2024-05-18 RX ADMIN — KETOROLAC TROMETHAMINE 30 MG: 30 INJECTION, SOLUTION INTRAMUSCULAR; INTRAVENOUS at 12:05

## 2024-05-18 NOTE — PROGRESS NOTES
"Subjective:      Patient ID: Manuela Reyes is a 74 y.o. female.    Vitals:  height is 5' 7" (1.702 m) and weight is 49.9 kg (110 lb). Her tympanic temperature is 97.3 °F (36.3 °C). Her blood pressure is 143/74 (abnormal) and her pulse is 78. Her respiration is 18 and oxygen saturation is 99%.     Chief Complaint: Back Pain (I think I pulled a muscle in my upper right back. Painful and unable to sleep. - Entered by patient)    Patient presents with upper mid thoracic pain since Wednesday. She states it feels like a spasm.  She has tried biofreeze and heat but not helping  salonpas patches as well. Pain is 8-9 on scale of 10    Back Pain  This is a new problem. The current episode started in the past 7 days. The problem occurs constantly. The problem is unchanged. The pain is present in the thoracic spine. The quality of the pain is described as aching. The pain does not radiate. The pain is at a severity of 8/10. The symptoms are aggravated by position. Pertinent negatives include no abdominal pain, bladder incontinence, bowel incontinence, chest pain, dysuria, fever, headaches, leg pain, numbness, paresis, paresthesias, pelvic pain, perianal numbness, tingling, weakness or weight loss. She has tried heat (biofreeze) for the symptoms. The treatment provided no relief.       Constitution: Negative for fever.   Cardiovascular:  Negative for chest pain.   Gastrointestinal:  Negative for abdominal pain and bowel incontinence.   Genitourinary:  Negative for dysuria, bladder incontinence and pelvic pain.   Musculoskeletal:  Positive for back pain.   Neurological:  Negative for headaches and numbness.      Objective:     Physical Exam   Constitutional: She does not appear ill. No distress.   Musculoskeletal:         General: Tenderness (right mid trapezius, guarded position) present.   Neurological: She is alert.   Nursing note and vitals reviewed.      Assessment:     1. Muscle strain of right upper back, initial " encounter        Plan:       Muscle strain of right upper back, initial encounter  -     methocarbamoL (ROBAXIN) 750 MG Tab; Take 1 tablet (750 mg total) by mouth 3 (three) times daily as needed (muscle spasm). May cause drowsiness, use with caution  Dispense: 15 tablet; Refill: 0  -     ketorolac injection 30 mg      Warm compression, OTC analgesics  OTC advil 200 mg, 2 pills every 8 hours with food, starts in 24 hours  Rx Robaxin muscle relaxer, up to 3 times a day as needed, may cause drowsiness  Follow up as needed

## 2024-05-18 NOTE — PATIENT INSTRUCTIONS
Warm compression, OTC analgesics  OTC advil 200 mg, 2 pills every 8 hours with food, starts in 24 hours  Rx Robaxin muscle relaxer, up to 3 times a day as needed, may cause drowsiness  Follow up as needed

## 2024-05-27 ENCOUNTER — OFFICE VISIT (OUTPATIENT)
Dept: PODIATRY | Facility: CLINIC | Age: 74
End: 2024-05-27
Payer: MEDICARE

## 2024-05-27 VITALS — BODY MASS INDEX: 17.27 KG/M2 | WEIGHT: 110 LBS | HEIGHT: 67 IN

## 2024-05-27 DIAGNOSIS — B35.1 ONYCHOMYCOSIS: Primary | ICD-10-CM

## 2024-05-27 DIAGNOSIS — L60.0 ONYCHOCRYPTOSIS: ICD-10-CM

## 2024-05-27 PROCEDURE — 1125F AMNT PAIN NOTED PAIN PRSNT: CPT | Mod: CPTII,S$GLB,, | Performed by: PODIATRIST

## 2024-05-27 PROCEDURE — 99213 OFFICE O/P EST LOW 20 MIN: CPT | Mod: S$GLB,,, | Performed by: PODIATRIST

## 2024-05-27 PROCEDURE — 1159F MED LIST DOCD IN RCRD: CPT | Mod: CPTII,S$GLB,, | Performed by: PODIATRIST

## 2024-05-27 PROCEDURE — 3288F FALL RISK ASSESSMENT DOCD: CPT | Mod: CPTII,S$GLB,, | Performed by: PODIATRIST

## 2024-05-27 PROCEDURE — 1160F RVW MEDS BY RX/DR IN RCRD: CPT | Mod: CPTII,S$GLB,, | Performed by: PODIATRIST

## 2024-05-27 PROCEDURE — 1101F PT FALLS ASSESS-DOCD LE1/YR: CPT | Mod: CPTII,S$GLB,, | Performed by: PODIATRIST

## 2024-05-27 PROCEDURE — 99999 PR PBB SHADOW E&M-EST. PATIENT-LVL III: CPT | Mod: PBBFAC,,, | Performed by: PODIATRIST

## 2024-05-27 NOTE — PROGRESS NOTES
Subjective:     Patient ID: Manuela Reyes is a 74 y.o. female.    Chief Complaint: Ingrown Toenail (Pt c/o left hallux medial  toe ingrown toenail, pt c/o pain 7/10, non-diabetic pt wears tennis shoes, PCP Dr. Lawler last seen 2-5-24) and Fungus (Pt c/o BL toenail fungus)    Manuela is a 74 y.o. female who presents to the clinic complaining of painful ingrown toenail on the left foot. Patient points to distal left hallux medial and lateral nail borders. Patient states pain in 7/10 when pressed or certain shoes. Patient states she has been using the Jublia as instructed with a lot of improvement with the big toenails. Patient has no other pedal complaints at this time.     Patient Active Problem List   Diagnosis    Crohn's disease of small intestine    Crohn's colitis    Mesenteric artery stenosis    Hyperlipidemia LDL goal <70    Chronic insomnia    Anxiety    Crohn's disease    Family history of cardiovascular disease    Atherosclerosis of abdominal aorta    Age-related osteoporosis without current pathological fracture    Essential hypertension    Decreased ROM of lumbar spine    Immunosuppressed status       Medication List with Changes/Refills   Current Medications    ADALIMUMAB (HUMIRA,CF, PEN) 40 MG/0.4 ML PNKT    Inject 0.4 mLs (40 mg total) into the skin every 14 (fourteen) days.    AMLODIPINE (NORVASC) 10 MG TABLET    Take 1 tablet by mouth once daily    ASPIRIN (ECOTRIN) 81 MG EC TABLET    Take 1 tablet (81 mg total) by mouth once daily.    ATENOLOL (TENORMIN) 25 MG TABLET    Take 1 tablet (25 mg total) by mouth once daily.    CALCIUM CARBONATE (OS-COOPER) 600 MG (1,500 MG) TAB    Take 1,200 mg by mouth once daily.     CYANOCOBALAMIN, VITAMIN B-12, 2,500 MCG LOZG    Place 1 tablet under the tongue.     DENOSUMAB (PROLIA) 60 MG/ML SYRG    Inject 60 mg into the skin.    EFINACONAZOLE (JUBLIA) 10 % ESCOBAR    APPLY TOPICALLY ONCE DAILY.    EFINACONAZOLE (JUBLIA) 10 % ESCOBAR    Apply 1 Act topically once daily.     HYDROCODONE-ACETAMINOPHEN (NORCO) 7.5-325 MG PER TABLET    Take 1 tablet by mouth every 12 (twelve) hours as needed for pain.    LEVOCETIRIZINE (XYZAL) 5 MG TABLET    Take 1 tablet (5 mg total) by mouth every evening.    LORAZEPAM (ATIVAN) 0.5 MG TABLET    Take 3 tablets (1.5 mg total) by mouth every evening.    MULTIVITAMIN WITH MINERALS TABLET    Take 1 tablet by mouth once daily at 6am.    MUPIROCIN (BACTROBAN) 2 % OINTMENT    Apply a small amount to affected area twice a day x 10 days    TRIAMCINOLONE ACETONIDE 0.1% (KENALOG) 0.1 % OINTMENT    Apply a small amount to affected area twice a day x 2 weeks as needed for flares    VALACYCLOVIR (VALTREX) 500 MG TABLET    Take 1 tablet by mouth twice a day as needed for 3 days per flare    VITAMIN D 1000 UNITS TAB    Take 185 mg by mouth once daily.       Review of patient's allergies indicates:   Allergen Reactions    Statins-hmg-coa reductase inhibitors Anaphylaxis     Myalgias    Codeine sulfate Itching    Hydrochlorothiazide Other (See Comments)     Adverse Reaction    Lisinopril Swelling and Edema     Facial Swelling       Past Surgical History:   Procedure Laterality Date    APPENDECTOMY      COLONOSCOPY N/A 05/19/2017    Procedure: COLONOSCOPY;  Surgeon: Jose Luis Leonard MD;  Location: [facility];  Service: Endoscopy;  Laterality: N/A;    COLONOSCOPY N/A 03/26/2018    Procedure: COLONOSCOPY;  Surgeon: Guillaume Whitman MD;  Location: [facility];  Service: Endoscopy;  Laterality: N/A;    COLONOSCOPY N/A 03/19/2019    Procedure: COLONOSCOPY;  Surgeon: Lili Ellis MD;  Location: [facility];  Service: Endoscopy;  Laterality: N/A;    COLONOSCOPY N/A 09/24/2020    Procedure: COLONOSCOPY;  Surgeon: Lili Ellis MD;  Location: [facility];  Service: Endoscopy;  Laterality: N/A;    COLONOSCOPY N/A 7/20/2023    Procedure: COLONOSCOPY;  Surgeon: Lili Ellis MD;  Location: [facility];  Service: Endoscopy;  Laterality: N/A;    ESOPHAGOGASTRODUODENOSCOPY N/A  "2023    Procedure: EGD (ESOPHAGOGASTRODUODENOSCOPY);  Surgeon: Lili Ellis MD;  Location: [facility];  Service: Endoscopy;  Laterality: N/A;    SMALL INTESTINE SURGERY         Family History   Problem Relation Name Age of Onset    Stroke Mother      Heart disease Mother      Cataracts Mother      Cancer Father          Lung    Heart disease Sister      Hypertension Brother      Glaucoma Neg Hx      Macular degeneration Neg Hx      Thyroid disease Neg Hx         Social History     Socioeconomic History    Marital status: Single   Tobacco Use    Smoking status: Former     Current packs/day: 0.00     Types: Cigarettes     Start date: 1985     Quit date: 2005     Years since quittin.4    Smokeless tobacco: Former    Tobacco comments:     Former Light Smoker less than 10 a day   Substance and Sexual Activity    Alcohol use: Yes     Alcohol/week: 4.0 standard drinks of alcohol     Types: 4 Glasses of wine per week     Comment: occ    Drug use: No    Sexual activity: Never     Partners: Female     Social Determinants of Health     Stress: No Stress Concern Present (2020)    [employer] - Occupational Stress Questionnaire     Feeling of Stress : Not at all       Vitals:    24 1050   Weight: 49.9 kg (110 lb 0.2 oz)   Height: 5' 7" (1.702 m)   PainSc:   7     Review of Systems   Constitutional:  Negative for chills and fever.   Respiratory:  Negative for shortness of breath.    Cardiovascular:  Negative for chest pain, palpitations, orthopnea, claudication and leg swelling.   Gastrointestinal:  Negative for diarrhea, nausea and vomiting.   Musculoskeletal:  Negative for joint pain.   Skin:  Negative for rash.   Neurological:  Negative for dizziness, tingling, sensory change, focal weakness and weakness.   Psychiatric/Behavioral: Negative.           Objective:      PHYSICAL EXAM: Apperance: Alert and orient in no distress,well developed, and with good attention to " grooming and body habits  Lower Extremity Exam   VASCULAR: Dorsalis pedis pulses 2/4 bilateral and Posterior Tibial pulses 2/4 bilateral.   DERMATOLOGICAL: No skin rash, subcutaneous nodules, lesions or ulcers observed. Left hallux nail observed to be mildly incurvated at  distal medial and lateral  borders and slight obstructed in the nail grooves with soft tissue.. No purulent drainage, no odor, and no increased temperature observed to left hallux. Nails 2,3,4,5 bilateral minimal clearing noted to proximal nail fold.   NEUROLOGICAL: Light touch, sharp-dull, proprioception all present and equal bilaterally.    MUSCULOSKELETAL: Muscle strength 5/5 for all foot inverters, everters, plantarflexors, and  dorsiflexors bilateral. Pain on palpation of left hallux  distal lateral  nail border. No pain on palpation of dorsal nail plate left hallux.         Assessment:       ICD-10-CM ICD-9-CM   1. Onychomycosis  B35.1 110.1   2. Onychocryptosis - Left Foot  L60.0 703.0       Plan:   Onychomycosis    Onychocryptosis - Left Foot      I counseled the patient on her conditions, regarding findings of my examination, my impressions, and usual treatment plan.   Conservatively we did discuss proper nail cutting for incurvated toenails. Surgically we briefly discussed temporary vs. permanent nail avulsion procedures with patient. The patient elects for conservative management at this time.   Patient instructed to spray all shoes with Lysol disinfectant spray and let dry before wearing. Patient instructed to wash all socks in hot water and bleach.  Discuss treatment options for nail fungus.  I explained that fungus lives in a warm dark moist environment and therefore patient should make every attempt to keep feet clean and dry.  We discussed drying feet thoroughly after shower particularly between the toes and then applying powder between the toes and in the shoes.    For fungal toenails I prescribed topical medication to be used  daily for up to a year.  We also discussed oral Lamisil but I did not recommend it as a first line of treatment since it is an internal medicine that may potentially have side effects, including liver problems.   Patient elects for to continue with Jublia topical treatment.   Patient to return as needed.                     Halima Salas DPM  [facility]

## 2024-06-04 ENCOUNTER — PATIENT MESSAGE (OUTPATIENT)
Dept: RHEUMATOLOGY | Facility: CLINIC | Age: 74
End: 2024-06-04
Payer: MEDICARE

## 2024-06-24 ENCOUNTER — PATIENT MESSAGE (OUTPATIENT)
Dept: GASTROENTEROLOGY | Facility: CLINIC | Age: 74
End: 2024-06-24
Payer: MEDICARE

## 2024-06-26 ENCOUNTER — PATIENT MESSAGE (OUTPATIENT)
Dept: RHEUMATOLOGY | Facility: CLINIC | Age: 74
End: 2024-06-26
Payer: MEDICARE

## 2024-06-27 ENCOUNTER — PATIENT MESSAGE (OUTPATIENT)
Dept: CARDIOLOGY | Facility: CLINIC | Age: 74
End: 2024-06-27
Payer: MEDICARE

## 2024-07-02 ENCOUNTER — PATIENT MESSAGE (OUTPATIENT)
Dept: RHEUMATOLOGY | Facility: CLINIC | Age: 74
End: 2024-07-02
Payer: MEDICARE

## 2024-07-02 ENCOUNTER — OFFICE VISIT (OUTPATIENT)
Dept: OTOLARYNGOLOGY | Facility: CLINIC | Age: 74
End: 2024-07-02
Payer: MEDICARE

## 2024-07-02 VITALS — WEIGHT: 109.38 LBS | BODY MASS INDEX: 16.58 KG/M2 | HEIGHT: 68 IN

## 2024-07-02 DIAGNOSIS — M54.2 NECK PAIN ON RIGHT SIDE: Primary | ICD-10-CM

## 2024-07-02 PROCEDURE — 99213 OFFICE O/P EST LOW 20 MIN: CPT | Mod: S$GLB,,, | Performed by: PHYSICIAN ASSISTANT

## 2024-07-02 PROCEDURE — 99999 PR PBB SHADOW E&M-EST. PATIENT-LVL III: CPT | Mod: PBBFAC,,, | Performed by: PHYSICIAN ASSISTANT

## 2024-07-02 PROCEDURE — 1159F MED LIST DOCD IN RCRD: CPT | Mod: CPTII,S$GLB,, | Performed by: PHYSICIAN ASSISTANT

## 2024-07-02 PROCEDURE — 1126F AMNT PAIN NOTED NONE PRSNT: CPT | Mod: CPTII,S$GLB,, | Performed by: PHYSICIAN ASSISTANT

## 2024-07-02 PROCEDURE — 3008F BODY MASS INDEX DOCD: CPT | Mod: CPTII,S$GLB,, | Performed by: PHYSICIAN ASSISTANT

## 2024-07-02 NOTE — PROGRESS NOTES
Subjective     Patient ID: Manuela Reyes is a 74 y.o. female.    Chief Complaint: Sore Throat and Neck Swelling (Pt c/o of pressure in fullness to the right side of the throat area under the jaw. Pt states no pain is associated with the symptoms, painful to swallow. Symptoms been going on for about 8 days.)    Patient is a pleasant 74 year old female here to see me today for evaluation of fullness and pressure in right side of her neck, under the jaw.  States it started with slight sore throat last Monday (one week ago).  She noticed a small tender lymph node in the right side of her neck.  It was smaller in size a few days later and her sore throat has improved as well.  She denies any fever.  She ear pain or drainage; no drainage in the mouth.  No oral sensitivity.   She does not smoke.  She denies any dysphagia.  She has had recent root canal 6/12/24 and then had pressure in her right cheek.  Her endodontist started her on Amoxil x 7 days (done on 6/23/24).  She did have increased sneezing and nasal drainage several days prior to onset of right neck fullness.  Taking Xyzal and Flonase PRN.  She had similar symptoms in 12/2023 and had US Neck at that time.    Review of Systems   Constitutional:  Negative for fever.   HENT:  Positive for nasal congestion, rhinorrhea and sore throat (improved). Negative for ear discharge, ear pain, mouth sores and trouble swallowing.           Objective     Physical Exam  Constitutional:       General: She is not in acute distress.     Appearance: She is well-developed. She is not ill-appearing.   HENT:      Head: Normocephalic and atraumatic.      Jaw: No trismus, swelling or pain on movement.      Salivary Glands: Right salivary gland is tender.      Comments: Mild tenderness right neck, just posterior to right submandibular gland (ptotic); not enlarged; no overlying erythema.  No discrete node palpable.     Right Ear: Tympanic membrane, ear canal and external ear normal.  No middle ear effusion. Tympanic membrane is not injected or erythematous.      Left Ear: Tympanic membrane, ear canal and external ear normal.  No middle ear effusion. Tympanic membrane is not injected or erythematous.      Nose: Nose normal. No mucosal edema, congestion or rhinorrhea.      Mouth/Throat:      Mouth: Mucous membranes are moist. Mucous membranes are not pale and not dry. No oral lesions.      Tongue: No lesions.      Pharynx: Uvula midline. No oropharyngeal exudate or posterior oropharyngeal erythema.   Eyes:      General: Lids are normal. No scleral icterus.     Extraocular Movements:      Right eye: Normal extraocular motion and no nystagmus.      Left eye: Normal extraocular motion and no nystagmus.      Conjunctiva/sclera: Conjunctivae normal.      Right eye: Right conjunctiva is not injected. No chemosis.     Left eye: Left conjunctiva is not injected. No chemosis.     Pupils: Pupils are equal, round, and reactive to light.   Neck:      Trachea: Trachea and phonation normal. No tracheal tenderness or tracheal deviation.   Pulmonary:      Effort: Pulmonary effort is normal. No respiratory distress.      Breath sounds: No stridor.   Lymphadenopathy:      Head:      Right side of head: No submental, submandibular, preauricular or posterior auricular adenopathy.      Left side of head: No submental, submandibular, preauricular or posterior auricular adenopathy.      Cervical: No cervical adenopathy.   Skin:     General: Skin is warm and dry.      Findings: No erythema or rash.   Neurological:      Mental Status: She is alert and oriented to person, place, and time.      Cranial Nerves: No cranial nerve deficit.   Psychiatric:         Behavior: Behavior normal. Behavior is cooperative.       US Neck 12/2023:  FINDINGS:  There are 2 visualized benign-appearing lymph nodes measuring 9 mm x 4 mm x 6 mm and 7 mm x 3 mm x 4 mm.  Both lymph nodes maintain a central echogenic hilum.  The margins are  well-circumscribed.  No adjacent fluid collection or edema.  Visualized portions of the right submandibular gland appear normal.     Impression:     Benign-appearing lymph nodes               Assessment and Plan     1. Neck pain on right side      No discrete nodes palpable in area of concern today and she has noted improvement but not full resolution of her symptoms since onset 8 days ago.  It's likely reactive lymphadenopathy given her recent dental work.  She has recently completed course of Amoxil from her dentist.  Would hold off on additional oral antibiotics for now.  Instructed her to call with any worsening and would then proceed with US of the neck.   Recommend aggressive hydration with plenty of water during the day.       No follow-ups on file.

## 2024-07-08 ENCOUNTER — PATIENT MESSAGE (OUTPATIENT)
Dept: OTOLARYNGOLOGY | Facility: CLINIC | Age: 74
End: 2024-07-08
Payer: MEDICARE

## 2024-07-11 ENCOUNTER — OFFICE VISIT (OUTPATIENT)
Dept: PODIATRY | Facility: CLINIC | Age: 74
End: 2024-07-11
Payer: MEDICARE

## 2024-07-11 VITALS — WEIGHT: 109.38 LBS | BODY MASS INDEX: 16.58 KG/M2 | HEIGHT: 68 IN

## 2024-07-11 DIAGNOSIS — L60.0 INGROWN TOENAIL OF LEFT FOOT: Primary | ICD-10-CM

## 2024-07-11 PROCEDURE — 99999 PR PBB SHADOW E&M-EST. PATIENT-LVL III: CPT | Mod: PBBFAC,,, | Performed by: PODIATRIST

## 2024-07-11 RX ORDER — HYDROCODONE BITARTRATE AND ACETAMINOPHEN 5; 325 MG/1; MG/1
1 TABLET ORAL EVERY 6 HOURS PRN
Qty: 7 TABLET | Refills: 0 | Status: SHIPPED | OUTPATIENT
Start: 2024-07-11

## 2024-07-11 NOTE — PROGRESS NOTES
Subjective:     Patient ID: Manuela Reyes is a 74 y.o. female.    Chief Complaint: Ingrown Toenail (C/o ingrown toenail, left great toenail, medial border, rates pain 6/10, pain started 2 days ago, non-diabetic, wears tennis and socks, last seen PCP Christelle Lawler on 02/05/24)    Manuela is a 74 y.o. female who presents to the clinic complaining of painful left ingrown toenail. Manuela is inquiring about treatment options. Patient points to left medial hallux toenail. Patient rates pain 6/10. Patient has no other pedal complaints at this time.     Patient Active Problem List   Diagnosis    Crohn's disease of small intestine    Crohn's colitis    Mesenteric artery stenosis    Hyperlipidemia LDL goal <70    Chronic insomnia    Anxiety    Crohn's disease    Family history of cardiovascular disease    Atherosclerosis of abdominal aorta    Age-related osteoporosis without current pathological fracture    Essential hypertension    Decreased ROM of lumbar spine    Immunosuppressed status       Medication List with Changes/Refills   Current Medications    ADALIMUMAB (HUMIRA,CF, PEN) 40 MG/0.4 ML PNKT    Inject 0.4 mLs (40 mg total) into the skin every 14 (fourteen) days.    AMLODIPINE (NORVASC) 10 MG TABLET    Take 1 tablet by mouth once daily    AMOXICILLIN (AMOXIL) 500 MG CAPSULE    TAKE 1 CAPSULE BY MOUTH 4 TIMES DAILY UNTIL GONE.    ASPIRIN (ECOTRIN) 81 MG EC TABLET    Take 1 tablet (81 mg total) by mouth once daily.    ATENOLOL (TENORMIN) 25 MG TABLET    Take 1 tablet (25 mg total) by mouth once daily.    CALCIUM CARBONATE (OS-COOPER) 600 MG (1,500 MG) TAB    Take 1,200 mg by mouth once daily.     CYANOCOBALAMIN, VITAMIN B-12, 2,500 MCG LOZG    Place 1 tablet under the tongue.     DENOSUMAB (PROLIA) 60 MG/ML SYRG    Inject 60 mg into the skin.    EFINACONAZOLE (JUBLIA) 10 % ESCOBAR    APPLY TOPICALLY ONCE DAILY.    EFINACONAZOLE (JUBLIA) 10 % ESCOBAR    Apply 1 Act topically once daily.    HYDROCODONE-ACETAMINOPHEN (NORCO)  7.5-325 MG PER TABLET    Take 1 tablet by mouth every 12 (twelve) hours as needed for pain.    LEVOCETIRIZINE (XYZAL) 5 MG TABLET    Take 1 tablet (5 mg total) by mouth every evening.    LORAZEPAM (ATIVAN) 0.5 MG TABLET    Take 3 tablets (1.5 mg total) by mouth every evening.    MULTIVITAMIN WITH MINERALS TABLET    Take 1 tablet by mouth once daily at 6am.    MUPIROCIN (BACTROBAN) 2 % OINTMENT    Apply a small amount to affected area twice a day x 10 days    TRIAMCINOLONE ACETONIDE 0.1% (KENALOG) 0.1 % OINTMENT    Apply a small amount to affected area twice a day x 2 weeks as needed for flares    VALACYCLOVIR (VALTREX) 500 MG TABLET    Take 1 tablet by mouth twice a day as needed for 3 days per flare    VITAMIN D 1000 UNITS TAB    Take 185 mg by mouth once daily.       Review of patient's allergies indicates:   Allergen Reactions    Statins-hmg-coa reductase inhibitors Anaphylaxis     Myalgias    Codeine sulfate Itching    Hydrochlorothiazide Other (See Comments)     Adverse Reaction    Lisinopril Swelling and Edema     Facial Swelling       Past Surgical History:   Procedure Laterality Date    APPENDECTOMY      COLONOSCOPY N/A 05/19/2017    Procedure: COLONOSCOPY;  Surgeon: Jose Luis Leonard MD;  Location: [facility];  Service: Endoscopy;  Laterality: N/A;    COLONOSCOPY N/A 03/26/2018    Procedure: COLONOSCOPY;  Surgeon: Guillaume Whitman MD;  Location: [facility];  Service: Endoscopy;  Laterality: N/A;    COLONOSCOPY N/A 03/19/2019    Procedure: COLONOSCOPY;  Surgeon: Lili Ellis MD;  Location: [facility];  Service: Endoscopy;  Laterality: N/A;    COLONOSCOPY N/A 09/24/2020    Procedure: COLONOSCOPY;  Surgeon: Lili Ellis MD;  Location: [facility];  Service: Endoscopy;  Laterality: N/A;    COLONOSCOPY N/A 7/20/2023    Procedure: COLONOSCOPY;  Surgeon: Lili Ellis MD;  Location: [facility];  Service: Endoscopy;  Laterality: N/A;    ESOPHAGOGASTRODUODENOSCOPY N/A 7/20/2023    Procedure: EGD  "(ESOPHAGOGASTRODUODENOSCOPY);  Surgeon: Lili Ellis MD;  Location: [facility];  Service: Endoscopy;  Laterality: N/A;    SMALL INTESTINE SURGERY         Family History   Problem Relation Name Age of Onset    Stroke Mother      Heart disease Mother      Cataracts Mother      Cancer Father          Lung    Heart disease Sister      Hypertension Brother      Glaucoma Neg Hx      Macular degeneration Neg Hx      Thyroid disease Neg Hx         Social History     Socioeconomic History    Marital status: Single   Tobacco Use    Smoking status: Former     Current packs/day: 0.00     Types: Cigarettes     Start date: 1985     Quit date: 2005     Years since quittin.5    Smokeless tobacco: Former    Tobacco comments:     Former Light Smoker less than 10 a day   Substance and Sexual Activity    Alcohol use: Yes     Alcohol/week: 4.0 standard drinks of alcohol     Types: 4 Glasses of wine per week     Comment: occ    Drug use: No    Sexual activity: Never     Partners: Female     Social Determinants of Health     Stress: No Stress Concern Present (2020)    [employer] - Occupational Stress Questionnaire     Feeling of Stress : Not at all       Vitals:    24 1357   Weight: 49.6 kg (109 lb 5.6 oz)   Height: 5' 8.39" (1.737 m)   PainSc:   6       Review of Systems   Constitutional:  Negative for chills and fever.   Respiratory:  Negative for shortness of breath.    Cardiovascular:  Negative for chest pain, palpitations, orthopnea, claudication and leg swelling.   Gastrointestinal:  Negative for diarrhea, nausea and vomiting.   Musculoskeletal:  Negative for joint pain.   Skin:  Negative for rash.   Neurological:  Negative for dizziness, tingling, sensory change, focal weakness and weakness.   Psychiatric/Behavioral: Negative.               Objective:      PHYSICAL EXAM: Apperance: Alert and orient in no distress,well developed, and with good attention to grooming and body " habits  Lower Extremity Exam   VASCULAR: Dorsalis pedis pulses 2/4 left and Posterior Tibial pulses 2/4 left.   DERMATOLOGICAL: No skin rash, subcutaneous nodules, lesions or ulcers observed. Left hallux nail observed to be mildly incurvated at medial borders and slight obstructed in the nail grooves with soft tissue.  No purulent drainage, no odor, and no increased temperature observed to left hallux.   NEUROLOGICAL: Light touch, sharp-dull, proprioception all present and equal bilaterally.    MUSCULOSKELETAL: Muscle strength 5/5 for all foot inverters, everters, plantarflexors, and  dorsiflexors bilateral. Pain on palpation of left hallux lateral nail border. No pain on palpation of dorsal nail plate left hallux.          Assessment:       ICD-10-CM ICD-9-CM   1. Ingrown toenail of left foot - Left Foot  L60.0 703.0       Plan:   Ingrown toenail of left foot - Left Foot    I counseled the patient on her conditions, regarding findings of my examination, my impressions, and usual treatment plan.   Patient consented verbal and written to permanent partial nail avulsion of left hallux.  Procedure performed: A local digital melissa was administered to the left hallux of  6cc of 1% Lidocaine plain. Attention was then directed to the left hallux to check for adequate anesthesia. A penrose drain tourniquet was applied to the base of the left hallux for hemostasis.  Attention was then directed the medial nail border to separate using a spatula the most proximal nail border at the eponychium was released to the area of the matrix. Then the border was released at the medial aspect and at the plantar aspect distally to proximally. Using the English anvil, a cut was then made approximately 3mm lateral to the medial nail fold in a longitudinal manner at the distal aspect extending to the proximal end of the nail plate. Using a straight hemostat, the free nail plate segment was clamped and removed. Using a tissue nipper, residual  tissue was then removed. The nail groove area was inspected and probed proximally with a curette for any spicules. Next a 60 second application of phenol was applied to the medial nail border. The area was flushed with copious amounts of sterile normal saline. Betadine was applied to the area and dry sterile dressing of gauze and Coflex. The penrose drain tourniquet was released. Neurovascular status was assessed and noted to be intact. Patient tolerated procedure well.   The patient was given oral and written instructions for post-op care including BID soaks of water and Epson salt followed by application of antibiotic ointment  and light dressing.  The patient was instructed to take Tylenol over-the-counter q4h prn pain and to call clinic immediately if there is increased pain, increased redness, pus, fever, chills, nausea, or vomiting present.  Patient to return 2 weeks or sooner if needed.        Halima Salas DPM  [facility]

## 2024-07-15 ENCOUNTER — PATIENT MESSAGE (OUTPATIENT)
Dept: PODIATRY | Facility: CLINIC | Age: 74
End: 2024-07-15
Payer: MEDICARE

## 2024-07-18 ENCOUNTER — LAB VISIT (OUTPATIENT)
Dept: LAB | Facility: HOSPITAL | Age: 74
End: 2024-07-18
Attending: INTERNAL MEDICINE
Payer: MEDICARE

## 2024-07-18 ENCOUNTER — PATIENT MESSAGE (OUTPATIENT)
Dept: RHEUMATOLOGY | Facility: CLINIC | Age: 74
End: 2024-07-18
Payer: MEDICARE

## 2024-07-18 DIAGNOSIS — E78.5 HYPERLIPIDEMIA LDL GOAL <70: ICD-10-CM

## 2024-07-18 DIAGNOSIS — M81.0 AGE-RELATED OSTEOPOROSIS WITHOUT CURRENT PATHOLOGICAL FRACTURE: ICD-10-CM

## 2024-07-18 LAB
25(OH)D3+25(OH)D2 SERPL-MCNC: 54 NG/ML (ref 30–96)
ALBUMIN SERPL BCP-MCNC: 4.1 G/DL (ref 3.5–5.2)
ALBUMIN SERPL BCP-MCNC: 4.1 G/DL (ref 3.5–5.2)
ALP SERPL-CCNC: 41 U/L (ref 55–135)
ALP SERPL-CCNC: 41 U/L (ref 55–135)
ALT SERPL W/O P-5'-P-CCNC: 22 U/L (ref 10–44)
ALT SERPL W/O P-5'-P-CCNC: 22 U/L (ref 10–44)
ANION GAP SERPL CALC-SCNC: 7 MMOL/L (ref 8–16)
ANION GAP SERPL CALC-SCNC: 7 MMOL/L (ref 8–16)
AST SERPL-CCNC: 25 U/L (ref 10–40)
AST SERPL-CCNC: 25 U/L (ref 10–40)
BILIRUB SERPL-MCNC: 0.5 MG/DL (ref 0.1–1)
BILIRUB SERPL-MCNC: 0.5 MG/DL (ref 0.1–1)
BUN SERPL-MCNC: 18 MG/DL (ref 8–23)
BUN SERPL-MCNC: 18 MG/DL (ref 8–23)
CALCIUM SERPL-MCNC: 9.4 MG/DL (ref 8.7–10.5)
CALCIUM SERPL-MCNC: 9.4 MG/DL (ref 8.7–10.5)
CHLORIDE SERPL-SCNC: 99 MMOL/L (ref 95–110)
CHLORIDE SERPL-SCNC: 99 MMOL/L (ref 95–110)
CHOLEST SERPL-MCNC: 193 MG/DL (ref 120–199)
CHOLEST/HDLC SERPL: 2.1 {RATIO} (ref 2–5)
CO2 SERPL-SCNC: 27 MMOL/L (ref 23–29)
CO2 SERPL-SCNC: 27 MMOL/L (ref 23–29)
CREAT SERPL-MCNC: 0.9 MG/DL (ref 0.5–1.4)
CREAT SERPL-MCNC: 0.9 MG/DL (ref 0.5–1.4)
EST. GFR  (NO RACE VARIABLE): >60 ML/MIN/1.73 M^2
EST. GFR  (NO RACE VARIABLE): >60 ML/MIN/1.73 M^2
GLUCOSE SERPL-MCNC: 104 MG/DL (ref 70–110)
GLUCOSE SERPL-MCNC: 104 MG/DL (ref 70–110)
HDLC SERPL-MCNC: 90 MG/DL (ref 40–75)
HDLC SERPL: 46.6 % (ref 20–50)
LDLC SERPL CALC-MCNC: 90.6 MG/DL (ref 63–159)
NONHDLC SERPL-MCNC: 103 MG/DL
POTASSIUM SERPL-SCNC: 4.4 MMOL/L (ref 3.5–5.1)
POTASSIUM SERPL-SCNC: 4.4 MMOL/L (ref 3.5–5.1)
PROT SERPL-MCNC: 7.6 G/DL (ref 6–8.4)
PROT SERPL-MCNC: 7.6 G/DL (ref 6–8.4)
SODIUM SERPL-SCNC: 133 MMOL/L (ref 136–145)
SODIUM SERPL-SCNC: 133 MMOL/L (ref 136–145)
TRIGL SERPL-MCNC: 62 MG/DL (ref 30–150)

## 2024-07-18 PROCEDURE — 36415 COLL VENOUS BLD VENIPUNCTURE: CPT | Performed by: INTERNAL MEDICINE

## 2024-07-18 PROCEDURE — 80061 LIPID PANEL: CPT | Performed by: INTERNAL MEDICINE

## 2024-07-18 PROCEDURE — 80053 COMPREHEN METABOLIC PANEL: CPT | Performed by: INTERNAL MEDICINE

## 2024-07-18 PROCEDURE — 82306 VITAMIN D 25 HYDROXY: CPT | Performed by: INTERNAL MEDICINE

## 2024-07-19 ENCOUNTER — PATIENT MESSAGE (OUTPATIENT)
Dept: CARDIOLOGY | Facility: CLINIC | Age: 74
End: 2024-07-19
Payer: MEDICARE

## 2024-07-22 ENCOUNTER — TELEPHONE (OUTPATIENT)
Dept: CARDIOLOGY | Facility: CLINIC | Age: 74
End: 2024-07-22
Payer: MEDICARE

## 2024-07-22 NOTE — TELEPHONE ENCOUNTER
Sent a my chart message  ----- Message from Eugenia Muñoz MD sent at 7/19/2024  8:53 AM CDT -----  Needs better compliance with cholesterol

## 2024-07-23 ENCOUNTER — OFFICE VISIT (OUTPATIENT)
Dept: INTERNAL MEDICINE | Facility: CLINIC | Age: 74
End: 2024-07-23
Payer: MEDICARE

## 2024-07-23 VITALS
OXYGEN SATURATION: 99 % | DIASTOLIC BLOOD PRESSURE: 82 MMHG | BODY MASS INDEX: 16.97 KG/M2 | SYSTOLIC BLOOD PRESSURE: 122 MMHG | WEIGHT: 112.88 LBS | HEART RATE: 70 BPM | RESPIRATION RATE: 20 BRPM | TEMPERATURE: 96 F

## 2024-07-23 DIAGNOSIS — I10 ESSENTIAL HYPERTENSION: Primary | ICD-10-CM

## 2024-07-23 DIAGNOSIS — I70.0 ATHEROSCLEROSIS OF ABDOMINAL AORTA: ICD-10-CM

## 2024-07-23 DIAGNOSIS — E78.5 HYPERLIPIDEMIA LDL GOAL <70: ICD-10-CM

## 2024-07-23 DIAGNOSIS — F51.04 CHRONIC INSOMNIA: ICD-10-CM

## 2024-07-23 PROCEDURE — 99999 PR PBB SHADOW E&M-EST. PATIENT-LVL V: CPT | Mod: PBBFAC,,, | Performed by: PHYSICIAN ASSISTANT

## 2024-07-23 PROCEDURE — 1159F MED LIST DOCD IN RCRD: CPT | Mod: CPTII,S$GLB,, | Performed by: PHYSICIAN ASSISTANT

## 2024-07-23 PROCEDURE — 3008F BODY MASS INDEX DOCD: CPT | Mod: CPTII,S$GLB,, | Performed by: PHYSICIAN ASSISTANT

## 2024-07-23 PROCEDURE — 99214 OFFICE O/P EST MOD 30 MIN: CPT | Mod: S$GLB,,, | Performed by: PHYSICIAN ASSISTANT

## 2024-07-23 PROCEDURE — 3074F SYST BP LT 130 MM HG: CPT | Mod: CPTII,S$GLB,, | Performed by: PHYSICIAN ASSISTANT

## 2024-07-23 PROCEDURE — 1101F PT FALLS ASSESS-DOCD LE1/YR: CPT | Mod: CPTII,S$GLB,, | Performed by: PHYSICIAN ASSISTANT

## 2024-07-23 PROCEDURE — 3288F FALL RISK ASSESSMENT DOCD: CPT | Mod: CPTII,S$GLB,, | Performed by: PHYSICIAN ASSISTANT

## 2024-07-23 PROCEDURE — 3079F DIAST BP 80-89 MM HG: CPT | Mod: CPTII,S$GLB,, | Performed by: PHYSICIAN ASSISTANT

## 2024-07-23 PROCEDURE — 1126F AMNT PAIN NOTED NONE PRSNT: CPT | Mod: CPTII,S$GLB,, | Performed by: PHYSICIAN ASSISTANT

## 2024-07-23 PROCEDURE — G2211 COMPLEX E/M VISIT ADD ON: HCPCS | Mod: S$GLB,,, | Performed by: PHYSICIAN ASSISTANT

## 2024-07-23 PROCEDURE — 1160F RVW MEDS BY RX/DR IN RCRD: CPT | Mod: CPTII,S$GLB,, | Performed by: PHYSICIAN ASSISTANT

## 2024-07-23 RX ORDER — LORAZEPAM 0.5 MG/1
1.5 TABLET ORAL NIGHTLY
Qty: 90 TABLET | Refills: 5 | Status: CANCELLED | OUTPATIENT
Start: 2024-07-23

## 2024-07-23 RX ORDER — AMLODIPINE BESYLATE 10 MG/1
TABLET ORAL
Qty: 90 TABLET | Refills: 1 | Status: SHIPPED | OUTPATIENT
Start: 2024-07-23

## 2024-07-23 NOTE — PROGRESS NOTES
Subjective:      Patient ID: Manuela Reyes is a 74 y.o. female.    Chief Complaint: Follow-up (She is here for a follow up. States she needs to discuss medication refills. )    Patient is known to me, being seen today for follow up.     HTN- atenolol 25mg, amlodipine 10mg   Insomnia- ativan 1.5mg   HLD- allergy to statins, Card recently recommended low fat diet, takes cholestOff   Eats fairly healthy, walks regularly     Labs recently completed, cholesterol elevated from previous     Last visit Feb 2024       Review of Systems   Constitutional:  Negative for chills, diaphoresis and fever.   HENT:  Negative for congestion, rhinorrhea and sore throat.    Respiratory:  Negative for cough, shortness of breath and wheezing.    Gastrointestinal:  Negative for abdominal pain, constipation, diarrhea, nausea and vomiting.   Skin:  Negative for rash.   Neurological:  Negative for dizziness, light-headedness and headaches.       Objective:   /82 (BP Location: Left arm, Patient Position: Sitting, BP Method: Medium (Manual))   Pulse 70   Temp 96.4 °F (35.8 °C) (Tympanic)   Resp 20   Wt 51.2 kg (112 lb 14 oz)   SpO2 99%   BMI 16.97 kg/m²   Physical Exam  Constitutional:       General: She is not in acute distress.     Appearance: Normal appearance. She is well-developed. She is not ill-appearing.   HENT:      Head: Normocephalic and atraumatic.   Cardiovascular:      Rate and Rhythm: Normal rate and regular rhythm.      Heart sounds: Normal heart sounds. No murmur heard.  Pulmonary:      Effort: Pulmonary effort is normal. No respiratory distress.      Breath sounds: Normal breath sounds. No decreased breath sounds.   Musculoskeletal:      Right lower leg: No edema.      Left lower leg: No edema.   Skin:     General: Skin is warm and dry.      Findings: No rash.   Psychiatric:         Speech: Speech normal.         Behavior: Behavior normal.         Thought Content: Thought content normal.       Assessment:       1. Essential hypertension    2. Hyperlipidemia LDL goal <70    3. Atherosclerosis of abdominal aorta    4. Chronic insomnia       Plan:   Essential hypertension  -     amLODIPine (NORVASC) 10 MG tablet; Take 1 tablet by mouth once daily  Dispense: 90 tablet; Refill: 1    Hyperlipidemia LDL goal <70  -     Ambulatory Referral/Consult to Lifestyle Nutrition; Future; Expected date: 07/30/2024    Atherosclerosis of abdominal aorta  -     Ambulatory Referral/Consult to Lifestyle Nutrition; Future; Expected date: 07/30/2024    Chronic insomnia    Ativan to be pended to PCP    Discussed low fat diet, heart healthy exercise   Talk w Card regarding statin alternatives     The 10-year ASCVD risk score (Martha GARG, et al., 2019) is: 17.2%    Values used to calculate the score:      Age: 74 years      Sex: Female      Is Non- : No      Diabetic: No      Tobacco smoker: No      Systolic Blood Pressure: 122 mmHg      Is BP treated: Yes      HDL Cholesterol: 90 mg/dL      Total Cholesterol: 193 mg/dL    6mth f/u PCP

## 2024-07-24 ENCOUNTER — PATIENT MESSAGE (OUTPATIENT)
Dept: PODIATRY | Facility: CLINIC | Age: 74
End: 2024-07-24
Payer: MEDICARE

## 2024-07-24 ENCOUNTER — OFFICE VISIT (OUTPATIENT)
Dept: RHEUMATOLOGY | Facility: CLINIC | Age: 74
End: 2024-07-24
Payer: MEDICARE

## 2024-07-24 ENCOUNTER — PATIENT MESSAGE (OUTPATIENT)
Dept: PRIMARY CARE CLINIC | Facility: CLINIC | Age: 74
End: 2024-07-24
Payer: MEDICARE

## 2024-07-24 ENCOUNTER — PATIENT MESSAGE (OUTPATIENT)
Dept: INTERNAL MEDICINE | Facility: CLINIC | Age: 74
End: 2024-07-24
Payer: MEDICARE

## 2024-07-24 ENCOUNTER — PATIENT MESSAGE (OUTPATIENT)
Dept: RHEUMATOLOGY | Facility: CLINIC | Age: 74
End: 2024-07-24

## 2024-07-24 VITALS
SYSTOLIC BLOOD PRESSURE: 160 MMHG | BODY MASS INDEX: 17 KG/M2 | DIASTOLIC BLOOD PRESSURE: 82 MMHG | HEART RATE: 76 BPM | HEIGHT: 68 IN | WEIGHT: 112.19 LBS

## 2024-07-24 DIAGNOSIS — K50.90 CROHN'S DISEASE WITHOUT COMPLICATION, UNSPECIFIED GASTROINTESTINAL TRACT LOCATION: ICD-10-CM

## 2024-07-24 DIAGNOSIS — Z87.442 HISTORY OF NEPHROLITHIASIS: ICD-10-CM

## 2024-07-24 DIAGNOSIS — Z51.81 MEDICATION MONITORING ENCOUNTER: ICD-10-CM

## 2024-07-24 DIAGNOSIS — F51.04 CHRONIC INSOMNIA: ICD-10-CM

## 2024-07-24 DIAGNOSIS — Z01.20 DENTAL EXAMINATION: ICD-10-CM

## 2024-07-24 DIAGNOSIS — F41.9 ANXIETY: ICD-10-CM

## 2024-07-24 DIAGNOSIS — M81.0 AGE RELATED OSTEOPOROSIS, UNSPECIFIED PATHOLOGICAL FRACTURE PRESENCE: Primary | ICD-10-CM

## 2024-07-24 PROCEDURE — 99215 OFFICE O/P EST HI 40 MIN: CPT | Mod: S$GLB,,, | Performed by: INTERNAL MEDICINE

## 2024-07-24 PROCEDURE — 3079F DIAST BP 80-89 MM HG: CPT | Mod: CPTII,S$GLB,, | Performed by: INTERNAL MEDICINE

## 2024-07-24 PROCEDURE — 1101F PT FALLS ASSESS-DOCD LE1/YR: CPT | Mod: CPTII,S$GLB,, | Performed by: INTERNAL MEDICINE

## 2024-07-24 PROCEDURE — 3008F BODY MASS INDEX DOCD: CPT | Mod: CPTII,S$GLB,, | Performed by: INTERNAL MEDICINE

## 2024-07-24 PROCEDURE — 3288F FALL RISK ASSESSMENT DOCD: CPT | Mod: CPTII,S$GLB,, | Performed by: INTERNAL MEDICINE

## 2024-07-24 PROCEDURE — 1159F MED LIST DOCD IN RCRD: CPT | Mod: CPTII,S$GLB,, | Performed by: INTERNAL MEDICINE

## 2024-07-24 PROCEDURE — 1125F AMNT PAIN NOTED PAIN PRSNT: CPT | Mod: CPTII,S$GLB,, | Performed by: INTERNAL MEDICINE

## 2024-07-24 PROCEDURE — 99999 PR PBB SHADOW E&M-EST. PATIENT-LVL III: CPT | Mod: PBBFAC,,, | Performed by: INTERNAL MEDICINE

## 2024-07-24 PROCEDURE — 3077F SYST BP >= 140 MM HG: CPT | Mod: CPTII,S$GLB,, | Performed by: INTERNAL MEDICINE

## 2024-07-24 NOTE — PROGRESS NOTES
RHEUMATOLOGY OUTPATIENT CLINIC NOTE    7/24/2024    Attending Rheumatologist: John Brown  Primary Care Provider/Physician Requesting Consultation: Christelle Lawler DO   Chief Complaint/Reason For Consultation:  Osteoporosis      Subjective:     Manuela Reyes is a 74 y.o. White female with OP    Increased gum sensitivity, awaiting dental evaluation.  No fractures since last visit.    Addendum 7/25: requesting assistance from our clinical pharmacist in efforts to find data on best osteoporosis therapy in context of gender reassignment.  I did not find much difference in terms of standard of care for osteoporosis regarding pharmacotherapy with bone antiresorptive or bone anabolic treatment.    Review of Systems   Genitourinary:         Hx of nephrolithiasis     Chronic comorbid conditions affecting medical decision making today:  Past Medical History:   Diagnosis Date    Anxiety     Crohn's disease     Depression     Genital herpes     Hepatitis C infection     Hypertension     Insomnia     Mesenteric artery stenosis     Dr. 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 Malaika Mack--dermatology    TIA (transient ischemic attack)      Past Surgical History:   Procedure Laterality Date    APPENDECTOMY      COLONOSCOPY N/A 05/19/2017    Procedure: COLONOSCOPY;  Surgeon: Jose Luis Leonard MD;  Location: [facility];  Service: Endoscopy;  Laterality: N/A;    COLONOSCOPY N/A 03/26/2018    Procedure: COLONOSCOPY;  Surgeon: Guillaume Whitman MD;  Location: [facility];  Service: Endoscopy;  Laterality: N/A;    COLONOSCOPY N/A 03/19/2019    Procedure: COLONOSCOPY;  Surgeon: Lili Ellis MD;  Location: [facility];  Service: Endoscopy;  Laterality: N/A;    COLONOSCOPY N/A 09/24/2020    Procedure: COLONOSCOPY;  Surgeon: Lili Ellis MD;  Location: [facility];  Service: Endoscopy;  Laterality: N/A;    COLONOSCOPY N/A 7/20/2023    Procedure:  COLONOSCOPY;  Surgeon: Lili Ellis MD;  Location: [facility];  Service: Endoscopy;  Laterality: N/A;    ESOPHAGOGASTRODUODENOSCOPY N/A 2023    Procedure: EGD (ESOPHAGOGASTRODUODENOSCOPY);  Surgeon: Lili Ellis MD;  Location: [facility] ENDO;  Service: Endoscopy;  Laterality: N/A;    SMALL INTESTINE SURGERY       Family History   Problem Relation Name Age of Onset    Stroke Mother      Heart disease Mother      Cataracts Mother      Cancer Father          Lung    Heart disease Sister      Hypertension Brother      Glaucoma Neg Hx      Macular degeneration Neg Hx      Thyroid disease Neg Hx       Social History     Tobacco Use   Smoking Status Former    Current packs/day: 0.00    Types: Cigarettes    Start date: 1985    Quit date: 2005    Years since quittin.5   Smokeless Tobacco Former   Tobacco Comments    Former Light Smoker less than 10 a day       Current Outpatient Medications:     adalimumab (HUMIRA,CF, PEN) 40 mg/0.4 mL PnKt, Inject 0.4 mLs (40 mg total) into the skin every 14 (fourteen) days., Disp: 6 pen , Rfl: 3    amLODIPine (NORVASC) 10 MG tablet, Take 1 tablet by mouth once daily, Disp: 90 tablet, Rfl: 1    aspirin (ECOTRIN) 81 MG EC tablet, Take 1 tablet (81 mg total) by mouth once daily., Disp: , Rfl:     atenoloL (TENORMIN) 25 MG tablet, Take 1 tablet (25 mg total) by mouth once daily., Disp: 30 tablet, Rfl: 11    calcium carbonate (OS-COOPER) 600 mg (1,500 mg) Tab, Take 1,200 mg by mouth once daily. , Disp: , Rfl:     cyanocobalamin, vitamin B-12, 2,500 mcg Lozg, Place 1 tablet under the tongue. , Disp: , Rfl:     denosumab (PROLIA) 60 mg/mL Syrg, Inject 60 mg into the skin., Disp: , Rfl:     efinaconazole (JUBLIA) 10 % Kike, APPLY TOPICALLY ONCE DAILY., Disp: 4 mL, Rfl: 3    levocetirizine (XYZAL) 5 MG tablet, Take 1 tablet (5 mg total) by mouth every evening., Disp: 90 tablet, Rfl: 3    LORazepam (ATIVAN) 0.5 MG tablet, Take 3 tablets (1.5 mg total) by mouth every evening., Disp:  90 tablet, Rfl: 5    multivitamin with minerals tablet, Take 1 tablet by mouth once daily at 6am., Disp: , Rfl:     mupirocin (BACTROBAN) 2 % ointment, Apply a small amount to affected area twice a day x 10 days, Disp: 22 g, Rfl: 0    vitamin D 1000 units Tab, Take 185 mg by mouth once daily., Disp: , Rfl:     amoxicillin (AMOXIL) 500 MG capsule, TAKE 1 CAPSULE BY MOUTH 4 TIMES DAILY UNTIL GONE., Disp: 28 capsule, Rfl: 1    efinaconazole (JUBLIA) 10 % Kike, Apply 1 Act topically once daily., Disp: 8 mL, Rfl: 3    HYDROcodone-acetaminophen (NORCO) 5-325 mg per tablet, Take 1 tablet by mouth every 6 (six) hours as needed for Pain. (Patient not taking: Reported on 7/24/2024), Disp: 7 tablet, Rfl: 0    triamcinolone acetonide 0.1% (KENALOG) 0.1 % ointment, Apply a small amount to affected area twice a day x 2 weeks as needed for flares, Disp: 30 g, Rfl: 4    valACYclovir (VALTREX) 500 MG tablet, Take 1 tablet by mouth twice a day as needed for 3 days per flare (Patient not taking: Reported on 7/24/2024), Disp: 30 tablet, Rfl: 0     Objective:     Vitals:    07/24/24 1149   BP: (!) 160/82   Pulse: 76     Physical Exam   Pulmonary/Chest: Effort normal. No respiratory distress.   Musculoskeletal:         General: No swelling. Normal range of motion.   Skin: No rash noted.       Reviewed available old and all outside pertinent medical records available.    All lab results personally reviewed and interpreted by me.       ASSESSMENT / PLAN     1. Age related osteoporosis, unspecified pathological fracture presence  Chronic therapy w/ Prolia (2020-), following sub adequate response to Boniva.  Repeat DXA w/ apparent stagnant severe osteoporotic T scores.  Suggest REclast 2 year course to replace prolia.  Patient will follow with clinic within 2 months, after dental evaluation regarding either staying on Prolia or proceed with REclast.  Ca/vit D supp OTC.      2. Medication monitoring encounter  Comprehensive Metabolic Panel       3. History of nephrolithiasis  Increased risk of incidence w/ bone anabolic therapy, not recommended.      4. Crohn's disease without complication, unspecified gastrointestinal tract location  sCa and vit D within acceptable range.   5.      Increased gum sensitivity                                       Rule out MRONJ, recommend dental evaluation             John Brown M.D.

## 2024-07-24 NOTE — Clinical Note
Discussed patient.  Any difference of standard of care regarding pharmacotherapy for osteoporosis?  Absolute contraindication to Evenity (Bilateral nonobstructing renal calculi)? My current plan is to replace Prolia by Reclast once per year x3 total doses, once MRONJ ruled out. Thank you in advance!

## 2024-07-24 NOTE — Clinical Note
Patient will follow with clinic within 2 months, after dental evaluation regarding either staying on Prolia or proceed with Reclast instead.

## 2024-07-24 NOTE — TELEPHONE ENCOUNTER
No care due was identified.  [facility] Embedded Care Due Messages. Reference number: [id number].   7/24/2024 3:42:29 PM CDT

## 2024-07-25 ENCOUNTER — TELEPHONE (OUTPATIENT)
Dept: RHEUMATOLOGY | Facility: CLINIC | Age: 74
End: 2024-07-25
Payer: MEDICARE

## 2024-07-25 ENCOUNTER — OFFICE VISIT (OUTPATIENT)
Dept: PODIATRY | Facility: CLINIC | Age: 74
End: 2024-07-25
Payer: MEDICARE

## 2024-07-25 VITALS — HEIGHT: 68 IN | BODY MASS INDEX: 17 KG/M2 | WEIGHT: 112.19 LBS

## 2024-07-25 DIAGNOSIS — F41.9 ANXIETY: ICD-10-CM

## 2024-07-25 DIAGNOSIS — L60.0 INGROWN TOENAIL OF LEFT FOOT: Primary | ICD-10-CM

## 2024-07-25 DIAGNOSIS — F51.04 CHRONIC INSOMNIA: ICD-10-CM

## 2024-07-25 PROCEDURE — 1159F MED LIST DOCD IN RCRD: CPT | Mod: CPTII,S$GLB,, | Performed by: PODIATRIST

## 2024-07-25 PROCEDURE — 99999 PR PBB SHADOW E&M-EST. PATIENT-LVL III: CPT | Mod: PBBFAC,,, | Performed by: PODIATRIST

## 2024-07-25 PROCEDURE — 3288F FALL RISK ASSESSMENT DOCD: CPT | Mod: CPTII,S$GLB,, | Performed by: PODIATRIST

## 2024-07-25 PROCEDURE — 1160F RVW MEDS BY RX/DR IN RCRD: CPT | Mod: CPTII,S$GLB,, | Performed by: PODIATRIST

## 2024-07-25 PROCEDURE — 1101F PT FALLS ASSESS-DOCD LE1/YR: CPT | Mod: CPTII,S$GLB,, | Performed by: PODIATRIST

## 2024-07-25 PROCEDURE — 3008F BODY MASS INDEX DOCD: CPT | Mod: CPTII,S$GLB,, | Performed by: PODIATRIST

## 2024-07-25 PROCEDURE — 1126F AMNT PAIN NOTED NONE PRSNT: CPT | Mod: CPTII,S$GLB,, | Performed by: PODIATRIST

## 2024-07-25 PROCEDURE — 99212 OFFICE O/P EST SF 10 MIN: CPT | Mod: S$GLB,,, | Performed by: PODIATRIST

## 2024-07-25 RX ORDER — LORAZEPAM 0.5 MG/1
1.5 TABLET ORAL NIGHTLY
Qty: 90 TABLET | Refills: 5 | OUTPATIENT
Start: 2024-07-25

## 2024-07-25 RX ORDER — LORAZEPAM 0.5 MG/1
1.5 TABLET ORAL NIGHTLY
Qty: 90 TABLET | Refills: 5 | Status: SHIPPED | OUTPATIENT
Start: 2024-07-25

## 2024-07-25 NOTE — TELEPHONE ENCOUNTER
No care due was identified.  [facility] Embedded Care Due Messages. Reference number: [id number].   7/25/2024 8:06:42 AM CDT

## 2024-07-25 NOTE — PROGRESS NOTES
Subjective:     Patient ID: Manuela Reyes is a 74 y.o. female.    Chief Complaint: Follow-up and Ingrown Toenail (Left hallux ingrown f/u, pt c/o pain  0/10, non-diabetic pt wears tennis shoes, PCP Dr. Lawler lat seen 7-23-24)    HPI: This 74 year old female returns to the clinic 2 weeks post nail procedure of left hlalux.  Patient has no complaints of fever chills or sweats.  Patient denies pain.  Patient has been dressing as instructed.     Patient Active Problem List   Diagnosis    Crohn's disease of small intestine    Crohn's colitis    Mesenteric artery stenosis    Hyperlipidemia LDL goal <70    Chronic insomnia    Anxiety    Crohn's disease    Family history of cardiovascular disease    Atherosclerosis of abdominal aorta    Age-related osteoporosis without current pathological fracture    Essential hypertension    Decreased ROM of lumbar spine    Immunosuppressed status       Medication List with Changes/Refills   Current Medications    ADALIMUMAB (HUMIRA,CF, PEN) 40 MG/0.4 ML PNKT    Inject 0.4 mLs (40 mg total) into the skin every 14 (fourteen) days.    AMLODIPINE (NORVASC) 10 MG TABLET    Take 1 tablet by mouth once daily    AMOXICILLIN (AMOXIL) 500 MG CAPSULE    TAKE 1 CAPSULE BY MOUTH 4 TIMES DAILY UNTIL GONE.    ASPIRIN (ECOTRIN) 81 MG EC TABLET    Take 1 tablet (81 mg total) by mouth once daily.    ATENOLOL (TENORMIN) 25 MG TABLET    Take 1 tablet (25 mg total) by mouth once daily.    CALCIUM CARBONATE (OS-COOPER) 600 MG (1,500 MG) TAB    Take 1,200 mg by mouth once daily.     CYANOCOBALAMIN, VITAMIN B-12, 2,500 MCG LOZG    Place 1 tablet under the tongue.     DENOSUMAB (PROLIA) 60 MG/ML SYRG    Inject 60 mg into the skin.    EFINACONAZOLE (JUBLIA) 10 % ESCOBAR    APPLY TOPICALLY ONCE DAILY.    EFINACONAZOLE (JUBLIA) 10 % ESCOBAR    Apply 1 Act topically once daily.    HYDROCODONE-ACETAMINOPHEN (NORCO) 5-325 MG PER TABLET    Take 1 tablet by mouth every 6 (six) hours as needed for Pain.     LEVOCETIRIZINE (XYZAL) 5 MG TABLET    Take 1 tablet (5 mg total) by mouth every evening.    LORAZEPAM (ATIVAN) 0.5 MG TABLET    Take 3 tablets (1.5 mg total) by mouth every evening.    MULTIVITAMIN WITH MINERALS TABLET    Take 1 tablet by mouth once daily at 6am.    MUPIROCIN (BACTROBAN) 2 % OINTMENT    Apply a small amount to affected area twice a day x 10 days    TRIAMCINOLONE ACETONIDE 0.1% (KENALOG) 0.1 % OINTMENT    Apply a small amount to affected area twice a day x 2 weeks as needed for flares    VALACYCLOVIR (VALTREX) 500 MG TABLET    Take 1 tablet by mouth twice a day as needed for 3 days per flare    VITAMIN D 1000 UNITS TAB    Take 185 mg by mouth once daily.       Review of patient's allergies indicates:   Allergen Reactions    Statins-hmg-coa reductase inhibitors Anaphylaxis     Myalgias    Codeine sulfate Itching    Hydrochlorothiazide Other (See Comments)     Adverse Reaction    Lisinopril Swelling and Edema     Facial Swelling       Past Surgical History:   Procedure Laterality Date    APPENDECTOMY      COLONOSCOPY N/A 05/19/2017    Procedure: COLONOSCOPY;  Surgeon: Jose Luis Leonard MD;  Location: [facility];  Service: Endoscopy;  Laterality: N/A;    COLONOSCOPY N/A 03/26/2018    Procedure: COLONOSCOPY;  Surgeon: Guillaume Whitman MD;  Location: [facility];  Service: Endoscopy;  Laterality: N/A;    COLONOSCOPY N/A 03/19/2019    Procedure: COLONOSCOPY;  Surgeon: Lili Ellis MD;  Location: [facility];  Service: Endoscopy;  Laterality: N/A;    COLONOSCOPY N/A 09/24/2020    Procedure: COLONOSCOPY;  Surgeon: Lili Ellis MD;  Location: [facility];  Service: Endoscopy;  Laterality: N/A;    COLONOSCOPY N/A 7/20/2023    Procedure: COLONOSCOPY;  Surgeon: Lili Ellis MD;  Location: [facility];  Service: Endoscopy;  Laterality: N/A;    ESOPHAGOGASTRODUODENOSCOPY N/A 7/20/2023    Procedure: EGD (ESOPHAGOGASTRODUODENOSCOPY);  Surgeon: Lili Ellis MD;  Location: [facility];  Service: Endoscopy;   "Laterality: N/A;    SMALL INTESTINE SURGERY         Family History   Problem Relation Name Age of Onset    Stroke Mother      Heart disease Mother      Cataracts Mother      Cancer Father          Lung    Heart disease Sister      Hypertension Brother      Glaucoma Neg Hx      Macular degeneration Neg Hx      Thyroid disease Neg Hx         Social History     Socioeconomic History    Marital status: Single   Tobacco Use    Smoking status: Former     Current packs/day: 0.00     Types: Cigarettes     Start date: 1985     Quit date: 2005     Years since quittin.5    Smokeless tobacco: Former    Tobacco comments:     Former Light Smoker less than 10 a day   Substance and Sexual Activity    Alcohol use: Yes     Alcohol/week: 4.0 standard drinks of alcohol     Types: 4 Glasses of wine per week     Comment: occ    Drug use: No    Sexual activity: Never     Partners: Female     Social Determinants of Health     Stress: No Stress Concern Present (2020)    [employer] [place] [employer] - Occupational Stress Questionnaire     Feeling of Stress : Not at all       Vitals:    24 1446   Weight: 50.9 kg (112 lb 3.4 oz)   Height: 5' 8" (1.727 m)   PainSc: 0-No pain       Review of Systems   Constitutional:  Negative for chills and fever.   Respiratory:  Negative for shortness of breath.    Cardiovascular:  Negative for chest pain, palpitations, orthopnea, claudication and leg swelling.   Gastrointestinal:  Negative for diarrhea, nausea and vomiting.   Musculoskeletal:  Negative for joint pain.   Skin:  Negative for rash.   Neurological:  Negative for dizziness, tingling, sensory change, focal weakness and weakness.   Psychiatric/Behavioral: Negative.             Objective:      PHYSICAL EXAM: Apperance: Alert and orient in no distress,well developed, and with good attention to grooming and body habits  Lower Extremity Exam   VASCULAR: Dorsalis pedis pulses 2/4 left and Posterior Tibial pulses 2/4 " left.   DERMATOLOGICAL: No skin rash, subcutaneous nodules, lesions or ulcers observed. Left hallux medial nail border observed to be clean with pink epithealized tissue noted. Minimal erythema noted. No purulent drainage, no odor, and no increased temperature observed to left hallux.   NEUROLOGICAL: Light touch, sharp-dull, proprioception all present and equal bilaterally.    MUSCULOSKELETAL: Muscle strength 5/5 for all foot inverters, everters, plantarflexors, and  dorsiflexors bilateral. No pain on palpation of left hallux medial nail border. No pain on palpation of dorsal nail plate left hallux.        Assessment:       ICD-10-CM ICD-9-CM   1. Ingrown toenail of left foot - Left Foot  L60.0 703.0       Plan:   Ingrown toenail of left foot - Left Foot      I counseled the patient on her conditions, regarding findings of my examination, my impressions, and usual treatment plan.   Patient to continue local care until completely healed with no drainage and no redness.  Patient should call the clinic immediately if any signs of infection such as fever chills sweats increased redness or pain but was otherwise discharged.       Halima Salas DPM  [facility]

## 2024-07-25 NOTE — TELEPHONE ENCOUNTER
Refill Routing Note   Medication(s) are not appropriate for processing by [facility] for the following reason(s):             [facility] action(s):  Route  Quick Discontinue        Medication Therapy Plan: duplicate request; refill center cannot QDC controlled substances      Appointments  past 12m or future 3m with PCP    Date Provider   Last Visit   2/5/2024 Christelle Lawler DO   Next Visit   Visit date not found Christelle Lawler DO   ED visits in past 90 days: 0        Note composed:10:15 AM 07/25/2024

## 2024-07-25 NOTE — TELEPHONE ENCOUNTER
Addendum 7/25: requesting assistance from our clinical pharmacist in efforts to find data on best osteoporosis therapy in context of gender reassignment.  I did not find much difference in terms of standard of care for osteoporosis regarding pharmacotherapy with bone antiresorptive or bone anabolic treatment.     Studies investigating BMD in transgender women receiving hormones have shown both lower, higher and no change in bone density after initiating hormones.[4-11] The differences in results may be due to the regimens used (some used unopposed androgen blockers for a period of time before initiating hormones) and length of follow-up. Known risk factors for osteoporosis include underutilization of hormones after gonadectomy or use of androgen blockers without or with insufficient estrogen. GnRH analogues also may result in short term decrease in bone mineral density (ie, GnRH analogues without concurrent estrogen, and when estrogen added, or blockers stopped bone density returns to normal).     Treatment for osteoporosis in trans men and women follows the same guidelines as cisgender populations and should take into consideration both pharmacologic and non-pharmacologic interventions.      When estrogen is initiated in trans women, there are positive changes in BMD and some measures of bone quality; however, the effect on fracture rates is not fully known as studies have not been powered to examine this end point.       Outgoing call to discuss osteoporosis treatment options w/ patient. Left vm requesting call back.   Follow up message sent to patient via [facility] patient portal.

## 2024-07-30 NOTE — TELEPHONE ENCOUNTER
Clinical Pharmacy Progress Note: Medication Education     Patient was counseled by pharmacist on new medication Reclast and its indications, side effects, and reasons for taking this medication.   - Educated patient on mechanism of action, frequency and route of administration, onset of action, and safety profile of Reclast.     - Labs up to date: CMP and vitamin D done on 7/18.       - Advised pt to use sun protection and get annual skin checks considering possible increased risk of skin cancer     Patient was provided educational drug card/handout.   Patient expressed understanding and had no further questions.    Patient amenable to proceeding with Reclast at this time. Patient also amenable to referral to Dr. Sanabria for second opinion.      Thank you for allowing us to participate in this patient's care.    Yany Garcia, PharmD  Ambulatory Clinical Pharmacist- Rheumatology

## 2024-08-05 ENCOUNTER — TELEPHONE (OUTPATIENT)
Dept: RHEUMATOLOGY | Facility: CLINIC | Age: 74
End: 2024-08-05
Payer: MEDICARE

## 2024-08-07 ENCOUNTER — TELEPHONE (OUTPATIENT)
Dept: INFUSION THERAPY | Facility: HOSPITAL | Age: 74
End: 2024-08-07
Payer: MEDICARE

## 2024-08-15 ENCOUNTER — PATIENT MESSAGE (OUTPATIENT)
Dept: UROLOGY | Facility: CLINIC | Age: 74
End: 2024-08-15
Payer: MEDICARE

## 2024-08-19 ENCOUNTER — LAB VISIT (OUTPATIENT)
Dept: LAB | Facility: HOSPITAL | Age: 74
End: 2024-08-19
Attending: NURSE PRACTITIONER
Payer: MEDICARE

## 2024-08-19 DIAGNOSIS — Z51.81 MEDICATION MONITORING ENCOUNTER: ICD-10-CM

## 2024-08-19 DIAGNOSIS — N20.0 RENAL STONES: ICD-10-CM

## 2024-08-19 DIAGNOSIS — N28.1 RENAL CYST: Primary | ICD-10-CM

## 2024-08-19 LAB
ALBUMIN SERPL BCP-MCNC: 4.1 G/DL (ref 3.5–5.2)
ALP SERPL-CCNC: 39 U/L (ref 55–135)
ALT SERPL W/O P-5'-P-CCNC: 14 U/L (ref 10–44)
ANION GAP SERPL CALC-SCNC: 12 MMOL/L (ref 8–16)
AST SERPL-CCNC: 20 U/L (ref 10–40)
BILIRUB SERPL-MCNC: 0.5 MG/DL (ref 0.1–1)
BUN SERPL-MCNC: 16 MG/DL (ref 8–23)
CALCIUM SERPL-MCNC: 9.9 MG/DL (ref 8.7–10.5)
CHLORIDE SERPL-SCNC: 98 MMOL/L (ref 95–110)
CO2 SERPL-SCNC: 24 MMOL/L (ref 23–29)
CREAT SERPL-MCNC: 0.9 MG/DL (ref 0.5–1.4)
EST. GFR  (NO RACE VARIABLE): >60 ML/MIN/1.73 M^2
GLUCOSE SERPL-MCNC: 102 MG/DL (ref 70–110)
POTASSIUM SERPL-SCNC: 5 MMOL/L (ref 3.5–5.1)
PROT SERPL-MCNC: 7.4 G/DL (ref 6–8.4)
SODIUM SERPL-SCNC: 134 MMOL/L (ref 136–145)

## 2024-08-19 PROCEDURE — 80053 COMPREHEN METABOLIC PANEL: CPT | Performed by: INTERNAL MEDICINE

## 2024-08-19 PROCEDURE — 36415 COLL VENOUS BLD VENIPUNCTURE: CPT | Performed by: INTERNAL MEDICINE

## 2024-08-21 ENCOUNTER — TELEPHONE (OUTPATIENT)
Dept: RHEUMATOLOGY | Facility: CLINIC | Age: 74
End: 2024-08-21
Payer: MEDICARE

## 2024-08-21 NOTE — TELEPHONE ENCOUNTER
Pt returned call. She has decided to stay with prolia for now. She will discuss changing to reclast in 6 months when she follows up with Dr Brown

## 2024-08-21 NOTE — TELEPHONE ENCOUNTER
Left message for pt to call Amy in Rheum at [phone] regarding recent dental eval and new orders for reclast.

## 2024-08-22 ENCOUNTER — INFUSION (OUTPATIENT)
Dept: INFUSION THERAPY | Facility: HOSPITAL | Age: 74
End: 2024-08-22
Attending: INTERNAL MEDICINE
Payer: MEDICARE

## 2024-08-22 VITALS
RESPIRATION RATE: 18 BRPM | HEART RATE: 70 BPM | SYSTOLIC BLOOD PRESSURE: 142 MMHG | TEMPERATURE: 97 F | OXYGEN SATURATION: 98 % | DIASTOLIC BLOOD PRESSURE: 71 MMHG

## 2024-08-22 DIAGNOSIS — M81.0 AGE-RELATED OSTEOPOROSIS WITHOUT CURRENT PATHOLOGICAL FRACTURE: Primary | ICD-10-CM

## 2024-08-22 PROCEDURE — 63600175 PHARM REV CODE 636 W HCPCS: Mod: JZ,JG | Performed by: INTERNAL MEDICINE

## 2024-08-22 PROCEDURE — 96372 THER/PROPH/DIAG INJ SC/IM: CPT

## 2024-08-22 RX ADMIN — DENOSUMAB 60 MG: 60 INJECTION SUBCUTANEOUS at 11:08

## 2024-08-22 NOTE — DISCHARGE INSTRUCTIONS
[facility]  [id number] [address]  [address]  [phone] phone     [phone] fax  Hours of Operation: Monday- Friday 8:00am- 5:00pm  After hours phone  [phone]  Hematology / Oncology Physicians on call      SHRUTHI Fuchs Dr., NP Phaon Dunbar, NP Khelsea Conley, FNP    Please call with any concerns regarding your appointment today.

## 2024-08-28 ENCOUNTER — HOSPITAL ENCOUNTER (OUTPATIENT)
Dept: RADIOLOGY | Facility: HOSPITAL | Age: 74
Discharge: HOME | End: 2024-08-28
Attending: NURSE PRACTITIONER
Payer: MEDICARE

## 2024-08-28 DIAGNOSIS — N28.1 RENAL CYST: ICD-10-CM

## 2024-08-28 DIAGNOSIS — N20.0 RENAL STONES: ICD-10-CM

## 2024-08-28 PROCEDURE — 76770 US EXAM ABDO BACK WALL COMP: CPT | Mod: 26,,, | Performed by: RADIOLOGY

## 2024-08-28 PROCEDURE — 76770 US EXAM ABDO BACK WALL COMP: CPT | Mod: TC

## 2024-08-29 ENCOUNTER — LAB VISIT (OUTPATIENT)
Dept: LAB | Facility: HOSPITAL | Age: 74
End: 2024-08-29
Attending: NURSE PRACTITIONER
Payer: MEDICARE

## 2024-08-29 ENCOUNTER — OFFICE VISIT (OUTPATIENT)
Dept: UROLOGY | Facility: CLINIC | Age: 74
End: 2024-08-29
Payer: MEDICARE

## 2024-08-29 VITALS
HEART RATE: 69 BPM | WEIGHT: 112.19 LBS | RESPIRATION RATE: 16 BRPM | SYSTOLIC BLOOD PRESSURE: 159 MMHG | DIASTOLIC BLOOD PRESSURE: 89 MMHG | BODY MASS INDEX: 17.06 KG/M2

## 2024-08-29 DIAGNOSIS — N28.1 RENAL CYST: ICD-10-CM

## 2024-08-29 DIAGNOSIS — N28.89 OTHER SPECIFIED DISORDERS OF KIDNEY AND URETER: Primary | ICD-10-CM

## 2024-08-29 LAB
CREAT SERPL-MCNC: 0.8 MG/DL (ref 0.5–1.4)
EST. GFR  (NO RACE VARIABLE): >60 ML/MIN/1.73 M^2

## 2024-08-29 PROCEDURE — 3079F DIAST BP 80-89 MM HG: CPT | Mod: CPTII,S$GLB,, | Performed by: NURSE PRACTITIONER

## 2024-08-29 PROCEDURE — 1126F AMNT PAIN NOTED NONE PRSNT: CPT | Mod: CPTII,S$GLB,, | Performed by: NURSE PRACTITIONER

## 2024-08-29 PROCEDURE — 1101F PT FALLS ASSESS-DOCD LE1/YR: CPT | Mod: CPTII,S$GLB,, | Performed by: NURSE PRACTITIONER

## 2024-08-29 PROCEDURE — 1159F MED LIST DOCD IN RCRD: CPT | Mod: CPTII,S$GLB,, | Performed by: NURSE PRACTITIONER

## 2024-08-29 PROCEDURE — 99999 PR PBB SHADOW E&M-EST. PATIENT-LVL IV: CPT | Mod: PBBFAC,,, | Performed by: NURSE PRACTITIONER

## 2024-08-29 PROCEDURE — 3077F SYST BP >= 140 MM HG: CPT | Mod: CPTII,S$GLB,, | Performed by: NURSE PRACTITIONER

## 2024-08-29 PROCEDURE — 36415 COLL VENOUS BLD VENIPUNCTURE: CPT | Performed by: NURSE PRACTITIONER

## 2024-08-29 PROCEDURE — 3008F BODY MASS INDEX DOCD: CPT | Mod: CPTII,S$GLB,, | Performed by: NURSE PRACTITIONER

## 2024-08-29 PROCEDURE — 82565 ASSAY OF CREATININE: CPT | Performed by: NURSE PRACTITIONER

## 2024-08-29 PROCEDURE — 3288F FALL RISK ASSESSMENT DOCD: CPT | Mod: CPTII,S$GLB,, | Performed by: NURSE PRACTITIONER

## 2024-08-29 PROCEDURE — 99214 OFFICE O/P EST MOD 30 MIN: CPT | Mod: S$GLB,,, | Performed by: NURSE PRACTITIONER

## 2024-08-29 NOTE — PROGRESS NOTES
Chief Complaint:   Renal cysts    HPI:   Patient is presenting to review renal ultrasound.  Has been followed by this clinic for nonobstructing renal stones and simple renal cysts.  Recent renal ultrasound indicated nonobstructing renal stones, no hydronephrosis.  Bilateral simple cysts with 1 complicated 1.4 cm cyst.  Denies gross hematuria or flank pain.  No  cancers in her family.  03/20/2023  Patient is a 73-year-old female, status post sex reassignment surgery in 1970s, to discuss renal ultrasound secondary to renal cysts and stones.  Patient states she is been completely asymptomatic.  Denies flank pain or gross hematuria.  Urine in clinic is negative and PVR is 1 mL.  Renal ultrasound indicates bilateral, nonobstructing renal stones and decrease in size of simple renal cyst.     04/19/2022  Patient is a 72-year-old female that is presenting to review renal ultrasound.  Patient has been followed by this clinic for renal cysts and stones.  Recent renal ultrasound indicated bilateral nonobstructing renal stones and simple renal cyst.  There is also a minimally complex cyst in the inferior right aspect of the kidney measuring 2.8 cm, not significantly changed compared to prior renal ultrasound, December 2020.  Patient is asymptomatic.No abd/pelvic pain and no exac/rel factors.  No hematuria.  No urolithiasis.  No urinary bother.  Patient had sex reassignment surgery in the 1970s, so is genotype.  In reviewing EMR, PSAs have been undetectable.  Patient is unaware if prostate was removed during reassignment surgery.  Allergies:  Statins-hmg-coa reductase inhibitors, Codeine sulfate, Hydrochlorothiazide, and Lisinopril    Medications:  has a current medication list which includes the following prescription(s): humira(cf) pen, amlodipine, amoxicillin, aspirin, atenolol, calcium carbonate, cyanocobalamin (vitamin b-12), prolia, jublia, jublia, hydrocodone-acetaminophen, levocetirizine, lorazepam, multivitamin with  minerals, mupirocin, triamcinolone acetonide 0.1%, valacyclovir, and vitamin d.    Review of Systems:  General: No fever, chills, fatigability, or weight loss.  Skin: No rashes, itching, or changes in color or texture of skin.  Chest: Denies HICKS, cyanosis, wheezing, cough, and sputum production.  Abdomen: Appetite fine. No weight loss. Denies diarrhea, abdominal pain, hematemesis, or blood in stool.  Musculoskeletal: No joint stiffness or swelling. Denies back pain.  : As above.  All other review of systems negative.    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    PSH:   has a past surgical history that includes Appendectomy; Small intestine surgery; Colonoscopy (N/A, 05/19/2017); Colonoscopy (N/A, 03/26/2018); Colonoscopy (N/A, 03/19/2019); Colonoscopy (N/A, 09/24/2020); Esophagogastroduodenoscopy (N/A, 7/20/2023); and Colonoscopy (N/A, 7/20/2023).    FamHx: family history includes Cancer in her father; Cataracts in her mother; Heart disease in her mother and sister; Hypertension in her brother; Stroke in her mother.    SocHx:  reports that she quit smoking about 19 years ago. Her smoking use included cigarettes. She started smoking about 39 years ago. She has quit using smokeless tobacco. She reports current alcohol use of about 4.0 standard drinks of alcohol per week. She reports that she does not use drugs.      Physical Exam:  General: A&Ox3, no apparent distress, no deformities  Neck: No masses, normal thyroid  Lungs: normal inspiration, no use of accessory muscles  Heart: normal pulse, no arrhythmias  Abdomen: Soft, NT, ND, no masses, no hernias, no hepatosplenomegaly  Lymphatic: Neck and groin nodes negative  Skin: The skin is warm and dry. No jaundice.  Ext: No  c/c/e.      Labs/Studies:   08/24/2024  EXAMINATION:  US RETROPERITONEAL COMPLETE     CLINICAL HISTORY:  Cyst of kidney, acquired     TECHNIQUE:  Routine imaging of the kidneys and bladder performed.     COMPARISON:  Prior ultrasounds     FINDINGS:  Right kidney: The right kidney measures 11 cm. Echotexture normal.  Multiple simple cysts present.  Minimally complex lower pole 3.8 cm cyst.  Nonobstructive calculi noted.  No hydronephrosis.     Left kidney: The left kidney measures 10.5 cm. Echotexture normal.  6 cm exophytic upper pole simple cyst.  1.4 cm lower pole complicated cyst.  Nonobstructive calculi present.  No hydronephrosis.     The bladder is unremarkable for distension.     Impression:     Bilateral nonobstructive nephrolithiasis.     Bilateral simple and complicated renal cysts.  Correlate with appropriate follow-up.     Impression/Plan:   Recent renal ultrasound does indicate a complex renal cysts, will proceed with CT renal mass protocol, patient will be contacted with results and needed follow-up.

## 2024-09-13 ENCOUNTER — HOSPITAL ENCOUNTER (OUTPATIENT)
Dept: RADIOLOGY | Facility: CLINIC | Age: 74
Discharge: HOME | End: 2024-09-13
Attending: NURSE PRACTITIONER
Payer: MEDICARE

## 2024-09-13 ENCOUNTER — TELEPHONE (OUTPATIENT)
Dept: RHEUMATOLOGY | Facility: CLINIC | Age: 74
End: 2024-09-13
Payer: MEDICARE

## 2024-09-13 ENCOUNTER — OFFICE VISIT (OUTPATIENT)
Dept: URGENT CARE | Facility: CLINIC | Age: 74
End: 2024-09-13
Payer: MEDICARE

## 2024-09-13 ENCOUNTER — PATIENT MESSAGE (OUTPATIENT)
Dept: CARDIOLOGY | Facility: CLINIC | Age: 74
End: 2024-09-13
Payer: MEDICARE

## 2024-09-13 ENCOUNTER — PATIENT MESSAGE (OUTPATIENT)
Dept: PODIATRY | Facility: CLINIC | Age: 74
End: 2024-09-13
Payer: MEDICARE

## 2024-09-13 VITALS
TEMPERATURE: 98 F | HEART RATE: 71 BPM | BODY MASS INDEX: 16.72 KG/M2 | HEIGHT: 68 IN | OXYGEN SATURATION: 99 % | WEIGHT: 110.31 LBS | RESPIRATION RATE: 16 BRPM | SYSTOLIC BLOOD PRESSURE: 163 MMHG | DIASTOLIC BLOOD PRESSURE: 73 MMHG

## 2024-09-13 DIAGNOSIS — S39.011A STRAIN OF ABDOMINAL MUSCLE, INITIAL ENCOUNTER: ICD-10-CM

## 2024-09-13 DIAGNOSIS — S23.41XA SPRAIN OF COSTAL CARTILAGE, INITIAL ENCOUNTER: Primary | ICD-10-CM

## 2024-09-13 DIAGNOSIS — R30.0 DYSURIA: ICD-10-CM

## 2024-09-13 DIAGNOSIS — S23.41XA SPRAIN OF COSTAL CARTILAGE, INITIAL ENCOUNTER: ICD-10-CM

## 2024-09-13 LAB
BILIRUBIN, UA POC OHS: NEGATIVE
BLOOD, UA POC OHS: NEGATIVE
CLARITY, UA POC OHS: CLEAR
COLOR, UA POC OHS: YELLOW
GLUCOSE, UA POC OHS: NEGATIVE
KETONES, UA POC OHS: NEGATIVE
LEUKOCYTES, UA POC OHS: NEGATIVE
NITRITE, UA POC OHS: NEGATIVE
PH, UA POC OHS: 7.5
PROTEIN, UA POC OHS: NEGATIVE
SPECIFIC GRAVITY, UA POC OHS: 1.01
UROBILINOGEN, UA POC OHS: 0.2

## 2024-09-13 PROCEDURE — 71101 X-RAY EXAM UNILAT RIBS/CHEST: CPT | Mod: RT,S$GLB,, | Performed by: RADIOLOGY

## 2024-09-13 PROCEDURE — 99214 OFFICE O/P EST MOD 30 MIN: CPT | Mod: 25,S$GLB,, | Performed by: NURSE PRACTITIONER

## 2024-09-13 PROCEDURE — 81003 URINALYSIS AUTO W/O SCOPE: CPT | Mod: QW,S$GLB,, | Performed by: NURSE PRACTITIONER

## 2024-09-13 PROCEDURE — 96372 THER/PROPH/DIAG INJ SC/IM: CPT | Mod: S$GLB,,, | Performed by: NURSE PRACTITIONER

## 2024-09-13 RX ORDER — TIZANIDINE 4 MG/1
4 TABLET ORAL EVERY 8 HOURS PRN
Qty: 15 TABLET | Refills: 0 | Status: SHIPPED | OUTPATIENT
Start: 2024-09-13 | End: 2024-09-18

## 2024-09-13 RX ORDER — KETOROLAC TROMETHAMINE 30 MG/ML
30 INJECTION, SOLUTION INTRAMUSCULAR; INTRAVENOUS
Status: COMPLETED | OUTPATIENT
Start: 2024-09-13 | End: 2024-09-13

## 2024-09-13 RX ADMIN — KETOROLAC TROMETHAMINE 30 MG: 30 INJECTION, SOLUTION INTRAMUSCULAR; INTRAVENOUS at 01:09

## 2024-09-13 NOTE — TELEPHONE ENCOUNTER
----- Message from Yany Garcia PharmD sent at 9/13/2024  9:41 AM CDT -----  Regarding: RE: please advise  Contact: pt @ [phone]  No I did not.  ----- Message -----  From: Michelle Canales MA  Sent: 9/13/2024   9:11 AM CDT  To: Yany Garcia PharmD  Subject: please advise                                    Hi, did you call this pt?  ----- Message -----  From: Gilberto Lovell  Sent: 9/13/2024   8:51 AM CDT  To: Kevin Lopez Staff    Patient is returning a phone call.     Who left a message for the patient: pharm     Does patient know what this is regarding: N/A     Would you like a call back, or a response through your [facility] portal?: call back  portal message if no answer     Comments:
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
## 2024-09-13 NOTE — PATIENT INSTRUCTIONS
Patient Instructions   Rest activity ad ming   Warm moist heat to  rib cage area of concern   Tylenol 650 mg every 6-8 hours as needed for pain   Zanaflex muscle relaxant as needed for spasm   Any worsening pain with or without  fever, Shortness of breath, --911 to ER

## 2024-09-17 NOTE — TELEPHONE ENCOUNTER
No care due was identified.  [facility] Embedded Care Due Messages. Reference number: [id number].   9/17/2024 5:51:02 PM CDT

## 2024-09-18 ENCOUNTER — HOSPITAL ENCOUNTER (OUTPATIENT)
Dept: RADIOLOGY | Facility: HOSPITAL | Age: 74
Discharge: HOME | End: 2024-09-18
Attending: NURSE PRACTITIONER
Payer: MEDICARE

## 2024-09-18 DIAGNOSIS — N28.1 RENAL CYST: ICD-10-CM

## 2024-09-18 DIAGNOSIS — N28.1 BILATERAL RENAL CYSTS: Primary | ICD-10-CM

## 2024-09-18 PROCEDURE — 25500020 PHARM REV CODE 255: Performed by: NURSE PRACTITIONER

## 2024-09-18 PROCEDURE — 74170 CT ABD WO CNTRST FLWD CNTRST: CPT | Mod: 26,,, | Performed by: RADIOLOGY

## 2024-09-18 PROCEDURE — 74170 CT ABD WO CNTRST FLWD CNTRST: CPT | Mod: TC

## 2024-09-18 RX ORDER — LEVOCETIRIZINE DIHYDROCHLORIDE 5 MG/1
5 TABLET, FILM COATED ORAL NIGHTLY
Qty: 90 TABLET | Refills: 1 | Status: SHIPPED | OUTPATIENT
Start: 2024-09-18

## 2024-09-18 RX ADMIN — IOHEXOL 100 ML: 350 INJECTION, SOLUTION INTRAVENOUS at 08:09

## 2024-09-18 NOTE — TELEPHONE ENCOUNTER
Refill Decision Note   Manuela Reyes  is requesting a refill authorization.  Brief Assessment and Rationale for Refill:  Approve     Medication Therapy Plan:         Comments:     Note composed:1:06 PM 09/18/2024

## 2024-09-23 ENCOUNTER — OFFICE VISIT (OUTPATIENT)
Dept: PODIATRY | Facility: CLINIC | Age: 74
End: 2024-09-23
Payer: MEDICARE

## 2024-09-23 DIAGNOSIS — L60.0 ONYCHOCRYPTOSIS: Primary | ICD-10-CM

## 2024-09-23 PROCEDURE — 99213 OFFICE O/P EST LOW 20 MIN: CPT | Mod: S$GLB,,, | Performed by: PODIATRIST

## 2024-09-23 PROCEDURE — 99999 PR PBB SHADOW E&M-EST. PATIENT-LVL III: CPT | Mod: PBBFAC,,, | Performed by: PODIATRIST

## 2024-09-23 PROCEDURE — 1101F PT FALLS ASSESS-DOCD LE1/YR: CPT | Mod: CPTII,S$GLB,, | Performed by: PODIATRIST

## 2024-09-23 PROCEDURE — 1160F RVW MEDS BY RX/DR IN RCRD: CPT | Mod: CPTII,S$GLB,, | Performed by: PODIATRIST

## 2024-09-23 PROCEDURE — 1126F AMNT PAIN NOTED NONE PRSNT: CPT | Mod: CPTII,S$GLB,, | Performed by: PODIATRIST

## 2024-09-23 PROCEDURE — 1159F MED LIST DOCD IN RCRD: CPT | Mod: CPTII,S$GLB,, | Performed by: PODIATRIST

## 2024-09-23 PROCEDURE — 3288F FALL RISK ASSESSMENT DOCD: CPT | Mod: CPTII,S$GLB,, | Performed by: PODIATRIST

## 2024-09-23 NOTE — PROGRESS NOTES
Subjective:       Patient ID: Manuela Reyes is a 74 y.o. female.    Chief Complaint: Nail Care (Left great hallux: pt denies pain, pt is nondiabetic,pt states, lateral side causing pain, when pressing on side there is a sharp pain)      HPI: Manuela Reyes presents to the office with complaints of pains to the left great  toe, due to possible ingrowing. States no drainage. States no swelling, no redness and moderate to severe pains.  Pain is with direct pressure to the distal aspect of the toe.  Symptoms have been on going for several days and are worsening. States difficulties with walking as a result of pains. Walking and standing, particularly with shoe gear, exacerbates the ailment.  Patient's Primary Care Provider is Christelle Lawler DO.     Review of patient's allergies indicates:   Allergen Reactions    Statins-hmg-coa reductase inhibitors Anaphylaxis     Myalgias    Codeine sulfate Itching    Hydrochlorothiazide Other (See Comments)     Adverse Reaction    Lisinopril Swelling and Edema     Facial Swelling       Past Medical History:   Diagnosis Date    Anxiety     Crohn's disease     Depression     Genital herpes     Hepatitis C infection     Hypertension     Insomnia     Mesenteric artery stenosis     Dr. 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 Malaika Mack--dermatology    TIA (transient ischemic attack)        Family History   Problem Relation Name Age of Onset    Stroke Mother      Heart disease Mother      Cataracts Mother      Cancer Father          Lung    Heart disease Sister      Hypertension Brother      Glaucoma Neg Hx      Macular degeneration Neg Hx      Thyroid disease Neg Hx         Social History     Socioeconomic History    Marital status: Single   Tobacco Use    Smoking status: Former     Current packs/day: 0.00     Types: Cigarettes     Start date: 1/1/1985     Quit date: 1/1/2005     Years since  quittin.7    Smokeless tobacco: Former    Tobacco comments:     Former Light Smoker less than 10 a day   Substance and Sexual Activity    Alcohol use: Yes     Alcohol/week: 4.0 standard drinks of alcohol     Types: 4 Glasses of wine per week     Comment: occ    Drug use: No    Sexual activity: Never     Partners: Female     Social Determinants of Health     Stress: No Stress Concern Present (2020)    [facility] [place] - Occupational Stress Questionnaire     Feeling of Stress : Not at all       Past Surgical History:   Procedure Laterality Date    APPENDECTOMY      COLONOSCOPY N/A 2017    Procedure: COLONOSCOPY;  Surgeon: Jose Luis Leonard MD;  Location: [facility];  Service: Endoscopy;  Laterality: N/A;    COLONOSCOPY N/A 2018    Procedure: COLONOSCOPY;  Surgeon: Guillaume Whitman MD;  Location: [facility];  Service: Endoscopy;  Laterality: N/A;    COLONOSCOPY N/A 2019    Procedure: COLONOSCOPY;  Surgeon: Lili Ellis MD;  Location: [facility];  Service: Endoscopy;  Laterality: N/A;    COLONOSCOPY N/A 2020    Procedure: COLONOSCOPY;  Surgeon: Lili Ellis MD;  Location: [facility];  Service: Endoscopy;  Laterality: N/A;    COLONOSCOPY N/A 2023    Procedure: COLONOSCOPY;  Surgeon: Lili Ellis MD;  Location: [facility];  Service: Endoscopy;  Laterality: N/A;    ESOPHAGOGASTRODUODENOSCOPY N/A 2023    Procedure: EGD (ESOPHAGOGASTRODUODENOSCOPY);  Surgeon: Lili Ellis MD;  Location: [facility];  Service: Endoscopy;  Laterality: N/A;    SMALL INTESTINE SURGERY         Review of Systems      Objective:   There were no vitals taken for this visit.    Physical Exam  LOWER EXTREMITY PHYSICAL EXAMINATION    NEUROLOGY: Sensation to light touch is intact. Proprioception is intact.     VASCULAR: On the left foot, the dorsalis pedis pulse is 2/4 and the posterior tibial pulse is 2/4. Capillary refill time is less than 3 seconds. Hair growth is  present on the dorsum of the foot and at the digits. Proximal to distal temperature is warm to warm.    DERMATOLOGY: Ingrowing of the left foot lateral nail border of the great toe.  Nail is incurvated to the skin distally causing a callus at the  border of the distal nail.  There is no signs of acute infection.  No signs of edema.  No signs of underlying fluctuance.  No granuloma formation.     ORTHOPEDIC: Manual Muscle Testing is 5/5 in all planes on the left, without pains, with and without resistance. Gait pattern is slightly antalgic.    Assessment:     1. Onychocryptosis - Left Foot        Plan:     Onychocryptosis - Left Foot          We discussed patient's options for treating the ingrown toenail.  We discussed slant back procedure to remove the distal offending edge, and discomfort to the distal nail.  We discussed risk of recurrence..  Patient did give verbal consent to proceed with procedure.    Patient tolerated procedure well without complications.  Moderate-sized spicule was removed without complications. Patient will start soaking in warm water and Epson salt twice daily.  Apply antibiotic cream and a Band-Aid to the affected borders if needed following soaking and showering.  Patient will call if there is any acute signs of infection associated with increased redness, swelling, abnormal drainage, increased pain.      Future Appointments   Date Time Provider Department Center   10/9/2024 10:20 AM [facility] MAMMO2 [facility] MAMMO [facility]   10/11/2024  1:30 PM Macey Hernández PA-C [facility] CARDIO [place]   11/6/2024 10:30 AM Sandra Hunt NP [facility] GASTRO [place]   1/23/2025 11:00 AM LABORATORY, [facility] [place] [facility]   1/24/2025 10:20 AM Christelle Lawler DO [facility]   1/30/2025 10:00 AM John Brown MD [facility] RHEU [facility]   1/30/2025 10:45 AM INJECTION 1, [facility]ION [facility]   9/15/2025 10:00 AM [facility] US2 [facility] ULSOUND [facility]   9/18/2025 10:20 AM Melyssa Cee NP [facility]  UROLOGY [facility]

## 2024-10-09 ENCOUNTER — HOSPITAL ENCOUNTER (OUTPATIENT)
Dept: RADIOLOGY | Facility: HOSPITAL | Age: 74
Discharge: HOME | End: 2024-10-09
Attending: INTERNAL MEDICINE
Payer: MEDICARE

## 2024-10-09 DIAGNOSIS — Z12.31 ENCOUNTER FOR SCREENING MAMMOGRAM FOR MALIGNANT NEOPLASM OF BREAST: ICD-10-CM

## 2024-10-09 PROCEDURE — 77067 SCR MAMMO BI INCL CAD: CPT | Mod: TC

## 2024-10-09 PROCEDURE — 77067 SCR MAMMO BI INCL CAD: CPT | Mod: 26,,, | Performed by: RADIOLOGY

## 2024-10-09 PROCEDURE — 77063 BREAST TOMOSYNTHESIS BI: CPT | Mod: 26,,, | Performed by: RADIOLOGY

## 2024-10-10 ENCOUNTER — OFFICE VISIT (OUTPATIENT)
Dept: INTERNAL MEDICINE | Facility: CLINIC | Age: 74
End: 2024-10-10
Payer: MEDICARE

## 2024-10-10 VITALS
TEMPERATURE: 96 F | RESPIRATION RATE: 20 BRPM | BODY MASS INDEX: 16.78 KG/M2 | OXYGEN SATURATION: 100 % | HEART RATE: 76 BPM | HEIGHT: 68 IN | WEIGHT: 110.69 LBS

## 2024-10-10 DIAGNOSIS — K80.20 GALLSTONES: ICD-10-CM

## 2024-10-10 DIAGNOSIS — R07.81 RIB PAIN ON RIGHT SIDE: Primary | ICD-10-CM

## 2024-10-10 DIAGNOSIS — Z23 NEED FOR VACCINATION: ICD-10-CM

## 2024-10-10 DIAGNOSIS — F51.04 CHRONIC INSOMNIA: ICD-10-CM

## 2024-10-10 DIAGNOSIS — G47.00 INSOMNIA, UNSPECIFIED TYPE: ICD-10-CM

## 2024-10-10 DIAGNOSIS — F41.9 ANXIETY: ICD-10-CM

## 2024-10-10 PROCEDURE — 99999 PR PBB SHADOW E&M-EST. PATIENT-LVL IV: CPT | Mod: PBBFAC,,, | Performed by: INTERNAL MEDICINE

## 2024-10-10 RX ORDER — LORAZEPAM 2 MG/1
2 TABLET ORAL NIGHTLY
Qty: 30 TABLET | Refills: 5 | Status: SHIPPED | OUTPATIENT
Start: 2024-10-10

## 2024-10-10 RX ORDER — IBUPROFEN 800 MG/1
800 TABLET ORAL EVERY 8 HOURS PRN
Qty: 30 TABLET | Refills: 0 | Status: SHIPPED | OUTPATIENT
Start: 2024-10-10 | End: 2024-10-20

## 2024-10-10 NOTE — PROGRESS NOTES
Manuelabetsy Reyes  74 y.o. White female  [id number]    Chief Complaint:  Chief Complaint   Patient presents with    Chest Pain     Right side        History of Present Illness:  History of Present Illness    CHIEF COMPLAINT:  Ms. Reyes presents today for follow up and evaluation of right-sided rib pain.    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 The pain extends from the front of the rib cage around to the side, worsening with activity and most pronounced in the late afternoon. She notes improvement with heat application and Biofreeze. X-rays at urgent care showed no fractures, with a possible cartilage injury diagnosed. She denies pain with palpation but reports discomfort with bending, slouching, and occasional twisting. The pain is not constant, with periods of subsiding for a day or two. She also reports a history of left-sided rib pain approximately two days prior to the urgent care visit.    MEDICATIONS:  She takes tizanidine, a muscle relaxer, for back and rib pain in the evening when pain is significant, experiencing dizziness and sleepiness as side effects. She currently takes lorazepam 1.5 mg for sleep and has tried adding melatonin to her sleep regimen. She inquires about increasing the lorazepam dosage.    SLEEP:  She reports not sleeping through the night for several months. She typically goes to bed at 10:00 PM and sleeps for approximately 6 hours before waking up.    IMAGING:  A recent CT revealed incidental findings of gallstones and simple cysts in both kidneys. She denies experiencing typical gallstone symptoms such as pain after eating, nausea, or severe pain attacks. A recent mammogram was negative, with the next scheduled in one year.    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 She also cares for pets, including a 20-pound dog.         Review of Systems   Constitutional:  Negative for fever and weight loss.   Gastrointestinal:  Positive for abdominal pain. Negative for constipation, diarrhea, melena, nausea and vomiting.   Genitourinary:  Negative for dysuria, frequency and hematuria.   Musculoskeletal:  Positive for myalgias.   Neurological:  Negative for headaches.   Psychiatric/Behavioral:  The patient is nervous/anxious and has insomnia.      Answers submitted by the patient for this visit:  Abdominal Pain Questionnaire (Submitted on 10/9/2024)  Chief Complaint: Abdominal pain  Chronicity: new  Onset: 1 to 4 weeks ago  Onset quality: sudden  Frequency: 2 to 4 times per day  Episode duration: 3 Hours  Progression since onset: waxing and waning  Pain location: RUQ  Pain - numeric: 4/10  Pain quality: dull  anorexia: No  arthralgias: No  belching: No  flatus: No  hematochezia: No  Aggravated by: being still, certain positions  Relieved by: nothing  Diagnostic workup: CT scan, ultrasound  Pain severity: moderate  Treatments tried: oral narcotic analgesics  Improvement on treatment: significant  abdominal surgery: Yes  colon cancer: No  Crohn's disease: Yes  gallstones: Yes  GERD: No  irritable bowel syndrome: Yes  kidney stones: Yes  pancreatitis: No  PUD: No  ulcerative colitis: No  UTI: No      History:  Past Medical History:   Diagnosis Date    Anxiety     Crohn's disease     Depression     Genital herpes     Hepatitis C infection     Hypertension     Insomnia     Mesenteric artery stenosis     Dr. 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 Malaika Mack--dermatology    TIA (transient ischemic attack)        Medications:  Current Outpatient Medications on File Prior to Visit   Medication Sig Dispense Refill    adalimumab (HUMIRA,CF, PEN) 40 mg/0.4 mL PnKt Inject 0.4 mLs (40 mg total) into the skin every 14  (fourteen) days. 6 pen 3    amLODIPine (NORVASC) 10 MG tablet Take 1 tablet by mouth once daily 90 tablet 1    amoxicillin (AMOXIL) 500 MG capsule TAKE 1 CAPSULE BY MOUTH 4 TIMES DAILY UNTIL GONE. 28 capsule 1    aspirin (ECOTRIN) 81 MG EC tablet Take 1 tablet (81 mg total) by mouth once daily.      atenoloL (TENORMIN) 25 MG tablet Take 1 tablet (25 mg total) by mouth once daily. 30 tablet 11    calcium carbonate (OS-COOPER) 600 mg (1,500 mg) Tab Take 1,200 mg by mouth once daily.       cyanocobalamin, vitamin B-12, 2,500 mcg Lozg Place 1 tablet under the tongue.       denosumab (PROLIA) 60 mg/mL Syrg Inject 60 mg into the skin.      efinaconazole (JUBLIA) 10 % Kike APPLY TOPICALLY ONCE DAILY. 4 mL 3    efinaconazole (JUBLIA) 10 % Kike Apply 1 Act topically once daily. 8 mL 3    HYDROcodone-acetaminophen (NORCO) 5-325 mg per tablet Take 1 tablet by mouth every 6 (six) hours as needed for Pain. 7 tablet 0    levocetirizine (XYZAL) 5 MG tablet Take 1 tablet (5 mg total) by mouth every evening. 90 tablet 1    multivitamin with minerals tablet Take 1 tablet by mouth once daily at 6am.      mupirocin (BACTROBAN) 2 % ointment Apply a small amount to affected area twice a day x 10 days 22 g 0    triamcinolone acetonide 0.1% (KENALOG) 0.1 % ointment Apply a small amount to affected area twice a day x 2 weeks as needed for flares 30 g 4    valACYclovir (VALTREX) 500 MG tablet Take 1 tablet by mouth twice a day as needed for 3 days per flare 30 tablet 0    vitamin D 1000 units Tab Take 185 mg by mouth once daily.      LORazepam (ATIVAN) 0.5 MG tablet Take 3 tablets (1.5 mg total) by mouth every evening. 90 tablet 5     No current facility-administered medications on file prior to visit.       Allergies:  Review of patient's allergies indicates:   Allergen Reactions    Statins-hmg-coa reductase inhibitors Anaphylaxis     Myalgias    Codeine sulfate Itching    Hydrochlorothiazide Other (See Comments)     Adverse Reaction     Lisinopril Swelling and Edema     Facial Swelling       Exam:  Vitals:    10/10/24 0952   Pulse: 76   Resp: 20   Temp: 96.3 °F (35.7 °C)     Weight: 50.2 kg (110 lb 10.7 oz)   Body mass index is 17.01 kg/m².      Physical Exam    Vitals: Reviewed.  Constitutional: No acute distress. Well-developed. Not ill-appearing.  Eyes: No scleral icterus.  Cardiovascular: Normal rate.  Pulmonary: Pulmonary effort is normal. No respiratory distress.   Abdomen: Soft. Nontender. Non distended.   Skin: Warm. Dry.  Neurological: Alert and oriented to person, place, and time.  Psychiatric: Behavior normal.         Assessment:  The primary encounter diagnosis was Rib pain on right side. Diagnoses of Gallstones, Need for vaccination, Insomnia, unspecified type, Anxiety, and Chronic insomnia were also pertinent to this visit.    Assessment & Plan    RIGHT LOWER RIB CAGE/UPPER QUADRANT PAIN (MUSCULOSKELETAL):  - Assessed right upper quadrant pain as likely musculoskeletal in origin, given onset with lifting heavy gate and exacerbation with activity.  - Ms. Reyes to apply heat to affected area as needed for pain relief.  - Started prescription-strength ibuprofen, to be taken with food.  - Continued tizanidine, instructed to take at night.    GALLSTONES:  - Considered gallstone pain as differential diagnosis but deemed less likely due to lack of typical symptoms.  - Noted presence of gallstones on recent CT but determined they are likely asymptomatic at this time.  - Explained typical symptoms of gallstone pain, including postprandial discomfort and nausea.  - Discussed indications for emergency care related to gallbladder issues, including fever and severe, unrelenting pain.  - Provided information on gallbladder function and possibility of living without a gallbladder.  - Contact office if experiencing symptoms of gallstone pain (e.g.  - nausea, pain after eating).  - Go to ER if experiencing fever or severe, unrelenting abdominal  pain.    SLEEP ISSUES:  - Evaluated sleep issues and decided to increase lorazepam dosage.  - Increased lorazepam from 1.5 mg to 2 mg at bedtime.    BREAST CANCER SCREENING:  - Explained rationale for continuing mammograms based on overall health and life expectancy.    FOLLOW UP:  - Follow up in January.

## 2024-10-11 ENCOUNTER — OFFICE VISIT (OUTPATIENT)
Dept: CARDIOLOGY | Facility: CLINIC | Age: 74
End: 2024-10-11
Payer: MEDICARE

## 2024-10-11 VITALS
HEART RATE: 72 BPM | HEIGHT: 68 IN | SYSTOLIC BLOOD PRESSURE: 118 MMHG | OXYGEN SATURATION: 98 % | WEIGHT: 111.56 LBS | DIASTOLIC BLOOD PRESSURE: 70 MMHG | BODY MASS INDEX: 16.91 KG/M2

## 2024-10-11 DIAGNOSIS — K50.119 CROHN'S DISEASE OF COLON WITH COMPLICATION: ICD-10-CM

## 2024-10-11 DIAGNOSIS — I70.0 ATHEROSCLEROSIS OF ABDOMINAL AORTA: ICD-10-CM

## 2024-10-11 DIAGNOSIS — I10 ESSENTIAL HYPERTENSION: ICD-10-CM

## 2024-10-11 DIAGNOSIS — E78.5 HYPERLIPIDEMIA LDL GOAL <70: Primary | ICD-10-CM

## 2024-10-11 DIAGNOSIS — Z82.49 FAMILY HISTORY OF CARDIOVASCULAR DISEASE: ICD-10-CM

## 2024-10-11 DIAGNOSIS — K55.1 MESENTERIC ARTERY STENOSIS: ICD-10-CM

## 2024-10-11 DIAGNOSIS — I70.0 CALCIFICATION OF AORTA: ICD-10-CM

## 2024-10-11 PROCEDURE — 99999 PR PBB SHADOW E&M-EST. PATIENT-LVL IV: CPT | Mod: PBBFAC,,, | Performed by: PHYSICIAN ASSISTANT

## 2024-10-22 ENCOUNTER — PATIENT MESSAGE (OUTPATIENT)
Dept: INTERNAL MEDICINE | Facility: CLINIC | Age: 74
End: 2024-10-22
Payer: MEDICARE

## 2024-10-22 DIAGNOSIS — K80.20 GALLSTONES: ICD-10-CM

## 2024-10-22 DIAGNOSIS — R10.11 RIGHT UPPER QUADRANT ABDOMINAL PAIN: Primary | ICD-10-CM

## 2024-10-25 ENCOUNTER — PATIENT MESSAGE (OUTPATIENT)
Dept: GASTROENTEROLOGY | Facility: CLINIC | Age: 74
End: 2024-10-25
Payer: MEDICARE

## 2024-10-28 ENCOUNTER — HOSPITAL ENCOUNTER (OUTPATIENT)
Dept: RADIOLOGY | Facility: HOSPITAL | Age: 74
Discharge: HOME | End: 2024-10-28
Attending: INTERNAL MEDICINE
Payer: MEDICARE

## 2024-10-28 DIAGNOSIS — R10.11 RIGHT UPPER QUADRANT ABDOMINAL PAIN: ICD-10-CM

## 2024-10-28 PROCEDURE — 76700 US EXAM ABDOM COMPLETE: CPT | Mod: 26,,, | Performed by: RADIOLOGY

## 2024-10-28 PROCEDURE — 76700 US EXAM ABDOM COMPLETE: CPT | Mod: TC

## 2024-10-31 ENCOUNTER — PATIENT MESSAGE (OUTPATIENT)
Dept: CARDIOLOGY | Facility: CLINIC | Age: 74
End: 2024-10-31
Payer: MEDICARE

## 2024-11-01 ENCOUNTER — PATIENT MESSAGE (OUTPATIENT)
Dept: GASTROENTEROLOGY | Facility: CLINIC | Age: 74
End: 2024-11-01
Payer: MEDICARE

## 2024-11-01 ENCOUNTER — TELEPHONE (OUTPATIENT)
Dept: GASTROENTEROLOGY | Facility: CLINIC | Age: 74
End: 2024-11-01
Payer: MEDICARE

## 2024-11-05 ENCOUNTER — OFFICE VISIT (OUTPATIENT)
Dept: GASTROENTEROLOGY | Facility: CLINIC | Age: 74
End: 2024-11-05
Payer: MEDICARE

## 2024-11-05 VITALS
SYSTOLIC BLOOD PRESSURE: 151 MMHG | HEART RATE: 94 BPM | DIASTOLIC BLOOD PRESSURE: 84 MMHG | HEIGHT: 68 IN | BODY MASS INDEX: 17.31 KG/M2 | WEIGHT: 114.19 LBS

## 2024-11-05 DIAGNOSIS — D84.9 IMMUNOSUPPRESSED STATUS: ICD-10-CM

## 2024-11-05 DIAGNOSIS — K50.00 CROHN'S DISEASE OF SMALL INTESTINE WITHOUT COMPLICATION: Primary | ICD-10-CM

## 2024-11-05 DIAGNOSIS — R10.11 RUQ PAIN: ICD-10-CM

## 2024-11-05 PROCEDURE — 1160F RVW MEDS BY RX/DR IN RCRD: CPT | Mod: CPTII,S$GLB,, | Performed by: NURSE PRACTITIONER

## 2024-11-05 PROCEDURE — 99999 PR PBB SHADOW E&M-EST. PATIENT-LVL IV: CPT | Mod: PBBFAC,,, | Performed by: NURSE PRACTITIONER

## 2024-11-05 PROCEDURE — 1159F MED LIST DOCD IN RCRD: CPT | Mod: CPTII,S$GLB,, | Performed by: NURSE PRACTITIONER

## 2024-11-05 PROCEDURE — 1126F AMNT PAIN NOTED NONE PRSNT: CPT | Mod: CPTII,S$GLB,, | Performed by: NURSE PRACTITIONER

## 2024-11-05 PROCEDURE — 3079F DIAST BP 80-89 MM HG: CPT | Mod: CPTII,S$GLB,, | Performed by: NURSE PRACTITIONER

## 2024-11-05 PROCEDURE — 3008F BODY MASS INDEX DOCD: CPT | Mod: CPTII,S$GLB,, | Performed by: NURSE PRACTITIONER

## 2024-11-05 PROCEDURE — 99214 OFFICE O/P EST MOD 30 MIN: CPT | Mod: S$GLB,,, | Performed by: NURSE PRACTITIONER

## 2024-11-05 PROCEDURE — 3288F FALL RISK ASSESSMENT DOCD: CPT | Mod: CPTII,S$GLB,, | Performed by: NURSE PRACTITIONER

## 2024-11-05 PROCEDURE — 3077F SYST BP >= 140 MM HG: CPT | Mod: CPTII,S$GLB,, | Performed by: NURSE PRACTITIONER

## 2024-11-05 PROCEDURE — 1101F PT FALLS ASSESS-DOCD LE1/YR: CPT | Mod: CPTII,S$GLB,, | Performed by: NURSE PRACTITIONER

## 2024-11-05 RX ORDER — ADALIMUMAB 40MG/0.4ML
40 KIT SUBCUTANEOUS
Qty: 6 PEN | Refills: 3 | Status: SHIPPED | OUTPATIENT
Start: 2024-11-05 | End: 2025-11-05

## 2024-11-05 RX ORDER — PANTOPRAZOLE SODIUM 40 MG/1
40 TABLET, DELAYED RELEASE ORAL DAILY
Qty: 30 TABLET | Refills: 1 | Status: SHIPPED | OUTPATIENT
Start: 2024-11-05 | End: 2025-01-04
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
## 2024-11-07 ENCOUNTER — PATIENT MESSAGE (OUTPATIENT)
Dept: GASTROENTEROLOGY | Facility: CLINIC | Age: 74
End: 2024-11-07
Payer: MEDICARE

## 2024-11-08 ENCOUNTER — PATIENT MESSAGE (OUTPATIENT)
Dept: GASTROENTEROLOGY | Facility: CLINIC | Age: 74
End: 2024-11-08
Payer: MEDICARE

## 2024-11-13 ENCOUNTER — OFFICE VISIT (OUTPATIENT)
Dept: SURGERY | Facility: CLINIC | Age: 74
End: 2024-11-13
Payer: MEDICARE

## 2024-11-13 VITALS
HEART RATE: 78 BPM | DIASTOLIC BLOOD PRESSURE: 81 MMHG | TEMPERATURE: 98 F | SYSTOLIC BLOOD PRESSURE: 152 MMHG | BODY MASS INDEX: 17.92 KG/M2 | WEIGHT: 114.19 LBS | HEIGHT: 67 IN

## 2024-11-13 DIAGNOSIS — R10.11 RIGHT UPPER QUADRANT ABDOMINAL PAIN: ICD-10-CM

## 2024-11-13 DIAGNOSIS — K80.20 GALLSTONES: ICD-10-CM

## 2024-11-13 PROCEDURE — 99999 PR PBB SHADOW E&M-EST. PATIENT-LVL IV: CPT | Mod: PBBFAC,,, | Performed by: SURGERY

## 2024-11-13 PROCEDURE — 3077F SYST BP >= 140 MM HG: CPT | Mod: CPTII,S$GLB,, | Performed by: SURGERY

## 2024-11-13 PROCEDURE — 3079F DIAST BP 80-89 MM HG: CPT | Mod: CPTII,S$GLB,, | Performed by: SURGERY

## 2024-11-13 PROCEDURE — 1126F AMNT PAIN NOTED NONE PRSNT: CPT | Mod: CPTII,S$GLB,, | Performed by: SURGERY

## 2024-11-13 PROCEDURE — 3008F BODY MASS INDEX DOCD: CPT | Mod: CPTII,S$GLB,, | Performed by: SURGERY

## 2024-11-13 PROCEDURE — 99204 OFFICE O/P NEW MOD 45 MIN: CPT | Mod: S$GLB,,, | Performed by: SURGERY

## 2024-11-13 RX ORDER — LIDOCAINE HYDROCHLORIDE 10 MG/ML
1 INJECTION, SOLUTION EPIDURAL; INFILTRATION; INTRACAUDAL; PERINEURAL ONCE
OUTPATIENT
Start: 2024-11-13 | End: 2024-11-13

## 2024-11-13 RX ORDER — SODIUM CHLORIDE 9 MG/ML
INJECTION, SOLUTION INTRAVENOUS CONTINUOUS
OUTPATIENT
Start: 2024-11-13

## 2024-11-13 RX ORDER — CEFAZOLIN SODIUM 2 G/50ML
2 SOLUTION INTRAVENOUS
OUTPATIENT
Start: 2024-11-13
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
## 2024-11-14 ENCOUNTER — PATIENT MESSAGE (OUTPATIENT)
Dept: GASTROENTEROLOGY | Facility: CLINIC | Age: 74
End: 2024-11-14
Payer: MEDICARE

## 2024-11-14 ENCOUNTER — PATIENT MESSAGE (OUTPATIENT)
Dept: SURGERY | Facility: CLINIC | Age: 74
End: 2024-11-14
Payer: MEDICARE

## 2024-11-17 ENCOUNTER — PATIENT MESSAGE (OUTPATIENT)
Dept: SURGERY | Facility: CLINIC | Age: 74
End: 2024-11-17
Payer: MEDICARE

## 2024-11-19 ENCOUNTER — TELEPHONE (OUTPATIENT)
Dept: SURGERY | Facility: CLINIC | Age: 74
End: 2024-11-19
Payer: MEDICARE

## 2024-11-19 NOTE — TELEPHONE ENCOUNTER
----- Message from Martha sent at 11/19/2024 12:38 PM CST -----  Contact: Manuela  Patient is calling to speak with someone regarding test. Patient reports scheduled for test this week and request to ask question. Please give patient a call back at [phone] to assist.   Thank you,  GH

## 2024-11-21 ENCOUNTER — PATIENT MESSAGE (OUTPATIENT)
Dept: GASTROENTEROLOGY | Facility: CLINIC | Age: 74
End: 2024-11-21
Payer: MEDICARE

## 2024-11-21 ENCOUNTER — PATIENT MESSAGE (OUTPATIENT)
Dept: SURGERY | Facility: CLINIC | Age: 74
End: 2024-11-21
Payer: MEDICARE

## 2024-11-21 ENCOUNTER — HOSPITAL ENCOUNTER (OUTPATIENT)
Dept: RADIOLOGY | Facility: HOSPITAL | Age: 74
Discharge: HOME | End: 2024-11-21
Attending: SURGERY
Payer: MEDICARE

## 2024-11-21 DIAGNOSIS — R10.11 RIGHT UPPER QUADRANT ABDOMINAL PAIN: ICD-10-CM

## 2024-11-21 DIAGNOSIS — K80.20 GALLSTONES: ICD-10-CM

## 2024-11-21 PROCEDURE — 78227 HEPATOBIL SYST IMAGE W/DRUG: CPT | Mod: 26,,, | Performed by: RADIOLOGY

## 2024-11-21 PROCEDURE — 63600175 PHARM REV CODE 636 W HCPCS: Performed by: SURGERY

## 2024-11-21 PROCEDURE — 78227 HEPATOBIL SYST IMAGE W/DRUG: CPT | Mod: TC

## 2024-11-21 PROCEDURE — A9537 TC99M MEBROFENIN: HCPCS | Performed by: SURGERY

## 2024-11-21 RX ORDER — SINCALIDE 5 UG/5ML
1.1 INJECTION, POWDER, LYOPHILIZED, FOR SOLUTION INTRAVENOUS ONCE
Status: COMPLETED | OUTPATIENT
Start: 2024-11-21 | End: 2024-11-21

## 2024-11-21 RX ORDER — KIT FOR THE PREPARATION OF TECHNETIUM TC 99M MEBROFENIN 45 MG/10ML
5 INJECTION, POWDER, LYOPHILIZED, FOR SOLUTION INTRAVENOUS
Status: COMPLETED | OUTPATIENT
Start: 2024-11-21 | End: 2024-11-21

## 2024-11-21 RX ADMIN — MEBROFENIN 5 MILLICURIE: 45 INJECTION, POWDER, LYOPHILIZED, FOR SOLUTION INTRAVENOUS at 08:11

## 2024-11-21 RX ADMIN — SINCALIDE 1.1 MCG: 5 INJECTION, POWDER, LYOPHILIZED, FOR SOLUTION INTRAVENOUS at 10:11

## 2024-11-22 DIAGNOSIS — R10.11 RIGHT UPPER QUADRANT ABDOMINAL PAIN: Primary | ICD-10-CM

## 2024-11-25 ENCOUNTER — PATIENT MESSAGE (OUTPATIENT)
Dept: GASTROENTEROLOGY | Facility: CLINIC | Age: 74
End: 2024-11-25
Payer: MEDICARE

## 2024-11-25 ENCOUNTER — PATIENT MESSAGE (OUTPATIENT)
Dept: SURGERY | Facility: CLINIC | Age: 74
End: 2024-11-25
Payer: MEDICARE

## 2024-11-25 ENCOUNTER — TELEPHONE (OUTPATIENT)
Dept: SURGERY | Facility: CLINIC | Age: 74
End: 2024-11-25
Payer: MEDICARE

## 2024-12-03 ENCOUNTER — HOSPITAL ENCOUNTER (OUTPATIENT)
Dept: CARDIOLOGY | Facility: HOSPITAL | Age: 74
Discharge: HOME | End: 2024-12-03
Payer: MEDICARE

## 2024-12-03 ENCOUNTER — PATIENT MESSAGE (OUTPATIENT)
Dept: CARDIOLOGY | Facility: CLINIC | Age: 74
End: 2024-12-03
Payer: MEDICARE

## 2024-12-03 DIAGNOSIS — R10.11 RIGHT UPPER QUADRANT ABDOMINAL PAIN: ICD-10-CM

## 2024-12-03 LAB
OHS QRS DURATION: 78 MS
OHS QTC CALCULATION: 404 MS

## 2024-12-03 PROCEDURE — 93005 ELECTROCARDIOGRAM TRACING: CPT

## 2024-12-03 PROCEDURE — 93010 ELECTROCARDIOGRAM REPORT: CPT | Mod: ,,, | Performed by: INTERNAL MEDICINE

## 2024-12-04 ENCOUNTER — PATIENT MESSAGE (OUTPATIENT)
Dept: SURGERY | Facility: CLINIC | Age: 74
End: 2024-12-04
Payer: MEDICARE

## 2024-12-05 ENCOUNTER — TELEPHONE (OUTPATIENT)
Dept: INTERNAL MEDICINE | Facility: CLINIC | Age: 74
End: 2024-12-05
Payer: MEDICARE

## 2024-12-05 ENCOUNTER — PATIENT MESSAGE (OUTPATIENT)
Dept: INTERNAL MEDICINE | Facility: CLINIC | Age: 74
End: 2024-12-05
Payer: MEDICARE

## 2024-12-05 DIAGNOSIS — E87.1 HYPONATREMIA: Primary | ICD-10-CM

## 2024-12-05 NOTE — TELEPHONE ENCOUNTER
Patient will do labs on Tuesday 12/10/2024 and see Palma Nunez NP on 12/13/2024 she started drinking pedia on her own do you think she should DC or continue

## 2024-12-05 NOTE — TELEPHONE ENCOUNTER
----- Message from Yvette Turner PA-C sent at 12/5/2024 11:02 AM CST -----  Sodium appears chronically low, please schedule repeat CMP and f/u visit with care team next week to address   Please make sure lab is done day prior to visit so we have results  ----- Message -----  From: Amber Hall LPN  Sent: 12/5/2024  10:55 AM CST  To: LYNDSAY Aleman; #      ----- Message -----  From: Tomasa Gong PA-C  Sent: 12/5/2024  10:47 AM CST  To: [facility] Staff    Patient with sodium 125 on pre-op labs. Surgery scheduled for 12/20. Please see patient ASAP and address pre-operatively    Tomasa Gong PA-C  [facility]

## 2024-12-05 NOTE — TELEPHONE ENCOUNTER
----- Message from Nurse Clark sent at 12/5/2024 10:55 AM CST -----    ----- Message -----  From: Tomasa Gong PA-C  Sent: 12/5/2024  10:47 AM CST  To: [facility] Bourgeois Staff    Patient with sodium 125 on pre-op labs. Surgery scheduled for 12/20. Please see patient ASAP and address pre-operatively    Tomasa Gong PA-C  [facility]

## 2024-12-09 ENCOUNTER — PATIENT MESSAGE (OUTPATIENT)
Dept: GASTROENTEROLOGY | Facility: CLINIC | Age: 74
End: 2024-12-09
Payer: MEDICARE

## 2024-12-09 DIAGNOSIS — K56.699 COLONIC STRICTURE: ICD-10-CM

## 2024-12-09 DIAGNOSIS — K50.00 CROHN'S DISEASE OF SMALL INTESTINE WITHOUT COMPLICATION: Primary | ICD-10-CM

## 2024-12-10 ENCOUNTER — TELEPHONE (OUTPATIENT)
Dept: SURGERY | Facility: CLINIC | Age: 74
End: 2024-12-10
Payer: MEDICARE

## 2024-12-10 ENCOUNTER — LAB VISIT (OUTPATIENT)
Dept: LAB | Facility: HOSPITAL | Age: 74
End: 2024-12-10
Attending: PHYSICIAN ASSISTANT
Payer: MEDICARE

## 2024-12-10 DIAGNOSIS — E87.1 HYPONATREMIA: ICD-10-CM

## 2024-12-10 LAB
ALBUMIN SERPL BCP-MCNC: 4.1 G/DL (ref 3.5–5.2)
ALP SERPL-CCNC: 38 U/L (ref 40–150)
ALT SERPL W/O P-5'-P-CCNC: 17 U/L (ref 10–44)
ANION GAP SERPL CALC-SCNC: 11 MMOL/L (ref 8–16)
AST SERPL-CCNC: 23 U/L (ref 10–40)
BILIRUB SERPL-MCNC: 0.5 MG/DL (ref 0.1–1)
BUN SERPL-MCNC: 12 MG/DL (ref 8–23)
CALCIUM SERPL-MCNC: 9 MG/DL (ref 8.7–10.5)
CHLORIDE SERPL-SCNC: 97 MMOL/L (ref 95–110)
CO2 SERPL-SCNC: 25 MMOL/L (ref 23–29)
CREAT SERPL-MCNC: 0.8 MG/DL (ref 0.5–1.4)
EST. GFR  (NO RACE VARIABLE): >60 ML/MIN/1.73 M^2
GLUCOSE SERPL-MCNC: 95 MG/DL (ref 70–110)
POTASSIUM SERPL-SCNC: 3.9 MMOL/L (ref 3.5–5.1)
PROT SERPL-MCNC: 7.5 G/DL (ref 6–8.4)
SODIUM SERPL-SCNC: 133 MMOL/L (ref 136–145)

## 2024-12-10 PROCEDURE — 80053 COMPREHEN METABOLIC PANEL: CPT | Performed by: PHYSICIAN ASSISTANT

## 2024-12-10 PROCEDURE — 36415 COLL VENOUS BLD VENIPUNCTURE: CPT | Performed by: PHYSICIAN ASSISTANT

## 2024-12-10 NOTE — TELEPHONE ENCOUNTER
----- Message from Tomasa Gong PA-C sent at 12/10/2024  3:18 PM CST -----  Please let the patient know we are good for Friday  ----- Message -----  From: Yvette Turner PA-C  Sent: 12/10/2024   3:12 PM CST  To: Tomasa Gong PA-C    Good afternoon,   I repeated Ms. Reyes's labs and her sodium levels have improved and she is at her baseline.  Patient does have a history of mild, chronic but stable hyponatremia (since at least 2017) without specific known etiology (although GI history could be contributing factor).  Dr. Lawler is out of office currently, however, I did discuss patient's labs with Dr. Hutchins in our office.  It is agreeable that patient is fine to proceed with surgery at this time.     Please let us know if you have any additional questions or concerns.     Thanks,   Cass Turner PA-C  [facility] [place]

## 2024-12-13 ENCOUNTER — PATIENT MESSAGE (OUTPATIENT)
Dept: SURGERY | Facility: CLINIC | Age: 74
End: 2024-12-13

## 2024-12-13 ENCOUNTER — TELEPHONE (OUTPATIENT)
Dept: SURGERY | Facility: CLINIC | Age: 74
End: 2024-12-13

## 2024-12-13 ENCOUNTER — OFFICE VISIT (OUTPATIENT)
Dept: SURGERY | Facility: CLINIC | Age: 74
End: 2024-12-13
Payer: MEDICARE

## 2024-12-13 VITALS
BODY MASS INDEX: 17.47 KG/M2 | SYSTOLIC BLOOD PRESSURE: 148 MMHG | HEART RATE: 72 BPM | WEIGHT: 111.31 LBS | DIASTOLIC BLOOD PRESSURE: 84 MMHG | HEIGHT: 67 IN | OXYGEN SATURATION: 98 %

## 2024-12-13 DIAGNOSIS — K50.00 CROHN'S DISEASE OF SMALL INTESTINE WITHOUT COMPLICATION: ICD-10-CM

## 2024-12-13 DIAGNOSIS — K56.699 COLONIC STRICTURE: ICD-10-CM

## 2024-12-13 PROCEDURE — 99999 PR PBB SHADOW E&M-EST. PATIENT-LVL V: CPT | Mod: PBBFAC,,, | Performed by: STUDENT IN AN ORGANIZED HEALTH CARE EDUCATION/TRAINING PROGRAM

## 2024-12-13 RX ORDER — POLYETHYLENE GLYCOL 3350 17 G/17G
238 POWDER, FOR SOLUTION ORAL DAILY
Qty: 238 G | Refills: 0 | Status: SHIPPED | OUTPATIENT
Start: 2024-12-13

## 2024-12-13 RX ORDER — NEOMYCIN SULFATE 500 MG/1
1000 TABLET ORAL 3 TIMES DAILY
Qty: 6 TABLET | Refills: 0 | Status: SHIPPED | OUTPATIENT
Start: 2024-12-13

## 2024-12-13 RX ORDER — METRONIDAZOLE 500 MG/1
500 TABLET ORAL 3 TIMES DAILY
Qty: 3 TABLET | Refills: 0 | Status: SHIPPED | OUTPATIENT
Start: 2024-12-13

## 2024-12-13 NOTE — TELEPHONE ENCOUNTER
Called spoke with patient regarding CT scan. Informed patient CT scans date time and location. Patient verbalized understanding.    ----- Message from Kayla sent at 12/13/2024 12:13 PM CST -----  Name of Who is Calling:BOONE YIN [[id number]]        What is the request in detail:Pt call returning a phone call from the office. Pt requesting a call back today if possible.        Can the clinic reply by [id number]:No        What Number to Call Back if not in [facility]:Telephone Information:  Mobile          [phone]

## 2024-12-13 NOTE — TELEPHONE ENCOUNTER
Called pt's sister. Asked her to deliver message to have pt call me back concerning specifics of her Ct. Sister verbalized understanding and said she will deliver this message today. Gave good call back number

## 2024-12-13 NOTE — TELEPHONE ENCOUNTER
Lm on  giving time date and location of her scheduled CT. Gave good call back number and pre-procedure instructions and asked the pt to call back to let me know she received this message.

## 2024-12-13 NOTE — PROGRESS NOTES
CRS Office Visit    SUBJECTIVE:     Chief Complaint:  Ileocolic stricture    History of Present Illness:  Patient is a 74 y.o. female presents with complaints of an ileocolic stricture and worsening bowel function.  Patient has had a known stricture of her prior ileocolic anastomosis.  07/2023 identified and ileocolic stricture that was unable to be traversed.  Patient was not symptomatic at that time.  However over the past several weeks she has had increased worsening symptoms.  She has a lack of appetite with the abdominal bloating.  Is no longer having regular bowel movements.  It is only passing very small amount of stool intermittently.  Passing flatus twice a day which is different from prior.  Her Crohn's disease is well-controlled on Humira    Colonoscopy:  07/2023  The perianal and digital rectal examinations were normal.        There was evidence of a prior end-to-side ileo-colonic anastomosis        in the cecum. The anastomosis was not traversed.        A moderate stenosis was found at the anastomosis (just a few cm        proximal) and was non-traversed.        The exam was otherwise without abnormality on direct and        retroflexion views.       Review of patient's allergies indicates:   Allergen Reactions    Statins-hmg-coa reductase inhibitors Anaphylaxis     Myalgias    Codeine sulfate Itching    Hydrochlorothiazide Other (See Comments)     Adverse Reaction    Lisinopril Swelling and Edema     Facial Swelling       Past Medical History:   Diagnosis Date    Anxiety     Crohn's disease     Depression     Genital herpes     Hepatitis C infection     Hypertension     Insomnia     Mesenteric artery stenosis     Dr. 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 Malaika Mack--dermatology    TIA (transient ischemic attack)      Past Surgical History:   Procedure Laterality Date    APPENDECTOMY      COLONOSCOPY N/A 05/19/2017    Procedure:  COLONOSCOPY;  Surgeon: Jose Luis Leonard MD;  Location: [facility];  Service: Endoscopy;  Laterality: N/A;    COLONOSCOPY N/A 2018    Procedure: COLONOSCOPY;  Surgeon: Guillaume Whitman MD;  Location: [facility];  Service: Endoscopy;  Laterality: N/A;    COLONOSCOPY N/A 2019    Procedure: COLONOSCOPY;  Surgeon: Lili Ellis MD;  Location: [facility];  Service: Endoscopy;  Laterality: N/A;    COLONOSCOPY N/A 2020    Procedure: COLONOSCOPY;  Surgeon: Lili Ellis MD;  Location: [facility];  Service: Endoscopy;  Laterality: N/A;    COLONOSCOPY N/A 2023    Procedure: COLONOSCOPY;  Surgeon: Lili Ellis MD;  Location: [facility];  Service: Endoscopy;  Laterality: N/A;    COSMETIC SURGERY      ESOPHAGOGASTRODUODENOSCOPY N/A 2023    Procedure: EGD (ESOPHAGOGASTRODUODENOSCOPY);  Surgeon: Lili Ellis MD;  Location: [facility];  Service: Endoscopy;  Laterality: N/A;    SMALL INTESTINE SURGERY      TONSILLECTOMY       Family History   Problem Relation Name Age of Onset    Stroke Mother Mother     Heart disease Mother Mother     Cataracts Mother Mother     Cancer Father Father         Lung    Heart disease Sister Sister     Hypertension Brother Brother         Brother    Glaucoma Neg Hx      Macular degeneration Neg Hx      Thyroid disease Neg Hx       Social History     Tobacco Use    Smoking status: Former     Current packs/day: 0.00     Average packs/day: 0.3 packs/day for 10.0 years (2.5 ttl pk-yrs)     Types: Cigarettes     Start date: 1995     Quit date: 2005     Years since quittin.9    Smokeless tobacco: Former    Tobacco comments:     Former Light Smoker less than 10 a day   Substance Use Topics    Alcohol use: Yes     Alcohol/week: 4.0 standard drinks of alcohol     Types: 4 Glasses of wine per week     Comment: occ    Drug use: No        OBJECTIVE:     Vital Signs (Most Recent)  Pulse: 72 (24 1052)  BP: (!) 148/84 (24 1052)  SpO2: 98 % (24  1052)    Physical Exam:  Physical Exam  Constitutional:       Appearance: She is well-developed.   HENT:      Head: Normocephalic and atraumatic.   Eyes:      Conjunctiva/sclera: Conjunctivae normal.      Pupils: Pupils are equal, round, and reactive to light.   Neck:      Thyroid: No thyromegaly.   Cardiovascular:      Rate and Rhythm: Normal rate and regular rhythm.   Pulmonary:      Effort: Pulmonary effort is normal. No respiratory distress.   Abdominal:      General: There is distension.      Palpations: Abdomen is soft. There is no mass.      Tenderness: There is no abdominal tenderness.   Musculoskeletal:         General: No tenderness. Normal range of motion.      Cervical back: Normal range of motion.   Skin:     General: Skin is warm and dry.      Capillary Refill: Capillary refill takes less than 2 seconds.   Neurological:      General: No focal deficit present.      Mental Status: She is alert and oriented to person, place, and time.           ASSESSMENT/PLAN:     Manuela was seen today for crohn's disease.    Diagnoses and all orders for this visit:    Crohn's disease of small intestine without complication  -     Ambulatory referral/consult to Colorectal Surgery  -     Case Request Operating Room: XI ROBOTIC COLECTOMY, RIGHT    Colonic stricture  -     Ambulatory referral/consult to Colorectal Surgery  -     Case Request Operating Room: XI ROBOTIC COLECTOMY, RIGHT    Other orders  -     metroNIDAZOLE (FLAGYL) 500 MG tablet; Take 1 tablet (500 mg total) by mouth 3 (three) times daily. Take initial dose at 2pm, second dose at 3pm and final dose at 10pm the day before surgery  -     neomycin (MYCIFRADIN) 500 mg Tab; Take 2 tablets (1,000 mg total) by mouth 3 (three) times daily. Take initial dose at 2pm, take second dose at 3pm and take final dose at 10pm the day before surgery.  -     polyethylene glycol (GLYCOLAX) 17 gram/dose powder; Take 238 g by mouth once daily. Mix with 2L of gatorade and drink all  of mixed solution starting at 12pm the day before surgery, finishing by 10pm the night before surgery.      - given her rapidly worsening symptoms I discussed with the patient that I feel as if she is near obstructed with the impending obstruction.  Given the severity of her stenosis 1 year prior I do not think that she would be a good candidate for endoscopic dilation.  Also I do not think she would tolerate a prep given her worsening symptoms.  - reviewed with the patient that this has an ileocolic stricture especially given her symptoms and endoscopic findings.  She had a CT scan approximately 3 months prior that showed no bowel abnormalities.  Crohn's is well-controlled for many years.  - will repeat CT with oral contrast  - discussed that proceeding with surgery for redo ileocolic resection is my recommendation.  I think this should be done rather expeditiously as patient is having worsening obstruction symptoms.  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  I offered surgery in 3 days but she is unable to arrange care and should short notice.  - will plan for robotic redo ileocolic resection 12/20/2024  - preoperative mechanical an oral bowel prep as tolerated  - discussed warning signs and symptoms to return to the emergency department including nausea, vomiting, worsening abdominal pain, inability to pass flatus or have a bowel movement  - Discussed risks, benefits and alternatives including bleeding, infection, damage to surrounding structures (intestine, ureter, bladder, blood vessels), anastomotic leak, prolonged hospitalization, postoperative ileus, hernia formation, and anesthetic complications such as heart attack, stroke or death. Patient agreed to proceed.

## 2024-12-16 ENCOUNTER — HOSPITAL ENCOUNTER (OUTPATIENT)
Dept: RADIOLOGY | Facility: HOSPITAL | Age: 74
Discharge: HOME | End: 2024-12-16
Attending: STUDENT IN AN ORGANIZED HEALTH CARE EDUCATION/TRAINING PROGRAM
Payer: MEDICARE

## 2024-12-16 ENCOUNTER — PATIENT MESSAGE (OUTPATIENT)
Dept: PREADMISSION TESTING | Facility: HOSPITAL | Age: 74
End: 2024-12-16
Payer: MEDICARE

## 2024-12-16 DIAGNOSIS — K56.699 COLONIC STRICTURE: ICD-10-CM

## 2024-12-16 DIAGNOSIS — K50.00 CROHN'S DISEASE OF SMALL INTESTINE WITHOUT COMPLICATION: ICD-10-CM

## 2024-12-16 PROCEDURE — 25500020 PHARM REV CODE 255: Performed by: STUDENT IN AN ORGANIZED HEALTH CARE EDUCATION/TRAINING PROGRAM

## 2024-12-16 PROCEDURE — 74177 CT ABD & PELVIS W/CONTRAST: CPT | Mod: TC

## 2024-12-16 PROCEDURE — 74177 CT ABD & PELVIS W/CONTRAST: CPT | Mod: 26,,, | Performed by: RADIOLOGY

## 2024-12-16 RX ADMIN — IOHEXOL 30 ML: 350 INJECTION, SOLUTION INTRAVENOUS at 03:12

## 2024-12-16 RX ADMIN — IOHEXOL 100 ML: 350 INJECTION, SOLUTION INTRAVENOUS at 03:12

## 2024-12-18 ENCOUNTER — PATIENT MESSAGE (OUTPATIENT)
Dept: SURGERY | Facility: CLINIC | Age: 74
End: 2024-12-18
Payer: MEDICARE

## 2024-12-18 DIAGNOSIS — K50.012 CROHN'S DISEASE OF SMALL INTESTINE WITH INTESTINAL OBSTRUCTION: Primary | ICD-10-CM

## 2024-12-18 DIAGNOSIS — R93.3 ABNORMAL FINDINGS ON DIAGNOSTIC IMAGING OF OTHER PARTS OF DIGESTIVE TRACT: ICD-10-CM

## 2024-12-18 RX ORDER — SODIUM, POTASSIUM,MAG SULFATES 17.5-3.13G
1 SOLUTION, RECONSTITUTED, ORAL ORAL DAILY
Qty: 354 ML | Refills: 0 | Status: SHIPPED | OUTPATIENT
Start: 2024-12-18 | End: 2024-12-23

## 2024-12-18 NOTE — CLINICAL REVIEW
6/30/2020  CARE COORDINATION REVIEW - IBD    [facility]    LAST OV - 5/11/2020  NEXT OV DUE - 11/11/2020     MEDICATIONS:  -HUMIRA BIWEEKLY, 1ST DOSE: 2018    IMMUNIZATION HISTORY:  MMR -   VARICELLA -   TDAP -   HEPATITIS A -   HEPATITIS B -   HERPES ZOSTER -   HPV -   INFLUENZA - UTD  MENINGOCOCCAL -   PNEUMOCOCCAL - UTD    BONE HEALTH:  VITAMIN D - TAKING    CANCER PREVENTION:  LAST COLONSCOPY - 3/19/2019, DUE  DERMATOLOGY VISIT -   EYE EXAM -     LABS / SCANS:  DEXA - SCHEDULED 8/3/2020  QUANT GOLD - 5/14/2020 - NEGATIVE  TPMT - 9/25/2018 - WNL    NOTES:  CBC / CMP Q3 MONTHS   Sarah Churchill was seen and treated in our emergency department on 12/18/2024.  She may return to work on 12/19/2024.       If you have any questions or concerns, please don't hesitate to call.      Jonathan Almazan PA-C

## 2025-01-02 ENCOUNTER — TELEPHONE (OUTPATIENT)
Dept: PREADMISSION TESTING | Facility: HOSPITAL | Age: 75
End: 2025-01-02
Payer: MEDICARE

## 2025-01-02 ENCOUNTER — PATIENT MESSAGE (OUTPATIENT)
Dept: SURGERY | Facility: CLINIC | Age: 75
End: 2025-01-02
Payer: MEDICARE

## 2025-01-02 NOTE — TELEPHONE ENCOUNTER
----- Message from Macey Hernández PA-C sent at 1/2/2025  3:36 PM CST -----  Regarding: RE: surgery 1/07  No cardiac contraindications to proceed    May hold ASA 5-7 days, resume post-op ASAP    I apologize I was off yesterday    Thanks  ----- Message -----  From: Mayuri Ash RN  Sent: 12/31/2024   2:01 PM CST  To: Macey Hernández PA-C  Subject: surgery 1/07                                     Hello,   This pt will be having ROBOTIC CHOLECYSTECTOMY surgery on 1/07/25 with Dr DARLING. We have reviewed the patient's medical history and are requesting a CARDIAC clearance note. Please advise on whether this patient can be cleared to proceed from your standpoint or will need any further testing to ensure they are optimized to proceed.   Patient is currently taking ASPIRIN and we would like your recommendations on how long they should hold this medication prior to their procedure.       -Thanks in advance,  [facility] Pre [facility].  [phone] or [phone]  Mon-Fri 7:30 am- 4 pm (Closed Major Holidays)

## 2025-01-06 ENCOUNTER — PATIENT MESSAGE (OUTPATIENT)
Dept: PREADMISSION TESTING | Facility: HOSPITAL | Age: 75
End: 2025-01-06
Payer: MEDICARE

## 2025-01-06 NOTE — PRE-PROCEDURE INSTRUCTIONS
Pre op instructions reviewed with pt over telephone, verbalized understanding.    Procedure Date: 1/7/25  Arrival Time:  11 am    Address:   [facility] (Off [facility], 2nd Building on the left)  [address] [place] [address] [place]. [id number]  >>>Please enter through front entrance Lobby of 1st floor to Registration desk<<<        !!!INSTRUCTIONS IMPORTANT!!!  NO FOOD or tobacco products after midnight the night before surgery! You may have clear liquids up to 3 hrs before your arrival to the Hospital  Clear liquids include Gatorade, water, soda, black coffee or tea (no milk or creamer), and clear juices.  Clear liquids do NOT include anything with pulp or food particles (Chicken broth, ice cream, yogurt, Jello, etc.)    >>>MEDICATION INSTRUCTIONS<<<: Morning of Surgery, take small sip of water with ONLY these medications:  Amlodipine  Atenolol     *Diabetic/ Prediabetic Patients: !!!If you take diabetic or weight loss medication, Do NOT take morning of surgery unless instructed by Doctor!!!  Metformin to be stopped 24 hrs prior to surgery.   Long Acting Insulin Instructions: HOLD the night before surgery unless instructed differently by Provider!  Ozempic/ Mounjaro/ Wegovy/ Trulicity/ Semaglutide injections or weight loss medication to be stopped 7 days prior to surgery.    !!!STOP ALL Aspirins, NSAIDS, WEIGHT LOSS INJECTIONS/PILLS, Herbal supplements, & Vitamins 7 DAYS BEFORE SURGERY!!!    ____  Avoid Alcoholic beverages 3 days prior to surgery, as it can thin the blood.  ____  NO Acrylic/fake nails or nail polish worn day of surgery (specifically hand/arm & foot surgeries).  ____  NO powder, lotions, deodorants, oils or cream on body.  ____  Remove all jewelry & piercings & foreign objects before arrival & leave at home.  ____  Remove Dentures, Hearing Aids & Contact Lens prior to surgery.  ____  Bring photo ID and insurance information to hospital (Leave Valuables at Home).  ____  If going home the same  day, arrange for a ride home. You will not be able to drive for 24 hrs if Anesthesia was used.   ____  Females (ages 11-60): may need to give a urine sample the morning of surgery; please see Pre op Nurse prior to using the restroom.  ____  Males: Stop ED medications (Viagra, Cialis) 24 hrs prior to surgery.  ____  Wear clean, loose fitting clothing to allow for dressings/ bandages.      Bathing Instructions:    -Shower with anti-bacterial Soap (Hibiclens or Dial) the night before surgery and the morning of.   -Do not use Hibiclens on your face or genitals.   -Apply clean clothes after shower.  -Do not shave your face or body 2 days prior to surgery unless instructed otherwise by your Surgeon.  -Do not shave pubic hair 7 days prior to surgery (gyn pt's).    [facility] Visitor/Ride Policy:  Only 2 adults allowed in pre op/recovery area during your procedure. You MUST HAVE A RIDE HOME from a responsible adult that you know and trust. Medical Transport, Uber or Lyft can ONLY be used if patient has a responsible adult to accompany them during ride home.       *Signs and symptoms of Infection Before or After Surgery:               !!!If you experience any fever, chills, nausea/ vomiting, foul odor/ excessive drainage from surgical site, flu-like symptoms, new wounds or cuts, PLEASE CALL THE SURGEON OFFICE at [phone] or SEND MESSAGE THROUGH [facility] PORTAL!!!     *If you are running late the morning of surgery, please call the Hospital Surgery Dept @ [phone].     *Billing questions:  [phone]     Thank you,  -[facility]  [phone] or [phone]  M-F 7:30 am-4:00 pm (Closed Major Holidays)

## 2025-01-07 ENCOUNTER — ANESTHESIA (OUTPATIENT)
Dept: SURGERY | Facility: HOSPITAL | Age: 75
End: 2025-01-07
Payer: MEDICARE

## 2025-01-07 ENCOUNTER — ANESTHESIA EVENT (OUTPATIENT)
Dept: SURGERY | Facility: HOSPITAL | Age: 75
End: 2025-01-07
Payer: MEDICARE

## 2025-01-07 ENCOUNTER — HOSPITAL ENCOUNTER (OUTPATIENT)
Facility: HOSPITAL | Age: 75
Discharge: HOME | End: 2025-01-07
Attending: SURGERY | Admitting: SURGERY
Payer: MEDICARE

## 2025-01-07 VITALS
SYSTOLIC BLOOD PRESSURE: 153 MMHG | HEIGHT: 67 IN | DIASTOLIC BLOOD PRESSURE: 87 MMHG | HEART RATE: 66 BPM | TEMPERATURE: 98 F | RESPIRATION RATE: 18 BRPM | WEIGHT: 109.69 LBS | OXYGEN SATURATION: 97 % | BODY MASS INDEX: 17.21 KG/M2

## 2025-01-07 DIAGNOSIS — K80.20 GALLSTONES: ICD-10-CM

## 2025-01-07 DIAGNOSIS — R10.11 RIGHT UPPER QUADRANT ABDOMINAL PAIN: ICD-10-CM

## 2025-01-07 PROCEDURE — 88304 TISSUE EXAM BY PATHOLOGIST: CPT | Performed by: STUDENT IN AN ORGANIZED HEALTH CARE EDUCATION/TRAINING PROGRAM

## 2025-01-07 PROCEDURE — 37000009 HC ANESTHESIA EA ADD 15 MINS: Performed by: SURGERY

## 2025-01-07 PROCEDURE — 25000003 PHARM REV CODE 250: Performed by: NURSE ANESTHETIST, CERTIFIED REGISTERED

## 2025-01-07 PROCEDURE — 63600175 PHARM REV CODE 636 W HCPCS: Performed by: SURGERY

## 2025-01-07 PROCEDURE — 63600175 PHARM REV CODE 636 W HCPCS: Performed by: NURSE ANESTHETIST, CERTIFIED REGISTERED

## 2025-01-07 PROCEDURE — 36000711: Performed by: SURGERY

## 2025-01-07 PROCEDURE — 71000015 HC POSTOP RECOV 1ST HR: Performed by: SURGERY

## 2025-01-07 PROCEDURE — 47562 LAPAROSCOPIC CHOLECYSTECTOMY: CPT | Mod: AS,,,

## 2025-01-07 PROCEDURE — 37000008 HC ANESTHESIA 1ST 15 MINUTES: Performed by: SURGERY

## 2025-01-07 PROCEDURE — 36000710: Performed by: SURGERY

## 2025-01-07 PROCEDURE — 47562 LAPAROSCOPIC CHOLECYSTECTOMY: CPT | Mod: ,,, | Performed by: SURGERY

## 2025-01-07 PROCEDURE — 71000033 HC RECOVERY, INTIAL HOUR: Performed by: SURGERY

## 2025-01-07 PROCEDURE — 25000003 PHARM REV CODE 250: Performed by: SURGERY

## 2025-01-07 PROCEDURE — 63600175 PHARM REV CODE 636 W HCPCS: Mod: TB | Performed by: ANESTHESIOLOGY

## 2025-01-07 PROCEDURE — 25000003 PHARM REV CODE 250: Performed by: ANESTHESIOLOGY

## 2025-01-07 PROCEDURE — 27201423 OPTIME MED/SURG SUP & DEVICES STERILE SUPPLY: Performed by: SURGERY

## 2025-01-07 PROCEDURE — 88304 TISSUE EXAM BY PATHOLOGIST: CPT | Mod: 26,,, | Performed by: STUDENT IN AN ORGANIZED HEALTH CARE EDUCATION/TRAINING PROGRAM

## 2025-01-07 RX ORDER — OXYCODONE AND ACETAMINOPHEN 5; 325 MG/1; MG/1
1 TABLET ORAL
Status: DISCONTINUED | OUTPATIENT
Start: 2025-01-07 | End: 2025-01-07 | Stop reason: HOSPADM

## 2025-01-07 RX ORDER — ONDANSETRON HYDROCHLORIDE 2 MG/ML
4 INJECTION, SOLUTION INTRAVENOUS EVERY 12 HOURS PRN
Status: DISCONTINUED | OUTPATIENT
Start: 2025-01-07 | End: 2025-01-07 | Stop reason: HOSPADM

## 2025-01-07 RX ORDER — HYDROMORPHONE HYDROCHLORIDE 1 MG/ML
0.2 INJECTION, SOLUTION INTRAMUSCULAR; INTRAVENOUS; SUBCUTANEOUS EVERY 5 MIN PRN
Status: DISCONTINUED | OUTPATIENT
Start: 2025-01-07 | End: 2025-01-07 | Stop reason: HOSPADM

## 2025-01-07 RX ORDER — SUCCINYLCHOLINE CHLORIDE 20 MG/ML
INJECTION INTRAMUSCULAR; INTRAVENOUS
Status: DISCONTINUED | OUTPATIENT
Start: 2025-01-07 | End: 2025-01-07

## 2025-01-07 RX ORDER — ONDANSETRON HYDROCHLORIDE 2 MG/ML
4 INJECTION, SOLUTION INTRAVENOUS ONCE AS NEEDED
Status: DISCONTINUED | OUTPATIENT
Start: 2025-01-07 | End: 2025-01-07 | Stop reason: HOSPADM

## 2025-01-07 RX ORDER — LIDOCAINE HYDROCHLORIDE 10 MG/ML
1 INJECTION, SOLUTION EPIDURAL; INFILTRATION; INTRACAUDAL; PERINEURAL ONCE
Status: DISCONTINUED | OUTPATIENT
Start: 2025-01-07 | End: 2025-01-07 | Stop reason: HOSPADM

## 2025-01-07 RX ORDER — ONDANSETRON HYDROCHLORIDE 2 MG/ML
4 INJECTION, SOLUTION INTRAVENOUS DAILY PRN
Status: DISCONTINUED | OUTPATIENT
Start: 2025-01-07 | End: 2025-01-07 | Stop reason: HOSPADM

## 2025-01-07 RX ORDER — SODIUM CHLORIDE, SODIUM LACTATE, POTASSIUM CHLORIDE, CALCIUM CHLORIDE 600; 310; 30; 20 MG/100ML; MG/100ML; MG/100ML; MG/100ML
INJECTION, SOLUTION INTRAVENOUS CONTINUOUS PRN
Status: DISCONTINUED | OUTPATIENT
Start: 2025-01-07 | End: 2025-01-07

## 2025-01-07 RX ORDER — BUPIVACAINE HYDROCHLORIDE 2.5 MG/ML
INJECTION, SOLUTION EPIDURAL; INFILTRATION; INTRACAUDAL
Status: DISCONTINUED | OUTPATIENT
Start: 2025-01-07 | End: 2025-01-07 | Stop reason: HOSPADM

## 2025-01-07 RX ORDER — ONDANSETRON HYDROCHLORIDE 2 MG/ML
INJECTION, SOLUTION INTRAVENOUS
Status: DISCONTINUED | OUTPATIENT
Start: 2025-01-07 | End: 2025-01-07

## 2025-01-07 RX ORDER — CEFAZOLIN 2 G/1
2 INJECTION, POWDER, FOR SOLUTION INTRAMUSCULAR; INTRAVENOUS
Status: COMPLETED | OUTPATIENT
Start: 2025-01-07 | End: 2025-01-07

## 2025-01-07 RX ORDER — IBUPROFEN 800 MG/1
800 TABLET ORAL 3 TIMES DAILY PRN
Qty: 30 TABLET | Refills: 0 | Status: SHIPPED | OUTPATIENT
Start: 2025-01-07

## 2025-01-07 RX ORDER — FENTANYL CITRATE 50 UG/ML
25 INJECTION, SOLUTION INTRAMUSCULAR; INTRAVENOUS EVERY 5 MIN PRN
Status: DISCONTINUED | OUTPATIENT
Start: 2025-01-07 | End: 2025-01-07 | Stop reason: HOSPADM

## 2025-01-07 RX ORDER — MEPERIDINE HYDROCHLORIDE 50 MG/ML
12.5 INJECTION INTRAMUSCULAR; INTRAVENOUS; SUBCUTANEOUS ONCE AS NEEDED
Status: DISCONTINUED | OUTPATIENT
Start: 2025-01-07 | End: 2025-01-07 | Stop reason: HOSPADM

## 2025-01-07 RX ORDER — INDOCYANINE GREEN AND WATER 25 MG
KIT INJECTION
Status: DISCONTINUED | OUTPATIENT
Start: 2025-01-07 | End: 2025-01-07

## 2025-01-07 RX ORDER — FENTANYL CITRATE 50 UG/ML
INJECTION, SOLUTION INTRAMUSCULAR; INTRAVENOUS
Status: DISCONTINUED | OUTPATIENT
Start: 2025-01-07 | End: 2025-01-07

## 2025-01-07 RX ORDER — HYDROCODONE BITARTRATE AND ACETAMINOPHEN 10; 325 MG/1; MG/1
1 TABLET ORAL EVERY 4 HOURS PRN
Status: DISCONTINUED | OUTPATIENT
Start: 2025-01-07 | End: 2025-01-07 | Stop reason: HOSPADM

## 2025-01-07 RX ORDER — PROPOFOL 10 MG/ML
VIAL (ML) INTRAVENOUS
Status: DISCONTINUED | OUTPATIENT
Start: 2025-01-07 | End: 2025-01-07

## 2025-01-07 RX ORDER — LIDOCAINE HYDROCHLORIDE 10 MG/ML
INJECTION, SOLUTION EPIDURAL; INFILTRATION; INTRACAUDAL; PERINEURAL
Status: DISCONTINUED | OUTPATIENT
Start: 2025-01-07 | End: 2025-01-07

## 2025-01-07 RX ORDER — SODIUM CHLORIDE 9 MG/ML
INJECTION, SOLUTION INTRAVENOUS CONTINUOUS
Status: DISCONTINUED | OUTPATIENT
Start: 2025-01-07 | End: 2025-01-07 | Stop reason: HOSPADM

## 2025-01-07 RX ORDER — HYDROCODONE BITARTRATE AND ACETAMINOPHEN 10; 325 MG/1; MG/1
1 TABLET ORAL EVERY 6 HOURS PRN
Qty: 15 TABLET | Refills: 0 | Status: SHIPPED | OUTPATIENT
Start: 2025-01-07

## 2025-01-07 RX ORDER — SIMETHICONE 80 MG
1 TABLET,CHEWABLE ORAL 3 TIMES DAILY PRN
Status: DISCONTINUED | OUTPATIENT
Start: 2025-01-07 | End: 2025-01-07 | Stop reason: HOSPADM

## 2025-01-07 RX ORDER — HYDROCODONE BITARTRATE AND ACETAMINOPHEN 5; 325 MG/1; MG/1
1 TABLET ORAL EVERY 4 HOURS PRN
Status: DISCONTINUED | OUTPATIENT
Start: 2025-01-07 | End: 2025-01-07 | Stop reason: HOSPADM

## 2025-01-07 RX ORDER — LIDOCAINE HYDROCHLORIDE 10 MG/ML
INJECTION, SOLUTION EPIDURAL; INFILTRATION; INTRACAUDAL; PERINEURAL
Status: DISCONTINUED | OUTPATIENT
Start: 2025-01-07 | End: 2025-01-07 | Stop reason: HOSPADM

## 2025-01-07 RX ORDER — ROCURONIUM BROMIDE 10 MG/ML
INJECTION, SOLUTION INTRAVENOUS
Status: DISCONTINUED | OUTPATIENT
Start: 2025-01-07 | End: 2025-01-07

## 2025-01-07 RX ORDER — DEXAMETHASONE SODIUM PHOSPHATE 4 MG/ML
INJECTION, SOLUTION INTRA-ARTICULAR; INTRALESIONAL; INTRAMUSCULAR; INTRAVENOUS; SOFT TISSUE
Status: DISCONTINUED | OUTPATIENT
Start: 2025-01-07 | End: 2025-01-07

## 2025-01-07 RX ADMIN — SIMETHICONE 80 MG: 80 TABLET, CHEWABLE ORAL at 05:01

## 2025-01-07 RX ADMIN — HYDROCODONE BITARTRATE AND ACETAMINOPHEN 1 TABLET: 5; 325 TABLET ORAL at 05:01

## 2025-01-07 RX ADMIN — ROCURONIUM BROMIDE 10 MG: 10 SOLUTION INTRAVENOUS at 04:01

## 2025-01-07 RX ADMIN — ROCURONIUM BROMIDE 5 MG: 10 SOLUTION INTRAVENOUS at 03:01

## 2025-01-07 RX ADMIN — ROCURONIUM BROMIDE 25 MG: 10 SOLUTION INTRAVENOUS at 03:01

## 2025-01-07 RX ADMIN — INDOCYANINE GREEN AND WATER 2.5 MG: KIT at 03:01

## 2025-01-07 RX ADMIN — CEFAZOLIN 2 G: 2 INJECTION, POWDER, FOR SOLUTION INTRAMUSCULAR; INTRAVENOUS at 03:01

## 2025-01-07 RX ADMIN — SODIUM CHLORIDE, SODIUM LACTATE, POTASSIUM CHLORIDE, AND CALCIUM CHLORIDE: 600; 310; 30; 20 INJECTION, SOLUTION INTRAVENOUS at 03:01

## 2025-01-07 RX ADMIN — ONDANSETRON 4 MG: 2 INJECTION INTRAMUSCULAR; INTRAVENOUS at 04:01

## 2025-01-07 RX ADMIN — FENTANYL CITRATE 100 MCG: 50 INJECTION, SOLUTION INTRAMUSCULAR; INTRAVENOUS at 03:01

## 2025-01-07 RX ADMIN — LIDOCAINE HYDROCHLORIDE 50 MG: 10 SOLUTION INTRAVENOUS at 03:01

## 2025-01-07 RX ADMIN — DEXAMETHASONE SODIUM PHOSPHATE 4 MG: 4 INJECTION, SOLUTION INTRA-ARTICULAR; INTRALESIONAL; INTRAMUSCULAR; INTRAVENOUS; SOFT TISSUE at 04:01

## 2025-01-07 RX ADMIN — PROPOFOL 120 MG: 10 INJECTION, EMULSION INTRAVENOUS at 03:01

## 2025-01-07 RX ADMIN — HYDROMORPHONE HYDROCHLORIDE 0.2 MG: 1 INJECTION, SOLUTION INTRAMUSCULAR; INTRAVENOUS; SUBCUTANEOUS at 05:01

## 2025-01-07 RX ADMIN — SUCCINYLCHOLINE CHLORIDE 120 MG: 20 INJECTION, SOLUTION INTRAMUSCULAR; INTRAVENOUS; PARENTERAL at 03:01

## 2025-01-07 RX ADMIN — SUGAMMADEX 200 MG: 100 INJECTION, SOLUTION INTRAVENOUS at 04:01

## 2025-01-07 NOTE — OP NOTE
[facility] ([facility])  Surgery Department  Operative Note    SUMMARY     Date of Procedure: 1/7/2025     Procedure: Procedure(s) (LRB):  XI ROBOTIC CHOLECYSTECTOMY (N/A)     Surgeons and Role:     * Harjit Yeh MD - Primary    Assistant: GINNY Hay      Pre-Operative Diagnosis: Right upper quadrant abdominal pain [R10.11]  Gallstones [K80.20]    Post-Operative Diagnosis: Post-Op Diagnosis Codes:     * Right upper quadrant abdominal pain [R10.11]     * Gallstones [K80.20]    Anesthesia: General    Operative Findings (including complications, if any):  Robotic cholecystectomy    Description of Technical Procedures:  Gallstones    Significant Surgical Tasks Conducted by the Assistant(s), if Applicable:  Robotic assist and closure    Estimated Blood Loss (EBL): 10cc           Implants: * No implants in log *    Specimens:   Gallbladder           Condition: Good    Disposition: PACU - hemodynamically stable.    Procedure in detail:   The patient was brought to the OR and underwent general anesthesia. The abdomen was prepped and draped in usual sterile fashion.  An incision was made just above the umbilicus.  Fascia grasped and elevated.  A Veress needle was inserted and insufflation obtained to 15 mm Hg.  An 8 mm robotic Optiview port was placed at this site. The laparoscope was inserted. There was no evidence of a vascular or enteric injury.     Additional trocars were placed as follows under visualization. An 8 mm trocar was placed in the anterior axillary line of the right mid abdomen. An 8 mm trocar was placed in the midclavicular line of the right midabdomen. An 8 mm robotic trocar was placed in the midclavicular line in the left midabdomen      The patient was placed in reverse Trendelenburg position and rolled to the left. The patient was docked to the da Austin robot.         The fundus of the gallbladder was retracted cephalad. The gallbladder infundibulum was retracted laterally.      Through  meticulous dissection the cystic duct was dissected circumferentially. The cystic artery was dissected circumferentially and the neck of the gallbladder was then dissected off the gallbladder bed. This cleared Calots triangle of all loose areolar tissue and the critical view was obtained.     Indocyanine green with firefly technology was used to elucidate the course of the cystic duct and biliary anatomy.      The cystic artery was clipped with 2 clips proximally 1 clip distally and transected.  The cystic duct was clipped twice proximally and once distally and divided.     The gallbladder was dissected free from the gallbladder bed.    The gallbladder bed was inspected and hemostasis was ensured using hook electrocautery. The area was irrigated until clear.     The umbilical operating arm of the robot was then removed and an Endo Catch bag was inserted. The gallbladder was placed in Endo Catch bag. The patient was then undocked from the da Austin operating robot. The gallbladder was extracted.      The trocars were removed under direct vision and no bleeding was noted. The abdomen was then desufflated. Marcaine was infiltrated. All incisions were closed with 4-0 Monocryl in a subcuticular manner. Dermaflex was applied to the incision sites

## 2025-01-07 NOTE — ANESTHESIA PROCEDURE NOTES
Intubation    Date/Time: 1/7/2025 3:51 PM    Performed by: Geovani Cruz CRNA  Authorized by: John Burnette MD    Intubation:     Induction:  Intravenous    Intubated:  Postinduction    Mask Ventilation:  Easy mask    Attempts:  1    Attempted By:  CRNA    Method of Intubation:  Direct    Blade:  Inga 3    Laryngeal View Grade: Grade IIA - cords partially seen      Difficult Airway Encountered?: No      Complications:  None    Airway Device:  Oral endotracheal tube    Airway Device Size:  7.5    Style/Cuff Inflation:  Cuffed (inflated to minimal occlusive pressure)    Inflation Amount (mL):  4    Tube secured:  23    Secured at:  The lips    Placement Verified By:  Capnometry    Complicating Factors:  None    Findings Post-Intubation:  BS equal bilateral and atraumatic/condition of teeth unchanged

## 2025-01-07 NOTE — TRANSFER OF CARE
"Anesthesia Transfer of Care Note    Patient: Manuela Reyes    Procedure(s) Performed: Procedure(s) (LRB):  XI ROBOTIC CHOLECYSTECTOMY (N/A)    Patient location: PACU    Anesthesia Type: general    Transport from OR: Transported from OR on room air with adequate spontaneous ventilation    Post pain: adequate analgesia    Post assessment: no apparent anesthetic complications and tolerated procedure well    Post vital signs: stable    Level of consciousness: responds to stimulation    Nausea/Vomiting: no nausea/vomiting    Complications: none    Transfer of care protocol was followed      Last vitals: Visit Vitals  BP (!) 167/83 (BP Location: Right arm, Patient Position: Sitting)   Pulse 71   Temp 36.8 °C (98.2 °F) (Temporal)   Resp 18   Ht 5' 7" (1.702 m)   Wt 49.7 kg (109 lb 10.9 oz)   SpO2 99%   Breastfeeding No   BMI 17.18 kg/m²     "

## 2025-01-07 NOTE — DISCHARGE SUMMARY
[facility] - Surgery (Hospital)  Discharge Note  Short Stay    Procedure(s) (LRB):  XI ROBOTIC CHOLECYSTECTOMY (N/A)      OUTCOME: Patient tolerated treatment/procedure well without complication and is now ready for discharge.    DISPOSITION: Home or Self Care    FINAL DIAGNOSIS:  Gallstones    FOLLOWUP: In clinic    DISCHARGE INSTRUCTIONS:    Discharge Procedure Orders   Diet general     Lifting restrictions   Order Comments: No heavy lifting greater than 10 lb x 2 weeks     Call MD for:  temperature >100.4     Call MD for:  persistent nausea and vomiting     Call MD for:  severe uncontrolled pain     Call MD for:  difficulty breathing, headache or visual disturbances     Call MD for:  redness, tenderness, or signs of infection (pain, swelling, redness, odor or green/yellow discharge around incision site)     Call MD for:  hives     Call MD for:  persistent dizziness or light-headedness     Call MD for:  extreme fatigue     No dressing needed     Activity as tolerated     Shower on day dressing removed (No bath)   Order Comments: Okay to shower in 2 days        TIME SPENT ON DISCHARGE: 30 minutes

## 2025-01-07 NOTE — H&P
History & Physical        Subjective  History of Present Illness:  Patient is a 74 y.o. female referred for gallstones.  She reports right upper quadrant abdominal discomfort radiating to her back initially began 1-2 months ago after lifting heavy gate.  Initially she thought it was a strained muscle or costochondritis.  Her symptoms have been intermittent in nature but now notices some postprandial association is along with increased belching.  She was undergone ultrasound and CT scan noting cholelithiasis.  Denies any change in bowel habits.  History of Crohn's disease with ileal resection and no issues since she has been managed with Humira.          Chief Complaint   Patient presents with    Consult       Gallstones               Review of patient's allergies indicates:   Allergen Reactions    Statins-hmg-coa reductase inhibitors Anaphylaxis       Myalgias    Codeine sulfate Itching    Hydrochlorothiazide Other (See Comments)       Adverse Reaction    Lisinopril Swelling and Edema       Facial Swelling         Current Medications          Current Outpatient Medications   Medication Sig Dispense Refill    adalimumab (HUMIRA,CF, PEN) 40 mg/0.4 mL PnKt Inject 0.4 mLs (40 mg total) into the skin every 14 (fourteen) days. 6 pen 3    amLODIPine (NORVASC) 10 MG tablet Take 1 tablet by mouth once daily 90 tablet 1    aspirin (ECOTRIN) 81 MG EC tablet Take 1 tablet (81 mg total) by mouth once daily.        atenoloL (TENORMIN) 25 MG tablet Take 1 tablet (25 mg total) by mouth once daily. 30 tablet 11    calcium carbonate (OS-COOPER) 600 mg (1,500 mg) Tab Take 1,200 mg by mouth once daily.         cyanocobalamin, vitamin B-12, 2,500 mcg Lozg Place 1 tablet under the tongue.         denosumab (PROLIA) 60 mg/mL Syrg Inject 60 mg into the skin.        levocetirizine (XYZAL) 5 MG tablet Take 1 tablet (5 mg total) by mouth every evening. 90 tablet 1    LORazepam (ATIVAN) 2 MG Tab Take 1 tablet (2 mg total) by mouth every evening.  30 tablet 5    multivitamin with minerals tablet Take 1 tablet by mouth once daily at 6am.        mupirocin (BACTROBAN) 2 % ointment Apply a small amount to affected area twice a day x 10 days 22 g 0    pantoprazole (PROTONIX) 40 MG tablet Take 1 tablet (40 mg total) by mouth once daily. 30 tablet 1    triamcinolone acetonide 0.1% (KENALOG) 0.1 % ointment Apply a small amount to affected area twice a day x 2 weeks as needed for flares 30 g 4    valACYclovir (VALTREX) 500 MG tablet Take 1 tablet by mouth twice a day as needed for 3 days per flare 30 tablet 0    vitamin D 1000 units Tab Take 185 mg by mouth once daily.          No current facility-administered medications for this visit.                 Past Medical History:   Diagnosis Date    Anxiety      Crohn's disease      Depression      Genital herpes      Hepatitis C infection      Hypertension      Insomnia      Mesenteric artery stenosis       Dr. 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 Malaika Mack--dermatology    TIA (transient ischemic attack)              Past Surgical History:   Procedure Laterality Date    APPENDECTOMY        COLONOSCOPY N/A 05/19/2017     Procedure: COLONOSCOPY;  Surgeon: Jose Luis Leonard MD;  Location: [facility];  Service: Endoscopy;  Laterality: N/A;    COLONOSCOPY N/A 03/26/2018     Procedure: COLONOSCOPY;  Surgeon: Guillaume Whitman MD;  Location: [facility];  Service: Endoscopy;  Laterality: N/A;    COLONOSCOPY N/A 03/19/2019     Procedure: COLONOSCOPY;  Surgeon: Lili Ellis MD;  Location: [facility];  Service: Endoscopy;  Laterality: N/A;    COLONOSCOPY N/A 09/24/2020     Procedure: COLONOSCOPY;  Surgeon: Lili Ellis MD;  Location: [facility];  Service: Endoscopy;  Laterality: N/A;    COLONOSCOPY N/A 07/20/2023     Procedure: COLONOSCOPY;  Surgeon: Lili Ellis MD;  Location: [facility];  Service: Endoscopy;  Laterality: N/A;    COSMETIC  SURGERY        ESOPHAGOGASTRODUODENOSCOPY N/A 2023     Procedure: EGD (ESOPHAGOGASTRODUODENOSCOPY);  Surgeon: Lili Ellis MD;  Location: [facility];  Service: Endoscopy;  Laterality: N/A;    SMALL INTESTINE SURGERY        TONSILLECTOMY                 Family History   Problem Relation Name Age of Onset    Stroke Mother Mother      Heart disease Mother Mother      Cataracts Mother Mother      Cancer Father Father           Lung    Heart disease Sister Sister      Hypertension Brother Brother           Brother    Glaucoma Neg Hx        Macular degeneration Neg Hx        Thyroid disease Neg Hx          Social History   Social History            Tobacco Use    Smoking status: Former       Current packs/day: 0.00       Average packs/day: 0.3 packs/day for 10.0 years (2.5 ttl pk-yrs)       Types: Cigarettes       Start date: 1995       Quit date: 2005       Years since quittin.8    Smokeless tobacco: Former    Tobacco comments:       Former Light Smoker less than 10 a day   Substance Use Topics    Alcohol use: Yes       Alcohol/week: 4.0 standard drinks of alcohol       Types: 4 Glasses of wine per week       Comment: occ    Drug use: No            Review of Systems:  Review of Systems   Constitutional:  Negative for chills, fatigue, fever and unexpected weight change.   Respiratory:  Negative for cough, shortness of breath, wheezing and stridor.    Cardiovascular:  Negative for chest pain, palpitations and leg swelling.   Gastrointestinal:  Positive for abdominal pain. Negative for abdominal distention, constipation, diarrhea, nausea and vomiting.   Genitourinary:  Negative for difficulty urinating, dysuria, frequency, hematuria and urgency.   Skin:  Negative for color change, pallor, rash and wound.   Hematological:  Does not bruise/bleed easily.                  Objective  Vital Signs (Most Recent)  Temp: 98.2 °F (36.8 °C) (24 1059)  Pulse: 78 (24 1059)  BP: (!) 152/81 (24  "1259  5' 7" (1.702 m)  51.8 kg (114 lb 3.2 oz)      Physical Exam:  Physical Exam  Vitals reviewed.   Constitutional:       Appearance: She is well-developed.   HENT:      Head: Normocephalic and atraumatic.   Cardiovascular:      Rate and Rhythm: Normal rate.   Pulmonary:      Effort: Pulmonary effort is normal.   Abdominal:      General: There is no distension.      Palpations: Abdomen is soft.      Tenderness: There is no abdominal tenderness.      Comments: Well-healed midline surgical scar extending above the umbilicus   Musculoskeletal:      Cervical back: Neck supple.   Skin:     General: Skin is warm and dry.   Neurological:      Mental Status: She is alert and oriented to person, place, and time.               Diagnostic Results:  CT:   Impression:     Multiple bilateral simple appearing renal cyst however there are several which are too small to accurately characterize.  Cholelithiasis.     Ultrasound:   Impression:     1.  Cholelithiasis without secondary findings indicating acute cholecystitis.              Assessment and Plan  74-year-old female with right upper quadrant abdominal pain/cholelithiasis     PLAN:     Symptoms reviewed with patient along with imaging findings   Certainly when her symptoms originated it appeared to be musculoskeletal in nature however her persistent symptoms now seem consistent with biliary colic as she was having food associations with continued discomfort after meals.  She has been noted to have cholelithiasis on CT and ultrasound.  We discussed other potential etiologies and difficulties in sometimes differentiating types of gastrointestinal, biliary, musculoskeletal discomfort.  We discussed potential options of HIDA scan with CCK to evaluate symptom reproduction versus cholecystectomy.  She would like to tentatively schedule HIDA scan and cholecystectomy today.  Will follow up results with patient and if symptoms reproduced move forward with cholecystectomy   Robotic " Cholecystectomy   Preop: CBC, CMP, EKG  The risks of robotic/laparoscopic cholecystectomy including bleeding, infection, common bile duct injury, bile leak, injury to abdominal organs, failure to alleviate symptoms, pulmonary embolus, deep vein thrombosis, cardiac event, and possibility of conversion to an open operation were explained to the patient.   The nature of the patient's condition, probability of success, risks of refusing treatment, and alternatives and risks of the alternatives were also explained.  The patient verbalized understanding.

## 2025-01-07 NOTE — ANESTHESIA PREPROCEDURE EVALUATION
01/07/2025  Manuela Reyes is a 75 y.o., adult.      Pre-op Assessment    I have reviewed the Patient Summary Reports.     I have reviewed the Nursing Notes. I have reviewed the NPO Status.      Review of Systems  Anesthesia Hx:  No problems with previous Anesthesia                Hematology/Oncology:  Hematology Normal   Oncology Normal                                   Cardiovascular:     Hypertension                                          Renal/:  Renal/ Normal                 Hepatic/GI:      Liver Disease,               Neurological:  TIA,                                     Endocrine:  Endocrine Normal            Dermatological:  Skin Normal    Psych:  Psychiatric History                  Physical Exam  General: Well nourished, Cooperative, Alert and Oriented    Airway:  Mallampati: II / II  Mouth Opening: Normal  TM Distance: Normal  Tongue: Normal  Neck ROM: Normal ROM    Dental:  Intact      Patient Active Problem List   Diagnosis    Crohn's disease of small intestine    Crohn's colitis    Mesenteric artery stenosis    Hyperlipidemia LDL goal <70    Chronic insomnia    Anxiety    Crohn's disease    Family history of cardiovascular disease    Atherosclerosis of abdominal aorta    Age-related osteoporosis without current pathological fracture    Essential hypertension    Decreased ROM of lumbar spine    Immunosuppressed status    Calcification of aorta       Anesthesia Plan  Type of Anesthesia, risks & benefits discussed:    Anesthesia Type: Gen ETT  Intra-op Monitoring Plan: Standard ASA Monitors  Post Op Pain Control Plan: multimodal analgesia  Induction:  IV  Airway Plan: Direct  Informed Consent: Informed consent signed with the Patient and all parties understand the risks and agree with anesthesia plan.  All questions answered.   ASA Score: 3  Day of Surgery Review of History &  Physical: H&P Update referred to the surgeon/provider.I have interviewed and examined the patient. I have reviewed the patient's H&P dated: There are no significant changes. H&P completed by Anesthesiologist.    Ready For Surgery From Anesthesia Perspective.     .

## 2025-01-08 NOTE — ANESTHESIA POSTPROCEDURE EVALUATION
Anesthesia Post Evaluation    Patient: Manuela Reyes    Procedure(s) Performed: Procedure(s) (LRB):  XI ROBOTIC CHOLECYSTECTOMY (N/A)    Final Anesthesia Type: general      Patient location during evaluation: PACU  Patient participation: Yes- Able to Participate  Level of consciousness: awake and alert, oriented and awake  Post-procedure vital signs: reviewed and stable  Pain management: adequate  Airway patency: patent    PONV status at discharge: No PONV  Anesthetic complications: no      Cardiovascular status: blood pressure returned to baseline  Respiratory status: unassisted  Hydration status: euvolemic  Follow-up not needed.              Vitals Value Taken Time   /89 01/07/25 1720   Temp 36.5 °C (97.7 °F) 01/07/25 1715   Pulse 65 01/07/25 1724   Resp 10 01/07/25 1724   SpO2 98 % 01/07/25 1724   Vitals shown include unfiled device data.      Event Time   Out of Recovery 17:27:38         Pain/Aileen Score: Pain Rating Prior to Med Admin: 5 (1/7/2025  5:10 PM)  Aileen Score: 10 (1/7/2025  5:15 PM)

## 2025-01-09 ENCOUNTER — PATIENT MESSAGE (OUTPATIENT)
Dept: SURGERY | Facility: CLINIC | Age: 75
End: 2025-01-09
Payer: MEDICARE

## 2025-01-09 LAB
FINAL PATHOLOGIC DIAGNOSIS: NORMAL
GROSS: NORMAL
Lab: NORMAL
MICROSCOPIC EXAM: NORMAL

## 2025-01-17 ENCOUNTER — OFFICE VISIT (OUTPATIENT)
Dept: URGENT CARE | Facility: CLINIC | Age: 75
End: 2025-01-17
Payer: MEDICARE

## 2025-01-17 VITALS
DIASTOLIC BLOOD PRESSURE: 67 MMHG | BODY MASS INDEX: 17.61 KG/M2 | RESPIRATION RATE: 18 BRPM | HEART RATE: 85 BPM | HEIGHT: 67 IN | TEMPERATURE: 97 F | SYSTOLIC BLOOD PRESSURE: 136 MMHG | WEIGHT: 112.19 LBS | OXYGEN SATURATION: 98 %

## 2025-01-17 DIAGNOSIS — N39.0 URINARY TRACT INFECTION WITH HEMATURIA, SITE UNSPECIFIED: Primary | ICD-10-CM

## 2025-01-17 DIAGNOSIS — R30.0 DYSURIA: ICD-10-CM

## 2025-01-17 DIAGNOSIS — R31.9 URINARY TRACT INFECTION WITH HEMATURIA, SITE UNSPECIFIED: Primary | ICD-10-CM

## 2025-01-17 LAB
BILIRUBIN, UA POC OHS: NEGATIVE
BLOOD, UA POC OHS: ABNORMAL
CLARITY, UA POC OHS: CLEAR
COLOR, UA POC OHS: YELLOW
GLUCOSE, UA POC OHS: NEGATIVE
KETONES, UA POC OHS: NEGATIVE
LEUKOCYTES, UA POC OHS: ABNORMAL
NITRITE, UA POC OHS: NEGATIVE
PH, UA POC OHS: 7
PROTEIN, UA POC OHS: 100
SPECIFIC GRAVITY, UA POC OHS: 1.02
UROBILINOGEN, UA POC OHS: 0.2

## 2025-01-17 PROCEDURE — 96372 THER/PROPH/DIAG INJ SC/IM: CPT | Mod: S$GLB,,, | Performed by: FAMILY MEDICINE

## 2025-01-17 PROCEDURE — 81003 URINALYSIS AUTO W/O SCOPE: CPT | Mod: QW,S$GLB,, | Performed by: NURSE PRACTITIONER

## 2025-01-17 PROCEDURE — 99214 OFFICE O/P EST MOD 30 MIN: CPT | Mod: 25,S$GLB,, | Performed by: NURSE PRACTITIONER

## 2025-01-17 PROCEDURE — 87186 SC STD MICRODIL/AGAR DIL: CPT | Performed by: NURSE PRACTITIONER

## 2025-01-17 PROCEDURE — 87088 URINE BACTERIA CULTURE: CPT | Performed by: NURSE PRACTITIONER

## 2025-01-17 PROCEDURE — 87086 URINE CULTURE/COLONY COUNT: CPT | Performed by: NURSE PRACTITIONER

## 2025-01-17 RX ORDER — CIPROFLOXACIN 500 MG/1
500 TABLET ORAL 2 TIMES DAILY
Qty: 10 TABLET | Refills: 0 | Status: SHIPPED | OUTPATIENT
Start: 2025-01-17 | End: 2025-01-20

## 2025-01-17 RX ORDER — CEFTRIAXONE 1 G/1
1 INJECTION, POWDER, FOR SOLUTION INTRAMUSCULAR; INTRAVENOUS
Status: COMPLETED | OUTPATIENT
Start: 2025-01-17 | End: 2025-01-17

## 2025-01-17 RX ORDER — LIDOCAINE HYDROCHLORIDE 10 MG/ML
2.1 INJECTION, SOLUTION INFILTRATION; PERINEURAL
Status: COMPLETED | OUTPATIENT
Start: 2025-01-17 | End: 2025-01-17

## 2025-01-17 RX ADMIN — CEFTRIAXONE 1 G: 1 INJECTION, POWDER, FOR SOLUTION INTRAMUSCULAR; INTRAVENOUS at 03:01

## 2025-01-17 RX ADMIN — LIDOCAINE HYDROCHLORIDE 2.1 ML: 10 INJECTION, SOLUTION INFILTRATION; PERINEURAL at 03:01

## 2025-01-17 NOTE — PATIENT INSTRUCTIONS

## 2025-01-17 NOTE — PROGRESS NOTES
"Subjective:      Patient ID: Manuela Reyes is a 75 y.o. adult.    Vitals:  height is 5' 7" (1.702 m) and weight is 50.9 kg (112 lb 3.4 oz). Her tympanic temperature is 96.8 °F (36 °C). Her blood pressure is 136/67 and her pulse is 85. Her respiration is 18 and oxygen saturation is 98%.     Chief Complaint: Dysuria    75 yr old female presents to the Urgent Care with complaint of burning sensation with urination associated with low grade fever, chills, and urinary frequency x 1 day. Fever and chills noted to have resolved. Patient denies any CP, SOB, abdominal pain at this time. Patient denies hx of recurrent UTIs.       Dysuria   This is a new problem. The current episode started yesterday. The problem has been gradually worsening. The quality of the pain is described as burning. The pain is at a severity of 6/10. There has been no fever. Associated symptoms include chills and urgency. Pertinent negatives include no behavior changes, discharge, flank pain, frequency, hematuria, hesitancy, nausea, possible pregnancy, sweats, vomiting, weight loss, bubble bath use, constipation, rash or withholding. She has tried nothing for the symptoms.       Constitution: Positive for chills and fever. Negative for sweating and fatigue.   Gastrointestinal:  Negative for nausea, vomiting and constipation.   Genitourinary:  Positive for dysuria and urgency. Negative for frequency, urine decreased, flank pain and hematuria.   Skin:  Negative for rash.      Objective:     Physical Exam   Constitutional: She is oriented to person, place, and time. She appears well-developed. She is cooperative. She is easily aroused.  Non-toxic appearance. She does not appear ill. No distress.   HENT:   Head: Normocephalic and atraumatic.   Nose: Nose abnormal.   Mouth/Throat: Mucous membranes are normal.   Eyes: Conjunctivae and lids are normal.   Cardiovascular: Normal rate, regular rhythm and normal heart sounds.   Pulmonary/Chest: Effort " normal and breath sounds normal. No respiratory distress.   Abdominal: Normal appearance and bowel sounds are normal. She exhibits no distension, no abdominal bruit, no pulsatile midline mass and no mass. Soft. There is no abdominal tenderness. There is no rigidity, no rebound, no guarding, no tenderness at McBurney's point, negative Reyna's sign, no left CVA tenderness and no right CVA tenderness.   Musculoskeletal: Normal range of motion.         General: No edema. Normal range of motion.   Neurological: She is alert, oriented to person, place, and time and easily aroused. She has normal strength.   Skin: Skin is warm, dry, intact, not diaphoretic and not pale.   Psychiatric: Her speech is normal and behavior is normal. Judgment and thought content normal.   Nursing note and vitals reviewed.      Assessment:     1. Urinary tract infection with hematuria, site unspecified    2. Dysuria      Results for orders placed or performed in visit on 01/17/25   POCT Urinalysis(Instrument)    Collection Time: 01/17/25  3:16 PM   Result Value Ref Range    Color, POC UA Yellow Yellow, Straw, Colorless    Clarity, POC UA Clear Clear    Glucose, POC UA Negative Negative    Bilirubin, POC UA Negative Negative    Ketones, POC UA Negative Negative    Spec Grav POC UA 1.020 1.005 - 1.030    Blood, POC UA Moderate (A) Negative    pH, POC UA 7.0 5.0 - 8.0    Protein, POC  (A) Negative    Urobilinogen, POC UA 0.2 <=1.0    Nitrite, POC UA Negative Negative    WBC, POC UA Large (A) Negative     *Note: Due to a large number of results and/or encounters for the requested time period, some results have not been displayed. A complete set of results can be found in Results Review.       Plan:    The patient is a non-toxic, afebrile, and well appearing adult. On physical exam, there is no abdominal tenderness, distention, peritoneal signs, or CVA tenderness.     Vital Signs: Reassuring  Lab\Radiology\Other Procedure RESULTS: UA positive;  Urine Culture is pending.     Given the above findings, my overall impression is complicated UTI. Given the above findings, I do not think the patient has pyelonephritis or STI .    Patient given Rocephin in the clinic. The patient will be discharged home with Cipro. Additional home care recommendations include increase water intake. The diagnosis, treatment plan, instructions for follow-up and reevaluation with her PCP as well as ED precautions have been discussed with the patient and she has verbalized an understanding of the information. All questions or concerns from the patient have been addressed.       Urinary tract infection with hematuria, site unspecified  -     Urine Culture High Risk  -     ciprofloxacin HCl (CIPRO) 500 MG tablet; Take 1 tablet (500 mg total) by mouth 2 (two) times daily. for 5 days  Dispense: 10 tablet; Refill: 0  -     cefTRIAXone injection 1 g  -     LIDOcaine HCL 10 mg/ml (1%) injection 2.1 mL    Dysuria  -     POCT Urinalysis(Instrument)      Patient Instructions   If you were prescribed a narcotic or controlled medication, do not drive or operate heavy equipment or machinery while taking these medications.  You must understand that you've received an Urgent Care treatment only and that you may be released before all your medical problems are known or treated. You, the patient, will arrange for follow up care as instructed.  Follow up with your PCP or specialty clinic as directed within 2-5 days if not improved or as needed.  You can call [phone] to schedule an appointment with the appropriate provider.  If your condition worsens we recommend that you receive another evaluation at the emergency room immediately or contact your primary medical clinics after hours call service to discuss your concerns.  Please return here or go to the Emergency Department for any concerns or worsening of condition.

## 2025-01-20 ENCOUNTER — TELEPHONE (OUTPATIENT)
Dept: URGENT CARE | Facility: CLINIC | Age: 75
End: 2025-01-20
Payer: MEDICARE

## 2025-01-20 ENCOUNTER — PATIENT MESSAGE (OUTPATIENT)
Dept: URGENT CARE | Facility: CLINIC | Age: 75
End: 2025-01-20
Payer: MEDICARE

## 2025-01-20 LAB — BACTERIA UR CULT: ABNORMAL

## 2025-01-20 RX ORDER — CEPHALEXIN 500 MG/1
500 CAPSULE ORAL EVERY 6 HOURS
Qty: 28 CAPSULE | Refills: 0 | Status: SHIPPED | OUTPATIENT
Start: 2025-01-20 | End: 2025-01-27

## 2025-01-20 NOTE — TELEPHONE ENCOUNTER
Reviewed urine culture results  Positive for E.coli, patient was prescribed cipro which shows resistance. She states she is feeling better. I recommended keflex and advised that she may have a recurrence of symptoms now that she has stopped the cipro. Prescription sent to pharmacy. All questions answered. Adina Wyatt NP

## 2025-01-20 NOTE — TELEPHONE ENCOUNTER
Patient requesting call back regarding Urine Culture.  Patient on Cipro and culture shows that it is resistant.

## 2025-01-21 ENCOUNTER — TELEPHONE (OUTPATIENT)
Dept: SURGERY | Facility: CLINIC | Age: 75
End: 2025-01-21
Payer: MEDICARE

## 2025-01-25 DIAGNOSIS — I10 ESSENTIAL HYPERTENSION: ICD-10-CM

## 2025-01-27 ENCOUNTER — HOSPITAL ENCOUNTER (OUTPATIENT)
Dept: RADIOLOGY | Facility: HOSPITAL | Age: 75
Discharge: HOME | End: 2025-01-27
Payer: MEDICARE

## 2025-01-27 ENCOUNTER — PATIENT MESSAGE (OUTPATIENT)
Dept: SURGERY | Facility: CLINIC | Age: 75
End: 2025-01-27

## 2025-01-27 ENCOUNTER — OFFICE VISIT (OUTPATIENT)
Dept: SURGERY | Facility: CLINIC | Age: 75
End: 2025-01-27
Payer: MEDICARE

## 2025-01-27 VITALS
WEIGHT: 113 LBS | SYSTOLIC BLOOD PRESSURE: 143 MMHG | DIASTOLIC BLOOD PRESSURE: 74 MMHG | BODY MASS INDEX: 17.7 KG/M2 | HEART RATE: 67 BPM

## 2025-01-27 DIAGNOSIS — M79.669 CALF PAIN, UNSPECIFIED LATERALITY: ICD-10-CM

## 2025-01-27 DIAGNOSIS — R10.11 RIGHT UPPER QUADRANT ABDOMINAL PAIN: Primary | ICD-10-CM

## 2025-01-27 PROBLEM — K80.20 GALLSTONES: Status: RESOLVED | Noted: 2025-01-07 | Resolved: 2025-01-27

## 2025-01-27 PROCEDURE — 3078F DIAST BP <80 MM HG: CPT | Mod: CPTII,S$GLB,,

## 2025-01-27 PROCEDURE — 93970 EXTREMITY STUDY: CPT | Mod: 26,,, | Performed by: RADIOLOGY

## 2025-01-27 PROCEDURE — 1160F RVW MEDS BY RX/DR IN RCRD: CPT | Mod: CPTII,S$GLB,,

## 2025-01-27 PROCEDURE — 1159F MED LIST DOCD IN RCRD: CPT | Mod: CPTII,S$GLB,,

## 2025-01-27 PROCEDURE — 1126F AMNT PAIN NOTED NONE PRSNT: CPT | Mod: CPTII,S$GLB,,

## 2025-01-27 PROCEDURE — 99999 PR PBB SHADOW E&M-EST. PATIENT-LVL IV: CPT | Mod: PBBFAC,,,

## 2025-01-27 PROCEDURE — 93970 EXTREMITY STUDY: CPT | Mod: TC

## 2025-01-27 PROCEDURE — 3077F SYST BP >= 140 MM HG: CPT | Mod: CPTII,S$GLB,,

## 2025-01-27 PROCEDURE — 99024 POSTOP FOLLOW-UP VISIT: CPT | Mod: S$GLB,,,

## 2025-01-27 RX ORDER — AMLODIPINE BESYLATE 10 MG/1
TABLET ORAL
Qty: 90 TABLET | Refills: 2 | Status: SHIPPED | OUTPATIENT
Start: 2025-01-27

## 2025-01-27 NOTE — TELEPHONE ENCOUNTER
No care due was identified.  [facility] Embedded Care Due Messages. Reference number: [id number].   1/27/2025 8:26:20 AM CST

## 2025-01-27 NOTE — TELEPHONE ENCOUNTER
Refill Decision Note   Manuela Reyes  is requesting a refill authorization.  Brief Assessment and Rationale for Refill:  Approve     Medication Therapy Plan:         Comments:     Note composed:8:42 AM 01/27/2025

## 2025-01-27 NOTE — PROGRESS NOTES
History & Physical    Subjective     History of Present Illness:  Patient is a 75 y.o. adult referred for gallstones.  She reports right upper quadrant abdominal discomfort radiating to her back initially began 1-2 months ago after lifting heavy gate.  Initially she thought it was a strained muscle or costochondritis.  Her symptoms have been intermittent in nature but now notices some postprandial association is along with increased belching.  She was undergone ultrasound and CT scan noting cholelithiasis.  Denies any change in bowel habits.  History of Crohn's disease with ileal resection and no issues since she has been managed with Humira.    Interval History:  Since last clinic visit, the patient underwent robotic cholecystectomy.  She is doing well today, but still has some nagging right upper quadrant abdominal pain that is similar to her preoperative symptoms.  It is not associated with eating.  She denies any associated fevers, chills, nausea, or vomiting.  She also reports bilateral calf pain without swelling, redness, shortness of breath, or chest pain.    Chief Complaint   Patient presents with    Post-op Evaluation     S/p cholecystectomy        Review of patient's allergies indicates:   Allergen Reactions    Statins-hmg-coa reductase inhibitors Anaphylaxis     Myalgias    Codeine sulfate Itching    Hydrochlorothiazide Other (See Comments)     Adverse Reaction    Lisinopril Swelling and Edema     Facial Swelling       Current Outpatient Medications   Medication Sig Dispense Refill    adalimumab (HUMIRA,CF, PEN) 40 mg/0.4 mL PnKt Inject 0.4 mLs (40 mg total) into the skin every 14 (fourteen) days. 6 pen 3    amLODIPine (NORVASC) 10 MG tablet Take 1 tablet by mouth once daily 90 tablet 2    aspirin (ECOTRIN) 81 MG EC tablet Take 1 tablet (81 mg total) by mouth once daily.      atenoloL (TENORMIN) 25 MG tablet Take 1 tablet (25 mg total) by mouth once daily. 30 tablet 11    calcium carbonate (OS-COOPER) 600 mg  (1,500 mg) Tab Take 1,200 mg by mouth once daily.       cephALEXin (KEFLEX) 500 MG capsule Take 1 capsule (500 mg total) by mouth every 6 (six) hours. for 7 days 28 capsule 0    cyanocobalamin, vitamin B-12, 2,500 mcg Lozg Place 1 tablet under the tongue.       denosumab (PROLIA) 60 mg/mL Syrg Inject 60 mg into the skin every 6 (six) months.      levocetirizine (XYZAL) 5 MG tablet Take 1 tablet (5 mg total) by mouth every evening. 90 tablet 1    LORazepam (ATIVAN) 2 MG Tab Take 1 tablet (2 mg total) by mouth every evening. 30 tablet 5    metroNIDAZOLE (FLAGYL) 500 MG tablet Take 1 tablet (500 mg total) by mouth 3 (three) times daily. Take initial dose at 2pm, second dose at 3pm and final dose at 10pm the day before surgery 3 tablet 0    multivitamin with minerals tablet Take 1 tablet by mouth once daily at 6am.      mupirocin (BACTROBAN) 2 % ointment Apply a small amount to affected area twice a day x 10 days 22 g 0    neomycin (MYCIFRADIN) 500 mg Tab Take 2 tablets (1,000 mg total) by mouth 3 (three) times daily. Take initial dose at 2pm, take second dose at 3pm and take final dose at 10pm the day before surgery. 6 tablet 0    polyethylene glycol (GLYCOLAX) 17 gram/dose powder Take 238 g by mouth once daily. Mix with 2L of gatorade and drink all of mixed solution starting at 12pm the day before surgery, finishing by 10pm the night before surgery. 238 g 0    triamcinolone acetonide 0.1% (KENALOG) 0.1 % ointment Apply a small amount to affected area twice a day x 2 weeks as needed for flares 30 g 4    valACYclovir (VALTREX) 500 MG tablet Take 1 tablet by mouth twice a day as needed for 3 days per flare 30 tablet 0    vitamin D 1000 units Tab Take 185 mg by mouth once daily.      HYDROcodone-acetaminophen (NORCO)  mg per tablet Take 1 tablet by mouth every 6 (six) hours as needed for Pain. (Patient not taking: Reported on 1/27/2025) 15 tablet 0    ibuprofen (ADVIL,MOTRIN) 800 MG tablet Take 1 tablet (800 mg  total) by mouth 3 (three) times daily as needed. (Patient not taking: Reported on 1/27/2025) 30 tablet 0    pantoprazole (PROTONIX) 40 MG tablet Take 1 tablet (40 mg total) by mouth once daily. 30 tablet 1     No current facility-administered medications for this visit.       Past Medical History:   Diagnosis Date    Anxiety     Crohn's disease     Depression     Gallstones 01/07/2025    Genital herpes     Hepatitis C infection     Hypertension     Insomnia     Mesenteric artery stenosis     Dr. 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 Malaika Mack--dermatology    TIA (transient ischemic attack)      Past Surgical History:   Procedure Laterality Date    APPENDECTOMY      COLONOSCOPY N/A 05/19/2017    Procedure: COLONOSCOPY;  Surgeon: Jose Luis Leonard MD;  Location: [facility] END[facility];  Service: Endoscopy;  Laterality: N/A;    COLONOSCOPY N/A 03/26/2018    Procedure: COLONOSCOPY;  Surgeon: Guillaume Whitman MD;  Location: [facility] END[facility];  Service: Endoscopy;  Laterality: N/A;    COLONOSCOPY N/A 03/19/2019    Procedure: COLONOSCOPY;  Surgeon: Lili Ellis MD;  Location: [facility] END[facility];  Service: Endoscopy;  Laterality: N/A;    COLONOSCOPY N/A 09/24/2020    Procedure: COLONOSCOPY;  Surgeon: Lili Ellis MD;  Location: [facility] END[facility];  Service: Endoscopy;  Laterality: N/A;    COLONOSCOPY N/A 07/20/2023    Procedure: COLONOSCOPY;  Surgeon: Lili Ellis MD;  Location: [facility] END[facility];  Service: Endoscopy;  Laterality: N/A;    COSMETIC SURGERY      ESOPHAGOGASTRODUODENOSCOPY N/A 07/20/2023    Procedure: EGD (ESOPHAGOGASTRODUODENOSCOPY);  Surgeon: Lili Ellis MD;  Location: [facility] END[facility];  Service: Endoscopy;  Laterality: N/A;    ORCHIECTOMY      ROBOT-ASSISTED CHOLECYSTECTOMY USING DA RAZA XI N/A 1/7/2025    Procedure: XI ROBOTIC CHOLECYSTECTOMY;  Surgeon: Harjit Yeh MD;  Location: [facility] OR;  Service: General;  Laterality: N/A;    sex reassignment      1979     SMALL INTESTINE SURGERY      TONSILLECTOMY      VAGINOPLASTY, PENILE INVERSION       Family History   Problem Relation Name Age of Onset    Stroke Mother Mother     Heart disease Mother Mother     Cataracts Mother Mother     Cancer Father Father         Lung    Heart disease Sister Sister     Hypertension Brother Brother         Brother    Glaucoma Neg Hx      Macular degeneration Neg Hx      Thyroid disease Neg Hx       Social History     Tobacco Use    Smoking status: Former     Current packs/day: 0.00     Average packs/day: 0.3 packs/day for 10.0 years (2.5 ttl pk-yrs)     Types: Cigarettes     Start date: 1995     Quit date: 2005     Years since quittin.0    Smokeless tobacco: Former    Tobacco comments:     Former Light Smoker less than 10 a day   Substance Use Topics    Alcohol use: Yes     Alcohol/week: 4.0 standard drinks of alcohol     Types: 4 Glasses of wine per week     Comment: occ    Drug use: No        Review of Systems:  Review of Systems   Constitutional:  Negative for chills, fatigue, fever and unexpected weight change.   Respiratory:  Negative for cough, shortness of breath, wheezing and stridor.    Cardiovascular:  Negative for chest pain, palpitations and leg swelling.   Gastrointestinal:  Positive for abdominal pain. Negative for abdominal distention, constipation, diarrhea, nausea and vomiting.   Genitourinary:  Negative for difficulty urinating, dysuria, frequency, hematuria and urgency.   Skin:  Negative for color change, pallor, rash and wound.   Hematological:  Does not bruise/bleed easily.          Objective     Vital Signs (Most Recent)  Pulse: 67 (25 1111)  BP: (!) 143/74 (25 1111)     51.3 kg (113 lb)     Physical Exam:  Physical Exam  Vitals reviewed.   Constitutional:       General: She is not in acute distress.     Appearance: She is well-developed. She is not toxic-appearing.   HENT:      Head: Normocephalic and atraumatic.      Right Ear: External ear  normal.      Left Ear: External ear normal.      Nose: Nose normal.      Mouth/Throat:      Mouth: Mucous membranes are moist.   Eyes:      Extraocular Movements: Extraocular movements intact.      Conjunctiva/sclera: Conjunctivae normal.   Cardiovascular:      Rate and Rhythm: Normal rate.   Pulmonary:      Effort: Pulmonary effort is normal.   Abdominal:      Comments: Abdomen soft, non-tender, and non-distended. Incisions CDI, no SOI, healing well   Musculoskeletal:      Cervical back: Neck supple.      Right lower leg: No edema.      Left lower leg: No edema.      Comments: No swelling, tenderness, or redness of bilateral calves   Skin:     General: Skin is warm and dry.      Findings: No erythema.   Neurological:      Mental Status: She is alert and oriented to person, place, and time.   Psychiatric:         Behavior: Behavior normal.           Diagnostic Results:  CT:   Impression:     Multiple bilateral simple appearing renal cyst however there are several which are too small to accurately characterize.  Cholelithiasis.    Ultrasound:   Impression:     1.  Cholelithiasis without secondary findings indicating acute cholecystitis.    Pathology:  Final Pathologic Diagnosis Gallbladder, laparoscopic cholecystectomy:  - Mild chronic cholecystitis  - Cholelithiasis  - Negative for dysplasia or malignancy       U/S BLE:    FINDINGS: Color flow and spectral Doppler vascular ultrasound was performed. There is compressibility and color Doppler flow with normal venous waveforms and good calf augmentation response demonstrated bilateral lower extremities.        Impression:    No evidence of deep venous thrombosis bilateral lower extremities.       Assessment and Plan     75-year-old female with right upper quadrant abdominal pain/cholelithiasis now s/p cholecystectomy.    - pathology reviewed as above, benign.    -regarding calf pain, obtain ultrasound lower extremities to rule out DVT, which was negative.  Advised to  follow up with her PCP should these symptoms persist.  - we will get CBC and CMP to rule out common postoperative complications.  Discussed the possibility that some symptoms were not from her gallbladder in the 1st place.  - no further postoperative restrictions otherwise.  - return to clinic as needed or based on above results.      Tomasa Gong PA-C  [facility]

## 2025-01-28 ENCOUNTER — PATIENT MESSAGE (OUTPATIENT)
Dept: SURGERY | Facility: CLINIC | Age: 75
End: 2025-01-28
Payer: MEDICARE

## 2025-01-29 ENCOUNTER — PATIENT MESSAGE (OUTPATIENT)
Dept: SURGERY | Facility: CLINIC | Age: 75
End: 2025-01-29
Payer: MEDICARE

## 2025-01-30 ENCOUNTER — OFFICE VISIT (OUTPATIENT)
Dept: RHEUMATOLOGY | Facility: CLINIC | Age: 75
End: 2025-01-30
Payer: MEDICARE

## 2025-01-30 ENCOUNTER — PATIENT MESSAGE (OUTPATIENT)
Dept: RHEUMATOLOGY | Facility: CLINIC | Age: 75
End: 2025-01-30

## 2025-01-30 VITALS
BODY MASS INDEX: 17.1 KG/M2 | SYSTOLIC BLOOD PRESSURE: 126 MMHG | HEIGHT: 67 IN | HEART RATE: 76 BPM | DIASTOLIC BLOOD PRESSURE: 85 MMHG | WEIGHT: 108.94 LBS

## 2025-01-30 DIAGNOSIS — M54.50 CHRONIC LOW BACK PAIN, UNSPECIFIED BACK PAIN LATERALITY, UNSPECIFIED WHETHER SCIATICA PRESENT: ICD-10-CM

## 2025-01-30 DIAGNOSIS — Z71.89 COUNSELING ON HEALTH PROMOTION AND DISEASE PREVENTION: ICD-10-CM

## 2025-01-30 DIAGNOSIS — E55.9 VITAMIN D INSUFFICIENCY: ICD-10-CM

## 2025-01-30 DIAGNOSIS — Z51.81 MEDICATION MONITORING ENCOUNTER: ICD-10-CM

## 2025-01-30 DIAGNOSIS — G89.29 CHRONIC LOW BACK PAIN, UNSPECIFIED BACK PAIN LATERALITY, UNSPECIFIED WHETHER SCIATICA PRESENT: ICD-10-CM

## 2025-01-30 DIAGNOSIS — M81.0 AGE-RELATED OSTEOPOROSIS WITHOUT CURRENT PATHOLOGICAL FRACTURE: Primary | ICD-10-CM

## 2025-01-30 PROCEDURE — 99999 PR PBB SHADOW E&M-EST. PATIENT-LVL IV: CPT | Mod: PBBFAC,,, | Performed by: INTERNAL MEDICINE

## 2025-01-30 NOTE — PROGRESS NOTES
RHEUMATOLOGY OUTPATIENT CLINIC NOTE    1/30/2025    Attending Rheumatologist: John Brown  Primary Care Provider/Physician Requesting Consultation: Christelle Lawler DO   Chief Complaint/Reason For Consultation:  Osteoporosis, Crohn's Disease, and nephrolithaisis      Subjective:     Manuela Reyes is a 75 y.o. White adult with OP    Recovering from UTI, completed abx course this past Tues.  Denies active urinary irritative sx at present.  No recent falls or fx since last visit.  Not currently scheduled for invasive dental w/u    Review of Systems   Cardiovascular:  Negative for chest pain.   Genitourinary:  Negative for dysuria and urgency.   Musculoskeletal:  Negative for back pain, falls and joint pain.   Skin:  Negative for rash.   Neurological:  Negative for focal weakness.       Chronic comorbid conditions affecting medical decision making today:  Past Medical History:   Diagnosis Date    Anxiety     Crohn's disease     Depression     Gallstones 01/07/2025    Genital herpes     Hepatitis C infection     Hypertension     Insomnia     Mesenteric artery stenosis     Dr. 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 Malaika Mack--dermatology    TIA (transient ischemic attack)      Past Surgical History:   Procedure Laterality Date    APPENDECTOMY      COLONOSCOPY N/A 05/19/2017    Procedure: COLONOSCOPY;  Surgeon: Jose Luis Leonard MD;  Location: [facility];  Service: Endoscopy;  Laterality: N/A;    COLONOSCOPY N/A 03/26/2018    Procedure: COLONOSCOPY;  Surgeon: Guillaume Whitman MD;  Location: [facility];  Service: Endoscopy;  Laterality: N/A;    COLONOSCOPY N/A 03/19/2019    Procedure: COLONOSCOPY;  Surgeon: Lili Ellis MD;  Location: [facility];  Service: Endoscopy;  Laterality: N/A;    COLONOSCOPY N/A 09/24/2020    Procedure: COLONOSCOPY;  Surgeon: Lili Ellis MD;  Location: [facility];  Service: Endoscopy;  Laterality:  N/A;    COLONOSCOPY N/A 2023    Procedure: COLONOSCOPY;  Surgeon: Lili Ellis MD;  Location: [facility];  Service: Endoscopy;  Laterality: N/A;    COSMETIC SURGERY      ESOPHAGOGASTRODUODENOSCOPY N/A 2023    Procedure: EGD (ESOPHAGOGASTRODUODENOSCOPY);  Surgeon: Lili Ellis MD;  Location: [facility];  Service: Endoscopy;  Laterality: N/A;    ORCHIECTOMY      ROBOT-ASSISTED CHOLECYSTECTOMY USING DA RAZA XI N/A 2025    Procedure: XI ROBOTIC CHOLECYSTECTOMY;  Surgeon: Harjit Yeh MD;  Location: [facility] OR;  Service: General;  Laterality: N/A;    sex reassignment          SMALL INTESTINE SURGERY      TONSILLECTOMY      VAGINOPLASTY, PENILE INVERSION       Family History   Problem Relation Name Age of Onset    Stroke Mother Mother     Heart disease Mother Mother     Cataracts Mother Mother     Cancer Father Father         Lung    Heart disease Sister Sister     Hypertension Brother Brother         Brother    Glaucoma Neg Hx      Macular degeneration Neg Hx      Thyroid disease Neg Hx       Social History     Tobacco Use   Smoking Status Former    Current packs/day: 0.00    Average packs/day: 0.3 packs/day for 10.0 years (2.5 ttl pk-yrs)    Types: Cigarettes    Start date: 1995    Quit date: 2005    Years since quittin.0   Smokeless Tobacco Former   Tobacco Comments    Former Light Smoker less than 10 a day       Current Outpatient Medications:     metroNIDAZOLE (FLAGYL) 500 MG tablet, Take 1 tablet (500 mg total) by mouth 3 (three) times daily. Take initial dose at 2pm, second dose at 3pm and final dose at 10pm the day before surgery (Patient taking differently: Take 2 mg by mouth 3 (three) times daily. Take initial dose at 2pm, second dose at 3pm and final dose at 10pm the day before surgery), Disp: 3 tablet, Rfl: 0    adalimumab (HUMIRA,CF, PEN) 40 mg/0.4 mL PnKt, Inject 0.4 mLs (40 mg total) into the skin every 14 (fourteen) days., Disp: 6 pen , Rfl: 3    amLODIPine (NORVASC)  10 MG tablet, Take 1 tablet by mouth once daily, Disp: 90 tablet, Rfl: 2    aspirin (ECOTRIN) 81 MG EC tablet, Take 1 tablet (81 mg total) by mouth once daily., Disp: , Rfl:     atenoloL (TENORMIN) 25 MG tablet, Take 1 tablet (25 mg total) by mouth once daily., Disp: 30 tablet, Rfl: 11    calcium carbonate (OS-COOPER) 600 mg (1,500 mg) Tab, Take 1,200 mg by mouth once daily. , Disp: , Rfl:     cyanocobalamin, vitamin B-12, 2,500 mcg Lozg, Place 1 tablet under the tongue. , Disp: , Rfl:     denosumab (PROLIA) 60 mg/mL Syrg, Inject 60 mg into the skin every 6 (six) months., Disp: , Rfl:     HYDROcodone-acetaminophen (NORCO)  mg per tablet, Take 1 tablet by mouth every 6 (six) hours as needed for Pain., Disp: 15 tablet, Rfl: 0    ibuprofen (ADVIL,MOTRIN) 800 MG tablet, Take 1 tablet (800 mg total) by mouth 3 (three) times daily as needed., Disp: 30 tablet, Rfl: 0    levocetirizine (XYZAL) 5 MG tablet, Take 1 tablet (5 mg total) by mouth every evening., Disp: 90 tablet, Rfl: 1    LORazepam (ATIVAN) 2 MG Tab, Take 1 tablet (2 mg total) by mouth every evening., Disp: 30 tablet, Rfl: 5    multivitamin with minerals tablet, Take 1 tablet by mouth once daily at 6am., Disp: , Rfl:     mupirocin (BACTROBAN) 2 % ointment, Apply a small amount to affected area twice a day x 10 days, Disp: 22 g, Rfl: 0    neomycin (MYCIFRADIN) 500 mg Tab, Take 2 tablets (1,000 mg total) by mouth 3 (three) times daily. Take initial dose at 2pm, take second dose at 3pm and take final dose at 10pm the day before surgery., Disp: 6 tablet, Rfl: 0    pantoprazole (PROTONIX) 40 MG tablet, Take 1 tablet (40 mg total) by mouth once daily., Disp: 30 tablet, Rfl: 1    polyethylene glycol (GLYCOLAX) 17 gram/dose powder, Take 238 g by mouth once daily. Mix with 2L of gatorade and drink all of mixed solution starting at 12pm the day before surgery, finishing by 10pm the night before surgery., Disp: 238 g, Rfl: 0    triamcinolone acetonide 0.1% (KENALOG) 0.1  % ointment, Apply a small amount to affected area twice a day x 2 weeks as needed for flares, Disp: 30 g, Rfl: 4    valACYclovir (VALTREX) 500 MG tablet, Take 1 tablet by mouth twice a day as needed for 3 days per flare, Disp: 30 tablet, Rfl: 0    vitamin D 1000 units Tab, Take 185 mg by mouth once daily., Disp: , Rfl:      Objective:     Vitals:    01/30/25 1003   BP: 126/85   Pulse: 76     Physical Exam   Eyes: Conjunctivae are normal.   Pulmonary/Chest: Effort normal. No respiratory distress.   Musculoskeletal:         General: No swelling.   Skin: No rash noted.       Reviewed available old and all outside pertinent medical records available.    All lab results personally reviewed and interpreted by me.       ASSESSMENT / PLAN     1. Age-related osteoporosis without current pathological fracture  Stagnant T scores on repeat DXA.  Patient not amenable for bone anabolic therapy at present.    Wishes to remain on Prolia (better efficacy than Reclast at the hip), c/ q6m.  Plan to update DXA in 2 years.  PTH, Intact      2. Vitamin D insufficiency  Vitamin D level  Ca/vit D supp /400mg BID      3. Medication monitoring encounter  Comprehensive Metabolic Panel      4. Counseling on health promotion and disease prevention        5. Chronic low back pain, unspecified back pain laterality, unspecified whether sciatica present  No ankylosis or sacroiliitis.  No interval compression deformities reported on CT 12/2024.  PT PRN.              Visit today included increased complexity associated with the care of the episodic problem Age-related osteoporosis addressed and managing the longitudinal care of the patient due to the serious and/or complex managed problem(s) Vitamin D insufficiency, Medication monitoring encounter .    John Brown M.D.

## 2025-02-05 ENCOUNTER — PATIENT MESSAGE (OUTPATIENT)
Dept: GASTROENTEROLOGY | Facility: CLINIC | Age: 75
End: 2025-02-05
Payer: MEDICARE

## 2025-02-05 ENCOUNTER — TELEPHONE (OUTPATIENT)
Dept: INFUSION THERAPY | Facility: HOSPITAL | Age: 75
End: 2025-02-05
Payer: MEDICARE

## 2025-02-05 NOTE — TELEPHONE ENCOUNTER
pt will back to r/s when she has seen dr    ----- Message from Ting sent at 2/4/2025 10:40 AM CST -----  Regarding: Return Call  Contact: patient  Type:  Patient Returning Call    Who Called: Manuela   Who Left Message for Patient: patient   Does the patient know what this is regarding?: the patient was like to come in later on 02/07/2025  Would the patient rather a call back or a response via [facility]? Call back   Best Call Back Number: [phone]    Additional Information:  If not she will come in at 9:15am her regular appointment    ThanksJOSÉ MIGUEL

## 2025-02-06 ENCOUNTER — TELEPHONE (OUTPATIENT)
Dept: RHEUMATOLOGY | Facility: CLINIC | Age: 75
End: 2025-02-06
Payer: MEDICARE

## 2025-02-06 NOTE — TELEPHONE ENCOUNTER
----- Message from Adilson sent at 2/6/2025 10:48 AM CST -----  Contact: Dr Amaya office  Type:  Needs Medical Advice    Who Called: Quentin Amaya office  Would the patient rather a call back or a response via [facility]? Call back  Best Call Back Number: [phone]  Additional Information: Calling to see if we received a fax. Their machine wants sending confirm receipt back. She sent if 4 times yesterday and last tuesday. Fax [phone] and they don't know if they will be able to receive fax at the moment.

## 2025-02-06 NOTE — TELEPHONE ENCOUNTER
Office calling to verify if fax was received also I have messaged provider in regards to the importance of the call

## 2025-02-07 ENCOUNTER — TELEPHONE (OUTPATIENT)
Dept: RHEUMATOLOGY | Facility: CLINIC | Age: 75
End: 2025-02-07
Payer: MEDICARE

## 2025-02-07 NOTE — TELEPHONE ENCOUNTER
----- Message from Fareed sent at 2/7/2025 12:40 PM CST -----  Contact: [phone]  Type:  Needs Medical Advice    Who Called: ARIADNE WITH DR WILLIAM ESCOBAR [phone]  Symptoms (please be specific): need to the nurse they fax machine is not working   How long has patient had these symptoms:    Pharmacy name and phone #:    Would the patient rather a call back or a response via [facility]? Call back  Best Call Back Number: [phone]  Additional Information: [id number]  
Pt message through the portal  
- HCP paperwork in chart completed on 6/2021 indicating: Primary HCP: Son Mani Barber; Alternate HCP: Daughter Angela Barber: [phone]  - MOLST in chart completed by primary team indicating: DNR/DNI. Trial of non-invasive ventilation. No feeding tube. NO hemodialysis.   - Spoke with HCPs Mani and Angela at bedside who verbalized understanding of patient's hospitalization course and clinical status. They feel conflicted about next steps as patient was more interactive this afternoon; however, they recognize patient's guarded prognosis. Family wish to reassess patient's clinical status in the next 24 hours to determine if they wish to continue with medical management vs transition to comfort centric approach of care. Addressed their questions regarding details of comfort centric approach of care and discussed possibility of PCU evaluation for further symptom management and disposition planning if goals transition towards symptom driven care.   Also discussed option of continued medical management with prioritization of symptoms as patient mildly dyspneic during encounter. Counseled about use of low dose opiates for dyspnea. Family wish to hold off on opiate initiation until reassessment of patient's clinical status tomorrow.   Family appreciative of counseling.   16 minutes spent on advanced care planning/ goals of care

## 2025-02-13 ENCOUNTER — TELEPHONE (OUTPATIENT)
Dept: RHEUMATOLOGY | Facility: CLINIC | Age: 75
End: 2025-02-13
Payer: MEDICARE

## 2025-02-13 NOTE — TELEPHONE ENCOUNTER
----- Message from Champ sent at 2/13/2025 10:12 AM CST -----  Contact: Alva  .Type:  Needs Medical Advice    Who Called: Alva from [facility]  Would the patient rather a call back or a response via [facility]? Call back  Best Call Back Number: [phone]  Additional Information: Alva is calling to confirm if  has received the medical clearance for the pt that was dropped off.

## 2025-02-13 NOTE — TELEPHONE ENCOUNTER
Spoke with Zahraa at Dr. Amaya . We have received paper and as soon as Dr. Brown sign's it we will fax it.

## 2025-02-21 ENCOUNTER — PATIENT MESSAGE (OUTPATIENT)
Dept: INTERNAL MEDICINE | Facility: CLINIC | Age: 75
End: 2025-02-21
Payer: MEDICARE

## 2025-02-25 ENCOUNTER — OFFICE VISIT (OUTPATIENT)
Dept: INTERNAL MEDICINE | Facility: CLINIC | Age: 75
End: 2025-02-25
Payer: MEDICARE

## 2025-02-25 VITALS
WEIGHT: 110.25 LBS | HEART RATE: 75 BPM | BODY MASS INDEX: 17.3 KG/M2 | DIASTOLIC BLOOD PRESSURE: 70 MMHG | TEMPERATURE: 97 F | RESPIRATION RATE: 16 BRPM | OXYGEN SATURATION: 97 % | SYSTOLIC BLOOD PRESSURE: 124 MMHG | HEIGHT: 67 IN

## 2025-02-25 DIAGNOSIS — E78.5 HYPERLIPIDEMIA LDL GOAL <70: ICD-10-CM

## 2025-02-25 DIAGNOSIS — I10 ESSENTIAL HYPERTENSION: ICD-10-CM

## 2025-02-25 DIAGNOSIS — Z00.00 ANNUAL PHYSICAL EXAM: Primary | ICD-10-CM

## 2025-02-25 PROCEDURE — 99999 PR PBB SHADOW E&M-EST. PATIENT-LVL IV: CPT | Mod: PBBFAC,,, | Performed by: PHYSICIAN ASSISTANT

## 2025-02-25 NOTE — PROGRESS NOTES
"Subjective:      Patient ID: Manuela Reyes is a 75 y.o. adult.    Chief Complaint: Follow-up    Patient is known to me, being seen today for annual.     HTN- atenolol 25mg, amlodipine 10mg   HLD- allergy to statins, Card recently recommended low fat diet, takes cholestOff   Eats fairly healthy, walks regularly   Chronic insomnia- lorazepam 2mg nightly, working well for her     Due for lipid labs (scheduled for April w Card, labs prior)     Gallbladder removal in Jan, still with intermittent RUQ pain  Was told it could take up to 2mths for it to resolve completely, if persistent was recommended she follow up w GI  Some improvement, just not resolved   Not assc w eating or movement, random    Currently seeing dentist, schedule for extraction 3/10 d/t abscess under bridge   Off prolia x6mths and humira x2wks     Last visit Oct 2024 w PCP.       Review of Systems   Constitutional:  Negative for chills, diaphoresis and fever.   HENT:  Negative for congestion, rhinorrhea and sore throat.    Respiratory:  Negative for cough, shortness of breath and wheezing.    Gastrointestinal:  Positive for abdominal pain. Negative for blood in stool, constipation, diarrhea, nausea and vomiting. Abdominal distention: RUQ.  Skin:  Negative for rash.   Neurological:  Negative for dizziness, light-headedness and headaches.       Objective:   /70 (BP Location: Left arm, Patient Position: Sitting)   Pulse 75   Temp 96.5 °F (35.8 °C) (Tympanic)   Resp 16   Ht 5' 7" (1.702 m)   Wt 50 kg (110 lb 3.7 oz)   SpO2 97%   BMI 17.26 kg/m²   Physical Exam  Constitutional:       General: She is not in acute distress.     Appearance: Normal appearance. She is well-developed. She is not ill-appearing.   HENT:      Head: Normocephalic and atraumatic.   Cardiovascular:      Rate and Rhythm: Normal rate and regular rhythm.      Heart sounds: Normal heart sounds. No murmur heard.  Pulmonary:      Effort: Pulmonary effort is normal. No " respiratory distress.      Breath sounds: Normal breath sounds. No decreased breath sounds.   Abdominal:      General: Bowel sounds are normal.      Palpations: Abdomen is soft.      Tenderness: There is no abdominal tenderness.   Musculoskeletal:      Right lower leg: No edema.      Left lower leg: No edema.   Skin:     General: Skin is warm and dry.      Findings: No rash.   Psychiatric:         Speech: Speech normal.         Behavior: Behavior normal.         Thought Content: Thought content normal.       Assessment:      1. Annual physical exam    2. Essential hypertension    3. Hyperlipidemia LDL goal <70       Plan:   Annual physical exam    Essential hypertension    Hyperlipidemia LDL goal <70      Expect continue improvement in abdominal pain, if persistent or worsening, f/u GI     Discussed vaccines, obtain at pharmacy     Continue current meds     6mth f/u or sooner if needed

## 2025-03-15 ENCOUNTER — PATIENT MESSAGE (OUTPATIENT)
Dept: OPHTHALMOLOGY | Facility: CLINIC | Age: 75
End: 2025-03-15
Payer: MEDICARE

## 2025-03-17 ENCOUNTER — PATIENT MESSAGE (OUTPATIENT)
Dept: INTERNAL MEDICINE | Facility: CLINIC | Age: 75
End: 2025-03-17
Payer: MEDICARE

## 2025-03-17 DIAGNOSIS — F51.04 CHRONIC INSOMNIA: ICD-10-CM

## 2025-03-17 DIAGNOSIS — F41.9 ANXIETY: ICD-10-CM

## 2025-03-17 DIAGNOSIS — I10 ESSENTIAL HYPERTENSION: ICD-10-CM

## 2025-03-17 NOTE — TELEPHONE ENCOUNTER
No care due was identified.  [facility] Embedded Care Due Messages. Reference number: [id number].   3/17/2025 11:02:39 AM CDT

## 2025-03-19 DIAGNOSIS — F41.9 ANXIETY: ICD-10-CM

## 2025-03-19 DIAGNOSIS — F51.04 CHRONIC INSOMNIA: ICD-10-CM

## 2025-03-19 RX ORDER — ATENOLOL 25 MG/1
25 TABLET ORAL DAILY
Qty: 90 TABLET | Refills: 3 | Status: SHIPPED | OUTPATIENT
Start: 2025-03-19

## 2025-03-19 RX ORDER — LEVOCETIRIZINE DIHYDROCHLORIDE 5 MG/1
5 TABLET, FILM COATED ORAL NIGHTLY
Qty: 90 TABLET | Refills: 3 | Status: SHIPPED | OUTPATIENT
Start: 2025-03-19

## 2025-03-19 RX ORDER — LORAZEPAM 2 MG/1
2 TABLET ORAL NIGHTLY
Qty: 30 TABLET | Refills: 5 | OUTPATIENT
Start: 2025-03-19

## 2025-03-19 NOTE — TELEPHONE ENCOUNTER
No care due was identified.  [facility] Embedded Care Due Messages. Reference number: [id number].   3/19/2025 5:33:39 PM CDT

## 2025-03-20 NOTE — TELEPHONE ENCOUNTER
Refill Routing Note   Medication(s) are not appropriate for processing by [facility] for the following reason(s):        Outside of protocol    [facility] action(s):  Route        Medication Therapy Plan: Duplicate      Appointments  past 12m or future 3m with PCP    Date Provider   Last Visit   10/10/2024 Christelle Lawler DO   Next Visit   9/3/2025 Christelle Lawler DO   ED visits in past 90 days: 0        Note composed:7:30 AM 03/20/2025

## 2025-03-21 ENCOUNTER — PATIENT MESSAGE (OUTPATIENT)
Dept: GASTROENTEROLOGY | Facility: CLINIC | Age: 75
End: 2025-03-21
Payer: MEDICARE

## 2025-03-24 DIAGNOSIS — Z00.00 ENCOUNTER FOR MEDICARE ANNUAL WELLNESS EXAM: ICD-10-CM

## 2025-03-24 RX ORDER — LORAZEPAM 2 MG/1
2 TABLET ORAL NIGHTLY
Qty: 30 TABLET | Refills: 5 | OUTPATIENT
Start: 2025-03-24

## 2025-03-25 DIAGNOSIS — F51.04 CHRONIC INSOMNIA: ICD-10-CM

## 2025-03-25 DIAGNOSIS — F41.9 ANXIETY: ICD-10-CM

## 2025-03-25 RX ORDER — LORAZEPAM 2 MG/1
2 TABLET ORAL NIGHTLY
Qty: 30 TABLET | Refills: 5 | Status: CANCELLED | OUTPATIENT
Start: 2025-03-25

## 2025-03-25 NOTE — TELEPHONE ENCOUNTER
Refill Routing Note   Medication(s) are not appropriate for processing by [facility] for the following reason(s):        Outside of protocol    [facility] action(s):  Route  Quick Discontinue        Medication Therapy Plan: Previous requests denied; Refill center cannot assess CDS. Thanks      Appointments  past 12m or future 3m with PCP    Date Provider   Last Visit   10/10/2024 Christelle Lawler DO   Next Visit   9/3/2025 Christelle Lawler DO   ED visits in past 90 days: 0        Note composed:11:26 AM 03/25/2025

## 2025-03-25 NOTE — TELEPHONE ENCOUNTER
No care due was identified.  [facility] Embedded Care Due Messages. Reference number: [id number].   3/25/2025 9:14:38 AM CDT

## 2025-03-27 DIAGNOSIS — F51.04 CHRONIC INSOMNIA: ICD-10-CM

## 2025-03-27 DIAGNOSIS — F41.9 ANXIETY: ICD-10-CM

## 2025-03-27 RX ORDER — LORAZEPAM 2 MG/1
2 TABLET ORAL NIGHTLY
Qty: 30 TABLET | Refills: 5 | OUTPATIENT
Start: 2025-03-27

## 2025-03-27 NOTE — TELEPHONE ENCOUNTER
No care due was identified.  [facility] Embedded Care Due Messages. Reference number: [id number].   3/27/2025 10:05:31 AM CDT

## 2025-03-31 ENCOUNTER — RESULTS FOLLOW-UP (OUTPATIENT)
Dept: INTERNAL MEDICINE | Facility: CLINIC | Age: 75
End: 2025-03-31

## 2025-03-31 ENCOUNTER — OFFICE VISIT (OUTPATIENT)
Dept: INTERNAL MEDICINE | Facility: CLINIC | Age: 75
End: 2025-03-31
Payer: MEDICARE

## 2025-03-31 VITALS
SYSTOLIC BLOOD PRESSURE: 120 MMHG | HEIGHT: 67 IN | OXYGEN SATURATION: 97 % | DIASTOLIC BLOOD PRESSURE: 75 MMHG | TEMPERATURE: 96 F | WEIGHT: 107.13 LBS | BODY MASS INDEX: 16.81 KG/M2 | HEART RATE: 80 BPM | RESPIRATION RATE: 20 BRPM

## 2025-03-31 DIAGNOSIS — N30.00 ACUTE CYSTITIS WITHOUT HEMATURIA: ICD-10-CM

## 2025-03-31 DIAGNOSIS — F41.9 ANXIETY: ICD-10-CM

## 2025-03-31 DIAGNOSIS — F51.04 CHRONIC INSOMNIA: ICD-10-CM

## 2025-03-31 DIAGNOSIS — I10 ESSENTIAL HYPERTENSION: ICD-10-CM

## 2025-03-31 DIAGNOSIS — R39.15 URINARY URGENCY: Primary | ICD-10-CM

## 2025-03-31 LAB
BACTERIA #/AREA URNS HPF: ABNORMAL /HPF
BILIRUB UR QL STRIP.AUTO: NEGATIVE
CLARITY UR: ABNORMAL
COLOR UR AUTO: YELLOW
GLUCOSE UR QL STRIP: NEGATIVE
HGB UR QL STRIP: ABNORMAL
KETONES UR QL STRIP: NEGATIVE
LEUKOCYTE ESTERASE UR QL STRIP: ABNORMAL
MICROSCOPIC COMMENT: ABNORMAL
NITRITE UR QL STRIP: NEGATIVE
PH UR STRIP: 7 [PH]
PROT UR QL STRIP: NEGATIVE
RBC #/AREA URNS HPF: 4 /HPF (ref 0–4)
SP GR UR STRIP: 1.01
WBC #/AREA URNS HPF: >100 /HPF (ref 0–5)

## 2025-03-31 PROCEDURE — 3074F SYST BP LT 130 MM HG: CPT | Mod: CPTII,S$GLB,, | Performed by: INTERNAL MEDICINE

## 2025-03-31 PROCEDURE — 1160F RVW MEDS BY RX/DR IN RCRD: CPT | Mod: CPTII,S$GLB,, | Performed by: INTERNAL MEDICINE

## 2025-03-31 PROCEDURE — 99999 PR PBB SHADOW E&M-EST. PATIENT-LVL V: CPT | Mod: PBBFAC,,, | Performed by: INTERNAL MEDICINE

## 2025-03-31 PROCEDURE — 1126F AMNT PAIN NOTED NONE PRSNT: CPT | Mod: CPTII,S$GLB,, | Performed by: INTERNAL MEDICINE

## 2025-03-31 PROCEDURE — 1159F MED LIST DOCD IN RCRD: CPT | Mod: CPTII,S$GLB,, | Performed by: INTERNAL MEDICINE

## 2025-03-31 PROCEDURE — 87088 URINE BACTERIA CULTURE: CPT | Performed by: INTERNAL MEDICINE

## 2025-03-31 PROCEDURE — 3078F DIAST BP <80 MM HG: CPT | Mod: CPTII,S$GLB,, | Performed by: INTERNAL MEDICINE

## 2025-03-31 PROCEDURE — 1101F PT FALLS ASSESS-DOCD LE1/YR: CPT | Mod: CPTII,S$GLB,, | Performed by: INTERNAL MEDICINE

## 2025-03-31 PROCEDURE — 81003 URINALYSIS AUTO W/O SCOPE: CPT | Performed by: INTERNAL MEDICINE

## 2025-03-31 PROCEDURE — 99214 OFFICE O/P EST MOD 30 MIN: CPT | Mod: S$GLB,,, | Performed by: INTERNAL MEDICINE

## 2025-03-31 PROCEDURE — G2211 COMPLEX E/M VISIT ADD ON: HCPCS | Mod: S$GLB,,, | Performed by: INTERNAL MEDICINE

## 2025-03-31 PROCEDURE — 3288F FALL RISK ASSESSMENT DOCD: CPT | Mod: CPTII,S$GLB,, | Performed by: INTERNAL MEDICINE

## 2025-03-31 RX ORDER — LORAZEPAM 2 MG/1
2 TABLET ORAL NIGHTLY
Qty: 30 TABLET | Refills: 5 | Status: SHIPPED | OUTPATIENT
Start: 2025-03-31

## 2025-03-31 RX ORDER — NITROFURANTOIN 25; 75 MG/1; MG/1
100 CAPSULE ORAL 2 TIMES DAILY
Qty: 20 CAPSULE | Refills: 0 | Status: SHIPPED | OUTPATIENT
Start: 2025-03-31 | End: 2025-04-10

## 2025-03-31 NOTE — PROGRESS NOTES
Manuela Reyes  75 y.o. White adult  [id number]    Chief Complaint:  Chief Complaint   Patient presents with    Urinary Urgency       History of Present Illness:  History of Present Illness    CHIEF COMPLAINT:  Ms. Reyes presents today with urinary frequency and urgency.    URINARY SYMPTOMS:  She reports urinary frequency and urgency occurring every 30 minutes since 4 o'clock with small amounts of urine output. Symptoms initially began after showering on Saturday, though she denies any symptoms yesterday. She denies fever and chills. She had a previous UTI in November following gallbladder surgery.    MEDICAL HISTORY:  She had recent gallbladder surgery with subsequent complications including belching and interference with bowel movements. She also had recent dental issues requiring removal of a recently placed bridge and extraction of an abscessed tooth.    MEDICATIONS:  She has a history of adverse reaction to Keflex manifesting as calf pain but tolerates Amoxicillin well. She takes Lorazepam for anxiety and sleep with good response, noting absence of next-day grogginess or cognitive impairment. She is currently on Prolia.    EXERCISE:  She walks 2-3 miles daily.         Review of Systems   Constitutional:  Negative for chills and fever.   Gastrointestinal:  Negative for abdominal pain.   Genitourinary:  Positive for frequency and urgency. Negative for dysuria.   Musculoskeletal:  Positive for back pain.       History:  Past Medical History:   Diagnosis Date    Anxiety     Crohn's disease     Depression     Gallstones 01/07/2025    Genital herpes     Hepatitis C infection     Hypertension     Insomnia     Mesenteric artery stenosis     Dr. 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 Malaika Mack--dermatology    TIA (transient ischemic attack)        Medications:  Medications Ordered Prior to Encounter[1]    Allergies:  Review of patient's  allergies indicates:   Allergen Reactions    Statins-hmg-coa reductase inhibitors Anaphylaxis     Myalgias    Codeine sulfate Itching    Hydrochlorothiazide Other (See Comments)     Adverse Reaction    Lisinopril Swelling and Edema     Facial Swelling    Keflex [cephalexin] Other (See Comments)     Muscle aches       Exam:  Vitals:    03/31/25 1004   BP: 120/75   Pulse:    Resp:    Temp:      Weight: 48.6 kg (107 lb 2.3 oz)   Body mass index is 16.78 kg/m².      Physical Exam    Vitals: Reviewed. Blood pressure is a little bit high.  Constitutional: No acute distress. Well-developed. Not ill-appearing.  Eyes: No scleral icterus.  Cardiovascular: Normal rate.  Pulmonary: Pulmonary effort is normal. No respiratory distress. Abdomen: Soft. Mildly tender in the lower abdomen.Nondistended.   Neurological: Alert and oriented to person, place, and time.  Psychiatric: Behavior normal. Tearful.        Assessment:  The primary encounter diagnosis was Urinary urgency. Diagnoses of Acute cystitis without hematuria, Anxiety, Chronic insomnia, and Essential hypertension were also pertinent to this visit.    Assessment & Plan    URINARY URGENCY:  - Assessed urinary symptoms, considering recent history of UTI.  - Reviewed prior urine culture from January and noted multiple resistances to antiboitics  - Noted previous UTI after cholecystectomy.    UTI:   - Prescribed nitrofurantoin based on prior urine culture and sensitivities  - Recommend increased water intake    ANXIETY DISORDER:  - Evaluated anxiety in context of recent life stressors and ongoing transsexual-related anxiety.  - Ms. Reyes reports ongoing anxiety impacting sleep.  - Continued Lorazepam prescription for anxiety and sleep issues.  - Considered explanation for early refill request due to increased anxiety.  - Instructed patient to message if experiencing any problems or changes in medication needs in the future.    SLEEP DISORDER:  - Sleep issues evaluated in  context of recent life stressors and anxiety.  - Lorazepam prescription continued to address both anxiety and sleep disturbances.    HYPERTENSION:   - Continued current medication  - Continued home b/p monitoring    GENERAL HEALTH MAINTENANCE:  - Ms. Reyes to continue daily exercise routine of walking 2-3 miles per day.                        [1]   Current Outpatient Medications on File Prior to Visit   Medication Sig Dispense Refill    adalimumab (HUMIRA,CF, PEN) 40 mg/0.4 mL PnKt Inject 0.4 mLs (40 mg total) into the skin every 14 (fourteen) days. 6 pen 3    amLODIPine (NORVASC) 10 MG tablet Take 1 tablet by mouth once daily 90 tablet 2    amoxicillin (AMOXIL) 500 MG capsule take 2 capsules by mouth now  then 1 three times a day until all gone 28 capsule 0    aspirin (ECOTRIN) 81 MG EC tablet Take 1 tablet (81 mg total) by mouth once daily.      atenoloL (TENORMIN) 25 MG tablet Take 1 tablet (25 mg total) by mouth once daily. 90 tablet 3    calcium carbonate (OS-COOPER) 600 mg (1,500 mg) Tab Take 1,200 mg by mouth once daily.       cyanocobalamin, vitamin B-12, 2,500 mcg Lozg Place 1 tablet under the tongue.       denosumab (PROLIA) 60 mg/mL Syrg Inject 60 mg into the skin every 6 (six) months.      dexAMETHasone 0.5 mg/5 mL Soln solution rinse with 1 teaspoon (5 ml) by mouth three times daily and spit 350 mL 0    HYDROcodone-acetaminophen (NORCO)  mg per tablet Take 1 tablet by mouth every 6 (six) hours as needed for Pain. 15 tablet 0    ibuprofen (ADVIL,MOTRIN) 800 MG tablet Take 1 tablet (800 mg total) by mouth 3 (three) times daily as needed. 30 tablet 0    levocetirizine (XYZAL) 5 MG tablet Take 1 tablet (5 mg total) by mouth every evening. 90 tablet 3    multivitamin with minerals tablet Take 1 tablet by mouth once daily at 6am.      mupirocin (BACTROBAN) 2 % ointment Apply a small amount to affected area twice a day x 10 days 22 g 0    neomycin (MYCIFRADIN) 500 mg Tab Take 2 tablets (1,000 mg total)  by mouth 3 (three) times daily. Take initial dose at 2pm, take second dose at 3pm and take final dose at 10pm the day before surgery. 6 tablet 0    polyethylene glycol (GLYCOLAX) 17 gram/dose powder Take 238 g by mouth once daily. Mix with 2L of gatorade and drink all of mixed solution starting at 12pm the day before surgery, finishing by 10pm the night before surgery. 238 g 0    traMADoL (ULTRAM) 50 mg tablet take 1 tablet by mouth every 6 hours not to exceed 8 tablets in 24 hours 28 tablet 0    triamcinolone acetonide 0.1% (KENALOG) 0.1 % ointment Apply a small amount to affected area twice a day x 2 weeks as needed for flares 30 g 4    valACYclovir (VALTREX) 500 MG tablet take 4 tablets by mouth once and then 4 tablets by mouth 12 hours later 8 tablet 3    vitamin D 1000 units Tab Take 185 mg by mouth once daily.      [DISCONTINUED] LORazepam (ATIVAN) 2 MG Tab Take 1 tablet (2 mg total) by mouth every evening. 30 tablet 5    [DISCONTINUED] valACYclovir (VALTREX) 500 MG tablet Take 1 tablet by mouth twice a day as needed for 3 days per flare (Patient not taking: Reported on 3/31/2025) 30 tablet 0     No current facility-administered medications on file prior to visit.

## 2025-04-03 LAB — BACTERIA UR CULT: ABNORMAL

## 2025-04-10 ENCOUNTER — OFFICE VISIT (OUTPATIENT)
Dept: URGENT CARE | Facility: CLINIC | Age: 75
End: 2025-04-10
Payer: MEDICARE

## 2025-04-10 VITALS
SYSTOLIC BLOOD PRESSURE: 150 MMHG | OXYGEN SATURATION: 98 % | RESPIRATION RATE: 18 BRPM | WEIGHT: 107 LBS | HEART RATE: 78 BPM | TEMPERATURE: 97 F | DIASTOLIC BLOOD PRESSURE: 81 MMHG | HEIGHT: 67 IN | BODY MASS INDEX: 16.79 KG/M2

## 2025-04-10 DIAGNOSIS — M54.50 ACUTE BILATERAL LOW BACK PAIN WITHOUT SCIATICA: Primary | ICD-10-CM

## 2025-04-10 DIAGNOSIS — I10 ELEVATED BLOOD PRESSURE READING IN OFFICE WITH DIAGNOSIS OF HYPERTENSION: ICD-10-CM

## 2025-04-10 DIAGNOSIS — M79.10 MUSCLE PAIN: ICD-10-CM

## 2025-04-10 PROCEDURE — 99214 OFFICE O/P EST MOD 30 MIN: CPT | Mod: S$GLB,,, | Performed by: NURSE PRACTITIONER

## 2025-04-10 RX ORDER — METHOCARBAMOL 500 MG/1
500 TABLET, FILM COATED ORAL 4 TIMES DAILY
Qty: 40 TABLET | Refills: 0 | Status: SHIPPED | OUTPATIENT
Start: 2025-04-10 | End: 2025-04-20

## 2025-04-10 NOTE — PROGRESS NOTES
"Subjective:      Patient ID: Manuela Reyes is a 75 y.o. adult.    Vitals:  height is 5' 7" (1.702 m) and weight is 48.5 kg (107 lb). Her tympanic temperature is 97 °F (36.1 °C). Her blood pressure is 150/81 (abnormal) and her pulse is 78. Her respiration is 18 and oxygen saturation is 98%.     Chief Complaint: Back Pain    Patient presents with low back pain "the two big muscles on each side" per patient .  They feel tight.  She had  her 20 lb dog and some groceries recently.  She is unable to take ibuprofen for more than a week due to a history of Crohns.  Symptome started Wednesday    Back Pain  This is a new problem. The current episode started yesterday. The problem occurs constantly. The problem is unchanged. The pain is present in the lumbar spine. The quality of the pain is described as aching. The pain does not radiate. The pain is at a severity of 8/10. The pain is mild. The pain is The same all the time. The symptoms are aggravated by position. Stiffness is present All day. Pertinent negatives include no abdominal pain, bladder incontinence, bowel incontinence, chest pain, dysuria, fever, headaches, leg pain, numbness, paresis, paresthesias, perianal numbness, tingling, weakness or weight loss. She has tried analgesics and heat for the symptoms. The treatment provided mild relief.       Constitution: Negative for chills and fever.   Cardiovascular:  Negative for chest pain.   Respiratory:  Negative for shortness of breath, stridor and wheezing.    Gastrointestinal:  Negative for abdominal pain and bowel incontinence.   Genitourinary:  Negative for dysuria, frequency, urgency, urine decreased, flank pain, bladder incontinence and bed wetting.   Musculoskeletal:  Positive for back pain and muscle ache.   Skin:  Negative for rash, erythema and bruising.   Neurological:  Negative for headaches and numbness.      Objective:     Physical Exam   Constitutional: She is oriented to person, place, " and time. She appears well-developed. She is cooperative. No distress.   HENT:   Head: Normocephalic and atraumatic.   Ears:   Right Ear: External ear normal.   Left Ear: External ear normal.   Nose: Nose normal.   Mouth/Throat: Oropharynx is clear and moist and mucous membranes are normal.   Eyes: Conjunctivae and lids are normal.   Neck: Trachea normal and phonation normal. Neck supple.   Cardiovascular: Normal rate, regular rhythm, normal heart sounds and normal pulses.   Pulmonary/Chest: Effort normal and breath sounds normal. No respiratory distress.   Abdominal: Normal appearance and bowel sounds are normal. She exhibits no mass. Soft.   Musculoskeletal: Normal range of motion.         General: Tenderness present. No swelling or deformity. Normal range of motion.      Thoracic back: Normal.      Lumbar back: She exhibits normal range of motion, no tenderness, no bony tenderness, no swelling, no edema, no deformity, no laceration and no spasm.   Neurological: She is alert and oriented to person, place, and time. She has normal strength and normal reflexes. She displays no weakness. No sensory deficit. Gait normal.   Skin: Skin is warm, dry, intact, not diaphoretic and not pale. Capillary refill takes less than 2 seconds. No erythema   Psychiatric: Her speech is normal and behavior is normal. Judgment and thought content normal.   Nursing note and vitals reviewed.      Assessment:     1. Acute bilateral low back pain without sciatica    2. Muscle pain    3. Elevated blood pressure reading in office with diagnosis of hypertension        Plan:       Acute bilateral low back pain without sciatica    Muscle pain    Elevated blood pressure reading in office with diagnosis of hypertension    Other orders  -     methocarbamoL (ROBAXIN) 500 MG Tab; Take 1 tablet (500 mg total) by mouth 4 (four) times daily. for 10 days  Dispense: 40 tablet; Refill: 0          Medical Decision Making:   Initial Assessment:   After  complete evaluation, including thorough history and physical exam, the patient's symptoms are most likely due to muscular  injury. There are no concerning features on physical exam to suggest fracture (no trauma, no bony tenderness to palpation), cauda equina (no bowel or urinary incontinence/retention, no saddle anesthesia, no distal weakness), AAA, viscus perforation, osteomyelitis or epidural abscess (no IVDU, vertebral tenderness), renal colic, pyelonephritis (afebrile, no CVAT, no urinary symptoms). Vital signs do not suggest sepsis. Imaging is negative for acute fracture or concerning findings. Patient is without signs of acute distress and vital signs are stable. Patient is discharged with prescription for muscle relaxant, anti-inflammatory pain relief, and recommendations for supportive care. Return/ER precautions were given.     Patient is seen in clinic with known history of essential hypertension noted to be elevated above goal today in clinic.  Patient was counseled that blood pressure was elevated. I advised adherence to current medication regime, low-salt heart smart diet, exercise and self-monitoring.  Patient follow up with primary physician for long-term management adjustments as needed.           Patient Instructions   Back Pain-Muscle Pain  Alternate heat and ice for first 48 hours then  apply heat. You may do gently stretching if tolerable.    Moist warm compresss to area several times daily.  May use a heating pad on LOW to provide heat over a towel which was dampended with warm water. DO NOT FALL ASLEEP WITH HEATING PAD ON.  Do not stay in one position to long.  When sleeping on your back place a pillow under knees to reduce tension on back.  If sleeping on your side, place pillow between knees to keep spine in better alinement.  Wear supportive shoes such as tennis shoes for support of the lower back.  Take any medication as directed.    Take the muscle relaxer as prescribed. It may cause  drowsiness so do not operate a vehicle or machinery while taking this medication.     Apply over the counter Voltaren gel to the area for improved pain relief.     Apply over the counter lidocaine patches to the area for improved pain relief.     Take arthritis strength Tylenol three times a day for pain in addition to the anti-inflammatory pain medication prescribed.     If you were not prescribed an anti-inflammatory medication, and if you do not have any history of stomach/intestinal ulcers, or kidney disease, or are not taking a blood thinner such as Coumadin, Plavix, Pradaxa, Eloquis, or Xaralta for example, it is OK to take over the counter Ibuprofen or Advil or Motrin or Aleve as directed.  Do not take these medications on an empty stomach.    If you were prescribed a narcotic medication, do not drive or operate heavy equipment or machinery while taking these medications.    If you lose control of your bowel and/or bladder, please go to the nearest Emergency Department immediately.  If you lose sensation in between your legs by your genitalia and/or rectum, please go to the nearest Emergency Department immediately.  If you lose control or sensation of any extremity, please go to the nearest Emergency Department immediately.

## 2025-04-14 ENCOUNTER — PATIENT MESSAGE (OUTPATIENT)
Dept: GASTROENTEROLOGY | Facility: CLINIC | Age: 75
End: 2025-04-14
Payer: MEDICARE

## 2025-04-15 ENCOUNTER — PATIENT MESSAGE (OUTPATIENT)
Dept: INTERNAL MEDICINE | Facility: CLINIC | Age: 75
End: 2025-04-15
Payer: MEDICARE

## 2025-04-16 ENCOUNTER — OFFICE VISIT (OUTPATIENT)
Dept: INTERNAL MEDICINE | Facility: CLINIC | Age: 75
End: 2025-04-16
Payer: MEDICARE

## 2025-04-16 VITALS
RESPIRATION RATE: 20 BRPM | HEART RATE: 81 BPM | TEMPERATURE: 97 F | WEIGHT: 110 LBS | HEIGHT: 67 IN | DIASTOLIC BLOOD PRESSURE: 80 MMHG | BODY MASS INDEX: 17.27 KG/M2 | OXYGEN SATURATION: 99 % | SYSTOLIC BLOOD PRESSURE: 136 MMHG

## 2025-04-16 DIAGNOSIS — I10 ESSENTIAL HYPERTENSION: ICD-10-CM

## 2025-04-16 DIAGNOSIS — M53.86 DECREASED ROM OF LUMBAR SPINE: ICD-10-CM

## 2025-04-16 DIAGNOSIS — S39.012D STRAIN OF LUMBAR REGION, SUBSEQUENT ENCOUNTER: Primary | ICD-10-CM

## 2025-04-16 PROCEDURE — 1160F RVW MEDS BY RX/DR IN RCRD: CPT | Mod: CPTII,S$GLB,, | Performed by: PHYSICIAN ASSISTANT

## 2025-04-16 PROCEDURE — 1159F MED LIST DOCD IN RCRD: CPT | Mod: CPTII,S$GLB,, | Performed by: PHYSICIAN ASSISTANT

## 2025-04-16 PROCEDURE — 3075F SYST BP GE 130 - 139MM HG: CPT | Mod: CPTII,S$GLB,, | Performed by: PHYSICIAN ASSISTANT

## 2025-04-16 PROCEDURE — 1125F AMNT PAIN NOTED PAIN PRSNT: CPT | Mod: CPTII,S$GLB,, | Performed by: PHYSICIAN ASSISTANT

## 2025-04-16 PROCEDURE — 3288F FALL RISK ASSESSMENT DOCD: CPT | Mod: CPTII,S$GLB,, | Performed by: PHYSICIAN ASSISTANT

## 2025-04-16 PROCEDURE — 99214 OFFICE O/P EST MOD 30 MIN: CPT | Mod: S$GLB,,, | Performed by: PHYSICIAN ASSISTANT

## 2025-04-16 PROCEDURE — 3079F DIAST BP 80-89 MM HG: CPT | Mod: CPTII,S$GLB,, | Performed by: PHYSICIAN ASSISTANT

## 2025-04-16 PROCEDURE — 1101F PT FALLS ASSESS-DOCD LE1/YR: CPT | Mod: CPTII,S$GLB,, | Performed by: PHYSICIAN ASSISTANT

## 2025-04-16 PROCEDURE — 99999 PR PBB SHADOW E&M-EST. PATIENT-LVL V: CPT | Mod: PBBFAC,,, | Performed by: PHYSICIAN ASSISTANT

## 2025-04-16 PROCEDURE — G2211 COMPLEX E/M VISIT ADD ON: HCPCS | Mod: S$GLB,,, | Performed by: PHYSICIAN ASSISTANT

## 2025-04-16 RX ORDER — CYCLOBENZAPRINE HCL 5 MG
5 TABLET ORAL 3 TIMES DAILY PRN
Qty: 30 TABLET | Refills: 0 | Status: SHIPPED | OUTPATIENT
Start: 2025-04-16

## 2025-04-16 RX ORDER — DICLOFENAC SODIUM 10 MG/G
2 GEL TOPICAL DAILY
Qty: 100 G | Refills: 0 | Status: SHIPPED | OUTPATIENT
Start: 2025-04-16

## 2025-04-16 NOTE — PROGRESS NOTES
Subjective:      Patient ID: Manuela Reyes is a 75 y.o. adult.    Chief Complaint: Follow-up (She is here for a follow up from urgent care on 4/10 for back pain. States the pain is slightly better since urgent care visit, just feels stiff and tight which is causing pain.)    Patient is known to me, being seen today for Urgent Care f/u.   Tuesday was carrying all her groceries with two hands, which were heavy  Also was lifting dogs in and out of the car that afternoon   Walked 2-3miles afternoon   The next morning pain started   Urgent Care the following day   She was prescribed robaxin and medrol dose back (per dentist for inflammation of tooth under bridge)   She is on day 3 of steroid pack   Took ibuprofen which helped but d/c d/t Crohn's     Patient reports some improvement but still with tight/stiff feeling of low back   Does not radiate to legs, R foot w some tingling     Last visit March 2025 w myself.     Back Pain  This is a recurrent problem. The current episode started in the past 7 days. The problem occurs constantly. The problem is unchanged. The pain is present in the lumbar spine. The quality of the pain is described as aching and burning. The pain does not radiate. The pain is at a severity of 8/10. The pain is severe. The pain is Worse during the day. The symptoms are aggravated by bending, coughing, position, sitting and stress. Stiffness is present All day. Associated symptoms include bowel incontinence, pelvic pain and weakness. Pertinent negatives include no abdominal pain, bladder incontinence, chest pain, dysuria, fever, headaches, leg pain, numbness, paresis, paresthesias, perianal numbness, tingling or weight loss. Risk factors include history of osteoporosis and history of renal stones. The treatment provided mild relief.     Review of Systems   Constitutional:  Negative for fever and weight loss.   Cardiovascular:  Negative for chest pain.   Gastrointestinal:  Positive for bowel  "incontinence and constipation. Negative for abdominal pain.   Genitourinary:  Positive for pelvic pain. Negative for bladder incontinence, dysuria and hematuria.   Musculoskeletal:  Positive for back pain.   Neurological:  Positive for weakness. Negative for tingling, numbness, headaches and paresthesias.       Objective:   /80 (BP Location: Left arm, Patient Position: Sitting)   Pulse 81   Temp 96.5 °F (35.8 °C) (Tympanic)   Resp 20   Ht 5' 7" (1.702 m)   Wt 49.9 kg (110 lb 0.2 oz)   SpO2 99%   BMI 17.23 kg/m²   Physical Exam  Constitutional:       General: She is not in acute distress.     Appearance: She is well-developed. She is not ill-appearing or diaphoretic.   HENT:      Head: Normocephalic and atraumatic.      Right Ear: External ear normal.      Left Ear: External ear normal.   Eyes:      General: Lids are normal.         Right eye: No discharge.         Left eye: No discharge.      Conjunctiva/sclera: Conjunctivae normal.      Right eye: Right conjunctiva is not injected.      Left eye: Left conjunctiva is not injected.   Pulmonary:      Effort: Pulmonary effort is normal. No respiratory distress.   Musculoskeletal:      Lumbar back: Tenderness present. No bony tenderness.        Back:    Skin:     General: Skin is warm and dry.      Findings: No rash.   Neurological:      Mental Status: She is alert and oriented to person, place, and time.      Sensory: No sensory deficit.      Motor: Motor function is intact.   Psychiatric:         Speech: Speech normal.         Behavior: Behavior normal.         Thought Content: Thought content normal.         Judgment: Judgment normal.       Assessment:      1. Strain of lumbar region, subsequent encounter    2. Decreased ROM of lumbar spine    3. Essential hypertension       Plan:   Strain of lumbar region, subsequent encounter  -     cyclobenzaprine (FLEXERIL) 5 MG tablet; Take 1 tablet (5 mg total) by mouth 3 (three) times daily as needed for Muscle " spasms.  Dispense: 30 tablet; Refill: 0  -     Ambulatory Referral/Consult to Physical Therapy; Future; Expected date: 04/23/2025  -     diclofenac sodium (VOLTAREN ARTHRITIS PAIN) 1 % Gel; Apply 2 g topically once daily.  Dispense: 50 g; Refill: 0    Decreased ROM of lumbar spine  -     cyclobenzaprine (FLEXERIL) 5 MG tablet; Take 1 tablet (5 mg total) by mouth 3 (three) times daily as needed for Muscle spasms.  Dispense: 30 tablet; Refill: 0  -     Ambulatory Referral/Consult to Physical Therapy; Future; Expected date: 04/23/2025  -     diclofenac sodium (VOLTAREN ARTHRITIS PAIN) 1 % Gel; Apply 2 g topically once daily.  Dispense: 50 g; Refill: 0    Essential hypertension      D/c robaxin as it causes her to feel like she's in a bubble   Caution with muscle relaxer as can cause drowsiness, avoid w other sedative meds (ativan)   To PT   Voltaren gel     Discussed worsening signs/symptoms and when to return to clinic or go to ED.   Patient expresses understanding and agrees with treatment plan.

## 2025-04-17 ENCOUNTER — CLINICAL SUPPORT (OUTPATIENT)
Dept: REHABILITATION | Facility: HOSPITAL | Age: 75
End: 2025-04-17
Payer: MEDICARE

## 2025-04-17 DIAGNOSIS — M54.50 ACUTE BILATERAL LOW BACK PAIN WITHOUT SCIATICA: Primary | ICD-10-CM

## 2025-04-17 DIAGNOSIS — M53.86 DECREASED ROM OF LUMBAR SPINE: ICD-10-CM

## 2025-04-17 DIAGNOSIS — S39.012D STRAIN OF LUMBAR REGION, SUBSEQUENT ENCOUNTER: ICD-10-CM

## 2025-04-17 PROCEDURE — 97112 NEUROMUSCULAR REEDUCATION: CPT | Mod: PN

## 2025-04-17 PROCEDURE — 97161 PT EVAL LOW COMPLEX 20 MIN: CPT | Mod: PN

## 2025-04-17 NOTE — PROGRESS NOTES
Outpatient Rehab    Physical Therapy Evaluation    Patient Name: Manuela Reyes  MRN: [id number]  YOB: 1950  Encounter Date: 4/17/2025    Therapy Diagnosis:   Encounter Diagnoses   Name Primary?    Strain of lumbar region, subsequent encounter     Decreased ROM of lumbar spine     Acute bilateral low back pain without sciatica Yes     Physician: Yvette Turner PA-C    Physician Orders: Eval and Treat  Medical Diagnosis: Strain of lumbar region, subsequent encounter  Decreased ROM of lumbar spine    Visit # / Visits Authorized:  1 / 1  Insurance Authorization Period: 4/16/2025 to 4/16/2026  Date of Evaluation: 4/17/2025  Plan of Care Certification: 4/17/2025 to 6/26/25    Time In:   12:32 pm  Time Out:  1:30  Total Time:   58 min  Total Billable Time:       Intake Outcome Measure for FOTO Survey    Therapist reviewed FOTO scores for Manuela Reyes on 4/17/2025.   FOTO report - see Media section or FOTO account episode details.     Intake Score: 47%    Precautions     Standard, HTN, crohn's disease, autoimmune disease      Subjective   History of Present Illness  Manuela is a 75 y.o. female who reports to physical therapy with a chief concern of low back pain.     The patient reports a medical diagnosis of S39.012D (ICD-10-CM) - Strain of lumbar region, subsequent encounter  M53.86 (ICD-10-CM) - Decreased ROM of lumbar spine.                      Past Medical History/Physical Systems Review:   Manuela Reyes  has a past medical history of Anxiety, Crohn's disease, Depression, Gallstones, Genital herpes, Hepatitis C infection, Hypertension, Insomnia, Mesenteric artery stenosis, Osteoporosis, SCC (squamous cell carcinoma), hand, right, and TIA (transient ischemic attack).    Manuela Reyes  has a past surgical history that includes Appendectomy; Small intestine surgery; Colonoscopy (N/A, 05/19/2017); Colonoscopy (N/A, 03/26/2018); Colonoscopy (N/A, 03/19/2019);  Colonoscopy (N/A, 09/24/2020); Esophagogastroduodenoscopy (N/A, 07/20/2023); Colonoscopy (N/A, 07/20/2023); Tonsillectomy; Cosmetic surgery; sex reassignment; Vaginoplasty, Penile Inversion; Orchiectomy; and Robot-assisted cholecystectomy using da Austin Xi (N/A, 01/07/2025).    Manuela has a current medication list which includes the following prescription(s): humira(cf) pen, amlodipine, aspirin, atenolol, calcium carbonate, cyanocobalamin (vitamin b-12), cyclobenzaprine, prolia, diclofenac sodium, levocetirizine, lorazepam, methylprednisolone, multivitamin with minerals, neomycin, polyethylene glycol, valacyclovir, and vitamin d.    Review of patient's allergies indicates:   Allergen Reactions    Statins-hmg-coa reductase inhibitors Anaphylaxis     Myalgias    Codeine sulfate Itching    Hydrochlorothiazide Other (See Comments)     Adverse Reaction    Lisinopril Swelling and Edema     Facial Swelling    Keflex [cephalexin] Other (See Comments)     Muscle aches        Objective      Spinal Mobility  Hypomobile: Lumbosacral  Cervical Mobility Details: PT noted severe forwards head posture and sway back posture in the spine with poor core engagement in standing.  Lumbosacral Mobility Details: Lumbar flexion 8 inches from floor; extension 50 % towards the sacrum but can't get to it with scapula due to pain; R/L side flexion to knee; sit to stand 30 sec test 8 reps        Subcranial Range of Motion   Active Restricted? Passive Restricted? Pain   Flexion         Protraction         Retraction           Cervical Range of Motion   Active (deg) Passive (deg) Pain   Flexion 50       Extension 50       Right Lateral Flexion 20       Right Rotation 60       Left Lateral Flexion 15       Left Rotation 55         Upper cervical rotation 60%; upper cervical flexion to R 80%; to L 60% ; T spine rotation to be assessed when turing is less painful as simple rolling mobility on the bed is very painful.    Shoulder Range of Motion      Shoulder, Elbow, or Forearm Range of Motion Details: Shoulder and elbow ROM is WFL but EROM flexion limited about 10 degrees by tight upper T spine (poor extension mobility and pain with rotation).               Hip Strength - Planes of Motion   Right Strength Right Pain Left Strength Left  Pain   Flexion (L2) 4   4     Extension 2+   2+     ABduction 3-   3-     ADduction           Internal Rotation           External Rotation               Hip Strength Details  Hip ROM WFL but R hip prone internal rotation limited at 30 vs L at 40; sore with hip flexion. Knee strength 5/5        Transfers/Bed Mobility Details  Slow and painful with sit to stand (guards rising and lowering) and turning in bed.            Treatment:  Balance/Neuromuscular Re-Education  NMR 1: supine chin tuck with retraction 2x 10; hook lying PPT 3x 10; lower trunk rotation 1x 10; seated ball squeeze 10 x 10 sec hold; supine protraction at shoulders 2x 10 per side; LAQ 2x 10 seated    Time Entry(in minutes):  PT Evaluation (Low) Time Entry: 12  Neuromuscular Re-Education Time Entry: 18    Assessment & Plan   Assessment  Manuela presents with a condition of Low complexity.   Presentation of Symptoms: Stable       Functional Limitations: Activity tolerance, Ambulating on uneven surfaces, Bed mobility, Completing work/school activities, Decreased ambulation distance/endurance, Driving, Disrupted sleep pattern, Functional mobility, Gait limitations, Getting off the floor, Painful locomotion/ambulation, Performing household chores, Sitting tolerance, Squatting, Transfers, Standing tolerance  Impairments: Abnormal gait, Abnormal muscle firing, Abnormal muscle tone, Abnormal or restricted range of motion, Activity intolerance, Impaired balance, Impaired physical strength, Lack of appropriate home exercise program, Pain with functional activity    Patient Goal for Therapy (PT): less pain with walking and picking up her dog  Prognosis: Good  Assessment  Details: PT noted ROM loss, strength loss and functional mobility resulting in pain in the back and legs and neck. Pt is a good candidate for PT to advance functional mobility and reduce pain.      Plan  From a physical therapy perspective, the patient would benefit from: Skilled Rehab Services    Planned therapy interventions include: Therapeutic exercise, Therapeutic activities, Neuromuscular re-education, and Manual therapy.    Planned modalities to include: Biofeedback, Contrast bath, Electrical stimulation - attended, Electrical stimulation - passive/unattended, Mechanical traction, Thermotherapy (hot pack), and Other (Comment). DN      Visit Frequency: 2 times Per Week for 10 Weeks.       This plan was discussed with Patient.   Discussion participants: Agreed Upon Plan of Care  Plan details: PT to advance pt with skilled PT to advance functional mobility and reduce pain.            Patient's spiritual, cultural, and educational needs considered and patient agreeable to plan of care and goals.     Education  Education was done with Patient. The patient's learning style includes Demonstration. The patient Requires assistance and Requires continuing/additional education.         PT educated pt in the need to increase motor control, ROM, balance and stability to reduce pain. PT educated pt in the idea that consistent work on HEP will help her progress faster. PT reviewed HEP with pt and provided a handout.        Goals:   Active       long term goals       lumbar flexion pain free; sit to stand without guarding       Start:  04/17/25    Expected End:  06/26/25            5/5 hip abduction strength       Start:  04/17/25    Expected End:  06/26/25            dead lifting 50 # for home tasks       Start:  04/17/25    Expected End:  06/26/25               short term goals       I with HEP       Start:  04/17/25    Expected End:  05/15/25            cervical flexion ROM 60 degrees and rotation 80 degrees or better        Start:  04/17/25    Expected End:  05/15/25                Brittney House PT

## 2025-04-19 ENCOUNTER — HOSPITAL ENCOUNTER (EMERGENCY)
Facility: HOSPITAL | Age: 75
Discharge: HOME | End: 2025-04-19
Attending: EMERGENCY MEDICINE
Payer: MEDICARE

## 2025-04-19 VITALS
SYSTOLIC BLOOD PRESSURE: 187 MMHG | DIASTOLIC BLOOD PRESSURE: 84 MMHG | WEIGHT: 108 LBS | HEART RATE: 88 BPM | OXYGEN SATURATION: 100 % | HEIGHT: 67 IN | BODY MASS INDEX: 16.95 KG/M2 | TEMPERATURE: 99 F | RESPIRATION RATE: 18 BRPM

## 2025-04-19 DIAGNOSIS — K59.00 CONSTIPATION, UNSPECIFIED CONSTIPATION TYPE: Primary | ICD-10-CM

## 2025-04-19 DIAGNOSIS — N39.0 URINARY TRACT INFECTION WITHOUT HEMATURIA, SITE UNSPECIFIED: ICD-10-CM

## 2025-04-19 LAB
ABSOLUTE EOSINOPHIL (OHS): 0.01 K/UL
ABSOLUTE MONOCYTE (OHS): 1.27 K/UL (ref 0.3–1)
ABSOLUTE NEUTROPHIL COUNT (OHS): 10.08 K/UL (ref 1.8–7.7)
ALBUMIN SERPL BCP-MCNC: 4.2 G/DL (ref 3.5–5.2)
ALP SERPL-CCNC: 63 UNIT/L (ref 40–150)
ALT SERPL W/O P-5'-P-CCNC: 21 UNIT/L (ref 10–44)
ANION GAP (OHS): 14 MMOL/L (ref 8–16)
AST SERPL-CCNC: 24 UNIT/L (ref 11–45)
BACTERIA #/AREA URNS AUTO: ABNORMAL /HPF
BASOPHILS # BLD AUTO: 0.02 K/UL
BASOPHILS NFR BLD AUTO: 0.2 %
BILIRUB SERPL-MCNC: 0.6 MG/DL (ref 0.1–1)
BILIRUB UR QL STRIP.AUTO: NEGATIVE
BUN SERPL-MCNC: 22 MG/DL (ref 8–23)
CALCIUM SERPL-MCNC: 9.8 MG/DL (ref 8.7–10.5)
CHLORIDE SERPL-SCNC: 93 MMOL/L (ref 95–110)
CLARITY UR: ABNORMAL
CO2 SERPL-SCNC: 23 MMOL/L (ref 23–29)
COLOR UR AUTO: YELLOW
CREAT SERPL-MCNC: 0.8 MG/DL (ref 0.5–1.4)
ERYTHROCYTE [DISTWIDTH] IN BLOOD BY AUTOMATED COUNT: 11.8 % (ref 11.5–14.5)
GFR SERPLBLD CREATININE-BSD FMLA CKD-EPI: >60 ML/MIN/1.73/M2
GLUCOSE SERPL-MCNC: 103 MG/DL (ref 70–110)
GLUCOSE UR QL STRIP: NEGATIVE
HCT VFR BLD AUTO: 38.2 % (ref 40–54)
HGB BLD-MCNC: 13.5 GM/DL (ref 14–18)
HGB UR QL STRIP: NEGATIVE
HOLD SPECIMEN: NORMAL
IMM GRANULOCYTES # BLD AUTO: 0.07 K/UL (ref 0–0.04)
IMM GRANULOCYTES NFR BLD AUTO: 0.6 % (ref 0–0.5)
KETONES UR QL STRIP: NEGATIVE
LEUKOCYTE ESTERASE UR QL STRIP: ABNORMAL
LIPASE SERPL-CCNC: 56 U/L (ref 4–60)
LYMPHOCYTES # BLD AUTO: 1.26 K/UL (ref 1–4.8)
MCH RBC QN AUTO: 33.6 PG (ref 27–31)
MCHC RBC AUTO-ENTMCNC: 35.3 G/DL (ref 32–36)
MCV RBC AUTO: 95 FL (ref 82–98)
MICROSCOPIC COMMENT: ABNORMAL
NITRITE UR QL STRIP: NEGATIVE
NUCLEATED RBC (/100WBC) (OHS): 0 /100 WBC
PH UR STRIP: 8 [PH]
PLATELET # BLD AUTO: 271 K/UL (ref 150–450)
PMV BLD AUTO: 8.8 FL (ref 9.2–12.9)
POTASSIUM SERPL-SCNC: 3.3 MMOL/L (ref 3.5–5.1)
PROT SERPL-MCNC: 8 GM/DL (ref 6–8.4)
PROT UR QL STRIP: NEGATIVE
RBC # BLD AUTO: 4.02 M/UL (ref 4.6–6.2)
RELATIVE EOSINOPHIL (OHS): 0.1 %
RELATIVE LYMPHOCYTE (OHS): 9.9 % (ref 18–48)
RELATIVE MONOCYTE (OHS): 10 % (ref 4–15)
RELATIVE NEUTROPHIL (OHS): 79.2 % (ref 38–73)
SODIUM SERPL-SCNC: 130 MMOL/L (ref 136–145)
SP GR UR STRIP: 1.01
UROBILINOGEN UR STRIP-ACNC: NEGATIVE EU/DL
WBC # BLD AUTO: 12.71 K/UL (ref 3.9–12.7)
WBC #/AREA URNS AUTO: 27 /HPF (ref 0–5)

## 2025-04-19 PROCEDURE — 87086 URINE CULTURE/COLONY COUNT: CPT | Performed by: EMERGENCY MEDICINE

## 2025-04-19 PROCEDURE — 96374 THER/PROPH/DIAG INJ IV PUSH: CPT

## 2025-04-19 PROCEDURE — 25000003 PHARM REV CODE 250: Performed by: EMERGENCY MEDICINE

## 2025-04-19 PROCEDURE — 25500020 PHARM REV CODE 255: Performed by: EMERGENCY MEDICINE

## 2025-04-19 PROCEDURE — 80053 COMPREHEN METABOLIC PANEL: CPT | Performed by: EMERGENCY MEDICINE

## 2025-04-19 PROCEDURE — 96375 TX/PRO/DX INJ NEW DRUG ADDON: CPT

## 2025-04-19 PROCEDURE — 83690 ASSAY OF LIPASE: CPT | Performed by: EMERGENCY MEDICINE

## 2025-04-19 PROCEDURE — 96361 HYDRATE IV INFUSION ADD-ON: CPT

## 2025-04-19 PROCEDURE — 63600175 PHARM REV CODE 636 W HCPCS: Mod: JZ,TB | Performed by: EMERGENCY MEDICINE

## 2025-04-19 PROCEDURE — 85025 COMPLETE CBC W/AUTO DIFF WBC: CPT | Performed by: EMERGENCY MEDICINE

## 2025-04-19 PROCEDURE — 81003 URINALYSIS AUTO W/O SCOPE: CPT | Performed by: EMERGENCY MEDICINE

## 2025-04-19 PROCEDURE — 99285 EMERGENCY DEPT VISIT HI MDM: CPT | Mod: 25

## 2025-04-19 RX ORDER — ONDANSETRON HYDROCHLORIDE 2 MG/ML
4 INJECTION, SOLUTION INTRAVENOUS
Status: COMPLETED | OUTPATIENT
Start: 2025-04-19 | End: 2025-04-19

## 2025-04-19 RX ORDER — KETOROLAC TROMETHAMINE 30 MG/ML
15 INJECTION, SOLUTION INTRAMUSCULAR; INTRAVENOUS
Status: COMPLETED | OUTPATIENT
Start: 2025-04-19 | End: 2025-04-19

## 2025-04-19 RX ORDER — CEFTRIAXONE 1 G/1
1 INJECTION, POWDER, FOR SOLUTION INTRAMUSCULAR; INTRAVENOUS
Status: COMPLETED | OUTPATIENT
Start: 2025-04-19 | End: 2025-04-19

## 2025-04-19 RX ORDER — DOXYCYCLINE 100 MG/1
100 CAPSULE ORAL 2 TIMES DAILY
Qty: 20 CAPSULE | Refills: 0 | Status: SHIPPED | OUTPATIENT
Start: 2025-04-19 | End: 2025-04-29

## 2025-04-19 RX ADMIN — IOHEXOL 100 ML: 350 INJECTION, SOLUTION INTRAVENOUS at 04:04

## 2025-04-19 RX ADMIN — CEFTRIAXONE 1 G: 1 INJECTION, POWDER, FOR SOLUTION INTRAMUSCULAR; INTRAVENOUS at 04:04

## 2025-04-19 RX ADMIN — KETOROLAC TROMETHAMINE 15 MG: 30 INJECTION, SOLUTION INTRAMUSCULAR at 01:04

## 2025-04-19 RX ADMIN — ONDANSETRON 4 MG: 2 INJECTION INTRAMUSCULAR; INTRAVENOUS at 01:04

## 2025-04-19 RX ADMIN — SODIUM CHLORIDE 1000 ML: 9 INJECTION, SOLUTION INTRAVENOUS at 04:04

## 2025-04-19 RX ADMIN — SODIUM CHLORIDE 500 ML: 9 INJECTION, SOLUTION INTRAVENOUS at 01:04

## 2025-04-19 NOTE — DISCHARGE INSTRUCTIONS
Continue the MiraLax for the constipation  Doxycycline has been prescribed for the urinary tract infection    Return to emergency department if you develop:  - Sustained Fever >100.4 or low temperature <96.8   - Tachycardia (very high heart rate) or Pulse >90  - Hypotension (low blood pressure) a top number (systolic pressure) of <90 or a bottom number (diastolic pressure) of <40 or lower  - Hypertension (high blood pressure) a top number (systolic pressure) of >180 or a bottom number (diastolic pressure) of >120 or higher  - Severe pain not relieved with recommended pain control regimen  - Severe shortness of breath above baseline  - Severe nausea, vomiting, inability to tolerate oral feeding/hydration  - Altered mental status, abnormal interaction or behaviors   - If symptoms acutely worsen or do not improve  - Development of other worrisome symptom

## 2025-04-19 NOTE — ED PROVIDER NOTES
SCRIBE #1 NOTE: I, Maxine Hong, am scribing for, and in the presence of, Clark Liu MD. I have scribed the entire note.       History     Chief Complaint   Patient presents with    Constipation     Pt. C/o not being able to have a BM for the past 3 days. Pt states the constipation started when she was taking muscle relaxer's for a back injury      Review of patient's allergies indicates:   Allergen Reactions    Statins-hmg-coa reductase inhibitors Anaphylaxis     Myalgias    Codeine sulfate Itching    Hydrochlorothiazide Other (See Comments)     Adverse Reaction    Lisinopril Swelling and Edema     Facial Swelling    Keflex [cephalexin] Other (See Comments)     Muscle aches         History of Present Illness     HPI    4/19/2025, 1:12 PM  History obtained from the patient and medical records      History of Present Illness: Manuela Reyes is a 75 y.o. adult patient with a PMHx of HTN, Crohn's disease, anxiety, osteoporosis, SCC, and TIA who presents to the Emergency Department for evaluation of constipation. Pt has not had a BM for the past 3 days. Pt states she feels severely bloated and gurgling in her stomach. Pt strained her back a week ago and started taking muscle relaxer's and steroids. She completed the steroids yesterday. Symptoms are constant and moderate in severity. No mitigating or exacerbating factors reported. Patient denies any rectal pressure, fever, N/V, or rectal pain. Prior Tx includes Indy lax with a small BM. Pt took Indy lax on Thursday and Friday. No further complaints or concerns at this time.       Arrival mode: Personal Transportation    PCP: Christelle Lawler DO        Past Medical History:  Past Medical History:   Diagnosis Date    Anxiety     Crohn's disease     Depression     Gallstones 01/07/2025    Genital herpes     Hepatitis C infection     Hypertension     Insomnia     Mesenteric artery stenosis     Dr. 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 Malaika Mack--dermatology    TIA (transient ischemic attack)        Past Surgical History:  Past Surgical History:   Procedure Laterality Date    APPENDECTOMY      COLONOSCOPY N/A 05/19/2017    Procedure: COLONOSCOPY;  Surgeon: Jose Luis Leonard MD;  Location: [facility] ENDO;  Service: Endoscopy;  Laterality: N/A;    COLONOSCOPY N/A 03/26/2018    Procedure: COLONOSCOPY;  Surgeon: Guillaume Whitman MD;  Location: [facility] ENDO;  Service: Endoscopy;  Laterality: N/A;    COLONOSCOPY N/A 03/19/2019    Procedure: COLONOSCOPY;  Surgeon: Lili Ellis MD;  Location: [facility] ENDO;  Service: Endoscopy;  Laterality: N/A;    COLONOSCOPY N/A 09/24/2020    Procedure: COLONOSCOPY;  Surgeon: Lili Ellis MD;  Location: [facility] ENDO;  Service: Endoscopy;  Laterality: N/A;    COLONOSCOPY N/A 07/20/2023    Procedure: COLONOSCOPY;  Surgeon: Lili Ellis MD;  Location: [facility];  Service: Endoscopy;  Laterality: N/A;    COSMETIC SURGERY      ESOPHAGOGASTRODUODENOSCOPY N/A 07/20/2023    Procedure: EGD (ESOPHAGOGASTRODUODENOSCOPY);  Surgeon: Lili Ellis MD;  Location: [facility];  Service: Endoscopy;  Laterality: N/A;    ORCHIECTOMY      ROBOT-ASSISTED CHOLECYSTECTOMY USING DA RAZA XI N/A 01/07/2025    Procedure: XI ROBOTIC CHOLECYSTECTOMY;  Surgeon: Harjit Yeh MD;  Location: [facility];  Service: General;  Laterality: N/A;    sex reassignment      1979    SMALL INTESTINE SURGERY      TONSILLECTOMY      VAGINOPLASTY, PENILE INVERSION           Family History:  Family History   Problem Relation Name Age of Onset    Stroke Mother Mother     Heart disease Mother Mother     Cataracts Mother Mother     Cancer Father Father         Lung    Heart disease Sister Sister     Hypertension Brother Brother         Brother    Glaucoma Neg Hx      Macular degeneration Neg Hx      Thyroid disease Neg Hx         Social History:  Social History     Tobacco Use    Smoking status: Former      Current packs/day: 0.00     Average packs/day: 0.3 packs/day for 10.0 years (2.5 ttl pk-yrs)     Types: Cigarettes     Start date: 1995     Quit date: 2005     Years since quittin.3    Smokeless tobacco: Never    Tobacco comments:     Former Light Smoker less than 10 a day   Substance and Sexual Activity    Alcohol use: Yes     Alcohol/week: 4.0 standard drinks of alcohol     Types: 4 Glasses of wine per week     Comment: occ    Drug use: No    Sexual activity: Not Currently     Partners: Male     Birth control/protection: Abstinence        Review of Systems     Review of Systems   Constitutional:  Negative for fever.   HENT:  Negative for sore throat.    Respiratory:  Negative for shortness of breath.    Cardiovascular:  Negative for chest pain.   Gastrointestinal:  Positive for constipation. Negative for nausea, rectal pain and vomiting.        (-) Rectal pressure   Genitourinary:  Negative for dysuria.   Musculoskeletal:  Negative for back pain.   Skin:  Negative for rash.   Neurological:  Negative for weakness.   Hematological:  Does not bruise/bleed easily.   All other systems reviewed and are negative.       Physical Exam     Initial Vitals [25 1232]   BP Pulse Resp Temp SpO2   (!) 166/88 89 18 98.2 °F (36.8 °C) 99 %      MAP       --          Physical Exam  Nursing Notes and Vital Signs Reviewed.  Constitutional: Patient is in no acute distress. Well-developed and well-nourished.  Head: Atraumatic. Normocephalic.  Eyes: PERRL. EOM intact. Conjunctivae are not pale. No scleral icterus.  ENT: Mucous membranes are moist. Oropharynx is clear and symmetric.    Neck: Supple. Full ROM. No lymphadenopathy.  Cardiovascular: Regular rate. Regular rhythm. No murmurs, rubs, or gallops. Pulmonary/Chest: No respiratory distress. Clear to auscultation bilaterally. No wheezing or rales.  Abdominal: Distended and bloated. No abdominal tenderness. Hyperactive bowel sounds.    Genitourinary: No CVA  "tenderness.  Musculoskeletal: Moves all extremities. No obvious deformities. No edema. No calf tenderness.  Skin: Warm and dry.  Neurological:  Alert, awake, and appropriate.  Normal speech.  No acute focal neurological deficits are appreciated.  Psychiatric: Normal affect. Good eye contact. Appropriate in content.  Rectal exam: No fecal matter present in the rectum.      ED Course   Procedures  ED Vital Signs:  Vitals:    04/19/25 1232 04/19/25 1300 04/19/25 1305 04/19/25 1315   BP: (!) 166/88 (!) 183/84  (!) 167/84   Pulse: 89 84  85   Resp: 18 18  14   Temp: 98.2 °F (36.8 °C)      TempSrc: Oral      SpO2: 99% 99%  99%   Weight: 49 kg (108 lb)  49 kg (108 lb 0.4 oz)    Height:   5' 7" (1.702 m)     04/19/25 1430 04/19/25 1545 04/19/25 1615 04/19/25 1700   BP: (!) 164/79 (!) 149/76 (!) 192/92 (!) 169/84   Pulse: 81 79 91 81   Resp: 15 18 17 18   Temp: 98.7 °F (37.1 °C)      TempSrc:       SpO2: 99% 98% 99% 100%   Weight:       Height:        04/19/25 1715 04/19/25 1755   BP: (!) 173/90 (!) 187/84   Pulse: 81 88   Resp: 18 18   Temp:  98.9 °F (37.2 °C)   TempSrc:     SpO2: 100% 100%   Weight:     Height:         Abnormal Lab Results:  Labs Reviewed   COMPREHENSIVE METABOLIC PANEL - Abnormal       Result Value    Sodium 130 (*)     Potassium 3.3 (*)     Chloride 93 (*)     CO2 23      Glucose 103      BUN 22      Creatinine 0.8      Calcium 9.8      Protein Total 8.0      Albumin 4.2      Bilirubin Total 0.6      ALP 63      AST 24      ALT 21      Anion Gap 14      eGFR >60     URINALYSIS, REFLEX TO URINE CULTURE - Abnormal    Color, UA Yellow      Appearance, UA Hazy (*)     pH, UA 8.0      Spec Grav UA 1.010      Protein, UA Negative      Glucose, UA Negative      Ketones, UA Negative      Bilirubin, UA Negative      Blood, UA Negative      Nitrites, UA Negative      Urobilinogen, UA Negative      Leukocyte Esterase, UA 2+ (*)    CBC WITH DIFFERENTIAL - Abnormal    WBC 12.71 (*)     RBC 4.02 (*)     HGB 13.5 (*)  "    HCT 38.2 (*)     MCV 95      MCH 33.6 (*)     MCHC 35.3      RDW 11.8      Platelet Count 271      MPV 8.8 (*)     Nucleated RBC 0      Neut % 79.2 (*)     Lymph % 9.9 (*)     Mono % 10.0      Eos % 0.1      Basophil % 0.2      Imm Grans % 0.6 (*)     Neut # 10.08 (*)     Lymph # 1.26      Mono # 1.27 (*)     Eos # 0.01      Baso # 0.02      Imm Grans # 0.07 (*)    URINALYSIS MICROSCOPIC - Abnormal    WBC, UA 27 (*)     Bacteria, UA Occasional      Microscopic Comment       LIPASE - Normal    Lipase Level 56     CULTURE, URINE   CBC W/ AUTO DIFFERENTIAL    Narrative:     The following orders were created for panel order CBC W/ AUTO DIFFERENTIAL.  Procedure                               Abnormality         Status                     ---------                               -----------         ------                     CBC with Differential[[id number]]       Abnormal            Final result                 Please view results for these tests on the individual orders.   GREY TOP URINE HOLD    Extra Tube Hold for add-ons.          All Lab Results:  Results for orders placed or performed during the hospital encounter of 04/19/25   Comp. Metabolic Panel    Collection Time: 04/19/25  1:33 PM   Result Value Ref Range    Sodium 130 (L) 136 - 145 mmol/L    Potassium 3.3 (L) 3.5 - 5.1 mmol/L    Chloride 93 (L) 95 - 110 mmol/L    CO2 23 23 - 29 mmol/L    Glucose 103 70 - 110 mg/dL    BUN 22 8 - 23 mg/dL    Creatinine 0.8 0.5 - 1.4 mg/dL    Calcium 9.8 8.7 - 10.5 mg/dL    Protein Total 8.0 6.0 - 8.4 gm/dL    Albumin 4.2 3.5 - 5.2 g/dL    Bilirubin Total 0.6 0.1 - 1.0 mg/dL    ALP 63 40 - 150 unit/L    AST 24 11 - 45 unit/L    ALT 21 10 - 44 unit/L    Anion Gap 14 8 - 16 mmol/L    eGFR >60 >60 mL/min/1.73/m2   Lipase    Collection Time: 04/19/25  1:33 PM   Result Value Ref Range    Lipase Level 56 4 - 60 U/L   CBC with Differential    Collection Time: 04/19/25  1:33 PM   Result Value Ref Range    WBC 12.71 (H) 3.90 - 12.70  K/uL    RBC 4.02 (L) 4.60 - 6.20 M/uL    HGB 13.5 (L) 14.0 - 18.0 gm/dL    HCT 38.2 (L) 40.0 - 54.0 %    MCV 95 82 - 98 fL    MCH 33.6 (H) 27.0 - 31.0 pg    MCHC 35.3 32.0 - 36.0 g/dL    RDW 11.8 11.5 - 14.5 %    Platelet Count 271 150 - 450 K/uL    MPV 8.8 (L) 9.2 - 12.9 fL    Nucleated RBC 0 <=0 /100 WBC    Neut % 79.2 (H) 38 - 73 %    Lymph % 9.9 (L) 18 - 48 %    Mono % 10.0 4 - 15 %    Eos % 0.1 <=8 %    Basophil % 0.2 <=1.9 %    Imm Grans % 0.6 (H) 0.0 - 0.5 %    Neut # 10.08 (H) 1.8 - 7.7 K/uL    Lymph # 1.26 1 - 4.8 K/uL    Mono # 1.27 (H) 0.3 - 1 K/uL    Eos # 0.01 <=0.5 K/uL    Baso # 0.02 <=0.2 K/uL    Imm Grans # 0.07 (H) 0.00 - 0.04 K/uL   Urinalysis, Reflex to Urine Culture    Collection Time: 04/19/25  1:49 PM    Specimen: Urine   Result Value Ref Range    Color, UA Yellow Straw, Paty, Yellow, Light-Orange    Appearance, UA Hazy (A) Clear    pH, UA 8.0 5.0 - 8.0    Spec Grav UA 1.010 1.005 - 1.030    Protein, UA Negative Negative    Glucose, UA Negative Negative    Ketones, UA Negative Negative    Bilirubin, UA Negative Negative    Blood, UA Negative Negative    Nitrites, UA Negative Negative    Urobilinogen, UA Negative <2.0 EU/dL    Leukocyte Esterase, UA 2+ (A) Negative   GREY TOP URINE HOLD    Collection Time: 04/19/25  1:49 PM   Result Value Ref Range    Extra Tube Hold for add-ons.    Urinalysis Microscopic    Collection Time: 04/19/25  1:49 PM   Result Value Ref Range    WBC, UA 27 (H) 0 - 5 /HPF    Bacteria, UA Occasional None, Rare, Occasional /HPF    Microscopic Comment       *Note: Due to a large number of results and/or encounters for the requested time period, some results have not been displayed. A complete set of results can be found in Results Review.       Imaging Results:  Imaging Results              CT Abdomen Pelvis With IV Contrast NO Oral Contrast (Final result)  Result time 04/19/25 16:25:10      Final result by Chato Weaver DO (04/19/25 16:25:10)                    Impression:      1.  No acute findings.  2.  Large amount of stool throughout the colon.    All CT scans at [this location] are performed using dose modulation techniques as appropriate to a performed exam including the following:  Automated exposure control; adjustment of the mA and/or kV according to patient size (this includes techniques or standardized protocols for targeted exams where dose is matched to indication / reason for exam; i.e. extremities or head); use of iterative reconstruction technique.    Finalized on: 4/19/2025 4:25 PM By:  Chato Weaver  [facility]# [id number]      2025-04-19 16:27:18.715     [facility]               Narrative:    EXAM: CT ABDOMEN PELVIS WITH IV CONTRAST    CLINICAL HISTORY: Abdominal abscess/infection suspected    COMPARISON: 12/16/2024    TECHNIQUE: Standard thin-section axial images, with reformatted sagittal and coronal images.    FINDINGS: Minimal atelectasis at the right lung base.    Mild fatty infiltration of liver, liver is otherwise unremarkable.    Bilateral low attenuating renal lesions, lesions large enough to characterize have Hounsfield values consistent with simple cysts.  Remaining lesions are indeterminate likely additional cysts.  Kidneys and ureters are otherwise unremarkable.    Spleen, pancreas, and adrenals are unremarkable.  Gallbladder is absent.    Atherosclerotic calcification, vascular structures are otherwise unremarkable.    There is no abdominal or retroperitoneal lymphadenopathy.  No ascites or fat stranding within the abdomen.  No dilated loops of small bowel.  Large amount of stool throughout the colon.  Scattered colonic diverticula.  Postsurgical changes distal ileum near the ileocecal valve.    Pelvis: No pelvic free fluid, iliac chain or inguinal lymphadenopathy.  Uterus is absent.  Adnexal regions are unremarkable.    No concerning lytic or sclerotic bony lesions.                                         No EKG ordered.           The Emergency  Provider reviewed the vital signs and test results, which are outlined above.     ED Discussion     4:55 PM: Reassessed pt at this time.  No acute findings on CT scan she does have stool throughout.  She does have a mild UTI.  She was very comfortable being discharged home .  She did get a dose of IV antibiotics in the emergency room will be discharged home with doxycycline.  She will also resume MiraLax she has only taken it for the past 2 days.  Discussed with patient and/or family/caretaker all pertinent ED information and results. Discussed pt dx and plan of tx. Gave the patient all f/u and return to the ED instructions. All questions and concerns were addressed at this time. Patient and/or family/caretaker expresses understanding of information and instructions, and is comfortable with plan to discharge. Pt is stable for discharge.     I discussed with patient and/or family/caretaker that evaluation in the ED does not suggest any emergent or life threatening medical conditions requiring immediate intervention beyond what was provided in the ED, and I believe patient is safe for discharge.  Regardless, an unremarkable evaluation in the ED does not preclude the development or presence of a serious of life threatening condition. As such, I instructed that the patient is to return immediately for any worsening or change in current symptoms.       Medical Decision Making  Amount and/or Complexity of Data Reviewed  Labs: ordered. Decision-making details documented in ED Course.  Radiology: ordered. Decision-making details documented in ED Course.    Risk  Prescription drug management.                ED Medication(s):  Medications   sodium chloride 0.9% bolus 500 mL 500 mL (0 mLs Intravenous Stopped 4/19/25 1442)   ketorolac injection 15 mg (15 mg Intravenous Given 4/19/25 1335)   ondansetron injection 4 mg (4 mg Intravenous Given 4/19/25 1334)   sodium chloride 0.9% bolus 1,000 mL 1,000 mL (0 mLs Intravenous Stopped  4/19/25 1724)   cefTRIAXone injection 1 g (1 g Intravenous Given 4/19/25 1624)   iohexoL (OMNIPAQUE 350) injection 100 mL (100 mLs Intravenous Given 4/19/25 1606)       Discharge Medication List as of 4/19/2025  5:02 PM        START taking these medications    Details   doxycycline (VIBRAMYCIN) 100 MG Cap Take 1 capsule (100 mg total) by mouth 2 (two) times daily. for 10 days, Starting Sat 4/19/2025, Until Tue 4/29/2025, Normal              Follow-up Information       Christelle Lawler DO. Schedule an appointment as soon as possible for a visit in 2 days.    Specialty: Internal Medicine  Contact information:  [address] [place] [address] DR Tristen MARQUEZ [id number] [phone]                                 Scribe Attestation:   Scribe #1: I performed the above scribed service and the documentation accurately describes the services I performed. I attest to the accuracy of the note.     Attending:   Physician Attestation Statement for Scribe #1: I, Clark Liu MD, personally performed the services described in this documentation, as scribed by Maxine Hong, in my presence, and it is both accurate and complete.           Clinical Impression       ICD-10-CM ICD-9-CM   1. Constipation, unspecified constipation type  K59.00 564.00   2. Urinary tract infection without hematuria, site unspecified  N39.0 599.0       Disposition:   Disposition: Discharged  Condition: Stable        Clark Liu MD  04/19/25 1938

## 2025-04-20 ENCOUNTER — PATIENT MESSAGE (OUTPATIENT)
Dept: SURGERY | Facility: CLINIC | Age: 75
End: 2025-04-20
Payer: MEDICARE

## 2025-04-20 ENCOUNTER — TELEPHONE (OUTPATIENT)
Facility: OTHER | Age: 75
End: 2025-04-20
Payer: MEDICARE

## 2025-04-21 ENCOUNTER — PATIENT MESSAGE (OUTPATIENT)
Dept: GASTROENTEROLOGY | Facility: CLINIC | Age: 75
End: 2025-04-21

## 2025-04-21 LAB — BACTERIA UR CULT: NORMAL

## 2025-04-21 NOTE — PROGRESS NOTES
Patient seen in the ED on 4/19/2025 for constipation.  Patient's call escalated to post ED text tracker.  Spoke with patient to assess for any additional concerns or needs.  Patient requesting appointment with Amber Sims MD, Colon and Rectal Surgery.  Unable to schedule appointment.  In-basket referral message sent to  Amber Sims MD for further assistance with scheduling. Provided information for [facility] 24/7 Nurse triage line, [phone] or [phone] ([phone]) for any further needs to be addressed.  Patient appreciative and understood.  Encounter closed at this time.

## 2025-04-23 ENCOUNTER — OFFICE VISIT (OUTPATIENT)
Dept: SURGERY | Facility: CLINIC | Age: 75
End: 2025-04-23
Payer: MEDICARE

## 2025-04-23 VITALS
SYSTOLIC BLOOD PRESSURE: 158 MMHG | DIASTOLIC BLOOD PRESSURE: 89 MMHG | WEIGHT: 108 LBS | HEART RATE: 93 BPM | BODY MASS INDEX: 16.92 KG/M2

## 2025-04-23 DIAGNOSIS — K50.00 CROHN'S DISEASE OF SMALL INTESTINE WITHOUT COMPLICATION: Primary | ICD-10-CM

## 2025-04-23 DIAGNOSIS — K59.00 CONSTIPATION, UNSPECIFIED CONSTIPATION TYPE: ICD-10-CM

## 2025-04-23 PROCEDURE — 3079F DIAST BP 80-89 MM HG: CPT | Mod: CPTII,S$GLB,, | Performed by: STUDENT IN AN ORGANIZED HEALTH CARE EDUCATION/TRAINING PROGRAM

## 2025-04-23 PROCEDURE — 99999 PR PBB SHADOW E&M-EST. PATIENT-LVL III: CPT | Mod: PBBFAC,,, | Performed by: STUDENT IN AN ORGANIZED HEALTH CARE EDUCATION/TRAINING PROGRAM

## 2025-04-23 PROCEDURE — 99214 OFFICE O/P EST MOD 30 MIN: CPT | Mod: S$GLB,,, | Performed by: STUDENT IN AN ORGANIZED HEALTH CARE EDUCATION/TRAINING PROGRAM

## 2025-04-23 PROCEDURE — 1125F AMNT PAIN NOTED PAIN PRSNT: CPT | Mod: CPTII,S$GLB,, | Performed by: STUDENT IN AN ORGANIZED HEALTH CARE EDUCATION/TRAINING PROGRAM

## 2025-04-23 PROCEDURE — 3077F SYST BP >= 140 MM HG: CPT | Mod: CPTII,S$GLB,, | Performed by: STUDENT IN AN ORGANIZED HEALTH CARE EDUCATION/TRAINING PROGRAM

## 2025-04-23 PROCEDURE — 1159F MED LIST DOCD IN RCRD: CPT | Mod: CPTII,S$GLB,, | Performed by: STUDENT IN AN ORGANIZED HEALTH CARE EDUCATION/TRAINING PROGRAM

## 2025-04-23 RX ORDER — SODIUM, POTASSIUM,MAG SULFATES 17.5-3.13G
1 SOLUTION, RECONSTITUTED, ORAL ORAL DAILY
Qty: 354 ML | Refills: 0 | Status: SHIPPED | OUTPATIENT
Start: 2025-04-23 | End: 2025-04-25

## 2025-04-24 ENCOUNTER — CLINICAL SUPPORT (OUTPATIENT)
Dept: REHABILITATION | Facility: HOSPITAL | Age: 75
End: 2025-04-24
Payer: MEDICARE

## 2025-04-24 DIAGNOSIS — S39.012D STRAIN OF LUMBAR REGION, SUBSEQUENT ENCOUNTER: Primary | ICD-10-CM

## 2025-04-24 DIAGNOSIS — M54.50 ACUTE BILATERAL LOW BACK PAIN WITHOUT SCIATICA: ICD-10-CM

## 2025-04-24 DIAGNOSIS — M53.86 DECREASED ROM OF LUMBAR SPINE: ICD-10-CM

## 2025-04-24 PROCEDURE — 97110 THERAPEUTIC EXERCISES: CPT | Mod: PN

## 2025-04-24 PROCEDURE — 97112 NEUROMUSCULAR REEDUCATION: CPT | Mod: PN

## 2025-04-24 NOTE — PROGRESS NOTES
Outpatient Rehab    Physical Therapy Visit    Patient Name: Manuela Reyes  MRN: [id number]  YOB: 1950  Encounter Date: 4/24/2025    Therapy Diagnosis:   Encounter Diagnoses   Name Primary?    Strain of lumbar region, subsequent encounter Yes    Decreased ROM of lumbar spine     Acute bilateral low back pain without sciatica      Physician: Yvette Turner, *    Physician Orders: Eval and Treat  Medical Diagnosis: Strain of lumbar region, subsequent encounter  Decreased ROM of lumbar spine    Visit # / Visits Authorized:  1 / 10 plus eval  Insurance Authorization Period: 4/17/2025 to 12/31/2025  Date of Evaluation:  4/17/2025  Plan of Care Certification: 4/17/2025 to 6/26/25  Progress note due 30 days from 4/24/25       Time In:   1:03 pm -1:10 one on one with PT then again from 1:30-2:05  Time Out:  2:05  Total Time:   64 min  Total Billable Time:42   min    FOTO:  Intake Score:  %  Survey Score 1:  %  Survey Score 2:  %    Precautions     Standard, HTN, crohn's disease, autoimmune disease      Subjective   she has been having difficulty with bowel mvmts and has been to the ER. No blockage but back pain increased. neck pain is less considerably..  Pain reported as 8/10. low back    Objective            Treatment:  Therapeutic Exercise  TE 1: shuttle 3 bands 3 min  TE 2: deep breathing and exhaling to reduce muscle guarding  TE 3: lower trunk rotation for ROM and circulation 3x 10  Balance/Neuromuscular Re-Education  NMR 1: supine chin tuck with retraction 2x 10; hook lying PPT 3x 10; lower trunk rotation 1x 10; seated ball squeeze 10 x 10 sec hold; supine protraction at shoulders 2x 10 per side; LAQ 2x 10 seated; PEC PPT with bridge 2x 10 (cues to engage R glute med mildly)  NMR 2: Bed mobility training to reduce pain and guarding with transfers using exhaling and reaching    Time Entry(in minutes):  Neuromuscular Re-Education Time Entry: 27  Therapeutic Exercise Time Entry:  12    Assessment & Plan   Assessment: PT educated pt in therex to increase motor control and motility. PT and pt discussed adding pears and apples to help with acid to break down her foods for better protein absorption and motility. Pt is still very guarded with transfers and needs up to MIN A to help direct better and reduce pain when transferring.  Evaluation/Treatment Tolerance: Patient limited by fatigue    Patient will continue to benefit from skilled outpatient physical therapy to address the deficits listed in the problem list box on initial evaluation, provide pt/family education and to maximize pt's level of independence in the home and community environment.     Patient's spiritual, cultural, and educational needs considered and patient agreeable to plan of care and goals.           Plan: PT to advance core mobility and add Manual therapy via light traction and Soft tissue work on next few sessions to reduce guarding.    Goals:   Active       long term goals       lumbar flexion pain free; sit to stand without guarding (Progressing)       Start:  04/17/25    Expected End:  06/26/25            5/5 hip abduction strength (Progressing)       Start:  04/17/25    Expected End:  06/26/25            dead lifting 50 # for home tasks (Progressing)       Start:  04/17/25    Expected End:  06/26/25               short term goals       I with HEP (Progressing)       Start:  04/17/25    Expected End:  05/15/25            cervical flexion ROM 60 degrees and rotation 80 degrees or better (Progressing)       Start:  04/17/25    Expected End:  05/15/25                Brittney House, PT

## 2025-04-25 NOTE — PROGRESS NOTES
CRS Office Visit    SUBJECTIVE:     Chief Complaint:  Constipation    History of Present Illness:  Patient is a 75 y.o. adult presents with complaints of severe constipation.  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  Crohn's disease is reportedly in remission.  Patient not on any maintenance medications.  She does endorse chronic constipation.  She has not had a bowel movement in 4 days.  Has worsening abdominal pain and bloating with out nausea.  She continues to pass flatus.  Recently went to the emergency department where CT scan showed large stool burden throughout the colon without proximal small bowel dilation    Review of patient's allergies indicates:   Allergen Reactions    Statins-hmg-coa reductase inhibitors Anaphylaxis     Myalgias    Codeine sulfate Itching    Hydrochlorothiazide Other (See Comments)     Adverse Reaction    Lisinopril Swelling and Edema     Facial Swelling    Keflex [cephalexin] Other (See Comments)     Muscle aches       Past Medical History:   Diagnosis Date    Anxiety     Crohn's disease     Depression     Gallstones 01/07/2025    Genital herpes     Hepatitis C infection     Hypertension     Insomnia     Mesenteric artery stenosis     Dr. 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 Malaika Mack--dermatology    TIA (transient ischemic attack)      Past Surgical History:   Procedure Laterality Date    APPENDECTOMY      COLONOSCOPY N/A 05/19/2017    Procedure: COLONOSCOPY;  Surgeon: Jose Luis Leonard MD;  Location: [facility];  Service: Endoscopy;  Laterality: N/A;    COLONOSCOPY N/A 03/26/2018    Procedure: COLONOSCOPY;  Surgeon: Guillaume Whitman MD;  Location: [facility];  Service: Endoscopy;  Laterality: N/A;    COLONOSCOPY N/A 03/19/2019    Procedure: COLONOSCOPY;  Surgeon: Lili Ellis MD;  Location: [facility];  Service: Endoscopy;  Laterality: N/A;    COLONOSCOPY N/A  09/24/2020    Procedure: COLONOSCOPY;  Surgeon: Lili Ellis MD;  Location: [facility];  Service: Endoscopy;  Laterality: N/A;    COLONOSCOPY N/A 07/20/2023    Procedure: COLONOSCOPY;  Surgeon: Lili Ellis MD;  Location: [facility];  Service: Endoscopy;  Laterality: N/A;    COSMETIC SURGERY      ESOPHAGOGASTRODUODENOSCOPY N/A 07/20/2023    Procedure: EGD (ESOPHAGOGASTRODUODENOSCOPY);  Surgeon: iLli Ellis MD;  Location: [facility];  Service: Endoscopy;  Laterality: N/A;    ORCHIECTOMY      ROBOT-ASSISTED CHOLECYSTECTOMY USING DA RAZA XI N/A 01/07/2025    Procedure: XI ROBOTIC CHOLECYSTECTOMY;  Surgeon: Harjit Yeh MD;  Location: [facility] OR;  Service: General;  Laterality: N/A;    sex reassignment      1979    SMALL INTESTINE SURGERY      TONSILLECTOMY      VAGINOPLASTY, PENILE INVERSION       Family History   Problem Relation Name Age of Onset    Stroke Mother Mother     Heart disease Mother Mother     Cataracts Mother Mother     Cancer Father Father         Lung    Heart disease Sister Sister     Hypertension Brother Brother         Brother    Glaucoma Neg Hx      Macular degeneration Neg Hx      Thyroid disease Neg Hx       Social History[1]     OBJECTIVE:     Vital Signs (Most Recent)  Pulse: 93 (04/23/25 1426)  BP: (!) 158/89 (04/23/25 1426)    Physical Exam:  Physical Exam  Constitutional:       Appearance: She is well-developed.   HENT:      Head: Normocephalic and atraumatic.   Eyes:      Conjunctiva/sclera: Conjunctivae normal.      Pupils: Pupils are equal, round, and reactive to light.   Neck:      Thyroid: No thyromegaly.   Cardiovascular:      Rate and Rhythm: Normal rate and regular rhythm.   Pulmonary:      Effort: Pulmonary effort is normal. No respiratory distress.   Abdominal:      General: There is no distension.      Palpations: Abdomen is soft. There is no mass.      Tenderness: There is no abdominal tenderness.   Musculoskeletal:         General: No tenderness. Normal range of  motion.      Cervical back: Normal range of motion.   Skin:     General: Skin is warm and dry.      Capillary Refill: Capillary refill takes less than 2 seconds.   Neurological:      General: No focal deficit present.      Mental Status: She is alert and oriented to person, place, and time.           ASSESSMENT/PLAN:     Manuela was seen today for follow-up.    Diagnoses and all orders for this visit:    Crohn's disease of small intestine without complication  -     Colonoscopy; Future    Constipation, unspecified constipation type  -     Colonoscopy; Future    Other orders  -     sodium,potassium,mag sulfates (SUPREP BOWEL PREP KIT) 17.5-3.13-1.6 gram SolR; Take 177 mLs by mouth once daily. for 2 days      - reviewed with the patient that this has likely chronic constipation and potentially not related to her stricture given that her small bowel is not dilated on her CT scan.  However she does warrant a colonoscopy with possible balloon dilation of her stricture.  - Discussed that a minimum I would recommend behavioral, lifestyle and medication modifications to improve bowel habits. Usual bowel management handout given to patient. This includes a stool softener twice per day, fiber powder supplementation daily, drinking at least 64oz of water/day, avoiding straining with bowel movements, spending less than 5 min on toilet per bowel movement, eating a high fiber diet, using miralax as needed to achieve a bowel movement daily and using wet wipes to wipe after bowel movements when irritated.   - may need prescription medications for constipation.  We will defer to GI after colonoscopy  - Suprep sent         [1]   Social History  Tobacco Use    Smoking status: Former     Current packs/day: 0.00     Average packs/day: 0.3 packs/day for 10.0 years (2.5 ttl pk-yrs)     Types: Cigarettes     Start date: 1995     Quit date: 2005     Years since quittin.3    Smokeless tobacco: Never    Tobacco comments:     Former  Light Smoker less than 10 a day   Substance Use Topics    Alcohol use: Yes     Alcohol/week: 4.0 standard drinks of alcohol     Types: 4 Glasses of wine per week     Comment: occ    Drug use: No

## 2025-04-29 ENCOUNTER — CLINICAL SUPPORT (OUTPATIENT)
Dept: REHABILITATION | Facility: HOSPITAL | Age: 75
End: 2025-04-29
Payer: MEDICARE

## 2025-04-29 DIAGNOSIS — M54.50 ACUTE BILATERAL LOW BACK PAIN WITHOUT SCIATICA: ICD-10-CM

## 2025-04-29 DIAGNOSIS — S39.012D STRAIN OF LUMBAR REGION, SUBSEQUENT ENCOUNTER: Primary | ICD-10-CM

## 2025-04-29 DIAGNOSIS — M53.86 DECREASED ROM OF LUMBAR SPINE: ICD-10-CM

## 2025-04-29 PROCEDURE — 97140 MANUAL THERAPY 1/> REGIONS: CPT | Mod: PN

## 2025-04-29 PROCEDURE — 97110 THERAPEUTIC EXERCISES: CPT | Mod: PN

## 2025-04-29 NOTE — PROGRESS NOTES
Outpatient Rehab    Physical Therapy Visit    Patient Name: Manuela Reyes  MRN: [id number]  YOB: 1950  Encounter Date: 4/29/2025    Therapy Diagnosis:   Encounter Diagnoses   Name Primary?    Strain of lumbar region, subsequent encounter Yes    Decreased ROM of lumbar spine     Acute bilateral low back pain without sciatica        Physician: Yvette Turner, PA-C    Physician Orders: Eval and Treat  Medical Diagnosis: Strain of lumbar region, subsequent encounter  Decreased ROM of lumbar spine    Visit # / Visits Authorized:  2 / 10 plus eval  Insurance Authorization Period: 4/17/2025 to 12/31/2025  Date of Evaluation:  4/17/2025  Plan of Care Certification: 4/17/2025 to 6/26/25  Progress note due 30 days from 4/24/25       Time In:   9:37 am -10:00 one on one with PT then again  Time Out:  10:30  Total Time:   53 min  Total Billable Time:   23 min with PT only    FOTO:  Intake Score:  %  Survey Score 1:  %  Survey Score 2:  %    Precautions     Standard, HTN, crohn's disease, autoimmune disease      Subjective   pt reports she hasn't been sleeping well as her dog has been ill.  Pain reported as 8/10. low back    Objective            Treatment:  Therapeutic Exercise  TE 1: shuttle 3 bands 3 min; single knee to chest 2x 10  TE 3: lower trunk rotation for ROM and circulation 2x 10  Manual Therapy  MT 1: soft tissue mobs and cupping to mid T to lumbar spine and glutes. PT attempted R mid to anterior glute around the pelvis from the lateral anterior side but too tender to tolerate.  Balance/Neuromuscular Re-Education  NMR 1: supine chin tuck with retraction 2x 10; hook lying PPT 3x 10; lower trunk rotation 2x 10 with red theraband ; seated ball squeeze 10 x 10 sec hold then add red theraband 2x 10 each direction; supine protraction at shoulders 3x 10 per side 2#; LAQ 1x 10 seated 2#; PEC PPT with bridge 2x 10 (cues to engage R glute med mildly)    Time Entry(in minutes):  Manual Therapy Time  Entry: 13  Therapeutic Exercise Time Entry: 10    Assessment & Plan   Assessment: Moving into flexion is still challenging. Pt tends to pull from extension at the spine. PT used cues in simple standing to engage the core to reduce sway back. Pt struggled to bridge today as she tended to trunk extend vs hip extend. Pt asked about DN but today she is on antibiotics so PT expressed a desire to wait until her infection is cleared to reduce complications.  Evaluation/Treatment Tolerance: Patient limited by fatigue    Patient will continue to benefit from skilled outpatient physical therapy to address the deficits listed in the problem list box on initial evaluation, provide pt/family education and to maximize pt's level of independence in the home and community environment.     Patient's spiritual, cultural, and educational needs considered and patient agreeable to plan of care and goals.           Plan: PT to advance core mobility and add Manual therapy via light traction and Soft tissue work on next few sessions to reduce guarding.    Goals:   Active       long term goals       lumbar flexion pain free; sit to stand without guarding (Progressing)       Start:  04/17/25    Expected End:  06/26/25            5/5 hip abduction strength (Progressing)       Start:  04/17/25    Expected End:  06/26/25            dead lifting 50 # for home tasks (Progressing)       Start:  04/17/25    Expected End:  06/26/25               short term goals       I with HEP (Progressing)       Start:  04/17/25    Expected End:  05/15/25            cervical flexion ROM 60 degrees and rotation 80 degrees or better (Progressing)       Start:  04/17/25    Expected End:  05/15/25                Brittney House, PT

## 2025-05-02 ENCOUNTER — CLINICAL SUPPORT (OUTPATIENT)
Dept: REHABILITATION | Facility: HOSPITAL | Age: 75
End: 2025-05-02
Payer: MEDICARE

## 2025-05-02 DIAGNOSIS — S39.012D STRAIN OF LUMBAR REGION, SUBSEQUENT ENCOUNTER: Primary | ICD-10-CM

## 2025-05-02 DIAGNOSIS — M53.86 DECREASED ROM OF LUMBAR SPINE: ICD-10-CM

## 2025-05-02 DIAGNOSIS — M54.50 ACUTE BILATERAL LOW BACK PAIN WITHOUT SCIATICA: ICD-10-CM

## 2025-05-02 PROCEDURE — 97112 NEUROMUSCULAR REEDUCATION: CPT | Mod: PN

## 2025-05-02 PROCEDURE — 97140 MANUAL THERAPY 1/> REGIONS: CPT | Mod: PN

## 2025-05-02 NOTE — PROGRESS NOTES
Outpatient Rehab    Physical Therapy Visit    Patient Name: Manuela Reyes  MRN: [id number]  YOB: 1950  Encounter Date: 5/2/2025    Therapy Diagnosis:   Encounter Diagnoses   Name Primary?    Strain of lumbar region, subsequent encounter Yes    Decreased ROM of lumbar spine     Acute bilateral low back pain without sciatica        Physician: Yvette Turner, PAHajaC    Physician Orders: Eval and Treat  Medical Diagnosis: Strain of lumbar region, subsequent encounter  Decreased ROM of lumbar spine    Visit # / Visits Authorized:  3 / 10 plus eval  Insurance Authorization Period: 4/17/2025 to 12/31/2025  Date of Evaluation:  4/17/2025  Plan of Care Certification: 4/17/2025 to 6/26/25  Progress note due 30 days from 4/24/25       Time In:   10:05 am -10:30 one on one with PT then again  Time Out:  11:00  Total Time:   55 min  Total Billable Time:   25 min with PT only    FOTO:  Intake Score:  %  Survey Score 1:  %  Survey Score 2:  %    Precautions     Standard, HTN, crohn's disease, autoimmune disease      Subjective   pt reports she is feeling a bit better but she is still pretty sore. Better overall compared to her first day of PT..  Pain reported as 7/10. low back    Objective            Treatment:  Therapeutic Exercise  TE 1: shuttle 3 bands 3 min; single knee to chest 2x 10  TE 3: lower trunk rotation for ROM and circulation 2x 10  TE 4: prone hamstring curls 2x 10  Manual Therapy  MT 1: soft tissue mobs and cupping to mid T to lumbar spine and glutes.  Balance/Neuromuscular Re-Education  NMR 1: supine chin tuck with retraction 2x 10; hook lying PPT 3x 10; lower trunk rotation 2x 10 with red theraband ; seated ball squeeze 10 x 10 sec; supine protraction at shoulders 3x 10 per side 2#; LAQ 1x 10 seated 3#; sit to stand from 24 inch box 2x 10  NMR 3: sit to stand from 20 inch box 2x 10  NMR 4: 5# goblet carry 80 ft x3; single arm carry 8 # R then L 80 ftx3    Time Entry(in minutes):  Manual  Therapy Time Entry: 12  Neuromuscular Re-Education Time Entry: 15    Assessment & Plan   Assessment: transfers were a bit smoother today but she was fearful of falling with sit to stand when PT encouraged no UE assistance. Pt fatigued easily and falls into swayback postures. PT used manual cues to engage the core to teach transverse abdominus but needs max cues. PT added in more hip and LE work as well as tasks to engage the core with functional lifting but needs to add in more rectus as well as anterior lean to reduce sway back habits.  Evaluation/Treatment Tolerance: Patient limited by fatigue    Patient will continue to benefit from skilled outpatient physical therapy to address the deficits listed in the problem list box on initial evaluation, provide pt/family education and to maximize pt's level of independence in the home and community environment.     Patient's spiritual, cultural, and educational needs considered and patient agreeable to plan of care and goals.           Plan: PT to advance core mobility and continue with Manual therapy and possibly some light traction and core engagement work to reduce sway back postures.    Goals:   Active       long term goals       lumbar flexion pain free; sit to stand without guarding (Progressing)       Start:  04/17/25    Expected End:  06/26/25            5/5 hip abduction strength (Progressing)       Start:  04/17/25    Expected End:  06/26/25            dead lifting 50 # for home tasks (Progressing)       Start:  04/17/25    Expected End:  06/26/25               short term goals       I with HEP (Progressing)       Start:  04/17/25    Expected End:  05/15/25            cervical flexion ROM 60 degrees and rotation 80 degrees or better (Progressing)       Start:  04/17/25    Expected End:  05/15/25                Brittney House, PT

## 2025-05-04 ENCOUNTER — PATIENT MESSAGE (OUTPATIENT)
Dept: INTERNAL MEDICINE | Facility: CLINIC | Age: 75
End: 2025-05-04
Payer: MEDICARE

## 2025-05-04 DIAGNOSIS — S39.012D STRAIN OF LUMBAR REGION, SUBSEQUENT ENCOUNTER: Primary | ICD-10-CM

## 2025-05-05 RX ORDER — METHOCARBAMOL 500 MG/1
500 TABLET, FILM COATED ORAL NIGHTLY PRN
Qty: 30 TABLET | Refills: 0 | Status: SHIPPED | OUTPATIENT
Start: 2025-05-05

## 2025-05-06 ENCOUNTER — TELEPHONE (OUTPATIENT)
Dept: CARDIOLOGY | Facility: CLINIC | Age: 75
End: 2025-05-06
Payer: MEDICARE

## 2025-05-06 ENCOUNTER — PATIENT MESSAGE (OUTPATIENT)
Dept: CARDIOLOGY | Facility: CLINIC | Age: 75
End: 2025-05-06
Payer: MEDICARE

## 2025-05-07 ENCOUNTER — DOCUMENTATION ONLY (OUTPATIENT)
Dept: REHABILITATION | Facility: HOSPITAL | Age: 75
End: 2025-05-07
Payer: MEDICARE

## 2025-05-07 ENCOUNTER — CLINICAL SUPPORT (OUTPATIENT)
Dept: REHABILITATION | Facility: HOSPITAL | Age: 75
End: 2025-05-07
Payer: MEDICARE

## 2025-05-07 DIAGNOSIS — M54.50 ACUTE BILATERAL LOW BACK PAIN WITHOUT SCIATICA: Primary | ICD-10-CM

## 2025-05-07 PROCEDURE — 97112 NEUROMUSCULAR REEDUCATION: CPT | Mod: PN,CQ

## 2025-05-07 PROCEDURE — 97110 THERAPEUTIC EXERCISES: CPT | Mod: PN,CQ

## 2025-05-07 PROCEDURE — 97140 MANUAL THERAPY 1/> REGIONS: CPT | Mod: PN,CQ

## 2025-05-07 NOTE — PROGRESS NOTES
PT/PTA met face to face to discuss pt's treatment plan and progress towards established goals. Pt will be seen by a physical therapist minimally every 6th visit or every 30 days.    Jeanette Pan PTA

## 2025-05-07 NOTE — PROGRESS NOTES
Outpatient Rehab    Physical Therapy Visit    Patient Name: Manuela Reyes  MRN: [id number]  YOB: 1950  Encounter Date: 5/7/2025    Therapy Diagnosis:   Encounter Diagnosis   Name Primary?    Acute bilateral low back pain without sciatica Yes         Physician: Yvette Turner, PAHajaC    Physician Orders: Eval and Treat  Medical Diagnosis: Strain of lumbar region, subsequent encounter  Decreased ROM of lumbar spine    Visit # / Visits Authorized:  4 / 20 plus eval  Insurance Authorization Period: 4/17/2025 to 12/31/2025  Date of Evaluation:  4/17/2025  Plan of Care Certification: 4/17/2025 to 6/26/25  Progress note due 30 days from 4/24/25       Time In:   9:39 am  Time Out:  10:35 am  Total Time:   56 min  Total Billable Time:   56 min    FOTO:  Intake Score: 47%  Survey Score 1:  %  Survey Score 2:  %         Subjective   Patient reports increased pain worse with standing up from low surfaces such as toilet. Pain is more so in right thoracic to lumbar paraspinals than left side but she tends to have pain that wraps around to both sides. She also experiences tingling in right ankle..  Pain reported as 7/10. low back    Objective            Treatment:  Therapeutic Exercise  TE 1: single knee to chest 2x 10  TE 3: lower trunk rotation for ROM and circulation x 10  TE 5: shuttle 3 bands 3 min  TE 6: nustep x 5 minutes  Manual Therapy  MT 1: soft tissue mobs to mid T to lumbar spine and glutes.  Balance/Neuromuscular Re-Education  NMR 1: seated ball squeeze 10 x 10 sec; supine protraction at shoulders 3x 10 per side 2#; LAQ 1 x 10 seated 3#; sit to stand from 24 inch box 2x 10  NMR 4: 8# goblet carry 80 ft x3; single arm carry 8 # R then L 80 ftx3  NMR 5: cat cows x10      Time Entry(in minutes):       Assessment & Plan   Assessment: transfers are patient's bigger pain complaint noted in right sied low back so manual therapy addressed tension in lumbar and thoracic paraspinals prior to  exercises. educated patient on activating core prior to sit to stand so we worked on this with some ease although still difficult due to lower extremity weakness. patient reports less pain following addition of nustep to decrease joint stiffness prior to exercises. will benefit from more rectus work as well as anterior lean to reduce sway back habits as she has tendecy to lean back.       Patient will continue to benefit from skilled outpatient physical therapy to address the deficits listed in the problem list box on initial evaluation, provide pt/family education and to maximize pt's level of independence in the home and community environment.     Patient's spiritual, cultural, and educational needs considered and patient agreeable to plan of care and goals.           Plan: PT to advance core mobility and continue with Manual therapy and possibly some light traction and core engagement work to reduce sway back postures.    Goals:   Active       long term goals       lumbar flexion pain free; sit to stand without guarding (Progressing)       Start:  04/17/25    Expected End:  06/26/25            5/5 hip abduction strength (Progressing)       Start:  04/17/25    Expected End:  06/26/25            dead lifting 50 # for home tasks (Progressing)       Start:  04/17/25    Expected End:  06/26/25               short term goals       I with HEP (Progressing)       Start:  04/17/25    Expected End:  05/15/25            cervical flexion ROM 60 degrees and rotation 80 degrees or better (Progressing)       Start:  04/17/25    Expected End:  05/15/25                  Jeanette Pan PTA

## 2025-05-09 ENCOUNTER — CLINICAL SUPPORT (OUTPATIENT)
Dept: REHABILITATION | Facility: HOSPITAL | Age: 75
End: 2025-05-09
Payer: MEDICARE

## 2025-05-09 DIAGNOSIS — M54.50 ACUTE BILATERAL LOW BACK PAIN WITHOUT SCIATICA: Primary | ICD-10-CM

## 2025-05-09 PROCEDURE — 97112 NEUROMUSCULAR REEDUCATION: CPT | Mod: PN

## 2025-05-09 NOTE — PROGRESS NOTES
Outpatient Rehab    Physical Therapy Visit    Patient Name: Manuela Reyes  MRN: [id number]  YOB: 1950  Encounter Date: 5/9/2025    Therapy Diagnosis:   Encounter Diagnosis   Name Primary?    Acute bilateral low back pain without sciatica Yes         Physician: Yvette Turner PAHajaC    Physician Orders: Eval and Treat  Medical Diagnosis: Strain of lumbar region, subsequent encounter  Decreased ROM of lumbar spine    Visit # / Visits Authorized:  5 / 20 plus eval  Insurance Authorization Period: 4/17/2025 to 12/31/2025  Date of Evaluation:  4/17/2025  Plan of Care Certification: 4/17/2025 to 6/26/25  Progress note due 30 days from 4/24/25       Time In:   10:04 am-10:30  Time Out:  11:00 am  Total Time:   56 min  Total Billable Time:   26 min    FOTO:  Intake Score:  %  Survey Score 1:  %  Survey Score 2:  %    Precautions     Standard, HTN, crohn's disease, autoimmune disease      Subjective   Pt states she had a good day after her last PT session but was struggling to sleep last night..  Pain reported as 6/10. low back and neck    Objective            Treatment:  Therapeutic Exercise  TE 4: prone hamstring curls 2x 10  TE 5: shuttle 3 bands 3 min  TE 6: nustep x 6 minutes  Balance/Neuromuscular Re-Education  NMR 1: seated ball squeeze 10 x 10 sec; supine protraction at shoulders 3x 10 per side 2#; LAQ 1 x 10 seated 3#  NMR 5: cat cows x10 then add alternating arm 2x 10  NMR 6: PPT 2x 10; PPT with SLR 2x 10; PPT with hooklying march with red theraband 2x 10  NMR 7: lower trunk rotation with red theraband 2x 10  NMR 8: leaning hip extension 3x8  NMR 9: prone hamstring curls 2# 2x 10 per leg      Time Entry(in minutes):  Neuromuscular Re-Education Time Entry: 26    Assessment & Plan   Assessment: PT noted pt is transferring supine<>sit easier. Pain is improving and pt strength is progressing but she still needs needs cues on  motor control to help advance core support. Core quivers at times  or falls back in to lordosis so PT uses cues throughout manually or verbally. Sway back is still prominent so PT to add in more core engagement to reduce this in standing.  Evaluation/Treatment Tolerance: Patient limited by fatigue    Patient will continue to benefit from skilled outpatient physical therapy to address the deficits listed in the problem list box on initial evaluation, provide pt/family education and to maximize pt's level of independence in the home and community environment.     Patient's spiritual, cultural, and educational needs considered and patient agreeable to plan of care and goals.           Plan: PT to advance core mobility and continue with Manual therapy and possibly some light traction and core engagement work to reduce sway back postures.    Goals:   Active       long term goals       lumbar flexion pain free; sit to stand without guarding (Progressing)       Start:  04/17/25    Expected End:  06/26/25            5/5 hip abduction strength (Progressing)       Start:  04/17/25    Expected End:  06/26/25            dead lifting 50 # for home tasks (Progressing)       Start:  04/17/25    Expected End:  06/26/25               short term goals       I with HEP (Progressing)       Start:  04/17/25    Expected End:  05/15/25            cervical flexion ROM 60 degrees and rotation 80 degrees or better (Progressing)       Start:  04/17/25    Expected End:  05/15/25                  Brittney House, PT

## 2025-05-12 ENCOUNTER — CLINICAL SUPPORT (OUTPATIENT)
Dept: REHABILITATION | Facility: HOSPITAL | Age: 75
End: 2025-05-12
Payer: MEDICARE

## 2025-05-12 DIAGNOSIS — M54.50 ACUTE BILATERAL LOW BACK PAIN WITHOUT SCIATICA: Primary | ICD-10-CM

## 2025-05-12 PROCEDURE — 97112 NEUROMUSCULAR REEDUCATION: CPT | Mod: PN

## 2025-05-12 PROCEDURE — 97110 THERAPEUTIC EXERCISES: CPT | Mod: PN

## 2025-05-12 NOTE — PROGRESS NOTES
Outpatient Rehab    Physical Therapy Visit    Patient Name: Manuela Reyes  MRN: [id number]  YOB: 1950  Encounter Date: 5/12/2025    Therapy Diagnosis:   Encounter Diagnosis   Name Primary?    Acute bilateral low back pain without sciatica Yes       Physician: Yvette Turner, PAHajaC    Physician Orders: Eval and Treat  Medical Diagnosis: Strain of lumbar region, subsequent encounter  Decreased ROM of lumbar spine    Visit # / Visits Authorized:  6 / 20 plus eval  Insurance Authorization Period: 4/17/2025 to 12/31/2025  Date of Evaluation:  4/17/2025  Plan of Care Certification: 4/17/2025 to 6/26/25  Progress note due 30 days from 4/24/25       Time In:   11:02 am   Time Out:  11:55 am  Total Time:   53 min  Total Billable Time:   53 min with PT only    FOTO:  Intake Score:  %  Survey Score 1:  %  Survey Score 2:  %       Standard, HTN, crohn's disease, autoimmune disease      Subjective   Pt reports she did well friday but was sore Sat and SUnday. cramping is less..  Pain reported as 5/10. low back and neck    Objective            Treatment:  Therapeutic Exercise  TE 3: lower trunk rotation for ROM and circulation x 10  TE 6: nustep x 6 minutes with focus on wiseman 7 or above  Balance/Neuromuscular Re-Education  NMR 1: PEC with PPT 2x 10; PEC with PPT and bridge 2x 10; PEC with PPT with hook lying ball squeeze 10 x 10 sec; table top hold 10 x 10 sec  NMR 2: qped core engaged 1x 10 sec hold; then add alternating arm 3x 10 2 sec hold; prone on elbows pulling into PPT (pre plank training) 2x 5 10 sec holds  NMR 3: hip hinge training, sit to stand from 18 inch box 3x 5      Time Entry(in minutes):  Neuromuscular Re-Education Time Entry: 43  Therapeutic Exercise Time Entry: 10    Assessment & Plan   Assessment: Pt UE's still shiver in qped but she had better core control. PT advanced her to sit to stand from 18 inch seat but hip hinge training was needed to increase slow sitting and cues to  engage the hips more and driving through the feet to rise to stand.  Evaluation/Treatment Tolerance: Patient limited by fatigue    Patient will continue to benefit from skilled outpatient physical therapy to address the deficits listed in the problem list box on initial evaluation, provide pt/family education and to maximize pt's level of independence in the home and community environment.     Patient's spiritual, cultural, and educational needs considered and patient agreeable to plan of care and goals.           Plan: PT to advance core mobility and continue with Manual therapy and possibly some light traction and core engagement work to reduce sway back postures.    Goals:   Active       long term goals       lumbar flexion pain free; sit to stand without guarding (Progressing)       Start:  04/17/25    Expected End:  06/26/25            5/5 hip abduction strength (Progressing)       Start:  04/17/25    Expected End:  06/26/25            dead lifting 50 # for home tasks (Progressing)       Start:  04/17/25    Expected End:  06/26/25               short term goals       I with HEP (Met)       Start:  04/17/25    Expected End:  05/15/25    Resolved:  05/12/25         cervical flexion ROM 60 degrees and rotation 80 degrees or better (Progressing)       Start:  04/17/25    Expected End:  05/15/25                  Brittney House, PT

## 2025-05-13 ENCOUNTER — PATIENT MESSAGE (OUTPATIENT)
Dept: SURGERY | Facility: CLINIC | Age: 75
End: 2025-05-13
Payer: MEDICARE

## 2025-05-16 ENCOUNTER — CLINICAL SUPPORT (OUTPATIENT)
Dept: REHABILITATION | Facility: HOSPITAL | Age: 75
End: 2025-05-16
Payer: MEDICARE

## 2025-05-16 DIAGNOSIS — M53.86 DECREASED ROM OF LUMBAR SPINE: ICD-10-CM

## 2025-05-16 DIAGNOSIS — S39.012D STRAIN OF LUMBAR REGION, SUBSEQUENT ENCOUNTER: ICD-10-CM

## 2025-05-16 DIAGNOSIS — M54.50 ACUTE BILATERAL LOW BACK PAIN WITHOUT SCIATICA: Primary | ICD-10-CM

## 2025-05-16 PROCEDURE — 97110 THERAPEUTIC EXERCISES: CPT | Mod: PN

## 2025-05-16 PROCEDURE — 97112 NEUROMUSCULAR REEDUCATION: CPT | Mod: PN

## 2025-05-16 PROCEDURE — 97140 MANUAL THERAPY 1/> REGIONS: CPT | Mod: PN

## 2025-05-16 NOTE — PROGRESS NOTES
Outpatient Rehab    Physical Therapy Visit    Patient Name: Manuela Reyes  MRN: [id number]  YOB: 1950  Encounter Date: 5/16/2025    Therapy Diagnosis:   Encounter Diagnoses   Name Primary?    Acute bilateral low back pain without sciatica Yes    Strain of lumbar region, subsequent encounter     Decreased ROM of lumbar spine          Physician: Yvette Turner, SHRUTHI    Physician Orders: Eval and Treat  Medical Diagnosis: Strain of lumbar region, subsequent encounter  Decreased ROM of lumbar spine    Visit # / Visits Authorized:  7 / 20 plus eval  Insurance Authorization Period: 4/17/2025 to 12/31/2025  Date of Evaluation:  4/17/2025  Plan of Care Certification: 4/17/2025 to 6/26/25  Progress note due 30 days from 4/24/25       Time In:   10:36 am   Time Out:  11:30 am  Total Time:   54 min  Total Billable Time:   54 min with PT only    FOTO:  Intake Score:  %  Survey Score 1:  %  Survey Score 2:  %       Standard, HTN, crohn's disease, autoimmune disease      Subjective   SHe woke with some discomfort in her neck and back butwas able to reduce it some with someof her HEP. Pt states pain at the end of the day is worse (as she is fatigued) but mornings are better and so are afternoons. Overall she feels she is progressing..  Pain reported as 4/10. low back and neck    Objective            Treatment:  Therapeutic Exercise  TE 5: shuttle 4 bands 3 min  TE 7: seated trunk flexion 3x 8  Manual Therapy  MT 1: soft tissue mobs to mid T to lumbar spine  and some cupping. PT focused mostly only lower lumbar paraspinals and quadratus lumborum.  Balance/Neuromuscular Re-Education  NMR 3: hip hinge training, sit to stand from 18 inch box 2x 5  NMR 5: cat cows x10 then add alternating arm 1x10  NMR 8: leaning hip extension 3x8  NMR 9: side stepping with red theracord 10 steps 10 for 2 sets to the R then to the L each  NMR 10: hip adduction machine 20 # 2x 10; hip abduction machine 15 # 2x 10; med x  lumbar extension 20 # 2x 8      Time Entry(in minutes):  Manual Therapy Time Entry: 12  Neuromuscular Re-Education Time Entry: 34  Therapeutic Exercise Time Entry: 8    Assessment & Plan   Assessment: PT increased loading at the hips and core today. pt struggled on the lumbar extension machine needing some manual help from PT to initiate the motion. PT also encouraged core engagement to assist. PT added side stepping to increase core engagement and begin balance challenges.  Evaluation/Treatment Tolerance: Patient limited by fatigue    Patient will continue to benefit from skilled outpatient physical therapy to address the deficits listed in the problem list box on initial evaluation, provide pt/family education and to maximize pt's level of independence in the home and community environment.     Patient's spiritual, cultural, and educational needs considered and patient agreeable to plan of care and goals.           Plan: PT to advance core mobility and continue with Manual therapy and core engagement work to reduce sway back postures.    Goals:   Active       long term goals       lumbar flexion pain free; sit to stand without guarding (Progressing)       Start:  04/17/25    Expected End:  06/26/25            5/5 hip abduction strength (Progressing)       Start:  04/17/25    Expected End:  06/26/25            dead lifting 50 # for home tasks (Progressing)       Start:  04/17/25    Expected End:  06/26/25               short term goals       I with HEP (Met)       Start:  04/17/25    Expected End:  05/15/25    Resolved:  05/12/25         cervical flexion ROM 60 degrees and rotation 80 degrees or better (Progressing)       Start:  04/17/25    Expected End:  05/15/25                  Brittney House, PT

## 2025-05-18 ENCOUNTER — PATIENT MESSAGE (OUTPATIENT)
Dept: INTERNAL MEDICINE | Facility: CLINIC | Age: 75
End: 2025-05-18
Payer: MEDICARE

## 2025-05-18 DIAGNOSIS — S39.012D STRAIN OF LUMBAR REGION, SUBSEQUENT ENCOUNTER: ICD-10-CM

## 2025-05-19 ENCOUNTER — TELEPHONE (OUTPATIENT)
Dept: PAIN MEDICINE | Facility: CLINIC | Age: 75
End: 2025-05-19
Payer: MEDICARE

## 2025-05-19 ENCOUNTER — TELEPHONE (OUTPATIENT)
Dept: CARDIOLOGY | Facility: HOSPITAL | Age: 75
End: 2025-05-19
Payer: MEDICARE

## 2025-05-19 ENCOUNTER — CLINICAL SUPPORT (OUTPATIENT)
Dept: REHABILITATION | Facility: HOSPITAL | Age: 75
End: 2025-05-19
Payer: MEDICARE

## 2025-05-19 DIAGNOSIS — M54.50 ACUTE BILATERAL LOW BACK PAIN WITHOUT SCIATICA: Primary | ICD-10-CM

## 2025-05-19 DIAGNOSIS — E78.5 HYPERLIPIDEMIA LDL GOAL <70: Primary | ICD-10-CM

## 2025-05-19 PROCEDURE — 97112 NEUROMUSCULAR REEDUCATION: CPT | Mod: PN,CQ

## 2025-05-19 PROCEDURE — 97110 THERAPEUTIC EXERCISES: CPT | Mod: PN,CQ

## 2025-05-19 RX ORDER — METHOCARBAMOL 500 MG/1
500 TABLET, FILM COATED ORAL 3 TIMES DAILY PRN
Qty: 90 TABLET | Refills: 0 | Status: SHIPPED | OUTPATIENT
Start: 2025-05-19 | End: 2025-06-20

## 2025-05-19 NOTE — TELEPHONE ENCOUNTER
Please schedule CMP, lipid, orders placed.    Can be done within next 3-4 weeks at lab of choice.    Thanks

## 2025-05-19 NOTE — PROGRESS NOTES
Outpatient Rehab    Physical Therapy Visit    Patient Name: Manuela Reyes  MRN: [id number]  YOB: 1950  Encounter Date: 5/19/2025    Therapy Diagnosis:   Encounter Diagnosis   Name Primary?    Acute bilateral low back pain without sciatica Yes         Physician: Yvette Turner, PAHajaC    Physician Orders: Eval and Treat  Medical Diagnosis: Strain of lumbar region, subsequent encounter  Decreased ROM of lumbar spine    Visit # / Visits Authorized:  8 / 20 plus eval  Insurance Authorization Period: 4/17/2025 to 12/31/2025  Date of Evaluation:  4/17/2025  Plan of Care Certification: 4/17/2025 to 6/26/25  Progress note due 30 days from 4/24/25       Time In:   10:55 am   Time Out:  11:50 am  Total Time:   55 min  Total Billable Time:   55 minutes    FOTO:  Intake Score: 47%  Survey Score 1: 48%  Survey Score 2:  %         Subjective   Patient reports she is progressing well with therapy as she can stand at counter longer and brush teeth with less pain in back. She was able to do bridges but noted left si joint pain when doing this. She sent an update to doctor and referral was placed for physiatry..         Objective            Treatment:  Therapeutic Exercise  TE 5: shuttle 4 bands 4 min  TE 6: nustep x 5 minutes with focus on wiseman 7 or above  TE 7: seated trunk flexion 3x 8  Balance/Neuromuscular Re-Education  NMR 3: sit to stand from 18 inch box 2x 5  NMR 5: cat cows x10 then add alternating arm 1x10  NMR 8: leaning hip extension 2x8  NMR 9: side stepping with red theracord 10 steps 10 for 2 sets to the R then to the L each  NMR 10: hip adduction machine 20 # 2x 10; hip abduction machine 15 # 2x 10; med x lumbar extension 20 # x10      Time Entry(in minutes):       Assessment & Plan   Assessment: PT continued progressions made at last visit for hips and core stability without adverse symptoms. pt apprehensive about lumbar extension machine so we modified flexion range of motion to 40  degrees working on extension training. Cues for core engagement provided whenrising from seated lumbar flexion to decrease strain on low back. PT educated patient on transferring to kneeling on floor to lift dog  Evaluation/Treatment Tolerance: Patient limited by fatigue    Patient will continue to benefit from skilled outpatient physical therapy to address the deficits listed in the problem list box on initial evaluation, provide pt/family education and to maximize pt's level of independence in the home and community environment.     Patient's spiritual, cultural, and educational needs considered and patient agreeable to plan of care and goals.           Plan:      Goals:   Active       long term goals       lumbar flexion pain free; sit to stand without guarding (Progressing)       Start:  04/17/25    Expected End:  06/26/25            5/5 hip abduction strength (Progressing)       Start:  04/17/25    Expected End:  06/26/25            dead lifting 50 # for home tasks (Progressing)       Start:  04/17/25    Expected End:  06/26/25               short term goals       I with HEP (Met)       Start:  04/17/25    Expected End:  05/15/25    Resolved:  05/12/25         cervical flexion ROM 60 degrees and rotation 80 degrees or better (Progressing)       Start:  04/17/25    Expected End:  05/15/25                  Jeanette Pan PTA

## 2025-05-20 ENCOUNTER — TELEPHONE (OUTPATIENT)
Dept: PAIN MEDICINE | Facility: CLINIC | Age: 75
End: 2025-05-20
Payer: MEDICARE

## 2025-05-21 ENCOUNTER — CLINICAL SUPPORT (OUTPATIENT)
Dept: REHABILITATION | Facility: HOSPITAL | Age: 75
End: 2025-05-21
Payer: MEDICARE

## 2025-05-21 DIAGNOSIS — S39.012D STRAIN OF LUMBAR REGION, SUBSEQUENT ENCOUNTER: ICD-10-CM

## 2025-05-21 DIAGNOSIS — M54.50 ACUTE BILATERAL LOW BACK PAIN WITHOUT SCIATICA: Primary | ICD-10-CM

## 2025-05-21 DIAGNOSIS — M53.86 DECREASED ROM OF LUMBAR SPINE: ICD-10-CM

## 2025-05-21 PROCEDURE — 97140 MANUAL THERAPY 1/> REGIONS: CPT | Mod: PN

## 2025-05-21 PROCEDURE — 97112 NEUROMUSCULAR REEDUCATION: CPT | Mod: PN

## 2025-05-21 NOTE — PROGRESS NOTES
Outpatient Rehab    Physical Therapy Visit    Patient Name: Manuela Reyes  MRN: [id number]  YOB: 1950  Encounter Date: 5/21/2025    Therapy Diagnosis:   Encounter Diagnoses   Name Primary?    Acute bilateral low back pain without sciatica Yes    Strain of lumbar region, subsequent encounter     Decreased ROM of lumbar spine      Physician: Yvette Turner PA-C    Physician Orders: Eval and Treat  Medical Diagnosis: Strain of lumbar region, subsequent encounter  Decreased ROM of lumbar spine    Visit # / Visits Authorized:  9 / 20 plus eval  Insurance Authorization Period: 4/17/2025 to 12/31/2025  Date of Evaluation:  4/17/2025  Plan of Care Certification: 4/17/2025 to 6/26/25  Progress note due 30 days from 4/24/25       Time In:   11:06 am (PT one on one from 1106-09 then from 11:)  Time Out:  12:05 pm  Total Time:   59 min  Total Billable Time:   30 minutes    FOTO:  Intake Score:  %  Survey Score 1:  %  Survey Score 2:  %         Subjective             Objective            Treatment:         Time Entry(in minutes):       Assessment & Plan   Assessment:         Patient will continue to benefit from skilled outpatient physical therapy to address the deficits listed in the problem list box on initial evaluation, provide pt/family education and to maximize pt's level of independence in the home and community environment.     Patient's spiritual, cultural, and educational needs considered and patient agreeable to plan of care and goals.           Plan:      Goals:   Active       long term goals       lumbar flexion pain free; sit to stand without guarding (Progressing)       Start:  04/17/25    Expected End:  06/26/25            5/5 hip abduction strength (Progressing)       Start:  04/17/25    Expected End:  06/26/25            dead lifting 50 # for home tasks (Progressing)       Start:  04/17/25    Expected End:  06/26/25               short term goals       I with HEP (Met)        Start:  04/17/25    Expected End:  05/15/25    Resolved:  05/12/25         cervical flexion ROM 60 degrees and rotation 80 degrees or better (Progressing)       Start:  04/17/25    Expected End:  05/15/25                  Brittney House PT

## 2025-05-22 ENCOUNTER — OFFICE VISIT (OUTPATIENT)
Dept: PODIATRY | Facility: CLINIC | Age: 75
End: 2025-05-22
Payer: MEDICARE

## 2025-05-22 ENCOUNTER — TELEPHONE (OUTPATIENT)
Dept: PODIATRY | Facility: CLINIC | Age: 75
End: 2025-05-22

## 2025-05-22 ENCOUNTER — PATIENT MESSAGE (OUTPATIENT)
Dept: PODIATRY | Facility: CLINIC | Age: 75
End: 2025-05-22

## 2025-05-22 VITALS — HEIGHT: 67 IN | WEIGHT: 99.19 LBS | BODY MASS INDEX: 15.57 KG/M2

## 2025-05-22 DIAGNOSIS — M79.675 PAIN IN TOES OF BOTH FEET: Primary | ICD-10-CM

## 2025-05-22 DIAGNOSIS — M79.674 PAIN IN TOES OF BOTH FEET: Primary | ICD-10-CM

## 2025-05-22 DIAGNOSIS — L84 CORN OR CALLUS: ICD-10-CM

## 2025-05-22 DIAGNOSIS — D84.9 IMMUNOSUPPRESSED STATUS: ICD-10-CM

## 2025-05-22 DIAGNOSIS — B35.1 TINEA UNGUIUM: ICD-10-CM

## 2025-05-22 PROCEDURE — 99999 PR PBB SHADOW E&M-EST. PATIENT-LVL III: CPT | Mod: PBBFAC,,, | Performed by: PODIATRIST

## 2025-05-22 NOTE — PROGRESS NOTES
Subjective:       Patient ID: Manuela Reyes is a 75 y.o. adult.    Chief Complaint: Nail Care (Nail care and possible ingrown on the right great toe, pt rates pain 2/10, pt is non-diabetic)      HPI:  Patient presents to the office this afternoon, with complaint of elongated and thickened nail plates to the left and right.  States having increased pain in in closed shoe gear which she rates as a 2/10.  Has utilize Jublia in the past.  This patient's PMD is Christelle Lawler DO. Denies recent trauma which could lead to the diagnoses of nail dystrophy.     Review of patient's allergies indicates:   Allergen Reactions    Statins-hmg-coa reductase inhibitors Anaphylaxis     Myalgias    Codeine sulfate Itching    Hydrochlorothiazide Other (See Comments)     Adverse Reaction    Lisinopril Swelling and Edema     Facial Swelling    Keflex [cephalexin] Other (See Comments)     Muscle aches       Past Medical History:   Diagnosis Date    Anxiety     Crohn's disease     Depression     Gallstones 01/07/2025    Genital herpes     Hepatitis C infection     Hypertension     Insomnia     Mesenteric artery stenosis     Dr. 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 Malaika Mack--dermatology    TIA (transient ischemic attack)        Family History   Problem Relation Name Age of Onset    Stroke Mother Mother     Heart disease Mother Mother     Cataracts Mother Mother     Cancer Father Father         Lung    Heart disease Sister Sister     Hypertension Brother Brother         Brother    Glaucoma Neg Hx      Macular degeneration Neg Hx      Thyroid disease Neg Hx         Social History[1]    Past Surgical History:   Procedure Laterality Date    APPENDECTOMY      COLONOSCOPY N/A 05/19/2017    Procedure: COLONOSCOPY;  Surgeon: Jose Luis Leonard MD;  Location: [facility];  Service: Endoscopy;  Laterality: N/A;    COLONOSCOPY N/A 03/26/2018    Procedure:  "COLONOSCOPY;  Surgeon: Guillaume Whitman MD;  Location: [facility] ENDO;  Service: Endoscopy;  Laterality: N/A;    COLONOSCOPY N/A 03/19/2019    Procedure: COLONOSCOPY;  Surgeon: Lili Ellis MD;  Location: [facility] ENDO;  Service: Endoscopy;  Laterality: N/A;    COLONOSCOPY N/A 09/24/2020    Procedure: COLONOSCOPY;  Surgeon: Lili Ellis MD;  Location: [facility] ENDO;  Service: Endoscopy;  Laterality: N/A;    COLONOSCOPY N/A 07/20/2023    Procedure: COLONOSCOPY;  Surgeon: Lili Ellis MD;  Location: [facility] ENDO;  Service: Endoscopy;  Laterality: N/A;    COSMETIC SURGERY      ESOPHAGOGASTRODUODENOSCOPY N/A 07/20/2023    Procedure: EGD (ESOPHAGOGASTRODUODENOSCOPY);  Surgeon: Lili Ellis MD;  Location: [facility] ENDO;  Service: Endoscopy;  Laterality: N/A;    ORCHIECTOMY      ROBOT-ASSISTED CHOLECYSTECTOMY USING DA RAZA XI N/A 01/07/2025    Procedure: XI ROBOTIC CHOLECYSTECTOMY;  Surgeon: Harjit Yeh MD;  Location: [facility] OR;  Service: General;  Laterality: N/A;    sex reassignment      1979    SMALL INTESTINE SURGERY      TONSILLECTOMY      VAGINOPLASTY, PENILE INVERSION         Review of Systems       Objective:   Ht 5' 7" (1.702 m)   Wt 45 kg (99 lb 3.3 oz)   BMI 15.54 kg/m²     CT Abdomen Pelvis With IV Contrast NO Oral Contrast  Narrative: EXAM: CT ABDOMEN PELVIS WITH IV CONTRAST    CLINICAL HISTORY: Abdominal abscess/infection suspected    COMPARISON: 12/16/2024    TECHNIQUE: Standard thin-section axial images, with reformatted sagittal and coronal images.    FINDINGS: Minimal atelectasis at the right lung base.    Mild fatty infiltration of liver, liver is otherwise unremarkable.    Bilateral low attenuating renal lesions, lesions large enough to characterize have Hounsfield values consistent with simple cysts.  Remaining lesions are indeterminate likely additional cysts.  Kidneys and ureters are otherwise unremarkable.    Spleen, pancreas, and adrenals are unremarkable.  Gallbladder is " absent.    Atherosclerotic calcification, vascular structures are otherwise unremarkable.    There is no abdominal or retroperitoneal lymphadenopathy.  No ascites or fat stranding within the abdomen.  No dilated loops of small bowel.  Large amount of stool throughout the colon.  Scattered colonic diverticula.  Postsurgical changes distal ileum near the ileocecal valve.    Pelvis: No pelvic free fluid, iliac chain or inguinal lymphadenopathy.  Uterus is absent.  Adnexal regions are unremarkable.    No concerning lytic or sclerotic bony lesions.  Impression: 1.  No acute findings.  2.  Large amount of stool throughout the colon.    All CT scans at [this location] are performed using dose modulation techniques as appropriate to a performed exam including the following:  Automated exposure control; adjustment of the mA and/or kV according to patient size (this includes techniques or standardized protocols for targeted exams where dose is matched to indication / reason for exam; i.e. extremities or head); use of iterative reconstruction technique.    Finalized on: 4/19/2025 4:25 PM By:  Chato Weaver  [facility]# [id number]      2025-04-19 16:27:[id number]     [facility]       Physical Exam    LOWER EXTREMITY PHYSICAL EXAMINATION  NEUROLOGY: Sensation to light touch is intact. Proprioception is intact.     DERMATOLOGY:  Skin is supple, dry and intact. Small callus to the lateral foot. No ulcerations are noted. No hyperkeratosis or calluses are noted. No ecchymosis appreciated.  There are nail changes which are consistent with onychomycosis on the right and left foot nails 1, 2, 3, 4, 5       VASCULAR: The right DP pulse is 2/4 and the left DP is 2/4. The right PT pulse is 2/4 and the left PT pulse is 2/4. Proximal to distal, warm to warm. No dependent rubor or elevation palor is noted. Capillary refill time is less than 3 seconds. Hair growth is appreciated to the dorsal foot and digits.    Assessment:     1. Pain in toes of both feet     2. Tinea unguium    3. Immunosuppressed status    4. Corn or callus        Plan:     Pain in toes of both feet    Tinea unguium    Immunosuppressed status    Corn or callus      Thorough discussion is had with the patient today, concerning the diagnosis, its etiology, and the treatment algorithm at present.    Thorough discussion is had with the patient today, concerning the diagnosis, its etiology, and the treatment algorithm at present.  Discussed pathology in etiology associated with onychodystrophy as it relates to direct/indirect trauma, fungus/onychomycosis, vitamin insufficiencies, smoking, rheumatological pathologies, chemotherapy agents, peripheral vascular disease, diabetes mellitus,  history of nail loss or bleeding under the toenail, chemicals in nail polishes, etc...    Dystrophic nail plates, as outlined above (R#1,2,3,4,5  ; L#1,2,3,4,5 ), are sharply debrided with double action nail nipper, and/or with the assistance of a mechanical rotary jose miguel, with removal of all offending nail and nail border(s), for reduction of pains. Nails are reduced in terms of length, width and girth with removal of subungual debris to facilitate pain free weight bearing and ambulation. The elongated nails as outlined in the objective portion of this note, were trimmed to appropriate length, with a double action nail nipper, for alleviation/reduction of pains as well. Follow up in approx. 3-4 months.    Small callus of the lateral aspect of the foot removed.  Silver nitrate utilized for hemostasis.  No signs of underlying ulceration    Future Appointments   Date Time Provider Department Center   5/26/2025 11:00 AM Brittney House PT [facility] [place]   5/30/2025 11:00 AM Brittney House PT [facility] [place]   6/4/2025 11:00 AM Brittney House PT [facility] [place]   6/6/2025 11:00 AM Brittney House PT [facility] [place]   6/9/2025  9:10 AM TONY MARSHALL [facility] [place]    2025 11:00 AM Brittney House, PT [facility] [place]   2025 10:00 AM Brittney House, PT [facility] [place]   2025 11:00 AM HarshBrittney fernandez, PT [facility] [place]   2025 11:00 AM HarshBrittney efrnandez, PT [facility] [place]   2025  8:00 AM Macey Hernández PA-C [facility] CARDIO [facility]   2025 10:30 AM [facility]ATORY, [facility] [place]   2025  1:40 PM Ryne Reed PA-C [facility] IN PN  [facility]   2025  3:00 PM John Brown MD [facility] RHEU  [facility]   2025  3:45 PM INJECTION 1, [facility] INFUSION [facility] IN[facility]   2025 11:00 AM Dioni Woodard OD [facility] OPHTHAL [facility]   9/3/2025  8:20 AM Christelle Lawler DO [facility] IM  [facility]   9/15/2025 10:00 AM [facility] US [facility] ULSOUND [place]   2025 10:20 AM Melyssa Cee NP [facility] UROLOGY  [facility]             [1]   Social History  Socioeconomic History    Marital status: Single   Tobacco Use    Smoking status: Former     Current packs/day: 0.00     Average packs/day: 0.3 packs/day for 10.0 years (2.5 ttl pk-yrs)     Types: Cigarettes     Start date: 1995     Quit date: 2005     Years since quittin.4    Smokeless tobacco: Never    Tobacco comments:     Former Light Smoker less than 10 a day   Substance and Sexual Activity    Alcohol use: Yes     Alcohol/week: 4.0 standard drinks of alcohol     Types: 4 Glasses of wine per week     Comment: occ    Drug use: No    Sexual activity: Not Currently     Partners: Male     Birth control/protection: Abstinence     Social Drivers of Health     Financial Resource Strain: Medium Risk (2025)    Overall Financial Resource Strain (CARDIA)     Difficulty of Paying Living Expenses: Somewhat hard   Food Insecurity: No Food Insecurity (2025)    Hunger Vital Sign     Worried About Running Out of Food in the Last Year: Never true     Ran Out of Food in the Last Year: Never true   Transportation Needs: No Transportation Needs (2025)     PRAPARE - Transportation     Lack of Transportation (Medical): No     Lack of Transportation (Non-Medical): No   Physical Activity: Sufficiently Active (4/16/2025)    Exercise Vital Sign     Days of Exercise per Week: 4 days     Minutes of Exercise per Session: 60 min   Stress: Stress Concern Present (4/16/2025)    [employer] [place] [employer] - Occupational Stress Questionnaire     Feeling of Stress : To some extent   Housing Stability: Low Risk  (4/16/2025)    Housing Stability Vital Sign     Unable to Pay for Housing in the Last Year: No     Number of Times Moved in the Last Year: 0     Homeless in the Last Year: No

## 2025-05-22 NOTE — TELEPHONE ENCOUNTER
LVM FOR PATIENT ABOUT NOT SEEING ANY INSTRUCTIONS FOR HOME CARE BUT SHE COULD SOAK TOES IN WARM WATER EPSOM SALT IF THERE IS PAIN         Copied from CRM #[id number]. Topic: General Inquiry - Patient Advice  >> May 22, 2025  3:47 PM Michael wrote:  .Type:  Needs Medical Advice    Who Called:  Manuela     Would the patient rather a call back or a response via [facility]?  Call back   Best Call Back Number:  [phone]  Additional Information:  Pt is calling in regards to getting a call back to discuss concerns pertaining to the procedure she had today and would like to know if there is any home care instructions that she need to follow

## 2025-05-23 RX ORDER — MUPIROCIN 20 MG/G
OINTMENT TOPICAL 2 TIMES DAILY
Qty: 30 G | Refills: 0 | Status: SHIPPED | OUTPATIENT
Start: 2025-05-23

## 2025-05-26 ENCOUNTER — CLINICAL SUPPORT (OUTPATIENT)
Dept: REHABILITATION | Facility: HOSPITAL | Age: 75
End: 2025-05-26
Payer: MEDICARE

## 2025-05-26 DIAGNOSIS — M53.86 DECREASED ROM OF LUMBAR SPINE: ICD-10-CM

## 2025-05-26 DIAGNOSIS — M54.50 ACUTE BILATERAL LOW BACK PAIN WITHOUT SCIATICA: Primary | ICD-10-CM

## 2025-05-26 DIAGNOSIS — S39.012D STRAIN OF LUMBAR REGION, SUBSEQUENT ENCOUNTER: ICD-10-CM

## 2025-05-26 PROCEDURE — 97112 NEUROMUSCULAR REEDUCATION: CPT | Mod: PN

## 2025-05-26 NOTE — PROGRESS NOTES
Outpatient Rehab    Physical Therapy Visit    Patient Name: Manuela Reyes  MRN: [id number]  YOB: 1950  Encounter Date: 5/26/2025    Therapy Diagnosis:   Encounter Diagnoses   Name Primary?    Acute bilateral low back pain without sciatica Yes    Strain of lumbar region, subsequent encounter     Decreased ROM of lumbar spine        Physician: Yvette Turner PA-C    Physician Orders: Eval and Treat  Medical Diagnosis: Strain of lumbar region, subsequent encounter  Decreased ROM of lumbar spine    Visit # / Visits Authorized:  10 / 20 plus eval  Insurance Authorization Period: 4/17/2025 to 12/31/2025  Date of Evaluation:  4/17/2025  Plan of Care Certification: 4/17/2025 to 6/26/25  Progress note due 30 days from 4/24/25       Time In:   11:03 am (PT one on one from 1103-11:13)  Time Out:  11:55 pm  Total Time:   52 min  Total Billable Time:   10 minutes    FOTO:  Intake Score:  %  Survey Score 1:  %  Survey Score 2:  %       Standard, HTN, crohn's disease, autoimmune disease      Subjective   Pt reports she had a R back spasm that bothered her this weekend..  Pain reported as 3/10. low back and neck 4/10; 4 at L ribs/scapula    Objective            Treatment:  Therapeutic Exercise  TE 6: recumband bike 8 min  Balance/Neuromuscular Re-Education  NMR 5: cat cows x10 then add alternating arm 2x10  NMR 6: PPT 2x 10; supine scapular protraction 2# L 2x 10 and R 2#; suine alphabet at shoulder 2# a-z2x; supine wand flexion 2# 2x 10; side lying L shoulder horizontal abduction 2x 10; side lying clams with red theraband 2x 10 3 sec hold      Time Entry(in minutes):  Neuromuscular Re-Education Time Entry: 10    Assessment & Plan   Assessment: PT continued to work on tasks to increase core and hip control. PT noted lumbar spine mobility was very good at engaging into flexion in quadraped today. Pt is very weak in the core and hips needing support to advance still but gait is more upright, stride  length is smoother with less lateral lean and she is able to engage the core on cue verbally with less manual assistance needed.  Evaluation/Treatment Tolerance: Patient limited by fatigue    Patient will continue to benefit from skilled outpatient physical therapy to address the deficits listed in the problem list box on initial evaluation, provide pt/family education and to maximize pt's level of independence in the home and community environment.     Patient's spiritual, cultural, and educational needs considered and patient agreeable to plan of care and goals.           Plan: PT to advance core mobility and continue with Manual therapy and core engagement work to reduce sway back postures.    Goals:   Active       long term goals       lumbar flexion pain free; sit to stand without guarding (Progressing)       Start:  04/17/25    Expected End:  06/26/25            5/5 hip abduction strength (Progressing)       Start:  04/17/25    Expected End:  06/26/25            dead lifting 50 # for home tasks (Progressing)       Start:  04/17/25    Expected End:  06/26/25               short term goals       I with HEP (Met)       Start:  04/17/25    Expected End:  05/15/25    Resolved:  05/12/25         cervical flexion ROM 60 degrees and rotation 80 degrees or better (Progressing)       Start:  04/17/25    Expected End:  05/15/25                  Brittney House, PT

## 2025-05-30 ENCOUNTER — CLINICAL SUPPORT (OUTPATIENT)
Dept: REHABILITATION | Facility: HOSPITAL | Age: 75
End: 2025-05-30
Payer: MEDICARE

## 2025-05-30 DIAGNOSIS — M54.50 ACUTE BILATERAL LOW BACK PAIN WITHOUT SCIATICA: Primary | ICD-10-CM

## 2025-05-30 PROCEDURE — 97112 NEUROMUSCULAR REEDUCATION: CPT | Mod: PN

## 2025-05-30 NOTE — PROGRESS NOTES
Outpatient Rehab    Physical Therapy Visit    Patient Name: Manuela Reyes  MRN: [id number]  YOB: 1950  Encounter Date: 5/30/2025    Therapy Diagnosis:   Encounter Diagnosis   Name Primary?    Acute bilateral low back pain without sciatica Yes       Physician: Yvette Turner, PA-C    Physician Orders: Eval and Treat  Medical Diagnosis: Strain of lumbar region, subsequent encounter  Decreased ROM of lumbar spine    Visit # / Visits Authorized:  11 / 20 plus eval  Insurance Authorization Period: 4/17/2025 to 12/31/2025  Date of Evaluation:  4/17/2025  Plan of Care Certification: 4/17/2025 to 6/26/25  Progress note due 30 days from 4/24/25       Time In:   11:03 am (PT one on one from 1103-11:05 then 11:)  Time Out:  12:00 pm  Total Time:   57 min  Total Billable Time:   33 minutes    FOTO:  Intake Score:  %  Survey Score 1:  %  Survey Score 2:  %       Standard, HTN, crohn's disease, autoimmune disease      Subjective   Pt reports she is still having back spasms at times..  Pain reported as 4/10. low back and neck 4/10; 4 at L ribs/scapula    Objective            Treatment:  Therapeutic Exercise  TE 3: lower trunk rotation for ROM and circulation x 10  TE 5: shuttle 4 bands 3 min  Manual Therapy  MT 1: soft tissue mobs to mid T to lumbar paraspinals  Balance/Neuromuscular Re-Education  NMR 1: PEC with PPT 2x 10; PEC with PPT 1x 20 then  2 x10 1 #; side lying hip abduction 1x 10 then 1# 2x 10;  hook lying ball squeeze 10 x 10 sec; table top hold 10 x 10 sec; hook lying clams GTB 3x 10; C med x machine 93# 1x 20; Lumbar med x machine 40 # with PT assistance 1 x10; 24# unable on her own so still assisted 3 reps; 20 # 2x 8      Time Entry(in minutes):  Manual Therapy Time Entry: 9  Neuromuscular Re-Education Time Entry: 24    Assessment & Plan   Assessment: PT continued with therex to reduce guarding in the lumbar spine at the end of her session as she is still sore but improving as now  she can transfer alone with less guarding. Pt is also now able to perform her HEP without assistance so PT is able to increase load as tolerated as she should be able to manage tasks at home better without pain such as squatting to feed or lift her dog and manage her dishes/laundry.  Evaluation/Treatment Tolerance: Patient limited by fatigue    Patient will continue to benefit from skilled outpatient physical therapy to address the deficits listed in the problem list box on initial evaluation, provide pt/family education and to maximize pt's level of independence in the home and community environment.     Patient's spiritual, cultural, and educational needs considered and patient agreeable to plan of care and goals.           Plan: PT to advance core mobility and continue with Manual therapy and core engagement work to reduce sway back postures.    Goals:   Active       long term goals       lumbar flexion pain free; sit to stand without guarding (Progressing)       Start:  04/17/25    Expected End:  06/26/25            5/5 hip abduction strength (Progressing)       Start:  04/17/25    Expected End:  06/26/25            dead lifting 50 # for home tasks (Progressing)       Start:  04/17/25    Expected End:  06/26/25               short term goals       I with HEP (Met)       Start:  04/17/25    Expected End:  05/15/25    Resolved:  05/12/25         cervical flexion ROM 60 degrees and rotation 80 degrees or better (Progressing)       Start:  04/17/25    Expected End:  05/15/25                  Brittney House PT

## 2025-06-09 ENCOUNTER — CLINICAL SUPPORT (OUTPATIENT)
Dept: REHABILITATION | Facility: HOSPITAL | Age: 75
End: 2025-06-09
Payer: MEDICARE

## 2025-06-09 DIAGNOSIS — M54.50 ACUTE BILATERAL LOW BACK PAIN WITHOUT SCIATICA: Primary | ICD-10-CM

## 2025-06-09 PROCEDURE — 97110 THERAPEUTIC EXERCISES: CPT | Mod: PN

## 2025-06-09 PROCEDURE — 97112 NEUROMUSCULAR REEDUCATION: CPT | Mod: PN

## 2025-06-09 NOTE — PROGRESS NOTES
Outpatient Rehab    Physical Therapy Visit and Progress Note    Patient Name: Manuela Reyes  MRN: [id number]  YOB: 1950  Encounter Date: 6/9/2025    Therapy Diagnosis:   Encounter Diagnosis   Name Primary?    Acute bilateral low back pain without sciatica Yes       Physician: Yvette Turner PA-C    Physician Orders: Eval and Treat  Medical Diagnosis: Strain of lumbar region, subsequent encounter  Decreased ROM of lumbar spine    Visit # / Visits Authorized:  12 / 20 plus eval  Insurance Authorization Period: 4/17/2025 to 12/31/2025  Date of Evaluation:  4/17/2025  Plan of Care Certification: 4/17/2025 to 6/26/25  Progress note due 30 days from 4/24/25       Time In:   11:04 am (PT one on one until 12)  Time Out:  12:00 pm  Total Time:   56 min  Total Billable Time:   56 minutes    FOTO:  Intake Score:  %  Survey Score 1:  %  Survey Score 2:  %       Standard, HTN, crohn's disease, autoimmune disease      Subjective   Pt reports she gets spasms on the L>R when turning to the L to grab things when laying in  bed on her R side at night..  Pain reported as 7/10. low back and neck 3/10; 7 at L ribs/scapula    Objective         Hypomobile: Lumbosacral  Cervical Mobility Details: PT noted severe forwards head posture and sway back posture in the spine with poor core engagement in standing.  Lumbosacral Mobility Details: Lumbar flexion 8 inches from floor improved to 4 inches from floor; extension 50 % towards the sacrum but can't get to it with scapula due to pain improved to 70% ROM; R/L side flexion to knee improved to below knee; sit to stand 30 sec test 8 reps improved to 9          Cervical Range of Motion    Active (deg) today Pain   Flexion 50  55     Extension 50  55     Right Lateral Flexion 20  25     Right Rotation 60  70     Left Lateral Flexion 15  25     Left Rotation 55  70        Upper cervical rotation 60% improved to 80%; upper cervical flexion to R 80%; to L 60% improved to  70% ; T spine rotation improved to 30% ROM today         Hip Strength - Planes of Motion    Right Strength today Left Strength today   Flexion (L2) 4  4 4  4+   Extension 2+  3 2+  3   ABduction 3-  3+ 3-  3+   ADduction           Internal Rotation           External Rotation                    Treatment:  Therapeutic Exercise  TE 1: recumbant bike 6 min  TE 2: open book 2x 10  Balance/Neuromuscular Re-Education  NMR 2: seated rotation machine with green theraband 3x 10 R then L; lumbar extension machine 20 # 1x 20; Cervical extension machine 90# 1x 10 then 96# 1x 10; open book with green theraband to train trunk rotators; PPT 1x 20;  table top hold 10x 5 sec      Time Entry(in minutes):  Neuromuscular Re-Education Time Entry: 48  Therapeutic Exercise Time Entry: 8    Assessment & Plan   Assessment: Pt is now transferring supine<>sit with less guarding and able to perform with some resistance on the neck and back with less manual help and more VC today insteady. Pt fatigues in the core muscles still and is not nearly up to the load she should be tolerating at her size and age needing considerable strength gains to advance to less pain. However, Pt  ROM is so much better and standing posture is improving with less sway back.  Evaluation/Treatment Tolerance: Patient limited by fatigue    Patient will continue to benefit from skilled outpatient physical therapy to address the deficits listed in the problem list box on initial evaluation, provide pt/family education and to maximize pt's level of independence in the home and community environment.     Patient's spiritual, cultural, and educational needs considered and patient agreeable to plan of care and goals.           Plan: PT to advance core mobility and continue with Manual therapy and core engagement work to reduce sway back postures.    Goals:   Active       long term goals       lumbar flexion pain free; sit to stand without guarding (Progressing)       Start:   04/17/25    Expected End:  06/26/25            5/5 hip abduction strength (Progressing)       Start:  04/17/25    Expected End:  06/26/25            dead lifting 50 # for home tasks (Progressing)       Start:  04/17/25    Expected End:  06/26/25               short term goals       I with HEP (Met)       Start:  04/17/25    Expected End:  05/15/25    Resolved:  05/12/25         cervical flexion ROM 60 degrees and rotation 80 degrees or better (Progressing)       Start:  04/17/25    Expected End:  05/15/25                  Brittney House, PT

## 2025-06-12 ENCOUNTER — CLINICAL SUPPORT (OUTPATIENT)
Dept: REHABILITATION | Facility: HOSPITAL | Age: 75
End: 2025-06-12
Payer: MEDICARE

## 2025-06-12 ENCOUNTER — DOCUMENTATION ONLY (OUTPATIENT)
Dept: REHABILITATION | Facility: HOSPITAL | Age: 75
End: 2025-06-12
Payer: MEDICARE

## 2025-06-12 DIAGNOSIS — M54.50 ACUTE BILATERAL LOW BACK PAIN WITHOUT SCIATICA: Primary | ICD-10-CM

## 2025-06-12 PROCEDURE — 97110 THERAPEUTIC EXERCISES: CPT | Mod: PN,CQ

## 2025-06-12 PROCEDURE — 97112 NEUROMUSCULAR REEDUCATION: CPT | Mod: PN,CQ

## 2025-06-12 PROCEDURE — 97140 MANUAL THERAPY 1/> REGIONS: CPT | Mod: PN,CQ

## 2025-06-12 NOTE — PROGRESS NOTES
Outpatient Rehab    Physical Therapy Visit    Patient Name: Manuela Reyes  MRN: [id number]  YOB: 1950  Encounter Date: 6/12/2025    Therapy Diagnosis:   Encounter Diagnosis   Name Primary?    Acute bilateral low back pain without sciatica Yes         Physician: Yvette Turner PAHajaC    Physician Orders: Eval and Treat  Medical Diagnosis: Strain of lumbar region, subsequent encounter  Decreased ROM of lumbar spine    Visit # / Visits Authorized:  13 / 20 plus eval  Insurance Authorization Period: 4/17/2025 to 12/31/2025  Date of Evaluation:  4/17/2025  Plan of Care Certification: 4/17/2025 to 6/26/25  Progress note due 30 days from 6/9/25       Time In:   1:20 pm  Time Out:  2:10 pm  Total Time:   50 min  Total Billable Time:   42 minutes    FOTO:  Intake Score: 47%  Survey Score 1: 48%  Survey Score 2:  %         Subjective   Patient reports she has been having trouble sleeping due to her dog having flare ups recently. Patient said thoracic rotation machine made her sore for a few days and unable to lay on left side..  Pain reported as 3/10. low back; 5 at L ribs/scapula    Objective            Treatment:  Therapeutic Exercise  TE 1: nustep 6 min  TE 2: seated rotation machine 3x 10 R then L  Manual Therapy  MT 1: soft tissue mobs to mid T to lumbar paraspinals R  Balance/Neuromuscular Re-Education  NMR 2: Cervical extension machine 90# 2x 10; standing open book x 10 green theraband; PPT 1x 20;  table top hold 10x 5 sec  NMR 5: cat cows x10 then add alternating arm 2x10      Time Entry(in minutes):  Manual Therapy Time Entry: 12  Neuromuscular Re-Education Time Entry: 15  Therapeutic Exercise Time Entry: 15    Assessment & Plan   Assessment: Patient has increased pain in left thoracic paraspinals unable to lay on side due to pain therefore time spent on manual therapy to decrease tension in muscles and improve pain. MOdifications made secondary to increased pain experienced today so more  gentle approach and mobility work performed today.       Patient will continue to benefit from skilled outpatient physical therapy to address the deficits listed in the problem list box on initial evaluation, provide pt/family education and to maximize pt's level of independence in the home and community environment.     Patient's spiritual, cultural, and educational needs considered and patient agreeable to plan of care and goals.           Plan:      Goals:   Active       long term goals       lumbar flexion pain free; sit to stand without guarding (Progressing)       Start:  04/17/25    Expected End:  06/26/25            5/5 hip abduction strength (Progressing)       Start:  04/17/25    Expected End:  06/26/25            dead lifting 50 # for home tasks (Progressing)       Start:  04/17/25    Expected End:  06/26/25               short term goals       I with HEP (Met)       Start:  04/17/25    Expected End:  05/15/25    Resolved:  05/12/25         cervical flexion ROM 60 degrees and rotation 80 degrees or better (Progressing)       Start:  04/17/25    Expected End:  05/15/25                    Jeanette Pan, PTA

## 2025-06-18 ENCOUNTER — CLINICAL SUPPORT (OUTPATIENT)
Dept: REHABILITATION | Facility: HOSPITAL | Age: 75
End: 2025-06-18
Payer: MEDICARE

## 2025-06-18 DIAGNOSIS — M54.50 ACUTE BILATERAL LOW BACK PAIN WITHOUT SCIATICA: Primary | ICD-10-CM

## 2025-06-18 PROCEDURE — 97112 NEUROMUSCULAR REEDUCATION: CPT | Mod: PN

## 2025-06-18 PROCEDURE — 97530 THERAPEUTIC ACTIVITIES: CPT | Mod: PN

## 2025-06-18 NOTE — PROGRESS NOTES
Outpatient Rehab    Physical Therapy Visit    Patient Name: Manuela Reyes  MRN: [id number]  YOB: 1950  Encounter Date: 6/18/2025    Therapy Diagnosis:   Encounter Diagnosis   Name Primary?    Acute bilateral low back pain without sciatica Yes     Physician: Yvette Turner, PAHajaC    Physician Orders: Eval and Treat  Medical Diagnosis: Strain of lumbar region, subsequent encounter  Decreased ROM of lumbar spine    Visit # / Visits Authorized:  14 / 20 plus eval  Insurance Authorization Period: 4/17/2025 to 12/31/2025  Date of Evaluation:  4/17/2025  Plan of Care Certification: 4/17/2025 to 6/26/25  Progress note due 30 days from 6/9/25       Time In:   11:10 am with PT only from 11:20-12  Time Out:  12:00 pm  Total Time:   50 min  Total Billable Time:   40 minutes    FOTO:  Intake Score:  %  Survey Score 1:  %  Survey Score 2:  %       Standard, HTN, crohn's disease, autoimmune disease      Subjective   Pt dog has been ill and in and out of the vet. Pt states she is finally sleeping better now that the dog has been at the vet but she was struggling for some time. Pain in the back can be 3 on average but up to 8 at times still occasionally..  Pain reported as 3/10. low back and upper back    Objective        Cervical flexion 60 ; rotation 80; able to deep neck flexor hold 28 sec today.    Treatment:  Therapeutic Exercise  TE 5: shuttle 4 bands 3 min  Balance/Neuromuscular Re-Education  NMR 1: PEC with PPT 1x 10; PEC with PPT and bridge 3x 10; table top hold with shoulder flexion  and chin tuck 3x 10; cervical med x machine 102 # 1x 20; qped alternating with alternating arm 3x 10  Therapeutic Activity  TA 1: sit to stnad from 18 inch box 3x 10  TA 2: farmer's carry 5# per hand (10 #) 80 ft x2  TA 3: goblet carry 5 # 1x 80 ft  10 # 2x 80 ft      Time Entry(in minutes):  Neuromuscular Re-Education Time Entry: 30  Therapeutic Activity Time Entry: 10    Assessment & Plan   Assessment: Pt is  transferring better with rolling and sit to stand. Pt is able to now lay her head flat on the bed during therex task as she has less forward head posture. Lumbar posture is improving as she walks in less sway back as well. PT increased loading challenges on the spine today. Pt is very active caring for a sick dog so PT to continue working on gains in strength to perform simple tasks such as squatting and lifting to advance to safer levels of function with less pain.  Evaluation/Treatment Tolerance: Patient limited by fatigue    Patient will continue to benefit from skilled outpatient physical therapy to address the deficits listed in the problem list box on initial evaluation, provide pt/family education and to maximize pt's level of independence in the home and community environment.     Patient's spiritual, cultural, and educational needs considered and patient agreeable to plan of care and goals.           Plan: PT to advance core mobility and continue with Manual therapy and core engagement work to reduce sway back postures.    Goals:   Active       long term goals       lumbar flexion pain free; sit to stand without guarding (Progressing)       Start:  04/17/25    Expected End:  06/26/25            5/5 hip abduction strength (Progressing)       Start:  04/17/25    Expected End:  06/26/25            dead lifting 50 # for home tasks (Progressing)       Start:  04/17/25    Expected End:  06/26/25              Resolved       short term goals       I with HEP (Met)       Start:  04/17/25    Expected End:  05/15/25    Resolved:  05/12/25         cervical flexion ROM 60 degrees and rotation 80 degrees or better (Met)       Start:  04/17/25    Expected End:  05/15/25    Resolved:  06/18/25                 Brittney House, PT

## 2025-06-19 ENCOUNTER — PATIENT MESSAGE (OUTPATIENT)
Dept: CARDIOLOGY | Facility: CLINIC | Age: 75
End: 2025-06-19
Payer: MEDICARE

## 2025-06-20 ENCOUNTER — CLINICAL SUPPORT (OUTPATIENT)
Dept: REHABILITATION | Facility: HOSPITAL | Age: 75
End: 2025-06-20
Payer: MEDICARE

## 2025-06-20 ENCOUNTER — LAB VISIT (OUTPATIENT)
Dept: LAB | Facility: HOSPITAL | Age: 75
End: 2025-06-20
Attending: PHYSICIAN ASSISTANT
Payer: MEDICARE

## 2025-06-20 DIAGNOSIS — M54.50 ACUTE BILATERAL LOW BACK PAIN WITHOUT SCIATICA: Primary | ICD-10-CM

## 2025-06-20 DIAGNOSIS — E78.5 HYPERLIPIDEMIA LDL GOAL <70: ICD-10-CM

## 2025-06-20 LAB
CHOLEST SERPL-MCNC: 178 MG/DL (ref 120–199)
CHOLEST/HDLC SERPL: 2.4 {RATIO} (ref 2–5)
HDLC SERPL-MCNC: 75 MG/DL (ref 40–75)
HDLC SERPL: 42.1 % (ref 20–50)
LDLC SERPL CALC-MCNC: 85.6 MG/DL (ref 63–159)
NONHDLC SERPL-MCNC: 103 MG/DL
TRIGL SERPL-MCNC: 87 MG/DL (ref 30–150)

## 2025-06-20 PROCEDURE — 97112 NEUROMUSCULAR REEDUCATION: CPT | Mod: PN

## 2025-06-20 PROCEDURE — 97140 MANUAL THERAPY 1/> REGIONS: CPT | Mod: PN

## 2025-06-20 PROCEDURE — 36415 COLL VENOUS BLD VENIPUNCTURE: CPT

## 2025-06-20 PROCEDURE — 97110 THERAPEUTIC EXERCISES: CPT | Mod: PN

## 2025-06-20 PROCEDURE — 80061 LIPID PANEL: CPT

## 2025-06-20 NOTE — PROGRESS NOTES
Outpatient Rehab    Physical Therapy Visit    Patient Name: Manuela Reyes  MRN: [id number]  YOB: 1950  Encounter Date: 6/20/2025    Therapy Diagnosis:   Encounter Diagnosis   Name Primary?    Acute bilateral low back pain without sciatica Yes     Physician: Yvette Turner, *    Physician Orders: Eval and Treat  Medical Diagnosis: Strain of lumbar region, subsequent encounter  Decreased ROM of lumbar spine  Surgical Diagnosis: Not applicable for this Episode   Surgical Date: Not applicable for this Episode  Days Since Last Surgery: Not applicable for this Episode    Visit # / Visits Authorized:  15 / 20  Insurance Authorization Period: 4/17/2025 to 12/31/2025  Date of Evaluation: 4/17/2025  Plan of Care Certification: 4/17/2025 to 6/26/2025      PT/PTA:     Number of PTA visits since last PT visit:   Time In: 1430   Time Out: 1525  Total Time (in minutes): 55   Total Billable Time (in minutes): 55    FOTO:  Intake Score:  %  Survey Score 2:  %  Survey Score 3:  %    Precautions:       Subjective   Pt reports she is doing overall well and feels she has improved since starting PT. She is sore today in lower back. Has been remaining active and is doing HEP regularly..  Pain reported as 3/10. low back centrally and between scapulae    Objective            Treatment:  Therapeutic Exercise  TE 1: piriformis stretch 4 x 30 sec  TE 2: sciatic neural glides x 30 R/L  TE 3: lower trunk rotation for ROM and circulation x 3 min  TE 5: shuttle 4 bands 3 min  Manual Therapy  MT 1: soft tissue mobs to mid T to lumbar paraspinals R  Balance/Neuromuscular Re-Education  NMR 1: PPT with bridge 2 x 10 then 2 x 5 bridge with march  NMR 2: cat cow x 30 - slow  NMR 3: seated TSp extension with front rack ball hold - knees on step 4 x 8 4# ball    Time Entry(in minutes):  Manual Therapy Time Entry: 10  Neuromuscular Re-Education Time Entry: 25  Therapeutic Exercise Time Entry: 20    Assessment & Plan    Assessment: Pt tolerated session well. Has significant thoracic kyphosis with hypomobility - some difficulty with active extension and control of such. Reports feeling better post session . Will benefit from continuing to progress with thoracic mobility and trunk strengthening per plan of care .        The patient will continue to benefit from skilled outpatient physical therapy in order to address the deficits listed in the problem list on the initial evaluation, provide patient and family education, and maximize the patients level of independence in the home and community environments.     The patient's spiritual, cultural, and educational needs were considered, and the patient is agreeable to the plan of care and goals.           Plan: PT to progress core mobility / trunk strengthening and continue with Manual therapy as neededpostures.    Goals:   Active       long term goals       lumbar flexion pain free; sit to stand without guarding (Progressing)       Start:  04/17/25    Expected End:  06/26/25            5/5 hip abduction strength (Progressing)       Start:  04/17/25    Expected End:  06/26/25            dead lifting 50 # for home tasks (Progressing)       Start:  04/17/25    Expected End:  06/26/25              Resolved       short term goals       I with HEP (Met)       Start:  04/17/25    Expected End:  05/15/25    Resolved:  05/12/25         cervical flexion ROM 60 degrees and rotation 80 degrees or better (Met)       Start:  04/17/25    Expected End:  05/15/25    Resolved:  06/18/25             Yaya Chambers PT

## 2025-06-22 ENCOUNTER — PATIENT MESSAGE (OUTPATIENT)
Dept: INTERNAL MEDICINE | Facility: CLINIC | Age: 75
End: 2025-06-22
Payer: MEDICARE

## 2025-06-23 ENCOUNTER — LAB VISIT (OUTPATIENT)
Dept: LAB | Facility: HOSPITAL | Age: 75
End: 2025-06-23
Attending: INTERNAL MEDICINE
Payer: MEDICARE

## 2025-06-23 ENCOUNTER — OFFICE VISIT (OUTPATIENT)
Dept: INTERNAL MEDICINE | Facility: CLINIC | Age: 75
End: 2025-06-23
Payer: MEDICARE

## 2025-06-23 ENCOUNTER — CLINICAL SUPPORT (OUTPATIENT)
Dept: REHABILITATION | Facility: HOSPITAL | Age: 75
End: 2025-06-23
Payer: MEDICARE

## 2025-06-23 ENCOUNTER — TELEPHONE (OUTPATIENT)
Dept: CARDIOLOGY | Facility: CLINIC | Age: 75
End: 2025-06-23
Payer: MEDICARE

## 2025-06-23 ENCOUNTER — RESULTS FOLLOW-UP (OUTPATIENT)
Dept: CARDIOLOGY | Facility: CLINIC | Age: 75
End: 2025-06-23

## 2025-06-23 VITALS
OXYGEN SATURATION: 99 % | SYSTOLIC BLOOD PRESSURE: 140 MMHG | HEART RATE: 91 BPM | HEIGHT: 67 IN | BODY MASS INDEX: 15.12 KG/M2 | DIASTOLIC BLOOD PRESSURE: 76 MMHG | TEMPERATURE: 98 F | WEIGHT: 96.31 LBS

## 2025-06-23 DIAGNOSIS — R63.4 WEIGHT LOSS: Primary | ICD-10-CM

## 2025-06-23 DIAGNOSIS — R63.4 WEIGHT LOSS: ICD-10-CM

## 2025-06-23 DIAGNOSIS — R53.83 FATIGUE, UNSPECIFIED TYPE: ICD-10-CM

## 2025-06-23 DIAGNOSIS — R79.9 ABNORMAL FINDING OF BLOOD CHEMISTRY, UNSPECIFIED: ICD-10-CM

## 2025-06-23 DIAGNOSIS — M54.50 ACUTE BILATERAL LOW BACK PAIN WITHOUT SCIATICA: Primary | ICD-10-CM

## 2025-06-23 DIAGNOSIS — S39.012D STRAIN OF LUMBAR REGION, SUBSEQUENT ENCOUNTER: ICD-10-CM

## 2025-06-23 DIAGNOSIS — F41.9 ANXIETY: ICD-10-CM

## 2025-06-23 DIAGNOSIS — I10 ESSENTIAL HYPERTENSION: ICD-10-CM

## 2025-06-23 DIAGNOSIS — M54.50 BILATERAL LOW BACK PAIN WITHOUT SCIATICA, UNSPECIFIED CHRONICITY: ICD-10-CM

## 2025-06-23 DIAGNOSIS — M53.86 DECREASED ROM OF LUMBAR SPINE: ICD-10-CM

## 2025-06-23 LAB
ABSOLUTE EOSINOPHIL (OHS): 0.01 K/UL
ABSOLUTE MONOCYTE (OHS): 0.61 K/UL (ref 0.3–1)
ABSOLUTE NEUTROPHIL COUNT (OHS): 5.24 K/UL (ref 1.8–7.7)
ALBUMIN SERPL BCP-MCNC: 4.2 G/DL (ref 3.5–5.2)
ALP SERPL-CCNC: 154 UNIT/L (ref 40–150)
ALT SERPL W/O P-5'-P-CCNC: 15 UNIT/L (ref 10–44)
ANION GAP (OHS): 13 MMOL/L (ref 8–16)
AST SERPL-CCNC: 22 UNIT/L (ref 11–45)
BASOPHILS # BLD AUTO: 0.02 K/UL
BASOPHILS NFR BLD AUTO: 0.3 %
BILIRUB SERPL-MCNC: 0.4 MG/DL (ref 0.1–1)
BUN SERPL-MCNC: 32 MG/DL (ref 8–23)
CALCIUM SERPL-MCNC: 9.5 MG/DL (ref 8.7–10.5)
CHLORIDE SERPL-SCNC: 93 MMOL/L (ref 95–110)
CO2 SERPL-SCNC: 22 MMOL/L (ref 23–29)
CREAT SERPL-MCNC: 0.9 MG/DL (ref 0.5–1.4)
EAG (OHS): 105 MG/DL (ref 68–131)
ERYTHROCYTE [DISTWIDTH] IN BLOOD BY AUTOMATED COUNT: 13.1 % (ref 11.5–14.5)
GFR SERPLBLD CREATININE-BSD FMLA CKD-EPI: >60 ML/MIN/1.73/M2
GLUCOSE SERPL-MCNC: 100 MG/DL (ref 70–110)
HBA1C MFR BLD: 5.3 % (ref 4–5.6)
HCT VFR BLD AUTO: 35.2 % (ref 40–54)
HGB BLD-MCNC: 11.9 GM/DL (ref 14–18)
IMM GRANULOCYTES # BLD AUTO: 0.03 K/UL (ref 0–0.04)
IMM GRANULOCYTES NFR BLD AUTO: 0.4 % (ref 0–0.5)
LYMPHOCYTES # BLD AUTO: 2 K/UL (ref 1–4.8)
MCH RBC QN AUTO: 33.7 PG (ref 27–31)
MCHC RBC AUTO-ENTMCNC: 33.8 G/DL (ref 32–36)
MCV RBC AUTO: 100 FL (ref 82–98)
NUCLEATED RBC (/100WBC) (OHS): 0 /100 WBC
PLATELET # BLD AUTO: 293 K/UL (ref 150–450)
PMV BLD AUTO: 9.4 FL (ref 9.2–12.9)
POTASSIUM SERPL-SCNC: 4.4 MMOL/L (ref 3.5–5.1)
PROT SERPL-MCNC: 7.5 GM/DL (ref 6–8.4)
RBC # BLD AUTO: 3.53 M/UL (ref 4.6–6.2)
RELATIVE EOSINOPHIL (OHS): 0.1 %
RELATIVE LYMPHOCYTE (OHS): 25.3 % (ref 18–48)
RELATIVE MONOCYTE (OHS): 7.7 % (ref 4–15)
RELATIVE NEUTROPHIL (OHS): 66.2 % (ref 38–73)
SODIUM SERPL-SCNC: 128 MMOL/L (ref 136–145)
TSH SERPL-ACNC: 2.08 UIU/ML (ref 0.4–4)
WBC # BLD AUTO: 7.91 K/UL (ref 3.9–12.7)

## 2025-06-23 PROCEDURE — 80053 COMPREHEN METABOLIC PANEL: CPT

## 2025-06-23 PROCEDURE — 3077F SYST BP >= 140 MM HG: CPT | Mod: CPTII,S$GLB,, | Performed by: INTERNAL MEDICINE

## 2025-06-23 PROCEDURE — 3288F FALL RISK ASSESSMENT DOCD: CPT | Mod: CPTII,S$GLB,, | Performed by: INTERNAL MEDICINE

## 2025-06-23 PROCEDURE — 1101F PT FALLS ASSESS-DOCD LE1/YR: CPT | Mod: CPTII,S$GLB,, | Performed by: INTERNAL MEDICINE

## 2025-06-23 PROCEDURE — 83036 HEMOGLOBIN GLYCOSYLATED A1C: CPT

## 2025-06-23 PROCEDURE — 99999 PR PBB SHADOW E&M-EST. PATIENT-LVL IV: CPT | Mod: PBBFAC,,, | Performed by: INTERNAL MEDICINE

## 2025-06-23 PROCEDURE — 97110 THERAPEUTIC EXERCISES: CPT | Mod: PN

## 2025-06-23 PROCEDURE — 3078F DIAST BP <80 MM HG: CPT | Mod: CPTII,S$GLB,, | Performed by: INTERNAL MEDICINE

## 2025-06-23 PROCEDURE — 99214 OFFICE O/P EST MOD 30 MIN: CPT | Mod: S$GLB,,, | Performed by: INTERNAL MEDICINE

## 2025-06-23 PROCEDURE — 1125F AMNT PAIN NOTED PAIN PRSNT: CPT | Mod: CPTII,S$GLB,, | Performed by: INTERNAL MEDICINE

## 2025-06-23 PROCEDURE — 85025 COMPLETE CBC W/AUTO DIFF WBC: CPT

## 2025-06-23 PROCEDURE — 36415 COLL VENOUS BLD VENIPUNCTURE: CPT

## 2025-06-23 PROCEDURE — 1160F RVW MEDS BY RX/DR IN RCRD: CPT | Mod: CPTII,S$GLB,, | Performed by: INTERNAL MEDICINE

## 2025-06-23 PROCEDURE — 97530 THERAPEUTIC ACTIVITIES: CPT | Mod: PN

## 2025-06-23 PROCEDURE — 1159F MED LIST DOCD IN RCRD: CPT | Mod: CPTII,S$GLB,, | Performed by: INTERNAL MEDICINE

## 2025-06-23 PROCEDURE — 84443 ASSAY THYROID STIM HORMONE: CPT

## 2025-06-23 PROCEDURE — 97112 NEUROMUSCULAR REEDUCATION: CPT | Mod: PN

## 2025-06-23 PROCEDURE — G2211 COMPLEX E/M VISIT ADD ON: HCPCS | Mod: S$GLB,,, | Performed by: INTERNAL MEDICINE

## 2025-06-23 RX ORDER — DULOXETIN HYDROCHLORIDE 30 MG/1
30 CAPSULE, DELAYED RELEASE ORAL DAILY
Qty: 30 CAPSULE | Refills: 2 | Status: SHIPPED | OUTPATIENT
Start: 2025-06-23

## 2025-06-23 NOTE — TELEPHONE ENCOUNTER
----- Message from Macey Hernández PA-C sent at 6/23/2025  9:57 AM CDT -----  Labs reviewed/stable, will discuss at f/u  ----- Message -----  From: Lab, Background User  Sent: 6/20/2025   5:03 PM CDT  To: Macey Hernández PA-C

## 2025-06-23 NOTE — PROGRESS NOTES
Manuela Reyes  75 y.o. White adult  [id number]    Chief Complaint:  Chief Complaint   Patient presents with    Weight Loss    Back Pain    Fatigue       History of Present Illness:  History of Present Illness    CHIEF COMPLAINT:  Ms. Reyes presents today for follow up of weight loss, fatigue and back strain     HISTORY OF PRESENT ILLNESS:  She initially strained her back while lifting heavy groceries and continuing routine activities. She visited urgent care on April 10th and was diagnosed with muscle strain, prescribed Robaxin for 10 days. She was referred to physical therapy on April 16th after evaluation by Miss Turner. She currently experiences low back pain across the right side with occasional tingling in right leg and foot, as well as pain between shoulder blades at the spine's curvature. She reports morning lower back stiffness that improves with recommended exercises. Afternoon pain is only relieved by lying down to straighten spine. She has been in physical therapy for approximately two months with some improvement but continues to experience both lower and upper back pain. Tylenol provides minimal benefit.    WEIGHT MANAGEMENT:  She reports unintentional weight loss from 110 lbs to 96 lbs, attributed to reduced appetite secondary to back pain. She is attempting to mitigate weight loss by consuming 4-5 small meals daily instead of 3 traditional meals. She expresses concern about potential underlying medical causes and muscle mass loss.    SLEEP:  She experiences significant sleep disturbances related to pain. Lorazepam helps with sleep initiation but only maintains sleep for approximately 4 hours. She has difficulty returning to sleep after waking to use the bathroom or changing positions.    MEDICAL HISTORY:  She has a history of Crohn's disease which prevents her from taking NSAIDs. She has been diagnosed with scoliosis related to core muscle weakness. She has four physical therapy sessions  remaining.    MEDICATIONS:  She takes amlodipine for blood pressure management, prescribed by cardiologist several years ago. She uses lorazepam for sleep and takes a quarter tablet in the morning or afternoon as needed for anxiety related to caretaking responsibilities.    ASSOCIATED SYMPTOMS:  She reports intermittent fatigue, weakness, and shaking. She also notes an unusual sensation of feeling food moving through her throat without pain or difficulty swallowing.         Review of Systems   Constitutional:  Positive for malaise/fatigue and weight loss.   Respiratory:  Negative for cough.    Cardiovascular:  Negative for chest pain.   Gastrointestinal:  Negative for abdominal pain.   Musculoskeletal:  Positive for back pain.   Neurological:  Positive for tingling.   Psychiatric/Behavioral:  Negative for depression. The patient is nervous/anxious and has insomnia.        History:  Past Medical History:   Diagnosis Date    Anxiety     Crohn's disease     Depression     Gallstones 01/07/2025    Genital herpes     Hepatitis C infection     Hypertension     Insomnia     Mesenteric artery stenosis     Dr. 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 Malaika Mack--dermatology    TIA (transient ischemic attack)        Medications:  Medications Ordered Prior to Encounter[1]    Allergies:  Review of patient's allergies indicates:   Allergen Reactions    Statins-hmg-coa reductase inhibitors Anaphylaxis     Myalgias    Codeine sulfate Itching    Hydrochlorothiazide Other (See Comments)     Adverse Reaction    Lisinopril Swelling and Edema     Facial Swelling    Keflex [cephalexin] Other (See Comments)     Muscle aches       Exam:  Vitals:    06/23/25 1416   BP: (!) 140/76   Pulse:    Temp:      Weight: 43.7 kg (96 lb 5.5 oz)   Body mass index is 15.09 kg/m².      Physical Exam    Vitals: Reviewed. Weight: 96 lbs.  Constitutional: No acute distress.  Well-developed. Not ill-appearing.  Eyes: No scleral icterus.  Cardiovascular: Normal rate and regular rhythm. Normal heart sounds.  Pulmonary: Pulmonary effort is normal. No respiratory distress. Normal breath sounds.  Abdomen: Soft. Nontender. Nondistended. Normoactive bowel sounds.  Extremities: No edema.  Skin: Warm. Dry.  Neurological: Alert and oriented to person, place, and time.  Psychiatric: Behavior normal.  Back: Tenderness to palpation in upper back. Visible spinal curvature.         Assessment:  The primary encounter diagnosis was Weight loss. Diagnoses of Fatigue, unspecified type, Abnormal finding of blood chemistry, unspecified, Bilateral low back pain without sciatica, unspecified chronicity, and Anxiety were also pertinent to this visit.    Assessment & Plan    WEIGHT LOSS:  - Ms. Reyes to incorporate 1 protein supplement (Boost or Ensure) daily to support weight gain and muscle maintenance.  - Ms. Reyes to monitor weight at home or during other medical appointments.  - Ordered labs to investigate weight loss, including TSH, liver function, renal function, and glucose tests.    FATIGUE, UNSPECIFIED TYPE:  - Ordered labs to investigate fatigue, including TSH, liver function, renal function, and glucose tests.    BILATERAL LOW BACK PAIN WITHOUT SCIATICA, UNSPECIFIED CHRONICITY:  - Attributed elevated BP to current pain, anticipating improvement with pain relief.  - Started Cymbalta to be taken daily in the morning for back pain, given its dual action on mood and musculoskeletal pain.  - Explained Cymbalta as an SNRI antidepressant that can treat musculoskeletal pain.    ANXIETY:  - Started Cymbalta to be taken daily in the morning for anxiety, given its dual action on mood and musculoskeletal pain.  - Explained Cymbalta as an SNRI antidepressant that can treat anxiety, depression, and musculoskeletal pain.  - Advised that antidepressants like Cymbalta may take weeks to show full effects due  to gradual hormonal changes.  - Discussed potential side effects of Cymbalta, including initial nausea, headaches, and possible bowel changes.    ESSENTIAL HYPERTENSION: :  - Continued amlodipine for BP management.    FOLLOW UP:   - Follow up on September 3rd as scheduled.                          [1]   Current Outpatient Medications on File Prior to Visit   Medication Sig Dispense Refill    adalimumab (HUMIRA,CF, PEN) 40 mg/0.4 mL PnKt Inject 0.4 mLs (40 mg total) into the skin every 14 (fourteen) days. 6 pen 3    amLODIPine (NORVASC) 10 MG tablet Take 1 tablet by mouth once daily 90 tablet 2    aspirin (ECOTRIN) 81 MG EC tablet Take 1 tablet (81 mg total) by mouth once daily.      atenoloL (TENORMIN) 25 MG tablet Take 1 tablet (25 mg total) by mouth once daily. 90 tablet 3    calcium carbonate (OS-COOPER) 600 mg (1,500 mg) Tab Take 1,200 mg by mouth once daily.       cyanocobalamin, vitamin B-12, 2,500 mcg Lozg Place 1 tablet under the tongue.       denosumab (PROLIA) 60 mg/mL Syrg Inject 60 mg into the skin every 6 (six) months.      diclofenac sodium (VOLTAREN ARTHRITIS PAIN) 1 % Gel Apply 2 grams topically once daily. 100 g 0    levocetirizine (XYZAL) 5 MG tablet Take 1 tablet (5 mg total) by mouth every evening. 90 tablet 3    LORazepam (ATIVAN) 2 MG Tab Take 1 tablet (2 mg total) by mouth every evening. 30 tablet 5    methylPREDNISolone (MEDROL DOSEPACK) 4 mg tablet Follow package directions 21 each 0    multivitamin with minerals tablet Take 1 tablet by mouth once daily at 6am.      mupirocin (BACTROBAN) 2 % ointment Apply topically 2 (two) times daily. 30 g 0    neomycin (MYCIFRADIN) 500 mg Tab Take 2 tablets (1,000 mg total) by mouth 3 (three) times daily. Take initial dose at 2pm, take second dose at 3pm and take final dose at 10pm the day before surgery. 6 tablet 0    polyethylene glycol (GLYCOLAX) 17 gram/dose powder Take 238 g by mouth once daily. Mix with 2L of gatorade and drink all of mixed solution  starting at 12pm the day before surgery, finishing by 10pm the night before surgery. 238 g 0    valACYclovir (VALTREX) 500 MG tablet take 4 tablets by mouth once and then 4 tablets by mouth 12 hours later (Patient taking differently: Take 500 mg by mouth as needed.) 8 tablet 3    vitamin D 1000 units Tab Take 185 mg by mouth once daily.       No current facility-administered medications on file prior to visit.

## 2025-06-23 NOTE — TELEPHONE ENCOUNTER
Lvm for pt to return call to clinic in regards to results:      Labs reviewed/stable, will discuss at f/u

## 2025-06-24 ENCOUNTER — PATIENT MESSAGE (OUTPATIENT)
Dept: INTERNAL MEDICINE | Facility: CLINIC | Age: 75
End: 2025-06-24
Payer: MEDICARE

## 2025-06-25 ENCOUNTER — CLINICAL SUPPORT (OUTPATIENT)
Dept: REHABILITATION | Facility: HOSPITAL | Age: 75
End: 2025-06-25
Payer: MEDICARE

## 2025-06-25 ENCOUNTER — RESULTS FOLLOW-UP (OUTPATIENT)
Dept: INTERNAL MEDICINE | Facility: CLINIC | Age: 75
End: 2025-06-25

## 2025-06-25 ENCOUNTER — TELEPHONE (OUTPATIENT)
Dept: INTERNAL MEDICINE | Facility: CLINIC | Age: 75
End: 2025-06-25
Payer: MEDICARE

## 2025-06-25 DIAGNOSIS — D53.9 MACROCYTIC ANEMIA: ICD-10-CM

## 2025-06-25 DIAGNOSIS — M54.50 ACUTE BILATERAL LOW BACK PAIN WITHOUT SCIATICA: Primary | ICD-10-CM

## 2025-06-25 DIAGNOSIS — E87.1 HYPONATREMIA: Primary | ICD-10-CM

## 2025-06-25 PROCEDURE — 97530 THERAPEUTIC ACTIVITIES: CPT | Mod: PN

## 2025-06-25 PROCEDURE — 97110 THERAPEUTIC EXERCISES: CPT | Mod: PN

## 2025-06-25 PROCEDURE — 97112 NEUROMUSCULAR REEDUCATION: CPT | Mod: PN

## 2025-06-25 NOTE — PROGRESS NOTES
Outpatient Rehab    Physical Therapy Visit    Patient Name: Manuela Reyes  MRN: [id number]  YOB: 1950  Encounter Date: 6/25/2025    Therapy Diagnosis:   Encounter Diagnosis   Name Primary?    Acute bilateral low back pain without sciatica Yes       Physician: Yvette Turner MD    Physician Orders: Eval and Treat  Medical Diagnosis: Strain of lumbar region, subsequent encounter  Decreased ROM of lumbar spine    Visit # / Visits Authorized:  17 / 20  Insurance Authorization Period: 4/17/2025 to 12/31/2025  Date of Evaluation: 4/17/2025  Plan of Care Certification: 4/17/2025 to 6/26/2025   Progress note due 30 days from 6/9/25       Time In:   11:02 am  Time Out:  11: 56 am (PT one on one only)  Total Time (in minutes):   54  Total Billable Time (in minutes):  54 min with PT only    FOTO:  Intake Score:  %  Survey Score 2:  %  Survey Score 3:  %    Precautions:     Standard, HTN, crohn's disease, autoimmune disease      Subjective   pt states she had her best day yet as she was able to do all home chores and go for a walk but found out her blood work states she is having some nutrition issues..  Pain reported as 1/10. low back centrally and between scapulae    Objective            Treatment:  Therapeutic Exercise  TE 1: skc 2x 10  TE 2: sciatic neural glides 1x 20 R/L  TE 5: shuttle 4 bands 3 min  Balance/Neuromuscular Re-Education  NMR 1: PEC PPT with bridge 2 x 5  NMR 3: seated TSp extension with front rack hold (20 inch box ) 8#  NMR 7: runner with R leg on wall 10 x 10 sec hold; add R hip flexion/extension 2x 5  Therapeutic Activity  TA 1: sit to stand goblet hold 8# 2x 10 (20 inch box)  TA 3: goblet carry 8 #1x 80 ft then 15 # 1x 80 ft  TA 4: dead lift 15 # ball (20 inch box to cue external hip hinge direction) 2x5  TA 5: waist to overhead lift (arms outstretched) 4# ball 2x 8 cues on core engagement to reduce sway back  TA 6: overhead press 3# 2x 10; 's carry 3# 50 ft x 3 per  arm    Time Entry(in minutes):  Neuromuscular Re-Education Time Entry: 18  Therapeutic Activity Time Entry: 26  Therapeutic Exercise Time Entry: 10    Assessment & Plan   Assessment: PT continued increasing load and advancing pt HEP. Pt is getting more confident so she found some home items she can use as weight to begin upgrading her HEP with. PT noted pt has less lateral shift in single leg stand but still has a mild bit especially as fatigue sets in so we add hip stabilizing runner poses at the wall.  Evaluation/Treatment Tolerance: Patient limited by fatigue    The patient will continue to benefit from skilled outpatient physical therapy in order to address the deficits listed in the problem list on the initial evaluation, provide patient and family education, and maximize the patients level of independence in the home and community environments.     The patient's spiritual, cultural, and educational needs were considered, and the patient is agreeable to the plan of care and goals.           Plan: PT to progress core mobility / trunk strengthening and continue with Manual therapy as needed postures.    Goals:   Active       long term goals       lumbar flexion pain free; sit to stand without guarding (Met)       Start:  04/17/25    Expected End:  06/26/25    Resolved:  06/25/25         5/5 hip abduction strength (Progressing)       Start:  04/17/25    Expected End:  06/26/25            dead lifting 50 # for home tasks (Progressing)       Start:  04/17/25    Expected End:  06/26/25              Resolved       short term goals       I with HEP (Met)       Start:  04/17/25    Expected End:  05/15/25    Resolved:  05/12/25         cervical flexion ROM 60 degrees and rotation 80 degrees or better (Met)       Start:  04/17/25    Expected End:  05/15/25    Resolved:  06/18/25               Brittney House, PT

## 2025-06-26 ENCOUNTER — OFFICE VISIT (OUTPATIENT)
Dept: CARDIOLOGY | Facility: CLINIC | Age: 75
End: 2025-06-26
Payer: MEDICARE

## 2025-06-26 VITALS
BODY MASS INDEX: 15.12 KG/M2 | OXYGEN SATURATION: 99 % | SYSTOLIC BLOOD PRESSURE: 138 MMHG | RESPIRATION RATE: 16 BRPM | HEIGHT: 67 IN | WEIGHT: 96.31 LBS | HEART RATE: 86 BPM | DIASTOLIC BLOOD PRESSURE: 82 MMHG

## 2025-06-26 DIAGNOSIS — K55.1 MESENTERIC ARTERY STENOSIS: ICD-10-CM

## 2025-06-26 DIAGNOSIS — I70.0 CALCIFICATION OF AORTA: ICD-10-CM

## 2025-06-26 DIAGNOSIS — I70.0 ATHEROSCLEROSIS OF ABDOMINAL AORTA: ICD-10-CM

## 2025-06-26 DIAGNOSIS — E78.5 HYPERLIPIDEMIA LDL GOAL <70: ICD-10-CM

## 2025-06-26 DIAGNOSIS — I10 ESSENTIAL HYPERTENSION: ICD-10-CM

## 2025-06-26 DIAGNOSIS — M54.50 ACUTE BILATERAL LOW BACK PAIN WITHOUT SCIATICA: ICD-10-CM

## 2025-06-26 DIAGNOSIS — Z82.49 FAMILY HISTORY OF CARDIOVASCULAR DISEASE: Primary | ICD-10-CM

## 2025-06-26 PROCEDURE — 3075F SYST BP GE 130 - 139MM HG: CPT | Mod: CPTII,S$GLB,, | Performed by: PHYSICIAN ASSISTANT

## 2025-06-26 PROCEDURE — 99999 PR PBB SHADOW E&M-EST. PATIENT-LVL IV: CPT | Mod: PBBFAC,,, | Performed by: PHYSICIAN ASSISTANT

## 2025-06-26 PROCEDURE — 3288F FALL RISK ASSESSMENT DOCD: CPT | Mod: CPTII,S$GLB,, | Performed by: PHYSICIAN ASSISTANT

## 2025-06-26 PROCEDURE — 1159F MED LIST DOCD IN RCRD: CPT | Mod: CPTII,S$GLB,, | Performed by: PHYSICIAN ASSISTANT

## 2025-06-26 PROCEDURE — 99214 OFFICE O/P EST MOD 30 MIN: CPT | Mod: S$GLB,,, | Performed by: PHYSICIAN ASSISTANT

## 2025-06-26 PROCEDURE — 3044F HG A1C LEVEL LT 7.0%: CPT | Mod: CPTII,S$GLB,, | Performed by: PHYSICIAN ASSISTANT

## 2025-06-26 PROCEDURE — 1160F RVW MEDS BY RX/DR IN RCRD: CPT | Mod: CPTII,S$GLB,, | Performed by: PHYSICIAN ASSISTANT

## 2025-06-26 PROCEDURE — 1101F PT FALLS ASSESS-DOCD LE1/YR: CPT | Mod: CPTII,S$GLB,, | Performed by: PHYSICIAN ASSISTANT

## 2025-06-26 PROCEDURE — 3079F DIAST BP 80-89 MM HG: CPT | Mod: CPTII,S$GLB,, | Performed by: PHYSICIAN ASSISTANT

## 2025-06-26 NOTE — PROGRESS NOTES
Subjective   Patient ID:  Manuela Reyes is a 75 y.o. adult who presents for follow-up of mesenteric ischemia      HPI  Ms. Reyes is a 75 year old female patient whose current medical conditions include HTN, hyperlipidemia, prior TIA, mesenteric artery stenosis, and Crohn's disease who presents today for routine follow-up. She returns today and states she is doing ok. Main issue is recent back injury/strain. Still working with PT/OT, some days are better than others. She woke up very stiff this morning. CV wise, remains stable. No jose CP, heaviness, or tightness. No unusual SOB/HICKS. No LH, dizziness, palpitations, near syncope, or syncope. No s/s suggestive of CHF. No postprandial pain. BP initially elevated, but improved upon recheck. She has lost approximately 14 lbs since April, just has not had much of an appetite due to her back injury.     Recent labs from PCP reviewed. Anemia noted, iron studies planned for next week. Na low, she has increased her oral intake. LDL 85, she cannot tolerate statin/Repatha due to severe myalgias.     Review of Systems   Constitutional: Positive for decreased appetite, malaise/fatigue and weight loss. Negative for chills and fever.   HENT:  Negative for congestion, hoarse voice and sore throat.    Eyes:  Negative for blurred vision and discharge.   Cardiovascular:  Negative for chest pain, claudication, cyanosis, dyspnea on exertion, irregular heartbeat, leg swelling, near-syncope, orthopnea, palpitations and paroxysmal nocturnal dyspnea.   Respiratory:  Negative for cough, hemoptysis, shortness of breath, snoring, sputum production and wheezing.    Endocrine: Negative for cold intolerance and heat intolerance.   Hematologic/Lymphatic: Negative for bleeding problem. Does not bruise/bleed easily.   Skin:  Negative for rash.   Musculoskeletal:  Positive for arthritis and joint pain. Negative for back pain, joint swelling, muscle cramps, muscle weakness and myalgias.  "  Gastrointestinal:  Negative for abdominal pain, constipation, diarrhea, heartburn, melena and nausea.   Genitourinary:  Negative for hematuria.   Neurological:  Negative for dizziness, focal weakness, headaches, light-headedness, loss of balance, numbness, paresthesias, seizures and weakness.   Psychiatric/Behavioral:  Negative for memory loss. The patient does not have insomnia.    Allergic/Immunologic: Negative for hives.     /82   Pulse 86   Resp 16   Ht 5' 7" (1.702 m)   Wt 43.7 kg (96 lb 5.5 oz)   SpO2 99%   BMI 15.09 kg/m²        Objective     Physical Exam  Vitals and nursing note reviewed.   Constitutional:       General: She is not in acute distress.     Appearance: Normal appearance. She is well-developed. She is not diaphoretic.   HENT:      Head: Normocephalic and atraumatic.   Eyes:      General:         Right eye: No discharge.         Left eye: No discharge.      Pupils: Pupils are equal, round, and reactive to light.   Cardiovascular:      Rate and Rhythm: Normal rate and regular rhythm.      Heart sounds: Normal heart sounds, S1 normal and S2 normal. No murmur heard.  Pulmonary:      Effort: Pulmonary effort is normal. No respiratory distress.      Breath sounds: Normal breath sounds.   Musculoskeletal:      Right lower leg: No edema.      Left lower leg: No edema.   Skin:     General: Skin is warm and dry.      Findings: No erythema.   Neurological:      Mental Status: She is alert and oriented to person, place, and time.   Psychiatric:         Mood and Affect: Mood normal.         Behavior: Behavior normal.         Thought Content: Thought content normal.         Chemistry        Component Value Date/Time     (L) 06/23/2025 1501     (L) 01/27/2025 1155    K 4.4 06/23/2025 1501    K 4.8 01/27/2025 1155    CL 93 (L) 06/23/2025 1501    CL 97 01/27/2025 1155    CO2 22 (L) 06/23/2025 1501    CO2 26 01/27/2025 1155    BUN 32 (H) 06/23/2025 1501    CREATININE 0.9 06/23/2025 1501 "     06/23/2025 1501    GLU 99 01/27/2025 1155        Component Value Date/Time    CALCIUM 9.5 06/23/2025 1501    CALCIUM 9.2 01/27/2025 1155    ALKPHOS 154 (H) 06/23/2025 1501    ALKPHOS 39 (L) 01/27/2025 1155    AST 22 06/23/2025 1501    AST 28 01/27/2025 1155    ALT 15 06/23/2025 1501    ALT 23 01/27/2025 1155    BILITOT 0.4 06/23/2025 1501    BILITOT 0.5 01/27/2025 1155    ESTGFRAFRICA >60 06/14/2022 1144    EGFRNONAA >60 06/14/2022 1144        Lab Results   Component Value Date    WBC 7.91 06/23/2025    HGB 11.9 (L) 06/23/2025    HCT 35.2 (L) 06/23/2025     (H) 06/23/2025     06/23/2025       Lab Results   Component Value Date    CHOL 178 06/20/2025    CHOL 193 07/18/2024    CHOL 177 08/18/2023      Lab Results   Component Value Date    HDL 75 06/20/2025    HDL 90 (H) 07/18/2024    HDL 80 (H) 08/18/2023     Lab Results   Component Value Date    LDLCALC 85.6 06/20/2025    LDLCALC 90.6 07/18/2024    LDLCALC 87.8 08/18/2023      Lab Results   Component Value Date    TRIG 87 06/20/2025    TRIG 62 07/18/2024    TRIG 46 08/18/2023     Lab Results   Component Value Date    TOTALCHOLEST 2.4 06/20/2025    TOTALCHOLEST 2.1 07/18/2024    TOTALCHOLEST 2.2 08/18/2023     Lab Results   Component Value Date    NONHDLCHOL 103 06/20/2025    NONHDLCHOL 103 07/18/2024    NONHDLCHOL 97 08/18/2023     Lab Results   Component Value Date    CHOLHDL 42.1 06/20/2025    CHOLHDL 46.6 07/18/2024    CHOLHDL 45.2 08/18/2023       Chemistry        Component Value Date/Time     (L) 06/23/2025 1501     (L) 01/27/2025 1155    K 4.4 06/23/2025 1501    K 4.8 01/27/2025 1155    CL 93 (L) 06/23/2025 1501    CL 97 01/27/2025 1155    CO2 22 (L) 06/23/2025 1501    CO2 26 01/27/2025 1155    BUN 32 (H) 06/23/2025 1501    CREATININE 0.9 06/23/2025 1501     06/23/2025 1501    GLU 99 01/27/2025 1155        Component Value Date/Time    CALCIUM 9.5 06/23/2025 1501    CALCIUM 9.2 01/27/2025 1155    ALKPHOS 154 (H)  06/23/2025 1501    ALKPHOS 39 (L) 01/27/2025 1155    AST 22 06/23/2025 1501    AST 28 01/27/2025 1155    ALT 15 06/23/2025 1501    ALT 23 01/27/2025 1155    BILITOT 0.4 06/23/2025 1501    BILITOT 0.5 01/27/2025 1155    ESTGFRAFRICA >60 06/14/2022 1144    EGFRNONAA >60 06/14/2022 1144             Assessment and Plan     1. Family history of cardiovascular disease    2. Hyperlipidemia LDL goal <70    3. Essential hypertension    4. Calcification of aorta    5. Atherosclerosis of abdominal aorta    6. Acute bilateral low back pain without sciatica    7. Mesenteric artery stenosis      Presents for f/u. Doing ok CV wise. Recovering from back injury. No angina. No post-prandial pain. Lipids reviewed. Further workup of hyponatremia and anemia as per PCP. Continue same BP meds/mgmt, initial elevation likely due to pain.  Plan:  -Continue same CV meds/mgmt  -Increase activity level as tolerated  -Workup of anemia and hyponatremia as per PCP  -RTC 6 months with Dr. Muñoz with lipid, cmp, + TTE

## 2025-06-30 ENCOUNTER — CLINICAL SUPPORT (OUTPATIENT)
Dept: REHABILITATION | Facility: HOSPITAL | Age: 75
End: 2025-06-30
Payer: MEDICARE

## 2025-06-30 ENCOUNTER — LAB VISIT (OUTPATIENT)
Dept: LAB | Facility: HOSPITAL | Age: 75
End: 2025-06-30
Attending: INTERNAL MEDICINE
Payer: MEDICARE

## 2025-06-30 DIAGNOSIS — E87.1 HYPONATREMIA: ICD-10-CM

## 2025-06-30 DIAGNOSIS — M54.50 ACUTE BILATERAL LOW BACK PAIN WITHOUT SCIATICA: Primary | ICD-10-CM

## 2025-06-30 DIAGNOSIS — D53.9 MACROCYTIC ANEMIA: ICD-10-CM

## 2025-06-30 LAB
ANION GAP (OHS): 12 MMOL/L (ref 8–16)
BILIRUB UR QL STRIP.AUTO: NEGATIVE
BUN SERPL-MCNC: 23 MG/DL (ref 8–23)
CALCIUM SERPL-MCNC: 9.7 MG/DL (ref 8.7–10.5)
CHLORIDE SERPL-SCNC: 97 MMOL/L (ref 95–110)
CLARITY UR: ABNORMAL
CO2 SERPL-SCNC: 24 MMOL/L (ref 23–29)
COLOR UR AUTO: YELLOW
CREAT SERPL-MCNC: 0.8 MG/DL (ref 0.5–1.4)
FERRITIN SERPL-MCNC: 343 NG/ML (ref 20–300)
FOLATE SERPL-MCNC: 18.3 NG/ML (ref 4–24)
GFR SERPLBLD CREATININE-BSD FMLA CKD-EPI: >60 ML/MIN/1.73/M2
GLUCOSE SERPL-MCNC: 100 MG/DL (ref 70–110)
GLUCOSE UR QL STRIP: NEGATIVE
HGB UR QL STRIP: NEGATIVE
IRON SATN MFR SERPL: 27 % (ref 20–50)
IRON SERPL-MCNC: 91 UG/DL (ref 45–160)
KETONES UR QL STRIP: NEGATIVE
LEUKOCYTE ESTERASE UR QL STRIP: NEGATIVE
NITRITE UR QL STRIP: NEGATIVE
OSMOLALITY UR: 384 MOSM/KG (ref 50–1200)
PH UR STRIP: 7 [PH]
POTASSIUM SERPL-SCNC: 4.2 MMOL/L (ref 3.5–5.1)
PROT UR QL STRIP: NEGATIVE
SODIUM SERPL-SCNC: 133 MMOL/L (ref 136–145)
SP GR UR STRIP: 1.01
TIBC SERPL-MCNC: 343 UG/DL (ref 250–450)
TRANSFERRIN SERPL-MCNC: 232 MG/DL (ref 200–375)
UROBILINOGEN UR STRIP-ACNC: NEGATIVE EU/DL
VIT B12 SERPL-MCNC: 995 PG/ML (ref 210–950)

## 2025-06-30 PROCEDURE — 84466 ASSAY OF TRANSFERRIN: CPT

## 2025-06-30 PROCEDURE — 82746 ASSAY OF FOLIC ACID SERUM: CPT

## 2025-06-30 PROCEDURE — 97110 THERAPEUTIC EXERCISES: CPT | Mod: PN

## 2025-06-30 PROCEDURE — 82728 ASSAY OF FERRITIN: CPT

## 2025-06-30 PROCEDURE — 83935 ASSAY OF URINE OSMOLALITY: CPT

## 2025-06-30 PROCEDURE — 80048 BASIC METABOLIC PNL TOTAL CA: CPT

## 2025-06-30 PROCEDURE — 36415 COLL VENOUS BLD VENIPUNCTURE: CPT

## 2025-06-30 PROCEDURE — 97112 NEUROMUSCULAR REEDUCATION: CPT | Mod: PN

## 2025-06-30 PROCEDURE — 81003 URINALYSIS AUTO W/O SCOPE: CPT

## 2025-06-30 PROCEDURE — 82607 VITAMIN B-12: CPT

## 2025-06-30 NOTE — PROGRESS NOTES
Outpatient Rehab    Physical Therapy Progress Note : Updated Plan of Care  Outpatient Rehab    Physical Therapy Visit    Patient Name: Manuela Reyes  MRN: [id number]  YOB: 1950  Encounter Date: 6/30/2025    Therapy Diagnosis:   Encounter Diagnosis   Name Primary?    Acute bilateral low back pain without sciatica Yes       Physician: Yvette Turner MD    Physician Orders: Eval and Treat  Medical Diagnosis: Strain of lumbar region, subsequent encounter  Decreased ROM of lumbar spine    Visit # / Visits Authorized:  18 / 20  Insurance Authorization Period: 4/17/2025 to 12/31/2025  Date of Evaluation: 4/17/2025  Plan of Care Certification: 4/17/2025 to 6/26/2025 extend to 7/11/25  Progress note due 30 days from 6/9/25       Time In:   11:02 am  Time Out:  11: 57 am (PT one on one only)  Total Time (in minutes):   55  Total Billable Time (in minutes):  55 min with PT only    FOTO:  Intake Score:  %  Survey Score 2:  %  Survey Score 3:  %    Precautions:       Subjective   Pt states they did more blood work and continue to work on nutrition with her. She has increased salt at home and is working on HEP as tolerated. pt states she flared up some in the low back but upper back pain is 0 today..  Pain reported as 3/10. low back centrally    Objective          Lumbosacral Mobility Details: Lumbar flexion 8 inches from floor improved to ; extension 50 % towards the sacrum but can't get to it with scapula due to pain improved to 80% ROM ; R/L side flexion to knee but to below knee today; sit to stand 30 sec test 8 reps with modified UE use improved to 9 reps without UE use           Cervical Range of Motion    Active (deg) today Pain   Flexion 50  70     Extension 50  70     Right Lateral Flexion 20  45     Right Rotation 60  85     Left Lateral Flexion 15  45     Left Rotation 55  70        Upper cervical rotation 60%; upper cervical flexion to R 80%; to L 60% ; T spine rotation to be assessed  when turing is less painful as simple rolling mobility on the bed is very painful at eval but today performed to 70% ROM to the R and 60 % to L (pt has scoliosis so ROM won't be in normal ROM).         Hip Strength - Planes of Motion    Right Strength today Left Strength today   Flexion (L2) 4  5 4  5   Extension 2+  3+ 2+  3+   ABduction 3-  4+ 3-  4+   ADduction           Internal Rotation           External Rotation                         Treatment:  Balance/Neuromuscular Re-Education  NMR 1: supine chin tuck with retraction 2x 10; hook lying PPT 3x 10; lower trunk rotation 1x 10; seated ball squeeze 10 x 10 sec hold; supine protraction at shoulders 2x 10 per side; LAQ 2x 10 seated    Treatment:  Therapeutic Exercise  TE 5: shuttle 4 bands 3 min  TE 6: open book 2x 10  TE 7: seated trunk flexion 2x 10  TE 8: alysa curl 2x 10 (education in using the core pulling her up and hamstrings vs pulling from the spine.  TE 10: sit to stand 3x 10  Balance/Neuromuscular Re-Education  NMR 1: paloff press yellow TB 2x 10 per side; single leg stand L (theraband to R of her) paloff press with cues to open LSI with red band engaging the core 2x 10  NMR 3: standing shoulder rows yellow theraband 3x 10  NMR 5: table top hold 10 x 10 sec ; add cervical chin tuck on last 5 reps to max hold tolerance    Time Entry(in minutes):  Neuromuscular Re-Education Time Entry: 17  Therapeutic Exercise Time Entry: 38    Assessment & Plan   Assessment: PT advanced pt with therex to increase core support to reduce L SI pain. Pt is progressing well but fearful of discharge as she is still having some pain. PT explained she will ebb and flow while she is trying to advance more on her own over the next few months but we can see if she needs to return down the road if she develops any new issues. PT worked on single leg balance to reduce L SI pain but pt tends to R lateral shift at the trunk so moderate cues used to improve this. PT is slowly  advancing balance and strength support to help reduce L SI support.  Evaluation/Treatment Tolerance: Patient limited by fatigue    The patient will continue to benefit from skilled outpatient physical therapy in order to address the deficits listed in the problem list on the initial evaluation, provide patient and family education, and maximize the patients level of independence in the home and community environments.     The patient's spiritual, cultural, and educational needs were considered, and the patient is agreeable to the plan of care and goals.     Education  Education was done with Patient. The patient's learning style includes Demonstration. The patient Verbalizes understanding.         PT and pt discussed plans to d/c in the next 2 sessions to a more advanced HEP which we began some training on today.       Plan: PT to progress core mobility / trunk strengthening and continue with Manual therapy as needed postures.    Goals:   Active       long term goals       lumbar flexion pain free; sit to stand without guarding (Met)       Start:  04/17/25    Expected End:  06/26/25    Resolved:  06/25/25         5/5 hip abduction strength (Progressing)       Start:  04/17/25    Expected End:  07/11/25            dead lifting 50 # for home tasks (Progressing)       Start:  04/17/25    Expected End:  07/11/25              Resolved       short term goals       I with HEP (Met)       Start:  04/17/25    Expected End:  05/15/25    Resolved:  05/12/25         cervical flexion ROM 60 degrees and rotation 80 degrees or better (Met)       Start:  04/17/25    Expected End:  05/15/25    Resolved:  06/18/25               Brittney House, PT

## 2025-07-02 ENCOUNTER — PATIENT MESSAGE (OUTPATIENT)
Dept: INTERNAL MEDICINE | Facility: CLINIC | Age: 75
End: 2025-07-02
Payer: MEDICARE

## 2025-07-02 ENCOUNTER — CLINICAL SUPPORT (OUTPATIENT)
Dept: REHABILITATION | Facility: HOSPITAL | Age: 75
End: 2025-07-02
Payer: MEDICARE

## 2025-07-02 ENCOUNTER — RESULTS FOLLOW-UP (OUTPATIENT)
Dept: INTERNAL MEDICINE | Facility: CLINIC | Age: 75
End: 2025-07-02

## 2025-07-02 DIAGNOSIS — D53.9 MACROCYTIC ANEMIA: Primary | ICD-10-CM

## 2025-07-02 DIAGNOSIS — M54.50 ACUTE BILATERAL LOW BACK PAIN WITHOUT SCIATICA: Primary | ICD-10-CM

## 2025-07-02 PROCEDURE — 97110 THERAPEUTIC EXERCISES: CPT | Mod: PN

## 2025-07-02 PROCEDURE — 97530 THERAPEUTIC ACTIVITIES: CPT | Mod: PN

## 2025-07-02 NOTE — PROGRESS NOTES
Outpatient Rehab    Physical Therapy Visit    Patient Name: Manuela Reyes  MRN: [id number]  YOB: 1950  Encounter Date: 7/2/2025    Therapy Diagnosis:   Encounter Diagnosis   Name Primary?    Acute bilateral low back pain without sciatica Yes       Physician: Yvette Truner MD    Physician Orders: Eval and Treat  Medical Diagnosis: Strain of lumbar region, subsequent encounter  Decreased ROM of lumbar spine    Visit # / Visits Authorized:  19 / 20  Insurance Authorization Period: 4/17/2025 to 12/31/2025  Date of Evaluation: 4/17/2025  Plan of Care Certification: 6/30/2025 to 7/11/2025 extend to 7/11/25  Progress note due 30 days from 6/9/25       Time In:   11:02 am  Time Out:  11: 58 am (PT one on one only)  Total Time (in minutes):   56  Total Billable Time (in minutes):  56 min with PT only    FOTO:  Intake Score:  %  Survey Score 2:  %  Survey Score 3:  %    Precautions:     Standard, HTN, crohn's disease, autoimmune disease      Subjective   pt has been avoiding using her muscle relaxor and is more active but pain in the am is very stiff chiefly. Fatigues up to 5/10 at times..  Pain reported as 5/10. low back centrally    Objective             Treatment:  Therapeutic Exercise  TE 5: shuttle 4 bands 3 min  TE 7: seated trunk flexion 3x 5 6 #for endurance then seated trunk flexion 2x 10 for ROM  TE 8: alysa curl 3x 8 (education in using the core pulling her up and hamstrings vs pulling from the spine.  TE 9: Reviewed HEP handout  Balance/Neuromuscular Re-Education  NMR 2: standing overhead press up 4# ball 3x 10 with cues on core engagement; deep neck flexor 3x max hold time (28 was her max hold time)  Therapeutic Activity  TA 1: sit to stand goblet hold 6# 1x 3 (18 inch box); 20 Inch box 6#  TA 5: standing waist to overhead lift (arms outstretched) 4# ball 3x 8 cues on core engagement to reduce sway back  TA 6: overhead press 2# seated 2x 10  TA 7: seated sumo lift 6# 2x  8    Time Entry(in minutes):  Neuromuscular Re-Education Time Entry: 8  Therapeutic Activity Time Entry: 24  Therapeutic Exercise Time Entry: 24    Assessment & Plan   Assessment: PT began progressing pt towards her HEP d/c program working with pt on reviewing the added therex and progressing load and assessing load tolerance. Pt is still having pain up to 5/10 by the end of the day but on average 2/10. Pt continues to need better control in the core and trunk/legs to advance. Pt was less fearful lifting from floor level today. PT to d/c on follow up after pt works on her HEP on her own over the next week to see how well she does following more on her own.       The patient will continue to benefit from skilled outpatient physical therapy in order to address the deficits listed in the problem list on the initial evaluation, provide patient and family education, and maximize the patients level of independence in the home and community environments.     The patient's spiritual, cultural, and educational needs were considered, and the patient is agreeable to the plan of care and goals.             Plan: PT to progress core mobility / trunk strengthening and continue with Manual therapy as needed postures.    Goals:   Active       long term goals       lumbar flexion pain free; sit to stand without guarding (Met)       Start:  04/17/25    Expected End:  06/26/25    Resolved:  06/25/25         5/5 hip abduction strength (Progressing)       Start:  04/17/25    Expected End:  07/11/25            dead lifting 50 # for home tasks (Progressing)       Start:  04/17/25    Expected End:  07/11/25              Resolved       short term goals       I with HEP (Met)       Start:  04/17/25    Expected End:  05/15/25    Resolved:  05/12/25         cervical flexion ROM 60 degrees and rotation 80 degrees or better (Met)       Start:  04/17/25    Expected End:  05/15/25    Resolved:  06/18/25               Brittney House  PT

## 2025-07-09 ENCOUNTER — PATIENT MESSAGE (OUTPATIENT)
Dept: RHEUMATOLOGY | Facility: CLINIC | Age: 75
End: 2025-07-09
Payer: MEDICARE

## 2025-07-11 ENCOUNTER — CLINICAL SUPPORT (OUTPATIENT)
Dept: REHABILITATION | Facility: HOSPITAL | Age: 75
End: 2025-07-11
Payer: MEDICARE

## 2025-07-11 DIAGNOSIS — Z00.00 HEALTH CARE MAINTENANCE: Primary | ICD-10-CM

## 2025-07-11 DIAGNOSIS — M54.50 ACUTE BILATERAL LOW BACK PAIN WITHOUT SCIATICA: Primary | ICD-10-CM

## 2025-07-11 PROCEDURE — 97112 NEUROMUSCULAR REEDUCATION: CPT | Mod: PN

## 2025-07-11 PROCEDURE — 97530 THERAPEUTIC ACTIVITIES: CPT | Mod: PN

## 2025-07-11 NOTE — PROGRESS NOTES
Outpatient Rehab    Physical Therapy Discharge    Patient Name: Manuela Reyes  MRN: [id number]  YOB: 1950  Encounter Date: 7/11/2025    Therapy Diagnosis:   Encounter Diagnosis   Name Primary?    Acute bilateral low back pain without sciatica Yes     Physician: Yvette Turner PAHajaC    Physician Orders: Eval and Treat  Medical Diagnosis: Strain of lumbar region, subsequent encounter  Decreased ROM of lumbar spine    Visit # / Visits Authorized:  20 / 20  Insurance Authorization Period: 4/17/2025 to 12/31/2025  Date of Evaluation: 4/17/2025  Plan of Care Certification: 6/30/2025 to 7/11/2025      PT/PTA:     Number of PTA visits since last PT visit:   Time In:   9:35 am-9:59 with PT only  Time Out:  10:30  Total Time (in minutes):   55 min  Total Billable Time (in minutes):  24 min    FOTO:  Intake Score: 47%  Survey Score 2: 48%  Survey Score 3: 60%    Precautions:       Subjective             Objective    Spinal Mobility  Hypomobile: Lumbosacral  Cervical Mobility Details: PT noted severe forwards head posture and sway back posture in the spine with poor core engagement in standing.  Lumbosacral Mobility Details: Lumbar flexion 8 inches from floor improved to 2 inches from the floor;  extension 50 % towards the sacrum but can't get to it with scapula due to pain at eval but improved to full ROM; R/L side flexion to knee; sit to stand 30 sec test 8 reps on last progress note improved to 10 reps without using her UE to assist     Hip Strength - Planes of Motion    Right Strength today Left Strength today   Flexion (L2) 4  5 4  5   Extension 2+  5 2+  5   ABduction 3-  5 3-  5   ADduction           Internal Rotation           External Rotation                   Transfers/Bed Mobility Details  Slow and painful with sit to stand (guards rising and lowering) and turning in bed at eval and now are modified I without pain. Pt is now up to 20 # carry for 80 ft and dead lifting up to 20# and  tolerating it multiple times.        Treatment:  Therapeutic Exercise  TE 1: nustep 5 min  TE 8: alysa curl 1x 8 (education in using the core pulling her up and hamstrings vs pulling from the spine.  TE 9: Reviewed HEP handout questions  TE 10: sit to stand 1x 10  Therapeutic Activity  TA 1: sit to stand goblet hold 10# 3x 10 from 20 Inch  TA 2: farmer's carry 10# per hand (20 #) 80 ft x3  TA 3: goblet carry 10 #1x 80 ft then 15 # 1x 80 ft  TA 5: standing waist to overhead lift (arms outstretched) 6#  3x 8 cues on core engagement to reduce sway back  TA 6: overhead press 2# seated 2x 10    Time Entry(in minutes):  Neuromuscular Re-Education Time Entry: 15  Therapeutic Activity Time Entry: 9    Assessment & Plan   Assessment: Pt is I with HEP after reviewing questions today and now feels she can progress herself after the additional education today. PT continued with increasing load as tolerated and pt used good technique needing very little cues to assist.  Evaluation/Treatment Tolerance: Patient limited by fatigue    The patient's spiritual, cultural, and educational needs were considered, and the patient is agreeable to the plan of care and goals.     Education  Education was done with Patient. The patient's learning style includes Demonstration and Listening. The patient Demonstrates understanding.         Reviewed HEP updates and answered questions on  how to progress.       Plan: D/C to HEP.    Goals:   Active       long term goals       lumbar flexion pain free; sit to stand without guarding (Met)       Start:  04/17/25    Expected End:  06/26/25    Resolved:  06/25/25         5/5 hip abduction strength (Met)       Start:  04/17/25    Expected End:  07/11/25    Resolved:  07/11/25         dead lifting 50 # for home tasks (Adequate for Care Transition)       Start:  04/17/25    Expected End:  07/11/25              Resolved       short term goals       I with HEP (Met)       Start:  04/17/25    Expected End:   05/15/25    Resolved:  05/12/25         cervical flexion ROM 60 degrees and rotation 80 degrees or better (Met)       Start:  04/17/25    Expected End:  05/15/25    Resolved:  06/18/25             Brittney House PT

## 2025-07-16 ENCOUNTER — PATIENT MESSAGE (OUTPATIENT)
Dept: INTERNAL MEDICINE | Facility: CLINIC | Age: 75
End: 2025-07-16
Payer: MEDICARE

## 2025-07-16 ENCOUNTER — LAB VISIT (OUTPATIENT)
Dept: LAB | Facility: HOSPITAL | Age: 75
End: 2025-07-16
Attending: OBSTETRICS & GYNECOLOGY
Payer: MEDICARE

## 2025-07-16 DIAGNOSIS — Z00.00 HEALTH CARE MAINTENANCE: ICD-10-CM

## 2025-07-16 LAB
ABSOLUTE EOSINOPHIL (OHS): 0.01 K/UL
ABSOLUTE MONOCYTE (OHS): 0.62 K/UL (ref 0.3–1)
ABSOLUTE NEUTROPHIL COUNT (OHS): 2.85 K/UL (ref 1.8–7.7)
BASOPHILS # BLD AUTO: 0.02 K/UL
BASOPHILS NFR BLD AUTO: 0.4 %
ERYTHROCYTE [DISTWIDTH] IN BLOOD BY AUTOMATED COUNT: 13 % (ref 11.5–14.5)
HCT VFR BLD AUTO: 34.9 % (ref 40–54)
HGB BLD-MCNC: 12.2 GM/DL (ref 14–18)
IMM GRANULOCYTES # BLD AUTO: 0.01 K/UL (ref 0–0.04)
IMM GRANULOCYTES NFR BLD AUTO: 0.2 % (ref 0–0.5)
LYMPHOCYTES # BLD AUTO: 1.34 K/UL (ref 1–4.8)
MCH RBC QN AUTO: 34.7 PG (ref 27–31)
MCHC RBC AUTO-ENTMCNC: 35 G/DL (ref 32–36)
MCV RBC AUTO: 99 FL (ref 82–98)
NUCLEATED RBC (/100WBC) (OHS): 0 /100 WBC
PLATELET # BLD AUTO: 252 K/UL (ref 150–450)
PMV BLD AUTO: 9.1 FL (ref 9.2–12.9)
RBC # BLD AUTO: 3.52 M/UL (ref 4.6–6.2)
RELATIVE EOSINOPHIL (OHS): 0.2 %
RELATIVE LYMPHOCYTE (OHS): 27.6 % (ref 18–48)
RELATIVE MONOCYTE (OHS): 12.8 % (ref 4–15)
RELATIVE NEUTROPHIL (OHS): 58.8 % (ref 38–73)
WBC # BLD AUTO: 4.85 K/UL (ref 3.9–12.7)

## 2025-07-16 PROCEDURE — 36415 COLL VENOUS BLD VENIPUNCTURE: CPT

## 2025-07-16 PROCEDURE — 85025 COMPLETE CBC W/AUTO DIFF WBC: CPT

## 2025-07-17 ENCOUNTER — TELEPHONE (OUTPATIENT)
Dept: HEMATOLOGY/ONCOLOGY | Facility: CLINIC | Age: 75
End: 2025-07-17
Payer: MEDICARE

## 2025-07-17 NOTE — TELEPHONE ENCOUNTER
Spoke to patient in reference to Hematology referral from Yvette Turner PA-C. Appointment scheduled per patient's request next available on [facility]. Appointment notice via pt portal.

## 2025-07-18 DIAGNOSIS — I10 ESSENTIAL HYPERTENSION: ICD-10-CM

## 2025-07-18 RX ORDER — AMLODIPINE BESYLATE 10 MG/1
TABLET ORAL
Qty: 90 TABLET | Refills: 3 | Status: SHIPPED | OUTPATIENT
Start: 2025-07-18

## 2025-07-18 NOTE — TELEPHONE ENCOUNTER
No care due was identified.  [facility] Embedded Care Due Messages. Reference number: [id number].   7/18/2025 8:57:35 AM CDT

## 2025-07-18 NOTE — TELEPHONE ENCOUNTER
Refill Decision Note   Manuela Reyes  is requesting a refill authorization.  Brief Assessment and Rationale for Refill:  Approve     Medication Therapy Plan:         Comments:     Note composed:2:22 PM 07/18/2025

## 2025-07-21 ENCOUNTER — PATIENT MESSAGE (OUTPATIENT)
Dept: GASTROENTEROLOGY | Facility: CLINIC | Age: 75
End: 2025-07-21
Payer: MEDICARE

## 2025-07-31 ENCOUNTER — OFFICE VISIT (OUTPATIENT)
Dept: GASTROENTEROLOGY | Facility: CLINIC | Age: 75
End: 2025-07-31
Payer: MEDICARE

## 2025-07-31 ENCOUNTER — HOSPITAL ENCOUNTER (OUTPATIENT)
Dept: PREADMISSION TESTING | Facility: HOSPITAL | Age: 75
Discharge: HOME | End: 2025-07-31
Attending: INTERNAL MEDICINE
Payer: MEDICARE

## 2025-07-31 ENCOUNTER — PATIENT MESSAGE (OUTPATIENT)
Dept: GASTROENTEROLOGY | Facility: CLINIC | Age: 75
End: 2025-07-31

## 2025-07-31 VITALS
WEIGHT: 93.38 LBS | HEIGHT: 67 IN | DIASTOLIC BLOOD PRESSURE: 99 MMHG | SYSTOLIC BLOOD PRESSURE: 164 MMHG | BODY MASS INDEX: 14.66 KG/M2 | HEART RATE: 76 BPM

## 2025-07-31 DIAGNOSIS — D84.9 IMMUNOSUPPRESSED STATUS: ICD-10-CM

## 2025-07-31 DIAGNOSIS — K50.00 CROHN'S DISEASE OF SMALL INTESTINE WITHOUT COMPLICATION: Primary | ICD-10-CM

## 2025-07-31 DIAGNOSIS — K50.00 CROHN'S DISEASE OF SMALL INTESTINE WITHOUT COMPLICATION: ICD-10-CM

## 2025-07-31 PROCEDURE — 3288F FALL RISK ASSESSMENT DOCD: CPT | Mod: CPTII,S$GLB,, | Performed by: INTERNAL MEDICINE

## 2025-07-31 PROCEDURE — 99214 OFFICE O/P EST MOD 30 MIN: CPT | Mod: S$GLB,,, | Performed by: INTERNAL MEDICINE

## 2025-07-31 PROCEDURE — 3044F HG A1C LEVEL LT 7.0%: CPT | Mod: CPTII,S$GLB,, | Performed by: INTERNAL MEDICINE

## 2025-07-31 PROCEDURE — 1159F MED LIST DOCD IN RCRD: CPT | Mod: CPTII,S$GLB,, | Performed by: INTERNAL MEDICINE

## 2025-07-31 PROCEDURE — 3080F DIAST BP >= 90 MM HG: CPT | Mod: CPTII,S$GLB,, | Performed by: INTERNAL MEDICINE

## 2025-07-31 PROCEDURE — 1160F RVW MEDS BY RX/DR IN RCRD: CPT | Mod: CPTII,S$GLB,, | Performed by: INTERNAL MEDICINE

## 2025-07-31 PROCEDURE — 1101F PT FALLS ASSESS-DOCD LE1/YR: CPT | Mod: CPTII,S$GLB,, | Performed by: INTERNAL MEDICINE

## 2025-07-31 PROCEDURE — G2211 COMPLEX E/M VISIT ADD ON: HCPCS | Mod: S$GLB,,, | Performed by: INTERNAL MEDICINE

## 2025-07-31 PROCEDURE — 99999 PR PBB SHADOW E&M-EST. PATIENT-LVL IV: CPT | Mod: PBBFAC,,, | Performed by: INTERNAL MEDICINE

## 2025-07-31 PROCEDURE — 3077F SYST BP >= 140 MM HG: CPT | Mod: CPTII,S$GLB,, | Performed by: INTERNAL MEDICINE
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
## 2025-08-05 ENCOUNTER — PATIENT MESSAGE (OUTPATIENT)
Dept: HEMATOLOGY/ONCOLOGY | Facility: CLINIC | Age: 75
End: 2025-08-05

## 2025-08-05 ENCOUNTER — OFFICE VISIT (OUTPATIENT)
Dept: HEMATOLOGY/ONCOLOGY | Facility: CLINIC | Age: 75
End: 2025-08-05
Payer: MEDICARE

## 2025-08-05 ENCOUNTER — LAB VISIT (OUTPATIENT)
Dept: LAB | Facility: HOSPITAL | Age: 75
End: 2025-08-05
Attending: INTERNAL MEDICINE
Payer: MEDICARE

## 2025-08-05 VITALS
TEMPERATURE: 97 F | HEIGHT: 67 IN | SYSTOLIC BLOOD PRESSURE: 167 MMHG | BODY MASS INDEX: 15.19 KG/M2 | HEART RATE: 84 BPM | WEIGHT: 96.81 LBS | OXYGEN SATURATION: 99 % | DIASTOLIC BLOOD PRESSURE: 96 MMHG

## 2025-08-05 DIAGNOSIS — D84.9 IMMUNOSUPPRESSED STATUS: ICD-10-CM

## 2025-08-05 DIAGNOSIS — E55.9 VITAMIN D INSUFFICIENCY: ICD-10-CM

## 2025-08-05 DIAGNOSIS — K50.00 CROHN'S DISEASE OF SMALL INTESTINE WITHOUT COMPLICATION: ICD-10-CM

## 2025-08-05 DIAGNOSIS — R05.3 CHRONIC COUGH: ICD-10-CM

## 2025-08-05 DIAGNOSIS — D53.9 MACROCYTIC ANEMIA: Primary | ICD-10-CM

## 2025-08-05 DIAGNOSIS — Z51.81 MEDICATION MONITORING ENCOUNTER: ICD-10-CM

## 2025-08-05 DIAGNOSIS — M81.0 AGE-RELATED OSTEOPOROSIS WITHOUT CURRENT PATHOLOGICAL FRACTURE: ICD-10-CM

## 2025-08-05 LAB
25(OH)D3+25(OH)D2 SERPL-MCNC: 85 NG/ML (ref 30–96)
ALBUMIN SERPL BCP-MCNC: 4.2 G/DL (ref 3.5–5.2)
ALP SERPL-CCNC: 100 UNIT/L (ref 40–150)
ALT SERPL W/O P-5'-P-CCNC: 15 UNIT/L (ref 10–44)
ANION GAP (OHS): 7 MMOL/L (ref 8–16)
AST SERPL-CCNC: 41 UNIT/L (ref 11–45)
BILIRUB SERPL-MCNC: 0.4 MG/DL (ref 0.1–1)
BUN SERPL-MCNC: 30 MG/DL (ref 8–23)
CALCIUM SERPL-MCNC: 9.5 MG/DL (ref 8.7–10.5)
CHLORIDE SERPL-SCNC: 94 MMOL/L (ref 95–110)
CO2 SERPL-SCNC: 28 MMOL/L (ref 23–29)
CREAT SERPL-MCNC: 0.8 MG/DL (ref 0.5–1.4)
GFR SERPLBLD CREATININE-BSD FMLA CKD-EPI: >60 ML/MIN/1.73/M2
GLUCOSE SERPL-MCNC: 150 MG/DL (ref 70–110)
POTASSIUM SERPL-SCNC: 4.6 MMOL/L (ref 3.5–5.1)
PROT SERPL-MCNC: 7.4 GM/DL (ref 6–8.4)
SODIUM SERPL-SCNC: 129 MMOL/L (ref 136–145)

## 2025-08-05 PROCEDURE — 99999 PR PBB SHADOW E&M-EST. PATIENT-LVL V: CPT | Mod: PBBFAC,,, | Performed by: INTERNAL MEDICINE

## 2025-08-05 PROCEDURE — 82306 VITAMIN D 25 HYDROXY: CPT

## 2025-08-05 PROCEDURE — 36415 COLL VENOUS BLD VENIPUNCTURE: CPT

## 2025-08-05 PROCEDURE — 82040 ASSAY OF SERUM ALBUMIN: CPT

## 2025-08-05 PROCEDURE — 83970 ASSAY OF PARATHORMONE: CPT

## 2025-08-05 NOTE — PROGRESS NOTES
Subjective:       Patient ID: Manuela Reyes is a 75 y.o. adult.    Chief Complaint: Results and Anemia    HPI:  75-year-old female history of Crohn's disease patient is evaluating here for low-level macrocytic anemia.  Currently on Humira ECOG status 1    Past Medical History:   Diagnosis Date    Anxiety     Crohn's disease     Depression     Gallstones 01/07/2025    Genital herpes     Hepatitis C infection     Hypertension     Insomnia     Mesenteric artery stenosis     Dr. 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 Malaika Mack--dermatology    TIA (transient ischemic attack)      Family History   Problem Relation Name Age of Onset    Stroke Mother Mother     Heart disease Mother Mother     Cataracts Mother Mother     Cancer Father Father         Lung    Heart disease Sister Sister     Hypertension Brother Brother         Brother    Glaucoma Neg Hx      Macular degeneration Neg Hx      Thyroid disease Neg Hx       Social History[1]  Past Surgical History:   Procedure Laterality Date    APPENDECTOMY      COLONOSCOPY N/A 05/19/2017    Procedure: COLONOSCOPY;  Surgeon: Jose Luis Leonard MD;  Location: [facility];  Service: Endoscopy;  Laterality: N/A;    COLONOSCOPY N/A 03/26/2018    Procedure: COLONOSCOPY;  Surgeon: Guillaume Whitman MD;  Location: [facility];  Service: Endoscopy;  Laterality: N/A;    COLONOSCOPY N/A 03/19/2019    Procedure: COLONOSCOPY;  Surgeon: Lili Ellis MD;  Location: [facility];  Service: Endoscopy;  Laterality: N/A;    COLONOSCOPY N/A 09/24/2020    Procedure: COLONOSCOPY;  Surgeon: Lili Ellis MD;  Location: [facility];  Service: Endoscopy;  Laterality: N/A;    COLONOSCOPY N/A 07/20/2023    Procedure: COLONOSCOPY;  Surgeon: Lili Ellis MD;  Location: [facility];  Service: Endoscopy;  Laterality: N/A;    COSMETIC SURGERY      ESOPHAGOGASTRODUODENOSCOPY N/A 07/20/2023    Procedure: EGD  (ESOPHAGOGASTRODUODENOSCOPY);  Surgeon: Lili Ellis MD;  Location: [facility] ENDO;  Service: Endoscopy;  Laterality: N/A;    ORCHIECTOMY      ROBOT-ASSISTED CHOLECYSTECTOMY USING DA RAZA XI N/A 01/07/2025    Procedure: XI ROBOTIC CHOLECYSTECTOMY;  Surgeon: Harjit Yeh MD;  Location: [facility] OR;  Service: General;  Laterality: N/A;    sex reassignment      1979    SMALL INTESTINE SURGERY      TONSILLECTOMY      VAGINOPLASTY, PENILE INVERSION         Labs:  Lab Results   Component Value Date    WBC 5.75 08/05/2025    HGB 13.1 (L) 08/05/2025    HCT 37.0 (L) 08/05/2025    MCV 98 08/05/2025     08/05/2025     BMP  Lab Results   Component Value Date     (L) 08/05/2025    K 4.6 08/05/2025    CL 94 (L) 08/05/2025    CO2 28 08/05/2025    BUN 30 (H) 08/05/2025    CREATININE 0.8 08/05/2025    CALCIUM 9.5 08/05/2025    ANIONGAP 7 (L) 08/05/2025    ESTGFRAFRICA >60 06/14/2022    EGFRNONAA >60 06/14/2022     Lab Results   Component Value Date    ALT 15 08/05/2025    AST 41 08/05/2025    GGT 10 07/25/2017    ALKPHOS 100 08/05/2025    BILITOT 0.4 08/05/2025       Lab Results   Component Value Date    IRON 91 06/30/2025    TIBC 343 06/30/2025    FERRITIN 343.0 (H) 06/30/2025     Lab Results   Component Value Date    NFUABDLG56 995 (H) 06/30/2025     Lab Results   Component Value Date    FOLATE 18.3 06/30/2025     Lab Results   Component Value Date    TSH 2.081 06/23/2025         Review of Systems   Constitutional:  Positive for unexpected weight change. Negative for activity change, appetite change, chills, diaphoresis, fatigue and fever.   HENT:  Negative for congestion, dental problem, drooling, ear discharge, ear pain, facial swelling, hearing loss, mouth sores, nosebleeds, postnasal drip, rhinorrhea, sinus pressure, sneezing, sore throat, tinnitus, trouble swallowing and voice change.    Eyes:  Negative for photophobia, pain, discharge, redness, itching and visual disturbance.   Respiratory:  Negative for cough,  choking, chest tightness, shortness of breath, wheezing and stridor.    Cardiovascular:  Negative for chest pain, palpitations and leg swelling.   Gastrointestinal:  Negative for abdominal distention, abdominal pain, anal bleeding, blood in stool, constipation, diarrhea, nausea, rectal pain and vomiting.   Endocrine: Negative for cold intolerance, heat intolerance, polydipsia, polyphagia and polyuria.   Genitourinary:  Negative for decreased urine volume, difficulty urinating, dyspareunia, dysuria, enuresis, flank pain, frequency, genital sores, hematuria, menstrual problem, pelvic pain, urgency, vaginal bleeding, vaginal discharge and vaginal pain.   Musculoskeletal:  Positive for back pain. Negative for arthralgias, gait problem, joint swelling, myalgias, neck pain and neck stiffness.   Skin:  Negative for color change, pallor and rash.   Allergic/Immunologic: Negative for environmental allergies, food allergies and immunocompromised state.   Neurological:  Negative for dizziness, tremors, seizures, syncope, facial asymmetry, speech difficulty, weakness, light-headedness, numbness and headaches.   Hematological:  Negative for adenopathy. Does not bruise/bleed easily.   Psychiatric/Behavioral:  Negative for agitation, behavioral problems, confusion, decreased concentration, dysphoric mood, hallucinations, self-injury, sleep disturbance and suicidal ideas. The patient is not nervous/anxious and is not hyperactive.        Objective:      Physical Exam  Vitals reviewed.   Constitutional:       General: She is not in acute distress.     Appearance: She is well-developed and underweight. She is ill-appearing. She is not diaphoretic.   HENT:      Head: Normocephalic and atraumatic.      Right Ear: External ear normal.      Left Ear: External ear normal.      Nose: Nose normal.      Right Sinus: No maxillary sinus tenderness or frontal sinus tenderness.      Left Sinus: No maxillary sinus tenderness or frontal sinus  tenderness.      Mouth/Throat:      Pharynx: No oropharyngeal exudate.   Eyes:      General: Lids are normal. No scleral icterus.        Right eye: No discharge.         Left eye: No discharge.      Conjunctiva/sclera: Conjunctivae normal.      Right eye: Right conjunctiva is not injected. No hemorrhage.     Left eye: Left conjunctiva is not injected. No hemorrhage.     Pupils: Pupils are equal, round, and reactive to light.   Neck:      Thyroid: No thyromegaly.      Vascular: No JVD.      Trachea: No tracheal deviation.   Cardiovascular:      Rate and Rhythm: Normal rate and regular rhythm.   Pulmonary:      Effort: Pulmonary effort is normal. No respiratory distress.      Breath sounds: No stridor.   Chest:      Chest wall: No tenderness.   Abdominal:      General: Bowel sounds are normal. There is no distension.      Palpations: Abdomen is soft. There is no hepatomegaly, splenomegaly or mass.      Tenderness: There is no abdominal tenderness. There is no rebound.   Musculoskeletal:         General: No tenderness. Normal range of motion.      Cervical back: Normal range of motion and neck supple.   Lymphadenopathy:      Cervical: No cervical adenopathy.      Upper Body:      Right upper body: No supraclavicular adenopathy.      Left upper body: No supraclavicular adenopathy.   Skin:     General: Skin is dry.      Findings: No erythema or rash.   Neurological:      Mental Status: She is alert and oriented to person, place, and time.      Cranial Nerves: No cranial nerve deficit.      Coordination: Coordination normal.   Psychiatric:         Behavior: Behavior normal.         Thought Content: Thought content normal.         Judgment: Judgment normal.             Assessment:      1. Macrocytic anemia    2. Chronic cough    3. Crohn's disease of small intestine without complication    4. Immunosuppressed status         Med Onc Route Chart for Scheduling     Plan:     Reviewed laboratory studies with her extensive  workup point does not reveal low-level macrocytic anemia cause variant additional laboratory today and will see review variant; previous use of tobacco discontinued over the last 20 years but will repeat CT chest; information on macrocytic anemia sent to patient for their review discussed possibility of bone marrow with myelodysplasia if progressive        Karan Russ Jr, MD FACP         [1]   Social History  Socioeconomic History    Marital status: Single   Tobacco Use    Smoking status: Former     Current packs/day: 0.00     Average packs/day: 0.3 packs/day for 10.0 years (2.5 ttl pk-yrs)     Types: Cigarettes     Start date: 1995     Quit date: 2005     Years since quittin.6    Smokeless tobacco: Never    Tobacco comments:     Former Light Smoker less than 10 a day   Substance and Sexual Activity    Alcohol use: Yes     Alcohol/week: 4.0 standard drinks of alcohol     Types: 4 Glasses of wine per week     Comment: occ    Drug use: No    Sexual activity: Not Currently     Partners: Male     Birth control/protection: Abstinence     Social Drivers of Health     Financial Resource Strain: Medium Risk (2025)    Overall Financial Resource Strain (CARDIA)     Difficulty of Paying Living Expenses: Somewhat hard   Food Insecurity: No Food Insecurity (2025)    Hunger Vital Sign     Worried About Running Out of Food in the Last Year: Never true     Ran Out of Food in the Last Year: Never true   Transportation Needs: No Transportation Needs (2025)    PRAPARE - Transportation     Lack of Transportation (Medical): No     Lack of Transportation (Non-Medical): No   Physical Activity: Sufficiently Active (2025)    Exercise Vital Sign     Days of Exercise per Week: 4 days     Minutes of Exercise per Session: 60 min   Stress: Stress Concern Present (2025)    [employer] - Occupational Stress Questionnaire     Feeling of Stress : To some extent   Housing Stability:  Low Risk  (4/16/2025)    Housing Stability Vital Sign     Unable to Pay for Housing in the Last Year: No     Number of Times Moved in the Last Year: 0     Homeless in the Last Year: No

## 2025-08-06 ENCOUNTER — TELEPHONE (OUTPATIENT)
Dept: HEMATOLOGY/ONCOLOGY | Facility: CLINIC | Age: 75
End: 2025-08-06
Payer: MEDICARE

## 2025-08-06 ENCOUNTER — PATIENT MESSAGE (OUTPATIENT)
Dept: PODIATRY | Facility: CLINIC | Age: 75
End: 2025-08-06
Payer: MEDICARE

## 2025-08-06 LAB — PTH-INTACT SERPL-MCNC: 47.3 PG/ML (ref 9–77)

## 2025-08-06 NOTE — TELEPHONE ENCOUNTER
Called pt to address recent referral from Dr. Russ and recent distress screening.    No answer via phone; will send a message on the patient portal.      8/5/2025     2:49 PM 7/3/2023    10:46 AM 12/28/2022     2:48 PM 6/23/2022     1:17 PM 12/16/2021    11:45 AM 5/13/2021     2:00 PM   DISTRESS SCREENING   Distress Score 5 0 - No Distress 0 - No Distress 0 - No Distress 0  0    Emotional Concerns Worry or anxiety        Retire Spiritual or Jehovah's witness Concerns      No    Physical Concerns Sleep;Loss or change of physical abilities            Data saved with a previous flowsheet row definition

## 2025-08-07 ENCOUNTER — OFFICE VISIT (OUTPATIENT)
Dept: PAIN MEDICINE | Facility: CLINIC | Age: 75
End: 2025-08-07
Payer: MEDICARE

## 2025-08-07 ENCOUNTER — PATIENT MESSAGE (OUTPATIENT)
Dept: PAIN MEDICINE | Facility: CLINIC | Age: 75
End: 2025-08-07

## 2025-08-07 VITALS
DIASTOLIC BLOOD PRESSURE: 84 MMHG | HEIGHT: 67 IN | HEART RATE: 85 BPM | BODY MASS INDEX: 14.88 KG/M2 | SYSTOLIC BLOOD PRESSURE: 134 MMHG | WEIGHT: 94.81 LBS

## 2025-08-07 DIAGNOSIS — M54.50 DORSALGIA OF LUMBOSACRAL REGION: Primary | ICD-10-CM

## 2025-08-07 DIAGNOSIS — M47.816 LUMBAR SPONDYLOSIS: ICD-10-CM

## 2025-08-07 DIAGNOSIS — M54.2 DORSALGIA OF CERVICOTHORACIC REGION: ICD-10-CM

## 2025-08-07 DIAGNOSIS — S39.012D STRAIN OF LUMBAR REGION, SUBSEQUENT ENCOUNTER: ICD-10-CM

## 2025-08-07 DIAGNOSIS — M54.6 DORSALGIA OF CERVICOTHORACIC REGION: ICD-10-CM

## 2025-08-07 PROCEDURE — 1101F PT FALLS ASSESS-DOCD LE1/YR: CPT | Mod: CPTII,S$GLB,, | Performed by: PHYSICIAN ASSISTANT

## 2025-08-07 PROCEDURE — 3079F DIAST BP 80-89 MM HG: CPT | Mod: CPTII,S$GLB,, | Performed by: PHYSICIAN ASSISTANT

## 2025-08-07 PROCEDURE — 99203 OFFICE O/P NEW LOW 30 MIN: CPT | Mod: S$GLB,,, | Performed by: PHYSICIAN ASSISTANT

## 2025-08-07 PROCEDURE — 1125F AMNT PAIN NOTED PAIN PRSNT: CPT | Mod: CPTII,S$GLB,, | Performed by: PHYSICIAN ASSISTANT

## 2025-08-07 PROCEDURE — 99999 PR PBB SHADOW E&M-EST. PATIENT-LVL IV: CPT | Mod: PBBFAC,,, | Performed by: PHYSICIAN ASSISTANT

## 2025-08-07 PROCEDURE — 1159F MED LIST DOCD IN RCRD: CPT | Mod: CPTII,S$GLB,, | Performed by: PHYSICIAN ASSISTANT

## 2025-08-07 PROCEDURE — 3044F HG A1C LEVEL LT 7.0%: CPT | Mod: CPTII,S$GLB,, | Performed by: PHYSICIAN ASSISTANT

## 2025-08-07 PROCEDURE — 3288F FALL RISK ASSESSMENT DOCD: CPT | Mod: CPTII,S$GLB,, | Performed by: PHYSICIAN ASSISTANT

## 2025-08-07 PROCEDURE — 3075F SYST BP GE 130 - 139MM HG: CPT | Mod: CPTII,S$GLB,, | Performed by: PHYSICIAN ASSISTANT

## 2025-08-07 RX ORDER — TIZANIDINE 2 MG/1
2 TABLET ORAL 2 TIMES DAILY PRN
Qty: 60 TABLET | Refills: 1 | Status: SHIPPED | OUTPATIENT
Start: 2025-08-07 | End: 2025-09-06
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
## 2025-08-11 ENCOUNTER — PATIENT MESSAGE (OUTPATIENT)
Dept: HEMATOLOGY/ONCOLOGY | Facility: CLINIC | Age: 75
End: 2025-08-11
Payer: MEDICARE

## 2025-08-13 ENCOUNTER — OFFICE VISIT (OUTPATIENT)
Dept: RHEUMATOLOGY | Facility: CLINIC | Age: 75
End: 2025-08-13
Payer: MEDICARE

## 2025-08-13 ENCOUNTER — INFUSION (OUTPATIENT)
Dept: INFUSION THERAPY | Facility: HOSPITAL | Age: 75
End: 2025-08-13
Attending: INTERNAL MEDICINE
Payer: MEDICARE

## 2025-08-13 ENCOUNTER — HOSPITAL ENCOUNTER (OUTPATIENT)
Dept: RADIOLOGY | Facility: HOSPITAL | Age: 75
Discharge: HOME | End: 2025-08-13
Attending: PHYSICIAN ASSISTANT
Payer: MEDICARE

## 2025-08-13 ENCOUNTER — PATIENT MESSAGE (OUTPATIENT)
Dept: RHEUMATOLOGY | Facility: CLINIC | Age: 75
End: 2025-08-13

## 2025-08-13 VITALS
HEART RATE: 85 BPM | DIASTOLIC BLOOD PRESSURE: 92 MMHG | HEIGHT: 67 IN | WEIGHT: 96.13 LBS | SYSTOLIC BLOOD PRESSURE: 161 MMHG | BODY MASS INDEX: 15.09 KG/M2

## 2025-08-13 VITALS
HEIGHT: 67 IN | BODY MASS INDEX: 15.09 KG/M2 | DIASTOLIC BLOOD PRESSURE: 92 MMHG | HEART RATE: 85 BPM | SYSTOLIC BLOOD PRESSURE: 161 MMHG | WEIGHT: 96.13 LBS

## 2025-08-13 DIAGNOSIS — Z71.89 COUNSELING ON HEALTH PROMOTION AND DISEASE PREVENTION: ICD-10-CM

## 2025-08-13 DIAGNOSIS — Z87.311 HISTORY OF FRAGILITY FRACTURE: ICD-10-CM

## 2025-08-13 DIAGNOSIS — Z79.620 ENCOUNTER FOR MONITORING DENOSUMAB THERAPY: ICD-10-CM

## 2025-08-13 DIAGNOSIS — I70.0 CALCIFICATION OF AORTA: ICD-10-CM

## 2025-08-13 DIAGNOSIS — Z87.442 HISTORY OF NEPHROLITHIASIS: ICD-10-CM

## 2025-08-13 DIAGNOSIS — S39.012D STRAIN OF LUMBAR REGION, SUBSEQUENT ENCOUNTER: ICD-10-CM

## 2025-08-13 DIAGNOSIS — M54.6 DORSALGIA OF CERVICOTHORACIC REGION: ICD-10-CM

## 2025-08-13 DIAGNOSIS — M47.816 LUMBAR SPONDYLOSIS: ICD-10-CM

## 2025-08-13 DIAGNOSIS — N18.31 STAGE 3A CHRONIC KIDNEY DISEASE: ICD-10-CM

## 2025-08-13 DIAGNOSIS — M54.50 DORSALGIA OF LUMBOSACRAL REGION: ICD-10-CM

## 2025-08-13 DIAGNOSIS — Z51.81 ENCOUNTER FOR MONITORING DENOSUMAB THERAPY: ICD-10-CM

## 2025-08-13 DIAGNOSIS — M81.0 AGE-RELATED OSTEOPOROSIS WITHOUT CURRENT PATHOLOGICAL FRACTURE: Primary | ICD-10-CM

## 2025-08-13 DIAGNOSIS — Z91.89 AT HIGH RISK FOR HYPOCALCEMIA: ICD-10-CM

## 2025-08-13 DIAGNOSIS — M54.2 DORSALGIA OF CERVICOTHORACIC REGION: ICD-10-CM

## 2025-08-13 DIAGNOSIS — Z51.81 MEDICATION MONITORING ENCOUNTER: ICD-10-CM

## 2025-08-13 PROCEDURE — 99999 PR PBB SHADOW E&M-EST. PATIENT-LVL III: CPT | Mod: PBBFAC,,, | Performed by: INTERNAL MEDICINE

## 2025-08-13 PROCEDURE — 96372 THER/PROPH/DIAG INJ SC/IM: CPT

## 2025-08-13 PROCEDURE — 3080F DIAST BP >= 90 MM HG: CPT | Mod: CPTII,S$GLB,, | Performed by: INTERNAL MEDICINE

## 2025-08-13 PROCEDURE — G2211 COMPLEX E/M VISIT ADD ON: HCPCS | Mod: S$GLB,,, | Performed by: INTERNAL MEDICINE

## 2025-08-13 PROCEDURE — 99215 OFFICE O/P EST HI 40 MIN: CPT | Mod: S$GLB,,, | Performed by: INTERNAL MEDICINE

## 2025-08-13 PROCEDURE — 1101F PT FALLS ASSESS-DOCD LE1/YR: CPT | Mod: CPTII,S$GLB,, | Performed by: INTERNAL MEDICINE

## 2025-08-13 PROCEDURE — 1159F MED LIST DOCD IN RCRD: CPT | Mod: CPTII,S$GLB,, | Performed by: INTERNAL MEDICINE

## 2025-08-13 PROCEDURE — 72082 X-RAY EXAM ENTIRE SPI 2/3 VW: CPT | Mod: 26,,, | Performed by: STUDENT IN AN ORGANIZED HEALTH CARE EDUCATION/TRAINING PROGRAM

## 2025-08-13 PROCEDURE — 3044F HG A1C LEVEL LT 7.0%: CPT | Mod: CPTII,S$GLB,, | Performed by: INTERNAL MEDICINE

## 2025-08-13 PROCEDURE — 1125F AMNT PAIN NOTED PAIN PRSNT: CPT | Mod: CPTII,S$GLB,, | Performed by: INTERNAL MEDICINE

## 2025-08-13 PROCEDURE — 63600175 PHARM REV CODE 636 W HCPCS: Mod: JZ,TB | Performed by: INTERNAL MEDICINE

## 2025-08-13 PROCEDURE — 3077F SYST BP >= 140 MM HG: CPT | Mod: CPTII,S$GLB,, | Performed by: INTERNAL MEDICINE

## 2025-08-13 PROCEDURE — 72082 X-RAY EXAM ENTIRE SPI 2/3 VW: CPT | Mod: TC

## 2025-08-13 PROCEDURE — 3288F FALL RISK ASSESSMENT DOCD: CPT | Mod: CPTII,S$GLB,, | Performed by: INTERNAL MEDICINE

## 2025-08-13 RX ADMIN — DENOSUMAB 60 MG: 60 INJECTION SUBCUTANEOUS at 03:08

## 2025-08-15 ENCOUNTER — PATIENT MESSAGE (OUTPATIENT)
Dept: PAIN MEDICINE | Facility: CLINIC | Age: 75
End: 2025-08-15
Payer: MEDICARE

## 2025-08-15 ENCOUNTER — HOSPITAL ENCOUNTER (OUTPATIENT)
Dept: RADIOLOGY | Facility: HOSPITAL | Age: 75
Discharge: HOME | End: 2025-08-15
Attending: INTERNAL MEDICINE
Payer: MEDICARE

## 2025-08-15 PROCEDURE — 71250 CT THORAX DX C-: CPT | Mod: 26,,, | Performed by: RADIOLOGY

## 2025-08-15 PROCEDURE — 71250 CT THORAX DX C-: CPT | Mod: TC

## 2025-08-20 ENCOUNTER — PATIENT MESSAGE (OUTPATIENT)
Dept: RHEUMATOLOGY | Facility: CLINIC | Age: 75
End: 2025-08-20
Payer: MEDICARE

## 2025-08-20 ENCOUNTER — OFFICE VISIT (OUTPATIENT)
Dept: PAIN MEDICINE | Facility: CLINIC | Age: 75
End: 2025-08-20
Payer: MEDICARE

## 2025-08-20 DIAGNOSIS — M54.50 DORSALGIA OF LUMBOSACRAL REGION: ICD-10-CM

## 2025-08-20 DIAGNOSIS — M54.2 DORSALGIA OF CERVICOTHORACIC REGION: ICD-10-CM

## 2025-08-20 DIAGNOSIS — M81.0 AGE-RELATED OSTEOPOROSIS WITHOUT CURRENT PATHOLOGICAL FRACTURE: ICD-10-CM

## 2025-08-20 DIAGNOSIS — M54.50 CHRONIC LOW BACK PAIN, UNSPECIFIED BACK PAIN LATERALITY, UNSPECIFIED WHETHER SCIATICA PRESENT: ICD-10-CM

## 2025-08-20 DIAGNOSIS — G89.29 CHRONIC LOW BACK PAIN, UNSPECIFIED BACK PAIN LATERALITY, UNSPECIFIED WHETHER SCIATICA PRESENT: ICD-10-CM

## 2025-08-20 DIAGNOSIS — M40.204 KYPHOSIS OF THORACIC REGION, UNSPECIFIED KYPHOSIS TYPE: ICD-10-CM

## 2025-08-20 DIAGNOSIS — M47.816 LUMBAR SPONDYLOSIS: ICD-10-CM

## 2025-08-20 DIAGNOSIS — M43.9 WEDGE DEFORMITY ON X-RAY OF SPINE: Primary | ICD-10-CM

## 2025-08-20 DIAGNOSIS — M54.6 DORSALGIA OF CERVICOTHORACIC REGION: ICD-10-CM

## 2025-08-20 DIAGNOSIS — S39.012D STRAIN OF LUMBAR REGION, SUBSEQUENT ENCOUNTER: ICD-10-CM

## 2025-08-20 DIAGNOSIS — Z87.311 HISTORY OF FRAGILITY FRACTURE: Primary | ICD-10-CM

## 2025-08-20 PROCEDURE — 98005 SYNCH AUDIO-VIDEO EST LOW 20: CPT | Mod: 95,,, | Performed by: PHYSICIAN ASSISTANT

## 2025-08-20 PROCEDURE — 3044F HG A1C LEVEL LT 7.0%: CPT | Mod: CPTII,95,, | Performed by: PHYSICIAN ASSISTANT

## 2025-08-26 ENCOUNTER — PATIENT MESSAGE (OUTPATIENT)
Dept: PAIN MEDICINE | Facility: CLINIC | Age: 75
End: 2025-08-26
Payer: MEDICARE

## 2025-08-26 DIAGNOSIS — M54.2 DORSALGIA OF CERVICOTHORACIC REGION: ICD-10-CM

## 2025-08-26 DIAGNOSIS — M47.816 LUMBAR SPONDYLOSIS: ICD-10-CM

## 2025-08-26 DIAGNOSIS — M54.50 DORSALGIA OF LUMBOSACRAL REGION: ICD-10-CM

## 2025-08-26 DIAGNOSIS — S39.012D STRAIN OF LUMBAR REGION, SUBSEQUENT ENCOUNTER: ICD-10-CM

## 2025-08-26 DIAGNOSIS — M54.6 DORSALGIA OF CERVICOTHORACIC REGION: ICD-10-CM

## 2025-08-27 ENCOUNTER — OFFICE VISIT (OUTPATIENT)
Dept: HEMATOLOGY/ONCOLOGY | Facility: CLINIC | Age: 75
End: 2025-08-27
Payer: MEDICARE

## 2025-08-27 DIAGNOSIS — K50.00 CROHN'S DISEASE OF SMALL INTESTINE WITHOUT COMPLICATION: Primary | ICD-10-CM

## 2025-08-27 PROCEDURE — 98005 SYNCH AUDIO-VIDEO EST LOW 20: CPT | Mod: 95,,, | Performed by: INTERNAL MEDICINE

## 2025-08-27 PROCEDURE — 1159F MED LIST DOCD IN RCRD: CPT | Mod: CPTII,95,, | Performed by: INTERNAL MEDICINE

## 2025-08-27 PROCEDURE — 3044F HG A1C LEVEL LT 7.0%: CPT | Mod: CPTII,95,, | Performed by: INTERNAL MEDICINE

## 2025-08-27 PROCEDURE — 1160F RVW MEDS BY RX/DR IN RCRD: CPT | Mod: CPTII,95,, | Performed by: INTERNAL MEDICINE

## 2025-08-27 RX ORDER — TIZANIDINE 2 MG/1
4 TABLET ORAL 2 TIMES DAILY PRN
Qty: 120 TABLET | Refills: 1 | Status: SHIPPED | OUTPATIENT
Start: 2025-08-27 | End: 2025-09-27

## 2025-08-28 ENCOUNTER — OFFICE VISIT (OUTPATIENT)
Dept: OPHTHALMOLOGY | Facility: CLINIC | Age: 75
End: 2025-08-28
Payer: MEDICARE

## 2025-08-28 ENCOUNTER — PATIENT MESSAGE (OUTPATIENT)
Dept: OPHTHALMOLOGY | Facility: CLINIC | Age: 75
End: 2025-08-28

## 2025-08-28 DIAGNOSIS — H52.7 REFRACTIVE ERRORS: ICD-10-CM

## 2025-08-28 DIAGNOSIS — H25.13 CATARACT, NUCLEAR SCLEROTIC SENILE, BILATERAL: Primary | ICD-10-CM

## 2025-08-28 DIAGNOSIS — H35.363 DRUSEN OF MACULA OF BOTH EYES: ICD-10-CM

## 2025-08-28 DIAGNOSIS — I10 ESSENTIAL HYPERTENSION: ICD-10-CM

## 2025-08-28 PROCEDURE — 92014 COMPRE OPH EXAM EST PT 1/>: CPT | Mod: S$GLB,,, | Performed by: OPTOMETRIST

## 2025-08-28 PROCEDURE — 92015 DETERMINE REFRACTIVE STATE: CPT | Mod: S$GLB,,, | Performed by: OPTOMETRIST

## 2025-08-28 PROCEDURE — 1159F MED LIST DOCD IN RCRD: CPT | Mod: CPTII,S$GLB,, | Performed by: OPTOMETRIST

## 2025-08-28 PROCEDURE — 99999 PR PBB SHADOW E&M-EST. PATIENT-LVL III: CPT | Mod: PBBFAC,,, | Performed by: OPTOMETRIST

## 2025-08-28 PROCEDURE — 3044F HG A1C LEVEL LT 7.0%: CPT | Mod: CPTII,S$GLB,, | Performed by: OPTOMETRIST

## 2025-09-03 ENCOUNTER — OFFICE VISIT (OUTPATIENT)
Dept: INTERNAL MEDICINE | Facility: CLINIC | Age: 75
End: 2025-09-03
Payer: MEDICARE

## 2025-09-03 ENCOUNTER — HOSPITAL ENCOUNTER (OUTPATIENT)
Dept: PREADMISSION TESTING | Facility: HOSPITAL | Age: 75
Discharge: HOME | End: 2025-09-03
Attending: INTERNAL MEDICINE
Payer: MEDICARE

## 2025-09-03 VITALS
DIASTOLIC BLOOD PRESSURE: 70 MMHG | HEART RATE: 90 BPM | RESPIRATION RATE: 20 BRPM | BODY MASS INDEX: 15.57 KG/M2 | SYSTOLIC BLOOD PRESSURE: 125 MMHG | WEIGHT: 99.19 LBS | TEMPERATURE: 96 F | HEIGHT: 67 IN | OXYGEN SATURATION: 97 %

## 2025-09-03 DIAGNOSIS — M81.0 AGE-RELATED OSTEOPOROSIS WITHOUT CURRENT PATHOLOGICAL FRACTURE: ICD-10-CM

## 2025-09-03 DIAGNOSIS — K50.00 CROHN'S DISEASE OF SMALL INTESTINE WITHOUT COMPLICATION: Primary | ICD-10-CM

## 2025-09-03 DIAGNOSIS — F51.04 CHRONIC INSOMNIA: ICD-10-CM

## 2025-09-03 DIAGNOSIS — D64.9 MILD ANEMIA: ICD-10-CM

## 2025-09-03 DIAGNOSIS — Z87.891 HISTORY OF NICOTINE USE: ICD-10-CM

## 2025-09-03 DIAGNOSIS — R91.8 ABNORMAL CT SCAN OF LUNG: ICD-10-CM

## 2025-09-03 DIAGNOSIS — I10 ESSENTIAL HYPERTENSION: Primary | ICD-10-CM

## 2025-09-03 PROCEDURE — 1101F PT FALLS ASSESS-DOCD LE1/YR: CPT | Mod: CPTII,S$GLB,, | Performed by: INTERNAL MEDICINE

## 2025-09-03 PROCEDURE — G2211 COMPLEX E/M VISIT ADD ON: HCPCS | Mod: S$GLB,,, | Performed by: INTERNAL MEDICINE

## 2025-09-03 PROCEDURE — 1159F MED LIST DOCD IN RCRD: CPT | Mod: CPTII,S$GLB,, | Performed by: INTERNAL MEDICINE

## 2025-09-03 PROCEDURE — 3078F DIAST BP <80 MM HG: CPT | Mod: CPTII,S$GLB,, | Performed by: INTERNAL MEDICINE

## 2025-09-03 PROCEDURE — 3074F SYST BP LT 130 MM HG: CPT | Mod: CPTII,S$GLB,, | Performed by: INTERNAL MEDICINE

## 2025-09-03 PROCEDURE — 3044F HG A1C LEVEL LT 7.0%: CPT | Mod: CPTII,S$GLB,, | Performed by: INTERNAL MEDICINE

## 2025-09-03 PROCEDURE — 1125F AMNT PAIN NOTED PAIN PRSNT: CPT | Mod: CPTII,S$GLB,, | Performed by: INTERNAL MEDICINE

## 2025-09-03 PROCEDURE — 3288F FALL RISK ASSESSMENT DOCD: CPT | Mod: CPTII,S$GLB,, | Performed by: INTERNAL MEDICINE

## 2025-09-03 PROCEDURE — 99999 PR PBB SHADOW E&M-EST. PATIENT-LVL V: CPT | Mod: PBBFAC,,, | Performed by: INTERNAL MEDICINE

## 2025-09-03 PROCEDURE — 1160F RVW MEDS BY RX/DR IN RCRD: CPT | Mod: CPTII,S$GLB,, | Performed by: INTERNAL MEDICINE

## 2025-09-03 PROCEDURE — 99214 OFFICE O/P EST MOD 30 MIN: CPT | Mod: S$GLB,,, | Performed by: INTERNAL MEDICINE

## 2025-09-03 RX ORDER — POLYETHYLENE GLYCOL 3350, SODIUM SULFATE ANHYDROUS, SODIUM BICARBONATE, SODIUM CHLORIDE, POTASSIUM CHLORIDE 236; 22.74; 6.74; 5.86; 2.97 G/4L; G/4L; G/4L; G/4L; G/4L
4 POWDER, FOR SOLUTION ORAL ONCE
Qty: 4000 ML | Refills: 0 | Status: SHIPPED | OUTPATIENT
Start: 2025-09-03 | End: 2025-09-05

## 2025-09-03 RX ORDER — SODIUM, POTASSIUM,MAG SULFATES 17.5-3.13G
1 SOLUTION, RECONSTITUTED, ORAL ORAL DAILY
Qty: 1 KIT | Refills: 0 | Status: CANCELLED | OUTPATIENT
Start: 2025-09-03 | End: 2025-09-05

## 2025-09-03 RX ORDER — TRAZODONE HYDROCHLORIDE 50 MG/1
50 TABLET ORAL NIGHTLY PRN
Qty: 30 TABLET | Refills: 5 | Status: SHIPPED | OUTPATIENT
Start: 2025-09-03 | End: 2025-09-05 | Stop reason: SINTOL

## (undated) DEVICE — SOL STRL WATER INJ 1000ML BG

## (undated) DEVICE — NDL ECLIPSE SAFETY 23G 1.5IN

## (undated) DEVICE — SYR PLASTIPAK LL 3ML

## (undated) DEVICE — SEAL CANN UNIVERSAL 5-12MM

## (undated) DEVICE — ADHESIVE DERMABOND ADVANCED

## (undated) DEVICE — PACK BASIC SETUP SC BR

## (undated) DEVICE — GLOVE SENSICARE PI GRN 7

## (undated) DEVICE — COVER TIP CURVED SCISSORS XI

## (undated) DEVICE — OBTURATOR BLADELESS 8MM XI CLR

## (undated) DEVICE — KIT ANTIFOG W/SPONG & FLUID

## (undated) DEVICE — BAG TISSUE RETRIEVAL 5MM

## (undated) DEVICE — SUT MONOCRYL 4.0 PS2 CP496G

## (undated) DEVICE — DECANTER 6 VIAL

## (undated) DEVICE — APPLICATOR CHLORAPREP ORN 26ML

## (undated) DEVICE — DRAPE COLUMN DAVINCI XI

## (undated) DEVICE — GLOVE SIGNATURE ESSNTL LTX 7

## (undated) DEVICE — SET PNEUMOCLEAR HEAT HUM SE HF

## (undated) DEVICE — GOWN POLY REINF BRTH SLV XL

## (undated) DEVICE — HEADREST ROUND DISP FOAM 9IN

## (undated) DEVICE — DRAPE LAPSCP CHOLE 122X102X78

## (undated) DEVICE — ELECTRODE REM PLYHSV RETURN 9

## (undated) DEVICE — DRAPE THREE-QTR REINF 53X77IN

## (undated) DEVICE — MANIFOLD 4 PORT

## (undated) DEVICE — TOWEL OR DISP STRL BLUE 4/PK

## (undated) DEVICE — COVER LIGHT HANDLE 80/CA

## (undated) DEVICE — DRAPE ARM DAVINCI XI

## (undated) DEVICE — CLIP HEMO-LOK ML

## (undated) DEVICE — SUT PDS II O CT-2 VIL MONO

## (undated) DEVICE — SOL NORMAL USPCA 0.9%